# Patient Record
Sex: MALE | Race: WHITE | Employment: OTHER | URBAN - METROPOLITAN AREA
[De-identification: names, ages, dates, MRNs, and addresses within clinical notes are randomized per-mention and may not be internally consistent; named-entity substitution may affect disease eponyms.]

---

## 2017-01-05 ENCOUNTER — OFFICE VISIT (OUTPATIENT)
Dept: INTERNAL MEDICINE | Age: 82
End: 2017-01-05
Attending: INTERNAL MEDICINE

## 2017-01-05 VITALS
HEART RATE: 57 BPM | DIASTOLIC BLOOD PRESSURE: 70 MMHG | SYSTOLIC BLOOD PRESSURE: 127 MMHG | RESPIRATION RATE: 16 BRPM | WEIGHT: 230.6 LBS | OXYGEN SATURATION: 96 % | BODY MASS INDEX: 37.22 KG/M2 | TEMPERATURE: 97.6 F

## 2017-01-05 DIAGNOSIS — I35.0 AORTIC VALVE STENOSIS, UNSPECIFIED ETIOLOGY: Primary | ICD-10-CM

## 2017-01-05 LAB
ALBUMIN SERPL-MCNC: 4 G/DL (ref 3.4–5)
ANION GAP SERPL CALCULATED.3IONS-SCNC: 11 MMOL/L (ref 3–16)
BUN BLDV-MCNC: 27 MG/DL (ref 7–20)
CALCIUM SERPL-MCNC: 9.2 MG/DL (ref 8.3–10.6)
CHLORIDE BLD-SCNC: 105 MMOL/L (ref 99–110)
CO2: 28 MMOL/L (ref 21–32)
CREAT SERPL-MCNC: 1.5 MG/DL (ref 0.8–1.3)
GFR AFRICAN AMERICAN: 54
GFR NON-AFRICAN AMERICAN: 45
GLUCOSE BLD-MCNC: 126 MG/DL (ref 70–99)
HCT VFR BLD CALC: 40.1 % (ref 40.5–52.5)
HEMOGLOBIN: 13.5 G/DL (ref 13.5–17.5)
MCH RBC QN AUTO: 32.5 PG (ref 26–34)
MCHC RBC AUTO-ENTMCNC: 33.8 G/DL (ref 31–36)
MCV RBC AUTO: 96.1 FL (ref 80–100)
PDW BLD-RTO: 13.1 % (ref 12.4–15.4)
PHOSPHORUS: 3.9 MG/DL (ref 2.5–4.9)
PLATELET # BLD: 142 K/UL (ref 135–450)
PMV BLD AUTO: 7.6 FL (ref 5–10.5)
POTASSIUM SERPL-SCNC: 4.6 MMOL/L (ref 3.5–5.1)
RBC # BLD: 4.17 M/UL (ref 4.2–5.9)
SODIUM BLD-SCNC: 144 MMOL/L (ref 136–145)
WBC # BLD: 4.9 K/UL (ref 4–11)

## 2017-01-13 ENCOUNTER — TELEPHONE (OUTPATIENT)
Dept: INTERNAL MEDICINE | Age: 82
End: 2017-01-13

## 2017-01-24 ENCOUNTER — OFFICE VISIT (OUTPATIENT)
Dept: CARDIOLOGY CLINIC | Age: 82
End: 2017-01-24

## 2017-01-24 VITALS
DIASTOLIC BLOOD PRESSURE: 60 MMHG | WEIGHT: 234 LBS | SYSTOLIC BLOOD PRESSURE: 110 MMHG | BODY MASS INDEX: 37.77 KG/M2 | HEART RATE: 60 BPM

## 2017-01-24 DIAGNOSIS — I25.10 CAD IN NATIVE ARTERY: Primary | ICD-10-CM

## 2017-01-24 DIAGNOSIS — Z98.61 POST PTCA: ICD-10-CM

## 2017-01-24 DIAGNOSIS — I35.0 NONRHEUMATIC AORTIC VALVE STENOSIS: ICD-10-CM

## 2017-01-24 DIAGNOSIS — I42.9 CARDIOMYOPATHY (HCC): ICD-10-CM

## 2017-01-24 PROCEDURE — 93000 ELECTROCARDIOGRAM COMPLETE: CPT | Performed by: INTERNAL MEDICINE

## 2017-01-24 PROCEDURE — 99215 OFFICE O/P EST HI 40 MIN: CPT | Performed by: INTERNAL MEDICINE

## 2017-01-24 PROCEDURE — 1123F ACP DISCUSS/DSCN MKR DOCD: CPT | Performed by: INTERNAL MEDICINE

## 2017-01-24 PROCEDURE — 1036F TOBACCO NON-USER: CPT | Performed by: INTERNAL MEDICINE

## 2017-01-24 PROCEDURE — G8419 CALC BMI OUT NRM PARAM NOF/U: HCPCS | Performed by: INTERNAL MEDICINE

## 2017-01-24 PROCEDURE — G8484 FLU IMMUNIZE NO ADMIN: HCPCS | Performed by: INTERNAL MEDICINE

## 2017-01-24 PROCEDURE — 4040F PNEUMOC VAC/ADMIN/RCVD: CPT | Performed by: INTERNAL MEDICINE

## 2017-01-24 PROCEDURE — G8428 CUR MEDS NOT DOCUMENT: HCPCS | Performed by: INTERNAL MEDICINE

## 2017-01-24 PROCEDURE — G8598 ASA/ANTIPLAT THER USED: HCPCS | Performed by: INTERNAL MEDICINE

## 2017-01-24 RX ORDER — LISINOPRIL 40 MG/1
40 TABLET ORAL DAILY
Qty: 30 TABLET | Refills: 5
Start: 2017-01-24 | End: 2017-07-21 | Stop reason: SDUPTHER

## 2017-01-24 ASSESSMENT — ENCOUNTER SYMPTOMS
SHORTNESS OF BREATH: 0
APNEA: 0
CHOKING: 0
ABDOMINAL DISTENTION: 0
CHEST TIGHTNESS: 0
COUGH: 0

## 2017-01-26 ENCOUNTER — OFFICE VISIT (OUTPATIENT)
Dept: INTERNAL MEDICINE | Age: 82
End: 2017-01-26
Attending: INTERNAL MEDICINE

## 2017-01-26 ENCOUNTER — OFFICE VISIT (OUTPATIENT)
Dept: INTERNAL MEDICINE | Age: 82
End: 2017-01-26

## 2017-01-26 VITALS
BODY MASS INDEX: 37.32 KG/M2 | OXYGEN SATURATION: 95 % | HEART RATE: 56 BPM | TEMPERATURE: 97.8 F | DIASTOLIC BLOOD PRESSURE: 60 MMHG | SYSTOLIC BLOOD PRESSURE: 109 MMHG | RESPIRATION RATE: 16 BRPM | WEIGHT: 231.2 LBS

## 2017-01-26 DIAGNOSIS — Z09 FOLLOW UP: Primary | ICD-10-CM

## 2017-01-26 LAB
ALBUMIN SERPL-MCNC: 3.8 G/DL (ref 3.4–5)
ANION GAP SERPL CALCULATED.3IONS-SCNC: 8 MMOL/L (ref 3–16)
BUN BLDV-MCNC: 28 MG/DL (ref 7–20)
CALCIUM SERPL-MCNC: 9 MG/DL (ref 8.3–10.6)
CHLORIDE BLD-SCNC: 103 MMOL/L (ref 99–110)
CO2: 29 MMOL/L (ref 21–32)
CREAT SERPL-MCNC: 1.4 MG/DL (ref 0.8–1.3)
GFR AFRICAN AMERICAN: 58
GFR NON-AFRICAN AMERICAN: 48
GLUCOSE BLD-MCNC: 117 MG/DL (ref 70–99)
PHOSPHORUS: 3 MG/DL (ref 2.5–4.9)
POTASSIUM SERPL-SCNC: 3.6 MMOL/L (ref 3.5–5.1)
SODIUM BLD-SCNC: 140 MMOL/L (ref 136–145)

## 2017-02-09 DIAGNOSIS — N18.9 CKD (CHRONIC KIDNEY DISEASE), UNSPECIFIED STAGE: Primary | ICD-10-CM

## 2017-03-02 ENCOUNTER — TELEPHONE (OUTPATIENT)
Dept: INTERNAL MEDICINE | Age: 82
End: 2017-03-02

## 2017-03-02 DIAGNOSIS — N18.2 CKD (CHRONIC KIDNEY DISEASE), STAGE 2 (MILD): Primary | ICD-10-CM

## 2017-03-29 RX ORDER — TRIAMTERENE AND HYDROCHLOROTHIAZIDE 37.5; 25 MG/1; MG/1
1 TABLET ORAL DAILY
Qty: 30 TABLET | Refills: 3
Start: 2017-03-29 | End: 2017-07-21 | Stop reason: SDUPTHER

## 2017-03-29 RX ORDER — RANITIDINE 150 MG/1
150 TABLET ORAL 2 TIMES DAILY
Qty: 60 TABLET | Refills: 3
Start: 2017-03-29 | End: 2018-01-25 | Stop reason: SDUPTHER

## 2017-03-29 RX ORDER — DOCUSATE SODIUM 100 MG/1
100 CAPSULE, LIQUID FILLED ORAL 2 TIMES DAILY
Qty: 60 CAPSULE | Refills: 3
Start: 2017-03-29 | End: 2017-07-21 | Stop reason: SDUPTHER

## 2017-03-31 ENCOUNTER — TELEPHONE (OUTPATIENT)
Dept: CARDIOLOGY CLINIC | Age: 82
End: 2017-03-31

## 2017-04-07 DIAGNOSIS — M51.36 DDD (DEGENERATIVE DISC DISEASE), LUMBAR: Primary | ICD-10-CM

## 2017-04-14 ENCOUNTER — HOSPITAL ENCOUNTER (OUTPATIENT)
Dept: PHYSICAL THERAPY | Age: 82
Discharge: HOME | End: 2017-04-30
Admitting: PHYSICAL MEDICINE & REHABILITATION

## 2017-04-19 ENCOUNTER — HOSPITAL ENCOUNTER (OUTPATIENT)
Dept: PHYSICAL THERAPY | Age: 82
Discharge: HOME | End: 2017-04-20
Admitting: PHYSICAL MEDICINE & REHABILITATION

## 2017-04-21 ENCOUNTER — HOSPITAL ENCOUNTER (OUTPATIENT)
Dept: PHYSICAL THERAPY | Age: 82
Discharge: HOME | End: 2017-04-22
Admitting: PHYSICAL MEDICINE & REHABILITATION

## 2017-04-26 ENCOUNTER — HOSPITAL ENCOUNTER (OUTPATIENT)
Dept: PHYSICAL THERAPY | Age: 82
Discharge: HOME | End: 2017-04-27
Admitting: PHYSICAL MEDICINE & REHABILITATION

## 2017-04-28 ENCOUNTER — HOSPITAL ENCOUNTER (OUTPATIENT)
Dept: PHYSICAL THERAPY | Age: 82
Discharge: HOME | End: 2017-04-29
Admitting: PHYSICAL MEDICINE & REHABILITATION

## 2017-05-03 ENCOUNTER — HOSPITAL ENCOUNTER (OUTPATIENT)
Dept: PHYSICAL THERAPY | Age: 82
Discharge: HOME | End: 2017-05-04
Admitting: PHYSICAL MEDICINE & REHABILITATION

## 2017-05-05 ENCOUNTER — HOSPITAL ENCOUNTER (OUTPATIENT)
Dept: PHYSICAL THERAPY | Age: 82
Discharge: HOME | End: 2017-05-06
Admitting: PHYSICAL MEDICINE & REHABILITATION

## 2017-05-10 ENCOUNTER — HOSPITAL ENCOUNTER (OUTPATIENT)
Dept: PHYSICAL THERAPY | Age: 82
Discharge: HOME | End: 2017-05-11
Admitting: PHYSICAL MEDICINE & REHABILITATION

## 2017-05-24 RX ORDER — CARVEDILOL 3.12 MG/1
3.12 TABLET ORAL 2 TIMES DAILY WITH MEALS
Qty: 60 TABLET | Refills: 3 | Status: SHIPPED | OUTPATIENT
Start: 2017-05-24 | End: 2017-06-29 | Stop reason: SDUPTHER

## 2017-05-30 RX ORDER — NITROGLYCERIN 0.4 MG/1
0.4 TABLET SUBLINGUAL EVERY 5 MIN PRN
Qty: 25 TABLET | Refills: 0
Start: 2017-05-30 | End: 2018-01-25 | Stop reason: SDUPTHER

## 2017-06-01 ENCOUNTER — OFFICE VISIT (OUTPATIENT)
Dept: INTERNAL MEDICINE | Age: 82
End: 2017-06-01
Attending: INTERNAL MEDICINE

## 2017-06-01 VITALS
BODY MASS INDEX: 36.9 KG/M2 | DIASTOLIC BLOOD PRESSURE: 66 MMHG | OXYGEN SATURATION: 94 % | HEART RATE: 64 BPM | WEIGHT: 228.6 LBS | TEMPERATURE: 97.6 F | RESPIRATION RATE: 16 BRPM | SYSTOLIC BLOOD PRESSURE: 106 MMHG

## 2017-06-01 DIAGNOSIS — I95.2 HYPOTENSION DUE TO DRUGS: Primary | ICD-10-CM

## 2017-06-01 DIAGNOSIS — Z91.09 ENVIRONMENTAL ALLERGIES: ICD-10-CM

## 2017-06-01 RX ORDER — CETIRIZINE HYDROCHLORIDE 10 MG/1
10 TABLET ORAL DAILY
Qty: 30 TABLET | Refills: 3 | Status: SHIPPED | OUTPATIENT
Start: 2017-06-01 | End: 2018-01-25 | Stop reason: SDUPTHER

## 2017-06-02 ENCOUNTER — HOSPITAL ENCOUNTER (OUTPATIENT)
Dept: NON INVASIVE DIAGNOSTICS | Age: 82
Discharge: HOME | End: 2017-06-02
Attending: INTERNAL MEDICINE | Admitting: INTERNAL MEDICINE

## 2017-06-02 DIAGNOSIS — I95.2 HYPOTENSION DUE TO DRUGS: ICD-10-CM

## 2017-06-02 LAB
LV EF: 55 %
LVEF MODALITY: NORMAL

## 2017-06-16 ENCOUNTER — TELEPHONE (OUTPATIENT)
Dept: INTERNAL MEDICINE | Age: 82
End: 2017-06-16

## 2017-06-28 ENCOUNTER — TELEPHONE (OUTPATIENT)
Dept: INTERNAL MEDICINE | Age: 82
End: 2017-06-28

## 2017-06-29 ENCOUNTER — OFFICE VISIT (OUTPATIENT)
Dept: INTERNAL MEDICINE | Age: 82
End: 2017-06-29
Attending: INTERNAL MEDICINE

## 2017-06-29 VITALS
WEIGHT: 226.4 LBS | HEART RATE: 51 BPM | BODY MASS INDEX: 36.54 KG/M2 | TEMPERATURE: 97.4 F | SYSTOLIC BLOOD PRESSURE: 124 MMHG | RESPIRATION RATE: 16 BRPM | OXYGEN SATURATION: 98 % | DIASTOLIC BLOOD PRESSURE: 67 MMHG

## 2017-06-29 DIAGNOSIS — R68.89 BLOOD PRESSURE ALTERATION: Primary | ICD-10-CM

## 2017-06-29 RX ORDER — CARVEDILOL 3.12 MG/1
3.12 TABLET ORAL NIGHTLY
Qty: 30 TABLET | Refills: 3 | Status: SHIPPED | OUTPATIENT
Start: 2017-06-29 | End: 2017-11-02 | Stop reason: SDUPTHER

## 2017-07-21 RX ORDER — DOCUSATE SODIUM 100 MG/1
100 CAPSULE, LIQUID FILLED ORAL 2 TIMES DAILY
Qty: 60 CAPSULE | Refills: 3
Start: 2017-07-21 | End: 2017-11-27 | Stop reason: SDUPTHER

## 2017-07-21 RX ORDER — LISINOPRIL 40 MG/1
40 TABLET ORAL DAILY
Qty: 30 TABLET | Refills: 3
Start: 2017-07-21 | End: 2017-11-27 | Stop reason: SDUPTHER

## 2017-07-21 RX ORDER — TRIAMTERENE AND HYDROCHLOROTHIAZIDE 37.5; 25 MG/1; MG/1
1 TABLET ORAL DAILY
Qty: 30 TABLET | Refills: 3
Start: 2017-07-21 | End: 2017-11-29 | Stop reason: SDUPTHER

## 2017-07-21 RX ORDER — ASPIRIN 81 MG/1
162 TABLET ORAL DAILY
Qty: 30 TABLET | Refills: 3
Start: 2017-07-21 | End: 2017-11-13 | Stop reason: SDUPTHER

## 2017-07-25 ENCOUNTER — OFFICE VISIT (OUTPATIENT)
Dept: CARDIOLOGY CLINIC | Age: 82
End: 2017-07-25

## 2017-07-25 VITALS
DIASTOLIC BLOOD PRESSURE: 58 MMHG | BODY MASS INDEX: 36.83 KG/M2 | HEART RATE: 59 BPM | WEIGHT: 228.2 LBS | SYSTOLIC BLOOD PRESSURE: 130 MMHG

## 2017-07-25 DIAGNOSIS — I25.10 CAD IN NATIVE ARTERY: Primary | ICD-10-CM

## 2017-07-25 DIAGNOSIS — I35.0 NONRHEUMATIC AORTIC VALVE STENOSIS: ICD-10-CM

## 2017-07-25 DIAGNOSIS — I42.9 CARDIOMYOPATHY (HCC): ICD-10-CM

## 2017-07-25 DIAGNOSIS — Z98.61 POST PTCA: ICD-10-CM

## 2017-07-25 PROCEDURE — 4040F PNEUMOC VAC/ADMIN/RCVD: CPT | Performed by: INTERNAL MEDICINE

## 2017-07-25 PROCEDURE — 99214 OFFICE O/P EST MOD 30 MIN: CPT | Performed by: INTERNAL MEDICINE

## 2017-07-25 PROCEDURE — G8598 ASA/ANTIPLAT THER USED: HCPCS | Performed by: INTERNAL MEDICINE

## 2017-07-25 PROCEDURE — G8419 CALC BMI OUT NRM PARAM NOF/U: HCPCS | Performed by: INTERNAL MEDICINE

## 2017-07-25 PROCEDURE — G8427 DOCREV CUR MEDS BY ELIG CLIN: HCPCS | Performed by: INTERNAL MEDICINE

## 2017-07-25 PROCEDURE — 1036F TOBACCO NON-USER: CPT | Performed by: INTERNAL MEDICINE

## 2017-07-25 PROCEDURE — 1123F ACP DISCUSS/DSCN MKR DOCD: CPT | Performed by: INTERNAL MEDICINE

## 2017-07-27 ENCOUNTER — OFFICE VISIT (OUTPATIENT)
Dept: INTERNAL MEDICINE | Age: 82
End: 2017-07-27
Attending: INTERNAL MEDICINE

## 2017-07-27 VITALS
OXYGEN SATURATION: 96 % | WEIGHT: 228.8 LBS | SYSTOLIC BLOOD PRESSURE: 114 MMHG | DIASTOLIC BLOOD PRESSURE: 67 MMHG | RESPIRATION RATE: 16 BRPM | BODY MASS INDEX: 36.93 KG/M2 | HEART RATE: 66 BPM | TEMPERATURE: 97.8 F

## 2017-07-27 DIAGNOSIS — I10 ESSENTIAL HYPERTENSION: Primary | ICD-10-CM

## 2017-08-14 DIAGNOSIS — E78.5 HYPERLIPIDEMIA, UNSPECIFIED HYPERLIPIDEMIA TYPE: ICD-10-CM

## 2017-08-14 RX ORDER — EZETIMIBE 10 MG/1
10 TABLET ORAL NIGHTLY
Qty: 30 TABLET | Refills: 6 | Status: SHIPPED | OUTPATIENT
Start: 2017-08-14 | End: 2018-01-25 | Stop reason: SDUPTHER

## 2017-10-12 ENCOUNTER — OFFICE VISIT (OUTPATIENT)
Dept: ORTHOPEDIC SURGERY | Age: 82
End: 2017-10-12

## 2017-10-12 VITALS
HEIGHT: 66 IN | SYSTOLIC BLOOD PRESSURE: 139 MMHG | HEART RATE: 53 BPM | DIASTOLIC BLOOD PRESSURE: 69 MMHG | BODY MASS INDEX: 36.78 KG/M2 | WEIGHT: 228.84 LBS

## 2017-10-12 DIAGNOSIS — I71.40 ABDOMINAL ANEURYSM: ICD-10-CM

## 2017-10-12 DIAGNOSIS — M51.36 DDD (DEGENERATIVE DISC DISEASE), LUMBAR: Primary | ICD-10-CM

## 2017-10-12 DIAGNOSIS — M47.816 SPONDYLOSIS OF LUMBAR REGION WITHOUT MYELOPATHY OR RADICULOPATHY: ICD-10-CM

## 2017-10-12 PROCEDURE — G8598 ASA/ANTIPLAT THER USED: HCPCS | Performed by: PHYSICAL MEDICINE & REHABILITATION

## 2017-10-12 PROCEDURE — 1123F ACP DISCUSS/DSCN MKR DOCD: CPT | Performed by: PHYSICAL MEDICINE & REHABILITATION

## 2017-10-12 PROCEDURE — 99213 OFFICE O/P EST LOW 20 MIN: CPT | Performed by: PHYSICAL MEDICINE & REHABILITATION

## 2017-10-12 PROCEDURE — G8417 CALC BMI ABV UP PARAM F/U: HCPCS | Performed by: PHYSICAL MEDICINE & REHABILITATION

## 2017-10-12 PROCEDURE — 4040F PNEUMOC VAC/ADMIN/RCVD: CPT | Performed by: PHYSICAL MEDICINE & REHABILITATION

## 2017-10-12 PROCEDURE — G8427 DOCREV CUR MEDS BY ELIG CLIN: HCPCS | Performed by: PHYSICAL MEDICINE & REHABILITATION

## 2017-10-12 PROCEDURE — G8484 FLU IMMUNIZE NO ADMIN: HCPCS | Performed by: PHYSICAL MEDICINE & REHABILITATION

## 2017-10-12 PROCEDURE — 1036F TOBACCO NON-USER: CPT | Performed by: PHYSICAL MEDICINE & REHABILITATION

## 2017-10-12 NOTE — PROGRESS NOTES
30 tablet, Rfl: 3  Allergies:  Review of patient's allergies indicates no known allergies. Social History:    reports that he has never smoked. He has never used smokeless tobacco. He reports that he does not drink alcohol or use drugs. Family History:   History reviewed. No pertinent family history. REVIEW OF SYSTEMS:   CONSTITUTIONAL: Denies unexplained weight loss, fevers, chills or fatigue  NEUROLOGICAL: Denies unsteady gait or progressive weakness  MUSCULOSKELETAL: Denies joint swelling or redness  GI: Denies nausea, vomiting, diarrhea   : Denies bowel or bladder issues       PHYSICAL EXAM:    Vitals: Blood pressure 139/69, pulse 53, height 5' 5.98\" (1.676 m), weight 228 lb 13.4 oz (103.8 kg). GENERAL EXAM:  · General Apparence: Patient is adequately groomed with no evidence of malnutrition. · Psychiatric: Orientation: The patient is oriented to time, place and person. The patient's mood and affect are appropriate   · Vascular: Examination reveals no swelling and palpation reveals no tenderness in upper or lower extremities. Good capillary refill. · The lymphatic examination of the neck, axillae and groin reveals all areas to be without enlargement or induration  · Sensation is intact without deficit in the upper and lower extremities to light touch and pinprick  · Coordination of the upper and lower extremities are normal.      LUMBAR/SACRAL EXAMINATION:  · Inspection: Local inspection shows no step-off or bruising. Lumbar alignment is normal. No instability is noted. · Palpation:   No evidence of tenderness at the midline. No tenderness bilaterally at the paraspinal or trochanters. There is no paraspinal spasm. · Range of Motion: limited by 50% in all planes due to pain  · Strength:   Strength testing is 5/5 in all muscle groups tested. · Special Tests:   Straight leg raise and crossed SLR negative. Jose Alejandro's testing is negative bilaterally. FADIR's testing is negative bilaterally.   · Skin: There are no rashes, ulcerations or lesions. · Reflexes: Reflexes are symmetrically 1+ at the patellar and ankle tendons. Clonus absent bilaterally at the feet. · Gait & station: normal, patient ambulates without assistance  · Additional Examinations:  · RIGHT LOWER EXTREMITY: Inspection/examination of the right lower extremity does not show any tenderness, deformity or injury. Range of motion is normal and pain-free. There is no gross instability. There are no rashes, ulcerations or lesions. Strength and tone are normal. No atrophy or abnormal movements are noted. · LEFT LOWER EXTREMITY:  Inspection/examination of the left lower extremity does not show any tenderness, deformity or injury. Range of motion is normal and pain-free. There is no gross instability. There are no rashes, ulcerations or lesions. Strength and tone are normal. No atrophy or abnormal movements are noted. Diagnostic Testing:    MR Lumbar spine shows 2015  1. Focal 3.7cm abdominal aortic aneurysm at the L3 level. 2. Left lumbar scoliosis with increased lumbar lordosis and grade 1 anterolisthesis of L4 on L5    secondary to severe facet arthropathy resulting in borderline mild central canal stenosis with    right greater than left lateral recess stenosis, mild to moderate foraminal narrowing and    abutment of the descending L5 nerve roots bilaterally right greater than left. 3. Concentric disc displacement L5-S1 along with facet arthropathy mildly narrows the neural    foramen and abuts the exiting right L5 nerve root and the descending S1 nerve roots    bilaterally. 4. Broad-based protrusion L3-4 along with facet arthropathy and ligamentum flavum hypertrophy    results in borderline mild central canal stenosis with lateral recess stenosis and moderate    foraminal narrowing right greater than left with abutment of the exiting right L3 nerve root    and the descending L4 nerve roots bilaterally.    5. Bilobed disc displacement L2-3 results in borderline mild central canal and lateral recess    stenosis with abutment of the descending L3 nerve roots bilaterally. 6. Bilobed disc displacement L1-2 along with facet arthropathy results in abutment of the    descending L2 nerve roots bilaterally. 7. Bilateral renal cysts as described above with hypointense and possibly hemorrhagic 3.3cm    right renal cyst for which renal ultrasound is recommended as the next step in the diagnostic    algorithm. Results for orders placed or performed during the hospital encounter of 04/24/17   CBC Auto Differential   Result Value Ref Range    WBC 3.2 (L) 4.0 - 11.0 K/uL    RBC 4.00 (L) 4.20 - 5.90 M/uL    Hemoglobin 12.7 (L) 13.5 - 17.5 g/dL    Hematocrit 38.4 (L) 40.5 - 52.5 %    MCV 96.0 80.0 - 100.0 fL    MCH 31.8 26.0 - 34.0 pg    MCHC 33.1 31.0 - 36.0 g/dL    RDW 13.7 12.4 - 15.4 %    Platelets 754 (L) 441 - 450 K/uL    MPV 7.0 5.0 - 10.5 fL    Neutrophils % 37.2 %    Lymphocytes % 26.3 %    Monocytes % 27.4 %    Eosinophils % 8.5 %    Basophils % 0.6 %    Neutrophils # 1.2 (L) 1.7 - 7.7 K/uL    Lymphocytes # 0.9 (L) 1.0 - 5.1 K/uL    Monocytes # 0.9 0.0 - 1.3 K/uL    Eosinophils # 0.3 0.0 - 0.6 K/uL    Basophils # 0.0 0.0 - 0.2 K/uL   POCT Venous   Result Value Ref Range    Lactate 0.89 0.40 - 2.00 mmol/L    Sample Type CHAVA     Performed on SEE BELOW    POCT Venous   Result Value Ref Range    POC Sodium 142 136 - 145 mmol/L    POC Potassium 3.9 3.5 - 5.1 mmol/L    POC Chloride 103 99 - 110 mmol/L    CO2 25 21 - 32 mmol/L    POC Anion Gap 14 10 - 20    POC Glucose 108 (H) 70 - 99 mg/dl    POC BUN 22 (H) 7 - 18 mg/dL    POC Creatinine 1.3 0.8 - 1.3 mg/dL    GFR Non- 53 (A) >60    GFR African American >60     Calcium, Ion 1.17 1.12 - 1.32 mmol/L    Sample Type CHAVA     Performed on SEE BELOW      Impression:       1. DDD (degenerative disc disease), lumbar    2. Spondylosis of lumbar region without myelopathy or radiculopathy    3.

## 2017-10-13 ENCOUNTER — HOSPITAL ENCOUNTER (OUTPATIENT)
Dept: PHYSICAL THERAPY | Age: 82
Discharge: HOME | End: 2017-10-31
Attending: PHYSICAL MEDICINE & REHABILITATION | Admitting: PHYSICAL MEDICINE & REHABILITATION

## 2017-10-13 NOTE — PROGRESS NOTES
Physical Therapy  Initial Assessment  Date: 10/13/2017  Patient Name: Maurilio Gee  MRN: [id number]  : 1932     Treatment Diagnosis: Low back pain that interferes with function    Restrictions HTN, HD- stents ~ 22 y/o, OA, chronic LBP       Subjective   General  Chart Reviewed: Yes  Patient assessed for rehabilitation services?: Yes  Additional Pertinent Hx: PMH: HTN, HD- stents ~ 22 y/o, OA, chronic LBP  Family / Caregiver Present: No  Referring Practitioner: Dr. Deborah Reardon  Referral Date : 10/12/17  Diagnosis: Lumbar DDD    M51.36  PT Visit Information  PT Insurance Information: Medicare   $7840 yearly limit-- used ? ? Subjective  Subjective: Pt is 80year old male with over 5 year h/o low back pain that has been eased in the past with KAR and physical therapy. Pt completed a course of therapy 2017 and reports getting relief with exercises and manual therapy but the pain has since increased and limits his ability to walk longer than 10 minutes. Pt states \" I used to be able to walk for over an hour without this pain\". He uses an LS corset for \" extra support\" during the winter months. He denies falls but admits to feeling more confident using a RW in the evening at home but does not use during the day of for outings.   Pain Screening  Patient Currently in Pain: Yes  Vital Signs  Patient Currently in Pain: Yes 0-6/10 Low back L>R with frequent L post lateral thigh pain to knee that is worse at night    Vision/Hearing  Vision  Vision: Within Functional Limits  Hearing  Hearing: Within functional limits    Social/Functional History  Social/Functional History  Lives With: Spouse  Type of Home: Apartment  Home Layout: One level  Home Access: Elevator  Home Equipment: Rolling walker  ADL Assistance: Independent  Homemaking Assistance: Independent  Homemaking Responsibilities: Yes (shares with wife)  Ambulation Assistance: Independent (uses rolling walker in home during the evening and night to bathroom)  Active : Yes  Mode of Transportation: Car  Occupation: Retired  Leisure & Hobbies: Outings to daughters apt- struggles due to steps, swims at [employer] center weekly  Additional Comments: PLOF: Was able to walk with less pain 5 months ago after completing course of therapy  Objective     Observation/Palpation  Palpation: No sig tenderness or spasm to palpation in LS spine or adjacent paraspinals  Observation: Thoracic kyphosis, R shoulder eleveted standing, R lateral trunk shift in standing    AROM RLE (degrees)  RLE General AROM: Hip flexion 110 with stiffness, extension limited 5 deg due to tightenss, knee WFL  AROM LLE (degrees)  LLE General AROM: HIp flexion 105 with stiffnes, knee [place]  Spine  Lumbar: Flex 50% with pain and stiffness, hip hinges;  Extension 75% no pain, L SB 25% stiff, R SB 50% stiff     Strength RLE  Comment: hip abd 4-/5, knee ext 4/5, ankle DF 4+/5  Strength LLE  Comment: hip abd 4-/5, knee ext 4/5, ankle DF 4+/5  Tone RLE  RLE Tone: Normotonic  Tone LLE  LLE Tone: Normotonic  Additional Measures  Other: Gait is antalgic with forward and  L lateral lean no AD, 2.6 ft sec no LOB; TUG= 20 sec no AD with moderate struggle to complete sit to stand  Sensation  Overall Sensation Status: WFL  Bed mobility  Bridging: Independent  Rolling to Left: Independent  Rolling to Right: Independent  Supine to Sit: Independent  Sit to Supine: Independent  Scooting: Independent  Transfers  Sit to Stand: Modified independent  Stand to sit: Modified independent  Comment: moderate struggle with heavy use of B UE     Balance  Comments: TUG= 20 sec no AD ( pt self reports that he feels unsteady walking in the evening at times and uses his rolling walker  =  Assessment   Conditions Requiring Skilled Therapeutic Intervention  Assessment: Pt is 80year old male with exacerbation of chronic low back pain  that interferes with his ability to walk desired distances within his home, to attend community  outings and to walk for ecxercise. Evauation  reveals aberrant posture, decreased lumbar and bilateral hip flexibility, weak LS stabilizers, decreased segmental mobility of LS spine, decreased functional strength and balance per TUG scores score of 20. Pt will benefit from skilled PT to  allow for increased functional mobility withinhis environment with reduced pain and restriction and to prevent further dysfunction. Treatment Diagnosis: Low back pain that interferes with function  Prognosis: Good  Decision Making: Medium Complexity  Patient Education: Role of PT and POC, benefit of completing consistent exercise program in addition to swimming  to reduce symptoms  Barriers to Learning: Mild language barrier  REQUIRES PT FOLLOW UP: Yes  Activity Tolerance  Activity Tolerance: Patient Tolerated treatment well  Activity Tolerance: Pt reported decreased low back pain when walking after session         Plan   Plan  Times per week: 2  Plan weeks: 5  Current Treatment Recommendations: Strengthening, ROM, Balance Training, Functional Mobility Training, Gait Training, Manual Therapy - Soft Tissue Mobilization, Safety Education & Training, Patient/Caregiver Education & Training, Home Exercise Program    G-Code  PT G-Codes  Functional Assessment Tool Used: Modified Oswestry  Score: 34% limited  Functional Limitation: Mobility: Walking and moving around  Mobility: Walking and Moving Around Current Status (): At least 20 percent but less than 40 percent impaired, limited or restricted  Mobility: Walking and Moving Around Goal Status ():  At least 1 percent but less than 20 percent impaired, limited or restricted                 Goals  Long term goals  Time Frame for Long term goals : 5 weeks  Long term goal 1: Pt will be indep with HEP  Long term goal 2: Pt will complete TUG score in less than 12 sec to reflect increased functional strength and improved balance  Long term goal 3: Pt will sleep restfully without LE pain  Long term goal 4: Pt will ambulate for at least 45 minutes to with less than 2/10 back pain and will negotiate steps to visit his daughter without struggle  Patient Goals   Patient goals :  To have less pain when walking       Therapy Time   Individual Concurrent Group Co-treatment   Time In 0940         Time Out [address] [place] [address]

## 2017-10-13 NOTE — FLOWSHEET NOTE
Physical Therapy Daily Treatment Note  Date:  10/13/2017    Patient Name:  Tamiko Muñoz    :  1932  MRN: [id number]    Restrictions/Precaution:  HTN, HD- stents ~ 22 y/o, OA, chronic LBP    Medical/Treatment Diagnosis Information:  · Diagnosis: Lumbar DDD    M51.36  · Treatment Diagnosis: Low back pain that interferes with function  Insurance/Certification information:  PT Insurance Information: Medicare   $1980 yearly limit-- used ? ? Physician Information:  Referring Practitioner: Dr. Russell Blank of care signed (Y/N):  Sig pending  Visit# / total visits:  1/10  Pain level: 0-6/10     G-Code noted on 10/13/2017:   PT G-Codes  Functional Assessment Tool Used: Modified Oswestry  Score: 34% limited  Functional Limitation: Mobility: Walking and moving around  Mobility: Walking and Moving Around Current Status (): At least 20 percent but less than 40 percent impaired, limited or restricted  Mobility: Walking and Moving Around Goal Status (): At least 1 percent but less than 20 percent impaired, limited or restricted    Subjective:  Pt is 80year old male with over 5 year h/o low back pain that has been eased in the past with KAR and physical therapy. Pt completed a course of therapy 2017 and reports getting relief with exercises and manual therapy but the pain has since increased and limits his ability to walk longer than 10 minutes. Pt states \" I used to be able to walk for over an hour without this pain\". He uses an LS corset for \" extra support\" during the winter months. He denies falls but admits to feeling more confident using a RW in the evening at home but does not use during the day of for outings.   Pain Screening  Patient Currently in Pain: Yes  Vital Signs  Patient Currently in Pain: Yes 0-6/10 Low back L>R with frequent L post lateral thigh pain to knee that is worse at night       Objective:   -Observation: See initial evaluation.    -Exercises: see flow sheet below    -Home

## 2017-10-16 ENCOUNTER — TELEPHONE (OUTPATIENT)
Dept: ORTHOPEDIC SURGERY | Age: 82
End: 2017-10-16

## 2017-10-19 ENCOUNTER — TELEPHONE (OUTPATIENT)
Dept: ORTHOPEDIC SURGERY | Age: 82
End: 2017-10-19

## 2017-10-26 ENCOUNTER — OFFICE VISIT (OUTPATIENT)
Dept: INTERNAL MEDICINE | Age: 82
End: 2017-10-26
Attending: INTERNAL MEDICINE

## 2017-10-26 VITALS
SYSTOLIC BLOOD PRESSURE: 167 MMHG | WEIGHT: 230.6 LBS | RESPIRATION RATE: 16 BRPM | OXYGEN SATURATION: 94 % | HEIGHT: 66 IN | TEMPERATURE: 97.7 F | HEART RATE: 48 BPM | BODY MASS INDEX: 37.06 KG/M2 | DIASTOLIC BLOOD PRESSURE: 71 MMHG

## 2017-10-26 DIAGNOSIS — I35.0 AORTIC VALVE STENOSIS, UNSPECIFIED ETIOLOGY: ICD-10-CM

## 2017-10-26 DIAGNOSIS — I71.40 ABDOMINAL AORTIC ANEURYSM (AAA) WITHOUT RUPTURE: ICD-10-CM

## 2017-10-26 DIAGNOSIS — R29.898 WEAKNESS OF BOTH LOWER EXTREMITIES: ICD-10-CM

## 2017-10-26 DIAGNOSIS — I25.10 CORONARY ARTERY DISEASE INVOLVING NATIVE HEART, ANGINA PRESENCE UNSPECIFIED, UNSPECIFIED VESSEL OR LESION TYPE: Primary | ICD-10-CM

## 2017-10-26 DIAGNOSIS — I10 ESSENTIAL HYPERTENSION: ICD-10-CM

## 2017-10-26 DIAGNOSIS — N18.2 STAGE 2 CHRONIC KIDNEY DISEASE: ICD-10-CM

## 2017-10-26 NOTE — PROGRESS NOTES
[facility]       NURSING PROGRESS NOTE      October 26, 2017  Maurilio Gee    Chief Complaint:   Chief Complaint   Patient presents with    Follow-up    Urinary Frequency     every hour at night    Extremity Weakness     going to PT     Pain     2 day onset pain under left arm     Patient states he is taking all of his medications as ordered but does not want to review the printed list.  Constitutional: alert and oriented; denies illness; weight stable. Eyes: negative  Ears, nose, mouth, throat, and face: negative  Respiratory: negative   States he has not had to use his Albuterol INhaler. Cardiovascular: negative  Gastrointestinal: negative  Genitourinary: states he has been urinating every hour at night  Integument/breast: negative  Hematologic/lymphatic: negative  Musculoskeletal: states he has been going to Physical Therapy for leg strengthening due to lower extremity weakness. denies falls. onset 2 days of pain under left arm and he thinks it Is due to the physical therapy.   Neurological: negative  Diabetes: No    Pain Assessment:  Pain Present: yes  Pain Score: 2  Pain Quality/Description: Aching area under left arm    Mobility/Safety/Self-Care:  Steady Gait: yes  History of Falls: No   History of a Fall within the last 30 days No  Assistive Device: None  Poor Hygiene: No    Psychosocial:   Depression: No  If YES,    Does Patient express current thoughts of harming self or others?: No  Anxious: No  Insomnia: No  Inappropriate Behavior: No  Alcohol Abuse: no  Substance Abuse: no  Signs of Physical Abuse: No  Signs of Emotional Abuse: No    Educational:  Identify the learner who is being assessed for education:  patient                    Ability to Learn:  Exhibits ability to grasp concepts and respond to questions: Medium  Ready to Learn: Yes  calm   Preferred Method of Learning:  written  Barriers to Learning: Verbalizes interest  Special Considerations due to cultural, Denominational,

## 2017-10-26 NOTE — PROGRESS NOTES
Department Of Internal Medicine  General Medicine/Primary Care  Established Patient Visit    Patient:  Hany Hernandez   : 1932  Age: 80 y.o. MRN: [id number]  Date : 2015    History Obtained From:  patient    CHIEF COMPLAINT: follow up, chest pain    HISTORY OF PRESENT ILLNESS:   80 y.o. male who presents for follow up, he has PMH of CAD sp PCI (2 stents in ~), hypertension, dyslipidemia, BPH, cellulitis, kidney stones, cellulitis, 3.7 cm AAA, comes for follow up. Has history of AS and I was concerned of progression, repeat echo in  showed stable AS for grade I diastolic dysfunction. BP better controlled today not at goal for AAA however he was having symptomatic hypotension with other medications therefore I don't believe we can increase BP medsat this point. PMR physician gave him a referal for vascular surgery for AAA, he doesn't have indications for repair at this time. Has been going to PT for hip pain and has some non-reproducible pain under the left axilla for 2-3 days, the patient is a poor historian but states the pain starts sometimes at rest, sometimes with exercise and he is not sure about the duration or what makes it better or worse. Denies palpitation, SOB, nausea, vomiting, diarrhea, urinary symptoms and leg swelling. Past Medical History:    Past Medical History          Diagnosis  Date      CAD (coronary artery disease)       Hypertension       Hyperlipidemia       Arthritis       Prostate enlargement       DDD (degenerative disc disease), lumbar       L5, S1      Mild aortic stenosis       Constipation            Past Surgical History:    Past Surgical History        Procedure  Laterality  Date      Turp        Coronary angioplasty with stent placement   98      Skin tag removal   2/2/10         Family History:    Family History    No family history on file. Social History:   TOBACCO:  reports that he has never smoked.  He does not have any smokeless tobacco history on file. ETOH:  reports that he does not drink alcohol. OCCUPATION: retired    Allergies:  Review of patient's allergies indicates no known allergies. Current Medications:   Home Medications   Prior to Admission medications     Medication  Sig  Start Date  End Date  Taking? Authorizing Provider    Multiple Vitamin (MULTI-VITAMIN) TABS  Take 1 tablet by mouth daily  7/28/15   Yes  Sean Murdock MD    Nystatin POWD  Apply 1 each topically 2 times daily  6/10/15   Yes  Chung Suarez MD    docusate sodium (COLACE) 100 MG capsule  Take 1 capsule by mouth 2 times daily  6/2/15   Yes  Mariia Kevin MD    BACITRACIN-POLYMYXIN B, OPHTH, (AK-POLY-BAC) OINT  Sig: Apply a 1/2 inch ribbon to both eyes every 6 hours  4/19/15   Yes  Gerard Corado CNP    Skin Protectants, Misc. (EUCERIN) cream  Apply topically as needed. 4/8/15   Yes  Chung Suarez MD    ranitidine (ZANTAC) 150 MG tablet  Take 1 tablet by mouth nightly. 2/2/15   Yes  Ruslan Cheng MD    lisinopril (PRINIVIL;ZESTRIL) 20 MG tablet  Take 1 tablet by mouth 2 times daily. 1/5/15   Yes  Husam Barlow MD    hydrochlorothiazide (HYDRODIURIL) 25 MG tablet  Take 1 tablet by mouth daily. 10/29/14   Yes  Romy Alvarez MD    Fiber POWD  Take 1 each by mouth 2 times daily. 8/13/13   Yes  Kitty Farrar MD    hydrocortisone (ANUSOL-HC) 2.5 % rectal cream  Place rectally 2 times daily. 5/29/13   Yes  Chung Suarez MD    doxazosin (CARDURA) 8 MG tablet  Take 1 tablet by mouth daily. 4/17/13   Yes  Hemalatha Fonseca MD    aspirin EC 81 MG EC tablet  Take 162 mg by mouth daily. Yes  Historical Provider, MD    ezetimibe (ZETIA) 10 MG tablet  Take 10 mg by mouth nightly. Yes  Historical Provider, MD    diltiazem (CARTIA XT) 300 MG ER capsule  Take 300 mg by mouth daily. Yes  Historical Provider, MD    atorvastatin (LIPITOR) 40 MG tablet  Take 80 mg by mouth daily.     Yes  Historical Provider, MD    betamethasone dipropionate (DIPROLENE) 0.05 % ointment  Apply topically daily. 6/10/15    Diana Sinha MD           REVIEW OF SYSTEMS:  · Negative except for HPI  Physical Exam:       Vitals: BP (!) 167/71 (Site: Left Arm, Position: Sitting, Cuff Size: Large Adult)   Pulse (!) 48   Temp 97.7 °F (36.5 °C) (Oral)   Resp 16   Ht 5' 6\" (1.676 m)   Wt 230 lb 9.6 oz (104.6 kg)   SpO2 94%   BMI 37.22 kg/m²     Body mass index is 36.46 kg/(m^2). Wt Readings from Last 3 Encounters:    08/06/15  225 lb 12.8 oz (102.422 kg)    07/08/15  226 lb 3.2 oz (102.604 kg)    06/03/15  231 lb (104.781 kg)      · Gen - Alert, no signs of distress, appears stated age and cooperative  · HEENT - NC/AT  · Eye - injected conjunctiva  · Neck / Thyroid - Supple, no masses, nodes, nodules or enlargement. No adenopathy, no carotid bruit, no JVD, supple, symmetrical, trachea midline and thyroid not enlarged, symmetric, no tenderness/mass/nodules  · CVS - III/VI systolic murmur located in the right second intercostal space (increased from previous exams)  · Resp - No accessory muscle use. No crackles. No wheezing. No rhonchi  · GI - Non-tender. Non-distended. No masses. No organmegaly. Normal bowel sounds  · MSK - No cyanosis. No joint deformity.  No clubbing  · Neuro -grossly intact    LABS:    CBC:   Lab Results    Component  Value  Date     WBC  4.9  7/8/2015     HGB  12.9*  7/8/2015     HCT  39.7*  7/8/2015     MCV  96.3  7/8/2015     PLT  141  7/8/2015      No results found for this basename: iron, tibc, ferritin, folate, [id number], pth      BMP:   Lab Results    Component  Value  Date     NA  144  7/8/2015     K  4.0  7/8/2015     CL  108  7/8/2015     CO2  29  7/8/2015        LFT's:   Lab Results    Component  Value  Date     ALT  32  11/28/2012     AST  30  11/28/2012     ALKPHOS  97  11/28/2012     BILITOT  1.0  11/28/2012        Lipids:   Lab Results    Component  Value  Date     CHOL  116  3/18/2015     HDL  55  3/18/2015     LDLCALC  44  3/18/2015

## 2017-10-28 ENCOUNTER — HOSPITAL ENCOUNTER (OUTPATIENT)
Dept: NUCLEAR MEDICINE | Age: 82
Discharge: HOME | End: 2017-10-28
Admitting: INTERNAL MEDICINE

## 2017-10-28 ENCOUNTER — HOSPITAL ENCOUNTER (OUTPATIENT)
Dept: NON INVASIVE DIAGNOSTICS | Age: 82
Discharge: HOME | End: 2017-10-26
Attending: INTERNAL MEDICINE | Admitting: INTERNAL MEDICINE

## 2017-10-28 DIAGNOSIS — I25.10 ATHEROSCLEROTIC HEART DISEASE OF NATIVE CORONARY ARTERY WITHOUT ANGINA PECTORIS: ICD-10-CM

## 2017-10-28 DIAGNOSIS — I25.10 CORONARY ARTERY DISEASE INVOLVING NATIVE HEART, ANGINA PRESENCE UNSPECIFIED, UNSPECIFIED VESSEL OR LESION TYPE: ICD-10-CM

## 2017-10-28 LAB
LV EF: 38 %
LVEF MODALITY: NORMAL

## 2017-11-01 ENCOUNTER — HOSPITAL ENCOUNTER (OUTPATIENT)
Dept: OTHER | Age: 82
Discharge: HOME | End: 2017-11-30
Attending: PHYSICAL MEDICINE & REHABILITATION | Admitting: PHYSICAL MEDICINE & REHABILITATION

## 2017-11-01 ENCOUNTER — TELEPHONE (OUTPATIENT)
Dept: INTERNAL MEDICINE | Age: 82
End: 2017-11-01

## 2017-11-01 NOTE — TELEPHONE ENCOUNTER
Results from stress test reviewed, it showed a decrease in EF and a possible MI, patient was complaining of mild atypical chest pain last week and I gave him an order for a stress test.   I called the patient to communicate the results and to ask him to make an appointment soon with cardiology, he said he will call now and he will come to see me in clinic for his scheduled appointment tomorrow    Yasmany Wilhelm MD PGY3  Pager: [phone]

## 2017-11-02 ENCOUNTER — OFFICE VISIT (OUTPATIENT)
Dept: INTERNAL MEDICINE | Age: 82
End: 2017-11-02
Attending: INTERNAL MEDICINE

## 2017-11-02 VITALS
TEMPERATURE: 98 F | SYSTOLIC BLOOD PRESSURE: 113 MMHG | RESPIRATION RATE: 16 BRPM | HEART RATE: 58 BPM | DIASTOLIC BLOOD PRESSURE: 72 MMHG | WEIGHT: 228.6 LBS | OXYGEN SATURATION: 94 % | BODY MASS INDEX: 36.9 KG/M2

## 2017-11-02 DIAGNOSIS — I25.10 CORONARY ARTERY DISEASE INVOLVING NATIVE HEART, ANGINA PRESENCE UNSPECIFIED, UNSPECIFIED VESSEL OR LESION TYPE: Primary | ICD-10-CM

## 2017-11-02 LAB
ALBUMIN SERPL-MCNC: 3.9 G/DL (ref 3.4–5)
ANION GAP SERPL CALCULATED.3IONS-SCNC: 6 MMOL/L (ref 3–16)
BASOPHILS ABSOLUTE: 0 K/UL (ref 0–0.2)
BASOPHILS RELATIVE PERCENT: 0.7 %
BUN BLDV-MCNC: 36 MG/DL (ref 7–20)
CALCIUM SERPL-MCNC: 9.2 MG/DL (ref 8.3–10.6)
CHLORIDE BLD-SCNC: 106 MMOL/L (ref 99–110)
CO2: 32 MMOL/L (ref 21–32)
CREAT SERPL-MCNC: 1.5 MG/DL (ref 0.8–1.3)
EOSINOPHILS ABSOLUTE: 0.3 K/UL (ref 0–0.6)
EOSINOPHILS RELATIVE PERCENT: 5.2 %
GFR AFRICAN AMERICAN: 54
GFR NON-AFRICAN AMERICAN: 44
GLUCOSE BLD-MCNC: 73 MG/DL (ref 70–99)
HCT VFR BLD CALC: 39.7 % (ref 40.5–52.5)
HEMOGLOBIN: 13.3 G/DL (ref 13.5–17.5)
INR BLD: 1.44 (ref 0.85–1.15)
LYMPHOCYTES ABSOLUTE: 1.8 K/UL (ref 1–5.1)
LYMPHOCYTES RELATIVE PERCENT: 31.8 %
MCH RBC QN AUTO: 32.3 PG (ref 26–34)
MCHC RBC AUTO-ENTMCNC: 33.5 G/DL (ref 31–36)
MCV RBC AUTO: 96.4 FL (ref 80–100)
MONOCYTES ABSOLUTE: 1 K/UL (ref 0–1.3)
MONOCYTES RELATIVE PERCENT: 18 %
NEUTROPHILS ABSOLUTE: 2.5 K/UL (ref 1.7–7.7)
NEUTROPHILS RELATIVE PERCENT: 44.3 %
PDW BLD-RTO: 13.4 % (ref 12.4–15.4)
PHOSPHORUS: 3 MG/DL (ref 2.5–4.9)
PLATELET # BLD: 128 K/UL (ref 135–450)
PMV BLD AUTO: 7.8 FL (ref 5–10.5)
POTASSIUM SERPL-SCNC: 4.1 MMOL/L (ref 3.5–5.1)
PROTHROMBIN TIME: 16.3 SEC (ref 9.6–13)
RBC # BLD: 4.12 M/UL (ref 4.2–5.9)
SODIUM BLD-SCNC: 144 MMOL/L (ref 136–145)
WBC # BLD: 5.7 K/UL (ref 4–11)

## 2017-11-02 RX ORDER — CARVEDILOL 3.12 MG/1
3.12 TABLET ORAL 2 TIMES DAILY
Qty: 60 TABLET | Refills: 3 | Status: SHIPPED | OUTPATIENT
Start: 2017-11-02 | End: 2017-12-27 | Stop reason: SDUPTHER

## 2017-11-02 NOTE — PROGRESS NOTES
Department Of Internal Medicine  General Medicine/Primary Care  Established Patient Visit    Patient:  Leatha Luo   : 1932  Age: 80 y.o. MRN: [id number]  Date : 2015    History Obtained From:  patient    CHIEF COMPLAINT: follow up, chest pain    HISTORY OF PRESENT ILLNESS:   80 y.o. male who presents for follow up, he has PMH of CAD sp PCI (2 stents in ~), hypertension, dyslipidemia, BPH, cellulitis, kidney stones, cellulitis, 3.7 cm AAA, comes for follow up. Has history of AS, repeat echo in  showed stable AS for grade I diastolic dysfunction. . PMR physician gave him a referal for vascular surgery for AAA, he doesn't have indications for repair at this time. Last week, I saw him with this vague history of chest pain that sounded atypical, EKG was unchanged, discussed with Dr Nilam Wong and recommended an outpatient stress test, the results showed a small infract in the inferior wall, he was notified yesterday and I recommended to make an appointment with Dr Nilam Wong, he is going tomorrow. Meanwhile, the chest pressure resolved. The patient decided to increase his coreg to BID because of the chest pain, it was decreased a couple of months ago because he was bradycardic and dizzy, now he is tolerating well and wants to take BID (/72 HR 58 today), I told him to continue for today and he can discuss with Dr Nilam Wong tomorrow. I will get renal panel and CBC in case cardiology decide to proceed with an angiogram.     Denies palpitation, SOB, nausea, vomiting, diarrhea, urinary symptoms and leg swelling.      Past Medical History:    Past Medical History          Diagnosis  Date      CAD (coronary artery disease)       Hypertension       Hyperlipidemia       Arthritis       Prostate enlargement       DDD (degenerative disc disease), lumbar       L5, S1      Mild aortic stenosis       Constipation            Past Surgical History:    Past Surgical History        Procedure  Laterality  Date   Turp        Coronary angioplasty with stent placement   1/12/98      Skin tag removal   2/2/10         Family History:    Family History    No family history on file. Social History:   TOBACCO:  reports that he has never smoked. He does not have any smokeless tobacco history on file. ETOH:  reports that he does not drink alcohol. OCCUPATION: retired    Allergies:  Review of patient's allergies indicates no known allergies. Current Medications:   Home Medications   Prior to Admission medications     Medication  Sig  Start Date  End Date  Taking? Authorizing Provider    Multiple Vitamin (MULTI-VITAMIN) TABS  Take 1 tablet by mouth daily  7/28/15   Yes  Arie , MD    Nystatin POWD  Apply 1 each topically 2 times daily  6/10/15   Yes  Prince Brooke MD    docusate sodium (COLACE) 100 MG capsule  Take 1 capsule by mouth 2 times daily  6/2/15   Yes  Vangie Rubio MD    BACITRACIN-POLYMYXIN B, OPHTH, (AK-POLY-BAC) OINT  Sig: Apply a 1/2 inch ribbon to both eyes every 6 hours  4/19/15   Yes  Rolanda Duenas CNP    Skin Protectants, Misc. (EUCERIN) cream  Apply topically as needed. 4/8/15   Yes  Prince Brooke MD    ranitidine (ZANTAC) 150 MG tablet  Take 1 tablet by mouth nightly. 2/2/15   Yes  Adrian Ring MD    lisinopril (PRINIVIL;ZESTRIL) 20 MG tablet  Take 1 tablet by mouth 2 times daily. 1/5/15   Yes  Kisha Marin MD    hydrochlorothiazide (HYDRODIURIL) 25 MG tablet  Take 1 tablet by mouth daily. 10/29/14   Yes  Laila Talbert MD    Fiber POWD  Take 1 each by mouth 2 times daily. 8/13/13   Yes  Joce Feldman MD    hydrocortisone (ANUSOL-HC) 2.5 % rectal cream  Place rectally 2 times daily. 5/29/13   Yes  Prince Brooke MD    doxazosin (CARDURA) 8 MG tablet  Take 1 tablet by mouth daily. 4/17/13   Yes  Jonah Oneil MD    aspirin EC 81 MG EC tablet  Take 162 mg by mouth daily. Yes  Historical Provider, MD    ezetimibe (ZETIA) 10 MG tablet  Take 10 mg by mouth nightly.      Yes Historical Provider, MD    diltiazem (CARTIA XT) 300 MG ER capsule  Take 300 mg by mouth daily. Yes  Historical Provider, MD    atorvastatin (LIPITOR) 40 MG tablet  Take 80 mg by mouth daily. Yes  Historical Provider, MD    betamethasone dipropionate (DIPROLENE) 0.05 % ointment  Apply topically daily. 6/10/15    Yolanda Ferrara MD           REVIEW OF SYSTEMS:  · Negative except for HPI  Physical Exam:       Vitals: /72 (Site: Right Arm, Position: Sitting, Cuff Size: Medium Adult)   Pulse 58   Temp 98 °F (36.7 °C) (Oral)   Resp 16   Wt 228 lb 9.6 oz (103.7 kg)   SpO2 94%   BMI 36.90 kg/m²     Body mass index is 36.46 kg/(m^2). Wt Readings from Last 3 Encounters:    08/06/15  225 lb 12.8 oz (102.422 kg)    07/08/15  226 lb 3.2 oz (102.604 kg)    06/03/15  231 lb (104.781 kg)      · Gen - Alert, no signs of distress, appears stated age and cooperative  · HEENT - NC/AT  · Eye - injected conjunctiva  · Neck / Thyroid - Supple, no masses, nodes, nodules or enlargement. No adenopathy, no carotid bruit, no JVD, supple, symmetrical, trachea midline and thyroid not enlarged, symmetric, no tenderness/mass/nodules  · CVS - III/VI systolic murmur located in the right second intercostal space (increased from previous exams)  · Resp - No accessory muscle use. No crackles. No wheezing. No rhonchi  · GI - Non-tender. Non-distended. No masses. No organmegaly. Normal bowel sounds  · MSK - No cyanosis. No joint deformity.  No clubbing  · Neuro -grossly intact    LABS:    CBC:   Lab Results    Component  Value  Date     WBC  4.9  7/8/2015     HGB  12.9*  7/8/2015     HCT  39.7*  7/8/2015     MCV  96.3  7/8/2015     PLT  141  7/8/2015      No results found for this basename: iron, tibc, ferritin, folate, [id number], pth      BMP:   Lab Results    Component  Value  Date     NA  144  7/8/2015     K  4.0  7/8/2015     CL  108  7/8/2015     CO2  29  7/8/2015        LFT's:   Lab Results    Component  Value  Date     ALT  32 11/28/2012     AST  30  11/28/2012     ALKPHOS  97  11/28/2012     BILITOT  1.0  11/28/2012        Lipids:   Lab Results    Component  Value  Date     CHOL  116  3/18/2015     HDL  55  3/18/2015     LDLCALC  44  3/18/2015     TRIG  87  3/18/2015        INR:   No results found for this basename: INR, PROTIME        U/A:  Lab Results    Component  Value  Date     LABMICR  SEE BELOW  7/8/2015        Lab Results    Component  Value  Date     LABA1C  5.8  7/1/2015      Lab Results    Component  Value  Date     CREATININE  1.2  7/8/2015        Assessment/Plan:    80 y.o. male who presents for follow up:    Chest pain: atypical with a positive nuclear stress test. He has the pressure on the lest side of the chest last week, denies any episodes since the last visit   - Patient is a poor historian, and the pain sounded atypical but given his history cardiology was consulted and a stress test was ordered  - EKG didn't show any acute changes  - Stress test showed a small infract in the inferior wall and a 15% drop on EF, I notified the patient yesterday and he will see his cardiologist tomorrow, I ordered basic labs in case he needs an angiogram   - Continue asa   - Patient was advised to go to the ED if pain worsens    Essential HTN: better controlled and at goal for a small AAA (3.7 cm), coreg decreased a couple of months ago due to symptomatic bradycardia, the patient increased to BID last week when the pain started and he is now tolerating well, he will discuss dose and schedule with Dr Juan Antonio Matthew d/cd 6/17 due to symptomatic hypotension   - Continue lisinopril 40mg qD  - Continue tramtere-HCTZ 37.5-25 mg qD  - Coreg 3.125 BID?  - Statin     AAA  3.7 cm in 2016  - Will obtain a repeat abdominal US for monitoring  - Was referred to vascular surgery by PM&R    CKD creatinine is 1.3 from a baseline of 1.1, most likely secondary to progression of CKD -no volume overload or depletion, no other clear etiology for CARLI, this happened before episodic hypotension - Will monitor closely  - Will repeat today    Coronary artery disease stable s/p PCI, denies CP, SOB, palpitations  - Lexiscan and angiogram 12/15, no obstructive disease  - Started following with Dr Amos Seen in 2017  - BB, aspirin and statin     Hyperlipidemia stable, continue high dose statin and Zetia    Arthritis; stable, denies pain. Swimming 2-4x per week with mild left shoulder pain that lasts 2-3 hours after exercise, hasn't have any pain with exertion     BPH: sp TURP tx with doxazocin    Degenerative disc disease - lumbar: stable   - On PPT    AS, f/u cardiology assessment and plan, no interventions at this time     Pneumococcal vaccine and flu shot offered, patient refused   Today, we had an , so I discussed code status, he wants to be a full code, he gets anxious with this conversation and he never lets me explain me why is it important to have this discussions in the outpatient setting.       Real Hagen MD PGY3  Pager: [phone]

## 2017-11-02 NOTE — PATIENT INSTRUCTIONS
Before any of you medication is completely gone, call your pharmacy AT LEAST 1 WEEK ahead for refills. Review all information regarding your medication before starting. If you become ill when the clinic is closed, please call the [facility]  at   #[phone] and ask the  to page the AOD between 6:00 AM and 6:00 PM or page the AON between 6:00 PM and 6:00 am    Labs are done Tuesday thru Friday from 8:00 to 9:00 AM. For fasting labs do not eat anything for 12 hours prior. You may drink water. The clinic is not able to process MY CHART requests for appointments or messages including requests. Please call the [address] [place] [address] at [phone]  For appointments and messages. Call your pharmacy for medication refills.        Return to clinic 2 months    Approved to take Coreg BID by Dr Kim Dietrich. Patient has been doing this    Continue medication as listed on discharge sheet    Instructions reviewed before discharge and copy given to patient    [address] [phone]

## 2017-11-02 NOTE — PROGRESS NOTES
[facility] [facility] PROGRESS NOTE      November 2, 2017  Anatoliy Gallegos    Chief Complaint:   Chief Complaint   Patient presents with    Results     stress test.       Constitutional: negative  Eyes: negative  Ears, nose, mouth, throat, and face: negative  Respiratory: negative  Cardiovascular: negative, has a positive stress test . To see cardiologist tomorrow.   Gastrointestinal: negative  Genitourinary: negative  Integument/breast: negative  Hematologic/lymphatic: negative  Musculoskeletal: negative  Neurological: negative  Diabetes: No  Yes:  Medication compliance:    Diabetic diet compliance:    Home glucose monitoring:    Last eye exam:     Acute symptoms hyperglycemia:    Acute symptoms hypoglycemia:    POC glucose today:  mg/dL    Pain Assessment:  Pain Present: no  Pain Score: 0  Pain Quality/Description:     Mobility/Safety/Self-Care:  Steady Gait: Yes  History of Falls: No   History of a Fall within the last 30 days No  Assistive Device: None  Poor Hygiene: No    Psychosocial:   Depression: No  If YES,    Does Patient express current thoughts of harming self or others?: No  Anxious: No  Insomnia: No  Inappropriate Behavior: No  Alcohol Abuse: no  Substance Abuse: unknown  Signs of Physical Abuse: No  Signs of Emotional Abuse: No    Educational:  Identify the learner who is being assessed for education:  patient                    Ability to Learn:  Exhibits ability to grasp concepts and respond to questions: Medium  Ready to Learn: Yes  calm   Preferred Method of Learning:  written  Barriers to Learning: Verbalizes interest  Special Considerations due to cultural, Buddhism, spiritual beliefs:  No  Language:  [facility]  :  Yes    Note:         9:29 AM 11/2/2017

## 2017-11-03 ENCOUNTER — OFFICE VISIT (OUTPATIENT)
Dept: CARDIOLOGY CLINIC | Age: 82
End: 2017-11-03

## 2017-11-03 VITALS
HEART RATE: 60 BPM | SYSTOLIC BLOOD PRESSURE: 124 MMHG | BODY MASS INDEX: 36.48 KG/M2 | WEIGHT: 226 LBS | DIASTOLIC BLOOD PRESSURE: 70 MMHG

## 2017-11-03 DIAGNOSIS — I25.10 CAD IN NATIVE ARTERY: ICD-10-CM

## 2017-11-03 DIAGNOSIS — I25.5 ISCHEMIC CARDIOMYOPATHY: ICD-10-CM

## 2017-11-03 DIAGNOSIS — Z98.61 POST PTCA: ICD-10-CM

## 2017-11-03 DIAGNOSIS — R94.39 ABNORMAL MYOCARDIAL PERFUSION STUDY: Primary | ICD-10-CM

## 2017-11-03 DIAGNOSIS — I35.0 NONRHEUMATIC AORTIC VALVE STENOSIS: ICD-10-CM

## 2017-11-03 PROCEDURE — G8427 DOCREV CUR MEDS BY ELIG CLIN: HCPCS | Performed by: INTERNAL MEDICINE

## 2017-11-03 PROCEDURE — G8598 ASA/ANTIPLAT THER USED: HCPCS | Performed by: INTERNAL MEDICINE

## 2017-11-03 PROCEDURE — G8484 FLU IMMUNIZE NO ADMIN: HCPCS | Performed by: INTERNAL MEDICINE

## 2017-11-03 PROCEDURE — 1123F ACP DISCUSS/DSCN MKR DOCD: CPT | Performed by: INTERNAL MEDICINE

## 2017-11-03 PROCEDURE — 4040F PNEUMOC VAC/ADMIN/RCVD: CPT | Performed by: INTERNAL MEDICINE

## 2017-11-03 PROCEDURE — G8417 CALC BMI ABV UP PARAM F/U: HCPCS | Performed by: INTERNAL MEDICINE

## 2017-11-03 PROCEDURE — 99214 OFFICE O/P EST MOD 30 MIN: CPT | Performed by: INTERNAL MEDICINE

## 2017-11-03 PROCEDURE — 1036F TOBACCO NON-USER: CPT | Performed by: INTERNAL MEDICINE

## 2017-11-03 NOTE — PROGRESS NOTES
triamterene-hydrochlorothiazide (MAXZIDE-25) 37.5-25 MG per tablet Take 1 tablet by mouth daily 30 tablet 3    docusate sodium (COLACE) 100 MG capsule Take 1 capsule by mouth 2 times daily 60 capsule 3    Multiple Vitamin (MULTI-VITAMIN) TABS Take 1 tablet by mouth daily 30 tablet 3    cetirizine (ZYRTEC ALLERGY) 10 MG tablet Take 1 tablet by mouth daily 30 tablet 3    nitroGLYCERIN (NITROSTAT) 0.4 MG SL tablet Place 1 tablet under the tongue every 5 minutes as needed for Chest pain 25 tablet 0    albuterol sulfate HFA (PROVENTIL HFA) 108 (90 BASE) MCG/ACT inhaler Inhale 2 puffs into the lungs every 4 hours as needed for Wheezing or Shortness of Breath (Space out to every 6 hours as symptoms improve) Space out to every 6 hours as symptoms improve. 1 Inhaler 0    ranitidine (ZANTAC) 150 MG tablet Take 1 tablet by mouth 2 times daily 60 tablet 3    Fiber POWD Take 1 each by mouth 2 times daily 575 Bottle 3    bisacodyl (BISACODYL) 5 MG EC tablet Take 1 tablet by mouth daily as needed for Constipation 4 tablet 0    atorvastatin (LIPITOR) 80 MG tablet Take 1 tablet by mouth nightly 30 tablet 3    Nystatin POWD Apply 1 each topically 2 times daily 1 each 1    betamethasone dipropionate (DIPROLENE) 0.05 % ointment Apply topically daily. 1 Tube 0    BACITRACIN-POLYMYXIN B, OPHTH, (AK-POLY-BAC) OINT Sig: Apply a 1/2 inch ribbon to both eyes every 6 hours 1 Tube 0    Skin Protectants, Misc. (EUCERIN) cream Apply topically as needed. 1 Package 3    hydrocortisone (ANUSOL-HC) 2.5 % rectal cream Place rectally 2 times daily. 1 g 2    doxazosin (CARDURA) 8 MG tablet Take 1 tablet by mouth daily. 30 tablet 3     No current facility-administered medications for this visit. Vitals  Weight: 226 lb (102.5 kg)  Blood Pressure: BP: (124)/(70)    Pulse: 60         Review of Systems   Constitutional: Negative for activity change and fatigue.    Respiratory: Negative for apnea, cough, choking,  and shortness of including echo 6/17 and myoview 10/17. We discussed options. Including cath and surgical intervention. He is reluctant for Cath/surgery. Would prefer medical therapy unless sx worsen. Again had only isolated episode and was somewhat atypical and having no exertional sx including exercise. Follow up 6-8 wks. Zeke Driscoll

## 2017-11-13 RX ORDER — ASPIRIN 81 MG/1
162 TABLET ORAL DAILY
Qty: 30 TABLET | Refills: 3
Start: 2017-11-13 | End: 2018-01-25 | Stop reason: SDUPTHER

## 2017-11-13 RX ORDER — ASPIRIN 81 MG/1
162 TABLET ORAL DAILY
Qty: 30 TABLET | Refills: 3
Start: 2017-11-13 | End: 2017-11-13 | Stop reason: SDUPTHER

## 2017-11-27 RX ORDER — ATORVASTATIN CALCIUM 80 MG/1
80 TABLET, FILM COATED ORAL NIGHTLY
Qty: 30 TABLET | Refills: 3
Start: 2017-11-27 | End: 2018-01-25 | Stop reason: SDUPTHER

## 2017-11-27 RX ORDER — DOCUSATE SODIUM 100 MG/1
100 CAPSULE, LIQUID FILLED ORAL 2 TIMES DAILY
Qty: 60 CAPSULE | Refills: 1
Start: 2017-11-27 | End: 2017-11-27 | Stop reason: SDUPTHER

## 2017-11-27 RX ORDER — DOCUSATE SODIUM 100 MG/1
100 CAPSULE, LIQUID FILLED ORAL 2 TIMES DAILY
Qty: 60 CAPSULE | Refills: 3
Start: 2017-11-27 | End: 2018-01-25 | Stop reason: SDUPTHER

## 2017-11-27 RX ORDER — LISINOPRIL 40 MG/1
40 TABLET ORAL DAILY
Qty: 30 TABLET | Refills: 1
Start: 2017-11-27 | End: 2017-11-27 | Stop reason: SDUPTHER

## 2017-11-27 RX ORDER — LISINOPRIL 40 MG/1
40 TABLET ORAL DAILY
Qty: 30 TABLET | Refills: 3
Start: 2017-11-27 | End: 2018-01-22 | Stop reason: SDUPTHER

## 2017-11-29 RX ORDER — TRIAMTERENE AND HYDROCHLOROTHIAZIDE 37.5; 25 MG/1; MG/1
1 TABLET ORAL DAILY
Qty: 30 TABLET | Refills: 5
Start: 2017-11-29 | End: 2018-01-25 | Stop reason: SDUPTHER

## 2017-12-01 ENCOUNTER — HOSPITAL ENCOUNTER (OUTPATIENT)
Dept: OTHER | Age: 82
Discharge: HOME | End: 2017-12-31
Attending: PHYSICAL MEDICINE & REHABILITATION | Admitting: PHYSICAL MEDICINE & REHABILITATION

## 2017-12-22 ENCOUNTER — OFFICE VISIT (OUTPATIENT)
Dept: CARDIOLOGY CLINIC | Age: 82
End: 2017-12-22

## 2017-12-22 VITALS
DIASTOLIC BLOOD PRESSURE: 72 MMHG | BODY MASS INDEX: 37.61 KG/M2 | HEART RATE: 60 BPM | SYSTOLIC BLOOD PRESSURE: 118 MMHG | WEIGHT: 233 LBS

## 2017-12-22 DIAGNOSIS — R94.39 ABNORMAL MYOCARDIAL PERFUSION STUDY: ICD-10-CM

## 2017-12-22 DIAGNOSIS — I35.0 NONRHEUMATIC AORTIC VALVE STENOSIS: ICD-10-CM

## 2017-12-22 DIAGNOSIS — I25.5 ISCHEMIC CARDIOMYOPATHY: ICD-10-CM

## 2017-12-22 DIAGNOSIS — Z98.61 POST PTCA: ICD-10-CM

## 2017-12-22 DIAGNOSIS — I25.10 CAD IN NATIVE ARTERY: Primary | ICD-10-CM

## 2017-12-22 PROCEDURE — G8598 ASA/ANTIPLAT THER USED: HCPCS | Performed by: INTERNAL MEDICINE

## 2017-12-22 PROCEDURE — G8417 CALC BMI ABV UP PARAM F/U: HCPCS | Performed by: INTERNAL MEDICINE

## 2017-12-22 PROCEDURE — G8484 FLU IMMUNIZE NO ADMIN: HCPCS | Performed by: INTERNAL MEDICINE

## 2017-12-22 PROCEDURE — 99214 OFFICE O/P EST MOD 30 MIN: CPT | Performed by: INTERNAL MEDICINE

## 2017-12-22 PROCEDURE — G8427 DOCREV CUR MEDS BY ELIG CLIN: HCPCS | Performed by: INTERNAL MEDICINE

## 2017-12-22 PROCEDURE — 1036F TOBACCO NON-USER: CPT | Performed by: INTERNAL MEDICINE

## 2017-12-22 PROCEDURE — 4040F PNEUMOC VAC/ADMIN/RCVD: CPT | Performed by: INTERNAL MEDICINE

## 2017-12-22 PROCEDURE — 1123F ACP DISCUSS/DSCN MKR DOCD: CPT | Performed by: INTERNAL MEDICINE

## 2017-12-26 NOTE — TELEPHONE ENCOUNTER
Requested Prescriptions     Pending Prescriptions Disp Refills    carvedilol (COREG) 3.125 MG tablet [Pharmacy Med Name: CARVEDILOL 3.125 MG TABLET 3.125 TAB] 180 tablet 3     Sig: TAKE 1 TABLET BY MOUTH 2 TIMES DAILY (WITH MEALS)     Last Fill         Last seen        Next appointment       11/28/17 12/22/17 3/26/18       Last labs     Ordering Physician         11/2/17      Stephen Retana

## 2017-12-27 RX ORDER — CARVEDILOL 3.12 MG/1
3.12 TABLET ORAL 2 TIMES DAILY WITH MEALS
Qty: 180 TABLET | Refills: 3 | Status: SHIPPED | OUTPATIENT
Start: 2017-12-27 | End: 2018-01-25 | Stop reason: SDUPTHER

## 2017-12-28 ENCOUNTER — OFFICE VISIT (OUTPATIENT)
Dept: INTERNAL MEDICINE | Age: 82
End: 2017-12-28
Attending: INTERNAL MEDICINE

## 2017-12-28 ENCOUNTER — OFFICE VISIT (OUTPATIENT)
Dept: ORTHOPEDIC SURGERY | Age: 82
End: 2017-12-28

## 2017-12-28 VITALS
WEIGHT: 230 LBS | BODY MASS INDEX: 36.96 KG/M2 | HEIGHT: 66 IN | SYSTOLIC BLOOD PRESSURE: 133 MMHG | DIASTOLIC BLOOD PRESSURE: 71 MMHG

## 2017-12-28 VITALS
OXYGEN SATURATION: 94 % | BODY MASS INDEX: 37.19 KG/M2 | HEART RATE: 59 BPM | WEIGHT: 230.4 LBS | RESPIRATION RATE: 18 BRPM | DIASTOLIC BLOOD PRESSURE: 74 MMHG | SYSTOLIC BLOOD PRESSURE: 150 MMHG

## 2017-12-28 DIAGNOSIS — M54.30 ACUTE SCIATICA: ICD-10-CM

## 2017-12-28 DIAGNOSIS — M54.41 ACUTE RIGHT-SIDED LOW BACK PAIN WITH RIGHT-SIDED SCIATICA: ICD-10-CM

## 2017-12-28 DIAGNOSIS — N40.0 PROSTATE ENLARGEMENT: ICD-10-CM

## 2017-12-28 DIAGNOSIS — M47.816 SPONDYLOSIS OF LUMBAR REGION WITHOUT MYELOPATHY OR RADICULOPATHY: ICD-10-CM

## 2017-12-28 DIAGNOSIS — I10 ESSENTIAL HYPERTENSION: Primary | ICD-10-CM

## 2017-12-28 DIAGNOSIS — I25.10 CORONARY ARTERY DISEASE INVOLVING NATIVE HEART, ANGINA PRESENCE UNSPECIFIED, UNSPECIFIED VESSEL OR LESION TYPE: ICD-10-CM

## 2017-12-28 DIAGNOSIS — M51.36 DDD (DEGENERATIVE DISC DISEASE), LUMBAR: Primary | ICD-10-CM

## 2017-12-28 PROCEDURE — 4040F PNEUMOC VAC/ADMIN/RCVD: CPT | Performed by: PHYSICAL MEDICINE & REHABILITATION

## 2017-12-28 PROCEDURE — G8598 ASA/ANTIPLAT THER USED: HCPCS | Performed by: PHYSICAL MEDICINE & REHABILITATION

## 2017-12-28 PROCEDURE — G8427 DOCREV CUR MEDS BY ELIG CLIN: HCPCS | Performed by: PHYSICAL MEDICINE & REHABILITATION

## 2017-12-28 PROCEDURE — 99213 OFFICE O/P EST LOW 20 MIN: CPT | Performed by: PHYSICAL MEDICINE & REHABILITATION

## 2017-12-28 PROCEDURE — 1123F ACP DISCUSS/DSCN MKR DOCD: CPT | Performed by: PHYSICAL MEDICINE & REHABILITATION

## 2017-12-28 PROCEDURE — G8417 CALC BMI ABV UP PARAM F/U: HCPCS | Performed by: PHYSICAL MEDICINE & REHABILITATION

## 2017-12-28 PROCEDURE — G8484 FLU IMMUNIZE NO ADMIN: HCPCS | Performed by: PHYSICAL MEDICINE & REHABILITATION

## 2017-12-28 PROCEDURE — 1036F TOBACCO NON-USER: CPT | Performed by: PHYSICAL MEDICINE & REHABILITATION

## 2017-12-28 NOTE — PROGRESS NOTES
needed (pain), Disp: 15 tablet, Rfl: 0    Multiple Vitamin (MULTI-VITAMIN) TABS, Take 1 tablet by mouth daily, Disp: 30 tablet, Rfl: 5    triamterene-hydrochlorothiazide (MAXZIDE-25) 37.5-25 MG per tablet, Take 1 tablet by mouth daily, Disp: 30 tablet, Rfl: 5    atorvastatin (LIPITOR) 80 MG tablet, Take 1 tablet by mouth nightly, Disp: 30 tablet, Rfl: 3    lisinopril (PRINIVIL;ZESTRIL) 40 MG tablet, Take 1 tablet by mouth daily, Disp: 30 tablet, Rfl: 3    docusate sodium (COLACE) 100 MG capsule, Take 1 capsule by mouth 2 times daily, Disp: 60 capsule, Rfl: 3    aspirin EC 81 MG EC tablet, Take 2 tablets by mouth daily, Disp: 30 tablet, Rfl: 3    ezetimibe (ZETIA) 10 MG tablet, Take 1 tablet by mouth nightly, Disp: 30 tablet, Rfl: 6    cetirizine (ZYRTEC ALLERGY) 10 MG tablet, Take 1 tablet by mouth daily, Disp: 30 tablet, Rfl: 3    nitroGLYCERIN (NITROSTAT) 0.4 MG SL tablet, Place 1 tablet under the tongue every 5 minutes as needed for Chest pain, Disp: 25 tablet, Rfl: 0    albuterol sulfate HFA (PROVENTIL HFA) 108 (90 BASE) MCG/ACT inhaler, Inhale 2 puffs into the lungs every 4 hours as needed for Wheezing or Shortness of Breath (Space out to every 6 hours as symptoms improve) Space out to every 6 hours as symptoms improve., Disp: 1 Inhaler, Rfl: 0    ranitidine (ZANTAC) 150 MG tablet, Take 1 tablet by mouth 2 times daily, Disp: 60 tablet, Rfl: 3    Fiber POWD, Take 1 each by mouth 2 times daily, Disp: 575 Bottle, Rfl: 3    bisacodyl (BISACODYL) 5 MG EC tablet, Take 1 tablet by mouth daily as needed for Constipation, Disp: 4 tablet, Rfl: 0    Nystatin POWD, Apply 1 each topically 2 times daily, Disp: 1 each, Rfl: 1    betamethasone dipropionate (DIPROLENE) 0.05 % ointment, Apply topically daily. , Disp: 1 Tube, Rfl: 0    BACITRACIN-POLYMYXIN B, OPHTH, (AK-POLY-BAC) OINT, Sig: Apply a 1/2 inch ribbon to both eyes every 6 hours, Disp: 1 Tube, Rfl: 0    Skin Protectants, Misc.  (EUCERIN) cream, Apply

## 2017-12-28 NOTE — PATIENT INSTRUCTIONS
Call your pharmacy for medication refills at least 48 hours ahead at [phone] (Monday -Friday, 08:00 AM to 4:00 PM .If you become ill when the clinic is closed, please call [facility]  at [phone] and ask the  to page the AOD between 6:00 AM and 6:00 PM or page the AON between 6:00 PM & 6:00 AM.    The clinic is not able to process [facility] requests for appointments or messages including refill requests. Please call the [facility] [address] at [phone] for appointments and messages. The [place] [id number]    Keep appointment today with Neuro doctor for back pain. Return in one month for .      Instructions reviewed with patient and copy given to patient upon discharge.      9:54 AM12/28/2017

## 2017-12-28 NOTE — PROGRESS NOTES
[facility], [place], operated by [facility]       NURSING PROGRESS NOTE      December 28, 2017  Gabe Ross    Chief Complaint:   Chief Complaint   Patient presents with    Back Pain       Constitutional: negative  Eyes: negative  Ears, nose, mouth, throat, and face: negative  Respiratory: negative  Cardiovascular: negative  Gastrointestinal: negative  Genitourinary: negative  Integument/breast: negative  Hematologic/lymphatic: negative  Musculoskeletal: positive for back pain  Neurological: negative  Diabetes: No  Yes:  Medication compliance:    Diabetic diet compliance:    Home glucose monitoring:    Last eye exam:     Acute symptoms hyperglycemia:    Acute symptoms hypoglycemia:    POC glucose today:  mg/dL    Pain Assessment:  Pain Present: yes  Pain Score: 6  Pain Quality/Description: Aching    Mobility/Safety/Self-Care:  Steady Gait: Yes  History of Falls: No   History of a Fall within the last 30 days No  Assistive Device: None  Poor Hygiene: No    Psychosocial:   Depression: No  If YES,    Does Patient express current thoughts of harming self or others?: No  Anxious: No  Insomnia: No  Inappropriate Behavior: No  Alcohol Abuse: no  Substance Abuse: no  Signs of Physical Abuse: No  Signs of Emotional Abuse: No    Educational:  Identify the learner who is being assessed for education:  patient                    Ability to Learn:  Exhibits ability to grasp concepts and respond to questions: Medium  Ready to Learn: Yes  calm   Preferred Method of Learning:  written  Barriers to Learning: Verbalizes interest  Special Considerations due to cultural, Zoroastrian, spiritual beliefs:  No  Language:  English  :  No    Note:   Here for ER follow up has back pain. He was given zanaflex and an toradol injection in ER which didn't help/ States it radiates down his right leg. He is too see Dr. Nimesh Rivera. Today at 12:00. She is handling his back pain. Urology started him on Tamsulsin 0.4  Mg one daily.  He saw  Darío one week ago and is to see him again in 3 m [facility].        9:13 AM 12/28/2017

## 2017-12-28 NOTE — PROGRESS NOTES
neurologic deficits, has an appointment with neurosurgery today, complaining of pain 8/10  - Denies urinary, bowel and neurologic sx, he has been having increased urinary frequency attributed to his prostate (BPH), I would like to get more imaging of his back, however he said he will discuss the need with neurosurgery later today     Chest pain: resolved   - 10/17 EKG didn't show any acute changes  - 10/17 Stress test showed a small infract in the inferior wall and a 15% drop on EF, patient seen by Dr Marquise Tilley, Mr Yogi Lennon decided to continue with medical therapy unless symptoms worsen   - Continue asa   - Patient was advised to go to the ED if pain worsens    Essential HTN: ovreall better controlled and at goal for a small AAA (3.7 cm), 150/80 today, 160/80 at the end of the visit, likely due to pain   - Norvasc d/cd 6/17 due to symptomatic hypotension   - Continue lisinopril   - Continue tramtere-HCTZ   - Coreg BID, patient was seen in 9/17 with several episodes of dizziness, was noted to e bradycardic was advised to decrease it to once a day but he refused given that \"it helps my heart\", I explained him the risks several times and he verbalized understanding  - Statin     AAA  3.7 cm in 2016  - Will obtain a repeat abdominal US for monitoring, again   - Was referred to vascular surgery by PM&R    CKD   - Baseline creatinine 1.3 - 1.5    Coronary artery disease stable s/p PCI, denies CP, SOB, palpitations  - Lexiscan and angiogram 12/15, no obstructive disease  - Started following with Dr Marquise Tilley in 2017  - BB, aspirin and statin     Hyperlipidemia stable, continue high dose statin and Zetia    Arthritis; stable, denies pain.  Swimming 2-4x per week with mild left shoulder pain that lasts 2-3 hours after exercise, hasn't have any pain with exertion     BPH: sp TURP tx with doxazocin  - Flomax was started by Dr Jerry Murillo since his urinary frequency is increasing overnight 12/28    Degenerative disc disease - lumbar: stable

## 2018-01-08 ENCOUNTER — HOSPITAL ENCOUNTER (OUTPATIENT)
Dept: PHYSICAL THERAPY | Age: 83
Discharge: HOME | End: 2018-01-31
Admitting: PHYSICAL MEDICINE & REHABILITATION

## 2018-01-08 NOTE — FLOWSHEET NOTE
Minutes: 25   Total Treatment Minutes: 55     [x] EVAL (LOW) 43560 (typically 20 minutes face-to-face)  [] EVAL (MOD) 27814 (typically 30 minutes face-to-face)  [] EVAL (HIGH) 11386 (typically 45 minutes face-to-face)  [] RE-EVAL     [x] ZA(95621) x  1   [] IONTO   [] NMR (57811) x      [] VASO  [x] Manual (97001) x  1    [] Other:  [] TA x       [] Mech Traction (42767)  [] ES(attended) (83177)      [] ES (un) (48544):     Goals:   Short Term Goals: To be achieved in: 2 weeks  1. Independent in HEP and progression per patient tolerance, in order to prevent re-injury. 2. Patient will have a decrease in pain to facilitate improvement in movement, function, and ADLs as indicated by Functional Deficits. Long Term Goals: To be achieved in: 4 weeks  1. Disability index score of 30% or less for the BABAK to assist with reaching prior level of function. 2. Patient will demonstrate increased AROM to WNL, good LS mobility, good hip ROM to allow for proper joint functioning as indicated by patients Functional Deficits. 3. Patient will demonstrate an increase in Strength to good proximal hip and core activation to allow for proper functional mobility as indicated by patients Functional Deficits. 4. Patient will return to walking for 20 minutes functional activities without increased symptoms or restriction. Progression Towards Functional goals:  [] Patient is progressing as expected towards functional goals listed. [] Progression is slowed due to complexities listed. [] Progression has been slowed due to co-morbidities.   [x] Plan just implemented, too soon to assess goals progression  [] Other:     ASSESSMENT:  See eval    Treatment/Activity Tolerance:  [x] Patient tolerated treatment well [] Patient limited by fatique  [] Patient limited by pain  [] Patient limited by other medical complications  [] Other:     Prognosis: [x] Good [] Fair  [] Poor    Patient Requires Follow-up: [x] Yes  [] No    PLAN FOR NEXT

## 2018-01-08 NOTE — PLAN OF CARE
Prone Instability test       Sacral Spring/thrust       Femoral Nerve Tension Test                ROM LEFT RIGHT Comments   Lumbar Flex 50% limited     Lumbar Ext 75% limited     Side Bend 50% limited 50% limited    Rotation 50% limited 50% limited                  ROM LEFT RIGHT Comments   Hip Flexion 90 90    Hip Abd      Hip ER 30 35    Hip IR 15 25    Hip Extension      Knee Ext      Knee Flex      Hamstring Flex -60 -50    Piriformis                    Strength LEFT RIGHT Comments   Multifidus      Transverse Ab 2+ 2+    Hip Flexors 5 5    Hip Abductors 3+ 3+    Hip Extensors 4- 4-                   Myotomes Normal Abnormal Comments   Hip flexion (L1-L2) x     Knee extension (L2-L4) x     Dorsiflexion (L4-L5) x     Great Toe Ext (L5) x     Ankle Eversion (S1-S2) x     Ankle PF(S1-S2) x         Dermatomes Normal Abnormal Comments   inguinal area (L1)  x     anterior mid-thigh (L2) x     distal ant thigh/med knee (L3) x     medial lower leg and foot (L4) x     lateral lower leg and foot (L5) x     posterior calf (S1) x     medial calcaneus (S2) x           Reflexes Normal Abnormal Comments   S1-2 Seated achilles x     S1-2 Prone knee bend      L3-4 Patellar tendon x     C5-6 Biceps      C6 Brachioradialis      C7-8 Triceps      Clonus      Babinski      Castorena's        Joint mobility: Lumbar   []Normal    [x]Hypo   []Hyper    Palpation: denies tenderness with palpation    Functional Mobility/Transfers: slow, labored bed mobility    Posture: forward head, rounded shoulders, increased thoracic kyphosis, decreased lumbar lordosis with posterior pelvic tilt    Gait: (include devices/WB status) shuffling gait with trendelenburg pattern bilaterally    Bandages/Dressings/Incisions: n/a                         [x] Patient history, allergies, meds reviewed. Medical chart reviewed. See intake form.      Review Of Systems (ROS):  [x]Performed Review of systems (Integumentary, CardioPulmonary, Neurological) by intake and observation. Intake form has been scanned into medical record. Patient has been instructed to contact their primary care physician regarding ROS issues if not already being addressed at this time. Co-morbidities/Complexities (which will affect course of rehabilitation):   []None           Arthritic conditions   []Rheumatoid arthritis (M05.9)  [x]Osteoarthritis (M19.91)   Cardiovascular conditions   [x]Hypertension (I10)  []Hyperlipidemia (E78.5)  []Angina pectoris (I20)  []Atherosclerosis (I70)   Musculoskeletal conditions   [x]Disc pathology   []Congenital spine pathologies   []Prior surgical intervention  []Osteoporosis (M81.8)  []Osteopenia (M85.8)   Endocrine conditions   []Hypothyroid (E03.9)  []Hyperthyroid Gastrointestinal conditions   []Constipation (C26.84)   Metabolic conditions   []Morbid obesity (E66.01)  []Diabetes type 1(E10.65) or 2 (E11.65)   []Neuropathy (G60.9)     Pulmonary conditions   []Asthma (J45)  []Coughing   []COPD (J44.9)   Psychological Disorders  []Anxiety (F41.9)  []Depression (F32.9)   []Other:   []Other:          Barriers to/and or personal factors that will affect rehab potential:              [x]Age  []Sex              [x]Motivation/Lack of Motivation                        [x]Co-Morbidities              []Cognitive Function, education/learning barriers              []Environmental, home barriers              []profession/work barriers  [x]past PT/medical experience  []other:  Justification: condition is degenerative which may limit progress. Patient has had prior PT for same condition and has not continued with HEP for maintenance. Falls Risk Assessment (30 days):   [x] Falls Risk assessed and no intervention required.   [] Falls Risk assessed and Patient requires intervention due to being higher risk   TUG score (>12s at risk):     [] Falls education provided, including       G-Codes:  PT G-Codes  Functional Assessment Tool Used: Modified Oswestry  Score: 50%  Functional Limitation: Changing and maintaining body position  Changing and Maintaining Body Position Current Status (): At least 40 percent but less than 60 percent impaired, limited or restricted  Changing and Maintaining Body Position Goal Status (): At least 20 percent but less than 40 percent impaired, limited or restricted    ASSESSMENT:   Functional Impairments:     [x]Noted lumbar/proximal hip hypomobility   []Noted lumbosacral and/or generalized hypermobility   [x]Decreased Lumbosacral/hip/LE functional ROM   [x]Decreased core/proximal hip strength and neuromuscular control    []Decreased LE functional strength    []Abnormal reflexes/sensation/myotomal/dermatomal deficits  []Reduced balance/proprioceptive control    []other:      Functional Activity Limitations (from functional questionnaire and intake)   [x]Reduced ability to tolerate prolonged functional positions   [x]Reduced ability or difficulty with changes of positions or transfers between positions   [x]Reduced ability to maintain good posture and demonstrate good body mechanics with sitting, bending, and lifting   [x]Reduced ability to sleep   [] Reduced ability or tolerance with driving and/or computer work   [x]Reduced ability to perform lifting, reaching, carrying tasks   []Reduced ability to squat   []Reduced ability to forward bend   [x]Reduced ability to ambulate prolonged functional periods/distances/surfaces   [x]Reduced ability to ascend/descend stairs   []other:       Participation Restrictions   [x]Reduced participation in self care activities   [x]Reduced participation in home management activities   []Reduced participation in work activities   [x]Reduced participation in social activities. []Reduced participation in sport/recreation activities. Classification:   [x]Signs/symptoms consistent with Lumbar instability/stabilization subgroup.       []Signs/symptoms consistent with Lumbar mobilization/manipulation subgroup, myotomes and dermatomes intact. Meets manipulation criteria. []Signs/symptoms consistent with Lumbar direction specific/centralization subgroup   []Signs/symptoms consistent with Lumbar traction subgroup     []Signs/symptoms consistent with lumbar facet dysfunction   []Signs/symptoms consistent with lumbar stenosis type dysfunction   []Signs/symptoms consistent with nerve root involvement including myotome & dermatome dysfunction   []Signs/symptoms consistent with post-surgical status including: decreased ROM, strength and function. []signs/symptoms consistent with pathology which may benefit from Dry needling     []other:    Prognosis/Rehab Potential:      []Excellent   []Good    [x]Fair   []Poor    Tolerance of evaluation/treatment:    []Excellent   []Good    [x]Fair   []Poor    Physical Therapy Evaluation Complexity Justification  [x] A history of present problem with:  [] no personal factors and/or comorbidities that impact the plan of care;  []1-2 personal factors and/or comorbidities that impact the plan of care  [x]3 personal factors and/or comorbidities that impact the plan of care  [x] An examination of body systems using standardized tests and measures addressing any of the following: body structures and functions (impairments), activity limitations, and/or participation restrictions;:  [] a total of 1-2 or more elements   [] a total of 3 or more elements   [x] a total of 4 or more elements   [x] A clinical presentation with:  [x] stable and/or uncomplicated characteristics   [] evolving clinical presentation with changing characteristics  [] unstable and unpredictable characteristics;   [x] Clinical decision making of [x] low, [] moderate, [] high complexity using standardized patient assessment instrument and/or measurable assessment of functional outcome.     [x] EVAL (LOW) 51017 (typically 20 minutes face-to-face)  [] EVAL (MOD) 39469 (typically 30 minutes face-to-face)  [] EVAL (HIGH) 90453 (typically 45

## 2018-01-15 ENCOUNTER — HOSPITAL ENCOUNTER (OUTPATIENT)
Dept: PHYSICAL THERAPY | Age: 83
Discharge: HOME | End: 2018-01-16
Admitting: PHYSICAL MEDICINE & REHABILITATION

## 2018-01-15 NOTE — FLOWSHEET NOTE
Bridge 2x10     SLR Flex      SLR Abd 2x10 B     Clamshells - SL 20 x B New     Prone plank      Side plank      Squats      Standing Stretch (insert muscle)                  Therapeutic Exercise and NMR EXR  [x] (07844) Provided verbal/tactile cueing for activities related to strengthening, flexibility, endurance, ROM  for improvements in proximal hip and core control with self care, mobility, lifting and ambulation.  [] (24951) Provided verbal/tactile cueing for activities related to improving balance, coordination, kinesthetic sense, posture, motor skill, proprioception  to assist with core control in self care, mobility, lifting, and ambulation.      Therapeutic Activities:    [] (05099 or 46675) Provided verbal/tactile cueing for activities related to improving balance, coordination, kinesthetic sense, posture, motor skill, proprioception and motor activation to allow for proper function  with self care and ADLs  [] (52324) Provided training and instruction to the patient for proper core and proximal hip recruitment and positioning with ambulation re-education     Home Exercise Program:    [x] (77331) Reviewed/Progressed HEP activities related to strengthening, flexibility, endurance, ROM of core, proximal hip and LE for functional self-care, mobility, lifting and ambulation   [] (46249) Reviewed/Progressed HEP activities related to improving balance, coordination, kinesthetic sense, posture, motor skill, proprioception of core, proximal hip and LE for self care, mobility, lifting, and ambulation      Manual Treatments: Traction / PA's (Gr I, II, III, IV) / Stretch- Hamstring, Piriformis, Hip Flexor, Groin / STM/ Sacral Decompression 10'  [x] Provided manual therapy to mobilize soft tissue/joints for the purpose of modulating pain, promoting relaxation, increasing ROM, reducing/eliminating soft tissue swelling/inflammation/restriction, improving soft tissue extensibility and allowing for proper ROM for normal

## 2018-01-17 ENCOUNTER — HOSPITAL ENCOUNTER (OUTPATIENT)
Dept: PHYSICAL THERAPY | Age: 83
Discharge: HOME | End: 2018-01-18
Admitting: PHYSICAL MEDICINE & REHABILITATION

## 2018-01-17 NOTE — PROGRESS NOTES
I have reviewed and agree to the content of the note created by the Physical Therapist Assistant.     Electronically signed by Daisha Wilson PT

## 2018-01-17 NOTE — FLOWSHEET NOTE
other medical complications  [] Other:     Prognosis: [x] Good [] Fair  [] Poor    Patient Requires Follow-up: [x] Yes  [] No    PLAN FOR NEXT SESSION:     PLAN: See eval  [x] Continue per plan of care [] Alter current plan (see comments)  [] Plan of care initiated [] Hold pending MD visit [] Discharge    Electronically signed by: Mariusz Hauser, [address], [address] [place] [address]

## 2018-01-22 ENCOUNTER — HOSPITAL ENCOUNTER (OUTPATIENT)
Dept: PHYSICAL THERAPY | Age: 83
Discharge: HOME | End: 2018-01-23
Admitting: PHYSICAL MEDICINE & REHABILITATION

## 2018-01-22 RX ORDER — LISINOPRIL 40 MG/1
40 TABLET ORAL DAILY
Qty: 30 TABLET | Refills: 2
Start: 2018-01-22 | End: 2018-01-25 | Stop reason: SDUPTHER

## 2018-01-24 ENCOUNTER — HOSPITAL ENCOUNTER (OUTPATIENT)
Dept: PHYSICAL THERAPY | Age: 83
Discharge: HOME | End: 2018-01-25
Admitting: PHYSICAL MEDICINE & REHABILITATION

## 2018-01-24 NOTE — FLOWSHEET NOTE
[facility] [employer]  Orthopaedics and Sports Rehabilitation, [facility]    Physical Therapy Daily Treatment Note  Date:  2018    Patient Name:  Eddie Weaver    :  1932  MRN: [id number]  Restrictions/Precautions:    Medical/Treatment Diagnosis Information:  · Diagnosis: Lumbar DDD, spondylosis   M51.36, M47.816  · Treatment Diagnosis: PT practice pattern:  4F,  low back pain  Insurance/Certification information:  PT Insurance Information: Medicare  $2010 yearly limit  Physician Information:  Referring Practitioner: Dr Caroline Condon of care signed (Y/N):     Date of Patient follow up with Physician:     G-Code (if applicable):  CK    Date G-Code Applied:  18  PT G-Codes  Functional Assessment Tool Used: Modified Oswestry  Score: 50%  Functional Limitation: Changing and maintaining body position  Changing and Maintaining Body Position Current Status (): At least 40 percent but less than 60 percent impaired, limited or restricted  Changing and Maintaining Body Position Goal Status (): At least 20 percent but less than 40 percent impaired, limited or restricted    Progress Note: []  Yes  []  No  Next due by: Visit #10      Latex Allergy:  [x]NO      []YES  Preferred Language for Healthcare:   [x]English       []other:    Visit # Insurance Allowable   5 Medicare cap     Pain level:  1/10     SUBJECTIVE:  Pt. Reports he has less pain with most ADL's.      OBJECTIVE:   Observation:   Test measurements:      RESTRICTIONS/PRECAUTIONS: HBP, OA    Exercises/Interventions:       Script:  18  Exercise/Equipment Sets/Reps Notes Last Progression   Hand Knee Rock      Prone Press Up      LTR 20 x 5\"      Piriformis Cross Over/Figure 4 piriformis Stretch 3x30'' B     SKC      DKC      Prone Quad Stretch       Hamstring Stretch supine 90/90 + towel 3x30'' B     Quadruped UE      Quadruped LE      Quadruped UE/LE combined (birddog)       TA / Multifidus / Abd Hollow 15 x 5\"  - TC    TA March 10 sets  - medical complications  [] Other:     Prognosis: [x] Good [] Fair  [] Poor    Patient Requires Follow-up: [x] Yes  [] No    PLAN FOR NEXT SESSION:     PLAN: See eval  [x] Continue per plan of care [] Alter current plan (see comments)  [] Plan of care initiated [] Hold pending MD visit [] Discharge    Electronically signed by: Mariusz Huaser, [address], [place] [place] [address]

## 2018-01-25 ENCOUNTER — OFFICE VISIT (OUTPATIENT)
Dept: INTERNAL MEDICINE | Age: 83
End: 2018-01-25
Attending: INTERNAL MEDICINE

## 2018-01-25 VITALS
OXYGEN SATURATION: 94 % | BODY MASS INDEX: 36.8 KG/M2 | TEMPERATURE: 97.5 F | SYSTOLIC BLOOD PRESSURE: 138 MMHG | DIASTOLIC BLOOD PRESSURE: 66 MMHG | HEART RATE: 52 BPM | WEIGHT: 228 LBS | RESPIRATION RATE: 18 BRPM

## 2018-01-25 DIAGNOSIS — N40.0 PROSTATE ENLARGEMENT: ICD-10-CM

## 2018-01-25 DIAGNOSIS — I10 ESSENTIAL HYPERTENSION: ICD-10-CM

## 2018-01-25 DIAGNOSIS — I35.0 MILD AORTIC STENOSIS: ICD-10-CM

## 2018-01-25 DIAGNOSIS — G89.29 CHRONIC LOW BACK PAIN, UNSPECIFIED BACK PAIN LATERALITY, WITH SCIATICA PRESENCE UNSPECIFIED: Primary | ICD-10-CM

## 2018-01-25 DIAGNOSIS — E78.00 PURE HYPERCHOLESTEROLEMIA: ICD-10-CM

## 2018-01-25 DIAGNOSIS — E78.5 HYPERLIPIDEMIA, UNSPECIFIED HYPERLIPIDEMIA TYPE: ICD-10-CM

## 2018-01-25 DIAGNOSIS — M54.5 CHRONIC LOW BACK PAIN, UNSPECIFIED BACK PAIN LATERALITY, WITH SCIATICA PRESENCE UNSPECIFIED: Primary | ICD-10-CM

## 2018-01-25 DIAGNOSIS — I25.10 CORONARY ARTERY DISEASE INVOLVING NATIVE HEART, ANGINA PRESENCE UNSPECIFIED, UNSPECIFIED VESSEL OR LESION TYPE: ICD-10-CM

## 2018-01-25 RX ORDER — RANITIDINE 150 MG/1
150 TABLET ORAL 2 TIMES DAILY
Qty: 180 TABLET | Refills: 2 | Status: SHIPPED | OUTPATIENT
Start: 2018-01-25 | End: 2018-09-11 | Stop reason: SDUPTHER

## 2018-01-25 RX ORDER — LISINOPRIL 40 MG/1
40 TABLET ORAL DAILY
Qty: 90 TABLET | Refills: 3 | Status: SHIPPED | OUTPATIENT
Start: 2018-01-25 | End: 2019-01-17 | Stop reason: SDUPTHER

## 2018-01-25 RX ORDER — DOXAZOSIN 8 MG/1
8 TABLET ORAL DAILY
Qty: 90 TABLET | Refills: 2 | Status: SHIPPED | OUTPATIENT
Start: 2018-01-25 | End: 2018-10-23 | Stop reason: SDUPTHER

## 2018-01-25 RX ORDER — CARVEDILOL 3.12 MG/1
3.12 TABLET ORAL NIGHTLY
Qty: 180 TABLET | Refills: 3 | Status: SHIPPED | OUTPATIENT
Start: 2018-01-25 | End: 2018-02-01 | Stop reason: SDUPTHER

## 2018-01-25 RX ORDER — EZETIMIBE 10 MG/1
10 TABLET ORAL NIGHTLY
Qty: 90 TABLET | Refills: 2 | Status: SHIPPED | OUTPATIENT
Start: 2018-01-25 | End: 2018-10-12 | Stop reason: SDUPTHER

## 2018-01-25 RX ORDER — CETIRIZINE HYDROCHLORIDE 10 MG/1
10 TABLET ORAL DAILY
Qty: 90 TABLET | Refills: 2 | Status: SHIPPED | OUTPATIENT
Start: 2018-01-25 | End: 2019-01-17 | Stop reason: SDUPTHER

## 2018-01-25 RX ORDER — DOCUSATE SODIUM 100 MG/1
100 CAPSULE, LIQUID FILLED ORAL 2 TIMES DAILY
Qty: 180 CAPSULE | Refills: 2 | Status: SHIPPED | OUTPATIENT
Start: 2018-01-25 | End: 2019-01-17 | Stop reason: SDUPTHER

## 2018-01-25 RX ORDER — NITROGLYCERIN 0.4 MG/1
0.4 TABLET SUBLINGUAL EVERY 5 MIN PRN
Qty: 25 TABLET | Refills: 0 | Status: SHIPPED | OUTPATIENT
Start: 2018-01-25 | End: 2019-01-17 | Stop reason: SDUPTHER

## 2018-01-25 RX ORDER — ALBUTEROL SULFATE 90 UG/1
2 AEROSOL, METERED RESPIRATORY (INHALATION) EVERY 4 HOURS PRN
Qty: 1 INHALER | Refills: 2 | Status: ON HOLD | OUTPATIENT
Start: 2018-01-25 | End: 2019-04-29

## 2018-01-25 RX ORDER — ASPIRIN 81 MG/1
162 TABLET ORAL DAILY
Qty: 90 TABLET | Refills: 2 | Status: SHIPPED | OUTPATIENT
Start: 2018-01-25 | End: 2019-01-17 | Stop reason: SDUPTHER

## 2018-01-25 RX ORDER — ATORVASTATIN CALCIUM 80 MG/1
80 TABLET, FILM COATED ORAL NIGHTLY
Qty: 90 TABLET | Refills: 3 | Status: SHIPPED | OUTPATIENT
Start: 2018-01-25 | End: 2019-07-15 | Stop reason: SDUPTHER

## 2018-01-25 RX ORDER — TRIAMTERENE AND HYDROCHLOROTHIAZIDE 37.5; 25 MG/1; MG/1
1 TABLET ORAL DAILY
Qty: 90 TABLET | Refills: 2 | Status: SHIPPED | OUTPATIENT
Start: 2018-01-25 | End: 2018-02-15 | Stop reason: SDUPTHER

## 2018-01-25 NOTE — PROGRESS NOTES
visit. Body mass index is 36.46 kg/(m^2). Wt Readings from Last 3 Encounters:    08/06/15  225 lb 12.8 oz (102.422 kg)    07/08/15  226 lb 3.2 oz (102.604 kg)    06/03/15  231 lb (104.781 kg)      · Gen - Alert, no signs of distress, appears stated age and cooperative  · HEENT - NC/AT  · Eye - injected conjunctiva  · Neck / Thyroid - Supple, no masses, nodes, nodules or enlargement. No adenopathy, no carotid bruit, no JVD, supple, symmetrical, trachea midline and thyroid not enlarged, symmetric, no tenderness/mass/nodules  · CVS - III/VI systolic murmur located in the right second intercostal space (increased from previous exams)  · Resp - No accessory muscle use. No crackles. No wheezing. No rhonchi  · GI - Non-tender. Non-distended. No masses. No organmegaly. Normal bowel sounds  · MSK - No cyanosis. No joint deformity.  No clubbing  · Neuro -grossly intact    LABS:    CBC:   Lab Results    Component  Value  Date     WBC  4.9  7/8/2015     HGB  12.9*  7/8/2015     HCT  39.7*  7/8/2015     MCV  96.3  7/8/2015     PLT  141  7/8/2015      No results found for this basename: iron, tibc, ferritin, folate, [id number], pth      BMP:   Lab Results    Component  Value  Date     NA  144  7/8/2015     K  4.0  7/8/2015     CL  108  7/8/2015     CO2  29  7/8/2015        LFT's:   Lab Results    Component  Value  Date     ALT  32  11/28/2012     AST  30  11/28/2012     ALKPHOS  97  11/28/2012     BILITOT  1.0  11/28/2012        Lipids:   Lab Results    Component  Value  Date     CHOL  116  3/18/2015     HDL  55  3/18/2015     LDLCALC  44  3/18/2015     TRIG  87  3/18/2015        INR:   No results found for this basename: INR, PROTIME        U/A:  Lab Results    Component  Value  Date     LABMICR  SEE BELOW  7/8/2015        Lab Results    Component  Value  Date     LABA1C  5.8  7/1/2015      Lab Results    Component  Value  Date     CREATININE  1.2  7/8/2015        Assessment/Plan:    80 y.o. male who presents for follow up:    Back pain:  - Seen in the ED 12/12 with back pain, no neurologic deficits, seen by neurosurgery  - Denies urinary, bowel and neurologic sx, he has been having increased urinary frequency attributed to his prostate (BPH), I would like to get more imaging of his back, seen by neurosurgery, referred to PT, improving     Chest pain: resolved   - 10/17 EKG didn't show any acute changes  - 10/17 Stress test showed a small infract in the inferior wall and a 15% drop on EF, patient seen by Dr Ana Dsouza, Mr Bettina Coto decided to continue with medical therapy unless symptoms worsen   - Continue asa   - Patient was advised to go to the ED if pain recurrs    Essential HTN: ovreall better controlled and at goal for a small AAA (3.7 cm). Carefull with hypotension given AS  - Norvasc d/cd 6/17 due to symptomatic hypotension   - Continue lisinopril   - Continue tramtere-HCTZ   - Coreg BID, will decrease to nightly given past episodes of hypotension/dizziness and HR today 47. He will discuss with Dr Ana Dsouza later this week   - Statin     AAA  3.7 cm in 2016  - Will obtain a repeat abdominal US for monitoring, again   - Was referred to vascular surgery by PM&R    CKD III  - Baseline creatinine 1.3 - 1.5    Coronary artery disease stable s/p PCI, denies CP, SOB, palpitations  - Lexiscan and angiogram 12/15, no obstructive disease  - Started following with Dr Ana Dsouza in 2017  - BB, aspirin and statin     Hyperlipidemia stable, continue high dose statin and Zetia    Arthritis; stable, denies pain.  Swimming 2-4x per week with mild left shoulder pain that lasts 2-3 hours after exercise, hasn't have any pain with exertion     BPH: sp TURP tx with doxazocin  - Flomax was started by Dr Nadine Loving since his urinary frequency is increasing overnight 12/28    Degenerative disc disease - lumbar: stable   - On PPT    Pneumococcal vaccine and flu shot offered, patient refused   I attempted several times to discussed code status with the patient and his wife with the help of an , he wants to be a full code, he gets anxious with this conversation and he never lets me explain me why is it important to have this discussions in the outpatient setting.       Follow up in 3 months    Champ Hernandez MD PGY3  Pager: [phone]

## 2018-02-01 ENCOUNTER — HOSPITAL ENCOUNTER (OUTPATIENT)
Dept: PHYSICAL THERAPY | Age: 83
Discharge: HOME | End: 2018-02-02
Admitting: PHYSICAL MEDICINE & REHABILITATION

## 2018-02-01 ENCOUNTER — HOSPITAL ENCOUNTER (OUTPATIENT)
Dept: PHYSICAL THERAPY | Age: 83
Discharge: HOME | End: 2018-02-05
Attending: PHYSICAL MEDICINE & REHABILITATION | Admitting: PHYSICAL MEDICINE & REHABILITATION

## 2018-02-01 DIAGNOSIS — I25.10 CORONARY ARTERY DISEASE INVOLVING NATIVE HEART, ANGINA PRESENCE UNSPECIFIED, UNSPECIFIED VESSEL OR LESION TYPE: ICD-10-CM

## 2018-02-01 DIAGNOSIS — I10 ESSENTIAL HYPERTENSION: ICD-10-CM

## 2018-02-01 RX ORDER — CARVEDILOL 3.12 MG/1
3.12 TABLET ORAL NIGHTLY
Qty: 90 TABLET | Refills: 3 | Status: SHIPPED | OUTPATIENT
Start: 2018-02-01 | End: 2018-05-03 | Stop reason: SINTOL

## 2018-02-04 NOTE — PROGRESS NOTES
I have reviewed and agree to the content of the note created by the Physical Therapist Assistant.     Electronically signed by Dary Barbosa PT

## 2018-02-05 ENCOUNTER — TELEPHONE (OUTPATIENT)
Dept: INTERNAL MEDICINE | Age: 83
End: 2018-02-05

## 2018-02-05 ENCOUNTER — HOSPITAL ENCOUNTER (OUTPATIENT)
Dept: PHYSICAL THERAPY | Age: 83
Discharge: HOME | End: 2018-02-06
Admitting: PHYSICAL MEDICINE & REHABILITATION

## 2018-02-05 NOTE — FLOWSHEET NOTE
10 sets  - TC     Bridge 3x10     SLR Flex      SLR Abd 3x10 B     Clamshells - SL 20 x B     Prone plank      Side plank      Squats      CARLOS abd, ext 45# x 15 each B                 Therapeutic Exercise and NMR EXR  [x] (20701) Provided verbal/tactile cueing for activities related to strengthening, flexibility, endurance, ROM  for improvements in proximal hip and core control with self care, mobility, lifting and ambulation.  [] (44169) Provided verbal/tactile cueing for activities related to improving balance, coordination, kinesthetic sense, posture, motor skill, proprioception  to assist with core control in self care, mobility, lifting, and ambulation.      Therapeutic Activities:    [] (92151 or 72049) Provided verbal/tactile cueing for activities related to improving balance, coordination, kinesthetic sense, posture, motor skill, proprioception and motor activation to allow for proper function  with self care and ADLs  [] (72999) Provided training and instruction to the patient for proper core and proximal hip recruitment and positioning with ambulation re-education     Home Exercise Program:    [x] (79267) Reviewed/Progressed HEP activities related to strengthening, flexibility, endurance, ROM of core, proximal hip and LE for functional self-care, mobility, lifting and ambulation   [] (79142) Reviewed/Progressed HEP activities related to improving balance, coordination, kinesthetic sense, posture, motor skill, proprioception of core, proximal hip and LE for self care, mobility, lifting, and ambulation      Manual Treatments: Traction / PA's (Gr I, II, III, IV) / Stretch- Hamstring, Piriformis, Hip Flexor, Groin / STM/ Sacral Decompression 10'  [x] Provided manual therapy to mobilize soft tissue/joints for the purpose of modulating pain, promoting relaxation, increasing ROM, reducing/eliminating soft tissue swelling/inflammation/restriction, improving soft tissue extensibility and allowing for proper ROM for

## 2018-02-15 ENCOUNTER — OFFICE VISIT (OUTPATIENT)
Dept: INTERNAL MEDICINE | Age: 83
End: 2018-02-15
Attending: INTERNAL MEDICINE

## 2018-02-15 VITALS
TEMPERATURE: 97 F | RESPIRATION RATE: 16 BRPM | OXYGEN SATURATION: 98 % | SYSTOLIC BLOOD PRESSURE: 144 MMHG | HEART RATE: 56 BPM | DIASTOLIC BLOOD PRESSURE: 71 MMHG | WEIGHT: 230.4 LBS | BODY MASS INDEX: 37.19 KG/M2

## 2018-02-15 DIAGNOSIS — I25.10 CORONARY ARTERY DISEASE INVOLVING NATIVE HEART, ANGINA PRESENCE UNSPECIFIED, UNSPECIFIED VESSEL OR LESION TYPE: ICD-10-CM

## 2018-02-15 DIAGNOSIS — I10 ESSENTIAL HYPERTENSION: ICD-10-CM

## 2018-02-15 RX ORDER — TRIAMTERENE AND HYDROCHLOROTHIAZIDE 37.5; 25 MG/1; MG/1
2 TABLET ORAL DAILY
Qty: 60 TABLET | Refills: 3 | Status: SHIPPED | OUTPATIENT
Start: 2018-02-15 | End: 2018-06-21 | Stop reason: SDUPTHER

## 2018-02-15 NOTE — PROGRESS NOTES
Brief clinic note    Mr Patricia Mccauley presented today as a walk in for uncontrolled HTN (sBP 170-180), he is taking Coreg 3.125 mg BID (instead of nightly, I changed it several months ago due to pr-syncope and radycardia), lisnopril 40mg, Maxzide 37.5-25mg. BP has been better controlled (144/71) with the double dose of coreg however HR is low (he is asymptomatic). Will increase Maxzide to 75-50mg and will follow up in two weeks for a scheduled appointment. If BP remains elevated will consider Norvasc or other medication that wont decrease HR. He is also been taking more lisinopril than prescribed occassionally, I explained him he is taking the highest dose and that lisinopril may damage the kidneys in higher doses, he verbalized understanding and said he wont take extra medications. Physical Exam   Constitutional: He is oriented to person, place, and time. He appears well-developed and well-nourished. HENT:   Head: Normocephalic and atraumatic. Eyes: EOM are normal. Pupils are equal, round, and reactive to light. Neck: Normal range of motion. Neck supple. Cardiovascular: Regular rhythm. Murmur heard. Pulmonary/Chest: Effort normal and breath sounds normal.   Abdominal: Soft. Bowel sounds are normal.   Musculoskeletal: Normal range of motion. Neurological: He is alert and oriented to person, place, and time.      RTC in 2 weeks  Jade Crockett MD PGY3  Pager: [phone]

## 2018-03-01 ENCOUNTER — OFFICE VISIT (OUTPATIENT)
Dept: INTERNAL MEDICINE | Age: 83
End: 2018-03-01
Attending: INTERNAL MEDICINE

## 2018-03-01 VITALS
TEMPERATURE: 97.8 F | HEART RATE: 61 BPM | WEIGHT: 229 LBS | BODY MASS INDEX: 36.96 KG/M2 | RESPIRATION RATE: 20 BRPM | OXYGEN SATURATION: 95 % | SYSTOLIC BLOOD PRESSURE: 124 MMHG | DIASTOLIC BLOOD PRESSURE: 67 MMHG

## 2018-03-01 DIAGNOSIS — I10 ESSENTIAL HYPERTENSION: ICD-10-CM

## 2018-03-01 DIAGNOSIS — M19.90 ARTHRITIS: ICD-10-CM

## 2018-03-01 DIAGNOSIS — E78.00 PURE HYPERCHOLESTEROLEMIA: ICD-10-CM

## 2018-03-01 DIAGNOSIS — I71.40 ABDOMINAL AORTIC ANEURYSM (AAA) WITHOUT RUPTURE: Primary | ICD-10-CM

## 2018-03-01 LAB
BASOPHILS ABSOLUTE: 0 K/UL (ref 0–0.2)
BASOPHILS RELATIVE PERCENT: 0.7 %
CHOLESTEROL, TOTAL: 108 MG/DL (ref 0–199)
EOSINOPHILS ABSOLUTE: 0.4 K/UL (ref 0–0.6)
EOSINOPHILS RELATIVE PERCENT: 7.4 %
HCT VFR BLD CALC: 39.4 % (ref 40.5–52.5)
HDLC SERPL-MCNC: 53 MG/DL (ref 40–60)
HEMOGLOBIN: 13.2 G/DL (ref 13.5–17.5)
LDL CHOLESTEROL CALCULATED: 41 MG/DL
LYMPHOCYTES ABSOLUTE: 1.6 K/UL (ref 1–5.1)
LYMPHOCYTES RELATIVE PERCENT: 32.2 %
MCH RBC QN AUTO: 33 PG (ref 26–34)
MCHC RBC AUTO-ENTMCNC: 33.5 G/DL (ref 31–36)
MCV RBC AUTO: 98.6 FL (ref 80–100)
MONOCYTES ABSOLUTE: 0.8 K/UL (ref 0–1.3)
MONOCYTES RELATIVE PERCENT: 16.5 %
NEUTROPHILS ABSOLUTE: 2.1 K/UL (ref 1.7–7.7)
NEUTROPHILS RELATIVE PERCENT: 43.2 %
PDW BLD-RTO: 13.5 % (ref 12.4–15.4)
PLATELET # BLD: 118 K/UL (ref 135–450)
PMV BLD AUTO: 7.8 FL (ref 5–10.5)
RBC # BLD: 3.99 M/UL (ref 4.2–5.9)
TRIGL SERPL-MCNC: 72 MG/DL (ref 0–150)
VLDLC SERPL CALC-MCNC: 14 MG/DL
WBC # BLD: 5 K/UL (ref 4–11)

## 2018-03-01 NOTE — PROGRESS NOTES
Department Of Internal Medicine  General Medicine/Primary Care  Established Patient Visit    Patient:  Rohan Jonas   : 1932  Age: 80 y.o. MRN: [id number]  Date : 2015    History Obtained From:  patient    CHIEF COMPLAINT: follow up for BP    HISTORY OF PRESENT ILLNESS:   80 y.o. male who presents for follow up, he has PMH of CAD sp PCI (2 stents in ~), hypertension, dyslipidemia, BPH, cellulitis, kidney stones, cellulitis, 3.7 cm AAA, comes for follow up. Has history of AS, repeat echo in  showed stable AS for grade I diastolic dysfunction. .   PMR physician gave him a referal for vascular surgery for AAA, he doesn't have indications for repair at this time. He came to clinic 2 weeks ago with poorly controlled HTN, continued his home dose of ACE and BB and I increased his Maxzide, his BP today is 124/67 and at home he has been 120-150/50-70. He denies CP, lightheadedness, SOB, palpitations or any other symptoms. Past Medical History:    Past Medical History          Diagnosis  Date      CAD (coronary artery disease)       Hypertension       Hyperlipidemia       Arthritis       Prostate enlargement       DDD (degenerative disc disease), lumbar       L5, S1      Mild aortic stenosis       Constipation            Past Surgical History:    Past Surgical History        Procedure  Laterality  Date      Turp        Coronary angioplasty with stent placement   98      Skin tag removal   2/2/10         Family History:    Family History    No family history on file. Social History:   TOBACCO:  reports that he has never smoked. He does not have any smokeless tobacco history on file. ETOH:  reports that he does not drink alcohol. OCCUPATION: retired    Allergies:  Review of patient's allergies indicates no known allergies. Current Medications:   Home Medications   Prior to Admission medications     Medication  Sig  Start Date  End Date  Taking?   Authorizing Provider 07/08/15  226 lb 3.2 oz (102.604 kg)    06/03/15  231 lb (104.781 kg)      · Gen - Alert, no signs of distress, appears stated age and cooperative  · HEENT - NC/AT  · Eye - injected conjunctiva  · Neck / Thyroid - Supple, no masses, nodes, nodules or enlargement. No adenopathy, no carotid bruit, no JVD, supple, symmetrical, trachea midline and thyroid not enlarged, symmetric, no tenderness/mass/nodules  · CVS - III/VI systolic murmur located in the right second intercostal space (increased from previous exams)  · Resp - No accessory muscle use. No crackles. No wheezing. No rhonchi  · GI - Non-tender. Non-distended. No masses. No organmegaly. Normal bowel sounds  · MSK - No cyanosis. No joint deformity. No clubbing  · Neuro -grossly intact    LABS:    CBC:   Lab Results    Component  Value  Date     WBC  4.9  7/8/2015     HGB  12.9*  7/8/2015     HCT  39.7*  7/8/2015     MCV  96.3  7/8/2015     PLT  141  7/8/2015      No results found for this basename: iron, tibc, ferritin, folate, [id number], pth      BMP:   Lab Results    Component  Value  Date     NA  144  7/8/2015     K  4.0  7/8/2015     CL  108  7/8/2015     CO2  29  7/8/2015        LFT's:   Lab Results    Component  Value  Date     ALT  32  11/28/2012     AST  30  11/28/2012     ALKPHOS  97  11/28/2012     BILITOT  1.0  11/28/2012        Lipids:   Lab Results    Component  Value  Date     CHOL  116  3/18/2015     HDL  55  3/18/2015     LDLCALC  44  3/18/2015     TRIG  87  3/18/2015        INR:   No results found for this basename: INR, PROTIME        U/A:  Lab Results    Component  Value  Date     LABMICR  SEE BELOW  7/8/2015        Lab Results    Component  Value  Date     LABA1C  5.8  7/1/2015      Lab Results    Component  Value  Date     CREATININE  1.2  7/8/2015        Assessment/Plan:    80 y.o. male who presents for follow up:    Essential HTN: ovreall better controlled and at goal for a small AAA (3.7 cm).  Carefull with hypotension given AS  - 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: stable   - Seen in the ED 12/12/17 with back pain, no neurologic deficits, seen by neurosurgery, conservatory management with PT    Coronary artery disease stable s/p PCI and Chest pain: resolved   - 10/17 EKG didn't show any acute changes  - 10/17 Stress test showed a small infract in the inferior wall and a 15% drop on EF, patient seen by Dr Chalino Hearn, Mr Tony Clemente decided to continue with medical therapy unless symptoms worsen   - Continue asa and statin  - Patient was advised to go to the ED if pain recurrs    AAA  3.7 cm in 2016  - Discussed the need for US to monitor, he wont want surgery so we will control risk factors for now however he does want to get and US to see if it is getting bigger   - Was referred to vascular surgery by PM&R    CKD III: stable   - Baseline creatinine 1.3 - 1.5    Hyperlipidemia stable, continue high dose statin and Zetia    Arthritis; stable, denies pain.  Swimming 2-4x per week with mild left shoulder pain that lasts 2-3 hours after exercise, hasn't have any pain with exertion     BPH: sp TURP tx with doxazocin  - Flomax was started by Dr Katarina Harvey since his urinary frequency is increasing overnight 12/28    Degenerative disc disease - lumbar: stable   - On PPT    Pneumococcal vaccine and flu shot offered, patient refused   Follow up in 3 months    Julieth Carl MD PGY3  Pager: [phone]

## 2018-03-07 ENCOUNTER — HOSPITAL ENCOUNTER (OUTPATIENT)
Dept: ULTRASOUND IMAGING | Age: 83
Discharge: HOME | End: 2018-03-07
Attending: INTERNAL MEDICINE | Admitting: INTERNAL MEDICINE

## 2018-03-07 DIAGNOSIS — I71.40 ABDOMINAL AORTIC ANEURYSM WITHOUT RUPTURE: ICD-10-CM

## 2018-03-07 DIAGNOSIS — I71.40 ABDOMINAL AORTIC ANEURYSM (AAA) WITHOUT RUPTURE: ICD-10-CM

## 2018-03-26 ENCOUNTER — OFFICE VISIT (OUTPATIENT)
Dept: CARDIOLOGY CLINIC | Age: 83
End: 2018-03-26

## 2018-03-26 VITALS
SYSTOLIC BLOOD PRESSURE: 112 MMHG | HEART RATE: 56 BPM | BODY MASS INDEX: 37.45 KG/M2 | DIASTOLIC BLOOD PRESSURE: 70 MMHG | WEIGHT: 232 LBS

## 2018-03-26 DIAGNOSIS — Z98.61 HISTORY OF PTCA: ICD-10-CM

## 2018-03-26 DIAGNOSIS — I35.0 NONRHEUMATIC AORTIC VALVE STENOSIS: ICD-10-CM

## 2018-03-26 DIAGNOSIS — I25.5 ISCHEMIC CARDIOMYOPATHY: ICD-10-CM

## 2018-03-26 DIAGNOSIS — R94.39 ABNORMAL MYOCARDIAL PERFUSION STUDY: ICD-10-CM

## 2018-03-26 DIAGNOSIS — I25.10 CAD IN NATIVE ARTERY: Primary | ICD-10-CM

## 2018-03-26 PROCEDURE — 4040F PNEUMOC VAC/ADMIN/RCVD: CPT | Performed by: INTERNAL MEDICINE

## 2018-03-26 PROCEDURE — 99214 OFFICE O/P EST MOD 30 MIN: CPT | Performed by: INTERNAL MEDICINE

## 2018-03-26 PROCEDURE — 1036F TOBACCO NON-USER: CPT | Performed by: INTERNAL MEDICINE

## 2018-03-26 PROCEDURE — 1123F ACP DISCUSS/DSCN MKR DOCD: CPT | Performed by: INTERNAL MEDICINE

## 2018-03-26 PROCEDURE — G8427 DOCREV CUR MEDS BY ELIG CLIN: HCPCS | Performed by: INTERNAL MEDICINE

## 2018-03-26 PROCEDURE — G8484 FLU IMMUNIZE NO ADMIN: HCPCS | Performed by: INTERNAL MEDICINE

## 2018-03-26 PROCEDURE — G8417 CALC BMI ABV UP PARAM F/U: HCPCS | Performed by: INTERNAL MEDICINE

## 2018-03-26 PROCEDURE — G8598 ASA/ANTIPLAT THER USED: HCPCS | Performed by: INTERNAL MEDICINE

## 2018-03-26 NOTE — PROGRESS NOTES
Subjective:      Patient ID: Jr Kaplan is a 80 y.o. male. HPI: Elisabeth Pj ia  being seen today in follow up for CAD/PTCA/AS/cardiomyopathy and now abn myoview. No chest pain. Active. Still exercising. No sx with exercise. Swimming 2x per wk. No sob. No edema. No syncope. Past Medical History:   Diagnosis Date    Arthritis     CAD (coronary artery disease)     Chronic kidney disease     Constipation     DDD (degenerative disc disease), lumbar     L5, S1    Hyperlipidemia     Hypertension     Mild aortic stenosis     Prostate enlargement      Past Surgical History:   Procedure Laterality Date    CORONARY ANGIOPLASTY WITH STENT PLACEMENT  1/12/98    EYE SURGERY      SKIN TAG REMOVAL  2/2/10    TURP           No Known Allergies     Social History     Social History    Marital status:      Spouse name: N/A    Number of children: N/A    Years of education: N/A     Occupational History    Not on file. Social History Main Topics    Smoking status: Never Smoker    Smokeless tobacco: Never Used    Alcohol use No    Drug use: No    Sexual activity: Not on file     Other Topics Concern    Not on file     Social History Narrative    No narrative on file        FH reviewed      Patient  has a past medical history of Arthritis; CAD (coronary artery disease); Chronic kidney disease; Constipation; DDD (degenerative disc disease), lumbar; Hyperlipidemia; Hypertension; Mild aortic stenosis; and Prostate enlargement.      Current Outpatient Prescriptions   Medication Sig Dispense Refill    triamterene-hydrochlorothiazide (MAXZIDE-25) 37.5-25 MG per tablet Take 2 tablets by mouth daily 60 tablet 3    carvedilol (COREG) 3.125 MG tablet Take 1 tablet by mouth nightly 90 tablet 3    lisinopril (PRINIVIL;ZESTRIL) 40 MG tablet Take 1 tablet by mouth daily 90 tablet 3    atorvastatin (LIPITOR) 80 MG tablet Take 1 tablet by mouth nightly 90 tablet 3    docusate sodium (COLACE) 100 MG capsule Take 1 capsule by mouth 2 times daily 180 capsule 2    aspirin EC 81 MG EC tablet Take 2 tablets by mouth daily 90 tablet 2    ezetimibe (ZETIA) 10 MG tablet Take 1 tablet by mouth nightly 90 tablet 2    cetirizine (ZYRTEC ALLERGY) 10 MG tablet Take 1 tablet by mouth daily 90 tablet 2    nitroGLYCERIN (NITROSTAT) 0.4 MG SL tablet Place 1 tablet under the tongue every 5 minutes as needed for Chest pain 25 tablet 0    albuterol sulfate HFA (PROVENTIL HFA) 108 (90 Base) MCG/ACT inhaler Inhale 2 puffs into the lungs every 4 hours as needed for Wheezing or Shortness of Breath (Space out to every 6 hours as symptoms improve) Space out to every 6 hours as symptoms improve. 1 Inhaler 2    ranitidine (ZANTAC) 150 MG tablet Take 1 tablet by mouth 2 times daily 180 tablet 2    bisacodyl (BISACODYL) 5 MG EC tablet Take 1 tablet by mouth daily as needed for Constipation 4 tablet 0    hydrocortisone (ANUSOL-HC) 2.5 % rectal cream Place rectally 2 times daily. 1 g 2    doxazosin (CARDURA) 8 MG tablet Take 1 tablet by mouth daily 90 tablet 2    tiZANidine (ZANAFLEX) 4 MG tablet Take 1 tablet by mouth every 8 hours as needed (pain) 15 tablet 0    Multiple Vitamin (MULTI-VITAMIN) TABS Take 1 tablet by mouth daily 30 tablet 5    Fiber POWD Take 1 each by mouth 2 times daily 575 Bottle 3    Nystatin POWD Apply 1 each topically 2 times daily 1 each 1    betamethasone dipropionate (DIPROLENE) 0.05 % ointment Apply topically daily. 1 Tube 0    BACITRACIN-POLYMYXIN B, OPHTH, (AK-POLY-BAC) OINT Sig: Apply a 1/2 inch ribbon to both eyes every 6 hours 1 Tube 0    Skin Protectants, Misc. (EUCERIN) cream Apply topically as needed. 1 Package 3     No current facility-administered medications for this visit. Vitals:    03/26/18 1426   BP: 112/70   Pulse: 56     Wt 232      Review of Systems   Constitutional: Negative for activity change and fatigue.    Respiratory: Negative for apnea, cough, choking,  and shortness of breath. isolate episode of chest discomfort. Cardiovascular: Negative for chest pain, palpitations and leg swelling. No PND or orthopnea. No tachycardia. Gastrointestinal: Negative for abdominal distention. Musculoskeletal: Negative for myalgias. Neurological: Negative for dizziness, syncope and light-headedness. Psychiatric/Behavioral: Negative for agitation, behavioral problems and confusion. Other systems reviewed negative as done. Objective:   Physical Exam   Constitutional: He is oriented to person, place, and time. He appears well-developed and well-nourished. No distress. HENT:   Head: Normocephalic and atraumatic. Eyes: Conjunctivae and EOM are normal. Right eye exhibits no discharge. Left eye exhibits no discharge. Neck: Normal range of motion. Neck supple. No JVD present. Cardiovascular: Normal rate, regular rhythm, S1 normal and S2 normal.  Exam reveals no gallop. Murmur heard. Systolic murmur is present with a grade of 2/6   Pulses:       Radial pulses are 2+ on the right side, and 2+ on the left side. Pulmonary/Chest: Effort normal and breath sounds normal. No respiratory distress. He has no wheezes. He has no rales. Abdominal: Soft. Bowel sounds are normal. There is no tenderness. Musculoskeletal: Normal range of motion. He exhibits tr edema. Neurological: He is alert and oriented to person, place, and time. Skin: Skin is warm and dry. Psychiatric: He has a normal mood and affect. His behavior is normal. Thought content normal.       Assessment:      1. CAD in native artery     2. History of PTCA     3. Nonrheumatic aortic valve stenosis     4. Ischemic cardiomyopathy     5. Abnormal myocardial perfusion study               Plan:      CV stable. Continues to do well. Remains active. BP is good. No angina. Compensated. No exertional sx. Exercising.    Reviewed previous records and testing including cath 12/15 showing only mild gradient across AV and mild CAD/patent stents. Continue coreg/ACE. Also echo 6/17 and myoview 10/17. Follow up 3 months.

## 2018-05-03 ENCOUNTER — OFFICE VISIT (OUTPATIENT)
Dept: INTERNAL MEDICINE | Age: 83
End: 2018-05-03
Attending: INTERNAL MEDICINE

## 2018-05-03 VITALS
RESPIRATION RATE: 16 BRPM | DIASTOLIC BLOOD PRESSURE: 64 MMHG | OXYGEN SATURATION: 96 % | HEART RATE: 60 BPM | WEIGHT: 227.2 LBS | SYSTOLIC BLOOD PRESSURE: 126 MMHG | BODY MASS INDEX: 36.67 KG/M2 | TEMPERATURE: 97.4 F

## 2018-05-03 DIAGNOSIS — I95.2 HYPOTENSION DUE TO DRUGS: Primary | ICD-10-CM

## 2018-05-31 ENCOUNTER — OFFICE VISIT (OUTPATIENT)
Dept: INTERNAL MEDICINE | Age: 83
End: 2018-05-31
Attending: INTERNAL MEDICINE

## 2018-05-31 VITALS
RESPIRATION RATE: 20 BRPM | WEIGHT: 227 LBS | HEART RATE: 68 BPM | BODY MASS INDEX: 36.64 KG/M2 | OXYGEN SATURATION: 93 % | TEMPERATURE: 98 F | DIASTOLIC BLOOD PRESSURE: 79 MMHG | SYSTOLIC BLOOD PRESSURE: 111 MMHG

## 2018-05-31 DIAGNOSIS — I25.10 CORONARY ARTERY DISEASE INVOLVING NATIVE HEART, ANGINA PRESENCE UNSPECIFIED, UNSPECIFIED VESSEL OR LESION TYPE: Primary | ICD-10-CM

## 2018-05-31 DIAGNOSIS — E78.00 PURE HYPERCHOLESTEROLEMIA: ICD-10-CM

## 2018-05-31 DIAGNOSIS — M19.90 ARTHRITIS: ICD-10-CM

## 2018-05-31 DIAGNOSIS — I10 ESSENTIAL HYPERTENSION: ICD-10-CM

## 2018-06-21 DIAGNOSIS — I25.10 CORONARY ARTERY DISEASE INVOLVING NATIVE HEART, ANGINA PRESENCE UNSPECIFIED, UNSPECIFIED VESSEL OR LESION TYPE: ICD-10-CM

## 2018-06-21 DIAGNOSIS — I10 ESSENTIAL HYPERTENSION: ICD-10-CM

## 2018-06-21 RX ORDER — TRIAMTERENE AND HYDROCHLOROTHIAZIDE 37.5; 25 MG/1; MG/1
2 TABLET ORAL DAILY
Qty: 60 TABLET | Refills: 2 | Status: SHIPPED | OUTPATIENT
Start: 2018-06-21 | End: 2018-09-24 | Stop reason: SDUPTHER

## 2018-07-02 ENCOUNTER — OFFICE VISIT (OUTPATIENT)
Dept: CARDIOLOGY CLINIC | Age: 83
End: 2018-07-02

## 2018-07-02 VITALS
BODY MASS INDEX: 36.32 KG/M2 | DIASTOLIC BLOOD PRESSURE: 76 MMHG | SYSTOLIC BLOOD PRESSURE: 128 MMHG | OXYGEN SATURATION: 95 % | WEIGHT: 225 LBS | HEART RATE: 65 BPM

## 2018-07-02 DIAGNOSIS — Z98.61 HISTORY OF PTCA: ICD-10-CM

## 2018-07-02 DIAGNOSIS — R94.39 ABNORMAL MYOCARDIAL PERFUSION STUDY: ICD-10-CM

## 2018-07-02 DIAGNOSIS — I25.5 ISCHEMIC CARDIOMYOPATHY: ICD-10-CM

## 2018-07-02 DIAGNOSIS — I35.0 NONRHEUMATIC AORTIC VALVE STENOSIS: ICD-10-CM

## 2018-07-02 DIAGNOSIS — I25.10 CAD IN NATIVE ARTERY: Primary | ICD-10-CM

## 2018-07-02 PROCEDURE — 1036F TOBACCO NON-USER: CPT | Performed by: INTERNAL MEDICINE

## 2018-07-02 PROCEDURE — 99214 OFFICE O/P EST MOD 30 MIN: CPT | Performed by: INTERNAL MEDICINE

## 2018-07-02 PROCEDURE — 1123F ACP DISCUSS/DSCN MKR DOCD: CPT | Performed by: INTERNAL MEDICINE

## 2018-07-02 PROCEDURE — G8427 DOCREV CUR MEDS BY ELIG CLIN: HCPCS | Performed by: INTERNAL MEDICINE

## 2018-07-02 PROCEDURE — G8417 CALC BMI ABV UP PARAM F/U: HCPCS | Performed by: INTERNAL MEDICINE

## 2018-07-02 PROCEDURE — G8598 ASA/ANTIPLAT THER USED: HCPCS | Performed by: INTERNAL MEDICINE

## 2018-07-02 PROCEDURE — 4040F PNEUMOC VAC/ADMIN/RCVD: CPT | Performed by: INTERNAL MEDICINE

## 2018-07-02 NOTE — PROGRESS NOTES
cough, choking,  and shortness of breath. isolate episode of chest discomfort. Cardiovascular: Negative for chest pain, palpitations and leg swelling. No PND or orthopnea. No tachycardia. Gastrointestinal: Negative for abdominal distention. Musculoskeletal: Negative for myalgias. Neurological: Negative for dizziness, syncope and light-headedness. Psychiatric/Behavioral: Negative for agitation, behavioral problems and confusion. Other systems reviewed negative as done. Objective:   Physical Exam   Constitutional: He is oriented to person, place, and time. He appears well-developed and well-nourished. No distress. HENT:   Head: Normocephalic and atraumatic. Eyes: Conjunctivae and EOM are normal. Right eye exhibits no discharge. Left eye exhibits no discharge. Neck: Normal range of motion. Neck supple. No JVD present. Cardiovascular: Normal rate, regular rhythm, S1 normal and S2 normal.  Exam reveals no gallop. Murmur heard. Systolic murmur is present with a grade of 2/6   Pulses:       Radial pulses are 2+ on the right side, and 2+ on the left side. Pulmonary/Chest: Effort normal and breath sounds normal. No respiratory distress. He has no wheezes. He has no rales. Abdominal: Soft. Bowel sounds are normal. There is no tenderness. Musculoskeletal: Normal range of motion. He exhibits tr edema. Neurological: He is alert and oriented to person, place, and time. Skin: Skin is warm and dry. Psychiatric: He has a normal mood and affect. His behavior is normal. Thought content normal.       Assessment:       Diagnosis Orders   1. CAD in native artery     2. History of PTCA     3. Nonrheumatic aortic valve stenosis     4. Ischemic cardiomyopathy     5. Abnormal myocardial perfusion study               Plan:      CV stable. Continues to do well. Remains active. BP is good. No angina. Compensated. No exertional sx. Exercising. Wt stable.   Reviewed previous records and

## 2018-08-23 ENCOUNTER — OFFICE VISIT (OUTPATIENT)
Dept: INTERNAL MEDICINE CLINIC | Age: 83
End: 2018-08-23
Payer: MEDICARE

## 2018-08-23 DIAGNOSIS — M19.90 ARTHRITIS: Primary | ICD-10-CM

## 2018-08-23 DIAGNOSIS — E78.5 HYPERLIPIDEMIA, UNSPECIFIED HYPERLIPIDEMIA TYPE: ICD-10-CM

## 2018-08-23 PROCEDURE — 99213 OFFICE O/P EST LOW 20 MIN: CPT | Performed by: STUDENT IN AN ORGANIZED HEALTH CARE EDUCATION/TRAINING PROGRAM

## 2018-08-23 ASSESSMENT — ENCOUNTER SYMPTOMS
BACK PAIN: 1
SHORTNESS OF BREATH: 0
ABDOMINAL PAIN: 0
COUGH: 0
VOMITING: 0
NAUSEA: 0

## 2018-08-23 NOTE — PROGRESS NOTES
tablet by mouth daily 1/25/18  Yes Adria Bennett MD   atorvastatin (LIPITOR) 80 MG tablet Take 1 tablet by mouth nightly 1/25/18  Yes Adria Bennett MD   docusate sodium (COLACE) 100 MG capsule Take 1 capsule by mouth 2 times daily 1/25/18  Yes Adria Bennett MD   aspirin EC 81 MG EC tablet Take 2 tablets by mouth daily  Patient taking differently: Take 81 mg by mouth daily  1/25/18  Yes Adria Bennett MD   ezetimibe (ZETIA) 10 MG tablet Take 1 tablet by mouth nightly 1/25/18  Yes Adria Bennett MD   cetirizine (ZYRTEC ALLERGY) 10 MG tablet Take 1 tablet by mouth daily 1/25/18  Yes Adria Bennett MD   nitroGLYCERIN (NITROSTAT) 0.4 MG SL tablet Place 1 tablet under the tongue every 5 minutes as needed for Chest pain 1/25/18  Yes Adria Bennett MD   ranitidine (ZANTAC) 150 MG tablet Take 1 tablet by mouth 2 times daily 1/25/18  Yes Adria Bennett MD   doxazosin (CARDURA) 8 MG tablet Take 1 tablet by mouth daily 1/25/18  Yes Adria Bennett MD   tiZANidine (ZANAFLEX) 4 MG tablet Take 1 tablet by mouth every 8 hours as needed (pain) 12/20/17  Yes Ayo Drummond MD   Fiber POWD Take 1 each by mouth 2 times daily 8/8/16  Yes Jose Jaime MD   betamethasone dipropionate (DIPROLENE) 0.05 % ointment Apply topically daily. 6/10/15  Yes Troy Saldana MD   BACITRACIN-POLYMYXIN B, OPHTH, (AK-POLY-BAC) OINT Sig: Apply a 1/2 inch ribbon to both eyes every 6 hours 4/19/15  Yes DEBORAH Kruger CNP   albuterol sulfate HFA (PROVENTIL HFA) 108 (90 Base) MCG/ACT inhaler Inhale 2 puffs into the lungs every 4 hours as needed for Wheezing or Shortness of Breath (Space out to every 6 hours as symptoms improve) Space out to every 6 hours as symptoms improve. 1/25/18   Adria Bennett MD   bisacodyl (BISACODYL) 5 MG EC tablet Take 1 tablet by mouth daily as needed for Constipation 1/25/18   Adria Bennett MD   hydrocortisone (ANUSOL-HC) 2.5 % rectal cream Place rectally 2 times daily.  1/25/18   Adria Bennett MD

## 2018-08-23 NOTE — PATIENT INSTRUCTIONS
Get blood work done before next visit. Return in 3 months.    Podiatry referral  Return in 3 [facility]

## 2018-09-11 RX ORDER — RANITIDINE 150 MG/1
150 TABLET ORAL 2 TIMES DAILY
Qty: 180 TABLET | Refills: 2
Start: 2018-09-11 | End: 2019-01-17 | Stop reason: SDUPTHER

## 2018-09-24 DIAGNOSIS — I25.10 CORONARY ARTERY DISEASE INVOLVING NATIVE HEART, ANGINA PRESENCE UNSPECIFIED, UNSPECIFIED VESSEL OR LESION TYPE: ICD-10-CM

## 2018-09-24 DIAGNOSIS — I10 ESSENTIAL HYPERTENSION: ICD-10-CM

## 2018-09-24 RX ORDER — TRIAMTERENE AND HYDROCHLOROTHIAZIDE 37.5; 25 MG/1; MG/1
2 TABLET ORAL DAILY
Qty: 60 TABLET | Refills: 2
Start: 2018-09-24 | End: 2019-01-17 | Stop reason: SDUPTHER

## 2018-10-12 DIAGNOSIS — E78.5 HYPERLIPIDEMIA, UNSPECIFIED HYPERLIPIDEMIA TYPE: ICD-10-CM

## 2018-10-12 DIAGNOSIS — E78.00 PURE HYPERCHOLESTEROLEMIA: ICD-10-CM

## 2018-10-12 RX ORDER — EZETIMIBE 10 MG/1
10 TABLET ORAL NIGHTLY
Qty: 90 TABLET | Refills: 2
Start: 2018-10-12 | End: 2019-01-17 | Stop reason: SDUPTHER

## 2018-10-15 ENCOUNTER — OFFICE VISIT (OUTPATIENT)
Dept: CARDIOLOGY CLINIC | Age: 83
End: 2018-10-15
Payer: MEDICARE

## 2018-10-15 VITALS
SYSTOLIC BLOOD PRESSURE: 120 MMHG | DIASTOLIC BLOOD PRESSURE: 70 MMHG | HEART RATE: 62 BPM | BODY MASS INDEX: 36.16 KG/M2 | HEIGHT: 66 IN | WEIGHT: 225 LBS | RESPIRATION RATE: 16 BRPM

## 2018-10-15 DIAGNOSIS — I25.5 ISCHEMIC CARDIOMYOPATHY: ICD-10-CM

## 2018-10-15 DIAGNOSIS — Z98.61 HISTORY OF PTCA: ICD-10-CM

## 2018-10-15 DIAGNOSIS — R94.39 ABNORMAL MYOCARDIAL PERFUSION STUDY: ICD-10-CM

## 2018-10-15 DIAGNOSIS — I35.0 NONRHEUMATIC AORTIC VALVE STENOSIS: ICD-10-CM

## 2018-10-15 DIAGNOSIS — I25.10 CAD IN NATIVE ARTERY: Primary | ICD-10-CM

## 2018-10-15 PROCEDURE — 1036F TOBACCO NON-USER: CPT | Performed by: INTERNAL MEDICINE

## 2018-10-15 PROCEDURE — G8427 DOCREV CUR MEDS BY ELIG CLIN: HCPCS | Performed by: INTERNAL MEDICINE

## 2018-10-15 PROCEDURE — G8417 CALC BMI ABV UP PARAM F/U: HCPCS | Performed by: INTERNAL MEDICINE

## 2018-10-15 PROCEDURE — 1123F ACP DISCUSS/DSCN MKR DOCD: CPT | Performed by: INTERNAL MEDICINE

## 2018-10-15 PROCEDURE — G8598 ASA/ANTIPLAT THER USED: HCPCS | Performed by: INTERNAL MEDICINE

## 2018-10-15 PROCEDURE — G8484 FLU IMMUNIZE NO ADMIN: HCPCS | Performed by: INTERNAL MEDICINE

## 2018-10-15 PROCEDURE — 99214 OFFICE O/P EST MOD 30 MIN: CPT | Performed by: INTERNAL MEDICINE

## 2018-10-15 PROCEDURE — 1101F PT FALLS ASSESS-DOCD LE1/YR: CPT | Performed by: INTERNAL MEDICINE

## 2018-10-15 PROCEDURE — 4040F PNEUMOC VAC/ADMIN/RCVD: CPT | Performed by: INTERNAL MEDICINE

## 2018-10-15 NOTE — PROGRESS NOTES
abdominal distention. Musculoskeletal: Negative for myalgias. Neurological: Negative for dizziness, syncope and light-headedness. Psychiatric/Behavioral: Negative for agitation, behavioral problems and confusion. Other systems reviewed negative as done. Objective:   Physical Exam   Constitutional: He is oriented to person, place, and time. He appears well-developed and well-nourished. No distress. HENT:   Head: Normocephalic and atraumatic. Eyes: Conjunctivae and EOM are normal. Right eye exhibits no discharge. Left eye exhibits no discharge. Neck: Normal range of motion. Neck supple. No JVD present. Cardiovascular: Normal rate, regular rhythm, S1 normal and S2 normal.  Exam reveals no gallop. Murmur heard. Systolic murmur is present with a grade of 2/6   Pulses:       Radial pulses are 2+ on the right side, and 2+ on the left side. Pulmonary/Chest: Effort normal and breath sounds normal. No respiratory distress. He has no wheezes. He has no rales. Abdominal: Soft. Bowel sounds are normal. There is no tenderness. Musculoskeletal: Normal range of motion. He exhibits tr edema. Neurological: He is alert and oriented to person, place, and time. Skin: Skin is warm and dry. Psychiatric: He has a normal mood and affect. His behavior is normal. Thought content normal.       Assessment:       Diagnosis Orders   1. CAD in native artery     2. History of PTCA     3. Ischemic cardiomyopathy     4. Nonrheumatic aortic valve stenosis     5. Abnormal myocardial perfusion study               Plan:      CV stable. Remains active. BP is good. No angina. Compensated. No exertional sx. Exercising. Wt stable. Reviewed previous records and testing including cath 12/15 showing only mild gradient across AV and mild CAD/patent stents. Continue coreg/ACE. Also echo 6/17 and myoview 10/17. Follow up 3 months.

## 2018-10-17 ENCOUNTER — OFFICE VISIT (OUTPATIENT)
Dept: ORTHOPEDIC SURGERY | Age: 83
End: 2018-10-17
Payer: MEDICARE

## 2018-10-17 VITALS
HEIGHT: 66 IN | BODY MASS INDEX: 36.32 KG/M2 | WEIGHT: 226 LBS | DIASTOLIC BLOOD PRESSURE: 78 MMHG | SYSTOLIC BLOOD PRESSURE: 133 MMHG

## 2018-10-17 DIAGNOSIS — M51.26 LUMBAR DISC HERNIATION: Primary | ICD-10-CM

## 2018-10-17 DIAGNOSIS — M47.816 SPONDYLOSIS OF LUMBAR REGION WITHOUT MYELOPATHY OR RADICULOPATHY: ICD-10-CM

## 2018-10-17 PROCEDURE — 1101F PT FALLS ASSESS-DOCD LE1/YR: CPT | Performed by: PHYSICAL MEDICINE & REHABILITATION

## 2018-10-17 PROCEDURE — G8598 ASA/ANTIPLAT THER USED: HCPCS | Performed by: PHYSICAL MEDICINE & REHABILITATION

## 2018-10-17 PROCEDURE — 1123F ACP DISCUSS/DSCN MKR DOCD: CPT | Performed by: PHYSICAL MEDICINE & REHABILITATION

## 2018-10-17 PROCEDURE — 1036F TOBACCO NON-USER: CPT | Performed by: PHYSICAL MEDICINE & REHABILITATION

## 2018-10-17 PROCEDURE — 4040F PNEUMOC VAC/ADMIN/RCVD: CPT | Performed by: PHYSICAL MEDICINE & REHABILITATION

## 2018-10-17 PROCEDURE — G8484 FLU IMMUNIZE NO ADMIN: HCPCS | Performed by: PHYSICAL MEDICINE & REHABILITATION

## 2018-10-17 PROCEDURE — G8417 CALC BMI ABV UP PARAM F/U: HCPCS | Performed by: PHYSICAL MEDICINE & REHABILITATION

## 2018-10-17 PROCEDURE — 99213 OFFICE O/P EST LOW 20 MIN: CPT | Performed by: PHYSICAL MEDICINE & REHABILITATION

## 2018-10-17 PROCEDURE — G8427 DOCREV CUR MEDS BY ELIG CLIN: HCPCS | Performed by: PHYSICAL MEDICINE & REHABILITATION

## 2018-10-17 NOTE — PROGRESS NOTES
Past Surgical History:   Procedure Laterality Date    CORONARY ANGIOPLASTY WITH STENT PLACEMENT  1/12/98    EYE SURGERY      SKIN TAG REMOVAL  2/2/10    FLACO       Current Medications:     Current Outpatient Prescriptions:     ezetimibe (ZETIA) 10 MG tablet, Take 1 tablet by mouth nightly, Disp: 90 tablet, Rfl: 2    triamterene-hydrochlorothiazide (MAXZIDE-25) 37.5-25 MG per tablet, Take 2 tablets by mouth daily, Disp: 60 tablet, Rfl: 2    ranitidine (ZANTAC) 150 MG tablet, Take 1 tablet by mouth 2 times daily, Disp: 180 tablet, Rfl: 2    Multiple Vitamin (MULTI-VITAMIN) TABS, Take 1 tablet by mouth daily, Disp: 30 tablet, Rfl: 5    lisinopril (PRINIVIL;ZESTRIL) 40 MG tablet, Take 1 tablet by mouth daily, Disp: 90 tablet, Rfl: 3    atorvastatin (LIPITOR) 80 MG tablet, Take 1 tablet by mouth nightly, Disp: 90 tablet, Rfl: 3    docusate sodium (COLACE) 100 MG capsule, Take 1 capsule by mouth 2 times daily, Disp: 180 capsule, Rfl: 2    aspirin EC 81 MG EC tablet, Take 2 tablets by mouth daily (Patient taking differently: Take 81 mg by mouth daily ), Disp: 90 tablet, Rfl: 2    cetirizine (ZYRTEC ALLERGY) 10 MG tablet, Take 1 tablet by mouth daily, Disp: 90 tablet, Rfl: 2    nitroGLYCERIN (NITROSTAT) 0.4 MG SL tablet, Place 1 tablet under the tongue every 5 minutes as needed for Chest pain, Disp: 25 tablet, Rfl: 0    albuterol sulfate HFA (PROVENTIL HFA) 108 (90 Base) MCG/ACT inhaler, Inhale 2 puffs into the lungs every 4 hours as needed for Wheezing or Shortness of Breath (Space out to every 6 hours as symptoms improve) Space out to every 6 hours as symptoms improve., Disp: 1 Inhaler, Rfl: 2    bisacodyl (BISACODYL) 5 MG EC tablet, Take 1 tablet by mouth daily as needed for Constipation, Disp: 4 tablet, Rfl: 0    hydrocortisone (ANUSOL-HC) 2.5 % rectal cream, Place rectally 2 times daily. , Disp: 1 g, Rfl: 2    doxazosin (CARDURA) 8 MG tablet, Take 1 tablet by mouth daily, Disp: 90 tablet, Rfl: 2   1.0 - 5.1 K/uL    Monocytes # 0.8 0.0 - 1.3 K/uL    Eosinophils # 0.4 0.0 - 0.6 K/uL    Basophils # 0.0 0.0 - 0.2 K/uL   Lipid Panel   Result Value Ref Range    Cholesterol, Total 108 0 - 199 mg/dL    Triglycerides 72 0 - 150 mg/dL    HDL 53 40 - 60 mg/dL    LDL Calculated 41 <100 mg/dL    VLDL Cholesterol Calculated 14 Not Established mg/dL     Impression:       1. Lumbar disc herniation    2. Spondylosis of lumbar region without myelopathy or radiculopathy        Plan:  Clinical Course: Above diagnoses are worsening    I discussed the diagnosis and the treatment options with Jennifer Sanders today. In Summary:  The various treatment options were outlined and discussed with Jennifer Snaders including:  Conservative care options: physical therapy, ice, medications, bracing, and activity modification. The indications for therapeutic injections. The indications for additional imaging/laboratory studies. The indications for (possible future) interventions. After considering the various options discussed, Jennifer Sanders elected to pursue a course of treatment that includes the followin. Medications:  Continue anti-inflammatories with appropriate GI Precautions including to stop if develop dark tarry stools or GI upset and to take with food. 2. PT:  I will start the patient on a trial of PT to work on a lumbar stabilization program to focus on core strengthening, core stabilizing, lumbar stretches, hamstring flexibility, modalities as indicated for 6-8 visits over the next 4-6 weeks. 3. Further studies:  No further studies. 4. Interventional:  None at this time    5. Follow up:  2-3 months      Jennifer Sanders was instructed to call the office if his symptoms worsen or if new symptoms appear prior to the next scheduled visit.  He is specifically instructed to contact the office between now & his scheduled appointment if he has concerns related to his condition or if he needs assistance in scheduling

## 2018-10-23 RX ORDER — DOXAZOSIN 8 MG/1
8 TABLET ORAL DAILY
Qty: 90 TABLET | Refills: 2
Start: 2018-10-23 | End: 2019-02-05 | Stop reason: SDUPTHER

## 2018-10-24 ENCOUNTER — HOSPITAL ENCOUNTER (OUTPATIENT)
Dept: PHYSICAL THERAPY | Age: 83
Setting detail: THERAPIES SERIES
Discharge: HOME | End: 2018-10-24
Payer: MEDICARE

## 2018-10-24 PROCEDURE — 97161 PT EVAL LOW COMPLEX 20 MIN: CPT | Performed by: PHYSICAL THERAPIST

## 2018-10-24 PROCEDURE — G8982 BODY POS GOAL STATUS: HCPCS | Performed by: PHYSICAL THERAPIST

## 2018-10-24 PROCEDURE — G8981 BODY POS CURRENT STATUS: HCPCS | Performed by: PHYSICAL THERAPIST

## 2018-10-24 PROCEDURE — 97110 THERAPEUTIC EXERCISES: CPT | Performed by: PHYSICAL THERAPIST

## 2018-10-24 NOTE — PLAN OF CARE
LBP  Functional Disability Index:Modified BABAK  26%    Pain Scale: 6/10    Easing factors: lying down    Provocative factors: standing, walking     Night Pain: none     Type: []Constant   []Intermittent  []Radiating []Localized []other:     Numbness/Tingling: none     Red Flag Symptoms: Denied symptoms associated with more severe pathology    to include loss of bowel and bladder control, fever, chills, nausea, headache, recent weight gain, recent weight loss, night sweats, decreased appetite, fatigue. Functional Limitations/Impairments: []Sitting [x]Standing [x]Walking    []Squatting/bending  []Stairs           []ADL's  []Transfers []Sports/Recreation []Other:    Occupation/School: retired    Sport/recreational activities:      Living Status/Prior Level of Function: This patient was independent in ADL's and IADL's prior to onset of symptoms.        OBJECTIVE:       Standing Exam Normal Abnormal N/A Comments   Toe walk   x      Heel Walk x      Pelvic Height       Fwd Bend- (aberrant juttering or innominate mvmt)- Standing Flexion Test x      Extension x      Sacral Sulcus Test (Side Flexion)       Trendelenburg  x  (+) on L   Combined Movements                                   Seated Exam Normal Abnormal N/A Comments   Pelvic Height       Seated Rotation  x  Limited ROM bilaterally   Seated flexion       B hip IR  x  Limited ROM bilaterally   SLUMP Test x             Supine Exam Normal Abnormal N/A Comments   Hip flexion x      Abduction       ER  x  Limited mobility   IR  x  Limited mobility   NENA/Jose Alejandro x      FADIR x      SLR x      Crossed SLR       Supine to sit       Supine to Sit Test                            Prone Exam Normal Abnormal N/A Comments   Prone knee bend   x    Prone hip IR       B Achilles reflex/[place]       PA/Spring  x  Decreased mobility   Prone Instability test       Sacral Spring/thrust       Femoral Nerve Tension Test                ROM LEFT RIGHT Comments   Lumbar Flex   Limited

## 2018-10-24 NOTE — FLOWSHEET NOTE
Flex      SLR Abd 2x15 B     Clamshells      Prone plank      Side plank      Squats      Standing Stretch (insert muscle)                  Therapeutic Exercise and NMR EXR  [] (35750) Provided verbal/tactile cueing for activities related to strengthening, flexibility, endurance, ROM  for improvements in proximal hip and core control with self care, mobility, lifting and ambulation.  [] (81323) Provided verbal/tactile cueing for activities related to improving balance, coordination, kinesthetic sense, posture, motor skill, proprioception  to assist with core control in self care, mobility, lifting, and ambulation. Therapeutic Activities:    [] (95396 or 70114) Provided verbal/tactile cueing for activities related to improving balance, coordination, kinesthetic sense, posture, motor skill, proprioception and motor activation to allow for proper function  with self care and ADLs  [] (36778) Provided training and instruction to the patient for proper core and proximal hip recruitment and positioning with ambulation re-education     Home Exercise Program:    [x] (26040) Reviewed/Progressed HEP activities related to strengthening, flexibility, endurance, ROM of core, proximal hip and LE for functional self-care, mobility, lifting and ambulation   [] (84391) Reviewed/Progressed HEP activities related to improving balance, coordination, kinesthetic sense, posture, motor skill, proprioception of core, proximal hip and LE for self care, mobility, lifting, and ambulation      Manual Treatments: Traction / PA's (Gr I, II, III, IV) / Stretch- Hamstring, Piriformis, Hip Flexor, Groin / STM/ Sacral Decompression  [x] Provided manual therapy to mobilize soft tissue/joints for the purpose of modulating pain, promoting relaxation, increasing ROM, reducing/eliminating soft tissue swelling/inflammation/restriction, improving soft tissue extensibility and allowing for proper ROM for normal function. (14483).      Modalities:

## 2018-10-29 ENCOUNTER — HOSPITAL ENCOUNTER (OUTPATIENT)
Dept: PHYSICAL THERAPY | Age: 83
Setting detail: THERAPIES SERIES
Discharge: HOME | End: 2018-10-29
Payer: MEDICARE

## 2018-10-29 PROCEDURE — 97140 MANUAL THERAPY 1/> REGIONS: CPT | Performed by: SPECIALIST/TECHNOLOGIST

## 2018-10-29 PROCEDURE — 97110 THERAPEUTIC EXERCISES: CPT | Performed by: SPECIALIST/TECHNOLOGIST

## 2018-10-29 NOTE — FLOWSHEET NOTE
TA / Multifidus / Abd Hollow      TA March      Bridge 2x15     SLR Flex      SLR Abd 2x15 B     Clamshells - supine Green loop x 20 New     Prone plank      Side plank      Squats      Standing Stretch (insert muscle)                  Therapeutic Exercise and NMR EXR  [x] (33217) Provided verbal/tactile cueing for activities related to strengthening, flexibility, endurance, ROM  for improvements in proximal hip and core control with self care, mobility, lifting and ambulation.  [] (63283) Provided verbal/tactile cueing for activities related to improving balance, coordination, kinesthetic sense, posture, motor skill, proprioception  to assist with core control in self care, mobility, lifting, and ambulation.      Therapeutic Activities:    [] (23355 or 56946) Provided verbal/tactile cueing for activities related to improving balance, coordination, kinesthetic sense, posture, motor skill, proprioception and motor activation to allow for proper function  with self care and ADLs  [] (50835) Provided training and instruction to the patient for proper core and proximal hip recruitment and positioning with ambulation re-education     Home Exercise Program:    [x] (41625) Reviewed/Progressed HEP activities related to strengthening, flexibility, endurance, ROM of core, proximal hip and LE for functional self-care, mobility, lifting and ambulation   [] (13641) Reviewed/Progressed HEP activities related to improving balance, coordination, kinesthetic sense, posture, motor skill, proprioception of core, proximal hip and LE for self care, mobility, lifting, and ambulation      Manual Treatments: Traction / PA's (Gr I, II, III, IV) / Stretch- Hamstring, Piriformis, Hip Flexor, Groin / STM/ Sacral Decompression  [x] Provided manual therapy to mobilize soft tissue/joints for the purpose of modulating pain, promoting relaxation, increasing ROM, reducing/eliminating soft tissue swelling/inflammation/restriction, improving soft tissue extensibility and allowing for proper ROM for normal function. (72530). Modalities:   CP10' - sitting in a chair     Charges:  Timed Code Treatment Minutes: 40   Total Treatment Minutes: 50   Pt. Was in a hour time slot  [] EVAL (LOW) 70065 (typically 20 minutes face-to-face)  [] EVAL (MOD) 17789 (typically 30 minutes face-to-face)  [] EVAL (HIGH) 96225 (typically 45 minutes face-to-face)  [] RE-EVAL      [x] QG(03456) x  2   [] IONTO  [] NMR (41378) x      [] VASO  [x] Manual (00529) x  1    [] Other:  [] TA x       [] Mech Traction (09409)  [] ES(attended) (57954)      [] ES (un) (42038):     Goals:   Short Term Goals: To be achieved in: 2 weeks  1. Independent in HEP and progression per patient tolerance, in order to prevent re-injury. 2. Patient will have a decrease in pain to facilitate improvement in movement, function, and ADLs as indicated by Functional Deficits. Long Term Goals: To be achieved in: 4 weeks  1. Disability index score of 15% or less for the BABAK to assist with reaching prior level of function. 2. Patient will demonstrate increased AROM to WNL, good LS mobility, good hip ROM to allow for proper joint functioning as indicated by patients Functional Deficits. 3. Patient will demonstrate an increase in Strength to good proximal hip and core activation to allow for proper functional mobility as indicated by patients Functional Deficits. 4. Patient will return to standing/walking for 30 minutes functional activities without increased symptoms or restriction. Progression Towards Functional goals:  [] Patient is progressing as expected towards functional goals listed. [] Progression is slowed due to complexities listed. [] Progression has been slowed due to co-morbidities. [x] Plan just implemented, too soon to assess goals progression  [] Other:     ASSESSMENT:  Tight hamstrings and limited hip mobility.     Treatment/Activity Tolerance:  [x] Patient tolerated treatment

## 2018-11-05 ENCOUNTER — HOSPITAL ENCOUNTER (OUTPATIENT)
Dept: PHYSICAL THERAPY | Age: 83
Setting detail: THERAPIES SERIES
Discharge: HOME | End: 2018-11-05
Payer: MEDICARE

## 2018-11-05 PROCEDURE — 97140 MANUAL THERAPY 1/> REGIONS: CPT | Performed by: PHYSICAL THERAPIST

## 2018-11-05 PROCEDURE — 97110 THERAPEUTIC EXERCISES: CPT | Performed by: PHYSICAL THERAPIST

## 2018-11-05 NOTE — FLOWSHEET NOTE
UE/LE combined (birddog)       TA / Multifidus / Abd Hollow      TA March 30x B     Bridge 2x15     SLR Flex 2x15 B     SLR Abd 2x15 B     Clamshells  Green loop x 30 Side lying    Hip ext @ end of table 30x B     Prone plank      Side plank      Squats      Standing Stretch (insert muscle)                  Therapeutic Exercise and NMR EXR  [x] (07415) Provided verbal/tactile cueing for activities related to strengthening, flexibility, endurance, ROM  for improvements in proximal hip and core control with self care, mobility, lifting and ambulation.  [] (85388) Provided verbal/tactile cueing for activities related to improving balance, coordination, kinesthetic sense, posture, motor skill, proprioception  to assist with core control in self care, mobility, lifting, and ambulation.      Therapeutic Activities:    [] (86570 or 62377) Provided verbal/tactile cueing for activities related to improving balance, coordination, kinesthetic sense, posture, motor skill, proprioception and motor activation to allow for proper function  with self care and ADLs  [] (05684) Provided training and instruction to the patient for proper core and proximal hip recruitment and positioning with ambulation re-education     Home Exercise Program:    [x] (52217) Reviewed/Progressed HEP activities related to strengthening, flexibility, endurance, ROM of core, proximal hip and LE for functional self-care, mobility, lifting and ambulation   [] (51681) Reviewed/Progressed HEP activities related to improving balance, coordination, kinesthetic sense, posture, motor skill, proprioception of core, proximal hip and LE for self care, mobility, lifting, and ambulation      Manual Treatments: Traction / PA's (Gr I, II, III, IV) / Stretch- Hamstring, Piriformis, Hip Flexor, Groin / STM/ Sacral Decompression  [x] Provided manual therapy to mobilize soft tissue/joints for the purpose of modulating pain, promoting relaxation, increasing ROM,

## 2018-11-07 DIAGNOSIS — E78.5 HYPERLIPIDEMIA, UNSPECIFIED HYPERLIPIDEMIA TYPE: ICD-10-CM

## 2018-11-07 LAB
ALBUMIN SERPL-MCNC: 4.1 G/DL (ref 3.4–5)
ANION GAP SERPL CALCULATED.3IONS-SCNC: 14 MMOL/L (ref 3–16)
BASOPHILS ABSOLUTE: 0 K/UL (ref 0–0.2)
BASOPHILS RELATIVE PERCENT: 0.5 %
BUN BLDV-MCNC: 27 MG/DL (ref 7–20)
CALCIUM SERPL-MCNC: 8.9 MG/DL (ref 8.3–10.6)
CHLORIDE BLD-SCNC: 107 MMOL/L (ref 99–110)
CHOLESTEROL, TOTAL: 103 MG/DL (ref 0–199)
CO2: 25 MMOL/L (ref 21–32)
CREAT SERPL-MCNC: 1.6 MG/DL (ref 0.8–1.3)
EOSINOPHILS ABSOLUTE: 0.5 K/UL (ref 0–0.6)
EOSINOPHILS RELATIVE PERCENT: 8.9 %
GFR AFRICAN AMERICAN: 50
GFR NON-AFRICAN AMERICAN: 41
GLUCOSE BLD-MCNC: 110 MG/DL (ref 70–99)
HCT VFR BLD CALC: 36.8 % (ref 40.5–52.5)
HDLC SERPL-MCNC: 53 MG/DL (ref 40–60)
HEMOGLOBIN: 12.5 G/DL (ref 13.5–17.5)
LDL CHOLESTEROL CALCULATED: 39 MG/DL
LYMPHOCYTES ABSOLUTE: 1.6 K/UL (ref 1–5.1)
LYMPHOCYTES RELATIVE PERCENT: 28.9 %
MCH RBC QN AUTO: 33.3 PG (ref 26–34)
MCHC RBC AUTO-ENTMCNC: 34.1 G/DL (ref 31–36)
MCV RBC AUTO: 97.8 FL (ref 80–100)
MONOCYTES ABSOLUTE: 0.8 K/UL (ref 0–1.3)
MONOCYTES RELATIVE PERCENT: 14.6 %
NEUTROPHILS ABSOLUTE: 2.5 K/UL (ref 1.7–7.7)
NEUTROPHILS RELATIVE PERCENT: 47.1 %
PDW BLD-RTO: 13.2 % (ref 12.4–15.4)
PHOSPHORUS: 3.8 MG/DL (ref 2.5–4.9)
PLATELET # BLD: 130 K/UL (ref 135–450)
PMV BLD AUTO: 8.1 FL (ref 5–10.5)
POTASSIUM SERPL-SCNC: 4.2 MMOL/L (ref 3.5–5.1)
RBC # BLD: 3.76 M/UL (ref 4.2–5.9)
SODIUM BLD-SCNC: 146 MMOL/L (ref 136–145)
TRIGL SERPL-MCNC: 57 MG/DL (ref 0–150)
VLDLC SERPL CALC-MCNC: 11 MG/DL
WBC # BLD: 5.4 K/UL (ref 4–11)

## 2018-11-12 ENCOUNTER — HOSPITAL ENCOUNTER (OUTPATIENT)
Dept: PHYSICAL THERAPY | Age: 83
Setting detail: THERAPIES SERIES
Discharge: HOME | End: 2018-11-12
Payer: MEDICARE

## 2018-11-12 PROCEDURE — 97110 THERAPEUTIC EXERCISES: CPT | Performed by: PHYSICAL THERAPIST

## 2018-11-12 PROCEDURE — 97140 MANUAL THERAPY 1/> REGIONS: CPT | Performed by: PHYSICAL THERAPIST

## 2018-11-12 NOTE — FLOWSHEET NOTE
soft tissue extensibility and allowing for proper ROM for normal function. (87670). Modalities:   CP10' - sitting in a chair     Charges:  Timed Code Treatment Minutes: 40   Total Treatment Minutes: 50     [] EVAL (LOW) 68302 (typically 20 minutes face-to-face)  [] EVAL (MOD) 92976 (typically 30 minutes face-to-face)  [] EVAL (HIGH) 22820 (typically 45 minutes face-to-face)  [] RE-EVAL      [x] CO(47579) x  2   [] IONTO  [] NMR (46999) x      [] VASO  [x] Manual (45248) x  1    [] Other:  [] TA x       [] Mech Traction (11907)  [] ES(attended) (47614)      [] ES (un) (68578):     Goals:   Short Term Goals: To be achieved in: 2 weeks  1. Independent in HEP and progression per patient tolerance, in order to prevent re-injury. 2. Patient will have a decrease in pain to facilitate improvement in movement, function, and ADLs as indicated by Functional Deficits. Long Term Goals: To be achieved in: 4 weeks  1. Disability index score of 15% or less for the BABAK to assist with reaching prior level of function. 2. Patient will demonstrate increased AROM to WNL, good LS mobility, good hip ROM to allow for proper joint functioning as indicated by patients Functional Deficits. 3. Patient will demonstrate an increase in Strength to good proximal hip and core activation to allow for proper functional mobility as indicated by patients Functional Deficits. 4. Patient will return to standing/walking for 30 minutes functional activities without increased symptoms or restriction. Progression Towards Functional goals:  [x] Patient is progressing as expected towards functional goals listed. [] Progression is slowed due to complexities listed. [] Progression has been slowed due to co-morbidities. [] Plan just implemented, too soon to assess goals progression  [] Other:     ASSESSMENT:  Decreased pain levels when consistent with HEP on a daily basis. Tight hamstrings and limited hip mobility.  Repeated cueing for

## 2018-11-19 ENCOUNTER — HOSPITAL ENCOUNTER (OUTPATIENT)
Dept: PHYSICAL THERAPY | Age: 83
Setting detail: THERAPIES SERIES
Discharge: HOME | End: 2018-11-19
Payer: MEDICARE

## 2018-11-19 PROCEDURE — 97140 MANUAL THERAPY 1/> REGIONS: CPT | Performed by: SPECIALIST/TECHNOLOGIST

## 2018-11-19 PROCEDURE — G8983 BODY POS D/C STATUS: HCPCS | Performed by: SPECIALIST/TECHNOLOGIST

## 2018-11-19 PROCEDURE — G8982 BODY POS GOAL STATUS: HCPCS | Performed by: SPECIALIST/TECHNOLOGIST

## 2018-11-19 PROCEDURE — 97110 THERAPEUTIC EXERCISES: CPT | Performed by: SPECIALIST/TECHNOLOGIST

## 2018-11-19 NOTE — DISCHARGE SUMMARY
[facility]       Physical Therapy Discharge  Date: 2018        Patient Name:  Mckay Alicia    :  1932  MRN: [id number]  Referring Physician:Dr. Ml Talley spondylosis                        ICD Code:M47.816  [] Surgical [x] Conservative  Therapy Diagnosis/Practice Pattern:LBP      Total number of visits: 5   Reporting Period:   Beginning Date:10/24/18   End Date:18    OBJECTIVE  Test used Initial score Discharge Score   Pain Summary  6/10 1-2/10   Functional questionnaire Modified BABAK 26% 28%   Functional Testing              R/L R/L   ROM Trunk flex 50% limited WNL    ext 75% limited WNL    SB 25% limited B WNL    ROT 75% limited B WNL   Strength Hip flex 4/5 B 4/5 B    Hip abd 4+/5  4-/5 4+/5   4/5    Hip ext 4/5 B N/T        Functional Limitation G-Code (if applicable):         PT G-Codes  Functional Assessment Tool Used: Modified BABAK  Score: 28%  Functional Limitation: Changing and maintaining body position  Changing and Maintaining Body Position Goal Status (): At least 1 percent but less than 20 percent impaired, limited or restricted  Changing and Maintaining Body Position Discharge Status ():  At least 20 percent but less than 40 percent impaired, limited or restricted   Test/tests used to determine % limitation:Modified BABAK  Actual Score used to drive % PCFTOTIOKR:56%    Co-morbidities/Complexities (which will affect course of rehabilitation):   []None        Arthritic conditions   []Rheumatoid arthritis (M05.9)  [x]Osteoarthritis (M19.91) Cardiovascular conditions   [x]Hypertension (I10)  []Hyperlipidemia (E78.5)  []Angina pectoris (I20)  []Atherosclerosis (I70) Musculoskeletal conditions   []Disc pathology   []Congenital spine pathologies   []Prior surgical intervention  []Osteoporosis (M81.8)  []Osteopenia (M85.8)   Endocrine conditions   []Hypothyroid (E03.9)  []Hyperthyroid Gastrointestinal

## 2018-12-03 ENCOUNTER — HOSPITAL ENCOUNTER (EMERGENCY)
Age: 83
Discharge: HOME | End: 2018-12-04
Attending: EMERGENCY MEDICINE
Payer: MEDICARE

## 2018-12-03 ENCOUNTER — APPOINTMENT (OUTPATIENT)
Dept: CT IMAGING | Age: 83
End: 2018-12-03
Payer: MEDICARE

## 2018-12-03 DIAGNOSIS — R10.9 LEFT SIDED ABDOMINAL PAIN: Primary | ICD-10-CM

## 2018-12-03 LAB
BASOPHILS ABSOLUTE: 0 K/UL (ref 0–0.2)
BASOPHILS RELATIVE PERCENT: 0.5 %
BILIRUBIN URINE: NEGATIVE MG/DL
BLOOD, URINE: NEGATIVE
CALCIUM IONIZED: 1.19 MMOL/L (ref 1.12–1.32)
CLARITY: NORMAL
CO2: 24 MMOL/L (ref 21–32)
COLOR: NORMAL
EOSINOPHILS ABSOLUTE: 0.4 K/UL (ref 0–0.6)
EOSINOPHILS RELATIVE PERCENT: 6 %
GFR AFRICAN AMERICAN: 46
GFR NON-AFRICAN AMERICAN: 38
GLUCOSE BLD-MCNC: 106 MG/DL (ref 70–99)
GLUCOSE URINE: NEGATIVE MG/DL
HCT VFR BLD CALC: 40.1 % (ref 40.5–52.5)
HEMOGLOBIN: 13.3 G/DL (ref 13.5–17.5)
KETONES, URINE: NEGATIVE MG/DL
LEUKOCYTE ESTERASE, URINE: NEGATIVE
LYMPHOCYTES ABSOLUTE: 1.7 K/UL (ref 1–5.1)
LYMPHOCYTES RELATIVE PERCENT: 27 %
MCH RBC QN AUTO: 32.5 PG (ref 26–34)
MCHC RBC AUTO-ENTMCNC: 33.1 G/DL (ref 31–36)
MCV RBC AUTO: 98.1 FL (ref 80–100)
MICROSCOPIC EXAMINATION: NORMAL
MONOCYTES ABSOLUTE: 0.7 K/UL (ref 0–1.3)
MONOCYTES RELATIVE PERCENT: 11.3 %
NEUTROPHILS ABSOLUTE: 3.4 K/UL (ref 1.7–7.7)
NEUTROPHILS RELATIVE PERCENT: 55.2 %
NITRITE, URINE: NEGATIVE
PDW BLD-RTO: 13 % (ref 12.4–15.4)
PERFORMED ON: ABNORMAL
PH UA: 5.5
PLATELET # BLD: 126 K/UL (ref 135–450)
PMV BLD AUTO: 7.5 FL (ref 5–10.5)
POC ANION GAP: 11 (ref 10–20)
POC BUN: 32 MG/DL (ref 7–18)
POC CHLORIDE: 106 MMOL/L (ref 99–110)
POC CREATININE: 1.7 MG/DL (ref 0.8–1.3)
POC POTASSIUM: 4.4 MMOL/L (ref 3.5–5.1)
POC SAMPLE TYPE: ABNORMAL
POC SODIUM: 141 MMOL/L (ref 136–145)
PROTEIN UA: NEGATIVE MG/DL
RBC # BLD: 4.09 M/UL (ref 4.2–5.9)
SPECIFIC GRAVITY UA: 1.02
UROBILINOGEN, URINE: 0.2 E.U./DL
WBC # BLD: 6.2 K/UL (ref 4–11)

## 2018-12-03 PROCEDURE — 80047 BASIC METABLC PNL IONIZED CA: CPT

## 2018-12-03 PROCEDURE — 99284 EMERGENCY DEPT VISIT MOD MDM: CPT

## 2018-12-03 PROCEDURE — 85025 COMPLETE CBC W/AUTO DIFF WBC: CPT

## 2018-12-03 PROCEDURE — 74176 CT ABD & PELVIS W/O CONTRAST: CPT

## 2018-12-03 PROCEDURE — 81003 URINALYSIS AUTO W/O SCOPE: CPT

## 2018-12-03 ASSESSMENT — ENCOUNTER SYMPTOMS
DIARRHEA: 0
ABDOMINAL PAIN: 1
COUGH: 0
CONSTIPATION: 1
VOMITING: 0
NAUSEA: 1
SHORTNESS OF BREATH: 0

## 2018-12-03 ASSESSMENT — PAIN SCALES - GENERAL: PAINLEVEL_OUTOF10: 8

## 2018-12-03 ASSESSMENT — PAIN DESCRIPTION - ORIENTATION: ORIENTATION: LEFT

## 2018-12-03 ASSESSMENT — PAIN DESCRIPTION - DESCRIPTORS: DESCRIPTORS: CONSTANT

## 2018-12-03 ASSESSMENT — PAIN DESCRIPTION - LOCATION: LOCATION: FLANK

## 2018-12-04 VITALS
SYSTOLIC BLOOD PRESSURE: 156 MMHG | HEART RATE: 60 BPM | RESPIRATION RATE: 14 BRPM | DIASTOLIC BLOOD PRESSURE: 80 MMHG | OXYGEN SATURATION: 96 % | TEMPERATURE: 97.7 F

## 2018-12-04 PROCEDURE — 6370000000 HC RX 637 (ALT 250 FOR IP): Performed by: EMERGENCY MEDICINE

## 2018-12-04 PROCEDURE — 96374 THER/PROPH/DIAG INJ IV PUSH: CPT

## 2018-12-04 PROCEDURE — 6360000002 HC RX W HCPCS: Performed by: EMERGENCY MEDICINE

## 2018-12-04 RX ORDER — MORPHINE SULFATE 2 MG/ML
2 INJECTION, SOLUTION INTRAMUSCULAR; INTRAVENOUS ONCE
Status: COMPLETED | OUTPATIENT
Start: 2018-12-04 | End: 2018-12-04

## 2018-12-04 RX ORDER — ACETAMINOPHEN 325 MG/1
650 TABLET ORAL ONCE
Status: COMPLETED | OUTPATIENT
Start: 2018-12-04 | End: 2018-12-04

## 2018-12-04 RX ADMIN — ACETAMINOPHEN 650 MG: 325 TABLET ORAL at 00:46

## 2018-12-04 RX ADMIN — MORPHINE SULFATE 2 MG: 2 INJECTION, SOLUTION INTRAMUSCULAR; INTRAVENOUS at 00:38

## 2018-12-04 ASSESSMENT — PAIN SCALES - GENERAL
PAINLEVEL_OUTOF10: 8
PAINLEVEL_OUTOF10: 8

## 2018-12-04 NOTE — ED NOTES
MD at bedside updating patient and family on test results and plan of care.       Radha Washburn RN  12/04/18 2008

## 2018-12-07 ENCOUNTER — HOSPITAL ENCOUNTER (EMERGENCY)
Age: 83
Discharge: HOME | End: 2018-12-07
Attending: EMERGENCY MEDICINE
Payer: MEDICARE

## 2018-12-07 VITALS
BODY MASS INDEX: 36.16 KG/M2 | HEIGHT: 66 IN | RESPIRATION RATE: 15 BRPM | WEIGHT: 225 LBS | DIASTOLIC BLOOD PRESSURE: 75 MMHG | TEMPERATURE: 97.8 F | SYSTOLIC BLOOD PRESSURE: 151 MMHG | HEART RATE: 65 BPM | OXYGEN SATURATION: 97 %

## 2018-12-07 DIAGNOSIS — R10.32 LEFT LOWER QUADRANT PAIN: Primary | ICD-10-CM

## 2018-12-07 LAB
ANION GAP SERPL CALCULATED.3IONS-SCNC: 13 MMOL/L (ref 3–16)
BILIRUBIN URINE: NEGATIVE MG/DL
BLOOD, URINE: NEGATIVE
BUN BLDV-MCNC: 29 MG/DL (ref 7–20)
CALCIUM SERPL-MCNC: 9.6 MG/DL (ref 8.3–10.6)
CHLORIDE BLD-SCNC: 101 MMOL/L (ref 99–110)
CLARITY: NORMAL
CO2: 24 MMOL/L (ref 21–32)
COLOR: NORMAL
CREAT SERPL-MCNC: 1.5 MG/DL (ref 0.8–1.3)
GFR AFRICAN AMERICAN: 54
GFR NON-AFRICAN AMERICAN: 44
GLUCOSE BLD-MCNC: 108 MG/DL (ref 70–99)
GLUCOSE URINE: NEGATIVE MG/DL
HCT VFR BLD CALC: 41.1 % (ref 40.5–52.5)
HEMOGLOBIN: 14 G/DL (ref 13.5–17.5)
KETONES, URINE: NEGATIVE MG/DL
LEUKOCYTE ESTERASE, URINE: NEGATIVE
LIPASE: 46 U/L (ref 13–60)
MCH RBC QN AUTO: 32.8 PG (ref 26–34)
MCHC RBC AUTO-ENTMCNC: 34 G/DL (ref 31–36)
MCV RBC AUTO: 96.5 FL (ref 80–100)
MICROSCOPIC EXAMINATION: NORMAL
NITRITE, URINE: NEGATIVE
PDW BLD-RTO: 13 % (ref 12.4–15.4)
PH UA: 6
PLATELET # BLD: 136 K/UL (ref 135–450)
PMV BLD AUTO: 7.6 FL (ref 5–10.5)
POTASSIUM REFLEX MAGNESIUM: 4.1 MMOL/L (ref 3.5–5.1)
PROTEIN UA: NEGATIVE MG/DL
RBC # BLD: 4.26 M/UL (ref 4.2–5.9)
SODIUM BLD-SCNC: 138 MMOL/L (ref 136–145)
SPECIFIC GRAVITY UA: 1.01
UROBILINOGEN, URINE: 0.2 E.U./DL
WBC # BLD: 5.4 K/UL (ref 4–11)

## 2018-12-07 PROCEDURE — 81003 URINALYSIS AUTO W/O SCOPE: CPT

## 2018-12-07 PROCEDURE — 96372 THER/PROPH/DIAG INJ SC/IM: CPT

## 2018-12-07 PROCEDURE — 83690 ASSAY OF LIPASE: CPT

## 2018-12-07 PROCEDURE — 85027 COMPLETE CBC AUTOMATED: CPT

## 2018-12-07 PROCEDURE — 99284 EMERGENCY DEPT VISIT MOD MDM: CPT

## 2018-12-07 PROCEDURE — 6360000002 HC RX W HCPCS: Performed by: STUDENT IN AN ORGANIZED HEALTH CARE EDUCATION/TRAINING PROGRAM

## 2018-12-07 PROCEDURE — 80048 BASIC METABOLIC PNL TOTAL CA: CPT

## 2018-12-07 RX ORDER — DICYCLOMINE HYDROCHLORIDE 10 MG/ML
20 INJECTION INTRAMUSCULAR 4 TIMES DAILY
Status: DISCONTINUED | OUTPATIENT
Start: 2018-12-07 | End: 2018-12-07 | Stop reason: HOSPADM

## 2018-12-07 RX ORDER — POLYETHYLENE GLYCOL 3350 17 G/17G
17 POWDER, FOR SOLUTION ORAL DAILY PRN
Qty: 510 G | Refills: 0 | Status: SHIPPED | OUTPATIENT
Start: 2018-12-07 | End: 2019-01-06

## 2018-12-07 RX ORDER — DICYCLOMINE HYDROCHLORIDE 10 MG/1
10 CAPSULE ORAL 4 TIMES DAILY
Qty: 360 CAPSULE | Refills: 1 | Status: ON HOLD | OUTPATIENT
Start: 2018-12-07 | End: 2019-04-29

## 2018-12-07 RX ADMIN — DICYCLOMINE HYDROCHLORIDE 20 MG: 10 INJECTION INTRAMUSCULAR at 17:53

## 2018-12-07 ASSESSMENT — ENCOUNTER SYMPTOMS
CONSTIPATION: 1
ANAL BLEEDING: 0
SHORTNESS OF BREATH: 0
ABDOMINAL PAIN: 1
ABDOMINAL DISTENTION: 0
COUGH: 0
BACK PAIN: 1
BLOOD IN STOOL: 0

## 2018-12-07 ASSESSMENT — PAIN DESCRIPTION - LOCATION: LOCATION: ABDOMEN

## 2018-12-07 ASSESSMENT — PAIN SCALES - GENERAL: PAINLEVEL_OUTOF10: 6

## 2018-12-07 ASSESSMENT — PAIN DESCRIPTION - PAIN TYPE: TYPE: ACUTE PAIN

## 2018-12-07 NOTE — ED PROVIDER NOTES
99 - 110 mmol/L    CO2 24 21 - 32 mmol/L    Anion Gap 13 3 - 16    Glucose 108 (H) 70 - 99 mg/dL    BUN 29 (H) 7 - 20 mg/dL    CREATININE 1.5 (H) 0.8 - 1.3 mg/dL    GFR Non- 44 (A) >60    GFR  54 (A) >60    Calcium 9.6 8.3 - 10.6 mg/dL   CBC   Result Value Ref Range    WBC 5.4 4.0 - 11.0 K/uL    RBC 4.26 4.20 - 5.90 M/uL    Hemoglobin 14.0 13.5 - 17.5 g/dL    Hematocrit 41.1 40.5 - 52.5 %    MCV 96.5 80.0 - 100.0 fL    MCH 32.8 26.0 - 34.0 pg    MCHC 34.0 31.0 - 36.0 g/dL    RDW 13.0 12.4 - 15.4 %    Platelets 427 451 - 424 K/uL    MPV 7.6 5.0 - 10.5 fL   Lipase   Result Value Ref Range    Lipase 46.0 13.0 - 60.0 U/L   POC URINE with Microscopic   Result Value Ref Range    Color, UA Not Entered Straw/Yellow    Clarity, UA Not Entered Clear    Glucose, Ur Negative Negative mg/dL    Bilirubin Urine Negative Negative mg/dL    Ketones, Urine Negative Negative mg/dL    Specific Gravity, UA 1.010 1.005 - 1.030    Blood, Urine Negative Negative    pH, UA 6.0 5.0 - 8.0    Protein, UA Negative Negative mg/dL    Urobilinogen, Urine 0.2 <2.0 E.U./dL    Nitrite, Urine Negative Negative    Leukocyte Esterase, Urine Negative Negative    Microscopic Examination SEE BELOW        ED BEDSIDE ULTRASOUND:  none    RECENT VITALS:  BP: (!) 151/75, Temp: 97.8 °F (36.6 °C), Pulse: 65,Resp: 15, SpO2: 97 %     Procedures     none    ED Course     Nursing Notes, Past Medical Hx, Past Surgical Hx, Social Hx, Allergies, and Family Hx were reviewed.     The patient was given the followingmedications:  Orders Placed This Encounter   Medications    dicyclomine (BENTYL) injection 20 mg    dicyclomine (BENTYL) 10 MG capsule     Sig: Take 1 capsule by mouth 4 times daily     Dispense:  360 capsule     Refill:  1    polyethylene glycol (GLYCOLAX) powder     Sig: Take 17 g by mouth daily as needed (constipation)     Dispense:  510 g     Refill:  0       CONSULTS:  None    MEDICAL DECISION Franko Lindsey / Robert Darden / Kallie Hammond Niki Leos is a 80 y.o. male who presents today complaining of left-sided abdominal pain, onset several days ago. Patient was seen in the emergency room 4 days prior for the same complaint. At that time, a CT of the abdomen was performed and showed diverticulosis, cholelithiasis, and a AAA, which is known and stable. Patient received morphine in the emergency room and then was discharged on Tylenol. He had good relief of his pain for the next 2 days, but noted development of the pain without relief by Tylenol today. It is not changed in any way from prior. He has no significant associated symptoms. The patient does also report a history of constipation and typically has one bowel movement daily, usually in the evenings. He has not had a bowel movement today. On arrival, patient is hemodynamically stable in no acute distress. He does not have significant pain currently. His examination is completely benign. Given that he had a recent CT scan with the above findings, I do not see need to repeat this test or any further imaging. His abdominal pain is unchanged in nature from prior. A rectal exam did not show a significant stool impaction, however I think the patient likely has abdominal pain from constipation. There told her last visit that he has diverticulosis and this may be contributing to his pain is well, however is less likely. I will repeat blood work here today and he will be given Bentyl. On reassessment, patient had improvement of his pain with Bentyl. His blood work did not show any changes from his blood work 4 days ago. At this time, I think patient is okay for discharge. Patient and daughter are in agreement. He'll be discharged on Bentyl and given a prescription for MiraLAX. He takes fiber supplements and a stool softener daily and was encouraged to continue this. He was given instructions for foods with high fiber and appropriate diet for constipation.     This patient

## 2018-12-11 ENCOUNTER — OFFICE VISIT (OUTPATIENT)
Dept: INTERNAL MEDICINE CLINIC | Age: 83
End: 2018-12-11
Payer: MEDICARE

## 2018-12-11 VITALS
TEMPERATURE: 97.5 F | DIASTOLIC BLOOD PRESSURE: 78 MMHG | OXYGEN SATURATION: 95 % | SYSTOLIC BLOOD PRESSURE: 107 MMHG | WEIGHT: 215.3 LBS | RESPIRATION RATE: 20 BRPM | BODY MASS INDEX: 34.75 KG/M2 | HEART RATE: 68 BPM

## 2018-12-11 DIAGNOSIS — Z09 FOLLOW UP: Primary | ICD-10-CM

## 2018-12-11 PROCEDURE — 99213 OFFICE O/P EST LOW 20 MIN: CPT | Performed by: STUDENT IN AN ORGANIZED HEALTH CARE EDUCATION/TRAINING PROGRAM

## 2018-12-11 NOTE — PROGRESS NOTES
triamterene-hydrochlorothiazide (MAXZIDE-25) 37.5-25 MG per tablet Take 2 tablets by mouth daily 9/24/18   Sudhir Mcnamara MD   ranitidine (ZANTAC) 150 MG tablet Take 1 tablet by mouth 2 times daily 9/11/18   Jan Foster MD   Multiple Vitamin (MULTI-VITAMIN) TABS Take 1 tablet by mouth daily 5/21/18   Maximiliano Diaz MD   lisinopril (PRINIVIL;ZESTRIL) 40 MG tablet Take 1 tablet by mouth daily 1/25/18   David Gaston MD   atorvastatin (LIPITOR) 80 MG tablet Take 1 tablet by mouth nightly 1/25/18   David Gaston MD   docusate sodium (COLACE) 100 MG capsule Take 1 capsule by mouth 2 times daily 1/25/18   David Gaston MD   aspirin EC 81 MG EC tablet Take 2 tablets by mouth daily  Patient taking differently: Take 81 mg by mouth daily  1/25/18   David Gaston MD   cetirizine (ZYRTEC ALLERGY) 10 MG tablet Take 1 tablet by mouth daily 1/25/18   David Gaston MD   nitroGLYCERIN (NITROSTAT) 0.4 MG SL tablet Place 1 tablet under the tongue every 5 minutes as needed for Chest pain 1/25/18   David Gaston MD   albuterol sulfate HFA (PROVENTIL HFA) 108 (90 Base) MCG/ACT inhaler Inhale 2 puffs into the lungs every 4 hours as needed for Wheezing or Shortness of Breath (Space out to every 6 hours as symptoms improve) Space out to every 6 hours as symptoms improve. 1/25/18   David Gaston MD   bisacodyl (BISACODYL) 5 MG EC tablet Take 1 tablet by mouth daily as needed for Constipation 1/25/18   David Gaston MD   hydrocortisone (ANUSOL-HC) 2.5 % rectal cream Place rectally 2 times daily. 1/25/18   David Gaston MD   tiZANidine (ZANAFLEX) 4 MG tablet Take 1 tablet by mouth every 8 hours as needed (pain) 12/20/17   Leon Bhandari MD   Fiber POWD Take 1 each by mouth 2 times daily 8/8/16   Boston Kramer MD   Nystatin POWD Apply 1 each topically 2 times daily 6/10/15   Wolfgang Caldwell MD   betamethasone dipropionate (DIPROLENE) 0.05 % ointment Apply topically daily.  6/10/15   MD Helen Mcnulty

## 2018-12-27 ENCOUNTER — OFFICE VISIT (OUTPATIENT)
Dept: INTERNAL MEDICINE CLINIC | Age: 83
End: 2018-12-27
Payer: MEDICARE

## 2018-12-27 VITALS
HEART RATE: 57 BPM | DIASTOLIC BLOOD PRESSURE: 71 MMHG | SYSTOLIC BLOOD PRESSURE: 134 MMHG | RESPIRATION RATE: 20 BRPM | OXYGEN SATURATION: 95 % | TEMPERATURE: 97.2 F | WEIGHT: 215 LBS | BODY MASS INDEX: 34.7 KG/M2

## 2018-12-27 DIAGNOSIS — N18.30 CKD (CHRONIC KIDNEY DISEASE) STAGE 3, GFR 30-59 ML/MIN (HCC): ICD-10-CM

## 2018-12-27 DIAGNOSIS — K59.04 CHRONIC IDIOPATHIC CONSTIPATION: Primary | ICD-10-CM

## 2018-12-27 PROCEDURE — 99213 OFFICE O/P EST LOW 20 MIN: CPT | Performed by: STUDENT IN AN ORGANIZED HEALTH CARE EDUCATION/TRAINING PROGRAM

## 2018-12-27 RX ORDER — TRIAMTERENE AND HYDROCHLOROTHIAZIDE 37.5; 25 MG/1; MG/1
1 TABLET ORAL DAILY
Qty: 30 TABLET | Refills: 3 | Status: SHIPPED | OUTPATIENT
Start: 2018-12-27 | End: 2019-02-05 | Stop reason: SDUPTHER

## 2018-12-27 RX ORDER — DOCUSATE SODIUM 100 MG/1
100 CAPSULE, LIQUID FILLED ORAL 2 TIMES DAILY
Qty: 60 CAPSULE | Refills: 0 | Status: SHIPPED | OUTPATIENT
Start: 2018-12-27 | End: 2019-01-26

## 2018-12-27 RX ORDER — ASPIRIN 81 MG/1
81 TABLET ORAL DAILY
Qty: 90 TABLET | Refills: 3 | Status: SHIPPED | OUTPATIENT
Start: 2018-12-27 | End: 2019-07-26 | Stop reason: SDUPTHER

## 2018-12-27 RX ORDER — POLYETHYLENE GLYCOL 3350 17 G/17G
17 POWDER, FOR SOLUTION ORAL DAILY
Qty: 1530 G | Refills: 1 | Status: SHIPPED | OUTPATIENT
Start: 2018-12-27 | End: 2019-01-26

## 2018-12-27 RX ORDER — ATORVASTATIN CALCIUM 80 MG/1
80 TABLET, FILM COATED ORAL DAILY
Qty: 30 TABLET | Refills: 3 | Status: SHIPPED | OUTPATIENT
Start: 2018-12-27 | End: 2019-01-17 | Stop reason: SDUPTHER

## 2018-12-27 RX ORDER — LISINOPRIL 40 MG/1
40 TABLET ORAL DAILY
Qty: 30 TABLET | Refills: 3 | Status: ON HOLD | OUTPATIENT
Start: 2018-12-27 | End: 2019-03-28 | Stop reason: HOSPADM

## 2018-12-27 RX ORDER — EZETIMIBE 10 MG/1
10 TABLET ORAL NIGHTLY
Qty: 30 TABLET | Refills: 3 | Status: ON HOLD | OUTPATIENT
Start: 2018-12-27 | End: 2019-05-02 | Stop reason: HOSPADM

## 2018-12-27 RX ORDER — CETIRIZINE HYDROCHLORIDE 10 MG/1
10 TABLET ORAL DAILY
Qty: 90 TABLET | Refills: 3 | Status: SHIPPED | OUTPATIENT
Start: 2018-12-27 | End: 2019-07-26 | Stop reason: SDUPTHER

## 2018-12-27 RX ORDER — RANITIDINE 150 MG/1
150 TABLET ORAL 2 TIMES DAILY
Qty: 60 TABLET | Refills: 3 | Status: ON HOLD | OUTPATIENT
Start: 2018-12-27 | End: 2019-04-29

## 2018-12-27 RX ORDER — NITROGLYCERIN 0.4 MG/1
0.4 TABLET SUBLINGUAL EVERY 5 MIN PRN
Qty: 25 TABLET | Refills: 3 | Status: SHIPPED | OUTPATIENT
Start: 2018-12-27 | End: 2020-07-30

## 2018-12-27 ASSESSMENT — ENCOUNTER SYMPTOMS
ABDOMINAL PAIN: 0
SHORTNESS OF BREATH: 0
CHEST TIGHTNESS: 0

## 2019-01-17 ENCOUNTER — OFFICE VISIT (OUTPATIENT)
Dept: CARDIOLOGY CLINIC | Age: 84
End: 2019-01-17
Payer: MEDICARE

## 2019-01-17 VITALS
HEART RATE: 70 BPM | SYSTOLIC BLOOD PRESSURE: 126 MMHG | DIASTOLIC BLOOD PRESSURE: 80 MMHG | WEIGHT: 217 LBS | BODY MASS INDEX: 35.02 KG/M2

## 2019-01-17 DIAGNOSIS — I35.0 NONRHEUMATIC AORTIC VALVE STENOSIS: ICD-10-CM

## 2019-01-17 DIAGNOSIS — I25.5 ISCHEMIC CARDIOMYOPATHY: ICD-10-CM

## 2019-01-17 DIAGNOSIS — I25.10 CAD IN NATIVE ARTERY: Primary | ICD-10-CM

## 2019-01-17 DIAGNOSIS — Z98.61 HISTORY OF PTCA: ICD-10-CM

## 2019-01-17 DIAGNOSIS — R94.39 ABNORMAL MYOCARDIAL PERFUSION STUDY: ICD-10-CM

## 2019-01-17 PROCEDURE — G8417 CALC BMI ABV UP PARAM F/U: HCPCS | Performed by: INTERNAL MEDICINE

## 2019-01-17 PROCEDURE — 99214 OFFICE O/P EST MOD 30 MIN: CPT | Performed by: INTERNAL MEDICINE

## 2019-01-17 PROCEDURE — G8598 ASA/ANTIPLAT THER USED: HCPCS | Performed by: INTERNAL MEDICINE

## 2019-01-17 PROCEDURE — G8427 DOCREV CUR MEDS BY ELIG CLIN: HCPCS | Performed by: INTERNAL MEDICINE

## 2019-01-17 PROCEDURE — 1123F ACP DISCUSS/DSCN MKR DOCD: CPT | Performed by: INTERNAL MEDICINE

## 2019-01-17 PROCEDURE — 1101F PT FALLS ASSESS-DOCD LE1/YR: CPT | Performed by: INTERNAL MEDICINE

## 2019-01-17 PROCEDURE — 1036F TOBACCO NON-USER: CPT | Performed by: INTERNAL MEDICINE

## 2019-01-17 PROCEDURE — G8484 FLU IMMUNIZE NO ADMIN: HCPCS | Performed by: INTERNAL MEDICINE

## 2019-01-17 PROCEDURE — 4040F PNEUMOC VAC/ADMIN/RCVD: CPT | Performed by: INTERNAL MEDICINE

## 2019-02-05 RX ORDER — DOXAZOSIN 8 MG/1
8 TABLET ORAL DAILY
Qty: 90 TABLET | Refills: 0 | Status: ON HOLD | OUTPATIENT
Start: 2019-02-05 | End: 2019-03-28 | Stop reason: HOSPADM

## 2019-02-05 RX ORDER — TRIAMTERENE AND HYDROCHLOROTHIAZIDE 37.5; 25 MG/1; MG/1
1 TABLET ORAL DAILY
Qty: 90 TABLET | Refills: 0 | OUTPATIENT
Start: 2019-02-05 | End: 2019-04-04 | Stop reason: ALTCHOICE

## 2019-02-24 NOTE — PROGRESS NOTES
Addendum to Resident H& P/Progress note:  I have personally seen,examined and evaluated the patient.  I have reviewed the current history, physical findings, labs and assessment and plan and agree with note as documented by resident MD ( Helio Monte)      Quoc Ramos MD, [address]

## 2019-03-20 ENCOUNTER — OFFICE VISIT (OUTPATIENT)
Dept: ENT CLINIC | Age: 84
End: 2019-03-20
Payer: MEDICARE

## 2019-03-20 VITALS
WEIGHT: 225.8 LBS | HEIGHT: 66 IN | HEART RATE: 62 BPM | SYSTOLIC BLOOD PRESSURE: 139 MMHG | BODY MASS INDEX: 36.29 KG/M2 | DIASTOLIC BLOOD PRESSURE: 65 MMHG | TEMPERATURE: 97.2 F

## 2019-03-20 DIAGNOSIS — H61.23 BILATERAL IMPACTED CERUMEN: ICD-10-CM

## 2019-03-20 DIAGNOSIS — H60.61 CHRONIC OTITIS EXTERNA OF RIGHT EAR, UNSPECIFIED TYPE: Primary | ICD-10-CM

## 2019-03-20 PROCEDURE — 1036F TOBACCO NON-USER: CPT | Performed by: OTOLARYNGOLOGY

## 2019-03-20 PROCEDURE — G8484 FLU IMMUNIZE NO ADMIN: HCPCS | Performed by: OTOLARYNGOLOGY

## 2019-03-20 PROCEDURE — G8427 DOCREV CUR MEDS BY ELIG CLIN: HCPCS | Performed by: OTOLARYNGOLOGY

## 2019-03-20 PROCEDURE — G8598 ASA/ANTIPLAT THER USED: HCPCS | Performed by: OTOLARYNGOLOGY

## 2019-03-20 PROCEDURE — 99203 OFFICE O/P NEW LOW 30 MIN: CPT | Performed by: OTOLARYNGOLOGY

## 2019-03-20 PROCEDURE — 69210 REMOVE IMPACTED EAR WAX UNI: CPT | Performed by: OTOLARYNGOLOGY

## 2019-03-20 PROCEDURE — 1101F PT FALLS ASSESS-DOCD LE1/YR: CPT | Performed by: OTOLARYNGOLOGY

## 2019-03-20 PROCEDURE — G8417 CALC BMI ABV UP PARAM F/U: HCPCS | Performed by: OTOLARYNGOLOGY

## 2019-03-20 PROCEDURE — 4040F PNEUMOC VAC/ADMIN/RCVD: CPT | Performed by: OTOLARYNGOLOGY

## 2019-03-20 PROCEDURE — 1123F ACP DISCUSS/DSCN MKR DOCD: CPT | Performed by: OTOLARYNGOLOGY

## 2019-03-26 ENCOUNTER — HOSPITAL ENCOUNTER (INPATIENT)
Age: 84
LOS: 2 days | Discharge: HOME | DRG: 280 | End: 2019-03-28
Attending: INTERNAL MEDICINE | Admitting: INTERNAL MEDICINE
Payer: MEDICARE

## 2019-03-26 ENCOUNTER — APPOINTMENT (OUTPATIENT)
Dept: GENERAL RADIOLOGY | Age: 84
DRG: 280 | End: 2019-03-26
Payer: MEDICARE

## 2019-03-26 DIAGNOSIS — R07.9 CHEST PAIN, UNSPECIFIED TYPE: Primary | ICD-10-CM

## 2019-03-26 DIAGNOSIS — H60.61 CHRONIC OTITIS EXTERNA OF RIGHT EAR, UNSPECIFIED TYPE: ICD-10-CM

## 2019-03-26 LAB
A/G RATIO: 1.2 (ref 1.1–2.2)
ALBUMIN SERPL-MCNC: 3.7 G/DL (ref 3.4–5)
ALP BLD-CCNC: 73 U/L (ref 40–129)
ALT SERPL-CCNC: 17 U/L (ref 10–40)
ANION GAP SERPL CALCULATED.3IONS-SCNC: 10 MMOL/L (ref 3–16)
ANION GAP SERPL CALCULATED.3IONS-SCNC: 7 MMOL/L (ref 3–16)
AST SERPL-CCNC: 22 U/L (ref 15–37)
BASOPHILS ABSOLUTE: 0 K/UL (ref 0–0.2)
BASOPHILS ABSOLUTE: 0 K/UL (ref 0–0.2)
BASOPHILS RELATIVE PERCENT: 0.6 %
BASOPHILS RELATIVE PERCENT: 0.7 %
BILIRUB SERPL-MCNC: 0.9 MG/DL (ref 0–1)
BUN BLDV-MCNC: 33 MG/DL (ref 7–20)
BUN BLDV-MCNC: 35 MG/DL (ref 7–20)
CALCIUM SERPL-MCNC: 8.7 MG/DL (ref 8.3–10.6)
CALCIUM SERPL-MCNC: 8.8 MG/DL (ref 8.3–10.6)
CHLORIDE BLD-SCNC: 104 MMOL/L (ref 99–110)
CHLORIDE BLD-SCNC: 104 MMOL/L (ref 99–110)
CO2: 26 MMOL/L (ref 21–32)
CO2: 29 MMOL/L (ref 21–32)
CREAT SERPL-MCNC: 1.4 MG/DL (ref 0.8–1.3)
CREAT SERPL-MCNC: 1.4 MG/DL (ref 0.8–1.3)
EKG ATRIAL RATE: 65 BPM
EKG DIAGNOSIS: NORMAL
EKG P AXIS: 28 DEGREES
EKG P-R INTERVAL: 220 MS
EKG Q-T INTERVAL: 466 MS
EKG QRS DURATION: 116 MS
EKG QTC CALCULATION (BAZETT): 472 MS
EKG R AXIS: -22 DEGREES
EKG T AXIS: 26 DEGREES
EKG VENTRICULAR RATE: 62 BPM
EOSINOPHILS ABSOLUTE: 0.4 K/UL (ref 0–0.6)
EOSINOPHILS ABSOLUTE: 0.4 K/UL (ref 0–0.6)
EOSINOPHILS RELATIVE PERCENT: 7.3 %
EOSINOPHILS RELATIVE PERCENT: 8.3 %
GFR AFRICAN AMERICAN: 58
GFR AFRICAN AMERICAN: 58
GFR NON-AFRICAN AMERICAN: 48
GFR NON-AFRICAN AMERICAN: 48
GLOBULIN: 3.1 G/DL
GLUCOSE BLD-MCNC: 120 MG/DL (ref 70–99)
GLUCOSE BLD-MCNC: 124 MG/DL (ref 70–99)
HCT VFR BLD CALC: 35.9 % (ref 40.5–52.5)
HCT VFR BLD CALC: 37.1 % (ref 40.5–52.5)
HEMOGLOBIN: 12.1 G/DL (ref 13.5–17.5)
HEMOGLOBIN: 12.4 G/DL (ref 13.5–17.5)
LACTIC ACID: 0.8 MMOL/L (ref 0.4–2)
LV EF: 40 %
LVEF MODALITY: NORMAL
LYMPHOCYTES ABSOLUTE: 1.2 K/UL (ref 1–5.1)
LYMPHOCYTES ABSOLUTE: 1.5 K/UL (ref 1–5.1)
LYMPHOCYTES RELATIVE PERCENT: 26.9 %
LYMPHOCYTES RELATIVE PERCENT: 30.5 %
MAGNESIUM: 2.1 MG/DL (ref 1.8–2.4)
MCH RBC QN AUTO: 32.8 PG (ref 26–34)
MCH RBC QN AUTO: 32.9 PG (ref 26–34)
MCHC RBC AUTO-ENTMCNC: 33.6 G/DL (ref 31–36)
MCHC RBC AUTO-ENTMCNC: 33.6 G/DL (ref 31–36)
MCV RBC AUTO: 97.8 FL (ref 80–100)
MCV RBC AUTO: 98 FL (ref 80–100)
MONOCYTES ABSOLUTE: 0.7 K/UL (ref 0–1.3)
MONOCYTES ABSOLUTE: 0.8 K/UL (ref 0–1.3)
MONOCYTES RELATIVE PERCENT: 16 %
MONOCYTES RELATIVE PERCENT: 16 %
NEUTROPHILS ABSOLUTE: 2.1 K/UL (ref 1.7–7.7)
NEUTROPHILS ABSOLUTE: 2.2 K/UL (ref 1.7–7.7)
NEUTROPHILS RELATIVE PERCENT: 45.6 %
NEUTROPHILS RELATIVE PERCENT: 48.1 %
PDW BLD-RTO: 13.4 % (ref 12.4–15.4)
PDW BLD-RTO: 13.5 % (ref 12.4–15.4)
PLATELET # BLD: 122 K/UL (ref 135–450)
PLATELET # BLD: 123 K/UL (ref 135–450)
PMV BLD AUTO: 7.9 FL (ref 5–10.5)
PMV BLD AUTO: 8.1 FL (ref 5–10.5)
POTASSIUM REFLEX MAGNESIUM: 3.6 MMOL/L (ref 3.5–5.1)
POTASSIUM REFLEX MAGNESIUM: 4.6 MMOL/L (ref 3.5–5.1)
PRO-BNP: 1845 PG/ML (ref 0–449)
RBC # BLD: 3.66 M/UL (ref 4.2–5.9)
RBC # BLD: 3.79 M/UL (ref 4.2–5.9)
SODIUM BLD-SCNC: 140 MMOL/L (ref 136–145)
SODIUM BLD-SCNC: 140 MMOL/L (ref 136–145)
TOTAL PROTEIN: 6.8 G/DL (ref 6.4–8.2)
TROPONIN: 0.01 NG/ML
TROPONIN: 0.01 NG/ML
TROPONIN: 0.02 NG/ML
WBC # BLD: 4.4 K/UL (ref 4–11)
WBC # BLD: 4.9 K/UL (ref 4–11)

## 2019-03-26 PROCEDURE — 6360000002 HC RX W HCPCS: Performed by: STUDENT IN AN ORGANIZED HEALTH CARE EDUCATION/TRAINING PROGRAM

## 2019-03-26 PROCEDURE — 6370000000 HC RX 637 (ALT 250 FOR IP): Performed by: STUDENT IN AN ORGANIZED HEALTH CARE EDUCATION/TRAINING PROGRAM

## 2019-03-26 PROCEDURE — 99223 1ST HOSP IP/OBS HIGH 75: CPT | Performed by: INTERNAL MEDICINE

## 2019-03-26 PROCEDURE — 93005 ELECTROCARDIOGRAM TRACING: CPT | Performed by: INTERNAL MEDICINE

## 2019-03-26 PROCEDURE — 83735 ASSAY OF MAGNESIUM: CPT

## 2019-03-26 PROCEDURE — C9113 INJ PANTOPRAZOLE SODIUM, VIA: HCPCS | Performed by: STUDENT IN AN ORGANIZED HEALTH CARE EDUCATION/TRAINING PROGRAM

## 2019-03-26 PROCEDURE — 80053 COMPREHEN METABOLIC PANEL: CPT

## 2019-03-26 PROCEDURE — 83605 ASSAY OF LACTIC ACID: CPT

## 2019-03-26 PROCEDURE — 84484 ASSAY OF TROPONIN QUANT: CPT

## 2019-03-26 PROCEDURE — 1200000000 HC SEMI PRIVATE

## 2019-03-26 PROCEDURE — 2580000003 HC RX 258: Performed by: STUDENT IN AN ORGANIZED HEALTH CARE EDUCATION/TRAINING PROGRAM

## 2019-03-26 PROCEDURE — 85025 COMPLETE CBC W/AUTO DIFF WBC: CPT

## 2019-03-26 PROCEDURE — 99285 EMERGENCY DEPT VISIT HI MDM: CPT

## 2019-03-26 PROCEDURE — 94150 VITAL CAPACITY TEST: CPT

## 2019-03-26 PROCEDURE — 96374 THER/PROPH/DIAG INJ IV PUSH: CPT

## 2019-03-26 PROCEDURE — 6360000002 HC RX W HCPCS: Performed by: EMERGENCY MEDICINE

## 2019-03-26 PROCEDURE — 93306 TTE W/DOPPLER COMPLETE: CPT

## 2019-03-26 PROCEDURE — 36415 COLL VENOUS BLD VENIPUNCTURE: CPT

## 2019-03-26 PROCEDURE — 83880 ASSAY OF NATRIURETIC PEPTIDE: CPT

## 2019-03-26 PROCEDURE — 94664 DEMO&/EVAL PT USE INHALER: CPT

## 2019-03-26 PROCEDURE — 71045 X-RAY EXAM CHEST 1 VIEW: CPT

## 2019-03-26 PROCEDURE — 94760 N-INVAS EAR/PLS OXIMETRY 1: CPT

## 2019-03-26 RX ORDER — TIZANIDINE 4 MG/1
4 TABLET ORAL EVERY 8 HOURS PRN
Status: DISCONTINUED | OUTPATIENT
Start: 2019-03-26 | End: 2019-03-28 | Stop reason: HOSPADM

## 2019-03-26 RX ORDER — SODIUM CHLORIDE 0.9 % (FLUSH) 0.9 %
10 SYRINGE (ML) INJECTION EVERY 12 HOURS SCHEDULED
Status: DISCONTINUED | OUTPATIENT
Start: 2019-03-26 | End: 2019-03-28 | Stop reason: HOSPADM

## 2019-03-26 RX ORDER — FUROSEMIDE 10 MG/ML
40 INJECTION INTRAMUSCULAR; INTRAVENOUS DAILY
Status: DISCONTINUED | OUTPATIENT
Start: 2019-03-26 | End: 2019-03-27

## 2019-03-26 RX ORDER — PANTOPRAZOLE SODIUM 40 MG/10ML
40 INJECTION, POWDER, LYOPHILIZED, FOR SOLUTION INTRAVENOUS DAILY
Status: DISCONTINUED | OUTPATIENT
Start: 2019-03-26 | End: 2019-03-27

## 2019-03-26 RX ORDER — FUROSEMIDE 10 MG/ML
40 INJECTION INTRAMUSCULAR; INTRAVENOUS ONCE
Status: COMPLETED | OUTPATIENT
Start: 2019-03-26 | End: 2019-03-26

## 2019-03-26 RX ORDER — FUROSEMIDE 10 MG/ML
40 INJECTION INTRAMUSCULAR; INTRAVENOUS 2 TIMES DAILY
Status: DISCONTINUED | OUTPATIENT
Start: 2019-03-26 | End: 2019-03-26

## 2019-03-26 RX ORDER — ATORVASTATIN CALCIUM 40 MG/1
80 TABLET, FILM COATED ORAL NIGHTLY
Status: DISCONTINUED | OUTPATIENT
Start: 2019-03-26 | End: 2019-03-28 | Stop reason: HOSPADM

## 2019-03-26 RX ORDER — DICYCLOMINE HYDROCHLORIDE 10 MG/1
10 CAPSULE ORAL 4 TIMES DAILY
Status: DISCONTINUED | OUTPATIENT
Start: 2019-03-26 | End: 2019-03-28 | Stop reason: HOSPADM

## 2019-03-26 RX ORDER — LISINOPRIL 40 MG/1
40 TABLET ORAL DAILY
Status: DISCONTINUED | OUTPATIENT
Start: 2019-03-26 | End: 2019-03-27

## 2019-03-26 RX ORDER — ALBUTEROL SULFATE 2.5 MG/3ML
2.5 SOLUTION RESPIRATORY (INHALATION) EVERY 6 HOURS PRN
Status: DISCONTINUED | OUTPATIENT
Start: 2019-03-26 | End: 2019-03-28 | Stop reason: HOSPADM

## 2019-03-26 RX ORDER — CETIRIZINE HYDROCHLORIDE 10 MG/1
10 TABLET ORAL DAILY
Status: DISCONTINUED | OUTPATIENT
Start: 2019-03-26 | End: 2019-03-28 | Stop reason: HOSPADM

## 2019-03-26 RX ORDER — ALBUTEROL SULFATE 90 UG/1
2 AEROSOL, METERED RESPIRATORY (INHALATION) EVERY 4 HOURS PRN
Status: DISCONTINUED | OUTPATIENT
Start: 2019-03-26 | End: 2019-03-26 | Stop reason: CLARIF

## 2019-03-26 RX ORDER — TRIAMTERENE AND HYDROCHLOROTHIAZIDE 37.5; 25 MG/1; MG/1
1 TABLET ORAL DAILY
Status: DISCONTINUED | OUTPATIENT
Start: 2019-03-26 | End: 2019-03-28 | Stop reason: HOSPADM

## 2019-03-26 RX ORDER — ALBUTEROL SULFATE 2.5 MG/3ML
2.5 SOLUTION RESPIRATORY (INHALATION) EVERY 6 HOURS PRN
Status: DISCONTINUED | OUTPATIENT
Start: 2019-03-26 | End: 2019-03-26

## 2019-03-26 RX ORDER — HEPARIN SODIUM 5000 [USP'U]/ML
5000 INJECTION, SOLUTION INTRAVENOUS; SUBCUTANEOUS EVERY 8 HOURS SCHEDULED
Status: DISCONTINUED | OUTPATIENT
Start: 2019-03-26 | End: 2019-03-28 | Stop reason: HOSPADM

## 2019-03-26 RX ORDER — DEXTRIN 3 G/3.5 G
1 POWDER (GRAM) ORAL 2 TIMES DAILY
Status: DISCONTINUED | OUTPATIENT
Start: 2019-03-26 | End: 2019-03-26

## 2019-03-26 RX ORDER — DOXAZOSIN MESYLATE 4 MG/1
8 TABLET ORAL DAILY
Status: DISCONTINUED | OUTPATIENT
Start: 2019-03-26 | End: 2019-03-27

## 2019-03-26 RX ORDER — NITROGLYCERIN 0.4 MG/1
0.4 TABLET SUBLINGUAL EVERY 5 MIN PRN
Status: DISCONTINUED | OUTPATIENT
Start: 2019-03-26 | End: 2019-03-26

## 2019-03-26 RX ORDER — CARVEDILOL 6.25 MG/1
3.12 TABLET ORAL 2 TIMES DAILY WITH MEALS
Status: DISCONTINUED | OUTPATIENT
Start: 2019-03-26 | End: 2019-03-26

## 2019-03-26 RX ORDER — M-VIT,TX,IRON,MINS/CALC/FOLIC 27MG-0.4MG
1 TABLET ORAL DAILY
Status: DISCONTINUED | OUTPATIENT
Start: 2019-03-26 | End: 2019-03-28 | Stop reason: HOSPADM

## 2019-03-26 RX ORDER — SODIUM CHLORIDE 0.9 % (FLUSH) 0.9 %
10 SYRINGE (ML) INJECTION PRN
Status: DISCONTINUED | OUTPATIENT
Start: 2019-03-26 | End: 2019-03-28 | Stop reason: HOSPADM

## 2019-03-26 RX ORDER — ASPIRIN 81 MG/1
81 TABLET ORAL DAILY
Status: DISCONTINUED | OUTPATIENT
Start: 2019-03-26 | End: 2019-03-28 | Stop reason: HOSPADM

## 2019-03-26 RX ORDER — NITROGLYCERIN 0.4 MG/1
0.4 TABLET SUBLINGUAL EVERY 5 MIN PRN
Status: DISCONTINUED | OUTPATIENT
Start: 2019-03-26 | End: 2019-03-28 | Stop reason: HOSPADM

## 2019-03-26 RX ORDER — ONDANSETRON 2 MG/ML
4 INJECTION INTRAMUSCULAR; INTRAVENOUS EVERY 6 HOURS PRN
Status: DISCONTINUED | OUTPATIENT
Start: 2019-03-26 | End: 2019-03-28 | Stop reason: HOSPADM

## 2019-03-26 RX ORDER — RANITIDINE 150 MG/1
150 TABLET ORAL 2 TIMES DAILY
Status: DISCONTINUED | OUTPATIENT
Start: 2019-03-26 | End: 2019-03-26

## 2019-03-26 RX ADMIN — Medication 10 ML: at 23:20

## 2019-03-26 RX ADMIN — FUROSEMIDE 40 MG: 10 INJECTION INTRAVENOUS at 02:47

## 2019-03-26 RX ADMIN — Medication 10 ML: at 09:33

## 2019-03-26 RX ADMIN — NITROGLYCERIN 0.5 INCH: 20 OINTMENT TOPICAL at 07:45

## 2019-03-26 RX ADMIN — FUROSEMIDE 40 MG: 10 INJECTION, SOLUTION INTRAMUSCULAR; INTRAVENOUS at 09:33

## 2019-03-26 RX ADMIN — DICYCLOMINE HYDROCHLORIDE 10 MG: 10 CAPSULE ORAL at 23:19

## 2019-03-26 RX ADMIN — TIZANIDINE 4 MG: 4 TABLET ORAL at 09:34

## 2019-03-26 RX ADMIN — HEPARIN SODIUM 5000 UNITS: 5000 INJECTION INTRAVENOUS; SUBCUTANEOUS at 06:59

## 2019-03-26 RX ADMIN — PANTOPRAZOLE SODIUM 40 MG: 40 INJECTION, POWDER, FOR SOLUTION INTRAVENOUS at 09:33

## 2019-03-26 RX ADMIN — DICYCLOMINE HYDROCHLORIDE 10 MG: 10 CAPSULE ORAL at 15:56

## 2019-03-26 RX ADMIN — NITROGLYCERIN 0.5 INCH: 20 OINTMENT TOPICAL at 15:56

## 2019-03-26 RX ADMIN — Medication 1 TABLET: at 10:38

## 2019-03-26 RX ADMIN — ASPIRIN 81 MG: 81 TABLET, COATED ORAL at 09:34

## 2019-03-26 RX ADMIN — NITROGLYCERIN 0.4 MG: 0.4 TABLET, ORALLY DISINTEGRATING SUBLINGUAL at 05:42

## 2019-03-26 RX ADMIN — DICYCLOMINE HYDROCHLORIDE 10 MG: 10 CAPSULE ORAL at 10:38

## 2019-03-26 RX ADMIN — HEPARIN SODIUM 5000 UNITS: 5000 INJECTION INTRAVENOUS; SUBCUTANEOUS at 13:22

## 2019-03-26 RX ADMIN — LISINOPRIL 40 MG: 40 TABLET ORAL at 09:33

## 2019-03-26 RX ADMIN — DOXAZOSIN 8 MG: 4 TABLET ORAL at 10:38

## 2019-03-26 RX ADMIN — HEPARIN SODIUM 5000 UNITS: 5000 INJECTION INTRAVENOUS; SUBCUTANEOUS at 23:23

## 2019-03-26 RX ADMIN — CETIRIZINE HYDROCHLORIDE 10 MG: 10 TABLET, FILM COATED ORAL at 09:34

## 2019-03-26 RX ADMIN — ATORVASTATIN CALCIUM 80 MG: 40 TABLET, FILM COATED ORAL at 23:19

## 2019-03-26 RX ADMIN — TRIAMTERENE AND HYDROCHLOROTHIAZIDE 1 TABLET: 37.5; 25 TABLET ORAL at 10:38

## 2019-03-26 ASSESSMENT — ENCOUNTER SYMPTOMS
DIARRHEA: 0
ABDOMINAL PAIN: 0
ALLERGIC/IMMUNOLOGIC NEGATIVE: 1
EYES NEGATIVE: 1
RESPIRATORY NEGATIVE: 1
GASTROINTESTINAL NEGATIVE: 1

## 2019-03-26 ASSESSMENT — PAIN SCALES - GENERAL
PAINLEVEL_OUTOF10: 0
PAINLEVEL_OUTOF10: 7
PAINLEVEL_OUTOF10: 0
PAINLEVEL_OUTOF10: 0

## 2019-03-26 ASSESSMENT — PAIN DESCRIPTION - PAIN TYPE: TYPE: ACUTE PAIN

## 2019-03-26 ASSESSMENT — PAIN DESCRIPTION - LOCATION: LOCATION: CHEST

## 2019-03-26 NOTE — PROGRESS NOTES
Progress Note    Admit Date: 3/26/2019  Diet: DIET CARDIAC;    CC: Chest pain    S: Patient was seen and examined at bedside. Patient reports that he still feels some chest pain/discomfort in the left upper chest but it is improved compared to last night when it was more severe. Denies SOB, PND, Orthopnea, N/V/D, dizziness, exertional dyspnea, abdominal pain. Reports that he measures BP at home and is usually in the 150's. No other acute complaints at this time. Medications:     Scheduled Meds:   atorvastatin  80 mg Oral Nightly    therapeutic multivitamin-minerals  1 tablet Oral Daily    dicyclomine  10 mg Oral 4x Daily    lisinopril  40 mg Oral Daily    aspirin EC  81 mg Oral Daily    cetirizine  10 mg Oral Daily    doxazosin  8 mg Oral Daily    triamterene-hydrochlorothiazide  1 tablet Oral Daily    neomycin-polymyxin-hydrocortisone  3 drop Right Ear TID    sodium chloride flush  10 mL Intravenous 2 times per day    nitroglycerin  0.5 inch Topical 4 times per day    heparin (porcine)  5,000 Units Subcutaneous 3 times per day    pantoprazole  40 mg Intravenous Daily    furosemide  40 mg Intravenous Daily     Continuous Infusions:  PRN Meds:nitroGLYCERIN, tiZANidine, bisacodyl, sodium chloride flush, magnesium hydroxide, ondansetron, albuterol    Objective:   Vitals:   T-max:  Patient Vitals for the past 8 hrs:   BP Temp Temp src Pulse Resp SpO2   03/26/19 1252 115/67 98.7 °F (37.1 °C) Oral (!) 40 14 95 %   03/26/19 0915 130/83 97.5 °F (36.4 °C) Oral (!) 48 16 93 %   03/26/19 0700 (!) 148/76 -- -- (!) 48 13 --   03/26/19 0645 -- -- -- (!) 48 11 --   03/26/19 0630 (!) 152/76 -- -- (!) 45 18 --   03/26/19 0615 -- -- -- (!) 46 20 93 %       Intake/Output Summary (Last 24 hours) at 3/26/2019 1401  Last data filed at 3/26/2019 1252  Gross per 24 hour   Intake 250 ml   Output 1400 ml   Net -1150 ml       Physical Exam  Constitutional: He appears well-developed and well-nourished.    HENT:   Head: Normocephalic and atraumatic. Eyes: Pupils are equal, round, and reactive to light. Conjunctivae and EOM are normal.   Cardiovascular: Normal rate, regular rhythm and S1 normal. Systolic 2/6 in RUSB and LUSB  Pulses: Radial pulses are 2+ on the left side. EXT: Trace pedal edema. Pulmonary/Chest: Effort normal and breath sounds normal. No respiratory distress. Abdominal: Soft. Normal appearance and bowel sounds are normal. There is no tenderness. Neurological: He is alert. He has normal strength. No cranial nerve deficit or sensory deficit. Skin: Skin is warm and dry. LABS:    CBC:   Recent Labs     03/26/19  0146 03/26/19  0536   WBC 4.4 4.9   HGB 12.1* 12.4*   HCT 35.9* 37.1*   * 122*   MCV 98.0 97.8     Renal:    Recent Labs     03/26/19  0146 03/26/19  0536    140   K 4.6 3.6    104   CO2 29 26   BUN 35* 33*   CREATININE 1.4* 1.4*   GLUCOSE 124* 120*   CALCIUM 8.8 8.7   MG  --  2.10   ANIONGAP 7 10     Hepatic:   Recent Labs     03/26/19  0536   AST 22   ALT 17   BILITOT 0.9   PROT 6.8   LABALBU 3.7   ALKPHOS 73     Troponin:   Recent Labs     03/26/19  0146 03/26/19  0320 03/26/19  1040   TROPONINI 0.01 0.01 0.02*     BNP: No results for input(s): BNP in the last 72 hours. Lipids: No results for input(s): CHOL, HDL in the last 72 hours. Invalid input(s): LDLCALCU, TRIGLYCERIDE  ABGs:  No results for input(s): PHART, TMF6UYA, PO2ART, IEZ9AXU, BEART, THGBART, S5DBHJMV, LAD5PJC in the last 72 hours. INR: No results for input(s): INR in the last 72 hours. Lactate: No results for input(s): LACTATE in the last 72 hours. Cultures:  -----------------------------------------------------------------  RAD:   XR CHEST PORTABLE   Final Result     Reduced lung volumes with accentuation of bronchovascular markings. Assessment/Plan:   Kallie Mendez is a 80 y.o. male with PMH of CAD, CKD, HLD, HTN, and mild aortic stenosis presented to the ED with chest pain. Chest pain, atypical  In the setting of CAD and ischemic cardiomyopathy, r/o MI (mild trop elevation, history of CAD s/p PCI, last cath in 2015 was failed to show occlusive disease, EKG unchanged to prior) vs Costochondritis. Last ECHO showed EF 55%. Mild MR. Mild AS. Mild AR. Mild TX and mild TR.  - f/u ECHO  - trend trops  - Cadiology consulted. Appreciate recs   -ASA and statin   -Heparin is reasonable for now   -No bb/ccb given bradycardia on exam  - Continue home cardiac meds  - ASA, lipitor, lisinopril    Lower extremity edema, resolved  - Lasix 40 mg qd  - Check ECHO    HTN  - Seems to be running higher that usual as per Dr. Amara Zurita note from 12/2018  - Cont home ANURADHA meds; lisinopril 40mg, Maxzide-25, Cardura 8mg     Mild Aortic Stenosis  - Mean gradient 17, although valve area is <1cm (0.94)  - Cardiology eval  - ECHO    FEN:  cardiac diet  PPX:  Ppi, heparin  DISPO: wards      Imelda Nguyen, PGY-1  03/26/19  2:01 PM    This patient will be staffed and discussed with Ray Hinkle MD.   Patient seen and examined, labs and imaging studies reviewed, agree with assessment and plan as outlined above.   Continue with care monitor enzymes discussed with Dr. Fabio Buenrostro MD FACP

## 2019-03-26 NOTE — ED TRIAGE NOTES
Pt arrives with complaints of chest pain since 8pm.  States he's a patient of Dr Velvet Nageotte, but he's having chest pain now so he's not supposed to wait.

## 2019-03-26 NOTE — ED NOTES
Bed: B22-22  Expected date:   Expected time:   Means of arrival:   Comments:  205 Vitaly Gardner RN  03/26/19 9738

## 2019-03-26 NOTE — PROGRESS NOTES
80 y.o. man who came to ER for chest pain. Had sharp chest pain that started at 8 pm yesterday and essentially lasted all night. Gone this morning. No sob. No n/v/syncope. No sig LE edema or syncope. No orthopnea or PND. Has a h/o PCI years ago with Dr. Essence Shay. Past Medical History:   Diagnosis Date    Arthritis     CAD (coronary artery disease)     Chronic kidney disease     Constipation     DDD (degenerative disc disease), lumbar     L5, S1    Dental disease     Hearing loss     Hyperlipidemia     Hypertension     Mild aortic stenosis     Prostate enlargement      Past Surgical History:   Procedure Laterality Date    CORONARY ANGIOPLASTY WITH STENT PLACEMENT  1/12/98    EYE SURGERY      SKIN TAG REMOVAL  2/2/10    TURP       Social History     Tobacco Use    Smoking status: Never Smoker    Smokeless tobacco: Never Used   Substance Use Topics    Alcohol use: No     Alcohol/week: 0.0 oz    Drug use: No     No Known Allergies    Family History   Problem Relation Age of Onset    No Known Problems Mother     No Known Problems Father      Review of Systems   General: No fevers, chills, fatigue, or night sweats. HEENT: No blurry or decreased vision. No changes in hearing, nasal discharge or sore throat. Cardiovascular: See HPI. No cramping in legs or buttocks when walking. Respiratory: No cough, hemoptysis, or wheezing. Gastrointestinal: No abdominal pain, hematochezia, melana    Genito-Urinary: No dysuria or hematuria. Musculoskeletal: No complaints of joint pain, joint swelling or muscular weakness/soreness. Neurological: No dizziness or headaches. No numbness/tingling, speech problems or weakness. No history of a stroke or TIA. Psychological: No anxiety or depression  Hematological and Lymphatic: No abnormal bleeding or bruising, blood clots, jaundice.    Endocrine: No malaise/lethargy, palpitations, polydipsia/polyuria, temperature intolerance or unexpected weight changes. Skin: No rashes or non-healing ulcers. PE:  Blood pressure (!) 148/76, pulse (!) 48, temperature 98 °F (36.7 °C), temperature source Oral, resp. rate 13, height 5' 6\" (1.676 m), weight 225 lb (102.1 kg), SpO2 93 %. General (appearance): Well devel. No distress. Eyes: EOMI. Anicteric  Neck: Supple. No JVD  Ears/Nose/Mouth/Thorat: No cyanosis  CV: Reg azra. + SIMON. Respiratory:  Clear B, normal respiratory effort  GI: Abd soft. No peritoneal signs  Skin: Warm, dry. No rashes  Neuro/Psych: Alert and oriented x 3. Appropriate behavior  Ext:  No c/c. No pittin edema  Pulses:  2+ radial    CBC:   Recent Labs     03/26/19  0146 03/26/19  0536   WBC 4.4 4.9   HGB 12.1* 12.4*   HCT 35.9* 37.1*   MCV 98.0 97.8   * 122*     BMP:   Recent Labs     03/26/19  0146 03/26/19  0536    140   K 4.6 3.6    104   CO2 29 26   BUN 35* 33*   CREATININE 1.4* 1.4*     Mag:   Lab Results   Component Value Date    MG 2.10 03/26/2019     LIVER PROFILE:   Recent Labs     03/26/19  0536   AST 22   ALT 17   BILITOT 0.9   ALKPHOS 73     PT/INR: No results for input(s): PROTIME, INR in the last 72 hours. APTT: No results for input(s): APTT in the last 72 hours. BNP:  No results for input(s): BNP in the last 72 hours.   CARDIAC ENZYMES:   Recent Labs     03/26/19  0146 03/26/19  0320 03/26/19  1040   TROPONINI 0.01 0.01 0.02*     Lab Results   Component Value Date    CHOL 103 11/07/2018    CHOL 108 03/01/2018    CHOL 116 03/18/2015     Lab Results   Component Value Date    TRIG 57 11/07/2018    TRIG 72 03/01/2018    TRIG 87 03/18/2015     Lab Results   Component Value Date    HDL 53 11/07/2018    HDL 53 03/01/2018    HDL 55 03/18/2015     Lab Results   Component Value Date    LDLCALC 39 11/07/2018    LDLCALC 41 03/01/2018    LDLCALC 44 03/18/2015     Lab Results   Component Value Date    LABVLDL 11 11/07/2018    LABVLDL 14 03/01/2018    LABVLDL 17 03/18/2015     No results found for: CHOLDOMENICOO    3/26/19 ECG: NSR with PVC. Nonspecific T wave abnl. IVCD    10/2017 SPECT: Mild septal ischemia, small inferior infarct with ana-infarct ischemia    June 2017 TTE: EF 55%. Mild MR. Mild AS. Mild AR. Mild OR and mild TR. A/P:  80 y.o. admitted with chest pain. Patient of Dr. Leslie Xie. Issues:  -Chest pain, elevated Tn (minimal) in setting of mild ckd  -CAD  -HTN  -Hyperlipidemia  -CKD    Recs:  -ASA and statin  -Heparin is reasonable for now  -No bb/ccb given bradycardia on exam  -Will discuss with Dr. Leslie Xie. Discussed stress test, med mgt, and cath, and the pt and son said they'd follow the recs we make. -Get echo. Gayleen Moritz A. Arlo Rile, MD, [facility]

## 2019-03-26 NOTE — PROGRESS NOTES
Progress Note    Admit Date: 3/26/2019  Day: ***  Diet: DIET CARDIAC;    CC: Chest pain    S: Patient reports that he still feels some chest pain/discomfort in the left upper chest but it is improved compared to last night when it was more severe. Denies SOB, PND, Orthopnea, N/V/D, dizziness, exertional dyspnea, abdominal pain. Reports that he measures BP at home and is usually in the 150's. No other acute complaints at this time.      Medications:     Scheduled Meds:   atorvastatin  80 mg Oral Nightly    therapeutic multivitamin-minerals  1 tablet Oral Daily    dicyclomine  10 mg Oral 4x Daily    lisinopril  40 mg Oral Daily    aspirin EC  81 mg Oral Daily    cetirizine  10 mg Oral Daily    doxazosin  8 mg Oral Daily    triamterene-hydrochlorothiazide  1 tablet Oral Daily    neomycin-polymyxin-hydrocortisone  3 drop Right Ear TID    sodium chloride flush  10 mL Intravenous 2 times per day    nitroglycerin  0.5 inch Topical 4 times per day    heparin (porcine)  5,000 Units Subcutaneous 3 times per day    pantoprazole  40 mg Intravenous Daily    furosemide  40 mg Intravenous Daily     Continuous Infusions:  PRN Meds:nitroGLYCERIN, tiZANidine, bisacodyl, sodium chloride flush, magnesium hydroxide, ondansetron, albuterol    Objective:   Vitals:   T-max:  Patient Vitals for the past 8 hrs:   BP Temp Temp src Pulse Resp SpO2   03/26/19 1252 115/67 98.7 °F (37.1 °C) Oral (!) 40 14 95 %   03/26/19 0915 130/83 97.5 °F (36.4 °C) Oral (!) 48 16 93 %   03/26/19 0700 (!) 148/76 -- -- (!) 48 13 --   03/26/19 0645 -- -- -- (!) 48 11 --   03/26/19 0630 (!) 152/76 -- -- (!) 45 18 --   03/26/19 0615 -- -- -- (!) 46 20 93 %   03/26/19 0600 137/75 98 °F (36.7 °C) Oral 53 20 94 %   03/26/19 0545 -- -- -- 57 19 --       Intake/Output Summary (Last 24 hours) at 3/26/2019 1339  Last data filed at 3/26/2019 1252  Gross per 24 hour   Intake 250 ml   Output 1400 ml   Net -1150 ml       Physical Exam  Constitutional: He appears stenosis presented to the ED with chest pain. Chest pain, atypical  In the setting of CAD and ischemic cardiomyopathy, r/o MI (mild trop elevation, history of CAD s/p PCI, last cath in 2015 was failed to show occlusive disease, EKG unchanged to prior) vs Costochondritis. Last ECHO showed EF 55%. Mild MR. Mild AS. Mild AR. Mild TN and mild TR.  - f/u ECHO  - trend trops  - Cadiology consulted.  Appreciate recs   -ASA and statin   -Heparin is reasonable for now   -No bb/ccb given bradycardia on exam   -Will discuss with Dr. Velvet Nageotte in regards to need for stress test, med mgt, and cath  - Continue home cardiac meds  - ASA, lipitor, lisinopril    Lower extremity edema, resolved  - Lasix 40 mg qd  - Check ECHO    HTN  - Seems to be running higher that usual as per Dr. Alma Roque note from 12/2018  - Cont home ANURADHA meds; lisinopril 40mg, Maxzide-25, Cardura 8mg     Mild Aortic Stenosis  - Mean gradient 17, although valve area is <1cm (0.94)  - Cardiology eval  - ECHO        FEN:  cardiac diet  PPX:  Ppi, heparin  DISPO: wards        Syed Olmedo, PGY-2  03/26/19  1:39 PM    This patient has been staffed and discussed with Carline Campo MD.

## 2019-03-26 NOTE — PLAN OF CARE
4 Eyes Admission Assessment     I agree as the admission nurse that 2 RN's have performed a thorough Head to Toe Skin Assessment on the patient. ALL assessment sites listed below have been assessed on admission. Areas assessed by both nurses:   [x]   Head, Face, and Ears   [x]   Shoulders, Back, and Chest  [x]   Arms, Elbows, and Hands   [x]   Coccyx, Sacrum, and Ischum  [x]   Legs, Feet, and Heels        Does the Patient have Skin Breakdown?   No         Juan Prevention initiated:  No   Wound Care Orders initiated:  No      [facility] nurse consulted for Pressure Injury (Stage 3,4, Unstageable, DTI, NWPT, and Complex wounds):  No      Nurse 1 eSignature: Electronically signed by Carolin Figueredo RN on 3/26/19 at 5:50 PM    **SHARE this note so that the co-signing nurse is able to place an eSignature**    Nurse 2 eSignature: Electronically signed by Neo Martinez RN on 3/26/2019 at 6:00 PM

## 2019-03-26 NOTE — PROGRESS NOTES
RESPIRATORY THERAPY ASSESSMENT    Name:  Bk Hall  Medical Record Number:  [id number]  Age: 80 y.o. Gender: male  : 1932  Today's Date:  3/26/2019  Room:  [place] [address]    Assessment     Is the patient being admitted for a COPD or Asthma exacerbation? No   (If yes the patient will be seen every 4 hours for the first 24 hours and then reassessed)    Patient Admission Diagnosis      Allergies  No Known Allergies    Minimum Predicted Vital Capacity:     952          Actual Vital Capacity:      1500              Pulmonary History:No history  Home Oxygen Therapy:  room air  Home Respiratory Therapy:Albuterol   Current Respiratory Therapy:  Albuterol Q4PRN          Respiratory Severity Index(RSI)   Patients with orders for inhalation medications, oxygen, or any therapeutic treatment modality will be placed on Respiratory Protocol. They will be assessed with the first treatment and at least every 72 hours thereafter. The following severity scale will be used to determine frequency of treatment intervention.     Smoking History: No Smoking History = 0    Social History  Social History     Tobacco Use    Smoking status: Never Smoker    Smokeless tobacco: Never Used   Substance Use Topics    Alcohol use: No     Alcohol/week: 0.0 oz    Drug use: No       Recent Surgical History: None = 0  Past Surgical History  Past Surgical History:   Procedure Laterality Date    CORONARY ANGIOPLASTY WITH STENT PLACEMENT  98    EYE SURGERY      SKIN TAG REMOVAL  2/2/10    TURP         Level of Consciousness: Alert, Oriented, and Cooperative = 0    Level of Activity: Walking with assistance = 1    Respiratory Pattern: Regular Pattern; RR 8-20 = 0    Breath Sounds: Clear = 0    Sputum   ,  ,    Cough: Strong, spontaneous, non-productive = 0    Vital Signs   BP (!) 149/86   Pulse 57   Temp 98.1 °F (36.7 °C) (Oral)   Resp 20   Ht 5' 6\" (1.676 m)   Wt 225 lb (102.1 kg)   SpO2 93%   BMI 36.32 kg/m²   SPO2 (COPD values may differ): Greater than or equal to 92% on room air = 0    Peak Flow (asthma only): not applicable = 0    RSI: 0-4 = See once and convert to home regimen or discontinue        Plan       Goals: medication delivery    Patient/caregiver was educated on the proper method of use for Respiratory Care Devices:  Yes      Level of patient/caregiver understanding able to:   ? Verbalize understanding   ? Demonstrate understanding       ? Teach back        ? Needs reinforcement       ? No available caregiver               ? Other:     Response to education:  Good     Is patient being placed on Home Treatment Regimen? Yes     Does the patient have everything they need prior to discharge? NA     Comments: Pt assessed to home regimen. Plan of Care: Albuterol Q6PRN    Electronically signed by Sorin Olea on 3/26/2019 at 6:33 AM    Respiratory Protocol Guidelines     1. Assessment and treatment by Respiratory Therapy will be initiated for medication and therapeutic interventions upon initiation of aerosolized medication. 2. Physician will be contacted for respiratory rate (RR) greater than 35 breaths per minute. Therapy will be held for heart rate (HR) greater than 140 beats per minute, pending direction from physician. 3. Bronchodilators will be administered via Metered Dose Inhaler (MDI) with spacer when the following criteria are met:  a. Alert and cooperative     b. HR < 140 bpm  c. RR < 30 bpm                d. Can demonstrate a 2-3 second inspiratory hold  4. Bronchodilators will be administered via Hand Held Nebulizer [facility]) to patients when ANY of the following criteria are met  a. Incognizant or uncooperative          b. Patients treated with HHN at Home        c. Unable to demonstrate proper use of MDI with spacer     d. RR > 30 bpm   5. Bronchodilators will be delivered via Metered Dose Inhaler (MDI), HHN, Aerogen to intubated patients on mechanical ventilation.   6. Inhalation medication orders will be delivered and/or substituted as outlined below. Aerosolized Medications Ordering and Administration Guidelines:    1. All Medications will be ordered by a physician, and their frequency and/or modality will be adjusted as defined by the patients Respiratory Severity Index (RSI) score. 2. If the patient does not have documented COPD, consider discontinuing anticholinergics when RSI is less than 9.  3. If the bronchospasm worsens (increased RSI), then the bronchodilator frequency can be increased to a maximum of every 4 hours. If greater than every 4 hours is required, the physician will be contacted. 4. If the bronchospasm improves, the frequency of the bronchodilator can be decreased, based on the patient's RSI, but not less than home treatment regimen frequency. 5. Bronchodilator(s) will be discontinued if patient has a RSI less than 9 and has received no scheduled or as needed treatment for 72  Hrs. Patients Ordered on a Mucolytic Agent:    1. Must always be administered with a bronchodilator. 2. Discontinue if patient experiences worsened bronchospasm, or secretions have lessened to the point that the patient is able to clear them with a cough. Anti-inflammatory and Combination Medications:    1. If the patient lacks prior history of lung disease, is not using inhaled anti-inflammatory medication at home, and lacks wheezing by examination or by history for at least 24 hours, contact physician for possible discontinuation.

## 2019-03-26 NOTE — CARE COORDINATION
3/26/2019  [facility])  Clinical Case Management Department    Spoke with patient bedside, along with wife and son-in-law. Patient is a pleasant 81 y/o male who lives with his wife in a senior apartment building. Patient states that he is completely independent at home. The building has an elevator and patient states that a visiting RN comes once a week to see him - patient was unable to remember to agency name for the RN. CM/SW will follow on [facility] to assist with d/c planning if needed. Patient: Lisa Cabrera  MRN: [id number] / : 1932  ACCT: [de-identified]     Admission Documentation  Attending Provider: Briigtte Gutierrez MD  Admit date/time: 3/26/2019 12:51 AM  Status: Inpatient [101]  Diagnosis: Chest pain     Readmission within last 30 days:  no     Living Situation  Discharge Planning  Living Arrangements: Spouse/Significant Other  Support Systems: Spouse/Significant Other  Potential Assistance Needed: N/A  Type of Home Care Services: None  Patient expects to be discharged to[de-identified] home  Expected Discharge Date: 19    Service Assessment         Advance Directives (For Healthcare)  Pre-existing DNR Comfort Care/DNR Arrest/DNI Order: No  Healthcare Directive: No, patient does not have an advance directive for healthcare treatment       Destination  [place] [address] Health/Skilled Nursing  Services at Discharge: None  Home care at home? No     Therapy Consults  PT evaluation needed?: No  OT Evalulation Needed?: No  SLP evaluation needed?: No    Home Medical Care  Patient took care of his owns needs prior to admit.    Pharmacy: N/A   Potential Assistance Purchasing Medications:  No  Does patient want to participate in local refill/meds to beds program?: No    Goals of Care  Patient expects to be discharged to[de-identified] home  Patient plans for SNF: No         Mode of transport from hospital: Family     Factors facilitating achievement of predicted outcomes: Family support, Cooperative and Pleasant    Barriers to discharge: CRYSTAL Lee, [facility]  Case Management Department  Ph: [phone] Fax: [phone]

## 2019-03-26 NOTE — ED PROVIDER NOTES
[address] [facility] RESIDENT NOTE       Date of evaluation: 3/26/2019    Chief Complaint     Chest Pain      History of Present Illness     Pillo Sellers is a 80 y.o. male who presents with chest pain that started tonight at rest.  States it is located in the left side of his chest and goes to her shoulder. No shortness of breath, no lightheadedness. No fevers cough congestion and abdominal pain nausea or vomiting or back pain. He does have a pertinent past medical history of a previous MI which she has stents in place and follows with Dr. Edwin Wallis. Review of Systems     Review of Systems   All other systems reviewed and are negative. Past Medical, Surgical, Family, and Social History     He has a past medical history of Arthritis, CAD (coronary artery disease), Chronic kidney disease, Constipation, DDD (degenerative disc disease), lumbar, Dental disease, Hearing loss, Hyperlipidemia, Hypertension, Mild aortic stenosis, and Prostate enlargement. He has a past surgical history that includes TURP; Coronary angioplasty with stent (1/12/98); Skin tag removal (2/2/10); and eye surgery. His family history includes No Known Problems in his father and mother. He reports that he has never smoked. He has never used smokeless tobacco. He reports that he does not drink alcohol or use drugs. Medications     Previous Medications    ALBUTEROL SULFATE HFA (PROVENTIL HFA) 108 (90 BASE) MCG/ACT INHALER    Inhale 2 puffs into the lungs every 4 hours as needed for Wheezing or Shortness of Breath (Space out to every 6 hours as symptoms improve) Space out to every 6 hours as symptoms improve.     ASPIRIN EC 81 MG EC TABLET    Take 1 tablet by mouth daily    ATORVASTATIN (LIPITOR) 80 MG TABLET    Take 1 tablet by mouth nightly    BACITRACIN-POLYMYXIN B, OPHTH, (AK-POLY-BAC) OINT    Sig: Apply a 1/2 inch ribbon to both eyes every 6 hours    BETAMETHASONE DIPROPIONATE ([place]) 0.05 % OINTMENT    Apply topically daily. BISACODYL (BISACODYL) 5 MG EC TABLET    Take 1 tablet by mouth daily as needed for Constipation    CETIRIZINE (ZYRTEC) 10 MG TABLET    Take 1 tablet by mouth daily    DICYCLOMINE (BENTYL) 10 MG CAPSULE    Take 1 capsule by mouth 4 times daily    DOXAZOSIN (CARDURA) 8 MG TABLET    Take 1 tablet by mouth daily    EZETIMIBE (ZETIA) 10 MG TABLET    Take 1 tablet by mouth nightly    FIBER POWD    Take 1 each by mouth 2 times daily    HYDROCORTISONE (ANUSOL-HC) 2.5 % RECTAL CREAM    Place rectally 2 times daily. LISINOPRIL (PRINIVIL;ZESTRIL) 40 MG TABLET    Take 1 tablet by mouth daily    MULTIPLE VITAMIN (MULTI-VITAMIN) TABS    Take 1 tablet by mouth daily    NEOMYCIN-POLYMYXIN-HYDROCORTISONE (CORTISPORIN) 3.5-07330-2 OTIC SOLUTION    Place 3 drops into the right ear 3 times daily for 7 days    NITROGLYCERIN (NITROSTAT) 0.4 MG SL TABLET    Place 1 tablet under the tongue every 5 minutes as needed for Chest pain up to max of 3 total doses. If no relief after 1 dose, call 911. NYSTATIN POWD    Apply 1 each topically 2 times daily    RANITIDINE (ZANTAC) 150 MG TABLET    Take 1 tablet by mouth 2 times daily    SKIN PROTECTANTS, MISC. (EUCERIN) CREAM    Apply topically as needed. TIZANIDINE (ZANAFLEX) 4 MG TABLET    Take 1 tablet by mouth every 8 hours as needed (pain)    TRIAMTERENE-HYDROCHLOROTHIAZIDE (MAXZIDE-25) 37.5-25 MG PER TABLET    Take 1 tablet by mouth daily       Allergies     He has No Known Allergies. Physical Exam     INITIAL VITALS: BP: (!) 173/91, Temp: 98.1 °F (36.7 °C), Pulse: 61, Resp: 16, SpO2: 95 %   Physical Exam   Constitutional: He is oriented to person, place, and time. He appears well-developed and well-nourished. No distress. HENT:   Head: Normocephalic and atraumatic. Mouth/Throat: Oropharynx is clear and moist.   Eyes: Pupils are equal, round, and reactive to light. Conjunctivae are normal.   Neck: Normal range of motion. Neck supple. No tracheal deviation present. Cardiovascular: Normal rate, regular rhythm and normal heart sounds. Pulmonary/Chest: Effort normal and breath sounds normal. No respiratory distress. Abdominal: Soft. He exhibits no distension. There is no tenderness. Musculoskeletal: Normal range of motion. He exhibits no tenderness or deformity. Neurological: He is alert and oriented to person, place, and time. He exhibits normal muscle tone. Skin: Skin is warm and dry. He is not diaphoretic. No erythema. Psychiatric: He has a normal mood and affect. Nursing note and vitals reviewed. DiagnosticResults     EKG   Sinus rhythm with a first-degree AV block no ST elevation or depression QTc 472 rate of 62    RADIOLOGY:  XR CHEST PORTABLE   Final Result     Reduced lung volumes with accentuation of bronchovascular markings.               LABS:   Results for orders placed or performed during the hospital encounter of 03/26/19   CBC Auto Differential   Result Value Ref Range    WBC 4.4 4.0 - 11.0 K/uL    RBC 3.66 (L) 4.20 - 5.90 M/uL    Hemoglobin 12.1 (L) 13.5 - 17.5 g/dL    Hematocrit 35.9 (L) 40.5 - 52.5 %    MCV 98.0 80.0 - 100.0 fL    MCH 32.9 26.0 - 34.0 pg    MCHC 33.6 31.0 - 36.0 g/dL    RDW 13.5 12.4 - 15.4 %    Platelets 440 (L) 609 - 450 K/uL    MPV 8.1 5.0 - 10.5 fL    Neutrophils % 48.1 %    Lymphocytes % 26.9 %    Monocytes % 16.0 %    Eosinophils % 8.3 %    Basophils % 0.7 %    Neutrophils # 2.1 1.7 - 7.7 K/uL    Lymphocytes # 1.2 1.0 - 5.1 K/uL    Monocytes # 0.7 0.0 - 1.3 K/uL    Eosinophils # 0.4 0.0 - 0.6 K/uL    Basophils # 0.0 0.0 - 0.2 K/uL   Basic Metabolic Panel w/ Reflex to MG   Result Value Ref Range    Sodium 140 136 - 145 mmol/L    Potassium reflex Magnesium 4.6 3.5 - 5.1 mmol/L    Chloride 104 99 - 110 mmol/L    CO2 29 21 - 32 mmol/L    Anion Gap 7 3 - 16    Glucose 124 (H) 70 - 99 mg/dL    BUN 35 (H) 7 - 20 mg/dL    CREATININE 1.4 (H) 0.8 - 1.3 mg/dL    GFR Non- 48 (A) >60

## 2019-03-26 NOTE — H&P
Social History:   The patient lives at    Alcohol:    Illicit drugs:no use  Tobacco:      Family History:  Family History   Problem Relation Age of Onset    No Known Problems Mother     No Known Problems Father      ============================================================================  HomeMedications:  No current facility-administered medications on file prior to encounter. Current Outpatient Medications on File Prior to Encounter   Medication Sig Dispense Refill    neomycin-polymyxin-hydrocortisone (CORTISPORIN) 3.5-71082-7 otic solution Place 3 drops into the right ear 3 times daily for 7 days 1 Bottle 1    doxazosin (CARDURA) 8 MG tablet Take 1 tablet by mouth daily 90 tablet 0    triamterene-hydrochlorothiazide (MAXZIDE-25) 37.5-25 MG per tablet Take 1 tablet by mouth daily 90 tablet 0    ezetimibe (ZETIA) 10 MG tablet Take 1 tablet by mouth nightly 30 tablet 3    ranitidine (ZANTAC) 150 MG tablet Take 1 tablet by mouth 2 times daily 60 tablet 3    lisinopril (PRINIVIL;ZESTRIL) 40 MG tablet Take 1 tablet by mouth daily 30 tablet 3    aspirin EC 81 MG EC tablet Take 1 tablet by mouth daily 90 tablet 3    cetirizine (ZYRTEC) 10 MG tablet Take 1 tablet by mouth daily 90 tablet 3    nitroGLYCERIN (NITROSTAT) 0.4 MG SL tablet Place 1 tablet under the tongue every 5 minutes as needed for Chest pain up to max of 3 total doses. If no relief after 1 dose, call 911. 25 tablet 3    dicyclomine (BENTYL) 10 MG capsule Take 1 capsule by mouth 4 times daily 360 capsule 1    Multiple Vitamin (MULTI-VITAMIN) TABS Take 1 tablet by mouth daily 30 tablet 5    atorvastatin (LIPITOR) 80 MG tablet Take 1 tablet by mouth nightly 90 tablet 3    albuterol sulfate HFA (PROVENTIL HFA) 108 (90 Base) MCG/ACT inhaler Inhale 2 puffs into the lungs every 4 hours as needed for Wheezing or Shortness of Breath (Space out to every 6 hours as symptoms improve) Space out to every 6 hours as symptoms improve.  1 Inhaler 2    bisacodyl (BISACODYL) 5 MG EC tablet Take 1 tablet by mouth daily as needed for Constipation 4 tablet 0    hydrocortisone (ANUSOL-HC) 2.5 % rectal cream Place rectally 2 times daily. 1 g 2    tiZANidine (ZANAFLEX) 4 MG tablet Take 1 tablet by mouth every 8 hours as needed (pain) 15 tablet 0    Fiber POWD Take 1 each by mouth 2 times daily 575 Bottle 3    Nystatin POWD Apply 1 each topically 2 times daily 1 each 1    betamethasone dipropionate (DIPROLENE) 0.05 % ointment Apply topically daily. 1 Tube 0    BACITRACIN-POLYMYXIN B, OPHTH, (AK-POLY-BAC) OINT Sig: Apply a 1/2 inch ribbon to both eyes every 6 hours 1 Tube 0    Skin Protectants, Misc. (EUCERIN) cream Apply topically as needed. 1 Package 3       Allergies: No Known Allergies    ============================================================================       Current Vitals: BP (!) 171/88   Pulse 52   Temp 98.1 °F (36.7 °C) (Oral)   Resp 19   Ht 5' 6\" (1.676 m)   Wt 225 lb (102.1 kg)   SpO2 96%   BMI 36.32 kg/m²     Intake/Output: No intake/output data recorded. No intake/output data recorded. Physical Examination:     Physical Exam   Constitutional: He appears well-developed and well-nourished. HENT:   Head: Normocephalic and atraumatic. Eyes: Pupils are equal, round, and reactive to light. Conjunctivae and EOM are normal.   Cardiovascular: Normal rate, regular rhythm and S1 normal.   Murmur heard. Systolic murmur is present with a grade of 3/6. Pulses:       Radial pulses are 2+ on the left side. Diminished S2. Mild bilat lower extrem edema   Pulmonary/Chest: Effort normal and breath sounds normal. No respiratory distress. Abdominal: Soft. Normal appearance and bowel sounds are normal. There is no tenderness. Neurological: He is alert. He has normal strength. No cranial nerve deficit or sensory deficit. Skin: Skin is warm and dry. ============================================================================  Consults:   IP CONSULT TO HOSPITALIST  IP CONSULT TO RESIDENT INTERNAL MEDICINE  IP CONSULT TO CARDIOLOGY  ============================================================================  Laboratory Data:    CBC:   Lab Results   Component Value Date    WBC 4.4 2019    HGB 12.1 (L) 2019    HCT 35.9 (L) 2019    MCV 98.0 2019     (L) 2019       BMP:   Lab Results   Component Value Date    CREATININE 1.4 (H) 2019    BUN 35 (H) 2019     2019    K 4.6 2019     2019    CO2 29 2019    PHOS 3.8 2018    MG 2.20 2015       LFT's: No results for input(s): AST, ALT, ALB, BILITOT, ALKPHOS in the last 72 hours. INR: No results for input(s): INR in the last 72 hours. Troponin:   Recent Labs     19  0146 19  0320   TROPONINI 0.01 0.01       BNP:No results for input(s): BNP in the last 72 hours. ABGs: No results for input(s): PHART, CWA0LNX, PO2ART in the last 72 hours. [email or web address]    ============================================================================  Imaging, EKG and other studies:   XR CHEST PORTABLE   Final Result     Reduced lung volumes with accentuation of bronchovascular markings. EKG:    Imagin2015 Coronary angiogram  Angiographic Findings: Right dominant system  Left Main: Normal  Left Anterior Descending: Proximal LAD appears ectatic. There is a \"stepdown\" to the mid LAD and a 50% stenosis at this site. Clayton Pandey is small and has an ostial 95% stenosis. Alistair Cedillo has an ostial 60% stenosis. Circumflex: Very large vessel system. Diffuse luminal irregularities. OM1-OM3 are small to moderate sized. OM4 and 5 are very large.  OM5 has an ostial 40-50% stenosis. Right Coronary: Ectatic vessel. Diffuse luminal irregularities. Left Ventriculogram:  Not performed. Femoral Angiogram: Mild irregularities. Hemodynamics (mm Hg):  Left Ventricular Pressure: 170/6, 22  Central Aortic Pressure: 155/65  Conclusions:  -No obstructive disease. Stents were difficult to visualize (? LAD and RCA) but are patent.  -Peak-to-peak gradient across the aortic valve of roughly 15 mm Hg on pullback across the aortic valve    ECHO:   6/2/2017   Summary   Left ventricular size is mildly increased . There is mild concentric left   ventricular hypertrophy.   Left ventricular function is normal with ejection fraction estimated at 55   %.   No diagnostic regional wall motion abnormalities noted.   Diastolic filling parameters suggests grade I diastolic dysfunction .   Mild mitral regurgitation is present.   The aortic valve is thickened/calcified with a peak gradient of 32 mm Hg,   and mean gradient of 17 mmHg c/w mild aortic stenosis.   Mild aortic regurgitation is present.  Krissy Peaches is mild tricuspid regurgitation with RVSP estimated at 40 mmHg.   Mild pulmonic regurgitation present.     Doppler Measurements   AV Peak Velocity: 282 cm/s     MV Peak E-Wave: 58.5 cm/s   AV Peak Gradient: 31.81 mmHg   MV Peak A-Wave: 73.8 cm/s   AV Mean Gradient: 17 mmHg      MV E/A Ratio: 0.79   LVOT Peak Velocity: 98.8 cm/s   AV Area (Continuity):0.94 cm2  ============================================================================  Assessment and plan:    Geoffrey Brown is a 80 y.o. male, who was admitted with left sided chest pain that started last evening around 8pm. It has since resolved to 2/10. He has a remote history of CAD s/p PCI, last cath in 2015 was failed to show occlusive disease.     Atypical Chest pain with CAD and ischemic cardiomyopathy  - Rule out ACS; trend trops x3  - Possible that mild AS from 2015 may be progressing although unusual that his pain is not exertional  - Check new ECHO  - Cardiology consultation, further recs appreciated  - Continue home cardiac meds;   - ASA, lipitor, lisinopril  - Should be on carvedilol as per Dr. Jorge Patton note but does not appear to be, will hold off on this for now given his high BP and uncertain AS  - Nitro paste (0.5inches) for chest pain    HTN  - Seems to be running higher that usual as per Dr. Varinder Forresteramento note from 12/2018  - Cont home ANURADHA meds; lisinopril 40mg, Maxzide-25, Cardura 8mg  - Add 0.5inches of nitro, monitor for chest pain resolution    Mild Aortic Stenosis  - Mean gradient 17, although valve area is <1cm (0.94)  - Cardiology eval  - ECHO    Lower extremity edema  - Mild  - Add lasix 40mg IV daily  - Check ECHO  ============================================================================  FEN:  cardiac diet  PPX:  Ppi, heparin  DISPO: wards    This [facility]  -----------------------------  Viraj Bose MD PGY-3

## 2019-03-27 LAB
ALBUMIN SERPL-MCNC: 4 G/DL (ref 3.4–5)
ANION GAP SERPL CALCULATED.3IONS-SCNC: 12 MMOL/L (ref 3–16)
BUN BLDV-MCNC: 40 MG/DL (ref 7–20)
CALCIUM SERPL-MCNC: 9.2 MG/DL (ref 8.3–10.6)
CHLORIDE BLD-SCNC: 101 MMOL/L (ref 99–110)
CO2: 27 MMOL/L (ref 21–32)
CREAT SERPL-MCNC: 1.9 MG/DL (ref 0.8–1.3)
EKG ATRIAL RATE: 59 BPM
EKG DIAGNOSIS: NORMAL
EKG P AXIS: 56 DEGREES
EKG P-R INTERVAL: 208 MS
EKG Q-T INTERVAL: 492 MS
EKG QRS DURATION: 142 MS
EKG QTC CALCULATION (BAZETT): 487 MS
EKG R AXIS: -1 DEGREES
EKG T AXIS: 124 DEGREES
EKG VENTRICULAR RATE: 59 BPM
GFR AFRICAN AMERICAN: 41
GFR NON-AFRICAN AMERICAN: 34
GLUCOSE BLD-MCNC: 113 MG/DL (ref 70–99)
LV EF: 36 %
LVEF MODALITY: NORMAL
PHOSPHORUS: 4.1 MG/DL (ref 2.5–4.9)
POTASSIUM SERPL-SCNC: 3.8 MMOL/L (ref 3.5–5.1)
SODIUM BLD-SCNC: 140 MMOL/L (ref 136–145)

## 2019-03-27 PROCEDURE — 2580000003 HC RX 258: Performed by: STUDENT IN AN ORGANIZED HEALTH CARE EDUCATION/TRAINING PROGRAM

## 2019-03-27 PROCEDURE — 1200000000 HC SEMI PRIVATE

## 2019-03-27 PROCEDURE — 6370000000 HC RX 637 (ALT 250 FOR IP): Performed by: STUDENT IN AN ORGANIZED HEALTH CARE EDUCATION/TRAINING PROGRAM

## 2019-03-27 PROCEDURE — 80069 RENAL FUNCTION PANEL: CPT

## 2019-03-27 PROCEDURE — 93017 CV STRESS TEST TRACING ONLY: CPT

## 2019-03-27 PROCEDURE — 6360000002 HC RX W HCPCS: Performed by: STUDENT IN AN ORGANIZED HEALTH CARE EDUCATION/TRAINING PROGRAM

## 2019-03-27 PROCEDURE — C9113 INJ PANTOPRAZOLE SODIUM, VIA: HCPCS | Performed by: STUDENT IN AN ORGANIZED HEALTH CARE EDUCATION/TRAINING PROGRAM

## 2019-03-27 PROCEDURE — 6360000002 HC RX W HCPCS: Performed by: INTERNAL MEDICINE

## 2019-03-27 PROCEDURE — 93005 ELECTROCARDIOGRAM TRACING: CPT | Performed by: STUDENT IN AN ORGANIZED HEALTH CARE EDUCATION/TRAINING PROGRAM

## 2019-03-27 PROCEDURE — 99233 SBSQ HOSP IP/OBS HIGH 50: CPT | Performed by: INTERNAL MEDICINE

## 2019-03-27 PROCEDURE — 78452 HT MUSCLE IMAGE SPECT MULT: CPT

## 2019-03-27 PROCEDURE — 36415 COLL VENOUS BLD VENIPUNCTURE: CPT

## 2019-03-27 PROCEDURE — A9502 TC99M TETROFOSMIN: HCPCS | Performed by: INTERNAL MEDICINE

## 2019-03-27 PROCEDURE — 3430000000 HC RX DIAGNOSTIC RADIOPHARMACEUTICAL: Performed by: INTERNAL MEDICINE

## 2019-03-27 PROCEDURE — 6370000000 HC RX 637 (ALT 250 FOR IP): Performed by: INTERNAL MEDICINE

## 2019-03-27 PROCEDURE — 93010 ELECTROCARDIOGRAM REPORT: CPT | Performed by: INTERNAL MEDICINE

## 2019-03-27 RX ORDER — PANTOPRAZOLE SODIUM 40 MG/1
40 TABLET, DELAYED RELEASE ORAL
Status: DISCONTINUED | OUTPATIENT
Start: 2019-03-28 | End: 2019-03-28 | Stop reason: HOSPADM

## 2019-03-27 RX ORDER — DOXAZOSIN MESYLATE 4 MG/1
4 TABLET ORAL DAILY
Status: DISCONTINUED | OUTPATIENT
Start: 2019-03-28 | End: 2019-03-28 | Stop reason: HOSPADM

## 2019-03-27 RX ORDER — FUROSEMIDE 40 MG/1
40 TABLET ORAL DAILY
Status: DISCONTINUED | OUTPATIENT
Start: 2019-03-28 | End: 2019-03-28 | Stop reason: HOSPADM

## 2019-03-27 RX ORDER — METOPROLOL SUCCINATE 25 MG/1
25 TABLET, EXTENDED RELEASE ORAL NIGHTLY
Status: DISCONTINUED | OUTPATIENT
Start: 2019-03-27 | End: 2019-03-28

## 2019-03-27 RX ORDER — LISINOPRIL 40 MG/1
40 TABLET ORAL NIGHTLY
Status: DISCONTINUED | OUTPATIENT
Start: 2019-03-27 | End: 2019-03-28

## 2019-03-27 RX ADMIN — DICYCLOMINE HYDROCHLORIDE 10 MG: 10 CAPSULE ORAL at 21:23

## 2019-03-27 RX ADMIN — HEPARIN SODIUM 5000 UNITS: 5000 INJECTION INTRAVENOUS; SUBCUTANEOUS at 15:13

## 2019-03-27 RX ADMIN — TRIAMTERENE AND HYDROCHLOROTHIAZIDE 1 TABLET: 37.5; 25 TABLET ORAL at 13:47

## 2019-03-27 RX ADMIN — Medication 10 ML: at 11:42

## 2019-03-27 RX ADMIN — PANTOPRAZOLE SODIUM 40 MG: 40 INJECTION, POWDER, FOR SOLUTION INTRAVENOUS at 13:47

## 2019-03-27 RX ADMIN — Medication 1 TABLET: at 13:47

## 2019-03-27 RX ADMIN — Medication 10 ML: at 13:47

## 2019-03-27 RX ADMIN — TETROFOSMIN 10 MILLICURIE: 1.38 INJECTION, POWDER, LYOPHILIZED, FOR SOLUTION INTRAVENOUS at 11:42

## 2019-03-27 RX ADMIN — DICYCLOMINE HYDROCHLORIDE 10 MG: 10 CAPSULE ORAL at 13:47

## 2019-03-27 RX ADMIN — HEPARIN SODIUM 5000 UNITS: 5000 INJECTION INTRAVENOUS; SUBCUTANEOUS at 06:31

## 2019-03-27 RX ADMIN — Medication 10 ML: at 13:26

## 2019-03-27 RX ADMIN — LISINOPRIL 40 MG: 40 TABLET ORAL at 21:23

## 2019-03-27 RX ADMIN — Medication 10 ML: at 21:24

## 2019-03-27 RX ADMIN — ATORVASTATIN CALCIUM 80 MG: 40 TABLET, FILM COATED ORAL at 21:23

## 2019-03-27 RX ADMIN — DICYCLOMINE HYDROCHLORIDE 10 MG: 10 CAPSULE ORAL at 17:41

## 2019-03-27 RX ADMIN — CETIRIZINE HYDROCHLORIDE 10 MG: 10 TABLET, FILM COATED ORAL at 13:46

## 2019-03-27 RX ADMIN — FUROSEMIDE 40 MG: 10 INJECTION, SOLUTION INTRAMUSCULAR; INTRAVENOUS at 13:47

## 2019-03-27 RX ADMIN — LISINOPRIL 40 MG: 40 TABLET ORAL at 13:47

## 2019-03-27 RX ADMIN — ASPIRIN 81 MG: 81 TABLET, COATED ORAL at 13:47

## 2019-03-27 RX ADMIN — HEPARIN SODIUM 5000 UNITS: 5000 INJECTION INTRAVENOUS; SUBCUTANEOUS at 21:25

## 2019-03-27 RX ADMIN — REGADENOSON 0.4 MG: 0.08 INJECTION, SOLUTION INTRAVENOUS at 13:25

## 2019-03-27 RX ADMIN — DOXAZOSIN 8 MG: 4 TABLET ORAL at 13:46

## 2019-03-27 RX ADMIN — TETROFOSMIN 30 MILLICURIE: 1.38 INJECTION, POWDER, LYOPHILIZED, FOR SOLUTION INTRAVENOUS at 13:25

## 2019-03-27 ASSESSMENT — PAIN SCALES - GENERAL
PAINLEVEL_OUTOF10: 0

## 2019-03-27 NOTE — PROGRESS NOTES
[address] Daily Progress Note      Admit Date:  3/26/2019    Subjective:  Mr. Manual Mortimer was seen and examined. F/U CP/CAD/HTN/Trop. Feels better today. No chest pain.       Objective:   /67   Pulse 54   Temp 97.6 °F (36.4 °C) (Oral)   Resp 16   Ht 5' 6\" (1.676 m)   Wt 225 lb (102.1 kg)   SpO2 92%   BMI 36.32 kg/m²       Intake/Output Summary (Last 24 hours) at 3/27/2019 0944  Last data filed at 3/26/2019 2314  Gross per 24 hour   Intake 600 ml   Output 850 ml   Net -250 ml       TELEMETRY: Sinus     Physical Exam:  General:  Awake, alert, NAD  Skin:  Warm and dry  Neck:  JVP not elevated  Chest:  Clear to auscultation, respiration normal  Cardiovascular:  RRR S1S2  Abdomen:  Soft nontender  Extremities:  TR edema    Medications:    atorvastatin  80 mg Oral Nightly    therapeutic multivitamin-minerals  1 tablet Oral Daily    dicyclomine  10 mg Oral 4x Daily    lisinopril  40 mg Oral Daily    aspirin EC  81 mg Oral Daily    cetirizine  10 mg Oral Daily    doxazosin  8 mg Oral Daily    triamterene-hydrochlorothiazide  1 tablet Oral Daily    neomycin-polymyxin-hydrocortisone  3 drop Right Ear TID    sodium chloride flush  10 mL Intravenous 2 times per day    [Held by provider] nitroglycerin  0.5 inch Topical 4 times per day    heparin (porcine)  5,000 Units Subcutaneous 3 times per day    pantoprazole  40 mg Intravenous Daily    furosemide  40 mg Intravenous Daily       nitroGLYCERIN, tiZANidine, bisacodyl, sodium chloride flush, magnesium hydroxide, ondansetron, albuterol    Lab Data:  CBC:   Recent Labs     03/26/19 0146 03/26/19  0536   WBC 4.4 4.9   HGB 12.1* 12.4*   HCT 35.9* 37.1*   MCV 98.0 97.8   * 122*     BMP:   Recent Labs     03/26/19 0146 03/26/19  0536    140   K 4.6 3.6    104   CO2 29 26   BUN 35* 33*   CREATININE 1.4* 1.4*     LIVER PROFILE:   Recent Labs     03/26/19  0536   AST 22   ALT 17   BILITOT 0.9   ALKPHOS 73     PT/INR: No results for input(s): PROTIME, INR in the last 72 hours. APTT: No results for input(s): APTT in the last 72 hours. BNP:  No results for input(s): BNP in the last 72 hours. IMAGING:     Assessment:  Patient Active Problem List    Diagnosis Date Noted    Weakness of both lower extremities 10/26/2017     Priority: Low    S/P coronary angiogram      Priority: Low    Abnormal stress test      Priority: Low    Coronary artery disease involving native heart      Priority: Low    Essential hypertension      Priority: Low    Moderate aortic stenosis 12/20/2015     Priority: Low    Precordial pain 12/20/2015     Priority: Low    Chest pain      Priority: Low    CAD (coronary artery disease)      Priority: Low    Hypertension      Priority: Low    Hypercholesterolemia      Priority: Low    Arthritis      Priority: Low    Prostate enlargement      Priority: Low    DDD (degenerative disc disease), lumbar      Priority: Low    Mild aortic stenosis      Priority: Low    Constipation      Priority: Low    Elevated troponin     CKD (chronic kidney disease) stage 3, GFR 30-59 ml/min ([facility] [address] 12/27/2018       Plan:  1. No further chest pain. Trop in face of elevated Cr. Will have Fidel myoview to risk stratify. BP better. Recheck cr.        Core Measures:  · Discharge instructions:   · LVEF documented:   · ACEI for LV dysfunction:   · Smoking Cessation:    José Luis Perez MD 3/27/2019 9:44 AM

## 2019-03-27 NOTE — PROGRESS NOTES
Internal Medicine PGY-1 Resident Progress Note        PCP: Mary Burgess MD    Date of Admission: 3/26/2019    Chief Complaint: Chest Pain      Subjective:     No acute events overnight. HR stable in low 50s overnight. Denies any chest pain. BP soft overnight to 90s, so nitropaste was held. Otherwise no issues. Medications:  Reviewed    Infusion Medications   Scheduled Medications    atorvastatin  80 mg Oral Nightly    therapeutic multivitamin-minerals  1 tablet Oral Daily    dicyclomine  10 mg Oral 4x Daily    lisinopril  40 mg Oral Daily    aspirin EC  81 mg Oral Daily    cetirizine  10 mg Oral Daily    doxazosin  8 mg Oral Daily    triamterene-hydrochlorothiazide  1 tablet Oral Daily    neomycin-polymyxin-hydrocortisone  3 drop Right Ear TID    sodium chloride flush  10 mL Intravenous 2 times per day    [Held by provider] nitroglycerin  0.5 inch Topical 4 times per day    heparin (porcine)  5,000 Units Subcutaneous 3 times per day    pantoprazole  40 mg Intravenous Daily    furosemide  40 mg Intravenous Daily     PRN Meds: nitroGLYCERIN, tiZANidine, bisacodyl, sodium chloride flush, magnesium hydroxide, ondansetron, albuterol      Intake/Output Summary (Last 24 hours) at 3/27/2019 0657  Last data filed at 3/26/2019 2314  Gross per 24 hour   Intake 850 ml   Output 1400 ml   Net -550 ml       Physical Exam Performed:    BP (!) 110/54   Pulse 54   Temp 98.3 °F (36.8 °C) (Oral)   Resp 16   Ht 5' 6\" (1.676 m)   Wt 225 lb (102.1 kg)   SpO2 92%   BMI 36.32 kg/m²     General appearance: No apparent distress, appears stated age and cooperative. HEENT: Pupils equal, round, and reactive to light. Conjunctivae/corneas clear. Neck: Supple, with full range of motion. No jugular venous distention. Trachea midline. Respiratory:  Normal respiratory effort. Clear to auscultation, bilaterally without Rales/Wheezes/Rhonchi. Cardiovascular: Regular rate and rhythm.  Systolic murmur appreciated in R 2nd intercostal space. Heart sounds distant. No appreciable gallops or rubs. Abdomen: Soft, non-tender, non-distended with normal bowel sounds. Musculoskeletal: No clubbing, cyanosis or edema bilaterally. Full range of motion without deformity. Strength preserved in upper and lower extremities bilaterally  Skin: Skin color, texture, turgor normal.  No rashes or lesions. Neurologic:  Neurovascularly intact without any focal sensory/motor deficits. Cranial nerves: II-XII intact, grossly non-focal.  Psychiatric: Alert and oriented, thought content appropriate, normal insight   Peripheral Pulses: +2 palpable, equal bilaterally       Labs:   Recent Labs     03/26/19 0146 03/26/19 0536   WBC 4.4 4.9   HGB 12.1* 12.4*   HCT 35.9* 37.1*   * 122*     Recent Labs     03/26/19 0146 03/26/19  0536    140   K 4.6 3.6    104   CO2 29 26   BUN 35* 33*   CREATININE 1.4* 1.4*   CALCIUM 8.8 8.7     Recent Labs     03/26/19  0536   AST 22   ALT 17   BILITOT 0.9   ALKPHOS 73     No results for input(s): INR in the last 72 hours. Recent Labs     03/26/19  0146 03/26/19  0320 03/26/19  1040   TROPONINI 0.01 0.01 0.02*       Urinalysis:      Lab Results   Component Value Date    NITRU Negative 12/07/2018    WBCUA 0-2 05/14/2014    BACTERIA Rare 05/14/2014    RBCUA 0-2 05/14/2014    BLOODU Negative 12/07/2018    SPECGRAV 1.010 12/07/2018    GLUCOSEU Negative 12/07/2018       Radiology:  XR CHEST PORTABLE   Final Result     Reduced lung volumes with accentuation of bronchovascular markings. Assessment/Plan:    Marj Ramos a 80 y. o. male with PMH of CAD, CKD, HLD, HTN, and mild aortic stenosis presented to the ED with chest pain.      Active Hospital Problems    Diagnosis Date Noted    Chest pain [R07.9]     Hypercholesterolemia [E78.00]       Chest pain, atypical  In the setting of CAD and ischemic cardiomyopathy, r/o MI (mild trop elevation, history of CAD s/p PCI, last cath in 2015 was failed to show occlusive disease, EKG unchanged to prior) vs Costochondritis. Last ECHO showed EF 55%. Mild MR. Mild AS. Mild AR. Mild MA and mild TR.  - Echo this admission showed EF 40% with hypokinesis of septum and inferolateral and anterolateral walls; new from prior echo. Grade I DD unchanged. Moderate aortic stenosis w/ mean pressure gradient 30 mmHg and mild-mod aortic regurgitation  - trend trops  - Cadiology following. -ASA and statin              -Heparin is reasonable for now              -No bb/ccb given bradycardia on exam   -Will have lexiscan to risk stratify  - Continue home cardiac meds  - ASA, lipitor, lisinopril     Lower extremity edema, resolved  - Lasix 40 mg qd     HTN  - Seems to be running higher that usual as per Dr. Janae Estes note from 12/2018  - Cont home ANURADHA meds; lisinopril 40mg, Maxzide-25, Cardura 8mg     Mild Aortic Stenosis  - Mean gradient 17, although valve area is <1cm (0.94)  - Cardiology following      DVT Prophylaxis: Heparin  Diet: DIET CARDIAC;  Code Status: Full Code    PT/OT Eval Status: None    Dispo - Patient doing well overall. Nitropaste was stopped due to low BP, but patient continues to deny chest pain. Leanne Renee today to risk stratify.     I will discuss the patient with the senior resident and MD Kaylee Martin MD  Internal Medicine Resident PGY-1  [employer]

## 2019-03-27 NOTE — PLAN OF CARE
Problem: Pain:  Goal: Patient's pain/discomfort is manageable  Description  Patient's pain/discomfort is manageable  Outcome: Ongoing  Note:   Denies chest pain although states he has a little chest pressure. Sbp . HR 55-60. Tele shows sinus azra with PVCs and first degree heart block. Nitropaste on hold due to low bp. Skin warm and dry. Denies SOB. Will continue to monitor and plan of care.

## 2019-03-27 NOTE — PROGRESS NOTES
Bp 96/54 sitting on side of bed. Had pt lie down and rechecked-105/50. Skin warm and dry. Resp easy. HR 55-56. Noted sinus azra on tele with PVCs. Denies chest pain although says he feels a little chest pressure. Denies SOB. Dr. Yara Avila notified of above information. Nitropaste on hold. Will continue to monitor.

## 2019-03-27 NOTE — CARE COORDINATION
[address] Transitions Interview     3/27/2019    Patient: Andrew Whitman Patient : 1932   MRN: [id number]   Reason for Admission: Chest Pain      RARS: Readmission Risk Score: 24         Spoke with: Daughter in Law at bedside      Readmission Risk  Patient Active Problem List   Diagnosis    CAD (coronary artery disease)    Hypertension    Hypercholesterolemia    Arthritis    Prostate enlargement    DDD (degenerative disc disease), lumbar    Mild aortic stenosis    Constipation    Moderate aortic stenosis    Precordial pain    Chest pain    Coronary artery disease involving native heart    Essential hypertension    Abnormal stress test    S/P coronary angiogram    Weakness of both lower extremities    CKD (chronic kidney disease) stage 3, GFR 30-59 ml/min ([facility])    Elevated troponin       Inpatient Assessment  Care Transitions Summary    Care Transitions Inpatient Review  Medication Review  Are you able to afford your medications?:  Yes  How often do you have difficulty taking your medications?:  I always take them as prescribed. Housing Review  Who do you live with?:  Partner/Spouse/SO  Are you an active caregiver in your home?:  Yes  For whom are you the caregiver?:  Spouse  Social Support  Do you have a ?:  No  Do you have a [facility]?:  No  Durable Medical Equipment  Patient DME:  Other, Shower chair  Other Patient DME:  [facility], [address] [facility]  Functional Review  Ability to seek help/take action for Emergent/Urgent situations i.e. fire, crime, inclement weather or health crisis. :  Independent  Ability handle personal hygiene needs (bathing/dressing/grooming): Independent  Ability to manage medications: Independent  Ability to prepare food:  Independent  Ability to maintain home (clean home, laundry):   Independent  Ability to drive and/or has transportation:  Independent  Ability to do shopping:  Independent  Ability to manage finances: Independent  Is patient able to live independently?:  Yes  Hearing and Vision  Visual Impairment:  None  Hearing Impairment:  None  Care Transitions Interventions  No Identified Needs       Summary  CTC spoke with patient's daughter in law at bedside, patient off unit in testing. Daughter in law states patient is very independent in home, cares for spouse in 1 level home, no stairs and elevator access. Patient is able to manage his own medications, shopping and finances, as well as driving himself to and from appointments, per Daughter in law. CTC explained ongoing follow up CTC calls once discharged, daughter reports that would be good, feels someone following up with patient would be very helpful. Daughter stated patient did not have any outside services for himself or spouse, prior to admission. Follow Up  Future Appointments   Date Time Provider Shazia Weldon   3/28/2019  1:15 PM Sin Camargo MD [facility] None   5/2/2019 10:00 AM [facility]   3/20/2020  9:00 AM Ankur Patino MD [facility]       Health Maintenance  There are no preventive care reminders to display for this patient.     Franklin Lewis RN

## 2019-03-28 ENCOUNTER — HOSPITAL ENCOUNTER (EMERGENCY)
Age: 84
Discharge: HOME | End: 2019-03-28
Attending: EMERGENCY MEDICINE
Payer: MEDICARE

## 2019-03-28 ENCOUNTER — APPOINTMENT (OUTPATIENT)
Dept: GENERAL RADIOLOGY | Age: 84
End: 2019-03-28
Payer: MEDICARE

## 2019-03-28 VITALS
BODY MASS INDEX: 36.16 KG/M2 | TEMPERATURE: 97.1 F | DIASTOLIC BLOOD PRESSURE: 66 MMHG | RESPIRATION RATE: 20 BRPM | WEIGHT: 225 LBS | OXYGEN SATURATION: 94 % | HEART RATE: 63 BPM | SYSTOLIC BLOOD PRESSURE: 118 MMHG | HEIGHT: 66 IN

## 2019-03-28 VITALS
HEART RATE: 51 BPM | DIASTOLIC BLOOD PRESSURE: 67 MMHG | OXYGEN SATURATION: 97 % | WEIGHT: 215 LBS | TEMPERATURE: 96 F | BODY MASS INDEX: 34.7 KG/M2 | SYSTOLIC BLOOD PRESSURE: 127 MMHG | RESPIRATION RATE: 16 BRPM

## 2019-03-28 DIAGNOSIS — T50.905A MEDICATION SIDE EFFECT, INITIAL ENCOUNTER: Primary | ICD-10-CM

## 2019-03-28 LAB
ANION GAP SERPL CALCULATED.3IONS-SCNC: 12 MMOL/L (ref 3–16)
BASOPHILS ABSOLUTE: 0 K/UL (ref 0–0.2)
BASOPHILS RELATIVE PERCENT: 0.5 %
BILIRUBIN URINE: NEGATIVE
BLOOD, URINE: NEGATIVE
BUN BLDV-MCNC: 55 MG/DL (ref 7–20)
CALCIUM SERPL-MCNC: 9.2 MG/DL (ref 8.3–10.6)
CHLORIDE BLD-SCNC: 102 MMOL/L (ref 99–110)
CLARITY: CLEAR
CO2: 28 MMOL/L (ref 21–32)
COLOR: YELLOW
CREAT SERPL-MCNC: 2.3 MG/DL (ref 0.8–1.3)
EKG ATRIAL RATE: 49 BPM
EKG DIAGNOSIS: NORMAL
EKG P AXIS: 50 DEGREES
EKG P-R INTERVAL: 224 MS
EKG Q-T INTERVAL: 488 MS
EKG QRS DURATION: 132 MS
EKG QTC CALCULATION (BAZETT): 440 MS
EKG R AXIS: -14 DEGREES
EKG T AXIS: 112 DEGREES
EKG VENTRICULAR RATE: 49 BPM
EOSINOPHILS ABSOLUTE: 0.4 K/UL (ref 0–0.6)
EOSINOPHILS RELATIVE PERCENT: 7.2 %
GFR AFRICAN AMERICAN: 33
GFR NON-AFRICAN AMERICAN: 27
GLUCOSE BLD-MCNC: 97 MG/DL (ref 70–99)
GLUCOSE URINE: NEGATIVE MG/DL
HCT VFR BLD CALC: 37.4 % (ref 40.5–52.5)
HEMOGLOBIN: 12.4 G/DL (ref 13.5–17.5)
KETONES, URINE: NEGATIVE MG/DL
LEUKOCYTE ESTERASE, URINE: NEGATIVE
LYMPHOCYTES ABSOLUTE: 1.4 K/UL (ref 1–5.1)
LYMPHOCYTES RELATIVE PERCENT: 25.5 %
MCH RBC QN AUTO: 32.1 PG (ref 26–34)
MCHC RBC AUTO-ENTMCNC: 33.1 G/DL (ref 31–36)
MCV RBC AUTO: 96.9 FL (ref 80–100)
MICROSCOPIC EXAMINATION: NORMAL
MONOCYTES ABSOLUTE: 0.9 K/UL (ref 0–1.3)
MONOCYTES RELATIVE PERCENT: 17.3 %
NEUTROPHILS ABSOLUTE: 2.6 K/UL (ref 1.7–7.7)
NEUTROPHILS RELATIVE PERCENT: 49.5 %
NITRITE, URINE: NEGATIVE
PDW BLD-RTO: 13.2 % (ref 12.4–15.4)
PH UA: 7 (ref 5–8)
PLATELET # BLD: 128 K/UL (ref 135–450)
PMV BLD AUTO: 7.9 FL (ref 5–10.5)
POTASSIUM REFLEX MAGNESIUM: 3.9 MMOL/L (ref 3.5–5.1)
PRO-BNP: 788 PG/ML (ref 0–449)
PROTEIN UA: NEGATIVE MG/DL
RBC # BLD: 3.86 M/UL (ref 4.2–5.9)
SODIUM BLD-SCNC: 142 MMOL/L (ref 136–145)
SPECIFIC GRAVITY UA: 1.01 (ref 1–1.03)
TROPONIN: 0.01 NG/ML
TROPONIN: 0.02 NG/ML
URINE REFLEX TO CULTURE: NORMAL
URINE TYPE: NORMAL
UROBILINOGEN, URINE: 1 E.U./DL
WBC # BLD: 5.3 K/UL (ref 4–11)

## 2019-03-28 PROCEDURE — 84484 ASSAY OF TROPONIN QUANT: CPT

## 2019-03-28 PROCEDURE — 81003 URINALYSIS AUTO W/O SCOPE: CPT

## 2019-03-28 PROCEDURE — 6370000000 HC RX 637 (ALT 250 FOR IP): Performed by: STUDENT IN AN ORGANIZED HEALTH CARE EDUCATION/TRAINING PROGRAM

## 2019-03-28 PROCEDURE — 96360 HYDRATION IV INFUSION INIT: CPT

## 2019-03-28 PROCEDURE — 99285 EMERGENCY DEPT VISIT HI MDM: CPT

## 2019-03-28 PROCEDURE — 93005 ELECTROCARDIOGRAM TRACING: CPT | Performed by: EMERGENCY MEDICINE

## 2019-03-28 PROCEDURE — 85025 COMPLETE CBC W/AUTO DIFF WBC: CPT

## 2019-03-28 PROCEDURE — 83880 ASSAY OF NATRIURETIC PEPTIDE: CPT

## 2019-03-28 PROCEDURE — 2580000003 HC RX 258: Performed by: STUDENT IN AN ORGANIZED HEALTH CARE EDUCATION/TRAINING PROGRAM

## 2019-03-28 PROCEDURE — 71045 X-RAY EXAM CHEST 1 VIEW: CPT

## 2019-03-28 PROCEDURE — 2580000003 HC RX 258: Performed by: PHYSICIAN ASSISTANT

## 2019-03-28 PROCEDURE — 99232 SBSQ HOSP IP/OBS MODERATE 35: CPT | Performed by: INTERNAL MEDICINE

## 2019-03-28 PROCEDURE — 6360000002 HC RX W HCPCS: Performed by: STUDENT IN AN ORGANIZED HEALTH CARE EDUCATION/TRAINING PROGRAM

## 2019-03-28 PROCEDURE — 80048 BASIC METABOLIC PNL TOTAL CA: CPT

## 2019-03-28 RX ORDER — LISINOPRIL 20 MG/1
20 TABLET ORAL NIGHTLY
Qty: 30 TABLET | Refills: 3 | Status: SHIPPED | OUTPATIENT
Start: 2019-03-28 | End: 2019-04-18

## 2019-03-28 RX ORDER — 0.9 % SODIUM CHLORIDE 0.9 %
500 INTRAVENOUS SOLUTION INTRAVENOUS ONCE
Status: COMPLETED | OUTPATIENT
Start: 2019-03-28 | End: 2019-03-28

## 2019-03-28 RX ORDER — LISINOPRIL 20 MG/1
20 TABLET ORAL NIGHTLY
Status: DISCONTINUED | OUTPATIENT
Start: 2019-03-28 | End: 2019-03-28 | Stop reason: HOSPADM

## 2019-03-28 RX ORDER — DOXAZOSIN MESYLATE 4 MG/1
4 TABLET ORAL DAILY
Qty: 30 TABLET | Refills: 3 | Status: SHIPPED | OUTPATIENT
Start: 2019-03-28 | End: 2019-04-18

## 2019-03-28 RX ORDER — FUROSEMIDE 40 MG/1
40 TABLET ORAL DAILY
Qty: 60 TABLET | Refills: 3 | Status: SHIPPED | OUTPATIENT
Start: 2019-03-28 | End: 2019-04-04

## 2019-03-28 RX ADMIN — ASPIRIN 81 MG: 81 TABLET, COATED ORAL at 10:46

## 2019-03-28 RX ADMIN — DOXAZOSIN 4 MG: 4 TABLET ORAL at 10:45

## 2019-03-28 RX ADMIN — Medication 1 TABLET: at 10:45

## 2019-03-28 RX ADMIN — PANTOPRAZOLE SODIUM 40 MG: 40 TABLET, DELAYED RELEASE ORAL at 06:49

## 2019-03-28 RX ADMIN — Medication 10 ML: at 09:00

## 2019-03-28 RX ADMIN — TIZANIDINE 4 MG: 4 TABLET ORAL at 10:46

## 2019-03-28 RX ADMIN — TRIAMTERENE AND HYDROCHLOROTHIAZIDE 1 TABLET: 37.5; 25 TABLET ORAL at 10:45

## 2019-03-28 RX ADMIN — HEPARIN SODIUM 5000 UNITS: 5000 INJECTION INTRAVENOUS; SUBCUTANEOUS at 06:49

## 2019-03-28 RX ADMIN — CETIRIZINE HYDROCHLORIDE 10 MG: 10 TABLET, FILM COATED ORAL at 10:46

## 2019-03-28 RX ADMIN — SODIUM CHLORIDE 500 ML: 9 INJECTION, SOLUTION INTRAVENOUS at 17:46

## 2019-03-28 RX ADMIN — DICYCLOMINE HYDROCHLORIDE 10 MG: 10 CAPSULE ORAL at 10:46

## 2019-03-28 ASSESSMENT — ENCOUNTER SYMPTOMS
NAUSEA: 0
SORE THROAT: 0
BACK PAIN: 0
VOICE CHANGE: 0
ABDOMINAL DISTENTION: 0
VOMITING: 0
CHEST TIGHTNESS: 0
SHORTNESS OF BREATH: 0
ABDOMINAL PAIN: 0
PHOTOPHOBIA: 0

## 2019-03-28 ASSESSMENT — PAIN SCALES - GENERAL: PAINLEVEL_OUTOF10: 0

## 2019-03-28 NOTE — ED PROVIDER NOTES
TABLET    Take 1 tablet by mouth 2 times daily    TRIAMTERENE-HYDROCHLOROTHIAZIDE (MAXZIDE-25) 37.5-25 MG PER TABLET    Take 1 tablet by mouth daily       Allergies     He has No Known Allergies. Physical Exam     INITIAL VITALS: BP: 130/70, Temp: 96 °F (35.6 °C), Pulse: (!) 47, Resp: 16, SpO2: 94 %  Physical Exam   Constitutional: He is oriented to person, place, and time. He appears well-developed and well-nourished. HENT:   Head: Normocephalic and atraumatic. Eyes: Pupils are equal, round, and reactive to light. EOM are normal.   Neck: Normal range of motion. Neck supple. Cardiovascular: Regular rhythm and normal heart sounds. Bradycardia present. Pulmonary/Chest: Effort normal and breath sounds normal.   Abdominal: Soft. There is no tenderness. Musculoskeletal: Normal range of motion. Neurological: He is alert and oriented to person, place, and time. He has normal strength. No cranial nerve deficit. He displays a negative Romberg sign. Normal finger to nose, no pronator drift. Skin: Skin is warm and dry. Psychiatric: He has a normal mood and affect. Diagnostic Results     EKG   Interpreted in conjunction with emergency department physician Dorothy Skelton MD  Rhythm: sinus bradycardia  Rate: bradycardia  Axis: normal  Ectopy: none  Conduction: nonspecific interventricular conduction block  ST Segments: no acute change  T Waves: no acute change  Q Waves:none  Clinical Impression: no acute changes  Comparison:  3/27/19    RADIOLOGY:  XR CHEST PORTABLE   Final Result      Cardiomegaly. No acute chest process.           LABS:   Results for orders placed or performed during the hospital encounter of 03/28/19   CBC Auto Differential   Result Value Ref Range    WBC 5.3 4.0 - 11.0 K/uL    RBC 3.86 (L) 4.20 - 5.90 M/uL    Hemoglobin 12.4 (L) 13.5 - 17.5 g/dL    Hematocrit 37.4 (L) 40.5 - 52.5 %    MCV 96.9 80.0 - 100.0 fL    MCH 32.1 26.0 - 34.0 pg    MCHC 33.1 31.0 - 36.0 g/dL    RDW 13.2 12.4 - 15.4 %    Platelets 279 (L) 873 - 450 K/uL    MPV 7.9 5.0 - 10.5 fL    Neutrophils % 49.5 %    Lymphocytes % 25.5 %    Monocytes % 17.3 %    Eosinophils % 7.2 %    Basophils % 0.5 %    Neutrophils # 2.6 1.7 - 7.7 K/uL    Lymphocytes # 1.4 1.0 - 5.1 K/uL    Monocytes # 0.9 0.0 - 1.3 K/uL    Eosinophils # 0.4 0.0 - 0.6 K/uL    Basophils # 0.0 0.0 - 0.2 K/uL   Basic Metabolic Panel w/ Reflex to MG   Result Value Ref Range    Sodium 142 136 - 145 mmol/L    Potassium reflex Magnesium 3.9 3.5 - 5.1 mmol/L    Chloride 102 99 - 110 mmol/L    CO2 28 21 - 32 mmol/L    Anion Gap 12 3 - 16    Glucose 97 70 - 99 mg/dL    BUN 55 (H) 7 - 20 mg/dL    CREATININE 2.3 (H) 0.8 - 1.3 mg/dL    GFR Non-African American 27 (A) >60    GFR  33 (A) >60    Calcium 9.2 8.3 - 10.6 mg/dL   Troponin   Result Value Ref Range    Troponin 0.02 (H) <0.01 ng/mL   Brain Natriuretic Peptide   Result Value Ref Range    Pro- (H) 0 - 449 pg/mL     RECENT VITALS:  BP: 127/67, Temp: 96 °F (35.6 °C), Pulse: 51, Resp: 16, SpO2: 97 %     ED Course     Nursing Notes, Past Medical Hx,Past Surgical Hx, Social Hx, Allergies, and Family Hx were reviewed. The patient was given the following medications:  Orders Placed This Encounter   Medications    0.9 % sodium chloride bolus       CONSULTS:  None    MEDICAL DECISION MAKING / ASSESSMENT / Paresh Odette is a 80 y.o. male who presents to the emergency department with weakness and fatigue. Vital signs were stable on presentation and remained stable throughout his stay. Thorough history and physical exam was performed as detailed above. Patient was discharged from the hospital earlier today after being admitted for chest pain. He states that he was feeling good as he was discharged and was able to walk to his car without difficulty. Patient states that shortly after he got home he began to feel very fatigued and weak.  The patient's wife says at one point he fell asleep very quickly on a couch and was difficult to arouse. It is unclear at this time this was a syncopal event. On physical exam, patient is alert and oriented ×3. He has 5/5 strength in upper and lower shortness bilaterally and cranial nerves II through XII are intact. He has normal finger to nose and no pronator drift. I have low suspicion for stroke at this time given normal examination and complaints of generalized fatigue. CBC, BMP, BNP, troponin, EKG, urinalysis, and chest x-ray were all obtained. CBC showed no signs of systemic infection or anemia with a white blood cell count of 5.3 and hemoglobin of 12.4. BMP came back with a slightly increased creatinine to 2.3. His last creatinine on 3/27/19 was 1.9. BNP came back slightly improved at 788, his BNP on 3/26/19 was 1845. Troponin came back slightly elevated at 0.02, however patient did have an elevated troponin 0.02 on 3/26/19. Upon further investigation, the patient was given a total of 80 mg of lisinopril by mistake yesterday while in the hospital. Patient is supposed take 20 mg daily at home. Patient's blood pressures been within normal limits in the emergency department. Orthostatic vital signs did show a drop in systolic blood pressure to 91. The patient was given a 500 mL bolus of fluids for fluid resuscitation. The patient was able to ambulate throughout the entire emergency department without difficulty. He did not experience any dizziness, lightheadedness, or weakness while ambulating. Repeat troponin came back at 0.01. At this time, I believe that over administration of the patient's lisinopril yesterday caused his symptoms. He was able to ambulatory throughout the emergency department without difficulty. He do not experience any symptoms such as lightheadedness, dizziness, or increased weakness. I also have low suspicion for ACS causing the patient's elevated troponin at this time.  Repeat troponin came back at 0.01 and patient did not experience any symptoms such as shortness of breath or chest pain. At this time, with the patient is stable to be discharged with the medication changes noted in his ABS. We told to follow-up with his primary care provider within the next week to discuss his recent hospital admission and medication changes. This patient was also evaluated by the attending physician. All care plans were discussed and agreed upon.        Disposition: Discharge     Laurie Giron PA-C  03/28/19 Lara He 1778, MELVI  03/28/19 0679

## 2019-03-28 NOTE — PROGRESS NOTES
Bp 113/62. Hr 59. Sinus azra with first degree heart block noted on tele. Denies chest pain or SOB. HR dropped into 40s last night. Metoprolol dose due. Dr. Torin Stokes notified of HR and bp. Order received to hold Metoprolol. Will continue to monitor.

## 2019-03-28 NOTE — CARE COORDINATION
Case Management Discharge Assessment    3/28/2019  [facility] Case Management Department    Patient: Ilir Moncada  MRN: [id number] / : 1932  ACCT: [de-identified]          Admission Documentation  Attending Provider: Caden Palomo MD  Admit date/time: 3/26/2019 12:51 AM  Status: Inpatient [101]  Diagnosis: Chest pain     Readmission within last 30 days:  no     Living Situation  Discharge Planning  Living Arrangements: Spouse/Significant Other  Support Systems: Spouse/Significant Other  Potential Assistance Needed: N/A  Type of Home Care Services: None  Patient expects to be discharged to[de-identified] home  Expected Discharge Date: 19    Service Assessment:       Values / Beliefs  Do you have any ethnic, cultural, sacramental, or spiritual Baptism needs you would like us to be aware of while you are in the hospital?: No    Advance Directives (For Healthcare)  Pre-existing DNR Comfort Care/DNR Arrest/DNI Order: No  Healthcare Directive: No, patient does not have an advance directive for healthcare treatment       Pharmacy:    Potential Assistance Purchasing Medications:  No  Does patient want to participate in local refill/meds to beds program?: No    Notification completed in HENS/PAS? No     IMM  No       Discharge disposition: Home     LOC home w/ spouse      D/C home  Via private  Car   Kp Weinberg and his family were provided with choice of provider; he and his family are in agreement with the discharge plan. Care Transitions patient:  Yes    Kyree Govea RN  [facility]  Case Management Department  Ph: [phone]

## 2019-03-28 NOTE — DISCHARGE SUMMARY
Hospital Medicine Discharge Summary    Patient ID: Johnathan Marin   Gender: male  : 1932   Age: 80 y.o. MRN: [id number]  Code Status: Full   Patient's PCP: Donna Russell MD    Admit Date: 3/26/2019     Discharge Date: 3/28/2019      Admitting Physician: Cristiane Gaytan MD     Discharge Physician: Bettina Cotton MD     Discharge Diagnoses:  Acute left ventricular systolic heart failure present on admission  NSTEMI present on admission     Active Hospital Problems    Diagnosis Date Noted    Chest pain [R07.9]     Hypercholesterolemia [E78.00]        The patient was seen and examined on day of discharge and this discharge summary is in conjunction with any daily progress note from day of discharge. Hospital Course:     Mr. Fernando Potts is a 81 y/o male w/ PMH significant for CAD, CKD, HLD, HTN and mild aortic stenosis who was managed inpatient for new-onset systolic heart failure. Initially presented with left-sided chest pain. He had a remote history of PCI w/ 2x stent placement approximately 20 years ago. ACS workup this admission largely negative. His chest pain resolved following the administration of nitroglylcerin. An echo was performed which showed worsening LV function, 40% from 55% with new hypokinesis of septum and inferolateral and anterolateral walls. Moderate aortic stenosis with mild-mod aortic regurgitation also noted. Cardiology followed while inpatient.   His medications were optimized over the course of his stay and he was discharged in stable condition on the following medications:    Started lasix 40 daily  Decreased lisinopril to 20 daily  Doxasozin decreased to 4  Continue Maxzide 37.5-25  Stopped metoprolol in the setting of bradycardia    Disposition:  Home    Physical Exam Performed:     /66   Pulse 63   Temp 97.1 °F (36.2 °C) (Oral)   Resp 20   Ht 5' 6\" (1.676 m)   Wt 225 lb (102.1 kg)   SpO2 94%   BMI 36.32 kg/m²       General appearance: No apparent distress, appears stated age and cooperative. HEENT: Pupils equal, round, and reactive to light. Conjunctivae/corneas clear. Neck: Supple, with full range of motion. No jugular venous distention. Trachea midline. Respiratory:  Normal respiratory effort. Clear to auscultation, bilaterally without Rales/Wheezes/Rhonchi. Cardiovascular: Regular rate and rhythm. Systolic murmur appreciated, heard best in R and left 2nd intercostal spaces. No appreciable gallops or rubs. Abdomen: Soft, non-tender, non-distended with normal bowel sounds. Musculoskeletal: No clubbing, cyanosis or edema bilaterally. Full range of motion without deformity. Strength preserved in upper and lower extremities bilaterally  Skin: Skin color, texture, turgor normal.  No rashes or lesions. Neurologic:  Neurovascularly intact without any focal sensory/motor deficits. Cranial nerves: II-XII intact, grossly non-focal.  Psychiatric: Alert and oriented, thought content appropriate, normal insight   Peripheral Pulses: +2 palpable, equal bilaterally         Labs: For convenience and continuity at follow-up the following most recent labs are provided:      CBC:    Lab Results   Component Value Date    WBC 4.9 03/26/2019    HGB 12.4 03/26/2019    HCT 37.1 03/26/2019     03/26/2019       Renal:    Lab Results   Component Value Date     03/27/2019    K 3.8 03/27/2019    K 3.6 03/26/2019     03/27/2019    CO2 27 03/27/2019    BUN 40 03/27/2019    CREATININE 1.9 03/27/2019    CALCIUM 9.2 03/27/2019    PHOS 4.1 03/27/2019         Significant Diagnostic Studies    Radiology:   NM MYOCARDIAL SPECT REST EXERCISE OR RX   Final Result      XR CHEST PORTABLE   Final Result     Reduced lung volumes with accentuation of bronchovascular markings.                  Consults:     IP CONSULT TO HOSPITALIST  IP CONSULT TO RESIDENT INTERNAL MEDICINE  IP CONSULT TO CARDIOLOGY    Disposition:  Discharged home in stable condition     Condition at Discharge: Stable    Discharge Instructions/Follow-up:      Take medications as directed  Follow up with PCP in 1 week (4/4)    Code Status:  Full    Activity: activity as tolerated    Diet: cardiac diet      Discharge Medications:     Discharge Medication List as of 3/28/2019 10:20 AM           Details   furosemide (LASIX) 40 MG tablet Take 1 tablet by mouth daily, Disp-60 tablet, R-3Normal      diclofenac sodium 1 % GEL Apply 4 g topically 4 times daily as needed for Pain, Topical, 4 TIMES DAILY PRN Starting u 3/28/2019, Disp-4 Tube, R-2, Normal              Details   neomycin-polymyxin-hydrocortisone (CORTISPORIN) 3.5-45529-2 otic solution Place 3 drops into the right ear 3 times daily for 7 days, Disp-1 Bottle, R-1Normal      lisinopril (PRINIVIL;ZESTRIL) 20 MG tablet Take 1 tablet by mouth nightly, Disp-30 tablet, R-3Normal      doxazosin (CARDURA) 4 MG tablet Take 1 tablet by mouth daily, Disp-30 tablet, R-3Normal              Details   triamterene-hydrochlorothiazide (MAXZIDE-25) 37.5-25 MG per tablet Take 1 tablet by mouth daily, Disp-90 tablet, R-0Phone In      ezetimibe (ZETIA) 10 MG tablet Take 1 tablet by mouth nightly, Disp-30 tablet, R-3Print      ranitidine (ZANTAC) 150 MG tablet Take 1 tablet by mouth 2 times daily, Disp-60 tablet, R-3Normal      aspirin EC 81 MG EC tablet Take 1 tablet by mouth daily, Disp-90 tablet, R-3Print      cetirizine (ZYRTEC) 10 MG tablet Take 1 tablet by mouth daily, Disp-90 tablet, R-3Print      nitroGLYCERIN (NITROSTAT) 0.4 MG SL tablet Place 1 tablet under the tongue every 5 minutes as needed for Chest pain up to max of 3 total doses.  If no relief after 1 dose, call 911., Disp-25 tablet, R-3Print      dicyclomine (BENTYL) 10 MG capsule Take 1 capsule by mouth 4 times daily, Disp-360 capsule, R-1Print      Multiple Vitamin (MULTI-VITAMIN) TABS Take 1 tablet by mouth daily, Disp-30 tablet, R-5NO PRINT      atorvastatin (LIPITOR) 80 MG tablet Take 1 tablet by mouth nightly, Disp-90 tablet, R-3Print      albuterol sulfate HFA (PROVENTIL HFA) 108 (90 Base) MCG/ACT inhaler Inhale 2 puffs into the lungs every 4 hours as needed for Wheezing or Shortness of Breath (Space out to every 6 hours as symptoms improve) Space out to every 6 hours as symptoms improve., Disp-1 Inhaler, R-2Print      bisacodyl (BISACODYL) 5 MG EC tablet Take 1 tablet by mouth daily as needed for Constipation, Disp-4 tablet, R-0Print      hydrocortisone (ANUSOL-HC) 2.5 % rectal cream Place rectally 2 times daily. , Disp-1 g, R-2, Print      tiZANidine (ZANAFLEX) 4 MG tablet Take 1 tablet by mouth every 8 hours as needed (pain), Disp-15 tablet, R-0Print      Fiber POWD Take 1 each by mouth 2 times daily, Disp-575 Bottle, R-3      Nystatin POWD Apply 1 each topically 2 times daily, Disp-1 each, R-1      betamethasone dipropionate (DIPROLENE) 0.05 % ointment Apply topically daily. , Disp-1 Tube, R-0, Print      BACITRACIN-POLYMYXIN B, OPHTH, (AK-POLY-BAC) OINT Sig: Apply a 1/2 inch ribbon to both eyes every 6 hours, Disp-1 Tube, R-0      Skin Protectants, Misc. (EUCERIN) cream Apply topically as needed. , Disp-1 Package, R-3, Print             Time Spent on discharge is more than 45 minutes in the examination, evaluation, counseling and review of medications and discharge plan. Signed:    Sanjay Cyr MD   3/28/2019      Thank you Stephanie Mullen MD for the opportunity to be involved in this patient's care. If you have any questions or concerns please feel free to contact me via [address] [place] [address]. Patient seen and examined, labs and imaging studies reviewed, agree with assessment and plan as outlined above. Continue with discharge planning asked to monitor for recurrence of symptoms or new symptoms including but not limited to chest pain shortness of breath nausea vomiting fevers or chills and seek immediate medical attention or call 911. Greater than 30 minutes spent on case on day of discharge.   No beta blocker on discharge because of bradycardia.       MD Ty Bond

## 2019-03-28 NOTE — PROGRESS NOTES
[address] Daily Progress Note      Admit Date:  3/26/2019    Subjective:  Mr. Merlene Marquez was seen and examined. F/U CP/Trop/CAD. No complaints. Feeling good. No chest pain/sob.      Objective:   /62   Pulse 64   Temp 98.6 °F (37 °C) (Oral)   Resp 20   Ht 5' 6\" (1.676 m)   Wt 225 lb (102.1 kg)   SpO2 95%   BMI 36.32 kg/m²       Intake/Output Summary (Last 24 hours) at 3/28/2019 1007  Last data filed at 3/28/2019 3792  Gross per 24 hour   Intake 720 ml   Output 715 ml   Net 5 ml       TELEMETRY: Sinus     Physical Exam:  General:  Awake, alert, NAD  Skin:  Warm and dry  Neck:  JVP not elevated  Chest:  Clear to auscultation, respiration normal  Cardiovascular:  RRR S1S2  Abdomen:  Soft nontender  Extremities:  no edema    Medications:    lisinopril  20 mg Oral Nightly    furosemide  40 mg Oral Daily    doxazosin  4 mg Oral Daily    pantoprazole  40 mg Oral QAM AC    atorvastatin  80 mg Oral Nightly    therapeutic multivitamin-minerals  1 tablet Oral Daily    dicyclomine  10 mg Oral 4x Daily    aspirin EC  81 mg Oral Daily    cetirizine  10 mg Oral Daily    triamterene-hydrochlorothiazide  1 tablet Oral Daily    neomycin-polymyxin-hydrocortisone  3 drop Right Ear TID    sodium chloride flush  10 mL Intravenous 2 times per day    heparin (porcine)  5,000 Units Subcutaneous 3 times per day       nitroGLYCERIN, tiZANidine, bisacodyl, sodium chloride flush, magnesium hydroxide, ondansetron, albuterol    Lab Data:  CBC:   Recent Labs     03/26/19  0146 03/26/19  0536   WBC 4.4 4.9   HGB 12.1* 12.4*   HCT 35.9* 37.1*   MCV 98.0 97.8   * 122*     BMP:   Recent Labs     03/26/19  0146 03/26/19  0536 03/27/19  1102    140 140   K 4.6 3.6 3.8    104 101   CO2 29 26 27   PHOS  --   --  4.1   BUN 35* 33* 40*   CREATININE 1.4* 1.4* 1.9*     LIVER PROFILE:   Recent Labs     03/26/19  0536   AST 22   ALT 17   BILITOT 0.9   ALKPHOS 73     PT/INR: No results for input(s): PROTIME, INR in the last 72 hours. APTT: No results for input(s): APTT in the last 72 hours. BNP:  No results for input(s): BNP in the last 72 hours. IMAGING:     Assessment:  Patient Active Problem List    Diagnosis Date Noted    Weakness of both lower extremities 10/26/2017     Priority: Low    S/P coronary angiogram      Priority: Low    Abnormal stress test      Priority: Low    Coronary artery disease involving native heart      Priority: Low    Essential hypertension      Priority: Low    Moderate aortic stenosis 12/20/2015     Priority: Low    Precordial pain 12/20/2015     Priority: Low    Chest pain      Priority: Low    CAD (coronary artery disease)      Priority: Low    Hypertension      Priority: Low    Hypercholesterolemia      Priority: Low    Arthritis      Priority: Low    Prostate enlargement      Priority: Low    DDD (degenerative disc disease), lumbar      Priority: Low    Mild aortic stenosis      Priority: Low    Constipation      Priority: Low    Elevated troponin     CKD (chronic kidney disease) stage 3, GFR 30-59 ml/min ([facility]) 12/27/2018     Imp: chest pain/trop/CAD      Plan:  1. CV stable. No further chest pain. Myoview negative for ischemia. Likely home today.        Core Measures:  · Discharge instructions:   · LVEF documented:   · ACEI for LV dysfunction:   · Smoking Cessation:    Tamika Pastrana MD 3/28/2019 10:07 AM

## 2019-03-28 NOTE — ED PROVIDER NOTES
ED Attending Attestation Note     Date of evaluation: 3/28/2019    This patient was seen by the advance practice provider. I have seen and examined the patient, agree with the workup, evaluation, management and diagnosis. The care plan has been discussed. I have reviewed the ECG and concur with the ANN's interpretation. My assessment reveals an elderly gentleman who presents with generalized weakness. He was just discharged from the hospital around 11 a.m. He is accompanied by his wife and daughter. Daughter was not here when the patient at discharge. He did reportedly walked to the car without any assistance. When he got home he sat down on the couch and then became unresponsive. It is unclear whether he actually lost consciousness or not. There is a language barrier. When daughter arrived at his house, he seemed slow to answer questions, although his answers were appropriate. She noted his blood pressure below when she checked it, 60s over 30s. On exam, patient is alert and oriented.  Heart rate slow and regular     Rock Blandon MD  03/28/19 2132

## 2019-03-28 NOTE — PROGRESS NOTES
ADVANCED CARE PLANNING    Annmarie Reynolds       :  1932              MRN:  [id number]  Admission Date: 3/26/2019 12:51 AM  Date of Discussion:  3/27/19      Purpose of Encounter: Advanced care planning in light of chf.  Parties in attendance: :Montserrat Gracia MD, Family members:daughter. Decisional Capacity:Yes      Objective/Medical Story: 81 yo with cad admitted with nstemi and chf. Active Diagnoses: Active Hospital Problems    Diagnosis Date Noted    Chest pain [R07.9]     Hypercholesterolemia [E78.00]          Subjective/Patient Story: Patient understands that his/her cardiac function continues to deteriorate. Patient no longer wishes further curative interventions, including hospitalization, if there is no long term benefit :No  Patient wishes to continue further interventions, patient will re-evaluate at a later time: Yes    Goals of Care Determinations: Patient wishes to focus on recovery. Code Status: At this time patient wishes to be Full code      Time Spent:     Total time spent face-to-face in education and discussion: 35 minutes. Electronically signed by Montserrat Avila MD on 3/28/2019 at 2:45 PM late entry on 3/28    Thank you Celine Aguilar MD for the opportunity to be involved in this patient's care. If you have any questions or concerns please feel free to contact me at [phone].

## 2019-03-28 NOTE — PLAN OF CARE
Problem: Pain:  Goal: Patient's pain/discomfort is manageable  Description  Patient's pain/discomfort is manageable  Outcome: Ongoing  Note:   Denied chest pain overnight. Will continue to monitor. Problem: Cardiac Output - Decreased:  Goal: Hemodynamic stability will improve  Description  Hemodynamic stability will improve  Outcome: Ongoing  Note:   Bp dropped to 94/39 when he was sleeping last night. HR 40-50s. Tele shows sinus bradycardia with first degree heart block and IVCD. This AM awake and alert. Bp 121/67. Denied feeling dizzy or lightheaded. Will continue to monitor and plan of care.

## 2019-03-28 NOTE — PROGRESS NOTES
Patient is leaving unit with family member by side. IV and telemetry has been removed. Patient belongings are with patient. Patient refused to use wheel chair to take down to front entrance. Vitals stable upon discharge. Patient left at 1050.

## 2019-03-28 NOTE — DISCHARGE INSTR - DIET

## 2019-03-28 NOTE — ED TRIAGE NOTES
Pt c/o fatigue and weakness since being discharged yesterday. Pt was in the hospital for hypertension, placed on new meds. Per squad and family, pt blood pressure \"was low\". Per squad pt was 120's/60's. Pt denies any pain.

## 2019-03-28 NOTE — DISCHARGE INSTR - COC
disease) stage 3, GFR 30-59 ml/min ([facility]) N18.3    Elevated troponin R74.8       Isolation/Infection:   Isolation          No Isolation            Nurse Assessment:  Last Vital Signs: /62   Pulse 64   Temp 98.6 °F (37 °C) (Oral)   Resp 20   Ht 5' 6\" (1.676 m)   Wt 225 lb (102.1 kg)   SpO2 95%   BMI 36.32 kg/m²     Last documented pain score (0-10 scale): Pain Level: 0  Last Weight:   Wt Readings from Last 1 Encounters:   03/26/19 225 lb (102.1 kg)     Mental Status:  {IP PT MENTAL STATUS:20030}    IV Access:  { SUZY IV ACCESS:317661428}    Nursing Mobility/ADLs:  Walking   {CHP DME JQBA:219808435}  Transfer  {CHP DME RGOI:461002099}  Bathing  {CHP DME JTSL:270775266}  Dressing  {CHP DME BUSX:567939149}  Toileting  {P DME YNFD:034076144}  Feeding  {P DME JWEN:394251446}  Med Admin  {P DME YBEN:840127113}  Med Delivery   { SUZY MED Delivery:645890253}    Wound Care Documentation and Therapy:        Elimination:  Continence:   · Bowel: {YES / XW:81535}  · Bladder: {YES / TX:54159}  Urinary Catheter: {Urinary Catheter:283734742}   Colostomy/Ileostomy/Ileal Conduit: {YES / DD:68262}       Date of Last BM: ***    Intake/Output Summary (Last 24 hours) at 3/28/2019 1019  Last data filed at 3/28/2019 0653  Gross per 24 hour   Intake 720 ml   Output 715 ml   Net 5 ml     I/O last 3 completed shifts:   In: 5 [P.O.:720]  Out: 715 [Urine:715]    Safety Concerns:     [employer] Safety Concerns:303259270}    Impairments/Disabilities:      [employer] Impairments/Disabilities:212962805}    Nutrition Therapy:  Current Nutrition Therapy:   [employer] Diet List:361317012}    Routes of Feeding: {[facility] DME Other Feedings:226499627}  Liquids: {Slp liquid thickness:77354}  Daily Fluid Restriction: {P DME Yes amt example:787075297}  Last Modified Barium Swallow with Video (Video Swallowing Test): {Done Not Done [facility]:892093913}    Treatments at the Time of Hospital Discharge:   Respiratory Treatments: ***  Oxygen Therapy: {Therapy; copd oxygen:10527}  Ventilator:    {MH CC Vent LCIS:115565997}    Rehab Therapies: {THERAPEUTIC INTERVENTION:0846270142}  Weight Bearing Status/Restrictions: 50Mal GUTIERREZ Weight Bearin}  Other Medical Equipment (for information only, NOT a DME order):  {EQUIPMENT:938704403}  Other Treatments: ***    Patient's personal belongings (please select all that are sent with patient):  {CHP DME Belongings:141751284}    RN SIGNATURE:  {Esignature:244826786}    CASE MANAGEMENT/SOCIAL WORK SECTION    Inpatient Status Date: 2019    Readmission Risk Assessment Score:  Readmission Risk              Risk of Unplanned Readmission:        22           Discharging to Facility/ Agency     Dialysis Facility (if applicable)   NA   · Name:  · Address:  · Dialysis Schedule:  · Phone:  · Fax:    / signature: Electronically signed by Elo España RN on 3/28/19 at 10:35 AM    PHYSICIAN SECTION    Prognosis: {Prognosis:2314483911}    Condition at Discharge: 50Mal Rayo Patient Condition:653244415}    Rehab Potential (if transferring to Rehab): {Prognosis:7263720778}    Recommended Labs or Other Treatments After Discharge: ***    Physician Certification: I certify the above information and transfer of José Medeiros  is necessary for the continuing treatment of the diagnosis listed and that he requires {Admit to Appropriate Level of Care:24399} for {GREATER/LESS:971323829} 30 days.      Update Admission H&P: {CHP DME Changes in NCYMO:398747858}    PHYSICIAN SIGNATURE:  {Esignature:990990165}

## 2019-03-29 ENCOUNTER — CARE COORDINATION (OUTPATIENT)
Dept: CASE MANAGEMENT | Age: 84
End: 2019-03-29

## 2019-03-29 DIAGNOSIS — R07.9 CHEST PAIN, UNSPECIFIED TYPE: Primary | ICD-10-CM

## 2019-03-29 PROCEDURE — 1111F DSCHRG MED/CURRENT MED MERGE: CPT | Performed by: STUDENT IN AN ORGANIZED HEALTH CARE EDUCATION/TRAINING PROGRAM

## 2019-04-02 ENCOUNTER — CARE COORDINATION (OUTPATIENT)
Dept: CASE MANAGEMENT | Age: 84
End: 2019-04-02

## 2019-04-04 ENCOUNTER — OFFICE VISIT (OUTPATIENT)
Dept: ENT CLINIC | Age: 84
End: 2019-04-04
Payer: MEDICARE

## 2019-04-04 ENCOUNTER — OFFICE VISIT (OUTPATIENT)
Dept: INTERNAL MEDICINE CLINIC | Age: 84
End: 2019-04-04
Payer: MEDICARE

## 2019-04-04 VITALS
WEIGHT: 218 LBS | TEMPERATURE: 98.2 F | HEART RATE: 57 BPM | SYSTOLIC BLOOD PRESSURE: 121 MMHG | DIASTOLIC BLOOD PRESSURE: 60 MMHG | BODY MASS INDEX: 35.03 KG/M2 | HEIGHT: 66 IN

## 2019-04-04 VITALS
BODY MASS INDEX: 34.91 KG/M2 | OXYGEN SATURATION: 96 % | DIASTOLIC BLOOD PRESSURE: 65 MMHG | SYSTOLIC BLOOD PRESSURE: 157 MMHG | TEMPERATURE: 97.9 F | WEIGHT: 216.3 LBS | RESPIRATION RATE: 20 BRPM | HEART RATE: 59 BPM

## 2019-04-04 DIAGNOSIS — I50.22 CHRONIC SYSTOLIC HEART FAILURE (HCC): ICD-10-CM

## 2019-04-04 DIAGNOSIS — Z09 FOLLOW UP: Primary | ICD-10-CM

## 2019-04-04 DIAGNOSIS — H92.01 OTALGIA, RIGHT EAR: Primary | ICD-10-CM

## 2019-04-04 PROCEDURE — 99213 OFFICE O/P EST LOW 20 MIN: CPT | Performed by: STUDENT IN AN ORGANIZED HEALTH CARE EDUCATION/TRAINING PROGRAM

## 2019-04-04 PROCEDURE — 1123F ACP DISCUSS/DSCN MKR DOCD: CPT | Performed by: OTOLARYNGOLOGY

## 2019-04-04 PROCEDURE — 1111F DSCHRG MED/CURRENT MED MERGE: CPT | Performed by: OTOLARYNGOLOGY

## 2019-04-04 PROCEDURE — 4040F PNEUMOC VAC/ADMIN/RCVD: CPT | Performed by: OTOLARYNGOLOGY

## 2019-04-04 PROCEDURE — G8427 DOCREV CUR MEDS BY ELIG CLIN: HCPCS | Performed by: OTOLARYNGOLOGY

## 2019-04-04 PROCEDURE — 1036F TOBACCO NON-USER: CPT | Performed by: OTOLARYNGOLOGY

## 2019-04-04 PROCEDURE — G8417 CALC BMI ABV UP PARAM F/U: HCPCS | Performed by: OTOLARYNGOLOGY

## 2019-04-04 PROCEDURE — 99212 OFFICE O/P EST SF 10 MIN: CPT | Performed by: OTOLARYNGOLOGY

## 2019-04-04 PROCEDURE — G8598 ASA/ANTIPLAT THER USED: HCPCS | Performed by: OTOLARYNGOLOGY

## 2019-04-04 RX ORDER — FUROSEMIDE 20 MG/1
20 TABLET ORAL DAILY
Qty: 60 TABLET | Refills: 3 | Status: ON HOLD | OUTPATIENT
Start: 2019-04-04 | End: 2019-05-02 | Stop reason: SDUPTHER

## 2019-04-04 RX ORDER — FUROSEMIDE 40 MG/1
20 TABLET ORAL DAILY
Qty: 60 TABLET | Refills: 3 | Status: SHIPPED | OUTPATIENT
Start: 2019-04-04 | End: 2019-04-18

## 2019-04-04 ASSESSMENT — ENCOUNTER SYMPTOMS
SHORTNESS OF BREATH: 0
ABDOMINAL PAIN: 0

## 2019-04-04 NOTE — PATIENT INSTRUCTIONS
Before any of you medication is completely gone, call your pharmacy AT LEAST 1 WEEK ahead for refills. Review all information regarding your medication before starting. If you become ill when the clinic is closed, please call the [facility] [employer]  at   #[phone] and ask the  to page the AOD between 6:00 AM and 6:00 PM or page the AON between 6:00 PM and 6:00 am    The clinic is not able to process MY CHART requests for appointments or messages including requests. Please call the [address] [place] [address] at [phone]  For appointments and messages. Call your pharmacy for medication refills. Return to clinic July.  Call for appointment    Stop Maxzide    If you gain 3 pounds in 1 day take lasix 20 mg daily        Continue medication as listed on discharge sheet    Instructions reviewed before discharge and copy given to patient    Rylan Ghada Avila [phone]

## 2019-04-04 NOTE — PROGRESS NOTES
CC/HPI:  80 y.o. patient of Dr. Bren Hale with CAD, HTN, HLD, AS and CKD who is here for hospital follow up. He denies cp, sob, LH/dizziness, palpitations or syncope. No change to his mild LE edema or weight gain. No orthopnea or PND. No fevers, n/v/d or GI/ bleeding. He takes lasix 20 mg po daily unless he has a 3 lb weight gain then he takes 40 mg       Past Medical History:   Diagnosis Date    Arthritis     CAD (coronary artery disease)     Chronic kidney disease     Constipation     DDD (degenerative disc disease), lumbar     L5, S1    Dental disease     Hearing loss     Hyperlipidemia     Hypertension     Mild aortic stenosis     Prostate enlargement      Past Surgical History:   Procedure Laterality Date    CORONARY ANGIOPLASTY WITH STENT PLACEMENT  1/12/98    EYE SURGERY      SKIN TAG REMOVAL  2/2/10    TURP       Family History   Problem Relation Age of Onset    No Known Problems Mother     No Known Problems Father      Social History     Tobacco Use    Smoking status: Never Smoker    Smokeless tobacco: Never Used   Substance Use Topics    Alcohol use: No     Alcohol/week: 0.0 oz    Drug use: No     Allergies:Patient has no known allergies. Review of Systems  General: No changes in weight, fatigue, or night sweats. HEENT: No blurry or decreased vision. No changes in hearing, nasal discharge or sore throat. Cardiovascular:  See HPI. Respiratory: No cough, hemoptysis, or wheezing. No history of asthma. Gastrointestinal:  No abdominal pain, hematochezia, melana, constipation, diarrhea, or history of GI ulcers. Genito-Urinary: No dysuria or hematuria. No urgency or polyuria. Musculoskeletal:  + arthritis   Neurological:  No dizziness, headaches, numbness/tingling, speech problems or weakness. No history of a stroke or TIA. Psychological:  No anxiety or depression.   Hematological and Lymphatic: No abnormal bleeding or bruising, blood clots, jaundice or swollen lymph [place]    IMAGING:     3/27/2019 Nuc Stress: There is normal isotope uptake at stress and rest. There is no evidence of    myocardial ischemia or scar.    Severely dilated LV with diffuse moderate hypokinesis.    Left ventricular ejection fraction of 36 %.    Normal TID of 1.0    Study findings are abnormal and most consistent with non-ischemic    cardiomyopathy. 3/26/2019 Echo:   Note: patient is bradycardic.   Left ventricular cavity size is normal. There is moderate-severe concentric   left ventricular hypertrophy. The mid anteroseptum is hypokinetic. The   apical septum is hypokinetic. . Mild anterolateral hypokinesis. The apical   inferior and anterior walls are hypokinetic. Overall LVEF is roughly 40%.   The inferolateral wall is severely hypokinetic. Diastolic filling parameters   suggest grade I diastolic dysfunction.   The left atrium is minimally dilated.   Mitral annular calcification is present. Mild mitral regurgitation is present.   At least moderate aortic stenosis with a peak velocity of 3.69 m/s and a   mean pressure gradient of 30mmHg. Given the LV dysfunction, the severity of   the stenosis may be underestimated. Mild to moderate aortic regurgitation.   The aortic root is mildly dilated measuring 3.9 cm.   Mild tricuspid regurgitation.  IVC size is normal (<2.1cm) and collapses > 50% with respiration consistent   with normal RA pressure (3mmHg). Estimated pulmonary artery systolic   pressure is at 37 mmHg assuming a right atrial pressure of 3 mmHg.       Medications:   Current Outpatient Medications   Medication Sig Dispense Refill    neomycin-polymyxin-hydrocortisone (CORTISPORIN) 3.5-51421-7 otic solution Place 3 drops into the right ear 3 times daily for 7 days 1 Bottle 1    furosemide (LASIX) 40 MG tablet Take 1 tablet by mouth daily 60 tablet 3    lisinopril (PRINIVIL;ZESTRIL) 20 MG tablet Take 1 tablet by mouth nightly 30 tablet 3    doxazosin (CARDURA) 4 MG tablet Take 1 tablet by mouth daily 30 tablet 3    diclofenac sodium 1 % GEL Apply 4 g topically 4 times daily as needed for Pain 4 Tube 2    triamterene-hydrochlorothiazide (MAXZIDE-25) 37.5-25 MG per tablet Take 1 tablet by mouth daily 90 tablet 0    ezetimibe (ZETIA) 10 MG tablet Take 1 tablet by mouth nightly 30 tablet 3    ranitidine (ZANTAC) 150 MG tablet Take 1 tablet by mouth 2 times daily 60 tablet 3    aspirin EC 81 MG EC tablet Take 1 tablet by mouth daily 90 tablet 3    cetirizine (ZYRTEC) 10 MG tablet Take 1 tablet by mouth daily 90 tablet 3    nitroGLYCERIN (NITROSTAT) 0.4 MG SL tablet Place 1 tablet under the tongue every 5 minutes as needed for Chest pain up to max of 3 total doses. If no relief after 1 dose, call 911. 25 tablet 3    dicyclomine (BENTYL) 10 MG capsule Take 1 capsule by mouth 4 times daily 360 capsule 1    Multiple Vitamin (MULTI-VITAMIN) TABS Take 1 tablet by mouth daily 30 tablet 5    atorvastatin (LIPITOR) 80 MG tablet Take 1 tablet by mouth nightly 90 tablet 3    albuterol sulfate HFA (PROVENTIL HFA) 108 (90 Base) MCG/ACT inhaler Inhale 2 puffs into the lungs every 4 hours as needed for Wheezing or Shortness of Breath (Space out to every 6 hours as symptoms improve) Space out to every 6 hours as symptoms improve. 1 Inhaler 2     No current facility-administered medications for this visit. Assessment:  1. CAD in native artery    2. Ischemic cardiomyopathy    3. Essential hypertension    4. Hypercholesterolemia    5. Moderate aortic stenosis        Plan:  ASA, statin, zetia  Doxazosin, lisinopril,   Lasix 20 mg daily with 20 mg PRN weight gain  Follow up with Dr. Tr Guardado   Daily weights, salt and fluid limitation  Recheck Echo in 3 months  No BB d/t  bradycardia.

## 2019-04-04 NOTE — PROGRESS NOTES
FOLLOW UP VISIT:      INTERIM HISTORY: History of otitis externa in the past.  His use drops in the right ear earlier this month. Patient was admitted to the hospital for cardiac problems and developed pain in the right ear. Hearing not affected. No otorrhea. Not associated with upper respiratory infection. Right ear pain has completely resolved. PAST MEDICAL HISTORY:   Social History     Tobacco Use   Smoking Status Never Smoker   Smokeless Tobacco Never Used                                                    Social History     Substance and Sexual Activity   Alcohol Use No    Alcohol/week: 0.0 oz                                                    Current Outpatient Medications:     furosemide (LASIX) 40 MG tablet, Take 0.5 tablets by mouth daily, Disp: 60 tablet, Rfl: 3    furosemide (LASIX) 20 MG tablet, Take 1 tablet by mouth daily, Disp: 60 tablet, Rfl: 3    neomycin-polymyxin-hydrocortisone (CORTISPORIN) 3.5-44681-6 otic solution, Place 3 drops into the right ear 3 times daily for 7 days, Disp: 1 Bottle, Rfl: 1    lisinopril (PRINIVIL;ZESTRIL) 20 MG tablet, Take 1 tablet by mouth nightly, Disp: 30 tablet, Rfl: 3    doxazosin (CARDURA) 4 MG tablet, Take 1 tablet by mouth daily, Disp: 30 tablet, Rfl: 3    diclofenac sodium 1 % GEL, Apply 4 g topically 4 times daily as needed for Pain, Disp: 4 Tube, Rfl: 2    ezetimibe (ZETIA) 10 MG tablet, Take 1 tablet by mouth nightly, Disp: 30 tablet, Rfl: 3    ranitidine (ZANTAC) 150 MG tablet, Take 1 tablet by mouth 2 times daily, Disp: 60 tablet, Rfl: 3    aspirin EC 81 MG EC tablet, Take 1 tablet by mouth daily, Disp: 90 tablet, Rfl: 3    cetirizine (ZYRTEC) 10 MG tablet, Take 1 tablet by mouth daily, Disp: 90 tablet, Rfl: 3    nitroGLYCERIN (NITROSTAT) 0.4 MG SL tablet, Place 1 tablet under the tongue every 5 minutes as needed for Chest pain up to max of 3 total doses.  If no relief after 1 dose, call 911., Disp: 25 tablet, Rfl: 3    dicyclomine (BENTYL) 10 MG capsule, Take 1 capsule by mouth 4 times daily, Disp: 360 capsule, Rfl: 1    Multiple Vitamin (MULTI-VITAMIN) TABS, Take 1 tablet by mouth daily, Disp: 30 tablet, Rfl: 5    atorvastatin (LIPITOR) 80 MG tablet, Take 1 tablet by mouth nightly, Disp: 90 tablet, Rfl: 3    albuterol sulfate HFA (PROVENTIL HFA) 108 (90 Base) MCG/ACT inhaler, Inhale 2 puffs into the lungs every 4 hours as needed for Wheezing or Shortness of Breath (Space out to every 6 hours as symptoms improve) Space out to every 6 hours as symptoms improve., Disp: 1 Inhaler, Rfl: 2                                                 Past Medical History:   Diagnosis Date    Arthritis     CAD (coronary artery disease)     Chronic kidney disease     Constipation     DDD (degenerative disc disease), lumbar     L5, S1    Dental disease     Hearing loss     Hyperlipidemia     Hypertension     Mild aortic stenosis     Prostate enlargement                                                     Past Surgical History:   Procedure Laterality Date    CORONARY ANGIOPLASTY WITH STENT PLACEMENT  1/12/98    EYE SURGERY      SKIN TAG REMOVAL  2/2/10    TURP           REVIEW OF SYSTEMS:  All pertinent positive and negative findings included in HPI. Otherwise, all other systems are reviewed and are negative    PHYSICAL EXAMINATION:   GENERAL: wdwn- no acute distress  COMMUNICATION :  Normal voice  MENTAL STATUS:  Normal  HEAD AND FACE:  Normal  EXTERNAL EARS AND NOSE:  Normal  FACIAL MUSCLES:  Normal  FACE PALPATION:  Negative  OTOSCOPY:  Normal tympanic membranes and middle ear spaces  TUNING FORKS:  Rinne ++ Payne midline at 512 Hz  INTRANASAL:  Septum midline, turbinates normal, meati clear. LIPS, TEETH, GINGIVA:  Normal mucosa  PHARYNX:  Normal  NECK:  No masses or adenopathy  SALIVARY GLANDS:  Normal  THYROID:  Normal    IMPRESSION: Normal otologic exam today    PLAN: Patient reassured that his ears look normal.    FOLLOW-UP: As needed.

## 2019-04-04 NOTE — PROGRESS NOTES
Outpatient Note for New Patient Visit    Patient:  Dl Suazo                                               : 1932  Age: 80 y.o. MRN: [id number]  Date : 2019    History Obtained From:  patient    CHIEF COMPLAINT:  Hospital follow up    HISTORY OF PRESENT ILLNESS:   The patient is a 80 y.o. male who presents for a hospital follow-up visit. He presented last week with chest pain. An ischemic w/u was negative, including a perfusion scan. His ECHO did show an EF decrease from 55% to 40%. He was started on lasix. His blood pressures have been 130s/50-60s at home. He has had no chest pain, shortness of breath (even with exertion), abdominal pain. We discussed stopping maxide, continuing with lisinopril 20 and lasix 20 for weight gain. He will take 81 mg ASA daily for his CAD s/p PCI. He will see Dr. Segun Diez tomorrow and continue discussions. Past Medical History:        Diagnosis Date    Arthritis     CAD (coronary artery disease)     Chronic kidney disease     Constipation     DDD (degenerative disc disease), lumbar     L5, S1    Dental disease     Hearing loss     Hyperlipidemia     Hypertension     Mild aortic stenosis     Prostate enlargement        Past Surgical History:        Procedure Laterality Date    CORONARY ANGIOPLASTY WITH STENT PLACEMENT  98    EYE SURGERY      SKIN TAG REMOVAL  2/2/10    TURP         Family History:       Problem Relation Age of Onset    No Known Problems Mother     No Known Problems Father        SocialHistory:   TOBACCO:   reports that he has never smoked. He has never used smokeless tobacco.  ETOH:   reports that he does not drink alcohol. OCCUPATION:      Allergies: Patient has no known allergies. Current Medications:    Prior to Admission medications    Medication Sig Start Date End Date Taking?  Authorizing Provider   furosemide (LASIX) 40 MG tablet Take 0.5 tablets by mouth daily 19  Yes Venkat Randall MD   furosemide (LASIX) 20 MG tablet Take 1 tablet by mouth daily 4/4/19  Yes Ariel Ferrara MD   doxazosin (CARDURA) 4 MG tablet Take 1 tablet by mouth daily 3/28/19  Yes Savana Min MD   ezetimibe (ZETIA) 10 MG tablet Take 1 tablet by mouth nightly 12/27/18  Yes Ariel Ferrara MD   ranitidine (ZANTAC) 150 MG tablet Take 1 tablet by mouth 2 times daily 12/27/18  Yes Ariel Ferrara MD   aspirin EC 81 MG EC tablet Take 1 tablet by mouth daily 12/27/18  Yes Ariel Ferrara MD   cetirizine (ZYRTEC) 10 MG tablet Take 1 tablet by mouth daily 12/27/18  Yes Ariel Ferrara MD   Multiple Vitamin (MULTI-VITAMIN) TABS Take 1 tablet by mouth daily 5/21/18  Yes Don Wallace MD   atorvastatin (LIPITOR) 80 MG tablet Take 1 tablet by mouth nightly 1/25/18  Yes Rao Kessler MD   albuterol sulfate HFA (PROVENTIL HFA) 108 (90 Base) MCG/ACT inhaler Inhale 2 puffs into the lungs every 4 hours as needed for Wheezing or Shortness of Breath (Space out to every 6 hours as symptoms improve) Space out to every 6 hours as symptoms improve. 1/25/18  Yes Rao Kessler MD   neomycin-polymyxin-hydrocortisone (CORTISPORIN) 3.5-88211-1 otic solution Place 3 drops into the right ear 3 times daily for 7 days 3/28/19 4/4/19  Savana Min MD   lisinopril (PRINIVIL;ZESTRIL) 20 MG tablet Take 1 tablet by mouth nightly 3/28/19   Savana Min MD   diclofenac sodium 1 % GEL Apply 4 g topically 4 times daily as needed for Pain 3/28/19   Savana Min MD   nitroGLYCERIN (NITROSTAT) 0.4 MG SL tablet Place 1 tablet under the tongue every 5 minutes as needed for Chest pain up to max of 3 total doses. If no relief after 1 dose, call 911. 12/27/18   Ariel Ferrara MD   dicyclomine (BENTYL) 10 MG capsule Take 1 capsule by mouth 4 times daily 12/7/18   Connie Morales MD       REVIEW OF SYSTEMS:  Review of Systems   Constitutional: Negative for diaphoresis and fatigue. Respiratory: Negative for shortness of breath. Cardiovascular: Negative for chest pain.

## 2019-04-05 ENCOUNTER — OFFICE VISIT (OUTPATIENT)
Dept: CARDIOLOGY CLINIC | Age: 84
End: 2019-04-05
Payer: MEDICARE

## 2019-04-05 ENCOUNTER — CARE COORDINATION (OUTPATIENT)
Dept: CASE MANAGEMENT | Age: 84
End: 2019-04-05

## 2019-04-05 VITALS
DIASTOLIC BLOOD PRESSURE: 54 MMHG | HEART RATE: 60 BPM | SYSTOLIC BLOOD PRESSURE: 98 MMHG | WEIGHT: 216.6 LBS | BODY MASS INDEX: 34.96 KG/M2

## 2019-04-05 DIAGNOSIS — E78.00 HYPERCHOLESTEROLEMIA: ICD-10-CM

## 2019-04-05 DIAGNOSIS — I10 ESSENTIAL HYPERTENSION: ICD-10-CM

## 2019-04-05 DIAGNOSIS — I25.5 ISCHEMIC CARDIOMYOPATHY: ICD-10-CM

## 2019-04-05 DIAGNOSIS — I25.10 CAD IN NATIVE ARTERY: Primary | ICD-10-CM

## 2019-04-05 DIAGNOSIS — I35.0 MODERATE AORTIC STENOSIS: ICD-10-CM

## 2019-04-05 PROCEDURE — 99214 OFFICE O/P EST MOD 30 MIN: CPT | Performed by: NURSE PRACTITIONER

## 2019-04-05 PROCEDURE — 1111F DSCHRG MED/CURRENT MED MERGE: CPT | Performed by: NURSE PRACTITIONER

## 2019-04-05 PROCEDURE — 1123F ACP DISCUSS/DSCN MKR DOCD: CPT | Performed by: NURSE PRACTITIONER

## 2019-04-05 PROCEDURE — G8417 CALC BMI ABV UP PARAM F/U: HCPCS | Performed by: NURSE PRACTITIONER

## 2019-04-05 PROCEDURE — 4040F PNEUMOC VAC/ADMIN/RCVD: CPT | Performed by: NURSE PRACTITIONER

## 2019-04-05 PROCEDURE — 1036F TOBACCO NON-USER: CPT | Performed by: NURSE PRACTITIONER

## 2019-04-05 PROCEDURE — G8428 CUR MEDS NOT DOCUMENT: HCPCS | Performed by: NURSE PRACTITIONER

## 2019-04-05 PROCEDURE — G8598 ASA/ANTIPLAT THER USED: HCPCS | Performed by: NURSE PRACTITIONER

## 2019-04-08 NOTE — CARE COORDINATION
[facility] Trans[facility] Follow Up Call    2019    Patient: Isaac Ureña  Patient : 1932   MRN: [id number]   Reason for Admission: Chest Pain  Discharge Date: 3/28/19 RARS: Readmission Risk Score: 0         Spoke with: [id number] Transitions Subsequent and Final Call    Schedule Follow Up Appointment with PCP:  Declined  Subsequent and Final Calls  Do you have any ongoing symptoms?:  No  Have your medications changed?:  No  Do you have any questions related to your medications?:  No  Do you currently have any active services?:  No  Do you have any needs or concerns that I can assist you with?:  No  Identified Barriers:  None  Care Transitions Interventions  No Identified Needs  Other Interventions:          Summary  CTC spoke with patient this afternoon for follow up CTC call. Patient states he is doing very well, states he saw PCP last week, no new or changed medications, reports MD told him he was doing really well. Patient with no complaints of nausea, vomiting, fevers, chills, SOB or Cough. No heart palpitations or re occurring chest pain. Patient not wanting to receive any more follow up CTC calls, was scheduled to be called again for final follow up call on Wednesday, will sign off at this time, patient with no issues or concerns. CTC advised Pt of use of urgent care or physicians 24 hr access line if assistance is needed after hours or the CTC provided education on s/s that require medical attention and when to seek medical attention. CTC weekend.       Follow Up  Future Appointments   Date Time Provider Shazia Weldon   2019 10:00 AM MD Max Hyde   3/20/2020  9:00 AM MD CHANTAL Malcolm RN

## 2019-04-18 ENCOUNTER — OFFICE VISIT (OUTPATIENT)
Dept: INTERNAL MEDICINE CLINIC | Age: 84
End: 2019-04-18
Payer: MEDICARE

## 2019-04-18 VITALS
SYSTOLIC BLOOD PRESSURE: 167 MMHG | TEMPERATURE: 98.8 F | HEART RATE: 59 BPM | DIASTOLIC BLOOD PRESSURE: 77 MMHG | WEIGHT: 222.5 LBS | RESPIRATION RATE: 18 BRPM | OXYGEN SATURATION: 94 % | BODY MASS INDEX: 35.91 KG/M2

## 2019-04-18 DIAGNOSIS — I50.9 CHRONIC CONGESTIVE HEART FAILURE, UNSPECIFIED HEART FAILURE TYPE (HCC): Primary | ICD-10-CM

## 2019-04-18 DIAGNOSIS — I50.9 CHRONIC CONGESTIVE HEART FAILURE, UNSPECIFIED HEART FAILURE TYPE (HCC): ICD-10-CM

## 2019-04-18 LAB
ALBUMIN SERPL-MCNC: 3.9 G/DL (ref 3.4–5)
ANION GAP SERPL CALCULATED.3IONS-SCNC: 11 MMOL/L (ref 3–16)
BUN BLDV-MCNC: 28 MG/DL (ref 7–20)
CALCIUM SERPL-MCNC: 9 MG/DL (ref 8.3–10.6)
CHLORIDE BLD-SCNC: 103 MMOL/L (ref 99–110)
CO2: 28 MMOL/L (ref 21–32)
CREAT SERPL-MCNC: 1.6 MG/DL (ref 0.8–1.3)
GFR AFRICAN AMERICAN: 50
GFR NON-AFRICAN AMERICAN: 41
GLUCOSE BLD-MCNC: 100 MG/DL (ref 70–99)
PHOSPHORUS: 3.8 MG/DL (ref 2.5–4.9)
POTASSIUM SERPL-SCNC: 3.8 MMOL/L (ref 3.5–5.1)
SODIUM BLD-SCNC: 142 MMOL/L (ref 136–145)

## 2019-04-18 PROCEDURE — 99213 OFFICE O/P EST LOW 20 MIN: CPT | Performed by: STUDENT IN AN ORGANIZED HEALTH CARE EDUCATION/TRAINING PROGRAM

## 2019-04-18 RX ORDER — DOXAZOSIN MESYLATE 4 MG/1
4 TABLET ORAL 2 TIMES DAILY
Qty: 60 TABLET | Refills: 0 | Status: SHIPPED | OUTPATIENT
Start: 2019-04-18 | End: 2019-04-18

## 2019-04-18 RX ORDER — DOXAZOSIN MESYLATE 4 MG/1
4 TABLET ORAL NIGHTLY
Qty: 60 TABLET | Refills: 0 | Status: ON HOLD | OUTPATIENT
Start: 2019-04-18 | End: 2019-05-02 | Stop reason: HOSPADM

## 2019-04-18 RX ORDER — LISINOPRIL 20 MG/1
20 TABLET ORAL 2 TIMES DAILY
Qty: 60 TABLET | Refills: 1 | Status: ON HOLD | OUTPATIENT
Start: 2019-04-18 | End: 2019-05-02 | Stop reason: HOSPADM

## 2019-04-18 ASSESSMENT — ENCOUNTER SYMPTOMS
VOMITING: 0
SHORTNESS OF BREATH: 0
ABDOMINAL PAIN: 0
NAUSEA: 0
DIARRHEA: 0

## 2019-04-18 NOTE — PROGRESS NOTES
[facility]       NURSING PROGRESS NOTE      April 18, 2019  Iza Schafer    Chief Complaint:   Chief Complaint   Patient presents with    Hypertension       Constitutional: negative  Eyes: negative  Ears, nose, mouth, throat, and face: negative  Respiratory: negative  Cardiovascular: negative  Gastrointestinal: negative  Genitourinary: negative  Integument/breast: negative  Hematologic/lymphatic: negative  Musculoskeletal: negative  Neurological: negative  Diabetes: No  Yes:  Medication compliance:    Diabetic diet compliance:    Home glucose monitoring:    Last eye exam:     Acute symptoms hyperglycemia:    Acute symptoms hypoglycemia:    POC glucose today:  mg/dL    Pain Assessment:  Pain Present: no  Pain Score: 0  Pain Quality/Description:     Mobility/Safety/Self-Care:  Steady Gait: Yes  History of Falls: No   History of a Fall within the last 30 days No  Assistive Device: None  Poor Hygiene: No    Psychosocial:   Depression: No  If YES,    Does Patient express current thoughts of harming self or others?: No  Anxious: No  Insomnia: No  Inappropriate Behavior: No  Alcohol Abuse: no  Substance Abuse: no  Signs of Physical Abuse: No  Signs of Emotional Abuse: No    Educational:  Identify the learner who is being assessed for education:  patient                    Ability to Learn:  Exhibits ability to grasp concepts and respond to questions: Medium  Ready to Learn: No  calm   Preferred Method of Learning:  written  Barriers to Learning: Verbalizes interest  Special Considerations due to cultural, Islam, spiritual beliefs:  No  Language:  English  :  No    Note:   States he does not feel bad. No pain. No pedal edema. States he feels his pressure too high 167/77 today. Wanted urgent visit to have pressure checked. Wants prescription for scale to do weights daily at home. Wants to know if going back on Carvedilol would be better for his pressure.  His pulse ifs only 59 today.       1:28 PM 4/18/2019

## 2019-04-18 NOTE — PROGRESS NOTES
Outpatient Note for Established Patient Visit    Patient:  Stephania La                                               : 1932  Age: 80 y.o. MRN: [id number]  Date : 2019    History Obtained From:  patient    CHIEF COMPLAINT:  Concern about BP    HISTORY OF PRESENT ILLNESS:   The patient is a 80 y.o. male who presents with CHF and severe aortic stenosis who presents with concerns about his blood pressure. At home his SBP has been 160s-180s. His Maxide was recently discontinued because his blood pressure was actually borderline hypotensive. He doesn't like taking his lasix because it makes him urinate all day long and it's unpleasant for him. He was counseled on the importance of continuing Lasix. He has no chest pain, shortness of breath, abdominal pain. Past Medical History:        Diagnosis Date    Arthritis     CAD (coronary artery disease)     Chronic kidney disease     Constipation     DDD (degenerative disc disease), lumbar     L5, S1    Dental disease     Hearing loss     Hyperlipidemia     Hypertension     Mild aortic stenosis     Prostate enlargement        Past Surgical History:        Procedure Laterality Date    CORONARY ANGIOPLASTY WITH STENT PLACEMENT  98    EYE SURGERY      SKIN TAG REMOVAL  2/2/10    TURP         Family History:       Problem Relation Age of Onset    No Known Problems Mother     No Known Problems Father        SocialHistory:   TOBACCO:   reports that he has never smoked. He has never used smokeless tobacco.  ETOH:   reports that he does not drink alcohol. OCCUPATION:      Allergies: Patient has no known allergies. Current Medications:    Prior to Admission medications    Medication Sig Start Date End Date Taking? Authorizing Provider   Misc.  Devices (DIGITAL GLASS SCALE) MISC 1 Device by Does not apply route daily 19  Yes Merle Da Silva MD   lisinopril (PRINIVIL;ZESTRIL) 20 MG tablet Take 1 tablet by mouth 2 times daily 19  Yes Resp 18   Wt 222 lb 8 oz (100.9 kg)   SpO2 94%   BMI 35.91 kg/m²     Body mass index is 35.91 kg/m². Wt Readings from Last 3 Encounters:   04/18/19 222 lb 8 oz (100.9 kg)   04/05/19 216 lb 9.6 oz (98.2 kg)   04/04/19 218 lb (98.9 kg)     Physical Exam   Constitutional: He is oriented to person, place, and time. He appears well-developed and well-nourished. No distress. HENT:   Head: Normocephalic and atraumatic. Neck: Normal range of motion. Neck supple. Cardiovascular: Normal rate and regular rhythm. Murmur heard. 4/5 ejection systolic murmur   Pulmonary/Chest: Effort normal and breath sounds normal. No respiratory distress. He has no wheezes. Abdominal: Soft. Bowel sounds are normal. He exhibits no distension. There is no tenderness. Musculoskeletal: He exhibits edema (trace BLE). Neurological: He is alert and oriented to person, place, and time. Skin: Skin is warm and dry. He is not diaphoretic. Assessment/Plan:   Mr. Christopher Diana is an 81 yo gentleman who presents for adjustment of his BP medications. HTN  -continue lasix 20 mg   -increase lisinopril from 20 mg qd to bid  -RFP in 1-2weeks  -return to clinic in 2 weeks for recheck    CHF   -changes: increase lisinopril to 20 bid  -prescription given for scale for daily weights  -counseled on low sodium diet    Merle Da Silva M.D.   4/18/2019, 2:21 PM     Addendum to Resident H& P/Progress note:  I have personally seen,examined and evaluated the patient.  I have reviewed the current history, physical findings, labs and assessment and plan and agree with note as documented by resident MD ( Baljinder Coyne)      Darwin Esquivel MD, [address]

## 2019-04-24 ENCOUNTER — TELEPHONE (OUTPATIENT)
Dept: INTERNAL MEDICINE CLINIC | Age: 84
End: 2019-04-24

## 2019-04-24 RX ORDER — ISOSORBIDE MONONITRATE 30 MG/1
30 TABLET, EXTENDED RELEASE ORAL DAILY
Qty: 30 TABLET | Refills: 3 | Status: ON HOLD | OUTPATIENT
Start: 2019-04-24 | End: 2019-05-02 | Stop reason: HOSPADM

## 2019-04-29 ENCOUNTER — HOSPITAL ENCOUNTER (INPATIENT)
Age: 84
LOS: 3 days | Discharge: HOME | DRG: 291 | End: 2019-05-02
Attending: EMERGENCY MEDICINE | Admitting: FAMILY MEDICINE
Payer: MEDICARE

## 2019-04-29 ENCOUNTER — APPOINTMENT (OUTPATIENT)
Dept: GENERAL RADIOLOGY | Age: 84
DRG: 291 | End: 2019-04-29
Payer: MEDICARE

## 2019-04-29 DIAGNOSIS — J81.0 ACUTE PULMONARY EDEMA (HCC): ICD-10-CM

## 2019-04-29 DIAGNOSIS — R06.03 RESPIRATORY DISTRESS: Primary | ICD-10-CM

## 2019-04-29 PROBLEM — I16.1 HYPERTENSIVE EMERGENCY: Status: ACTIVE | Noted: 2019-04-29

## 2019-04-29 PROBLEM — I50.43 ACUTE ON CHRONIC COMBINED SYSTOLIC AND DIASTOLIC CONGESTIVE HEART FAILURE (HCC): Status: ACTIVE | Noted: 2019-04-29

## 2019-04-29 LAB
ALBUMIN SERPL-MCNC: 3.6 G/DL (ref 3.4–5)
ALBUMIN SERPL-MCNC: 3.9 G/DL (ref 3.4–5)
ALBUMIN SERPL-MCNC: 4.1 G/DL (ref 3.4–5)
ALP BLD-CCNC: 83 U/L (ref 40–129)
ALT SERPL-CCNC: 21 U/L (ref 10–40)
ANION GAP SERPL CALCULATED.3IONS-SCNC: 12 MMOL/L (ref 3–16)
ANION GAP SERPL CALCULATED.3IONS-SCNC: 13 MMOL/L (ref 3–16)
ANION GAP SERPL CALCULATED.3IONS-SCNC: 13 MMOL/L (ref 3–16)
AST SERPL-CCNC: 25 U/L (ref 15–37)
BASE EXCESS ARTERIAL: 3 (ref -3–3)
BASE EXCESS VENOUS: -2 (ref -3–3)
BASE EXCESS VENOUS: 0 (ref -3–3)
BASOPHILS ABSOLUTE: 0 K/UL (ref 0–0.2)
BASOPHILS RELATIVE PERCENT: 0.7 %
BILIRUB SERPL-MCNC: 0.7 MG/DL (ref 0–1)
BILIRUBIN DIRECT: <0.2 MG/DL (ref 0–0.3)
BILIRUBIN, INDIRECT: NORMAL MG/DL (ref 0–1)
BUN BLDV-MCNC: 25 MG/DL (ref 7–20)
BUN BLDV-MCNC: 25 MG/DL (ref 7–20)
BUN BLDV-MCNC: 26 MG/DL (ref 7–20)
CALCIUM IONIZED: 1.17 MMOL/L (ref 1.12–1.32)
CALCIUM SERPL-MCNC: 8.7 MG/DL (ref 8.3–10.6)
CALCIUM SERPL-MCNC: 8.9 MG/DL (ref 8.3–10.6)
CALCIUM SERPL-MCNC: 9.1 MG/DL (ref 8.3–10.6)
CHLORIDE BLD-SCNC: 101 MMOL/L (ref 99–110)
CHLORIDE BLD-SCNC: 103 MMOL/L (ref 99–110)
CHLORIDE BLD-SCNC: 104 MMOL/L (ref 99–110)
CO2: 25 MMOL/L (ref 21–32)
CO2: 27 MMOL/L (ref 21–32)
CO2: 28 MMOL/L (ref 21–32)
CREAT SERPL-MCNC: 1.4 MG/DL (ref 0.8–1.3)
CREAT SERPL-MCNC: 1.6 MG/DL (ref 0.8–1.3)
CREAT SERPL-MCNC: 1.6 MG/DL (ref 0.8–1.3)
EKG ATRIAL RATE: 67 BPM
EKG DIAGNOSIS: NORMAL
EKG P AXIS: 26 DEGREES
EKG P-R INTERVAL: 214 MS
EKG Q-T INTERVAL: 468 MS
EKG QRS DURATION: 138 MS
EKG QTC CALCULATION (BAZETT): 494 MS
EKG R AXIS: -13 DEGREES
EKG T AXIS: 114 DEGREES
EKG VENTRICULAR RATE: 67 BPM
EOSINOPHILS ABSOLUTE: 0.4 K/UL (ref 0–0.6)
EOSINOPHILS RELATIVE PERCENT: 6.8 %
GFR AFRICAN AMERICAN: 50
GFR AFRICAN AMERICAN: 50
GFR AFRICAN AMERICAN: 58
GFR NON-AFRICAN AMERICAN: 41
GFR NON-AFRICAN AMERICAN: 41
GFR NON-AFRICAN AMERICAN: 48
GLUCOSE BLD-MCNC: 118 MG/DL (ref 70–99)
GLUCOSE BLD-MCNC: 138 MG/DL (ref 70–99)
GLUCOSE BLD-MCNC: 172 MG/DL (ref 70–99)
GLUCOSE BLD-MCNC: 87 MG/DL (ref 70–99)
HCO3 ARTERIAL: 27.6 MMOL/L (ref 21–29)
HCO3 VENOUS: 24.8 MMOL/L (ref 23–29)
HCO3 VENOUS: 26.5 MMOL/L (ref 23–29)
HCT VFR BLD CALC: 36.3 % (ref 40.5–52.5)
HEMOGLOBIN: 12 G/DL (ref 13.5–17.5)
LACTATE: 0.53 MMOL/L (ref 0.4–2)
LACTATE: 0.98 MMOL/L (ref 0.4–2)
LACTATE: 1.97 MMOL/L (ref 0.4–2)
LYMPHOCYTES ABSOLUTE: 2.5 K/UL (ref 1–5.1)
LYMPHOCYTES RELATIVE PERCENT: 40.3 %
MAGNESIUM: 1.9 MG/DL (ref 1.8–2.4)
MAGNESIUM: 2.2 MG/DL (ref 1.8–2.4)
MCH RBC QN AUTO: 32.3 PG (ref 26–34)
MCHC RBC AUTO-ENTMCNC: 33.2 G/DL (ref 31–36)
MCV RBC AUTO: 97.4 FL (ref 80–100)
MONOCYTES ABSOLUTE: 0.7 K/UL (ref 0–1.3)
MONOCYTES RELATIVE PERCENT: 11.1 %
NEUTROPHILS ABSOLUTE: 2.5 K/UL (ref 1.7–7.7)
NEUTROPHILS RELATIVE PERCENT: 41.1 %
O2 SAT, ARTERIAL: 98 % (ref 93–100)
O2 SAT, VEN: 41 %
O2 SAT, VEN: 88 %
PCO2 ARTERIAL: 46.5 MM HG (ref 35–45)
PCO2, VEN: 51.8 MM HG (ref 40–50)
PCO2, VEN: 54.3 MM HG (ref 40–50)
PDW BLD-RTO: 13.2 % (ref 12.4–15.4)
PERFORMED ON: ABNORMAL
PH ARTERIAL: 7.38 (ref 7.35–7.45)
PH VENOUS: 7.27 (ref 7.35–7.45)
PH VENOUS: 7.32 (ref 7.35–7.45)
PHOSPHORUS: 2.8 MG/DL (ref 2.5–4.9)
PHOSPHORUS: 3.8 MG/DL (ref 2.5–4.9)
PLATELET # BLD: 133 K/UL (ref 135–450)
PMV BLD AUTO: 8 FL (ref 5–10.5)
PO2 ARTERIAL: 101.5 MM HG (ref 75–108)
PO2, VEN: 26 MM HG
PO2, VEN: 63 MM HG
POC POTASSIUM: 3.4 MMOL/L (ref 3.5–5.1)
POC SAMPLE TYPE: ABNORMAL
POC SODIUM: 142 MMOL/L (ref 136–145)
POTASSIUM REFLEX MAGNESIUM: 3.4 MMOL/L (ref 3.5–5.1)
POTASSIUM SERPL-SCNC: 3.5 MMOL/L (ref 3.5–5.1)
POTASSIUM SERPL-SCNC: 4.1 MMOL/L (ref 3.5–5.1)
PRO-BNP: 2991 PG/ML (ref 0–449)
RBC # BLD: 3.73 M/UL (ref 4.2–5.9)
SODIUM BLD-SCNC: 142 MMOL/L (ref 136–145)
TCO2 ARTERIAL: 29 MMOL/L
TCO2 CALC VENOUS: 26 MMOL/L
TCO2 CALC VENOUS: 28 MMOL/L
TOTAL PROTEIN: 7 G/DL (ref 6.4–8.2)
TROPONIN: <0.01 NG/ML
WBC # BLD: 6.2 K/UL (ref 4–11)

## 2019-04-29 PROCEDURE — 80069 RENAL FUNCTION PANEL: CPT

## 2019-04-29 PROCEDURE — 80076 HEPATIC FUNCTION PANEL: CPT

## 2019-04-29 PROCEDURE — 96374 THER/PROPH/DIAG INJ IV PUSH: CPT

## 2019-04-29 PROCEDURE — 94664 DEMO&/EVAL PT USE INHALER: CPT

## 2019-04-29 PROCEDURE — 6360000002 HC RX W HCPCS: Performed by: STUDENT IN AN ORGANIZED HEALTH CARE EDUCATION/TRAINING PROGRAM

## 2019-04-29 PROCEDURE — 85025 COMPLETE CBC W/AUTO DIFF WBC: CPT

## 2019-04-29 PROCEDURE — 6370000000 HC RX 637 (ALT 250 FOR IP): Performed by: STUDENT IN AN ORGANIZED HEALTH CARE EDUCATION/TRAINING PROGRAM

## 2019-04-29 PROCEDURE — 71045 X-RAY EXAM CHEST 1 VIEW: CPT

## 2019-04-29 PROCEDURE — 94150 VITAL CAPACITY TEST: CPT

## 2019-04-29 PROCEDURE — 82803 BLOOD GASES ANY COMBINATION: CPT

## 2019-04-29 PROCEDURE — 83735 ASSAY OF MAGNESIUM: CPT

## 2019-04-29 PROCEDURE — 2700000000 HC OXYGEN THERAPY PER DAY

## 2019-04-29 PROCEDURE — 2580000003 HC RX 258: Performed by: STUDENT IN AN ORGANIZED HEALTH CARE EDUCATION/TRAINING PROGRAM

## 2019-04-29 PROCEDURE — 94761 N-INVAS EAR/PLS OXIMETRY MLT: CPT

## 2019-04-29 PROCEDURE — 99223 1ST HOSP IP/OBS HIGH 75: CPT | Performed by: INTERNAL MEDICINE

## 2019-04-29 PROCEDURE — 36600 WITHDRAWAL OF ARTERIAL BLOOD: CPT

## 2019-04-29 PROCEDURE — 83605 ASSAY OF LACTIC ACID: CPT

## 2019-04-29 PROCEDURE — 93005 ELECTROCARDIOGRAM TRACING: CPT | Performed by: EMERGENCY MEDICINE

## 2019-04-29 PROCEDURE — 1200000000 HC SEMI PRIVATE

## 2019-04-29 PROCEDURE — 82330 ASSAY OF CALCIUM: CPT

## 2019-04-29 PROCEDURE — 99285 EMERGENCY DEPT VISIT HI MDM: CPT

## 2019-04-29 PROCEDURE — 99221 1ST HOSP IP/OBS SF/LOW 40: CPT | Performed by: INTERNAL MEDICINE

## 2019-04-29 PROCEDURE — 83880 ASSAY OF NATRIURETIC PEPTIDE: CPT

## 2019-04-29 PROCEDURE — 36415 COLL VENOUS BLD VENIPUNCTURE: CPT

## 2019-04-29 PROCEDURE — 6360000002 HC RX W HCPCS

## 2019-04-29 PROCEDURE — 84295 ASSAY OF SERUM SODIUM: CPT

## 2019-04-29 PROCEDURE — 94660 CPAP INITIATION&MGMT: CPT

## 2019-04-29 PROCEDURE — 84484 ASSAY OF TROPONIN QUANT: CPT

## 2019-04-29 PROCEDURE — 82947 ASSAY GLUCOSE BLOOD QUANT: CPT

## 2019-04-29 PROCEDURE — 84132 ASSAY OF SERUM POTASSIUM: CPT

## 2019-04-29 RX ORDER — HEPARIN SODIUM 5000 [USP'U]/ML
5000 INJECTION, SOLUTION INTRAVENOUS; SUBCUTANEOUS EVERY 8 HOURS SCHEDULED
Status: DISCONTINUED | OUTPATIENT
Start: 2019-04-29 | End: 2019-05-02 | Stop reason: HOSPADM

## 2019-04-29 RX ORDER — ACETAMINOPHEN 325 MG/1
650 TABLET ORAL EVERY 4 HOURS PRN
Status: DISCONTINUED | OUTPATIENT
Start: 2019-04-29 | End: 2019-05-02 | Stop reason: HOSPADM

## 2019-04-29 RX ORDER — SUCCINYLCHOLINE CHLORIDE 20 MG/ML
INJECTION INTRAMUSCULAR; INTRAVENOUS
Status: DISCONTINUED
Start: 2019-04-29 | End: 2019-04-29

## 2019-04-29 RX ORDER — FUROSEMIDE 10 MG/ML
INJECTION INTRAMUSCULAR; INTRAVENOUS
Status: COMPLETED
Start: 2019-04-29 | End: 2019-04-29

## 2019-04-29 RX ORDER — DOCUSATE SODIUM 100 MG/1
100 CAPSULE, LIQUID FILLED ORAL 2 TIMES DAILY
Status: ON HOLD | COMMUNITY
End: 2019-05-02 | Stop reason: SDUPTHER

## 2019-04-29 RX ORDER — HEPARIN SODIUM 5000 [USP'U]/ML
5000 INJECTION, SOLUTION INTRAVENOUS; SUBCUTANEOUS EVERY 8 HOURS SCHEDULED
Status: DISCONTINUED | OUTPATIENT
Start: 2019-04-29 | End: 2019-04-29 | Stop reason: ALTCHOICE

## 2019-04-29 RX ORDER — MAGNESIUM SULFATE 1 G/100ML
1 INJECTION INTRAVENOUS ONCE
Status: COMPLETED | OUTPATIENT
Start: 2019-04-29 | End: 2019-04-29

## 2019-04-29 RX ORDER — SODIUM CHLORIDE 0.9 % (FLUSH) 0.9 %
10 SYRINGE (ML) INJECTION EVERY 12 HOURS SCHEDULED
Status: DISCONTINUED | OUTPATIENT
Start: 2019-04-29 | End: 2019-04-29

## 2019-04-29 RX ORDER — SODIUM CHLORIDE 0.9 % (FLUSH) 0.9 %
10 SYRINGE (ML) INJECTION PRN
Status: DISCONTINUED | OUTPATIENT
Start: 2019-04-29 | End: 2019-04-29

## 2019-04-29 RX ORDER — MINERAL OIL AND WHITE PETROLATUM 150; 830 MG/G; MG/G
OINTMENT OPHTHALMIC
Status: DISCONTINUED | OUTPATIENT
Start: 2019-04-29 | End: 2019-05-02 | Stop reason: HOSPADM

## 2019-04-29 RX ORDER — ONDANSETRON 2 MG/ML
4 INJECTION INTRAMUSCULAR; INTRAVENOUS EVERY 6 HOURS PRN
Status: DISCONTINUED | OUTPATIENT
Start: 2019-04-29 | End: 2019-04-29 | Stop reason: SDUPTHER

## 2019-04-29 RX ORDER — SODIUM CHLORIDE 0.9 % (FLUSH) 0.9 %
10 SYRINGE (ML) INJECTION PRN
Status: DISCONTINUED | OUTPATIENT
Start: 2019-04-29 | End: 2019-05-02 | Stop reason: HOSPADM

## 2019-04-29 RX ORDER — DICYCLOMINE HYDROCHLORIDE 10 MG/1
10 CAPSULE ORAL 4 TIMES DAILY
Status: DISCONTINUED | OUTPATIENT
Start: 2019-04-29 | End: 2019-04-29 | Stop reason: ALTCHOICE

## 2019-04-29 RX ORDER — POTASSIUM CHLORIDE 1.5 G/1.77G
40 POWDER, FOR SOLUTION ORAL ONCE
Status: COMPLETED | OUTPATIENT
Start: 2019-04-29 | End: 2019-04-29

## 2019-04-29 RX ORDER — DOXAZOSIN MESYLATE 4 MG/1
4 TABLET ORAL DAILY
Status: DISCONTINUED | OUTPATIENT
Start: 2019-04-29 | End: 2019-05-01

## 2019-04-29 RX ORDER — CETIRIZINE HYDROCHLORIDE 10 MG/1
10 TABLET ORAL DAILY
Status: DISCONTINUED | OUTPATIENT
Start: 2019-04-29 | End: 2019-05-02 | Stop reason: HOSPADM

## 2019-04-29 RX ORDER — FAMOTIDINE 20 MG/1
20 TABLET, FILM COATED ORAL 2 TIMES DAILY
Status: DISCONTINUED | OUTPATIENT
Start: 2019-04-29 | End: 2019-05-02 | Stop reason: HOSPADM

## 2019-04-29 RX ORDER — ALBUTEROL SULFATE 2.5 MG/3ML
2.5 SOLUTION RESPIRATORY (INHALATION)
Status: DISCONTINUED | OUTPATIENT
Start: 2019-04-29 | End: 2019-05-02 | Stop reason: HOSPADM

## 2019-04-29 RX ORDER — LISINOPRIL 20 MG/1
20 TABLET ORAL 2 TIMES DAILY
Status: DISCONTINUED | OUTPATIENT
Start: 2019-04-29 | End: 2019-05-01

## 2019-04-29 RX ORDER — ASPIRIN 81 MG/1
81 TABLET ORAL DAILY
Status: DISCONTINUED | OUTPATIENT
Start: 2019-04-29 | End: 2019-05-02 | Stop reason: HOSPADM

## 2019-04-29 RX ORDER — SODIUM CHLORIDE 9 MG/ML
INJECTION, SOLUTION INTRAVENOUS
Status: DISCONTINUED
Start: 2019-04-29 | End: 2019-04-29

## 2019-04-29 RX ORDER — POTASSIUM CHLORIDE 7.45 MG/ML
10 INJECTION INTRAVENOUS ONCE
Status: COMPLETED | OUTPATIENT
Start: 2019-04-29 | End: 2019-04-29

## 2019-04-29 RX ORDER — MULTIVITAMIN WITH FOLIC ACID 400 MCG
1 TABLET ORAL DAILY
Status: DISCONTINUED | OUTPATIENT
Start: 2019-04-29 | End: 2019-05-02 | Stop reason: HOSPADM

## 2019-04-29 RX ORDER — ATORVASTATIN CALCIUM 80 MG/1
80 TABLET, FILM COATED ORAL NIGHTLY
Status: DISCONTINUED | OUTPATIENT
Start: 2019-04-29 | End: 2019-05-02 | Stop reason: HOSPADM

## 2019-04-29 RX ORDER — NITROGLYCERIN 0.4 MG/1
0.4 TABLET SUBLINGUAL EVERY 5 MIN PRN
Status: DISCONTINUED | OUTPATIENT
Start: 2019-04-29 | End: 2019-05-02 | Stop reason: HOSPADM

## 2019-04-29 RX ORDER — ALBUTEROL SULFATE 2.5 MG/3ML
2.5 SOLUTION RESPIRATORY (INHALATION) EVERY 6 HOURS PRN
Status: DISCONTINUED | OUTPATIENT
Start: 2019-04-29 | End: 2019-04-29

## 2019-04-29 RX ORDER — ONDANSETRON 2 MG/ML
4 INJECTION INTRAMUSCULAR; INTRAVENOUS EVERY 6 HOURS PRN
Status: DISCONTINUED | OUTPATIENT
Start: 2019-04-29 | End: 2019-05-02 | Stop reason: HOSPADM

## 2019-04-29 RX ORDER — FUROSEMIDE 10 MG/ML
40 INJECTION INTRAMUSCULAR; INTRAVENOUS ONCE
Status: COMPLETED | OUTPATIENT
Start: 2019-04-29 | End: 2019-04-29

## 2019-04-29 RX ORDER — ETOMIDATE 2 MG/ML
INJECTION INTRAVENOUS
Status: DISCONTINUED
Start: 2019-04-29 | End: 2019-04-29

## 2019-04-29 RX ADMIN — ACETAMINOPHEN 650 MG: 325 TABLET ORAL at 13:55

## 2019-04-29 RX ADMIN — HEPARIN SODIUM 5000 UNITS: 5000 INJECTION INTRAVENOUS; SUBCUTANEOUS at 13:52

## 2019-04-29 RX ADMIN — ATORVASTATIN CALCIUM 80 MG: 80 TABLET, FILM COATED ORAL at 21:24

## 2019-04-29 RX ADMIN — POTASSIUM CHLORIDE 10 MEQ: 7.46 INJECTION, SOLUTION INTRAVENOUS at 06:49

## 2019-04-29 RX ADMIN — DOXAZOSIN 4 MG: 4 TABLET ORAL at 13:52

## 2019-04-29 RX ADMIN — Medication 10 ML: at 21:25

## 2019-04-29 RX ADMIN — LISINOPRIL 20 MG: 20 TABLET ORAL at 11:49

## 2019-04-29 RX ADMIN — FAMOTIDINE 20 MG: 20 TABLET ORAL at 21:24

## 2019-04-29 RX ADMIN — HEPARIN SODIUM 5000 UNITS: 5000 INJECTION INTRAVENOUS; SUBCUTANEOUS at 21:24

## 2019-04-29 RX ADMIN — FUROSEMIDE 5 MG/HR: 10 INJECTION, SOLUTION INTRAMUSCULAR; INTRAVENOUS at 22:14

## 2019-04-29 RX ADMIN — LISINOPRIL 20 MG: 20 TABLET ORAL at 21:27

## 2019-04-29 RX ADMIN — THERA TABS 1 TABLET: TAB at 09:07

## 2019-04-29 RX ADMIN — CETIRIZINE HYDROCHLORIDE 10 MG: 10 TABLET, FILM COATED ORAL at 09:07

## 2019-04-29 RX ADMIN — FUROSEMIDE 40 MG: 10 INJECTION INTRAMUSCULAR; INTRAVENOUS at 02:56

## 2019-04-29 RX ADMIN — MAGNESIUM SULFATE HEPTAHYDRATE 1 G: 1 INJECTION, SOLUTION INTRAVENOUS at 11:49

## 2019-04-29 RX ADMIN — ASPIRIN 81 MG: 81 TABLET, COATED ORAL at 09:07

## 2019-04-29 RX ADMIN — POTASSIUM CHLORIDE 40 MEQ: 1.5 POWDER, FOR SOLUTION ORAL at 06:19

## 2019-04-29 RX ADMIN — FUROSEMIDE 5 MG/HR: 10 INJECTION, SOLUTION INTRAMUSCULAR; INTRAVENOUS at 06:49

## 2019-04-29 RX ADMIN — FUROSEMIDE 40 MG: 10 INJECTION, SOLUTION INTRAMUSCULAR; INTRAVENOUS at 02:56

## 2019-04-29 ASSESSMENT — PAIN DESCRIPTION - ORIENTATION
ORIENTATION: RIGHT;LEFT
ORIENTATION: RIGHT

## 2019-04-29 ASSESSMENT — PAIN DESCRIPTION - DESCRIPTORS
DESCRIPTORS: OTHER (COMMENT)
DESCRIPTORS: OTHER (COMMENT)

## 2019-04-29 ASSESSMENT — PAIN SCALES - GENERAL
PAINLEVEL_OUTOF10: 0
PAINLEVEL_OUTOF10: 0
PAINLEVEL_OUTOF10: 3
PAINLEVEL_OUTOF10: 0
PAINLEVEL_OUTOF10: 0
PAINLEVEL_OUTOF10: 8

## 2019-04-29 ASSESSMENT — PAIN DESCRIPTION - PAIN TYPE
TYPE: ACUTE PAIN
TYPE: ACUTE PAIN

## 2019-04-29 ASSESSMENT — PAIN DESCRIPTION - ONSET: ONSET: SUDDEN

## 2019-04-29 ASSESSMENT — PAIN DESCRIPTION - LOCATION
LOCATION: LEG
LOCATION: LEG

## 2019-04-29 ASSESSMENT — PAIN DESCRIPTION - FREQUENCY: FREQUENCY: CONTINUOUS

## 2019-04-29 NOTE — CARE COORDINATION
is   present.   At least moderate aortic stenosis with a peak velocity of 3.69 m/s and a   mean pressure gradient of 30mmHg. Given the LV dysfunction, the severity of   the stenosis may be underestimated. Mild to moderate aortic regurgitation.   The aortic root is mildly dilated measuring 3.9 cm.   Mild tricuspid regurgitation.  IVC size is normal (<2.1cm) and collapses > 50% with respiration consistent   with normal RA pressure (3mmHg). Estimated pulmonary artery systolic   pressure is at 37 mmHg assuming a right atrial pressure of 3 mmHg.       Has patient been diagnosed with CRISTINA: No  Is patient being treated: No    Cardiology Consulted: Yes  Heart Failure Order Set Used: Yes  Complete Intake and Output: No  Complete Daily Weights: Yes    BMP:  Lab Results   Component Value Date     04/30/2019     04/29/2019     04/29/2019    K 3.8 04/30/2019    K 3.5 04/29/2019    K 4.1 04/29/2019    K 3.4 04/29/2019    K 3.9 03/28/2019    K 3.6 03/26/2019     04/30/2019     04/29/2019     04/29/2019    CO2 30 04/30/2019    CO2 28 04/29/2019    CO2 27 04/29/2019    PHOS 3.6 04/30/2019    PHOS 2.8 04/29/2019    PHOS 3.8 04/29/2019    BUN 27 04/30/2019    BUN 25 04/29/2019    BUN 25 04/29/2019    CREATININE 1.6 04/30/2019    CREATININE 1.6 04/29/2019    CREATININE 1.4 04/29/2019     BNP:   Lab Results   Component Value Date    PROBNP 2,991 04/29/2019    PROBNP 788 03/28/2019    PROBNP 1,845 03/26/2019           ACTIVITY/FUNCTIONAL ASSESSMENT:     PT/OT:  Assessment   Assessment: Pt presents near his baseline function- he is typically indepenent with self care and IADLs and swims several times a week. Pt sba progressing to supervision for transfer and mobility this date, demo safe ADLS from seated as needed. Pt's O2 sats did decrease to 87% with activity, although pt reports is breathing is improving since admission.  Encouraged pt to continue mobility with RN/PCA assist and anticipate pt will Managing Your Diabetes Plate Method   [] NCM Sodium (Salt) Content of Foods  [] NCM Lower Sodium (Salt) Food List   [] NCM High Protein Food List  [] Heart Healthy Eating Label Reading Tips   [] Fast Food Guide (from [employer])  [] Healthy Eating when Eating Out  [] Controlling Your Fluids   [] Cardiac menu  [] Low sodium menu  [] Renal menu  [] Other     Time spent with patient: 30 minutes     Antonia Garcia RD, LD  Pager: [phone]  Office: [facility]  During Admission  If EF <40% ACE ordered ___, or ARB ___ or contraindication documented ____ Lisinopril  If EF <40% Evidence-Based Beta Blocker ordered ____ or contraindication documented____ Coreg  If EF 35% or less, K <5on admit, Cr<2 (female) <2.5 (male), Aldosterone antagonist ordered None EF >35%  Upon Discharge  If EF <40% ACE ordered ___, or ARB ___ or contraindication documented ____ Lisinopril  If EF <40% Evidence-Based Beta Blocker ordered ____ or contraindication documented____ Coreg  If EF 35% or less, K <5on admit, Cr<2 (female) <2.5 (male), Aldosterone antagonist ordered None EF >35%  Received Influenza Vaccine (Oct-Mar) N/A  DVT Prophylaxis by Day 2: Yes  Prescription drug coverage: Yes  Can afford prescription co-pay: Yes  Time Spent Educating: [address]  Patient Education:       Worsening HF symptoms Yes    fluid restriction  Yes    Tobacco cessation (Smoked in the last 12 months) Yes  daily weights and parameters  Yes  Avoid NSAIDS in HF Yes  written HF education given Yes     Follow up appointment Yes    ICD counseling No and EF >35%  Patient can Teach Back:   Worsening HF symptoms and when to report Yes   Weight gain to report to healthcare provider Yes  Amount of sodium to eat per day Yes  Name of their Rudy Guardian Yes    Time Spent Educatin mins      Total Time Spent on Patient Education:  60minutesYes    CASE MANAGEMENT:  Assessment:      DISCHARGE PLAN:  Services at Discharge: [address] [facility]

## 2019-04-29 NOTE — ED PROVIDER NOTES
normal  Ectopy: premature ventricular contractions (frequent)  Conduction: nonspecific interventricular conduction block  ST Segments: no acute change  T Waves:flattening in  lateral leads  Q Waves: none  Clinical Impression: no acute changes  Comparison:  3/28/19    RADIOLOGY:  XR CHEST PORTABLE   Final Result       Unchanged heart size. Increased opacity in the bilateral mid to lower    lobes, possibly representing aspiration, atelectasis, or edema.           LABS:   Results for orders placed or performed during the hospital encounter of 04/29/19   Troponin   Result Value Ref Range    Troponin <0.01 <0.01 ng/mL   Basic Metabolic Panel w/ Reflex to MG   Result Value Ref Range    Sodium 142 136 - 145 mmol/L    Potassium reflex Magnesium 3.4 (L) 3.5 - 5.1 mmol/L    Chloride 104 99 - 110 mmol/L    CO2 25 21 - 32 mmol/L    Anion Gap 13 3 - 16    Glucose 172 (H) 70 - 99 mg/dL    BUN 26 (H) 7 - 20 mg/dL    CREATININE 1.6 (H) 0.8 - 1.3 mg/dL    GFR Non- 41 (A) >60    GFR  50 (A) >60    Calcium 8.7 8.3 - 10.6 mg/dL   CBC Auto Differential   Result Value Ref Range    WBC 6.2 4.0 - 11.0 K/uL    RBC 3.73 (L) 4.20 - 5.90 M/uL    Hemoglobin 12.0 (L) 13.5 - 17.5 g/dL    Hematocrit 36.3 (L) 40.5 - 52.5 %    MCV 97.4 80.0 - 100.0 fL    MCH 32.3 26.0 - 34.0 pg    MCHC 33.2 31.0 - 36.0 g/dL    RDW 13.2 12.4 - 15.4 %    Platelets 459 (L) 981 - 450 K/uL    MPV 8.0 5.0 - 10.5 fL    Neutrophils % 41.1 %    Lymphocytes % 40.3 %    Monocytes % 11.1 %    Eosinophils % 6.8 %    Basophils % 0.7 %    Neutrophils # 2.5 1.7 - 7.7 K/uL    Lymphocytes # 2.5 1.0 - 5.1 K/uL    Monocytes # 0.7 0.0 - 1.3 K/uL    Eosinophils # 0.4 0.0 - 0.6 K/uL    Basophils # 0.0 0.0 - 0.2 K/uL   Brain Natriuretic Peptide   Result Value Ref Range    Pro-BNP 2,991 (H) 0 - 449 pg/mL   Hepatic Function Panel   Result Value Ref Range    Total Protein 7.0 6.4 - 8.2 g/dL    Alb 3.9 3.4 - 5.0 g/dL    Alkaline Phosphatase 83 40 - 129 U/L

## 2019-04-29 NOTE — H&P
bilaterally      LABS:  Recent Labs     04/29/19 0226   WBC 6.2   HGB 12.0*   HCT 36.3*   *                                                                  Recent Labs     04/29/19 0226      K 3.4*      CO2 25   BUN 26*   CREATININE 1.6*   GLUCOSE 172*     Recent Labs     04/29/19 0226   AST 25   ALT 21   BILITOT 0.7   ALKPHOS 83     Recent Labs     04/29/19 0226   TROPONINI <0.01     No results for input(s): BNP in the last 72 hours. No results found for: PHART, WLD7ETW, PO2ART  No results for input(s): INR in the last 72 hours. No results for input(s): NITRITE, COLORU, PHUR, LABCAST, WBCUA, RBCUA, MUCUS, TRICHOMONAS, YEAST, BACTERIA, CLARITYU, SPECGRAV, LEUKOCYTESUR, UROBILINOGEN, BILIRUBINUR, BLOODU, GLUCOSEU, AMORPHOUS in the last 72 hours. Invalid input(s): Johnson Rinne     IMAGING:  XR CHEST PORTABLE   Final Result       Unchanged heart size. Increased opacity in the bilateral mid to lower    lobes, possibly representing aspiration, atelectasis, or edema. XR CHEST PORTABLE    (Results Pending)       Assessment & Plan:    Patient is a 80 y.o. male with a PMH significant for HFrEF (EF 40%, G1DD), mod aortic stenosis, HTN, CAD s/p PCI who presents with shortness of breath. Dx flash pulmonary edema in the setting of acute CHF exacerbation vs. HTN urgency    Acute Hypoxic Respiratory Failure   2/2 Flash Pulmonary Edema in the setting of  Acute CHF exacerbation vs. HTN Urgency. BP Elevated to 183 systolic on admission with bilateral lower lobe opacities on CXR. Required BiPAP 2/2 O2 sats in 70s. Hx of HFrEF on lasix at home. BNP 3000. Weight significantly elevated from last clinic appointment. Given IV lasix 40 in ED with significant improvement in respiratory status and BP.  - Lasix gtt @ 5  - Holding home antihypertensives as BP dropped significantly after lasix.    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 On lasix 20 daily w/ prn 20 mg for weight gain which patient has not been taking. He has a history of medication non-compliance at clinic appointment; states he has been taking correctly. 20lb weight gain since last clinic appointment  - Lasix gtt as above  - Holding home lisinopril and IMDUR  - BNP every other day  - is not on BB 2/2 bradycardia in past  - Daily weights and strict I/Os  - cardiology and heart failure nurse consulted   - Was supposed to have   - NPO now while on Bi-PAP    HTN Urgency; resolved  BP 836C systolic on admission.   Dropped to 120s after 1x IV lasix 40 in ED.    - Lasix gtt  - Holding home lisinopril, IMDUR, doxazosin  - Cardiology    HLD  Last lipid panel wnl   - Lipid panel ordered  - Continue home lipitor 80  - Had ezetimibe in med rec, holding for now    Hypokalemia  K 3.4   - KCl IV 10 x1 + PO 40   - Keep K > 4.0  - RFP scheduled for 9 to follow    Code Status:  Full  FEN: NPO except sips w/ meds  PPX: Lovenox / pepcid    DISPO: Admitted to   -----------------------------  Merlyn Magallanes, PGY-1  Perfect [employer] Application  5/41/0760  5:43 AM

## 2019-04-29 NOTE — CONSULTS
Critical Care Consult     Admit Date: 4/29/2019  Hospital Day: 1    ICU DAY  Code:Full Code  PCP: Sven Mccallum MD              HPI:  80 y.o. male with mod-severe AS and biventricular heart failure awoke from sleep because he was short of breath. Patient found to be hypoxic to the low 80's on arrival and in respiratory distress with a sbp of around 220. He required bipap overnight to stabilize him and he had a response to IV lasix. SUBJECTIVE:   Interval Hx: Patient is much improved after lasix and BiPAP. He is currently saturating high 90s on 3L O2- will wean. He has no complaints of dyspnea. He has no chest pain, abdominal pain or N/V/D. He has an external catheter and has diuresed 1.5L since admission. He was seen by Dr. Ricardo Dexter this morning. He is complaining of lower abdominal pain, that he says is from [address] [place] [address]. Will bladder scan and place newman catheter. He is not tolerating condom catheter- keeps falling off. He was swimming last week and felt like his legs were sinking and he was weak. He has frequent PVCs on telemetry. Daughter says patient snores, no official diagnosis of CRISTINA.     MEDICATIONS:   Scheduled Meds:   aspirin EC  81 mg Oral Daily    atorvastatin  80 mg Oral Nightly    cetirizine  10 mg Oral Daily    dicyclomine  10 mg Oral 4x Daily    multivitamin  1 tablet Oral Daily    famotidine  20 mg Oral BID    sodium chloride flush  10 mL Intravenous 2 times per day    enoxaparin  40 mg Subcutaneous Daily      Continuous Infusions:   furosemide (LASIX) 1mg/ml infusion 5 mg/hr (04/29/19 0649)     PRN Meds:magnesium hydroxide, ondansetron, albuterol, diclofenac sodium, nitroGLYCERIN, sodium chloride flush, acetaminophen  Allergies: No Known Allergies    PHYSICAL EXAM:       Vitals: BP (!) 144/75   Pulse 61   Temp 97.7 °F (36.5 °C) (Axillary)   Resp 18   Ht 5' 6.14\" (1.68 m)   Wt 221 lb 12.5 oz (100.6 kg)   SpO2 97%   BMI 35.64 kg/m²   I/O:      Intake/Output Summary
daily 4/18/19   Magda Hennessy MD   doxazosin (CARDURA) 4 MG tablet Take 1 tablet by mouth nightly 4/18/19   Magda Hennessy MD   furosemide (LASIX) 20 MG tablet Take 1 tablet by mouth daily 4/4/19   Magda Hennessy MD   diclofenac sodium 1 % GEL Apply 4 g topically 4 times daily as needed for Pain 3/28/19   Wyatt Minor MD   ezetimibe (ZETIA) 10 MG tablet Take 1 tablet by mouth nightly 12/27/18   Magda Hennessy MD   aspirin EC 81 MG EC tablet Take 1 tablet by mouth daily 12/27/18   Magda Hennessy MD   cetirizine (ZYRTEC) 10 MG tablet Take 1 tablet by mouth daily 12/27/18   Magda Hennessy MD   nitroGLYCERIN (NITROSTAT) 0.4 MG SL tablet Place 1 tablet under the tongue every 5 minutes as needed for Chest pain up to max of 3 total doses. If no relief after 1 dose, call 911. 12/27/18   Magda Hennessy MD   Multiple Vitamin (MULTI-VITAMIN) TABS Take 1 tablet by mouth daily 5/21/18   Laya Best MD   atorvastatin (LIPITOR) 80 MG tablet Take 1 tablet by mouth nightly 1/25/18   Claudia Huerta MD        Allergies:  Patient has no known allergies. Review of Systems:       · Constitutional: there has been no unanticipated weight loss. There's been no change in energy level, sleep pattern, or activity level. · Eyes: No visual changes or diplopia. No scleral icterus. · ENT: No Headaches, hearing loss or vertigo. No mouth sores or sore throat. · Cardiovascular: No loss of consciousness. No hemoptysis, pleuritic pain, or phlebitis. · Respiratory: No cough or wheezing, no sputum production. No hematemesis. · Gastrointestinal: No abdominal pain, appetite loss, blood in stools. No change in bowel or bladder habits. · Genitourinary: No dysuria, trouble voiding, or hematuria. · Musculoskeletal:  No gait disturbance, weakness or joint complaints. · Integumentary: No rash or pruritis. · Other systems reviewed negative as done.   Physical Examination:    Vitals:    04/29/19 1006   BP: (!) 165/94   Pulse:

## 2019-04-29 NOTE — ED NOTES
Bed: A04-04  Expected date:   Expected time:   Means of arrival:   Comments:  [facility] [address] [place] [address]  04/29/19 3393

## 2019-04-29 NOTE — PROGRESS NOTES
ABG drawn per RT Kamryn, on BIPAP at 40%. Ref. Range 4/29/2019 06:01   pH, Arterial Latest Ref Range: 7.350 - 7.450  7.381   pCO2, Arterial Latest Ref Range: 35.0 - 45.0 mm Hg 46.5 (H)   pO2, Arterial Latest Ref Range: 75.0 - 108.0 mm Hg 101.5   HCO3, Arterial Latest Ref Range: 21.0 - 29.0 mmol/L 27.6   TCO2 (calc), Art Latest Ref Range: Not Established mmol/L 29   Base Excess, Arterial Latest Ref Range: -3 - 3  3   O2 Sat, Arterial Latest Ref Range: 93 - 100 % 98     Patient taken off BIPAP per MD order. Placed on 4 liters O2 per nasal cannula.

## 2019-04-29 NOTE — PROGRESS NOTES
4 Eyes Admission Assessment     I agree as the admission nurse that 2 RN's have performed a thorough Head to Toe Skin Assessment on the patient. ALL assessment sites listed below have been assessed on admission. Areas assessed by both nurses: Johanna Mendez YES[x]   Head, Face, and Ears   [x]   Shoulders, Back, and Chest  [x]   Arms, Elbows, and Hands   [x]   Coccyx, Sacrum, and Ischum  [x]   Legs, Feet, and Heels        Does the Patient have Skin Breakdown?   NO       Juan Prevention initiated:  YES  Wound Care Orders initiated:  NO      [facility] nurse consulted for Pressure Injury (Stage 3,4, Unstageable, DTI, NWPT, and Complex wounds):  NO      Nurse 1 eSignature: Electronically signed by Jose Dobbs RN on 4/29/19 at 7:27 AM    **SHARE this note so that the co-signing nurse is able to place an eSignature**    Nurse 2 eSignature: Electronically signed by Rayray Lockwood RN on 4/29/19 at 7:30 AM

## 2019-04-29 NOTE — PROGRESS NOTES
INR 1.44 (H) 11/02/2017    INR 1.50 (H) 12/22/2015    INR 1.45 (H) 12/22/2015     U/A:No results for input(s): NITRITE, COLORU, PHUR, LABCAST, WBCUA, RBCUA, MUCUS, TRICHOMONAS, YEAST, BACTERIA, CLARITYU, SPECGRAV, LEUKOCYTESUR, UROBILINOGEN, BILIRUBINUR, BLOODU, GLUCOSEU, AMORPHOUS in the last 72 hours. Invalid input(s): Zuri Griggs       The objective and subjective findings as well as the ICU course of treatment have been reviewed with the ICU team. The treatment plan has been reviewed with the ICU team. The patient is being transferred to [place] [address] in stable condition. Sreedhar Bridges M.D.    Internal Medicine Resident, PGY-2  Reached via [address] [place] [address]  4/29/2019, 2:38 PM

## 2019-04-29 NOTE — PROGRESS NOTES
Hospitalist Progress Note      PCP: James English MD    Date of Admission: 4/29/2019    Chief Complaint: Dyspnea    Hospital Course:   Feels much better  Down to 2 L oxygen  Breathing improved  No chest pain  No abdominal pain nausea vomiting        Medications:  Reviewed      Exam:    BP (!) 165/94   Pulse 59   Temp 97.7 °F (36.5 °C) (Axillary)   Resp 19   Ht 5' 6.14\" (1.68 m)   Wt 221 lb 12.5 oz (100.6 kg)   SpO2 100%   BMI 35.64 kg/m²     General appearance: No apparent distress, appears stated age and cooperative. HEENT: Pupils equal, round, and reactive to light. Conjunctivae/corneas clear. Neck: Supple, with full range of motion. No jugular venous distention. Trachea midline. Respiratory:  Normal respiratory effort. Clear to auscultation, bilaterally without RALES/WHEEZES/Rhonchi. Cardiovascular: Regular rate and rhythm with normal S1/S2 without MURMURS, rubs or gallops. Abdomen: Soft, non-tender, non-distended with normal bowel sounds. Musculoskeletal: 2+ pitting lower extremity edema  Skin: Skin color, texture, turgor normal.  No rashes or lesions. Neurologic:  Neurovascularly intact without any focal sensory/motor deficits. Cranial nerves: II-XII intact, grossly non-focal.  Psychiatric: Alert and oriented, thought content appropriate, normal insight        Labs:   Recent Labs     04/29/19 0226   WBC 6.2   HGB 12.0*   HCT 36.3*   *     Recent Labs     04/29/19 0226 04/29/19  0953    142   K 3.4* 4.1    103   CO2 25 27   BUN 26* 25*   CREATININE 1.6* 1.4*   CALCIUM 8.7 9.1   PHOS  --  3.8     Recent Labs     04/29/19 0226   AST 25   ALT 21   BILIDIR <0.2   BILITOT 0.7   ALKPHOS 83     No results for input(s): INR in the last 72 hours.   Recent Labs     04/29/19 0226   TROPONINI <0.01       Urinalysis:      Lab Results   Component Value Date    NITRU Negative 03/28/2019    WBCUA 0-2 05/14/2014    BACTERIA Rare 05/14/2014    RBCUA 0-2 05/14/2014    BLOODU Negative

## 2019-04-30 LAB
ALBUMIN SERPL-MCNC: 3.7 G/DL (ref 3.4–5)
ANION GAP SERPL CALCULATED.3IONS-SCNC: 11 MMOL/L (ref 3–16)
BASOPHILS ABSOLUTE: 0 K/UL (ref 0–0.2)
BASOPHILS RELATIVE PERCENT: 0.6 %
BUN BLDV-MCNC: 27 MG/DL (ref 7–20)
CALCIUM SERPL-MCNC: 8.9 MG/DL (ref 8.3–10.6)
CHLORIDE BLD-SCNC: 102 MMOL/L (ref 99–110)
CHOLESTEROL, TOTAL: 100 MG/DL (ref 0–199)
CO2: 30 MMOL/L (ref 21–32)
CREAT SERPL-MCNC: 1.6 MG/DL (ref 0.8–1.3)
EOSINOPHILS ABSOLUTE: 0.5 K/UL (ref 0–0.6)
EOSINOPHILS RELATIVE PERCENT: 8.6 %
GFR AFRICAN AMERICAN: 50
GFR NON-AFRICAN AMERICAN: 41
GLUCOSE BLD-MCNC: 116 MG/DL (ref 70–99)
HCT VFR BLD CALC: 37 % (ref 40.5–52.5)
HDLC SERPL-MCNC: 52 MG/DL (ref 40–60)
HEMOGLOBIN: 12.4 G/DL (ref 13.5–17.5)
LDL CHOLESTEROL CALCULATED: 40 MG/DL
LYMPHOCYTES ABSOLUTE: 1.3 K/UL (ref 1–5.1)
LYMPHOCYTES RELATIVE PERCENT: 21.6 %
MAGNESIUM: 2 MG/DL (ref 1.8–2.4)
MCH RBC QN AUTO: 32.7 PG (ref 26–34)
MCHC RBC AUTO-ENTMCNC: 33.6 G/DL (ref 31–36)
MCV RBC AUTO: 97.4 FL (ref 80–100)
MONOCYTES ABSOLUTE: 1 K/UL (ref 0–1.3)
MONOCYTES RELATIVE PERCENT: 17 %
NEUTROPHILS ABSOLUTE: 3.1 K/UL (ref 1.7–7.7)
NEUTROPHILS RELATIVE PERCENT: 52.2 %
PDW BLD-RTO: 12.9 % (ref 12.4–15.4)
PHOSPHORUS: 3.6 MG/DL (ref 2.5–4.9)
PLATELET # BLD: 127 K/UL (ref 135–450)
PMV BLD AUTO: 7.5 FL (ref 5–10.5)
POTASSIUM SERPL-SCNC: 3.8 MMOL/L (ref 3.5–5.1)
RBC # BLD: 3.81 M/UL (ref 4.2–5.9)
SODIUM BLD-SCNC: 143 MMOL/L (ref 136–145)
TRIGL SERPL-MCNC: 39 MG/DL (ref 0–150)
TSH REFLEX: 2.78 UIU/ML (ref 0.27–4.2)
VLDLC SERPL CALC-MCNC: 8 MG/DL
WBC # BLD: 6 K/UL (ref 4–11)

## 2019-04-30 PROCEDURE — 85025 COMPLETE CBC W/AUTO DIFF WBC: CPT

## 2019-04-30 PROCEDURE — 83735 ASSAY OF MAGNESIUM: CPT

## 2019-04-30 PROCEDURE — 6360000002 HC RX W HCPCS: Performed by: STUDENT IN AN ORGANIZED HEALTH CARE EDUCATION/TRAINING PROGRAM

## 2019-04-30 PROCEDURE — 6370000000 HC RX 637 (ALT 250 FOR IP): Performed by: STUDENT IN AN ORGANIZED HEALTH CARE EDUCATION/TRAINING PROGRAM

## 2019-04-30 PROCEDURE — 1200000000 HC SEMI PRIVATE

## 2019-04-30 PROCEDURE — 6370000000 HC RX 637 (ALT 250 FOR IP): Performed by: INTERNAL MEDICINE

## 2019-04-30 PROCEDURE — 80061 LIPID PANEL: CPT

## 2019-04-30 PROCEDURE — 36415 COLL VENOUS BLD VENIPUNCTURE: CPT

## 2019-04-30 PROCEDURE — 94762 N-INVAS EAR/PLS OXIMTRY CONT: CPT

## 2019-04-30 PROCEDURE — 80069 RENAL FUNCTION PANEL: CPT

## 2019-04-30 PROCEDURE — 84443 ASSAY THYROID STIM HORMONE: CPT

## 2019-04-30 PROCEDURE — 99233 SBSQ HOSP IP/OBS HIGH 50: CPT | Performed by: INTERNAL MEDICINE

## 2019-04-30 RX ORDER — FUROSEMIDE 40 MG/1
40 TABLET ORAL 2 TIMES DAILY
Status: DISCONTINUED | OUTPATIENT
Start: 2019-04-30 | End: 2019-05-01

## 2019-04-30 RX ORDER — CARVEDILOL 3.12 MG/1
3.12 TABLET ORAL 2 TIMES DAILY WITH MEALS
Status: DISCONTINUED | OUTPATIENT
Start: 2019-04-30 | End: 2019-05-01

## 2019-04-30 RX ORDER — POTASSIUM CHLORIDE 1.5 G/1.77G
40 POWDER, FOR SOLUTION ORAL 2 TIMES DAILY
Status: COMPLETED | OUTPATIENT
Start: 2019-04-30 | End: 2019-04-30

## 2019-04-30 RX ORDER — TETRAHYDROZOLINE HCL 0.05 %
1 DROPS OPHTHALMIC (EYE) EVERY 4 HOURS PRN
Status: DISCONTINUED | OUTPATIENT
Start: 2019-04-30 | End: 2019-05-02 | Stop reason: HOSPADM

## 2019-04-30 RX ADMIN — DOXAZOSIN 4 MG: 4 TABLET ORAL at 09:21

## 2019-04-30 RX ADMIN — HEPARIN SODIUM 5000 UNITS: 5000 INJECTION INTRAVENOUS; SUBCUTANEOUS at 14:10

## 2019-04-30 RX ADMIN — TETRAHYDROZOLINE HCL 0.05% 1 DROP: 0.05 SOLUTION/ DROPS INTRAOCULAR at 02:57

## 2019-04-30 RX ADMIN — POTASSIUM CHLORIDE 40 MEQ: 1.5 POWDER, FOR SOLUTION ORAL at 02:50

## 2019-04-30 RX ADMIN — THERA TABS 1 TABLET: TAB at 09:46

## 2019-04-30 RX ADMIN — CETIRIZINE HYDROCHLORIDE 10 MG: 10 TABLET, FILM COATED ORAL at 09:21

## 2019-04-30 RX ADMIN — TETRAHYDROZOLINE HCL 0.05% 1 DROP: 0.05 SOLUTION/ DROPS INTRAOCULAR at 16:50

## 2019-04-30 RX ADMIN — LISINOPRIL 20 MG: 20 TABLET ORAL at 09:21

## 2019-04-30 RX ADMIN — FAMOTIDINE 20 MG: 20 TABLET ORAL at 09:21

## 2019-04-30 RX ADMIN — WHITE PETROLATUM MINERAL OIL: 150; 830 OINTMENT OPHTHALMIC at 02:10

## 2019-04-30 RX ADMIN — ATORVASTATIN CALCIUM 80 MG: 80 TABLET, FILM COATED ORAL at 21:37

## 2019-04-30 RX ADMIN — HEPARIN SODIUM 5000 UNITS: 5000 INJECTION INTRAVENOUS; SUBCUTANEOUS at 06:03

## 2019-04-30 RX ADMIN — FAMOTIDINE 20 MG: 20 TABLET ORAL at 21:37

## 2019-04-30 RX ADMIN — CARVEDILOL 3.12 MG: 3.12 TABLET, FILM COATED ORAL at 12:49

## 2019-04-30 RX ADMIN — ASPIRIN 81 MG: 81 TABLET, COATED ORAL at 09:21

## 2019-04-30 RX ADMIN — TETRAHYDROZOLINE HCL 0.05% 1 DROP: 0.05 SOLUTION/ DROPS INTRAOCULAR at 09:49

## 2019-04-30 RX ADMIN — LISINOPRIL 20 MG: 20 TABLET ORAL at 21:37

## 2019-04-30 RX ADMIN — FUROSEMIDE 40 MG: 40 TABLET ORAL at 18:12

## 2019-04-30 RX ADMIN — WHITE PETROLATUM MINERAL OIL: 150; 830 OINTMENT OPHTHALMIC at 12:52

## 2019-04-30 RX ADMIN — HEPARIN SODIUM 5000 UNITS: 5000 INJECTION INTRAVENOUS; SUBCUTANEOUS at 21:45

## 2019-04-30 RX ADMIN — CARVEDILOL 3.12 MG: 3.12 TABLET, FILM COATED ORAL at 18:12

## 2019-04-30 RX ADMIN — POTASSIUM CHLORIDE 40 MEQ: 1.5 POWDER, FOR SOLUTION ORAL at 09:21

## 2019-04-30 ASSESSMENT — PAIN SCALES - GENERAL
PAINLEVEL_OUTOF10: 0

## 2019-04-30 ASSESSMENT — ENCOUNTER SYMPTOMS
SHORTNESS OF BREATH: 0
CONSTIPATION: 0
COUGH: 0
SORE THROAT: 0

## 2019-04-30 NOTE — PROGRESS NOTES
[address] Daily Progress Note      Admit Date:  4/29/2019    Subjective:  Mr. Em Jackson was seen and examined. F/U CHF/HTN/HTN urgency/RF. Breathing much better. Breathing back to normal. No chest pain. Objective:   BP (!) 144/89   Pulse 59   Temp 98.4 °F (36.9 °C) (Oral)   Resp 14   Ht 5' 6.14\" (1.68 m)   Wt 212 lb 1.3 oz (96.2 kg)   SpO2 95%   BMI 34.08 kg/m²       Intake/Output Summary (Last 24 hours) at 4/30/2019 8689  Last data filed at 4/30/2019 8649  Gross per 24 hour   Intake 1728 ml   Output 5150 ml   Net -3422 ml       TELEMETRY: Sinus     Physical Exam:  General:  Awake, alert, NAD  Skin:  Warm and dry  Neck:  JVP difficult  Chest: decreased BS, fine crackles very base, respiration normal  Cardiovascular:  RRR S1S2  Abdomen:  Soft nontender  Extremities:  tr edema    Medications:    aspirin EC  81 mg Oral Daily    atorvastatin  80 mg Oral Nightly    cetirizine  10 mg Oral Daily    multivitamin  1 tablet Oral Daily    famotidine  20 mg Oral BID    heparin (porcine)  5,000 Units Subcutaneous 3 times per day    lisinopril  20 mg Oral BID    doxazosin  4 mg Oral Daily      furosemide (LASIX) 1mg/ml infusion 5 mg/hr (04/29/19 2214)     tetrahydrozoline, magnesium hydroxide, ondansetron, diclofenac sodium, nitroGLYCERIN, sodium chloride flush, acetaminophen, albuterol, lubrifresh P.M. Lab Data:  CBC:   Recent Labs     04/29/19  0226 04/30/19 0439   WBC 6.2 6.0   HGB 12.0* 12.4*   HCT 36.3* 37.0*   MCV 97.4 97.4   * 127*     BMP:   Recent Labs     04/29/19  0953 04/29/19  1545 04/30/19 0439    142 143   K 4.1 3.5 3.8    101 102   CO2 27 28 30   PHOS 3.8 2.8 3.6   BUN 25* 25* 27*   CREATININE 1.4* 1.6* 1.6*     LIVER PROFILE:   Recent Labs     04/29/19 0226   AST 25   ALT 21   BILIDIR <0.2   BILITOT 0.7   ALKPHOS 83     PT/INR: No results for input(s): PROTIME, INR in the last 72 hours. APTT: No results for input(s): APTT in the last 72 hours.   BNP:  No

## 2019-05-01 LAB
ALBUMIN SERPL-MCNC: 3.4 G/DL (ref 3.4–5)
ANION GAP SERPL CALCULATED.3IONS-SCNC: 11 MMOL/L (ref 3–16)
BASOPHILS ABSOLUTE: 0 K/UL (ref 0–0.2)
BASOPHILS RELATIVE PERCENT: 0.6 %
BUN BLDV-MCNC: 35 MG/DL (ref 7–20)
CALCIUM SERPL-MCNC: 9 MG/DL (ref 8.3–10.6)
CHLORIDE BLD-SCNC: 100 MMOL/L (ref 99–110)
CO2: 31 MMOL/L (ref 21–32)
CREAT SERPL-MCNC: 1.8 MG/DL (ref 0.8–1.3)
EOSINOPHILS ABSOLUTE: 0.6 K/UL (ref 0–0.6)
EOSINOPHILS RELATIVE PERCENT: 9.4 %
GFR AFRICAN AMERICAN: 43
GFR NON-AFRICAN AMERICAN: 36
GLUCOSE BLD-MCNC: 108 MG/DL (ref 70–99)
HCT VFR BLD CALC: 39.5 % (ref 40.5–52.5)
HEMOGLOBIN: 13.2 G/DL (ref 13.5–17.5)
LYMPHOCYTES ABSOLUTE: 1.8 K/UL (ref 1–5.1)
LYMPHOCYTES RELATIVE PERCENT: 28.5 %
MAGNESIUM: 2.1 MG/DL (ref 1.8–2.4)
MCH RBC QN AUTO: 32.5 PG (ref 26–34)
MCHC RBC AUTO-ENTMCNC: 33.5 G/DL (ref 31–36)
MCV RBC AUTO: 96.9 FL (ref 80–100)
MONOCYTES ABSOLUTE: 1 K/UL (ref 0–1.3)
MONOCYTES RELATIVE PERCENT: 15.2 %
NEUTROPHILS ABSOLUTE: 3 K/UL (ref 1.7–7.7)
NEUTROPHILS RELATIVE PERCENT: 46.3 %
PDW BLD-RTO: 13 % (ref 12.4–15.4)
PHOSPHORUS: 3.9 MG/DL (ref 2.5–4.9)
PLATELET # BLD: 133 K/UL (ref 135–450)
PMV BLD AUTO: 8 FL (ref 5–10.5)
POTASSIUM SERPL-SCNC: 3.5 MMOL/L (ref 3.5–5.1)
PRO-BNP: 4898 PG/ML (ref 0–449)
RBC # BLD: 4.08 M/UL (ref 4.2–5.9)
SODIUM BLD-SCNC: 142 MMOL/L (ref 136–145)
WBC # BLD: 6.4 K/UL (ref 4–11)

## 2019-05-01 PROCEDURE — 83735 ASSAY OF MAGNESIUM: CPT

## 2019-05-01 PROCEDURE — 97161 PT EVAL LOW COMPLEX 20 MIN: CPT

## 2019-05-01 PROCEDURE — 2580000003 HC RX 258: Performed by: STUDENT IN AN ORGANIZED HEALTH CARE EDUCATION/TRAINING PROGRAM

## 2019-05-01 PROCEDURE — 97535 SELF CARE MNGMENT TRAINING: CPT

## 2019-05-01 PROCEDURE — 97530 THERAPEUTIC ACTIVITIES: CPT

## 2019-05-01 PROCEDURE — 99233 SBSQ HOSP IP/OBS HIGH 50: CPT | Performed by: INTERNAL MEDICINE

## 2019-05-01 PROCEDURE — 85025 COMPLETE CBC W/AUTO DIFF WBC: CPT

## 2019-05-01 PROCEDURE — 6370000000 HC RX 637 (ALT 250 FOR IP): Performed by: STUDENT IN AN ORGANIZED HEALTH CARE EDUCATION/TRAINING PROGRAM

## 2019-05-01 PROCEDURE — 36415 COLL VENOUS BLD VENIPUNCTURE: CPT

## 2019-05-01 PROCEDURE — 80069 RENAL FUNCTION PANEL: CPT

## 2019-05-01 PROCEDURE — 94760 N-INVAS EAR/PLS OXIMETRY 1: CPT

## 2019-05-01 PROCEDURE — 1200000000 HC SEMI PRIVATE

## 2019-05-01 PROCEDURE — 6360000002 HC RX W HCPCS: Performed by: STUDENT IN AN ORGANIZED HEALTH CARE EDUCATION/TRAINING PROGRAM

## 2019-05-01 PROCEDURE — 97165 OT EVAL LOW COMPLEX 30 MIN: CPT

## 2019-05-01 PROCEDURE — 83880 ASSAY OF NATRIURETIC PEPTIDE: CPT

## 2019-05-01 RX ORDER — POTASSIUM CHLORIDE 1.5 G/1.77G
40 POWDER, FOR SOLUTION ORAL ONCE
Status: COMPLETED | OUTPATIENT
Start: 2019-05-01 | End: 2019-05-01

## 2019-05-01 RX ORDER — CARVEDILOL 6.25 MG/1
6.25 TABLET ORAL 2 TIMES DAILY WITH MEALS
Status: DISCONTINUED | OUTPATIENT
Start: 2019-05-01 | End: 2019-05-02 | Stop reason: HOSPADM

## 2019-05-01 RX ORDER — 0.9 % SODIUM CHLORIDE 0.9 %
250 INTRAVENOUS SOLUTION INTRAVENOUS ONCE
Status: COMPLETED | OUTPATIENT
Start: 2019-05-01 | End: 2019-05-01

## 2019-05-01 RX ADMIN — FAMOTIDINE 20 MG: 20 TABLET ORAL at 09:52

## 2019-05-01 RX ADMIN — CARVEDILOL 6.25 MG: 6.25 TABLET, FILM COATED ORAL at 18:52

## 2019-05-01 RX ADMIN — ATORVASTATIN CALCIUM 80 MG: 80 TABLET, FILM COATED ORAL at 20:10

## 2019-05-01 RX ADMIN — THERA TABS 1 TABLET: TAB at 09:52

## 2019-05-01 RX ADMIN — HEPARIN SODIUM 5000 UNITS: 5000 INJECTION INTRAVENOUS; SUBCUTANEOUS at 15:37

## 2019-05-01 RX ADMIN — ASPIRIN 81 MG: 81 TABLET, COATED ORAL at 09:52

## 2019-05-01 RX ADMIN — CETIRIZINE HYDROCHLORIDE 10 MG: 10 TABLET, FILM COATED ORAL at 09:52

## 2019-05-01 RX ADMIN — SODIUM CHLORIDE 250 ML: 9 INJECTION, SOLUTION INTRAVENOUS at 15:37

## 2019-05-01 RX ADMIN — LISINOPRIL 20 MG: 20 TABLET ORAL at 09:55

## 2019-05-01 RX ADMIN — POTASSIUM CHLORIDE 40 MEQ: 1.5 POWDER, FOR SOLUTION ORAL at 15:37

## 2019-05-01 RX ADMIN — FUROSEMIDE 40 MG: 40 TABLET ORAL at 09:51

## 2019-05-01 RX ADMIN — HEPARIN SODIUM 5000 UNITS: 5000 INJECTION INTRAVENOUS; SUBCUTANEOUS at 20:11

## 2019-05-01 RX ADMIN — TETRAHYDROZOLINE HCL 0.05% 1 DROP: 0.05 SOLUTION/ DROPS INTRAOCULAR at 04:52

## 2019-05-01 RX ADMIN — FAMOTIDINE 20 MG: 20 TABLET ORAL at 20:10

## 2019-05-01 RX ADMIN — DOXAZOSIN 4 MG: 4 TABLET ORAL at 09:52

## 2019-05-01 RX ADMIN — HEPARIN SODIUM 5000 UNITS: 5000 INJECTION INTRAVENOUS; SUBCUTANEOUS at 06:44

## 2019-05-01 ASSESSMENT — PAIN SCALES - GENERAL
PAINLEVEL_OUTOF10: 0
PAINLEVEL_OUTOF10: 2

## 2019-05-01 ASSESSMENT — ENCOUNTER SYMPTOMS
SORE THROAT: 0
SHORTNESS OF BREATH: 0
CONSTIPATION: 0
COUGH: 0

## 2019-05-01 NOTE — PROGRESS NOTES
Physical Therapy    Facility/Department: [facility] ICU  Initial Assessment/Treatment Note/Discharge Note    NAME: Luis Amaya  : 1932  MRN: [id number]    Date of Service: 2019    Discharge Recommendations:    Luis Amaya scored a 21/24 on the AM-PAC short mobility form. At this time, no further PT is recommended upon discharge due to supervision/SBA for all mobility. Recommend patient returns to prior setting with prior services. PT Equipment Recommendations  Equipment Needed: No    Assessment   Assessment: Pt tolerated treatment well this date. He is at or near baseline with SpO2 >90% for majority of treatment with a brief decrease to 87%. However, pt able to recover quickly and on his own. Pt is stable on his feet and required SBA/Supervision for all mobility and balance activities. Pt desires to return home and his only concerns with treatment include control with medications. Pt is typically independent with all activities and active, however advised to use cane when his breathing is altered. Consult social work/case management regarding nursing assist at home upon DC. DC with family and from acute IP PT at this time. Decision Making: Low Complexity  Patient Education: role of PT, importance of OOB, assist with mobility, dc recs. Pt verb understanding. No Skilled PT: At baseline function  REQUIRES PT FOLLOW UP: No       Patient Diagnosis(es): The primary encounter diagnosis was Respiratory distress. A diagnosis of Acute pulmonary edema (HCC) was also pertinent to this visit. has a past medical history of Arthritis, CAD (coronary artery disease), Chronic kidney disease, Constipation, DDD (degenerative disc disease), lumbar, Dental disease, Hearing loss, Hyperlipidemia, Hypertension, Mild aortic stenosis, and Prostate enlargement. has a past surgical history that includes TURP; Coronary angioplasty with stent (98);  Skin tag removal (2/2/10); and eye surgery. Restrictions  Position Activity Restriction  Other position/activity restrictions: up with assist  Vision/Hearing  Vision: Impaired  Vision Exceptions: Wears glasses for reading  Hearing: Within functional limits(per PMH, pt is [place])     Subjective  General  Chart Reviewed: Yes  Patient assessed for rehabilitation services?: Yes  Additional Pertinent Hx: Pt is a 80 y.o. male admitted to [facility] following an episode of respiratory distress. CXR - CHF with increased basilar opacities, effusions, atelectasis. Pert PMH: CAD, CKD, DDD, Hyperlipidemia, HTN, Mild aortic stenosis, Coronary Angioplasty with stent placcement (1998), TURP  Family / Caregiver Present: Yes(Son in Law)  Referring Practitioner: Willow Peck MD  Referral Date : 04/30/19  Diagnosis: Hypertensive Emergency  Subjective  Subjective: Pt found in supine upon arrival. Pleasant and agreeable to PT eval.  Pain Screening  Patient Currently in Pain: Denies         Orientation  Orientation  Overall Orientation Status: Within Functional Limits  Social/Functional History  Social/Functional History  Lives With: Spouse (wife uses RW to get around)  Type of Home: Apartment  Home Layout: One level  Home Access: Elevator  Bathroom Shower/Tub: Walk-in shower  Bathroom Toilet: Standard  Bathroom Equipment: Grab bars in shower, Grab bars around toilet, Shower chair  Home Equipment: Rolling walker, Cane(Does not use AD often)  ADL Assistance: Independent  Homemaking Assistance: Needs assistance(Aide comes into clean during the week. Pt able to do laundry. Wife cooks)  Ambulation Assistance: Independent(No AD typically. )  Transfer Assistance: Independent  Active : Yes  Occupation: Retired  Additional Comments: pt swims regularly at the [employer]. Pt denies falls. Cognition   Cognition  Overall Cognitive Status: [facility]  Cognition Comment: Very pleasant and talkative. Cues to redirect.     Objective    AROM RLE (degrees)  RLE AROM: WFL  AROM LLE

## 2019-05-01 NOTE — DISCHARGE INSTR - COC
restirctions  Other Medical Equipment (for information only, NOT a DME order):  {EQUIPMENT:042710910}  Other Treatments: ***    Patient's personal belongings (please select all that are sent with patient):  {CHP DME Belongings:817926372}    RN SIGNATURE:  Jennifer Hamilton RN    CASE MANAGEMENT/SOCIAL WORK SECTION    Inpatient Status Date: ***    Readmission Risk Assessment Score:  Readmission Risk              Risk of Unplanned Readmission:        24           Discharging to Facility/ Agency   · Name:  [facility]   · Address:  · Phone: [phone]  · Fax: [phone]        / signature: {Esignature:401777746}    PHYSICIAN SECTION    Prognosis: {Prognosis:1206316876}    Condition at Discharge: [address] [place] [address] Patient Condition:423449920}    Rehab Potential (if transferring to Rehab): {Prognosis:4954787430}    Recommended Labs or Other Treatments After Discharge: ***    Physician Certification: I certify the above information and transfer of Ivan Nogueira  is necessary for the continuing treatment of the diagnosis listed and that he requires {Admit to Appropriate Level of Care:99361} for {GREATER/LESS:519257030} 30 days.      Update Admission H&P: {CHP DME Changes in VQONV:251028777}    PHYSICIAN SIGNATURE:  {Esignature:276069701}

## 2019-05-01 NOTE — FLOWSHEET NOTE
05/01/19 1504   Encounter Summary   Services provided to: Patient and family together   Referral/Consult From: [address] [facility]   (Seen 5/1/19, LAURO. )   Complexity of Encounter Moderate   Length of Encounter 15 minutes   Routine   Type Initial   Assessment Approachable   Intervention Nurtured hope   Outcome Expressed gratitude

## 2019-05-01 NOTE — PROGRESS NOTES
place, and time. He appears well-developed and well-nourished. HENT:   Head: Normocephalic and atraumatic. Eyes: Pupils are equal, round, and reactive to light. Neck: Normal range of motion. Cardiovascular: Normal rate and regular rhythm. Murmur heard. Pulmonary/Chest: Effort normal and breath sounds normal. He has no wheezes. Abdominal: Soft. Bowel sounds are normal. He exhibits no distension. Musculoskeletal: Normal range of motion. He exhibits edema. Neurological: He is alert and oriented to person, place, and time. No cranial nerve deficit. DATA:       Labs:  CBC:   Recent Labs     04/29/19 0226 04/30/19 0439 05/01/19  0424   WBC 6.2 6.0 6.4   HGB 12.0* 12.4* 13.2*   HCT 36.3* 37.0* 39.5*   * 127* 133*       BMP:   Recent Labs     04/29/19  1545 04/30/19 0439 05/01/19  0424    143 142   K 3.5 3.8 3.5    102 100   CO2 28 30 31   BUN 25* 27* 35*   CREATININE 1.6* 1.6* 1.8*   GLUCOSE 87 116* 108*     LFT's:   Recent Labs     04/29/19 0226   AST 25   ALT 21   BILITOT 0.7   ALKPHOS 83     Troponin:   Recent Labs     04/29/19 0226   TROPONINI <0.01     BNP: No results for input(s): BNP in the last 72 hours. ABGs:   Recent Labs     04/29/19  0601   PHART 7.381   EYQ3FEM 46.5*   PO2ART 101.5     INR: No results for input(s): INR in the last 72 hours. U/A:No results for input(s): NITRITE, COLORU, PHUR, LABCAST, WBCUA, RBCUA, MUCUS, TRICHOMONAS, YEAST, BACTERIA, CLARITYU, SPECGRAV, LEUKOCYTESUR, UROBILINOGEN, BILIRUBINUR, BLOODU, GLUCOSEU, AMORPHOUS in the last 72 hours. Invalid input(s): KETONESU    XR CHEST PORTABLE   Final Result   1. Congestive heart failure with increasing basilar opacity, effusions and atelectasis. 2. Pneumonia not excluded with chest radiography alone. XR CHEST PORTABLE   Final Result       Unchanged heart size. Increased opacity in the bilateral mid to lower    lobes, possibly representing aspiration, atelectasis, or edema.

## 2019-05-02 VITALS
WEIGHT: 208.34 LBS | SYSTOLIC BLOOD PRESSURE: 117 MMHG | OXYGEN SATURATION: 95 % | HEIGHT: 66 IN | BODY MASS INDEX: 33.48 KG/M2 | RESPIRATION RATE: 17 BRPM | HEART RATE: 52 BPM | TEMPERATURE: 98.3 F | DIASTOLIC BLOOD PRESSURE: 70 MMHG

## 2019-05-02 LAB
ALBUMIN SERPL-MCNC: 3.3 G/DL (ref 3.4–5)
ANION GAP SERPL CALCULATED.3IONS-SCNC: 11 MMOL/L (ref 3–16)
BASOPHILS ABSOLUTE: 0 K/UL (ref 0–0.2)
BASOPHILS RELATIVE PERCENT: 0.5 %
BUN BLDV-MCNC: 36 MG/DL (ref 7–20)
CALCIUM SERPL-MCNC: 9 MG/DL (ref 8.3–10.6)
CHLORIDE BLD-SCNC: 104 MMOL/L (ref 99–110)
CO2: 28 MMOL/L (ref 21–32)
CREAT SERPL-MCNC: 1.8 MG/DL (ref 0.8–1.3)
EOSINOPHILS ABSOLUTE: 0.6 K/UL (ref 0–0.6)
EOSINOPHILS RELATIVE PERCENT: 9.9 %
GFR AFRICAN AMERICAN: 43
GFR NON-AFRICAN AMERICAN: 36
GLUCOSE BLD-MCNC: 107 MG/DL (ref 70–99)
HCT VFR BLD CALC: 37.9 % (ref 40.5–52.5)
HEMOGLOBIN: 12.9 G/DL (ref 13.5–17.5)
LYMPHOCYTES ABSOLUTE: 1.6 K/UL (ref 1–5.1)
LYMPHOCYTES RELATIVE PERCENT: 26.4 %
MAGNESIUM: 2.1 MG/DL (ref 1.8–2.4)
MCH RBC QN AUTO: 32.6 PG (ref 26–34)
MCHC RBC AUTO-ENTMCNC: 34 G/DL (ref 31–36)
MCV RBC AUTO: 96 FL (ref 80–100)
MONOCYTES ABSOLUTE: 0.9 K/UL (ref 0–1.3)
MONOCYTES RELATIVE PERCENT: 15.6 %
NEUTROPHILS ABSOLUTE: 2.9 K/UL (ref 1.7–7.7)
NEUTROPHILS RELATIVE PERCENT: 47.6 %
PDW BLD-RTO: 13 % (ref 12.4–15.4)
PHOSPHORUS: 4.2 MG/DL (ref 2.5–4.9)
PLATELET # BLD: 141 K/UL (ref 135–450)
PMV BLD AUTO: 7.6 FL (ref 5–10.5)
POTASSIUM SERPL-SCNC: 4 MMOL/L (ref 3.5–5.1)
RBC # BLD: 3.95 M/UL (ref 4.2–5.9)
SODIUM BLD-SCNC: 143 MMOL/L (ref 136–145)
WBC # BLD: 6 K/UL (ref 4–11)

## 2019-05-02 PROCEDURE — 83735 ASSAY OF MAGNESIUM: CPT

## 2019-05-02 PROCEDURE — 6360000002 HC RX W HCPCS: Performed by: STUDENT IN AN ORGANIZED HEALTH CARE EDUCATION/TRAINING PROGRAM

## 2019-05-02 PROCEDURE — 99233 SBSQ HOSP IP/OBS HIGH 50: CPT | Performed by: INTERNAL MEDICINE

## 2019-05-02 PROCEDURE — 85025 COMPLETE CBC W/AUTO DIFF WBC: CPT

## 2019-05-02 PROCEDURE — 6370000000 HC RX 637 (ALT 250 FOR IP): Performed by: STUDENT IN AN ORGANIZED HEALTH CARE EDUCATION/TRAINING PROGRAM

## 2019-05-02 PROCEDURE — 80069 RENAL FUNCTION PANEL: CPT

## 2019-05-02 RX ORDER — FUROSEMIDE 20 MG/1
60 TABLET ORAL DAILY
Qty: 60 TABLET | Refills: 0 | Status: SHIPPED | OUTPATIENT
Start: 2019-05-02 | End: 2019-05-02 | Stop reason: SDUPTHER

## 2019-05-02 RX ORDER — CARVEDILOL 6.25 MG/1
6.25 TABLET ORAL 2 TIMES DAILY WITH MEALS
Qty: 60 TABLET | Refills: 0 | Status: SHIPPED | OUTPATIENT
Start: 2019-05-02 | End: 2019-05-10

## 2019-05-02 RX ORDER — FUROSEMIDE 20 MG/1
40 TABLET ORAL DAILY
Qty: 60 TABLET | Refills: 0 | Status: SHIPPED | OUTPATIENT
Start: 2019-05-02 | End: 2019-05-07

## 2019-05-02 RX ORDER — DOCUSATE SODIUM 100 MG/1
100 CAPSULE, LIQUID FILLED ORAL 2 TIMES DAILY PRN
Qty: 10 CAPSULE | Refills: 0 | Status: SHIPPED | OUTPATIENT
Start: 2019-05-02 | End: 2019-07-05 | Stop reason: SDUPTHER

## 2019-05-02 RX ADMIN — ASPIRIN 81 MG: 81 TABLET, COATED ORAL at 08:43

## 2019-05-02 RX ADMIN — CETIRIZINE HYDROCHLORIDE 10 MG: 10 TABLET, FILM COATED ORAL at 08:43

## 2019-05-02 RX ADMIN — THERA TABS 1 TABLET: TAB at 08:43

## 2019-05-02 RX ADMIN — CARVEDILOL 6.25 MG: 6.25 TABLET, FILM COATED ORAL at 08:43

## 2019-05-02 RX ADMIN — HEPARIN SODIUM 5000 UNITS: 5000 INJECTION INTRAVENOUS; SUBCUTANEOUS at 06:23

## 2019-05-02 RX ADMIN — FAMOTIDINE 20 MG: 20 TABLET ORAL at 08:43

## 2019-05-02 ASSESSMENT — PAIN SCALES - GENERAL
PAINLEVEL_OUTOF10: 0
PAINLEVEL_OUTOF10: 0

## 2019-05-02 NOTE — PROGRESS NOTES
Pt discharged home. IV d/keith, catheter intact. Belongings collected. D/c instructions explained, including medication regimen. Pt verbalized understanding. Pt transported to main entrance and safely assisted into vehicle.

## 2019-05-03 ENCOUNTER — CARE COORDINATION (OUTPATIENT)
Dept: CASE MANAGEMENT | Age: 84
End: 2019-05-03

## 2019-05-03 DIAGNOSIS — I50.9 CONGESTIVE HEART FAILURE, UNSPECIFIED HF CHRONICITY, UNSPECIFIED HEART FAILURE TYPE (HCC): Primary | ICD-10-CM

## 2019-05-03 PROCEDURE — 1111F DSCHRG MED/CURRENT MED MERGE: CPT | Performed by: STUDENT IN AN ORGANIZED HEALTH CARE EDUCATION/TRAINING PROGRAM

## 2019-05-03 NOTE — CARE COORDINATION
[facility] Transitions Initial Follow Up Call    Call within 2 business days of discharge: Yes    Patient: Ilir Moncada Patient : 1932   MRN: [id number]   Reason for Admission: HTN Emergency/ CHF  Discharge Date: 19 RARS: Readmission Risk Score: 23      Last Discharge [facility]       Complaint Diagnosis Description Type Department Provider    19 Respiratory Distress Respiratory distress . .. ED to Hosp-Admission (Discharged) (ADMITTED) [facility] Lali Meyer MD; Maritza Beth. .. Spoke with: Ilir Moncada and Daughter     Facility: [facility], [facility].     Non-face-to-face services provided:  Obtained and reviewed discharge summary and/or continuity of care documents  Communication with home health agencies or other community services the patient is currently using-[facility]   Education of patient/family/caregiver/guardian to support self-management-.   Assessment and support for treatment adherence and medication management-.    Care Transitions 24 Hour Call    Schedule Follow Up Appointment with PCP:  Declined  Do you have any ongoing symptoms?:  No  Do you have a copy of your discharge instructions?:  Yes  Do you have all of your prescriptions and are they filled?:  Yes  Have you been contacted by a [id number] Pharmacist?:  No  Have you scheduled your follow up appointment?:  Yes  How are you going to get to your appointment?:  Car - family or friend to transport  Were you discharged with any Home Care or Post Acute Services:  Yes  Post Acute Services:  Home Health (Comment: [facility] )  Patient DME:  Other, Shower chair  Other Patient DME:  [facility], Rabia W Bibiana Noonan you have support at home?:  Partner/Spouse/SO  Do you feel like you have everything you need to keep you well at home?:  Yes  Are you an active caregiver in your home?:  Yes  Care Transitions Interventions  No Identified Needs       Summary  CTC spoke with patient and patient's daughter this afternoon for initial 24 hour discharge follow up call. Patient states he is doing well, feels much better. BP this am was 96-53, then 122-56 later this am.  Patient states he is not having any complaints of nausea, vomiting, fevers, chills, SOB or Cough. No LE Edema, chest pain or heart palpitations. Patient states his weight this am was 202 lbs. CTC encouraged patient and daughter to make sure he get's weight first thing in the am, after first void. CTC also instructed patient to monitor sodium intake, reading all food labels and staying away from foods with 400 mg of sodium. CTC also encouraged patient to make sure he is following his fluid restriction of 64 oz per day. CTC, patient's daughter,  and Pt reviewed meds and CTC completed the 1111f, all medications filled and in home. Patient has follow up appointments scheduled already with Cardiologist.  CTC advised Pt of use of urgent care or physicians 24 hr access line if assistance is needed after hours or CTC weekend. CTC provided education on s/s that require medical attention and when to seek medical attention. Pt denies any needs or concerns and is agreeable with additional calls. CTC contacted [facility], as daughter states no one has contacted them yet. CTC spoke with intake person at [facility] agency, stated SN will be in home today or tomorrow.      Follow Up  Future Appointments   Date Time Provider Shazia Weldon   5/7/2019  1:00 PM DEBORAH Meneses - CNP [facility]   3/20/2020  9:00 AM Paula Schwarz MD [facility]       Freddie Carrillo RN

## 2019-05-06 NOTE — PROGRESS NOTES
[facility]   Congestive Heart Failure    Primary Care Doctor:  Donna Russell MD  Primary Cardiologist: Soraida Ford    ReAd Pt: Yes    Chief Complaint:  chf    History of Present Illness:  Johnathan Marin is a 80 y.o. male with PMH AS, HTN, CAD, PCI, ICM,  HFrEF (40%) who presents today for hospital f/u. Johnathan Marin was admitted 4/29/19 and discharged 5/2/19. Hospital course: presents with shortness of breath that woke him from sleep. In ED, his BP was elevated to 802 systolic w/ significant shortness of breath and altered mental status. He was hypoxic to SpO2 70's. CXR showed increased bilateral opacities in lower lobes, pro-BNP was significantly elevated.  VBG showed pH 7.268, pCO2 54.3.  He was placed on BiPAP and given 1x dose IV lasix 40 with significant improvement in BP and breathing. He was quickly titrated off the BiPAP. He was placed on a lasix gtt and diuresed 7L. He was converted to po lasix. He was placed on coreg 6.25mg by cardiology. Medication changes include: Discontinued lisinopril, Cardura. Added Coreg and increased dose of lasix  Swam yesterday and felt good    Since hospitalization he denies chest pain, dyspnea, palpitations, orthopnea, PND, exertional chest pressure/discomfort, fatigue, early saiety, edema, syncope. Approximate hosp diuresis was 7L, hospital weight on d/c was 208lb and discharge home weight was 201lb. Daily wts since that gone up initially bec dose wrong at 20, then increased to 60mg Today's home wt: 202    -150/70-90  Baseline Weight: 201lb    Admit BNP: 2991   Discharge BNP: 4898    EF: 40%  Cardiac Imaging: Echo 3/26/19   Left ventricular cavity size is normal. There is moderate-severe concentric   left ventricular hypertrophy. The mid anteroseptum is hypokinetic. The   apical septum is hypokinetic. . Mild anterolateral hypokinesis. The apical   inferior and anterior walls are hypokinetic.  Overall LVEF is roughly 40%.   The inferolateral wall is severely hypokinetic. Diastolic filling parameters   suggest grade I diastolic dysfunction.   The left atrium is minimally dilated.   Mitral annular calcification is present. Mild mitral regurgitation is   present.   At least moderate aortic stenosis with a peak velocity of 3.69 m/s and a   mean pressure gradient of 30mmHg. Given the LV dysfunction, the severity of   the stenosis may be underestimated. Mild to moderate aortic regurgitation.   The aortic root is mildly dilated measuring 3.9 cm.   Mild tricuspid regurgitation.  IVC size is normal (<2.1cm) and collapses > 50% with respiration consistent   with normal RA pressure (3mmHg). Estimated pulmonary artery systolic   pressure is at 37 mmHg assuming a right atrial pressure of 3 mmHg. Device: No       Activity: at baseline, back to swimming  Can you walk 1-2 blocks or do a moderate amount of house/yard work? Yes  Cardiac Rehab Referral: No     NYHA Class: I   Class I   Patients with no limitation of activities; they suffer no symptoms from ordinary activities. Class II   Patients with slight, mild limitation of activity; they are comfortable with rest or with mild exertion.    Class III   Patients with marked limitation of activity; they are comfortable only at rest.   Class IV   Patients who should be at complete rest, confined to bed or chair; any physical activity brings on discomfort and symptoms occur at rest.    Sodium Restrictions: 2g  Fluid Restrictions: 48-64 oz/day  Sodium and fluid restriction compliance: good    Pt Education: The patient has received education on the following topics: dietary sodium restriction, heart failure medications, the importance of physical activity, symptom management and weight monitoring         Past Medical History:   has a past medical history of Arthritis, CAD (coronary artery disease), Chronic kidney disease, Constipation, DDD (degenerative disc disease), lumbar, Dental disease, Hearing loss, Hyperlipidemia, Hypertension, Mild aortic stenosis, and Prostate enlargement. Surgical History:   has a past surgical history that includes TURP; Coronary angioplasty with stent (1/12/98); Skin tag removal (2/2/10); and eye surgery. Social History:   reports that he has never smoked. He has never used smokeless tobacco. He reports that he does not drink alcohol or use drugs. Family History:   Family History   Problem Relation Age of Onset    No Known Problems Mother     No Known Problems Father        Home Medications:  Prior to Admission medications    Medication Sig Start Date End Date Taking? Authorizing Provider   docusate sodium (COLACE) 100 MG capsule Take 1 capsule by mouth 2 times daily as needed for Constipation 5/2/19   Primo Bahena MD   carvedilol (COREG) 6.25 MG tablet Take 1 tablet by mouth 2 times daily (with meals) 5/2/19   Primo Bahena MD   furosemide (LASIX) 20 MG tablet Take 2 tablets by mouth daily 5/2/19   Primo Bahena MD   [facility]. Devices (DIGITAL GLASS SCALE) MISC 1 Device by Does not apply route daily 4/18/19   Aleksandar Moore MD   diclofenac sodium 1 % GEL Apply 4 g topically 4 times daily as needed for Pain 3/28/19   Christiano Can MD   aspirin EC 81 MG EC tablet Take 1 tablet by mouth daily 12/27/18   Aleksandar Moore MD   cetirizine (ZYRTEC) 10 MG tablet Take 1 tablet by mouth daily 12/27/18   Aleksandar Moore MD   nitroGLYCERIN (NITROSTAT) 0.4 MG SL tablet Place 1 tablet under the tongue every 5 minutes as needed for Chest pain up to max of 3 total doses. If no relief after 1 dose, call 911. 12/27/18   Aleksandar Moore MD   Multiple Vitamin (MULTI-VITAMIN) TABS Take 1 tablet by mouth daily 5/21/18   Vesta Sims MD   atorvastatin (LIPITOR) 80 MG tablet Take 1 tablet by mouth nightly 1/25/18   Bre Hart MD        Allergies:  Patient has no known allergies. ROS:   Review of Systems   Constitutional: Negative. Respiratory: Negative. Cardiovascular: Negative.     Gastrointestinal: Negative. Genitourinary: Negative. Musculoskeletal: Negative. Skin: Negative. Neurological: Negative. Hematological: Negative. Psychiatric/Behavioral: Negative. Physical Examination:    Vitals:    05/07/19 1313   BP: (!) 138/90   Pulse: 63   Weight: 212 lb 12.8 oz (96.5 kg)           Physical Exam   Constitutional: He is oriented to person, place, and time. He appears well-developed and well-nourished. HENT:   Head: Normocephalic and atraumatic. Eyes: Pupils are equal, round, and reactive to light. EOM are normal.   Neck: Normal range of motion. Neck supple. Cardiovascular: Normal rate, regular rhythm and intact distal pulses. Murmur heard. Pulmonary/Chest: Effort normal and breath sounds normal.   Abdominal: Soft. Musculoskeletal: Normal range of motion. Neurological: He is alert and oriented to person, place, and time. Skin: Skin is warm and dry. Psychiatric: He has a normal mood and affect.  His behavior is normal. Judgment and thought content normal.       Lab Data:    CBC:   Lab Results   Component Value Date    WBC 6.0 05/02/2019    WBC 6.4 05/01/2019    WBC 6.0 04/30/2019    RBC 3.95 05/02/2019    RBC 4.08 05/01/2019    RBC 3.81 04/30/2019    HGB 12.9 05/02/2019    HGB 13.2 05/01/2019    HGB 12.4 04/30/2019    HCT 37.9 05/02/2019    HCT 39.5 05/01/2019    HCT 37.0 04/30/2019    MCV 96.0 05/02/2019    MCV 96.9 05/01/2019    MCV 97.4 04/30/2019    RDW 13.0 05/02/2019    RDW 13.0 05/01/2019    RDW 12.9 04/30/2019     05/02/2019     05/01/2019     04/30/2019     BMP:  Lab Results   Component Value Date     05/02/2019     05/01/2019     04/30/2019    K 4.0 05/02/2019    K 3.5 05/01/2019    K 3.8 04/30/2019    K 3.4 04/29/2019    K 3.9 03/28/2019    K 3.6 03/26/2019     05/02/2019     05/01/2019     04/30/2019    CO2 28 05/02/2019    CO2 31 05/01/2019    CO2 30 04/30/2019    PHOS 4.2 05/02/2019    PHOS 3.9 05/01/2019 PHOS 3.6 04/30/2019    BUN 36 05/02/2019    BUN 35 05/01/2019    BUN 27 04/30/2019    CREATININE 1.8 05/02/2019    CREATININE 1.8 05/01/2019    CREATININE 1.6 04/30/2019     BNP:   Lab Results   Component Value Date    PROBNP 4,898 05/01/2019    PROBNP 2,991 04/29/2019    PROBNP 788 03/28/2019     Iron Studies:  No results found for: FERRITIN  Iron Deficiency Anemia:  No IV Iron Therapy:  No  2017 ACC/AHA HF Guidelines:   intravenous iron replacement in patients with New York Heart Association (NYHA) class II and III HF and iron deficiency(ferritin <100 ng/ml or 100-300 ng/ml if transferrin saturation <20%), to improve functional status and QoL. Assessment/Plan:    1. Acute systolic congestive heart failure ([address] - compensated   2. Coronary artery disease involving native heart, angina presence unspecified, unspecified vessel or lesion type - stable, on GDT   3. Essential hypertension - controlled, cont to monitor         Instructions:   1. Medications: Continue current medications  2. Labs: per home health  3. Lifestyle Recommendations: Weigh yourself every day in the morning after urination, call [facility] if wt increases 2-3lb in one day or 5lb in one week, Limit sodium to 2000mg/day and fluids to 2L or 64oz/day. Add a fish oil supplement. 4. Follow up: Dr. Kathryn Simpson in 3 months, [facility] in one month      Heart Failure Hotline: 47 490 310 - [place]     Heart Failure Websites:   www.heart. org  Www.cardiosmart. org    Websites with Low Sodium Recipes:   www.mayoclinic.org/healthy-lifestyle/recipes/low-sodium-recipes  www.V-Key.com/recipes    Smartphone mikayla's that can help you keep track of symptoms, weights, and medications:  HeartPartner  MyHeartA  HeartSSpikes [employer]  WOWME    Nutrition Mikayla to track calories, carbohydrates and sodium content of food:   CalorieKing  MyFitnessPal      I appreciate the opportunity of cooperating in the care of this individual.    Sony Olivo CNP, 5/6/2019, 10:44 AM    QUALITY MEASURES  1. Tobacco Cessation Counseling: NA  2. Retake of BP if >140/90:   NA  3. Documentation to PCP/referring for new patient:  Sent to PCP at close of office visit  4. CAD patient on anti-platelet: Yes  5. CAD patient on STATIN therapy:  Yes  6. Patient with CHF and aFib on anticoagulation:  NA   7. Patient Education:  Yes   8. BB for LVSD :  NA   9. ACE/ARB for LVSD:  NA   10.  Left Ventricular Ejection Fraction (LVEF) Assessment:  Yes

## 2019-05-07 ENCOUNTER — OFFICE VISIT (OUTPATIENT)
Dept: CARDIOLOGY CLINIC | Age: 84
End: 2019-05-07
Payer: MEDICARE

## 2019-05-07 ENCOUNTER — CARE COORDINATION (OUTPATIENT)
Dept: CASE MANAGEMENT | Age: 84
End: 2019-05-07

## 2019-05-07 VITALS
WEIGHT: 212.8 LBS | HEART RATE: 63 BPM | BODY MASS INDEX: 34.2 KG/M2 | DIASTOLIC BLOOD PRESSURE: 90 MMHG | SYSTOLIC BLOOD PRESSURE: 138 MMHG

## 2019-05-07 DIAGNOSIS — I50.21 ACUTE SYSTOLIC CONGESTIVE HEART FAILURE (HCC): Primary | ICD-10-CM

## 2019-05-07 DIAGNOSIS — I25.10 CORONARY ARTERY DISEASE INVOLVING NATIVE HEART, ANGINA PRESENCE UNSPECIFIED, UNSPECIFIED VESSEL OR LESION TYPE: ICD-10-CM

## 2019-05-07 DIAGNOSIS — I10 ESSENTIAL HYPERTENSION: ICD-10-CM

## 2019-05-07 PROCEDURE — G8417 CALC BMI ABV UP PARAM F/U: HCPCS | Performed by: NURSE PRACTITIONER

## 2019-05-07 PROCEDURE — 4040F PNEUMOC VAC/ADMIN/RCVD: CPT | Performed by: NURSE PRACTITIONER

## 2019-05-07 PROCEDURE — G8427 DOCREV CUR MEDS BY ELIG CLIN: HCPCS | Performed by: NURSE PRACTITIONER

## 2019-05-07 PROCEDURE — 1111F DSCHRG MED/CURRENT MED MERGE: CPT | Performed by: NURSE PRACTITIONER

## 2019-05-07 PROCEDURE — 1036F TOBACCO NON-USER: CPT | Performed by: NURSE PRACTITIONER

## 2019-05-07 PROCEDURE — 1123F ACP DISCUSS/DSCN MKR DOCD: CPT | Performed by: NURSE PRACTITIONER

## 2019-05-07 PROCEDURE — G8598 ASA/ANTIPLAT THER USED: HCPCS | Performed by: NURSE PRACTITIONER

## 2019-05-07 PROCEDURE — 99214 OFFICE O/P EST MOD 30 MIN: CPT | Performed by: NURSE PRACTITIONER

## 2019-05-07 RX ORDER — FUROSEMIDE 20 MG/1
60 TABLET ORAL DAILY
Qty: 60 TABLET | Refills: 0 | Status: SHIPPED | OUTPATIENT
Start: 2019-05-07 | End: 2019-05-10

## 2019-05-07 ASSESSMENT — ENCOUNTER SYMPTOMS
GASTROINTESTINAL NEGATIVE: 1
RESPIRATORY NEGATIVE: 1

## 2019-05-07 NOTE — CARE COORDINATION
[facility] Transitions Follow Up Call    2019    Patient: Andrew Whitman  Patient : 1932   MRN: [id number]   Reason for Admission: CHF  Discharge Date: 19 RARS: Readmission Risk Score: 23         Spoke with: [id number] Transitions Subsequent and Final Call    Schedule Follow Up Appointment with PCP:  Declined  Subsequent and Final Calls  Do you have any ongoing symptoms?:  No  Have your medications changed?:  No  Do you have any questions related to your medications?:  No  Do you currently have any active services?:  Yes  Are you currently active with any services?:  Home Health  Do you have any needs or concerns that I can assist you with?:  No  Identified Barriers:  None  Care Transitions Interventions  No Identified Needs  Other Interventions:          Summary  CTC spoke with patient this afternoon for follow up CTC call. Patient states he is doing well, denies any complaints of nausea, vomiting, fevers, chills, SOB or Cough. No LE Edema, states SN with [facility] was in home today. Weight continues to be 202 lbs, instructed to continue to weigh himself daily, in am after first void. Patient denies any new or changed medications, states BP was good, unable to recall actual numbers. Patient has follow up appointment scheduled with Cardiologist for 2019. CTC advised Pt of use of urgent care or physicians 24 hr access line if assistance is needed after hours or CTC weekend. CTC provided education on s/s that require medical attention and when to seek medical attention. Pt denies any needs or concerns and is agreeable with additional calls.     Follow Up  Future Appointments   Date Time Provider Shazia Weldon   2019  2:00 PM DEBORAH Galicia - CNP [place] [facility]   2019  2:30 PM MD Max Tillman [facility]   3/20/2020  9:00 AM MD VICENTE CasePHYSKJULEITTE [facility]       Boris Ayers RN

## 2019-05-07 NOTE — PATIENT INSTRUCTIONS
Instructions:   1. Medications: Continue current medications  2. Labs: per home health  3. Lifestyle Recommendations: Weigh yourself every day in the morning after urination, call Kaylan Magana if wt increases 2-3lb in one day or 5lb in one week, Limit sodium to 2000mg/day and fluids to 2L or 64oz/day. Add a fish oil supplement.    4. Follow up: Dr. Rafael Zarate in 3 months, Kaylan Magana in one month      Heart Failure Hotline: [address] [place] [address] Half

## 2019-05-10 ENCOUNTER — OFFICE VISIT (OUTPATIENT)
Dept: CARDIOLOGY CLINIC | Age: 84
End: 2019-05-10
Payer: MEDICARE

## 2019-05-10 ENCOUNTER — CARE COORDINATION (OUTPATIENT)
Dept: CASE MANAGEMENT | Age: 84
End: 2019-05-10

## 2019-05-10 VITALS
HEART RATE: 75 BPM | SYSTOLIC BLOOD PRESSURE: 120 MMHG | WEIGHT: 212.8 LBS | BODY MASS INDEX: 34.2 KG/M2 | OXYGEN SATURATION: 94 % | HEIGHT: 66 IN | DIASTOLIC BLOOD PRESSURE: 70 MMHG

## 2019-05-10 DIAGNOSIS — R06.09 DYSPNEA ON EXERTION: ICD-10-CM

## 2019-05-10 DIAGNOSIS — E87.70 HYPERVOLEMIA, UNSPECIFIED HYPERVOLEMIA TYPE: ICD-10-CM

## 2019-05-10 DIAGNOSIS — R00.1 BRADYCARDIA: ICD-10-CM

## 2019-05-10 DIAGNOSIS — I50.21 ACUTE SYSTOLIC CONGESTIVE HEART FAILURE (HCC): ICD-10-CM

## 2019-05-10 DIAGNOSIS — I50.43 ACUTE ON CHRONIC COMBINED SYSTOLIC AND DIASTOLIC CONGESTIVE HEART FAILURE (HCC): Primary | ICD-10-CM

## 2019-05-10 LAB
ANION GAP SERPL CALCULATED.3IONS-SCNC: 15 MMOL/L (ref 3–16)
BUN BLDV-MCNC: 26 MG/DL (ref 7–20)
CALCIUM SERPL-MCNC: 9.3 MG/DL (ref 8.3–10.6)
CHLORIDE BLD-SCNC: 98 MMOL/L (ref 99–110)
CO2: 27 MMOL/L (ref 21–32)
CREAT SERPL-MCNC: 1.8 MG/DL (ref 0.8–1.3)
GFR AFRICAN AMERICAN: 43
GFR NON-AFRICAN AMERICAN: 36
GLUCOSE BLD-MCNC: 99 MG/DL (ref 70–99)
POTASSIUM SERPL-SCNC: 4.3 MMOL/L (ref 3.5–5.1)
PRO-BNP: 7763 PG/ML (ref 0–449)
SODIUM BLD-SCNC: 140 MMOL/L (ref 136–145)

## 2019-05-10 PROCEDURE — 1123F ACP DISCUSS/DSCN MKR DOCD: CPT | Performed by: NURSE PRACTITIONER

## 2019-05-10 PROCEDURE — 1111F DSCHRG MED/CURRENT MED MERGE: CPT | Performed by: NURSE PRACTITIONER

## 2019-05-10 PROCEDURE — G8427 DOCREV CUR MEDS BY ELIG CLIN: HCPCS | Performed by: NURSE PRACTITIONER

## 2019-05-10 PROCEDURE — G8417 CALC BMI ABV UP PARAM F/U: HCPCS | Performed by: NURSE PRACTITIONER

## 2019-05-10 PROCEDURE — 4040F PNEUMOC VAC/ADMIN/RCVD: CPT | Performed by: NURSE PRACTITIONER

## 2019-05-10 PROCEDURE — G8598 ASA/ANTIPLAT THER USED: HCPCS | Performed by: NURSE PRACTITIONER

## 2019-05-10 PROCEDURE — 1036F TOBACCO NON-USER: CPT | Performed by: NURSE PRACTITIONER

## 2019-05-10 PROCEDURE — 99214 OFFICE O/P EST MOD 30 MIN: CPT | Performed by: NURSE PRACTITIONER

## 2019-05-10 RX ORDER — CARVEDILOL 6.25 MG/1
3.12 TABLET ORAL 2 TIMES DAILY WITH MEALS
Qty: 60 TABLET | Refills: 0 | Status: SHIPPED | OUTPATIENT
Start: 2019-05-10 | End: 2019-07-26 | Stop reason: SDUPTHER

## 2019-05-10 RX ORDER — FUROSEMIDE 20 MG/1
60 TABLET ORAL 2 TIMES DAILY
Qty: 60 TABLET | Refills: 0 | Status: SHIPPED | OUTPATIENT
Start: 2019-05-10 | End: 2019-05-20

## 2019-05-10 RX ORDER — SPIRONOLACTONE 25 MG/1
12.5 TABLET ORAL DAILY
Qty: 30 TABLET | Refills: 3 | Status: SHIPPED | OUTPATIENT
Start: 2019-05-10 | End: 2019-07-26 | Stop reason: SDUPTHER

## 2019-05-10 ASSESSMENT — ENCOUNTER SYMPTOMS
COUGH: 0
SHORTNESS OF BREATH: 1
GASTROINTESTINAL NEGATIVE: 1
WHEEZING: 0

## 2019-05-10 NOTE — PROGRESS NOTES
[address]   Congestive Heart Failure    Primary Care Doctor:  Jayesh Dickey MD  Primary Cardiologist: Joshua Amaya    ReAd Pt: Yes    Chief Complaint:  SOB    History of Present Illness:  Dario Donovan is a 80 y.o. male with PMH AS, HTN, CAD, PCI, ICM,  HFrEF (40%) who presents today with c/o episode PND early this morning, increased AYALA, edema, dizziness, fatigue, and wt gain since this past Tuesday. Daughter reports HRs in the low 50s. he denies chest pain,  palpitations, orthopnea,  exertional chest pressure/discomfort, or syncope. Today's home wt 205lb    Baseline Weight: 201lb home scale    Admit BNP: 2991   Discharge BNP: 4898    EF: 40%  Cardiac Imaging: Echo 3/26/19   Left ventricular cavity size is normal. There is moderate-severe concentric   left ventricular hypertrophy. The mid anteroseptum is hypokinetic. The   apical septum is hypokinetic. . Mild anterolateral hypokinesis. The apical   inferior and anterior walls are hypokinetic. Overall LVEF is roughly 40%.   The inferolateral wall is severely hypokinetic. Diastolic filling parameters   suggest grade I diastolic dysfunction.   The left atrium is minimally dilated.   Mitral annular calcification is present. Mild mitral regurgitation is   present.   At least moderate aortic stenosis with a peak velocity of 3.69 m/s and a   mean pressure gradient of 30mmHg. Given the LV dysfunction, the severity of   the stenosis may be underestimated. Mild to moderate aortic regurgitation.   The aortic root is mildly dilated measuring 3.9 cm.   Mild tricuspid regurgitation.  IVC size is normal (<2.1cm) and collapses > 50% with respiration consistent   with normal RA pressure (3mmHg). Estimated pulmonary artery systolic   pressure is at 37 mmHg assuming a right atrial pressure of 3 mmHg. Device: No     Activity: below baseline  Can you walk 1-2 blocks or do a moderate amount of house/yard work? No  Cardiac Rehab Referral: No     NYHA Class: I II      Sodium Restrictions: 2g  Fluid Restrictions: 48-64 oz/day  Sodium and fluid restriction compliance: good    Pt Education: The patient has received education on the following topics: dietary sodium restriction, heart failure medications, the importance of physical activity, symptom management and weight monitoring         Past Medical History:   has a past medical history of Arthritis, CAD (coronary artery disease), Chronic kidney disease, Constipation, DDD (degenerative disc disease), lumbar, Dental disease, Hearing loss, Hyperlipidemia, Hypertension, Mild aortic stenosis, and Prostate enlargement. Surgical History:   has a past surgical history that includes TURP; Coronary angioplasty with stent (1/12/98); Skin tag removal (2/2/10); and eye surgery. Social History:   reports that he has never smoked. He has never used smokeless tobacco. He reports that he does not drink alcohol or use drugs. Family History:   Family History   Problem Relation Age of Onset    No Known Problems Mother     No Known Problems Father        Home Medications:  Prior to Admission medications    Medication Sig Start Date End Date Taking? Authorizing Provider   furosemide (LASIX) 20 MG tablet Take 3 tablets by mouth daily 5/7/19   DEBORAH Rose CNP   docusate sodium (COLACE) 100 MG capsule Take 1 capsule by mouth 2 times daily as needed for Constipation 5/2/19   Elsy Scott MD   carvedilol (COREG) 6.25 MG tablet Take 1 tablet by mouth 2 times daily (with meals) 5/2/19   Elsy Scott MD   [facility]  Devices (DIGITAL GLASS SCALE) MISC 1 Device by Does not apply route daily 4/18/19   Barrington Herring MD   diclofenac sodium 1 % GEL Apply 4 g topically 4 times daily as needed for Pain 3/28/19   Deanna Mckoy MD   aspirin EC 81 MG EC tablet Take 1 tablet by mouth daily 12/27/18   Barrington Herring MD   cetirizine (ZYRTEC) 10 MG tablet Take 1 tablet by mouth daily 12/27/18   Barrington Herring MD   nitroGLYCERIN (NITROSTAT) 0.4 MG SL tablet Place 1 tablet under the tongue every 5 minutes as needed for Chest pain up to max of 3 total doses. If no relief after 1 dose, call 911. 12/27/18   Floresita Amaro MD   Multiple Vitamin (MULTI-VITAMIN) TABS Take 1 tablet by mouth daily 5/21/18   Trish Yao MD   atorvastatin (LIPITOR) 80 MG tablet Take 1 tablet by mouth nightly 1/25/18   Cristo Nunez MD        Allergies:  Patient has no known allergies. ROS:   Review of Systems   Constitutional: Positive for fatigue. Respiratory: Positive for shortness of breath. Negative for cough and wheezing. Cardiovascular: Positive for leg swelling. Negative for chest pain and palpitations. Gastrointestinal: Negative. Genitourinary: Negative. Musculoskeletal: Negative. Skin: Negative. Neurological: Positive for dizziness. Hematological: Negative. Psychiatric/Behavioral: Negative. Physical Examination:    Vitals:    05/10/19 1356   BP: 120/70   Site: Left Upper Arm   Position: Sitting   Cuff Size: Medium Adult   Pulse: 75   SpO2: 94%   Weight: 212 lb 12.8 oz (96.5 kg)   Height: 5' 6\" (1.676 m)           Physical Exam   Constitutional: He is oriented to person, place, and time. He appears well-developed and well-nourished. HENT:   Head: Normocephalic and atraumatic. Eyes: Pupils are equal, round, and reactive to light. EOM are normal.   Neck: Normal range of motion. Neck supple. Cardiovascular: Normal rate, regular rhythm and intact distal pulses. Murmur heard. Pulmonary/Chest: Effort normal. He has rales. Abdominal: Soft. Musculoskeletal: Normal range of motion. He exhibits edema. Neurological: He is alert and oriented to person, place, and time. Skin: Skin is warm and dry. Psychiatric: He has a normal mood and affect.  His behavior is normal. Judgment and thought content normal.       Lab Data:    CBC:   Lab Results   Component Value Date    WBC 6.0 05/02/2019    WBC 6.4 05/01/2019    WBC 6.0 04/30/2019 once a day, decrease carvedilol to 3.125mg twice a day  2. Labs: today  3. Lifestyle Recommendations: Weigh yourself every day in the morning after urination, call Luis Barahona if wt increases 2-3lb in one day or 5lb in one week, Limit sodium to 2000mg/day and fluids to 2L or 64oz/day. Add a fish oil supplement. 4. Follow up: call ashok Monday with weight      Heart Failure Hotline: 767.923.4258 - Sheree Anguiano    Heart Failure Websites:   www.heart. org  Www.cardiosmart. org    Websites with Low Sodium Recipes:   www.[employer].org/healthy-lifestyle/recipes/low-sodium-recipes  www."[employer]/recipes    Smartphone mikayla's that can help you keep track of symptoms, weights, and medications:  HeartPartRocky Mountain OasisearEzakus  WOWME    Nutrition Mikayla to track calories, carbohydrates and sodium content of food:   CalorieKing  MyFitSt. [facility] HospitalPal      I appreciate the opportunity of cooperating in the care of this individual.    Julieth Donald CNP, 5/10/2019, 9:46 AM    QUALITY MEASURES  1. Tobacco Cessation Counseling: NA  2. Retake of BP if >140/90:   NA  3. Documentation to PCP/referring for new patient:  Sent to PCP at close of office visit  4. CAD patient on anti-platelet: Yes  5. CAD patient on STATIN therapy:  Yes  6. Patient with CHF and aFib on anticoagulation:  NA   7. Patient Education:  Yes   8. BB for LVSD :  NA   9. ACE/ARB for LVSD:  NA   10.  Left Ventricular Ejection Fraction (LVEF) Assessment:  Yes

## 2019-05-13 ENCOUNTER — TELEPHONE (OUTPATIENT)
Dept: CARDIOLOGY CLINIC | Age: 84
End: 2019-05-13

## 2019-05-13 ENCOUNTER — CARE COORDINATION (OUTPATIENT)
Dept: CASE MANAGEMENT | Age: 84
End: 2019-05-13

## 2019-05-13 NOTE — CARE COORDINATION
[facility] Transitions Follow Up Call    2019    Patient: Kallie Mendez  Patient : 1932   MRN: [id number]   Reason for Admission: CHF/ Hypertensive Emergency   Discharge Date: 19 RARS: Readmission Risk Score: 23         Spoke with: [address] Transitions Subsequent and Final Call    Schedule Follow Up Appointment with PCP:  Declined  Subsequent and Final Calls  Do you have any ongoing symptoms?:  No  Have your medications changed?:  No  Do you have any questions related to your medications?:  No  Do you currently have any active services?:  Yes  Are you currently active with any services?:  Home Health  Do you have any needs or concerns that I can assist you with?:  No  Identified Barriers:  None  Care Transitions Interventions  No Identified Needs  Other Interventions:          Summary  CTC spoke with patient this afternoon for follow up CTC call. Patient states he is doing much better, LE Edema is gone, denies any complaints of nausea, vomiting, fevers, chills, SOB or Cough. CTC encouraged patient to continue to weigh himself daily, follow fluid restriction and low sodium diet. Patient taking all medications as prescribed, no new or changed medications since last CTC call. Patient states [facility] in home on 2019. Patient has another follow up appointment with Cardiologist on 2019, and PCP on 2019. CTC advised Pt of use of urgent care or physicians 24 hr access line if assistance is needed after hours or CTC weekend. CTC provided education on s/s that require medical attention and when to seek medical attention. Pt denies any needs or concerns and is agreeable with additional calls.     Follow Up  Future Appointments   Date Time Provider Shazia Weldon   2019  1:15 PM Bethanie Celestin MD [address] None   2019  2:30 PM Robin Geiger APRN - CNP [place] [facility]   2019  2:30 PM Karissa Orozco MD [facility] 3/20/2020  9:00 AM MD VICENTE DaveyPHYPASTOR Chandra RN

## 2019-05-15 ENCOUNTER — OFFICE VISIT (OUTPATIENT)
Dept: INTERNAL MEDICINE CLINIC | Age: 84
End: 2019-05-15
Payer: MEDICARE

## 2019-05-15 ENCOUNTER — CARE COORDINATION (OUTPATIENT)
Dept: CASE MANAGEMENT | Age: 84
End: 2019-05-15

## 2019-05-15 ENCOUNTER — TELEPHONE (OUTPATIENT)
Dept: CARDIOLOGY CLINIC | Age: 84
End: 2019-05-15

## 2019-05-15 VITALS
DIASTOLIC BLOOD PRESSURE: 69 MMHG | WEIGHT: 207.5 LBS | HEART RATE: 64 BPM | BODY MASS INDEX: 33.49 KG/M2 | SYSTOLIC BLOOD PRESSURE: 141 MMHG | OXYGEN SATURATION: 96 % | RESPIRATION RATE: 20 BRPM | TEMPERATURE: 97.2 F

## 2019-05-15 DIAGNOSIS — M71.342 OTHER BURSAL CYST, LEFT HAND: Primary | ICD-10-CM

## 2019-05-15 PROCEDURE — 99213 OFFICE O/P EST LOW 20 MIN: CPT | Performed by: INTERNAL MEDICINE

## 2019-05-15 NOTE — PROGRESS NOTES
Department Of Internal Medicine     General Medicine/Primary Care      Established Patient Visit    Patient:  Kamilah Kaba                                               : 1932  Age: 80 y.o. MRN: [id number]  Date : 5/15/2019    History Obtained From:  patient    CHIEF COMPLAINT:  Bump on hand     HISTORY OF PRESENT ILLNESS:   The patient is a 80 y.o. male Kamilah Kaba is a 80 y.o. male with PMH AS, HTN, CAD, PCI, ICM,  HFrEF (40%) who presents today with c/o a bump on his left hand. States it appeared out of no where. It is painful when pushed on but there is no pain with movement. He has never had this before. Past Medical History:        Diagnosis Date    Arthritis     CAD (coronary artery disease)     Chronic kidney disease     Constipation     DDD (degenerative disc disease), lumbar     L5, S1    Dental disease     Hearing loss     Hyperlipidemia     Hypertension     Mild aortic stenosis     Prostate enlargement        Past Surgical History:        Procedure Laterality Date    CORONARY ANGIOPLASTY WITH STENT PLACEMENT  98    EYE SURGERY      SKIN TAG REMOVAL  2/2/10    TURP         Family History:       Problem Relation Age of Onset    Hypertension Mother     Cancer Mother     Stroke Father        Social History:   TOBACCO:   reports that he has never smoked. He has never used smokeless tobacco.  ETOH:   reports that he does not drink alcohol. OCCUPATION:      Allergies:  Patient has no known allergies. Current Medications:    Prior to Admission medications    Medication Sig Start Date End Date Taking?  Authorizing Provider   furosemide (LASIX) 20 MG tablet Take 3 tablets by mouth 2 times daily 5/10/19  Yes DEBORAH Goldsmith CNP   spironolactone (ALDACTONE) 25 MG tablet Take 0.5 tablets by mouth daily 5/10/19  Yes DEBORAH Goldsmith CNP   carvedilol (COREG) 6.25 MG tablet Take 0.5 tablets by mouth 2 times daily (with meals) 5/10/19  Yes DEBORAH Goldsmith CNP docusate sodium (COLACE) 100 MG capsule Take 1 capsule by mouth 2 times daily as needed for Constipation 5/2/19  Yes Maurilio An MD   diclofenac sodium 1 % GEL Apply 4 g topically 4 times daily as needed for Pain 3/28/19  Yes Odalys Garcia MD   aspirin EC 81 MG EC tablet Take 1 tablet by mouth daily 12/27/18  Yes Tylor Gonzalez MD   cetirizine (ZYRTEC) 10 MG tablet Take 1 tablet by mouth daily 12/27/18  Yes Tylor Gonzalez MD   Multiple Vitamin (MULTI-VITAMIN) TABS Take 1 tablet by mouth daily 5/21/18  Yes Lottie Purvis MD   atorvastatin (LIPITOR) 80 MG tablet Take 1 tablet by mouth nightly 1/25/18  Yes Elissa White MD   [facility]. Devices (DIGITAL GLASS SCALE) MISC 1 Device by Does not apply route daily 4/18/19   Tylor Gonzalez MD   nitroGLYCERIN (NITROSTAT) 0.4 MG SL tablet Place 1 tablet under the tongue every 5 minutes as needed for Chest pain up to max of 3 total doses. If no relief after 1 dose, call 911. 12/27/18   Tylor Gonzalez MD       REVIEW OF SYSTEMS:  · CONSTITUTIONAL: NO Recent weight changes,fatigue,fever,chills or night sweats   · EYES: NO blurriness,tearing,itching or acute change in vision   · EARS: NO hearing loss,tinnitus,vertigo,discharge or earache. · NOSE: NO rhinorrhea,sneezing,itching,allergy or epistaxis   · MOUTH/THROAT: NO bleeding gums,hoarseness or sore throat. · RESP: NO SOB,wheeze,cough,sputum,hemoptysis or bronchitis   · CVS: NO chest pain,palpitations,dyspnea on exertion,orthopnea,paroxysmal nocturnal dyspnea or edema   · GI: NO appetite changes, nausea,vomiting,or diarrhea,indigestion,dysphagia,change in bowel movements, or abdominal pain. · : NO Urinary frequency, hesitancy, urgency, polyuria, dysuria, hematuria, nocturia, or incontinence. · HEM/LYMPH: NO Anemia,bleeding tendency   · MSK: NO New myalgias,bone pain,joint pain,swelling or stiffness and has had no change in gait.    · NEURO: NO LOC, memory loss, forgetfulness, periods of confusion, difficulty concentrating, seizures, decline in intellect, nervousness, insomina, aphasia, or dysarthria. · PSYCH: Neg depression,personality changes,anxiety. · ENDO: Neg polydipsia,polyuria,abnormal weight changes,heat /cold intolerance. · ALL/IMM : Neg reactions to drugs other than listed,food,insects,skin rash,trouble breathing,local or general lymph node enlargement or tenderness. Physical Exam:      Vitals: BP (!) 141/69 (Site: Right Upper Arm, Position: Sitting, Cuff Size: Large Adult)   Pulse 64   Temp 97.2 °F (36.2 °C)   Resp 20   Wt 207 lb 8 oz (94.1 kg)   SpO2 96%   BMI 33.49 kg/m²     Body mass index is 33.49 kg/m². Wt Readings from Last 3 Encounters:   05/15/19 207 lb 8 oz (94.1 kg)   05/10/19 212 lb 12.8 oz (96.5 kg)   05/07/19 212 lb 12.8 oz (96.5 kg)       · Gen - Alert, no signs of distress, appears stated age and cooperative  · HEENT - NC/ atraumatic   · CVS - murmur apprenticed   · Resp - No accessory muscle use. No crackles. No wheezing. No rhonchi  · GI - Non-tender. Non-distended  · Skin - Warm and dry. No nodule on exposed extremities. No rash on exposed extremities  · Lymph - No cervical LAD. No supraclavicular LAD. · MSK - subdermal well circumscribed non movable cyst, no changes to skin overlying cyst   · Neuro - Awake. Follows commands. CN II-XII Grossly Intact. Sensation intact. Strength 5/5 UE and LE. Reflexes 1+ UE and LE francisco. Downgoing plantar francisco. Normal Gait. Finger-to-nose and rapidly alternating movements intact. Normal pain response  · Psych - Oriented to person, place, time. No anxiety or agitation.      LABS:    CBC:   Lab Results   Component Value Date    WBC 6.0 05/02/2019    HGB 12.9 (L) 05/02/2019    HCT 37.9 (L) 05/02/2019    MCV 96.0 05/02/2019     05/02/2019         No results found for: IRON, TIBC, FERRITIN, FOLATE, [id number], PTH                                                          BMP:    Lab Results   Component Value Date     05/10/2019    K 4.3 PGY-1    5/15/2019, 2:29 PM     Addendum to Resident H& P/Progress note:  I have personally seen,examined and evaluated the patient.  I have reviewed the current history, physical findings, labs and assessment and plan and agree with note as documented by resident MD ( Monica Coleman)      Jerry Sorensen MD, [address]

## 2019-05-15 NOTE — TELEPHONE ENCOUNTER
Talked with [place] about her father today. Wt down to 197lb, sx improved. Will decrease lasix to 40 bid with 20mg prn for wt >200lb.  Aidan Moon

## 2019-05-15 NOTE — PROGRESS NOTES
[facility]       NURSING PROGRESS NOTE      May 15, 2019  Shannan Vargas    Chief Complaint:   Chief Complaint   Patient presents with    Mass     Left hand       Constitutional: negative  Eyes: negative  Ears, nose, mouth, throat, and face: negative  Respiratory: negative  Cardiovascular: negative, HTN  Gastrointestinal: negative  Genitourinary: negative  Integument/breast: negative  Hematologic/lymphatic: negative  Musculoskeletal: negative, Mass left hand for 5 days- painful  Neurological: negative  Diabetes: No    Pain Assessment:  Pain Present: yes when touching it.  Can't give number      Mobility/Safety/Self-Care:  Steady Gait: Yes  History of Falls: No   History of a Fall within the last 30 days No  Assistive Device: None  Poor Hygiene: No    Psychosocial:   Depression: No  If YES,    Does Patient express current thoughts of harming self or others?: No  Anxious: No  Insomnia: No  Inappropriate Behavior: No  Alcohol Abuse: no  Substance Abuse: no  Signs of Physical Abuse: No  Signs of Emotional Abuse: No    Educational:  Identify the learner who is being assessed for education:  patient                    Ability to Learn:  Exhibits ability to grasp concepts and respond to questions: Medium  Ready to Learn: Yes  calm   Preferred Method of Learning:  written  Barriers to Learning: Verbalizes interest  Special Considerations due to cultural, Yarsanism, spiritual beliefs:  No  Language:  English  :  No    Note:   Thinks this was caused by medication      2:00 PM 5/15/2019

## 2019-05-15 NOTE — CARE COORDINATION
[facility] Transitions Initial Follow Up Call    Call within 2 business days of discharge: Yes    Patient:  Maciej Peralta   Patient :  1932  MRN:  [id number]    Reason for Admission:  CHF, Hypertensive Emergency   Discharge Date:  19   RARS:  Davy      1ST CTC attempt to reach Pt regarding recent hospital discharge. CTC left voice recording with call back number requesting a call back. CTC will close out case, as today was scheduled for final follow up CTC call.     Follow up appointments:    Future Appointments   Date Time Provider Shazia Weldon   2019  1:15 PM Sin Camargo MD [facility] None   2019  2:30 PM DEBORAH Mathias - CNP [place] [facility]   2019  2:30 PM Jacinto Cooks, MD [place] [facility]   3/20/2020  9:00 AM Ankur Patino MD [id number]       Thank You,    Franklin Lewis RN  Care Transition Coordinator  Contact [phone]

## 2019-05-20 ENCOUNTER — TELEPHONE (OUTPATIENT)
Dept: CARDIOLOGY CLINIC | Age: 84
End: 2019-05-20

## 2019-05-20 RX ORDER — FUROSEMIDE 20 MG/1
40 TABLET ORAL 2 TIMES DAILY
Qty: 60 TABLET | Refills: 3 | Status: SHIPPED | OUTPATIENT
Start: 2019-05-20 | End: 2019-07-26 | Stop reason: SDUPTHER

## 2019-05-22 ENCOUNTER — TELEPHONE (OUTPATIENT)
Dept: CARDIOLOGY CLINIC | Age: 84
End: 2019-05-22

## 2019-05-23 ENCOUNTER — OFFICE VISIT (OUTPATIENT)
Dept: ORTHOPEDIC SURGERY | Age: 84
End: 2019-05-23
Payer: MEDICARE

## 2019-05-23 VITALS — WEIGHT: 207 LBS | HEIGHT: 66 IN | BODY MASS INDEX: 33.27 KG/M2

## 2019-05-23 DIAGNOSIS — M79.642 LEFT HAND PAIN: Primary | ICD-10-CM

## 2019-05-23 DIAGNOSIS — R22.32 MASS OF LEFT HAND: ICD-10-CM

## 2019-05-23 PROCEDURE — 4040F PNEUMOC VAC/ADMIN/RCVD: CPT | Performed by: ORTHOPAEDIC SURGERY

## 2019-05-23 PROCEDURE — 1123F ACP DISCUSS/DSCN MKR DOCD: CPT | Performed by: ORTHOPAEDIC SURGERY

## 2019-05-23 PROCEDURE — G8598 ASA/ANTIPLAT THER USED: HCPCS | Performed by: ORTHOPAEDIC SURGERY

## 2019-05-23 PROCEDURE — G8427 DOCREV CUR MEDS BY ELIG CLIN: HCPCS | Performed by: ORTHOPAEDIC SURGERY

## 2019-05-23 PROCEDURE — 1111F DSCHRG MED/CURRENT MED MERGE: CPT | Performed by: ORTHOPAEDIC SURGERY

## 2019-05-23 PROCEDURE — 1036F TOBACCO NON-USER: CPT | Performed by: ORTHOPAEDIC SURGERY

## 2019-05-23 PROCEDURE — 99203 OFFICE O/P NEW LOW 30 MIN: CPT | Performed by: ORTHOPAEDIC SURGERY

## 2019-05-23 PROCEDURE — G8417 CALC BMI ABV UP PARAM F/U: HCPCS | Performed by: ORTHOPAEDIC SURGERY

## 2019-05-23 NOTE — LETTER
to following Quita Raisaak along with you.     Sincerely,      Renetta Perdomo MD    Hand & Upper Extremity Surgery  [address]  A partner of Ye Montiel MD

## 2019-05-24 ENCOUNTER — TELEPHONE (OUTPATIENT)
Dept: INTERNAL MEDICINE CLINIC | Age: 84
End: 2019-05-24

## 2019-05-24 NOTE — TELEPHONE ENCOUNTER
Note to Arline Quiles.  in office with wife's appointment. Asked to have B/P checked. Heart rate high 30s to 44. No chest pain. No lightheadedness. took all medications this am. States he will call you for advice.

## 2019-05-25 NOTE — PROGRESS NOTES
surrounding skin changes including erythema    Palpation:  Minimal tenderness to palpation, mass appears to be mobile and firm, no tenderness throughout remainder of thumb with normal thumb tenodesis and cascade    Range of Motion: Near symmetrical thumb range of motion in all planes including IP joint motion compared with contralateral thumb  No mechanical symptoms including catching or locking throughout thumb range of motion    Strength:  5 out of 5 strength EPL FPL and thumb abduction    Special Tests:  Gross sensation intact to thumb tip radial and ulnar aspect  Negative Tinel's overlying nonpulsatile mass  Negative transillumination of mass  Skin: There are no additional worrisome rashes, ulcerations or lesions. Gait: normal    Circulation: well perfused    Additional Comments:     Additional Examinations:  Right Upper Extremity:  Examination of the right upper extremity does not show any tenderness, deformity or injury. Range of motion is unremarkable. There is no gross instability. There are no rashes, ulcerations or lesions. Strength and tone are normal.       Radiology:     X-rays obtained and reviewed in office:  Views 3  Location left hand  Impression no acute fracture or additional acute osseous abnormality  Degenerative changes noted throughout the hand including thumb CMC and IP joints  Calcification noted adjacent to the volar aspect of the thumb MP joint and near [facility] joint with no additional soft tissue abnormality      Assessment:  80year-old male presenting with new onset left thumb pain as well as volar soft tissue mass ×2 weeks  1. Left thumb volar soft tissue mass, likely solid mass  Impression:   Encounter Diagnoses   Name Primary?     Left hand pain Yes    Mass of left hand        Office Procedures:  Orders Placed This Encounter   Procedures    XR HAND LEFT (MIN 3 VIEWS)     Standing Status:   Future     Number of Occurrences:   1     Standing Expiration Date:   5/23/2020     Order Specific Question:   Reason for exam:     Answer:   pain       Treatment Plan: After evaluating the patient he does appear to have a new soft tissue mass that has not grown in size and today is minimally uncomfortable. It does appear solid based on transillumination but possibility of solid versus cystic mass in this area could include retinacular cyst or nodule. We did discuss the possibility of additional masses including benign versus malignant. For now, the patient has minimal symptoms and we agreed to observe over the next 3-4 weeks carefully. If he is having increased growth or new pain then we will consider either obtaining an MRI for further evaluation or excisional biopsy of soft tissue mass. The patient is in agreement and we will plan for follow-up in 1 month.  No other treatment unless symptoms change acutely over the next several weeks

## 2019-05-30 ENCOUNTER — OFFICE VISIT (OUTPATIENT)
Dept: INTERNAL MEDICINE CLINIC | Age: 84
End: 2019-05-30
Payer: MEDICARE

## 2019-05-30 VITALS
SYSTOLIC BLOOD PRESSURE: 126 MMHG | HEART RATE: 51 BPM | WEIGHT: 200 LBS | TEMPERATURE: 97.6 F | DIASTOLIC BLOOD PRESSURE: 64 MMHG | BODY MASS INDEX: 32.28 KG/M2 | RESPIRATION RATE: 20 BRPM | OXYGEN SATURATION: 97 %

## 2019-05-30 DIAGNOSIS — N18.30 CHRONIC KIDNEY DISEASE, STAGE III (MODERATE) (HCC): ICD-10-CM

## 2019-05-30 DIAGNOSIS — I50.42 CHRONIC COMBINED SYSTOLIC AND DIASTOLIC CONGESTIVE HEART FAILURE (HCC): Primary | ICD-10-CM

## 2019-05-30 PROCEDURE — 99213 OFFICE O/P EST LOW 20 MIN: CPT | Performed by: STUDENT IN AN ORGANIZED HEALTH CARE EDUCATION/TRAINING PROGRAM

## 2019-05-30 ASSESSMENT — ENCOUNTER SYMPTOMS
SHORTNESS OF BREATH: 0
ABDOMINAL PAIN: 0

## 2019-05-30 NOTE — PROGRESS NOTES
[address] [facility]       NURSING PROGRESS NOTE      May 30, 2019  Huber Grady    Chief Complaint:   Chief Complaint   Patient presents with    Coronary Artery Disease       Constitutional: negative  Eyes: negative  Ears, nose, mouth, throat, and face: negative  Respiratory: negative  Cardiovascular: negative, low heart rate occasionally  Gastrointestinal: negative  Genitourinary: negative  Integument/breast: negative  Hematologic/lymphatic: negative  Musculoskeletal: negative, cyst on hand  Neurological: negative  Diabetes: Not Applicable    Pain Assessment:  Pain Present: yes  Pain Score: 3  Pain Quality/Description: Aching hand    Mobility/Safety/Self-Care:  Steady Gait: Yes  History of Falls: No   History of a Fall within the last 30 days No  Assistive Device: None  Poor Hygiene: No    Psychosocial:   Depression: No  If YES,    Does Patient express current thoughts of harming self or others?: No  Anxious: No  Insomnia: No  Inappropriate Behavior: No  Alcohol Abuse: no  Substance Abuse: no  Signs of Physical Abuse: No  Signs of Emotional Abuse: No    Educational:  Identify the learner who is being assessed for education:  patient                    Ability to Learn:  Exhibits ability to grasp concepts and respond to questions: Medium  Ready to Learn: Yes  calm   Preferred Method of Learning:  written  Barriers to Learning: Verbalizes interest  Special Considerations due to cultural, [employer]ian, spiritual beliefs:  No  Language:  English  :  No    Note:   Saw Dr Abraahm Morales      1:38 PM 5/30/2019
Constitutional: He is oriented to person, place, and time. He appears well-developed and well-nourished. No distress. Neck: Normal range of motion. Neck supple. Cardiovascular: Intact distal pulses. Murmur heard. Bradycardia, 5/6 holosystolic murmur   Pulmonary/Chest: Effort normal and breath sounds normal. No respiratory distress. He has no wheezes. He has no rales. Abdominal: Soft. Bowel sounds are normal. He exhibits no distension. Musculoskeletal: Normal range of motion. He exhibits no edema. Neurological: He is alert and oriented to person, place, and time. Skin: Skin is warm and dry. He is not diaphoretic. Psychiatric: He has a normal mood and affect. His behavior is normal. Judgment and thought content normal.        Assessment/Plan:     Mr. Henny Rodriguez is an 79 yo gentleman who presents for routine f/u visit.     HTN  -continue lasix 40 mg, increase to 60 mg bid for weight gain   -aldactone 12.5 daily  -coreg 3.125 bid  -electrolytes stable on last RFP, Cr stable    Moderate AS  -followed by Dr. Dyana Fuentes  -asymptomatic     CHF   -daily weights  -low sodium diet  -f/u with CHF clinic   -Britni Helm NP on 6/6 and Dr. Dyana Fuentes in August    CKD III  -stable    Asymptomatic Bradycardic  -HR 40-50 with no sx  -will speak with cardiology, who is already aware    CAD  -ASA and atorvastatin    HLD  -atorvastatin    L hand cyst  -followed by Dr. Milka De La Paz  -voltaren gel    *Patient qualifies for home health aide. He needs assistance with medication compliance. He needs monitoring of vitals and weight for his CHF. He qualifies for skilled nursing. Joceline Munoz M.D.   5/30/2019, 1:56 PM     Addendum to Resident H& P/Progress note:  I have personally seen,examined and evaluated the patient.  I have reviewed the current history, physical findings, labs and assessment and plan and agree with note as documented by resident MD ( John Sparks)      Jason Best MD, [address]

## 2019-05-31 NOTE — TELEPHONE ENCOUNTER
Spoke with pt's daughter Duran Shell pt has had no sx's and pulse at home was 49. Kaveh Collazo NP is ok with the pt coming in on 6/6/19 for OV to see her since the pt is not experiencing any current sx's or problems. Advised Amy to contact the office with any questions or concerns.

## 2019-06-05 ASSESSMENT — ENCOUNTER SYMPTOMS
GASTROINTESTINAL NEGATIVE: 1
WHEEZING: 0
COUGH: 0

## 2019-06-05 NOTE — PROGRESS NOTES
[address]   Congestive Heart Failure    Primary Care Doctor:  Izabella Bay MD  Primary Cardiologist: Franko Vance Pt: Yes    Chief Complaint:  SOB    History of Present Illness:  Huber Grady is a 80 y.o. male with PMH AS, HTN, CAD, PCI, ICM,  HFrEF (40%) who presents today for CHF f/u. At last OV one month ago, lasix increase and li started, coreg decreased for azra. Wt came down to 197 and lasix decreased to 40 bid with 20mg prn wt >200lb, still with azra. Today, he reports that he is feeling much better, wt back down, sx resolved and he is swimming regularly again. he denies chest pain,  palpitations, SOB, edema, orthopnea,  exertional chest pressure/discomfort, or syncope. Home HR 40-50, /60 in am 130-140 in pm    Today's home wt: 198lb    Baseline Weight: 198-200home scale    Admit BNP: 2991   Discharge BNP: 4898    EF: 40%  Cardiac Imaging: Echo 3/26/19   Left ventricular cavity size is normal. There is moderate-severe concentric   left ventricular hypertrophy. The mid anteroseptum is hypokinetic. The   apical septum is hypokinetic. . Mild anterolateral hypokinesis. The apical   inferior and anterior walls are hypokinetic. Overall LVEF is roughly 40%.   The inferolateral wall is severely hypokinetic. Diastolic filling parameters   suggest grade I diastolic dysfunction.   The left atrium is minimally dilated.   Mitral annular calcification is present. Mild mitral regurgitation is   present.   At least moderate aortic stenosis with a peak velocity of 3.69 m/s and a   mean pressure gradient of 30mmHg. Given the LV dysfunction, the severity of   the stenosis may be underestimated. Mild to moderate aortic regurgitation.   The aortic root is mildly dilated measuring 3.9 cm.   Mild tricuspid regurgitation.  IVC size is normal (<2.1cm) and collapses > 50% with respiration consistent   with normal RA pressure (3mmHg).  Estimated pulmonary artery systolic   pressure is at 37 mmHg assuming a right atrial pressure of 3 mmHg. Device: No     Activity: back to baseline  Can you walk 1-2 blocks or do a moderate amount of house/yard work?yes     NYHA Class: I I    Sodium Restrictions: 2g  Fluid Restrictions: 48-64 oz/day  Sodium and fluid restriction compliance: good    Pt Education: The patient has received education on the following topics: dietary sodium restriction, heart failure medications, the importance of physical activity, symptom management and weight monitoring         Past Medical History:   has a past medical history of Arthritis, CAD (coronary artery disease), Chronic kidney disease, Constipation, DDD (degenerative disc disease), lumbar, Dental disease, Hearing loss, Hyperlipidemia, Hypertension, Mild aortic stenosis, and Prostate enlargement. Surgical History:   has a past surgical history that includes TURP; Coronary angioplasty with stent (1/12/98); Skin tag removal (2/2/10); and eye surgery. Social History:   reports that he has never smoked. He has never used smokeless tobacco. He reports that he does not drink alcohol or use drugs. Family History:   Family History   Problem Relation Age of Onset    Hypertension Mother     Cancer Mother     Stroke Father        Home Medications:  Prior to Admission medications    Medication Sig Start Date End Date Taking? Authorizing Provider   furosemide (LASIX) 20 MG tablet Take 2 tablets by mouth 2 times daily 5/20/19   DEBORAH Jolly - CNP   spironolactone (ALDACTONE) 25 MG tablet Take 0.5 tablets by mouth daily 5/10/19   DEBORAH Jolly CNP   carvedilol (COREG) 6.25 MG tablet Take 0.5 tablets by mouth 2 times daily (with meals) 5/10/19   DEBORAH Jolly CNP   docusate sodium (COLACE) 100 MG capsule Take 1 capsule by mouth 2 times daily as needed for Constipation 5/2/19   Brinda Velasquez MD   [facility].  Devices (DIGITAL GLASS SCALE) MISC 1 Device by Does not apply route daily 4/18/19   Oscar Singh MD   diclofenac sodium 1 % GEL Apply 4 g topically 4 times daily as needed for Pain 3/28/19   Starr Malagon MD   aspirin EC 81 MG EC tablet Take 1 tablet by mouth daily 12/27/18   Kenneth Hodgson MD   cetirizine (ZYRTEC) 10 MG tablet Take 1 tablet by mouth daily 12/27/18   Kenneth Hodgson MD   nitroGLYCERIN (NITROSTAT) 0.4 MG SL tablet Place 1 tablet under the tongue every 5 minutes as needed for Chest pain up to max of 3 total doses. If no relief after 1 dose, call 911. 12/27/18   Kenneth Hodgson MD   Multiple Vitamin (MULTI-VITAMIN) TABS Take 1 tablet by mouth daily 5/21/18   Casey Beatty MD   atorvastatin (LIPITOR) 80 MG tablet Take 1 tablet by mouth nightly 1/25/18   Kurtis Lilly MD        Allergies:  Patient has no known allergies. ROS:   Review of Systems   Constitutional: Negative for fatigue. Respiratory: Negative for cough, shortness of breath and wheezing. Cardiovascular: Negative for chest pain, palpitations and leg swelling. Gastrointestinal: Negative. Genitourinary: Negative. Musculoskeletal: Negative. Skin: Negative. Neurological: Negative for dizziness. Hematological: Negative. Psychiatric/Behavioral: Negative. Physical Examination:    Vitals:    06/06/19 1401 06/06/19 1402   BP: (!) 140/70 (!) 140/70   Pulse: 56    Weight: 207 lb 6.4 oz (94.1 kg)            Physical Exam   Constitutional: He is oriented to person, place, and time. He appears well-developed and well-nourished. HENT:   Head: Normocephalic and atraumatic. Eyes: Pupils are equal, round, and reactive to light. EOM are normal.   Neck: Normal range of motion. Neck supple. Cardiovascular: Regular rhythm and intact distal pulses. Bradycardia present. Murmur heard. Pulmonary/Chest: Effort normal.   Abdominal: Soft. Musculoskeletal: Normal range of motion. Neurological: He is alert and oriented to person, place, and time. Skin: Skin is warm and dry. Psychiatric: He has a normal mood and affect.  His behavior is normal. Judgment and thought content normal.       Lab Data:    CBC:   Lab Results   Component Value Date    WBC 6.0 05/02/2019    WBC 6.4 05/01/2019    WBC 6.0 04/30/2019    RBC 3.95 05/02/2019    RBC 4.08 05/01/2019    RBC 3.81 04/30/2019    HGB 12.9 05/02/2019    HGB 13.2 05/01/2019    HGB 12.4 04/30/2019    HCT 37.9 05/02/2019    HCT 39.5 05/01/2019    HCT 37.0 04/30/2019    MCV 96.0 05/02/2019    MCV 96.9 05/01/2019    MCV 97.4 04/30/2019    RDW 13.0 05/02/2019    RDW 13.0 05/01/2019    RDW 12.9 04/30/2019     05/02/2019     05/01/2019     04/30/2019     BMP:  Lab Results   Component Value Date     05/10/2019     05/02/2019     05/01/2019    K 4.3 05/10/2019    K 4.0 05/02/2019    K 3.5 05/01/2019    K 3.4 04/29/2019    K 3.9 03/28/2019    K 3.6 03/26/2019    CL 98 05/10/2019     05/02/2019     05/01/2019    CO2 27 05/10/2019    CO2 28 05/02/2019    CO2 31 05/01/2019    PHOS 4.2 05/02/2019    PHOS 3.9 05/01/2019    PHOS 3.6 04/30/2019    BUN 26 05/10/2019    BUN 36 05/02/2019    BUN 35 05/01/2019    CREATININE 1.8 05/10/2019    CREATININE 1.8 05/02/2019    CREATININE 1.8 05/01/2019     BNP:   Lab Results   Component Value Date    PROBNP 7,763 05/10/2019    PROBNP 4,898 05/01/2019    PROBNP 2,991 04/29/2019     Iron Studies:  No results found for: FERRITIN  Iron Deficiency Anemia:  No    IV Iron Therapy:  No  2017 ACC/AHA HF Guidelines:   intravenous iron replacement in patients with New York Heart Association (NYHA) class II and III HF and iron deficiency(ferritin <100 ng/ml or 100-300 ng/ml if transferrin saturation <20%), to improve functional status and QoL.        Assessment/Plan:    Encounter Diagnoses   Name     Acute on chronic combined systolic and diastolic congestive heart failure (HCC) Compensated, cont current diuretics, labs today    Dyspnea on exertion resolved    Hypervolemia, unspecified hypervolemia type resolved    Bradycardia Continued,

## 2019-06-06 ENCOUNTER — NURSE ONLY (OUTPATIENT)
Dept: CARDIOLOGY CLINIC | Age: 84
End: 2019-06-06

## 2019-06-06 ENCOUNTER — OFFICE VISIT (OUTPATIENT)
Dept: CARDIOLOGY CLINIC | Age: 84
End: 2019-06-06
Payer: MEDICARE

## 2019-06-06 VITALS
BODY MASS INDEX: 33.48 KG/M2 | DIASTOLIC BLOOD PRESSURE: 70 MMHG | SYSTOLIC BLOOD PRESSURE: 140 MMHG | HEART RATE: 56 BPM | WEIGHT: 207.4 LBS

## 2019-06-06 DIAGNOSIS — R00.1 BRADYCARDIA: ICD-10-CM

## 2019-06-06 DIAGNOSIS — R06.09 DYSPNEA ON EXERTION: ICD-10-CM

## 2019-06-06 DIAGNOSIS — I50.43 ACUTE ON CHRONIC COMBINED SYSTOLIC AND DIASTOLIC CONGESTIVE HEART FAILURE (HCC): Primary | ICD-10-CM

## 2019-06-06 PROCEDURE — 4040F PNEUMOC VAC/ADMIN/RCVD: CPT | Performed by: NURSE PRACTITIONER

## 2019-06-06 PROCEDURE — 1036F TOBACCO NON-USER: CPT | Performed by: NURSE PRACTITIONER

## 2019-06-06 PROCEDURE — 0296T PR EXT ECG > 48HR TO 21 DAY RCRD W/CONECT INTL RCRD: CPT | Performed by: NURSE PRACTITIONER

## 2019-06-06 PROCEDURE — 1123F ACP DISCUSS/DSCN MKR DOCD: CPT | Performed by: NURSE PRACTITIONER

## 2019-06-06 PROCEDURE — G8598 ASA/ANTIPLAT THER USED: HCPCS | Performed by: NURSE PRACTITIONER

## 2019-06-06 PROCEDURE — G8427 DOCREV CUR MEDS BY ELIG CLIN: HCPCS | Performed by: NURSE PRACTITIONER

## 2019-06-06 PROCEDURE — 99214 OFFICE O/P EST MOD 30 MIN: CPT | Performed by: NURSE PRACTITIONER

## 2019-06-06 PROCEDURE — G8417 CALC BMI ABV UP PARAM F/U: HCPCS | Performed by: NURSE PRACTITIONER

## 2019-06-06 ASSESSMENT — ENCOUNTER SYMPTOMS: SHORTNESS OF BREATH: 0

## 2019-06-20 ENCOUNTER — OFFICE VISIT (OUTPATIENT)
Dept: ORTHOPEDIC SURGERY | Age: 84
End: 2019-06-20
Payer: MEDICARE

## 2019-06-20 DIAGNOSIS — R22.32 MASS OF LEFT HAND: Primary | ICD-10-CM

## 2019-06-20 PROCEDURE — G8417 CALC BMI ABV UP PARAM F/U: HCPCS | Performed by: ORTHOPAEDIC SURGERY

## 2019-06-20 PROCEDURE — 4040F PNEUMOC VAC/ADMIN/RCVD: CPT | Performed by: ORTHOPAEDIC SURGERY

## 2019-06-20 PROCEDURE — 99212 OFFICE O/P EST SF 10 MIN: CPT | Performed by: ORTHOPAEDIC SURGERY

## 2019-06-20 PROCEDURE — G8598 ASA/ANTIPLAT THER USED: HCPCS | Performed by: ORTHOPAEDIC SURGERY

## 2019-06-20 PROCEDURE — 1036F TOBACCO NON-USER: CPT | Performed by: ORTHOPAEDIC SURGERY

## 2019-06-20 PROCEDURE — 1123F ACP DISCUSS/DSCN MKR DOCD: CPT | Performed by: ORTHOPAEDIC SURGERY

## 2019-06-20 PROCEDURE — G8427 DOCREV CUR MEDS BY ELIG CLIN: HCPCS | Performed by: ORTHOPAEDIC SURGERY

## 2019-06-21 ENCOUNTER — TELEPHONE (OUTPATIENT)
Dept: INTERNAL MEDICINE CLINIC | Age: 84
End: 2019-06-21

## 2019-06-21 NOTE — TELEPHONE ENCOUNTER
Patient called in requesting a prescription for his multivitamin be called in to [facility]. Please call patient for any additional questions.

## 2019-06-24 PROCEDURE — 0298T PR EXT ECG > 48HR TO 21 DAY REVIEW AND INTERPRETATN: CPT | Performed by: INTERNAL MEDICINE

## 2019-06-24 NOTE — PROGRESS NOTES
Assessment: 59-year-old male presenting with new onset left thumb pain as well as volar soft tissue mass ×6 weeks  1. Left thumb volar soft tissue mass, likely solid mass        Treatment Plan: The patient currently does not have any pain or additional symptoms related to the soft tissue mass. No change in size over the last 1 month. We discussed operative intervention for surgical excision and biopsy versus continued observation. The patient would prefer observation at this point. I have offered him follow-up in 3 months for reevaluation and he is encouraged to call sooner if he begins to experience any new symptoms, increase in size or other questions or concerns    No follow-ups on file. History of Present Illness  Kamilah Kaba is a 80 y.o. male here today for a follow-up for a left thumb volar soft tissue mass  The patient was seen nearly 1 month ago with soft tissue mass near the volar aspect of the thumb MP joint  Since his last visit the patient has not noticed any change in size and currently has no symptoms of pain. He denies any additional visible changes or skin changes related to the mass. No new numbness or tingling and he has been performing activities as tolerated    Review of Systems  Pertinent items are noted in HPI  Review of systems reviewed from Patient History Form dated on 5/23/19 and available in the patient's chart under the Media tab. Vital Signs  There were no vitals filed for this visit. Physical Exam  Constitutional:  Patient is well-nourished and demonstrates normal hygiene. Mental Status:  Patient is alert and oriented to person, place and time. Skin:  Intact, no rashes or lesions.      Left Hand/thumb Examination  Inspection:  No swelling or skin changes throughout the thumb or hand  There is a prominence near the volar MP crease of the thumb approximately 1 cm in diameter with no surrounding skin changes edema or ecchymosis     Palpation:  Minimal tenderness to palpation, mass appears to be mobile and firm, no tenderness throughout remainder of thumb with normal thumb tenodesis and cascade     Range of Motion: Near symmetrical thumb range of motion in all planes including IP joint motion compared with contralateral thumb  No mechanical symptoms including catching or locking throughout thumb range of motion     Strength:  5 out of 5 strength EPL FPL and thumb abduction     Special Tests:  Gross sensation intact to thumb tip radial and ulnar aspect  Negative Tinel's overlying nonpulsatile mass  Negative transillumination of mass  Skin: There are no additional worrisome rashes, ulcerations or lesions. Capillary refill brisk all fingers, symmetric  Gross sensation intact to light touch median/ulnar/radial nerves  Sensation intact to radial/ulnar aspect of fingertip        Radiology:    X-rays obtained and reviewed in office:  No new images obtained today    Additional Diagnostic Test Findings:    Office Procedures:  No orders of the defined types were placed in this encounter. Harish Pacheco MD  Orthopaedic Surgeon, [address]    Contact Information:  Serafin Gray: [phone] [id number] Clinical )    This dictation was performed with a verbal recognition program [facility]) and it was checked for errors. It is possible that there are still dictated errors within this office note. If so, please bring any errors to my attention for an addendum. All efforts were made to ensure that this office note is accurate.

## 2019-06-26 DIAGNOSIS — R00.1 BRADYCARDIA: ICD-10-CM

## 2019-07-05 RX ORDER — DOCUSATE SODIUM 100 MG/1
100 CAPSULE, LIQUID FILLED ORAL 2 TIMES DAILY PRN
Qty: 60 CAPSULE | Refills: 1 | Status: SHIPPED | OUTPATIENT
Start: 2019-07-05 | End: 2019-07-26 | Stop reason: SDUPTHER

## 2019-07-15 DIAGNOSIS — I25.10 CORONARY ARTERY DISEASE INVOLVING NATIVE HEART, ANGINA PRESENCE UNSPECIFIED, UNSPECIFIED VESSEL OR LESION TYPE: ICD-10-CM

## 2019-07-15 RX ORDER — ATORVASTATIN CALCIUM 80 MG/1
80 TABLET, FILM COATED ORAL NIGHTLY
Qty: 90 TABLET | Refills: 0 | Status: SHIPPED | OUTPATIENT
Start: 2019-07-15 | End: 2019-07-26 | Stop reason: SDUPTHER

## 2019-07-25 NOTE — PROGRESS NOTES
[address]   Cardiac Electrophysiology Consultation   Date: 7/30/2019  Reason for Consultation:  Bradycardia  Consult Requesting Physician: No att. providers found     Chief Complaint:   Chief Complaint   Patient presents with    Congestive Heart Failure      HPI: Sulma Singh is a 80 y.o. male referred by Lori Graham NP, for bradycardia. PMH significant for AS, HTN, CAD s/p CABG 1998, PCI, ICMP, and HFrEF (EF 40%). He reported his HRs at home to be 40-50 bpm.  7 day Holter (6/2019) showed SR with average HR 49 (34-75 bpm), brief PAT and brief NSVT. Interval History: Today, he presents in SB, rate 47 bpm.  Denies complaints of lightheadedness, palpitations, dizziness, CP, SOB, orthopnea, presyncope, or syncope. Currently on Coreg 3.125 mg BID. He is active swimming 2x/week with good exercise tolerance. Past Medical History:   Diagnosis Date    Arthritis     CAD (coronary artery disease)     Chronic kidney disease     Constipation     DDD (degenerative disc disease), lumbar     L5, S1    Dental disease     Hearing loss     Hyperlipidemia     Hypertension     Mild aortic stenosis     Prostate enlargement         Past Surgical History:   Procedure Laterality Date    CORONARY ANGIOPLASTY WITH STENT PLACEMENT  1/12/98    EYE SURGERY      SKIN TAG REMOVAL  2/2/10    TURP         Allergies:  No Known Allergies    Medication:   Prior to Admission medications    Medication Sig Start Date End Date Taking?  Authorizing Provider   aspirin EC 81 MG EC tablet Take 1 tablet by mouth daily 7/26/19  Yes Gerardo Mata MD   atorvastatin (LIPITOR) 80 MG tablet Take 1 tablet by mouth nightly 7/26/19  Yes Gerardo Mata MD   carvedilol (COREG) 6.25 MG tablet Take 0.5 tablets by mouth 2 times daily (with meals) 7/26/19  Yes Gerardo Mata MD   cetirizine (ZYRTEC) 10 MG tablet Take 1 tablet by mouth daily 7/26/19  Yes Gerardo Mata MD   diclofenac sodium 1 % GEL Apply 4 g topically 4 times daily as needed for Pain 7/26/19  Yes Blanquita Olivares MD   docusate sodium (COLACE) 100 MG capsule Take 1 capsule by mouth 2 times daily as needed for Constipation 7/26/19  Yes Blanquita Olivares MD   furosemide (LASIX) 20 MG tablet Take 2 tablets by mouth 2 times daily 7/26/19  Yes Blanquita Olivares MD   Multiple Vitamin (MULTI-VITAMIN) TABS Take 1 tablet by mouth daily 7/26/19  Yes Blanquita Olivares MD   spironolactone (ALDACTONE) 25 MG tablet Take 0.5 tablets by mouth daily 7/26/19  Yes Blanquita Olivares MD   Misc. Devices (DIGITAL GLASS SCALE) MISC 1 Device by Does not apply route daily 4/18/19  Yes Blayne Plascencia MD   nitroGLYCERIN (NITROSTAT) 0.4 MG SL tablet Place 1 tablet under the tongue every 5 minutes as needed for Chest pain up to max of 3 total doses. If no relief after 1 dose, call 911. 12/27/18  Yes Blayne Plascencia MD       Social History:   reports that he has never smoked. He has never used smokeless tobacco. He reports that he does not drink alcohol or use drugs. Family History:  family history includes Cancer in his mother; Hypertension in his mother; Stroke in his father. Reviewed. Denies family history of sudden cardiac death, arrhythmia, premature CAD    Review of System:    · General ROS: negative for - chills, fever   · Psychological ROS: negative for - anxiety or depression  · Ophthalmic ROS: negative for - eye pain or loss of vision  · ENT ROS: negative for - epistaxis, headaches, nasal discharge, sore throat   · Allergy and Immunology ROS: negative for - hives, nasal congestion   · Hematological and Lymphatic ROS: negative for - bleeding problems, blood clots, bruising or jaundice  · Endocrine ROS: negative for - skin changes, temperature intolerance or unexpected weight changes  · Respiratory ROS: negative for - cough, hemoptysis, pleuritic pain, SOB, sputum changes or wheezing  · Cardiovascular ROS: Per HPI.    · Gastrointestinal ROS: negative for - abdominal pain, blood in stools, diarrhea, hematemesis, melena, nausea/vomiting or swallowing difficulty/pain  · Genito-Urinary ROS: negative for - dysuria or incontinence  · Musculoskeletal ROS: negative for - joint swelling or muscle pain  · Neurological ROS: negative for - confusion, dizziness, gait disturbance, headaches, numbness/tingling, seizures, speech problems, tremors, visual changes or weakness  · Dermatological ROS: negative for - rash    Physical Examination:  Vitals:    07/30/19 1010   BP: 120/70   Pulse: (!) 47       · Constitutional: Oriented. No distress. · Head: Normocephalic and atraumatic. · Mouth/Throat: Oropharynx is clear and moist.   · Eyes: Conjunctivae normal. EOM are normal.   · Neck: Normal range of motion. Neck supple. No rigidity. No JVD present. · Cardiovascular: Normal rate, regular rhythm, S1&S2 and intact distal pulses. · Pulmonary/Chest: Bilateral respiratory sounds. No wheezes. No rhonchi. · Abdominal: Soft. Bowel sounds present. No distension, No tenderness. · Musculoskeletal: No tenderness. No edema    · Lymphadenopathy: Has no cervical adenopathy. · Neurological: Alert and oriented. Cranial nerve appears intact, No Gross deficit   · Skin: Skin is warm and dry. No rash noted. · Psychiatric: Has a normal mood, affect and behavior     Labs:  Reviewed. ECG: reviewed, Sinus Bradycardia with first degree AVB and IVCD, rate 47,  ms, with QRS duration 132 ms. No pathologic Q waves, ventricular pre-excitation, or QT prolongation. Studies:   1.  7 day Holter (6/2019): showed SR with average HR 49 (34-75 bpm). 3 episodes of brief NSAT and one 4 beat run of NSVT    2. Echo 3/26/19:   Summary   Note: patient is bradycardic.   Left ventricular cavity size is normal. There is moderate-severe concentric   left ventricular hypertrophy. The mid anteroseptum is hypokinetic. The   apical septum is hypokinetic. . Mild anterolateral hypokinesis.  The apical   inferior and anterior walls are hypokinetic. Overall LVEF is roughly 40%.   The inferolateral wall is severely hypokinetic. Diastolic filling parameters   suggest grade I diastolic dysfunction.   The left atrium is minimally dilated.   Mitral annular calcification is present. Mild mitral regurgitation is   present.   At least moderate aortic stenosis with a peak velocity of 3.69 m/s and a   mean pressure gradient of 30mmHg. Given the LV dysfunction, the severity of   the stenosis may be underestimated. Mild to moderate aortic regurgitation.   The aortic root is mildly dilated measuring 3.9 cm.   Mild tricuspid regurgitation.  IVC size is normal (<2.1cm) and collapses > 50% with respiration consistent   with normal RA pressure (3mmHg). Estimated pulmonary artery systolic   pressure is at 37 mmHg assuming a right atrial pressure of 3 mmHg. 3. Stress Test 3/27/19:    Summary    There is normal isotope uptake at stress and rest. There is no evidence of    myocardial ischemia or scar.    Severely dilated LV with diffuse moderate hypokinesis.    Left ventricular ejection fraction of 36 %.    Normal TID of 1.0    Study findings are abnormal and most consistent with non-ischemic    cardiomyopathy. 4. Cath:  none    The MCOT, echocardiogram, stress test, and coronary angiography/PCI were reviewed by myself and used for my plan of care. Procedures:  1. none    Assessment/Plan:  1. Bradycardia  2. ICMP  3. CAD s/p CABG 1998   4. AS, moderate    Asymptomatic sinus bradycardia  With a short runs of nonsustained ventricular tachycardia nonsustained atrial tachycardia.  Denies dizziness, lightheadedness, presyncope, or syncope  Active pt with good exercise tolerance  Currently on Coreg 3.125 mg BID    Permanent pacemaker is not indicated at this time as he is asymptomatic and able to tolerate a small dose of beta-blockers    Follow up with EP as needed  Follow up with Angie Mcgee, [facility], and Dr. Nicholes Habermann    Thank you for allowing me to participate in the care of Claire Abrams. All questions and concerns were addressed to the patient/family. Alternatives to my treatment were discussed. Miguel Albright RN, am scribing for and in the presence of Dr. Rohini Garner. 07/30/19 10:15 AM  ANDI Bourgeois MD, personally performed the services prescribed in this documentation as scribed by Ms. Shakila Whiting RN in my presence and it is both accurate and complete.        Marika Barber MD  Cardiac Electrophysiology  [facility]

## 2019-07-26 ENCOUNTER — OFFICE VISIT (OUTPATIENT)
Dept: INTERNAL MEDICINE CLINIC | Age: 84
End: 2019-07-26
Payer: MEDICARE

## 2019-07-26 ENCOUNTER — TELEPHONE (OUTPATIENT)
Dept: INTERNAL MEDICINE CLINIC | Age: 84
End: 2019-07-26

## 2019-07-26 VITALS
BODY MASS INDEX: 33.65 KG/M2 | OXYGEN SATURATION: 96 % | DIASTOLIC BLOOD PRESSURE: 68 MMHG | RESPIRATION RATE: 18 BRPM | TEMPERATURE: 97 F | HEART RATE: 44 BPM | SYSTOLIC BLOOD PRESSURE: 136 MMHG | WEIGHT: 208.5 LBS

## 2019-07-26 DIAGNOSIS — I10 ESSENTIAL HYPERTENSION: ICD-10-CM

## 2019-07-26 DIAGNOSIS — K59.00 CONSTIPATION, UNSPECIFIED CONSTIPATION TYPE: ICD-10-CM

## 2019-07-26 DIAGNOSIS — E78.5 HYPERLIPIDEMIA, UNSPECIFIED HYPERLIPIDEMIA TYPE: ICD-10-CM

## 2019-07-26 DIAGNOSIS — Z00.00 WELL ADULT EXAM: ICD-10-CM

## 2019-07-26 DIAGNOSIS — I25.10 CORONARY ARTERY DISEASE INVOLVING NATIVE HEART, ANGINA PRESENCE UNSPECIFIED, UNSPECIFIED VESSEL OR LESION TYPE: ICD-10-CM

## 2019-07-26 DIAGNOSIS — I50.22 CHRONIC SYSTOLIC HEART FAILURE (HCC): Primary | ICD-10-CM

## 2019-07-26 DIAGNOSIS — M19.90 ARTHRITIS: ICD-10-CM

## 2019-07-26 PROCEDURE — 99213 OFFICE O/P EST LOW 20 MIN: CPT

## 2019-07-26 RX ORDER — ATORVASTATIN CALCIUM 80 MG/1
80 TABLET, FILM COATED ORAL NIGHTLY
Qty: 90 TABLET | Refills: 1 | Status: SHIPPED | OUTPATIENT
Start: 2019-07-26 | End: 2020-02-05

## 2019-07-26 RX ORDER — ASPIRIN 81 MG/1
81 TABLET ORAL DAILY
Qty: 90 TABLET | Refills: 3 | Status: ON HOLD | OUTPATIENT
Start: 2019-07-26 | End: 2019-10-17 | Stop reason: HOSPADM

## 2019-07-26 RX ORDER — SPIRONOLACTONE 25 MG/1
12.5 TABLET ORAL DAILY
Qty: 60 TABLET | Refills: 1 | Status: SHIPPED | OUTPATIENT
Start: 2019-07-26 | End: 2020-01-21

## 2019-07-26 RX ORDER — FUROSEMIDE 20 MG/1
40 TABLET ORAL 2 TIMES DAILY
Qty: 360 TABLET | Refills: 1 | Status: SHIPPED | OUTPATIENT
Start: 2019-07-26 | End: 2020-02-17 | Stop reason: SDUPTHER

## 2019-07-26 RX ORDER — CARVEDILOL 6.25 MG/1
3.12 TABLET ORAL 2 TIMES DAILY WITH MEALS
Qty: 90 TABLET | Refills: 1 | Status: SHIPPED | OUTPATIENT
Start: 2019-07-26 | End: 2019-10-16 | Stop reason: CLARIF

## 2019-07-26 RX ORDER — CETIRIZINE HYDROCHLORIDE 10 MG/1
10 TABLET ORAL DAILY
Qty: 90 TABLET | Refills: 1 | Status: SHIPPED | OUTPATIENT
Start: 2019-07-26 | End: 2019-10-16 | Stop reason: CLARIF

## 2019-07-26 RX ORDER — DOCUSATE SODIUM 100 MG/1
100 CAPSULE, LIQUID FILLED ORAL 2 TIMES DAILY PRN
Qty: 60 CAPSULE | Refills: 3 | Status: SHIPPED | OUTPATIENT
Start: 2019-07-26 | End: 2019-12-31

## 2019-07-26 ASSESSMENT — ENCOUNTER SYMPTOMS
ABDOMINAL PAIN: 0
STRIDOR: 0
CHEST TIGHTNESS: 0

## 2019-07-26 NOTE — PROGRESS NOTES
Here for follow up . Heart rate 44 . No dizziness to also follow up with Cardiology next Thursday . He had monitor on . No complaints.
connections:     Talks on phone: Not on file     Gets together: Not on file     Attends Shinto service: Not on file     Active member of club or organization: Not on file     Attends meetings of clubs or organizations: Not on file     Relationship status: Not on file    Intimate partner violence:     Fear of current or ex partner: Not on file     Emotionally abused: Not on file     Physically abused: Not on file     Forced sexual activity: Not on file   Other Topics Concern    Not on file   Social History Narrative    Not on file        Family History   Problem Relation Age of Onset    Hypertension Mother     Cancer Mother     Stroke Father        Vitals:    07/26/19 0821   BP: 136/68   Pulse: (!) 44   Resp: 18   Temp: 97 °F (36.1 °C)   TempSrc: Oral   SpO2: 96%   Weight: 208 lb 8 oz (94.6 kg)     Estimated body mass index is 33.65 kg/m² as calculated from the following:    Height as of 5/23/19: 5' 6\" (1.676 m). Weight as of this encounter: 208 lb 8 oz (94.6 kg). Physical Exam   Constitutional: He is oriented to person, place, and time. He appears well-developed and well-nourished. No distress. HENT:   Head: Normocephalic and atraumatic. Eyes: Pupils are equal, round, and reactive to light. EOM are normal.   Neck: Normal range of motion. Neck supple. Cardiovascular: Regular rhythm and intact distal pulses. Exam reveals no gallop and no friction rub. Murmur (aortic stenosis) heard. bradycardic     Pulmonary/Chest: Effort normal and breath sounds normal. No respiratory distress. He has no wheezes. Abdominal: Soft. Bowel sounds are normal.   Musculoskeletal: Normal range of motion. He exhibits no edema. Neurological: He is alert and oriented to person, place, and time. Skin: Skin is warm and dry. He is not diaphoretic. Psychiatric: He has a normal mood and affect.  His behavior is normal. Judgment and thought content normal.       ASSESSMENT/PLAN:      CHFrEF  Echo (03/26/2019) showed an

## 2019-07-30 ENCOUNTER — OFFICE VISIT (OUTPATIENT)
Dept: CARDIOLOGY CLINIC | Age: 84
End: 2019-07-30
Payer: MEDICARE

## 2019-07-30 VITALS
WEIGHT: 210 LBS | HEART RATE: 47 BPM | SYSTOLIC BLOOD PRESSURE: 120 MMHG | BODY MASS INDEX: 33.89 KG/M2 | DIASTOLIC BLOOD PRESSURE: 70 MMHG

## 2019-07-30 DIAGNOSIS — I25.10 CORONARY ARTERY DISEASE INVOLVING NATIVE HEART, ANGINA PRESENCE UNSPECIFIED, UNSPECIFIED VESSEL OR LESION TYPE: ICD-10-CM

## 2019-07-30 DIAGNOSIS — R00.1 BRADYCARDIA: Primary | ICD-10-CM

## 2019-07-30 DIAGNOSIS — I10 ESSENTIAL HYPERTENSION: ICD-10-CM

## 2019-07-30 DIAGNOSIS — I25.5 ISCHEMIC CARDIOMYOPATHY: ICD-10-CM

## 2019-07-30 PROCEDURE — 93000 ELECTROCARDIOGRAM COMPLETE: CPT | Performed by: INTERNAL MEDICINE

## 2019-07-30 PROCEDURE — 99215 OFFICE O/P EST HI 40 MIN: CPT | Performed by: INTERNAL MEDICINE

## 2019-08-07 ENCOUNTER — OFFICE VISIT (OUTPATIENT)
Dept: CARDIOLOGY CLINIC | Age: 84
End: 2019-08-07
Payer: MEDICARE

## 2019-08-07 VITALS
SYSTOLIC BLOOD PRESSURE: 138 MMHG | DIASTOLIC BLOOD PRESSURE: 80 MMHG | WEIGHT: 210 LBS | BODY MASS INDEX: 33.89 KG/M2 | HEART RATE: 56 BPM

## 2019-08-07 DIAGNOSIS — I25.10 CAD IN NATIVE ARTERY: Primary | ICD-10-CM

## 2019-08-07 DIAGNOSIS — Z98.61 HISTORY OF PTCA: ICD-10-CM

## 2019-08-07 DIAGNOSIS — I35.0 NONRHEUMATIC AORTIC VALVE STENOSIS: ICD-10-CM

## 2019-08-07 DIAGNOSIS — R00.1 BRADYCARDIA: ICD-10-CM

## 2019-08-07 DIAGNOSIS — I25.5 ISCHEMIC CARDIOMYOPATHY: ICD-10-CM

## 2019-08-07 PROCEDURE — G8427 DOCREV CUR MEDS BY ELIG CLIN: HCPCS | Performed by: INTERNAL MEDICINE

## 2019-08-07 PROCEDURE — 4040F PNEUMOC VAC/ADMIN/RCVD: CPT | Performed by: INTERNAL MEDICINE

## 2019-08-07 PROCEDURE — G8598 ASA/ANTIPLAT THER USED: HCPCS | Performed by: INTERNAL MEDICINE

## 2019-08-07 PROCEDURE — 1036F TOBACCO NON-USER: CPT | Performed by: INTERNAL MEDICINE

## 2019-08-07 PROCEDURE — 99214 OFFICE O/P EST MOD 30 MIN: CPT | Performed by: INTERNAL MEDICINE

## 2019-08-07 PROCEDURE — 1123F ACP DISCUSS/DSCN MKR DOCD: CPT | Performed by: INTERNAL MEDICINE

## 2019-08-07 PROCEDURE — G8417 CALC BMI ABV UP PARAM F/U: HCPCS | Performed by: INTERNAL MEDICINE

## 2019-08-18 ENCOUNTER — HOSPITAL ENCOUNTER (EMERGENCY)
Age: 84
Discharge: HOME | End: 2019-08-18
Attending: EMERGENCY MEDICINE
Payer: MEDICARE

## 2019-08-18 VITALS
HEART RATE: 60 BPM | BODY MASS INDEX: 32.95 KG/M2 | RESPIRATION RATE: 16 BRPM | OXYGEN SATURATION: 95 % | DIASTOLIC BLOOD PRESSURE: 70 MMHG | WEIGHT: 205 LBS | HEIGHT: 66 IN | SYSTOLIC BLOOD PRESSURE: 166 MMHG | TEMPERATURE: 98.4 F

## 2019-08-18 DIAGNOSIS — S61.011A LACERATION OF RIGHT THUMB WITHOUT FOREIGN BODY WITHOUT DAMAGE TO NAIL, INITIAL ENCOUNTER: Primary | ICD-10-CM

## 2019-08-18 PROCEDURE — 99283 EMERGENCY DEPT VISIT LOW MDM: CPT

## 2019-08-18 ASSESSMENT — PAIN DESCRIPTION - FREQUENCY: FREQUENCY: CONTINUOUS

## 2019-08-18 ASSESSMENT — PAIN DESCRIPTION - LOCATION: LOCATION: FINGER (COMMENT WHICH ONE)

## 2019-08-18 ASSESSMENT — PAIN DESCRIPTION - ORIENTATION: ORIENTATION: RIGHT

## 2019-08-18 ASSESSMENT — PAIN DESCRIPTION - PAIN TYPE: TYPE: ACUTE PAIN

## 2019-08-18 ASSESSMENT — PAIN SCALES - GENERAL: PAINLEVEL_OUTOF10: 5

## 2019-08-18 ASSESSMENT — PAIN DESCRIPTION - DESCRIPTORS: DESCRIPTORS: ACHING

## 2019-08-18 NOTE — ED PROVIDER NOTES
ED Attending Attestation Note     Date of evaluation: 8/18/2019    This patient was seen by the resident. I have seen and examined the patient, agree with the workup, evaluation, management and diagnosis. The care plan has been discussed. My assessment reveals appearing male with small superficial abrasion of the tip of the right thumb. No signs of felon, soft finger pad full flexion extension at the interphalangeal and MCP joint without pain. No concern for flexor tenosynovitis. No signs of cellulitis.       Elle Antonio MD  08/18/19 6940
Hx, Past Surgical Hx, Social Hx, Allergies, and Family Hx were reviewed. The patient was given the followingmedications:  Orders Placed This Encounter   Medications    mupirocin (BACTROBAN) 2 % ointment     Sig: Apply topically 3 times daily for 1 week. Dispense:  1 Tube     Refill:  0       CONSULTS:  None    MEDICAL DECISION MAKING / ASSESSMENT / PLAN     Qasim Esparza is a 80 y.o. male complaining of pain at the distal aspect of the left thumb after sustaining a laceration 10 days ago. There are no signs of infection and the patient denies fever or chills. He is afebrile in the emergency department. He has full range of motion of the joint, and there is no evidence of flexor tenosynovitis, felon, or neurovascular compromise. Tetanus status updated in the emergency department. The patient was discharged with mupirocin ointment. He was instructed to follow-up with his primary care provider as needed. He was provided discharge instructions in his native language. Patient was given discharge instructions and strict return precautions. All questions were answered appropriately. Patient verbalized understanding and agreement with the above treatment plan. This patient was also evaluated by the attending physician. All care plans werediscussed and agreed upon. Clinical Impression     1. Laceration of right thumb without foreign body without damage to nail, initial encounter        Disposition     PATIENT REFERRED TO:  Lara Velasquez MD  [facility]  [phone]    Schedule an appointment as soon as possible for a visit in 3 days  As needed      DISCHARGE MEDICATIONS:  New Prescriptions    MUPIROCIN (BACTROBAN) 2 % OINTMENT    Apply topically 3 times daily for 1 week.        DISPOSITION Decision To Discharge 08/18/2019 02:53:52 PM       Adan Diaz MD  Resident  08/18/19 9549

## 2019-10-03 ASSESSMENT — ENCOUNTER SYMPTOMS: GASTROINTESTINAL NEGATIVE: 1

## 2019-10-04 ENCOUNTER — OFFICE VISIT (OUTPATIENT)
Dept: CARDIOLOGY CLINIC | Age: 84
End: 2019-10-04
Payer: MEDICARE

## 2019-10-04 VITALS
BODY MASS INDEX: 34.04 KG/M2 | HEIGHT: 66 IN | WEIGHT: 211.8 LBS | DIASTOLIC BLOOD PRESSURE: 80 MMHG | OXYGEN SATURATION: 95 % | HEART RATE: 52 BPM | SYSTOLIC BLOOD PRESSURE: 152 MMHG

## 2019-10-04 DIAGNOSIS — I10 ESSENTIAL HYPERTENSION: Primary | ICD-10-CM

## 2019-10-04 DIAGNOSIS — I25.10 CORONARY ARTERY DISEASE INVOLVING NATIVE HEART, ANGINA PRESENCE UNSPECIFIED, UNSPECIFIED VESSEL OR LESION TYPE: ICD-10-CM

## 2019-10-04 DIAGNOSIS — I25.5 ISCHEMIC CARDIOMYOPATHY: ICD-10-CM

## 2019-10-04 DIAGNOSIS — I50.43 ACUTE ON CHRONIC COMBINED SYSTOLIC AND DIASTOLIC CONGESTIVE HEART FAILURE (HCC): ICD-10-CM

## 2019-10-04 LAB
ANION GAP SERPL CALCULATED.3IONS-SCNC: 12 MMOL/L (ref 3–16)
BUN BLDV-MCNC: 29 MG/DL (ref 7–20)
CALCIUM SERPL-MCNC: 8.9 MG/DL (ref 8.3–10.6)
CHLORIDE BLD-SCNC: 105 MMOL/L (ref 99–110)
CO2: 27 MMOL/L (ref 21–32)
CREAT SERPL-MCNC: 1.6 MG/DL (ref 0.8–1.3)
GFR AFRICAN AMERICAN: 50
GFR NON-AFRICAN AMERICAN: 41
GLUCOSE BLD-MCNC: 82 MG/DL (ref 70–99)
POTASSIUM SERPL-SCNC: 4 MMOL/L (ref 3.5–5.1)
PRO-BNP: 3757 PG/ML (ref 0–449)
SODIUM BLD-SCNC: 144 MMOL/L (ref 136–145)

## 2019-10-04 PROCEDURE — G8417 CALC BMI ABV UP PARAM F/U: HCPCS | Performed by: NURSE PRACTITIONER

## 2019-10-04 PROCEDURE — G8484 FLU IMMUNIZE NO ADMIN: HCPCS | Performed by: NURSE PRACTITIONER

## 2019-10-04 PROCEDURE — G8598 ASA/ANTIPLAT THER USED: HCPCS | Performed by: NURSE PRACTITIONER

## 2019-10-04 PROCEDURE — G8427 DOCREV CUR MEDS BY ELIG CLIN: HCPCS | Performed by: NURSE PRACTITIONER

## 2019-10-04 PROCEDURE — 1036F TOBACCO NON-USER: CPT | Performed by: NURSE PRACTITIONER

## 2019-10-04 PROCEDURE — 1123F ACP DISCUSS/DSCN MKR DOCD: CPT | Performed by: NURSE PRACTITIONER

## 2019-10-04 PROCEDURE — 4040F PNEUMOC VAC/ADMIN/RCVD: CPT | Performed by: NURSE PRACTITIONER

## 2019-10-04 PROCEDURE — 99214 OFFICE O/P EST MOD 30 MIN: CPT | Performed by: NURSE PRACTITIONER

## 2019-10-04 RX ORDER — LISINOPRIL 10 MG/1
10 TABLET ORAL DAILY
Qty: 90 TABLET | Refills: 1 | Status: SHIPPED | OUTPATIENT
Start: 2019-10-04 | End: 2019-12-11 | Stop reason: SDUPTHER

## 2019-10-04 ASSESSMENT — ENCOUNTER SYMPTOMS: RESPIRATORY NEGATIVE: 1

## 2019-10-08 ENCOUNTER — OFFICE VISIT (OUTPATIENT)
Dept: ORTHOPEDIC SURGERY | Age: 84
End: 2019-10-08
Payer: MEDICARE

## 2019-10-08 VITALS
HEART RATE: 49 BPM | SYSTOLIC BLOOD PRESSURE: 155 MMHG | WEIGHT: 211.86 LBS | HEIGHT: 66 IN | DIASTOLIC BLOOD PRESSURE: 92 MMHG | BODY MASS INDEX: 34.05 KG/M2

## 2019-10-08 DIAGNOSIS — I71.40 ABDOMINAL AORTIC ANEURYSM (AAA) WITHOUT RUPTURE: ICD-10-CM

## 2019-10-08 DIAGNOSIS — M48.062 SPINAL STENOSIS OF LUMBAR REGION WITH NEUROGENIC CLAUDICATION: ICD-10-CM

## 2019-10-08 DIAGNOSIS — M51.36 DDD (DEGENERATIVE DISC DISEASE), LUMBAR: Primary | ICD-10-CM

## 2019-10-08 DIAGNOSIS — M47.816 SPONDYLOSIS WITHOUT MYELOPATHY OR RADICULOPATHY, LUMBAR REGION: ICD-10-CM

## 2019-10-08 PROCEDURE — G8484 FLU IMMUNIZE NO ADMIN: HCPCS | Performed by: PHYSICAL MEDICINE & REHABILITATION

## 2019-10-08 PROCEDURE — G8427 DOCREV CUR MEDS BY ELIG CLIN: HCPCS | Performed by: PHYSICAL MEDICINE & REHABILITATION

## 2019-10-08 PROCEDURE — G8598 ASA/ANTIPLAT THER USED: HCPCS | Performed by: PHYSICAL MEDICINE & REHABILITATION

## 2019-10-08 PROCEDURE — 99214 OFFICE O/P EST MOD 30 MIN: CPT | Performed by: PHYSICAL MEDICINE & REHABILITATION

## 2019-10-08 PROCEDURE — 1123F ACP DISCUSS/DSCN MKR DOCD: CPT | Performed by: PHYSICAL MEDICINE & REHABILITATION

## 2019-10-08 PROCEDURE — 1036F TOBACCO NON-USER: CPT | Performed by: PHYSICAL MEDICINE & REHABILITATION

## 2019-10-08 PROCEDURE — G8417 CALC BMI ABV UP PARAM F/U: HCPCS | Performed by: PHYSICAL MEDICINE & REHABILITATION

## 2019-10-08 PROCEDURE — 4040F PNEUMOC VAC/ADMIN/RCVD: CPT | Performed by: PHYSICAL MEDICINE & REHABILITATION

## 2019-10-10 ENCOUNTER — OFFICE VISIT (OUTPATIENT)
Dept: INTERNAL MEDICINE CLINIC | Age: 84
End: 2019-10-10
Payer: MEDICARE

## 2019-10-10 VITALS
TEMPERATURE: 98.5 F | WEIGHT: 211 LBS | SYSTOLIC BLOOD PRESSURE: 157 MMHG | HEIGHT: 66 IN | OXYGEN SATURATION: 98 % | DIASTOLIC BLOOD PRESSURE: 75 MMHG | HEART RATE: 47 BPM | BODY MASS INDEX: 33.91 KG/M2

## 2019-10-10 DIAGNOSIS — I10 ESSENTIAL HYPERTENSION: Primary | ICD-10-CM

## 2019-10-10 DIAGNOSIS — M54.42 ACUTE BILATERAL LOW BACK PAIN WITH LEFT-SIDED SCIATICA: ICD-10-CM

## 2019-10-10 PROCEDURE — 99213 OFFICE O/P EST LOW 20 MIN: CPT | Performed by: STUDENT IN AN ORGANIZED HEALTH CARE EDUCATION/TRAINING PROGRAM

## 2019-10-10 ASSESSMENT — ENCOUNTER SYMPTOMS
SHORTNESS OF BREATH: 0
EYE PAIN: 0
DIARRHEA: 0
VOMITING: 0
NAUSEA: 0
EYE REDNESS: 0
ABDOMINAL PAIN: 0
BACK PAIN: 1
WHEEZING: 0
COLOR CHANGE: 0
RHINORRHEA: 0
SINUS PAIN: 0
CHEST TIGHTNESS: 0
CHOKING: 0

## 2019-10-10 ASSESSMENT — PATIENT HEALTH QUESTIONNAIRE - PHQ9
2. FEELING DOWN, DEPRESSED OR HOPELESS: 0
SUM OF ALL RESPONSES TO PHQ9 QUESTIONS 1 & 2: 0
SUM OF ALL RESPONSES TO PHQ QUESTIONS 1-9: 0
SUM OF ALL RESPONSES TO PHQ QUESTIONS 1-9: 0
1. LITTLE INTEREST OR PLEASURE IN DOING THINGS: 0

## 2019-10-11 ENCOUNTER — INITIAL CONSULT (OUTPATIENT)
Dept: SURGERY | Age: 84
End: 2019-10-11
Payer: MEDICARE

## 2019-10-11 VITALS
HEART RATE: 46 BPM | BODY MASS INDEX: 34.06 KG/M2 | SYSTOLIC BLOOD PRESSURE: 128 MMHG | DIASTOLIC BLOOD PRESSURE: 69 MMHG | WEIGHT: 211 LBS

## 2019-10-11 DIAGNOSIS — I71.40 ABDOMINAL AORTIC ANEURYSM (AAA) WITHOUT RUPTURE: ICD-10-CM

## 2019-10-11 PROCEDURE — G8484 FLU IMMUNIZE NO ADMIN: HCPCS | Performed by: SURGERY

## 2019-10-11 PROCEDURE — G8427 DOCREV CUR MEDS BY ELIG CLIN: HCPCS | Performed by: SURGERY

## 2019-10-11 PROCEDURE — G8598 ASA/ANTIPLAT THER USED: HCPCS | Performed by: SURGERY

## 2019-10-11 PROCEDURE — 1123F ACP DISCUSS/DSCN MKR DOCD: CPT | Performed by: SURGERY

## 2019-10-11 PROCEDURE — 4040F PNEUMOC VAC/ADMIN/RCVD: CPT | Performed by: SURGERY

## 2019-10-11 PROCEDURE — 1036F TOBACCO NON-USER: CPT | Performed by: SURGERY

## 2019-10-11 PROCEDURE — G8417 CALC BMI ABV UP PARAM F/U: HCPCS | Performed by: SURGERY

## 2019-10-11 PROCEDURE — 99203 OFFICE O/P NEW LOW 30 MIN: CPT | Performed by: SURGERY

## 2019-10-11 ASSESSMENT — ENCOUNTER SYMPTOMS
RESPIRATORY NEGATIVE: 1
GASTROINTESTINAL NEGATIVE: 1
EYES NEGATIVE: 1
ALLERGIC/IMMUNOLOGIC NEGATIVE: 1
BACK PAIN: 1

## 2019-10-16 ENCOUNTER — APPOINTMENT (OUTPATIENT)
Dept: CT IMAGING | Age: 84
DRG: 069 | End: 2019-10-16
Payer: MEDICARE

## 2019-10-16 ENCOUNTER — APPOINTMENT (OUTPATIENT)
Dept: MRI IMAGING | Age: 84
DRG: 069 | End: 2019-10-16
Payer: MEDICARE

## 2019-10-16 ENCOUNTER — TELEPHONE (OUTPATIENT)
Dept: CARDIOLOGY CLINIC | Age: 84
End: 2019-10-16

## 2019-10-16 ENCOUNTER — HOSPITAL ENCOUNTER (INPATIENT)
Age: 84
LOS: 1 days | Discharge: HOME | DRG: 069 | End: 2019-10-17
Attending: EMERGENCY MEDICINE | Admitting: INTERNAL MEDICINE
Payer: MEDICARE

## 2019-10-16 DIAGNOSIS — G45.9 TIA (TRANSIENT ISCHEMIC ATTACK): Primary | ICD-10-CM

## 2019-10-16 LAB
ANION GAP SERPL CALCULATED.3IONS-SCNC: 9 MMOL/L (ref 3–16)
APTT: 30.4 SEC (ref 26–36)
BASOPHILS ABSOLUTE: 0 K/UL (ref 0–0.2)
BASOPHILS RELATIVE PERCENT: 0.5 %
BUN BLDV-MCNC: 32 MG/DL (ref 7–20)
CALCIUM SERPL-MCNC: 9.2 MG/DL (ref 8.3–10.6)
CHLORIDE BLD-SCNC: 105 MMOL/L (ref 99–110)
CO2: 25 MMOL/L (ref 21–32)
CREAT SERPL-MCNC: 1.6 MG/DL (ref 0.8–1.3)
EKG ATRIAL RATE: 45 BPM
EKG DIAGNOSIS: NORMAL
EKG P AXIS: 39 DEGREES
EKG P-R INTERVAL: 244 MS
EKG Q-T INTERVAL: 490 MS
EKG QRS DURATION: 130 MS
EKG QTC CALCULATION (BAZETT): 423 MS
EKG R AXIS: -17 DEGREES
EKG T AXIS: 109 DEGREES
EKG VENTRICULAR RATE: 45 BPM
EOSINOPHILS ABSOLUTE: 0.2 K/UL (ref 0–0.6)
EOSINOPHILS RELATIVE PERCENT: 4.5 %
GFR AFRICAN AMERICAN: 50
GFR NON-AFRICAN AMERICAN: 41
GLUCOSE BLD-MCNC: 102 MG/DL (ref 70–99)
GLUCOSE BLD-MCNC: 117 MG/DL (ref 70–99)
HCT VFR BLD CALC: 37.9 % (ref 40.5–52.5)
HEMOGLOBIN: 12.9 G/DL (ref 13.5–17.5)
INR BLD: 1.6 (ref 0.86–1.14)
LYMPHOCYTES ABSOLUTE: 1.5 K/UL (ref 1–5.1)
LYMPHOCYTES RELATIVE PERCENT: 28.3 %
MCH RBC QN AUTO: 33.2 PG (ref 26–34)
MCHC RBC AUTO-ENTMCNC: 34.1 G/DL (ref 31–36)
MCV RBC AUTO: 97.3 FL (ref 80–100)
MONOCYTES ABSOLUTE: 0.7 K/UL (ref 0–1.3)
MONOCYTES RELATIVE PERCENT: 13.2 %
NEUTROPHILS ABSOLUTE: 2.8 K/UL (ref 1.7–7.7)
NEUTROPHILS RELATIVE PERCENT: 53.5 %
PDW BLD-RTO: 13.5 % (ref 12.4–15.4)
PERFORMED ON: ABNORMAL
PLATELET # BLD: 127 K/UL (ref 135–450)
PMV BLD AUTO: 7.4 FL (ref 5–10.5)
POTASSIUM REFLEX MAGNESIUM: 4.2 MMOL/L (ref 3.5–5.1)
PROTHROMBIN TIME: 18.2 SEC (ref 9.8–13)
RBC # BLD: 3.9 M/UL (ref 4.2–5.9)
SODIUM BLD-SCNC: 139 MMOL/L (ref 136–145)
WBC # BLD: 5.2 K/UL (ref 4–11)

## 2019-10-16 PROCEDURE — 2580000003 HC RX 258: Performed by: STUDENT IN AN ORGANIZED HEALTH CARE EDUCATION/TRAINING PROGRAM

## 2019-10-16 PROCEDURE — 6360000002 HC RX W HCPCS: Performed by: STUDENT IN AN ORGANIZED HEALTH CARE EDUCATION/TRAINING PROGRAM

## 2019-10-16 PROCEDURE — 70496 CT ANGIOGRAPHY HEAD: CPT

## 2019-10-16 PROCEDURE — G0378 HOSPITAL OBSERVATION PER HR: HCPCS

## 2019-10-16 PROCEDURE — 70498 CT ANGIOGRAPHY NECK: CPT

## 2019-10-16 PROCEDURE — 70551 MRI BRAIN STEM W/O DYE: CPT

## 2019-10-16 PROCEDURE — 96372 THER/PROPH/DIAG INJ SC/IM: CPT

## 2019-10-16 PROCEDURE — 99285 EMERGENCY DEPT VISIT HI MDM: CPT

## 2019-10-16 PROCEDURE — 85730 THROMBOPLASTIN TIME PARTIAL: CPT

## 2019-10-16 PROCEDURE — 70450 CT HEAD/BRAIN W/O DYE: CPT

## 2019-10-16 PROCEDURE — 6360000004 HC RX CONTRAST MEDICATION: Performed by: EMERGENCY MEDICINE

## 2019-10-16 PROCEDURE — 93005 ELECTROCARDIOGRAM TRACING: CPT | Performed by: EMERGENCY MEDICINE

## 2019-10-16 PROCEDURE — 6370000000 HC RX 637 (ALT 250 FOR IP): Performed by: STUDENT IN AN ORGANIZED HEALTH CARE EDUCATION/TRAINING PROGRAM

## 2019-10-16 PROCEDURE — 80048 BASIC METABOLIC PNL TOTAL CA: CPT

## 2019-10-16 PROCEDURE — 85610 PROTHROMBIN TIME: CPT

## 2019-10-16 PROCEDURE — 85025 COMPLETE CBC W/AUTO DIFF WBC: CPT

## 2019-10-16 RX ORDER — DOCUSATE SODIUM 100 MG/1
100 CAPSULE, LIQUID FILLED ORAL 2 TIMES DAILY PRN
Status: DISCONTINUED | OUTPATIENT
Start: 2019-10-16 | End: 2019-10-17 | Stop reason: HOSPADM

## 2019-10-16 RX ORDER — LABETALOL 20 MG/4 ML (5 MG/ML) INTRAVENOUS SYRINGE
10 EVERY 10 MIN PRN
Status: DISCONTINUED | OUTPATIENT
Start: 2019-10-16 | End: 2019-10-17 | Stop reason: HOSPADM

## 2019-10-16 RX ORDER — MULTIVITAMIN WITH FOLIC ACID 400 MCG
1 TABLET ORAL DAILY
Status: DISCONTINUED | OUTPATIENT
Start: 2019-10-16 | End: 2019-10-17 | Stop reason: HOSPADM

## 2019-10-16 RX ORDER — CARVEDILOL 6.25 MG/1
6.25 TABLET ORAL 2 TIMES DAILY WITH MEALS
Status: ON HOLD | COMMUNITY
End: 2019-10-17 | Stop reason: SDUPTHER

## 2019-10-16 RX ORDER — ONDANSETRON 2 MG/ML
4 INJECTION INTRAMUSCULAR; INTRAVENOUS EVERY 6 HOURS PRN
Status: DISCONTINUED | OUTPATIENT
Start: 2019-10-16 | End: 2019-10-17 | Stop reason: HOSPADM

## 2019-10-16 RX ORDER — ATORVASTATIN CALCIUM 80 MG/1
80 TABLET, FILM COATED ORAL NIGHTLY
Status: DISCONTINUED | OUTPATIENT
Start: 2019-10-16 | End: 2019-10-17 | Stop reason: HOSPADM

## 2019-10-16 RX ORDER — ASPIRIN 81 MG/1
81 TABLET ORAL DAILY
Status: DISCONTINUED | OUTPATIENT
Start: 2019-10-16 | End: 2019-10-17

## 2019-10-16 RX ORDER — SODIUM CHLORIDE 9 MG/ML
INJECTION, SOLUTION INTRAVENOUS CONTINUOUS
Status: DISCONTINUED | OUTPATIENT
Start: 2019-10-16 | End: 2019-10-17

## 2019-10-16 RX ORDER — SODIUM CHLORIDE 0.9 % (FLUSH) 0.9 %
10 SYRINGE (ML) INJECTION PRN
Status: DISCONTINUED | OUTPATIENT
Start: 2019-10-16 | End: 2019-10-17 | Stop reason: HOSPADM

## 2019-10-16 RX ORDER — SODIUM CHLORIDE 0.9 % (FLUSH) 0.9 %
10 SYRINGE (ML) INJECTION EVERY 12 HOURS SCHEDULED
Status: DISCONTINUED | OUTPATIENT
Start: 2019-10-16 | End: 2019-10-17 | Stop reason: HOSPADM

## 2019-10-16 RX ORDER — ASPIRIN 300 MG/1
300 SUPPOSITORY RECTAL DAILY
Status: DISCONTINUED | OUTPATIENT
Start: 2019-10-16 | End: 2019-10-17

## 2019-10-16 RX ORDER — CARVEDILOL 6.25 MG/1
6.25 TABLET ORAL 2 TIMES DAILY WITH MEALS
Status: DISCONTINUED | OUTPATIENT
Start: 2019-10-16 | End: 2019-10-16

## 2019-10-16 RX ORDER — HEPARIN SODIUM 5000 [USP'U]/ML
5000 INJECTION, SOLUTION INTRAVENOUS; SUBCUTANEOUS EVERY 8 HOURS SCHEDULED
Status: DISCONTINUED | OUTPATIENT
Start: 2019-10-16 | End: 2019-10-17 | Stop reason: HOSPADM

## 2019-10-16 RX ADMIN — SODIUM CHLORIDE: 0.9 INJECTION, SOLUTION INTRAVENOUS at 20:59

## 2019-10-16 RX ADMIN — HEPARIN SODIUM 5000 UNITS: 5000 INJECTION INTRAVENOUS; SUBCUTANEOUS at 20:56

## 2019-10-16 RX ADMIN — Medication 10 ML: at 22:23

## 2019-10-16 RX ADMIN — ATORVASTATIN CALCIUM 80 MG: 80 TABLET, FILM COATED ORAL at 20:56

## 2019-10-16 RX ADMIN — ASPIRIN 81 MG: 81 TABLET, COATED ORAL at 20:56

## 2019-10-16 RX ADMIN — IOPAMIDOL 80 ML: 755 INJECTION, SOLUTION INTRAVENOUS at 14:54

## 2019-10-16 RX ADMIN — THERA TABS 1 TABLET: TAB at 20:56

## 2019-10-16 ASSESSMENT — PAIN SCALES - GENERAL: PAINLEVEL_OUTOF10: 0

## 2019-10-16 ASSESSMENT — ENCOUNTER SYMPTOMS: ABDOMINAL PAIN: 0

## 2019-10-17 VITALS
HEIGHT: 66 IN | OXYGEN SATURATION: 95 % | BODY MASS INDEX: 32.78 KG/M2 | RESPIRATION RATE: 16 BRPM | DIASTOLIC BLOOD PRESSURE: 67 MMHG | SYSTOLIC BLOOD PRESSURE: 168 MMHG | HEART RATE: 41 BPM | WEIGHT: 204 LBS | TEMPERATURE: 98.1 F

## 2019-10-17 LAB
CHOLESTEROL, TOTAL: 114 MG/DL (ref 0–199)
HCT VFR BLD CALC: 35.8 % (ref 40.5–52.5)
HDLC SERPL-MCNC: 39 MG/DL (ref 40–60)
HEMOGLOBIN: 12.4 G/DL (ref 13.5–17.5)
LDL CHOLESTEROL CALCULATED: 62 MG/DL
LV EF: 23 %
LVEF MODALITY: NORMAL
MCH RBC QN AUTO: 33.5 PG (ref 26–34)
MCHC RBC AUTO-ENTMCNC: 34.6 G/DL (ref 31–36)
MCV RBC AUTO: 96.9 FL (ref 80–100)
PDW BLD-RTO: 13.2 % (ref 12.4–15.4)
PLATELET # BLD: 119 K/UL (ref 135–450)
PMV BLD AUTO: 7.7 FL (ref 5–10.5)
RBC # BLD: 3.69 M/UL (ref 4.2–5.9)
TRIGL SERPL-MCNC: 66 MG/DL (ref 0–150)
VLDLC SERPL CALC-MCNC: 13 MG/DL
WBC # BLD: 4.9 K/UL (ref 4–11)

## 2019-10-17 PROCEDURE — 36415 COLL VENOUS BLD VENIPUNCTURE: CPT

## 2019-10-17 PROCEDURE — C8929 TTE W OR WO FOL WCON,DOPPLER: HCPCS

## 2019-10-17 PROCEDURE — 92526 ORAL FUNCTION THERAPY: CPT

## 2019-10-17 PROCEDURE — 96372 THER/PROPH/DIAG INJ SC/IM: CPT

## 2019-10-17 PROCEDURE — 85027 COMPLETE CBC AUTOMATED: CPT

## 2019-10-17 PROCEDURE — 97530 THERAPEUTIC ACTIVITIES: CPT

## 2019-10-17 PROCEDURE — 97116 GAIT TRAINING THERAPY: CPT

## 2019-10-17 PROCEDURE — 2580000003 HC RX 258: Performed by: STUDENT IN AN ORGANIZED HEALTH CARE EDUCATION/TRAINING PROGRAM

## 2019-10-17 PROCEDURE — 80061 LIPID PANEL: CPT

## 2019-10-17 PROCEDURE — 1200000000 HC SEMI PRIVATE

## 2019-10-17 PROCEDURE — 6370000000 HC RX 637 (ALT 250 FOR IP): Performed by: STUDENT IN AN ORGANIZED HEALTH CARE EDUCATION/TRAINING PROGRAM

## 2019-10-17 PROCEDURE — G0378 HOSPITAL OBSERVATION PER HR: HCPCS

## 2019-10-17 PROCEDURE — 97162 PT EVAL MOD COMPLEX 30 MIN: CPT

## 2019-10-17 PROCEDURE — 6370000000 HC RX 637 (ALT 250 FOR IP): Performed by: INTERNAL MEDICINE

## 2019-10-17 PROCEDURE — 92610 EVALUATE SWALLOWING FUNCTION: CPT

## 2019-10-17 PROCEDURE — 6360000002 HC RX W HCPCS: Performed by: STUDENT IN AN ORGANIZED HEALTH CARE EDUCATION/TRAINING PROGRAM

## 2019-10-17 PROCEDURE — 97535 SELF CARE MNGMENT TRAINING: CPT

## 2019-10-17 PROCEDURE — 97165 OT EVAL LOW COMPLEX 30 MIN: CPT

## 2019-10-17 PROCEDURE — 83036 HEMOGLOBIN GLYCOSYLATED A1C: CPT

## 2019-10-17 RX ORDER — CARVEDILOL 3.12 MG/1
3.12 TABLET ORAL 2 TIMES DAILY WITH MEALS
Status: DISCONTINUED | OUTPATIENT
Start: 2019-10-17 | End: 2019-10-17 | Stop reason: HOSPADM

## 2019-10-17 RX ORDER — ACETAMINOPHEN 325 MG/1
650 TABLET ORAL EVERY 4 HOURS PRN
Status: DISCONTINUED | OUTPATIENT
Start: 2019-10-17 | End: 2019-10-17 | Stop reason: HOSPADM

## 2019-10-17 RX ORDER — CARVEDILOL 6.25 MG/1
3.12 TABLET ORAL 2 TIMES DAILY WITH MEALS
Qty: 60 TABLET | Refills: 3 | Status: SHIPPED | OUTPATIENT
Start: 2019-10-17 | End: 2019-10-18

## 2019-10-17 RX ADMIN — HEPARIN SODIUM 5000 UNITS: 5000 INJECTION INTRAVENOUS; SUBCUTANEOUS at 15:34

## 2019-10-17 RX ADMIN — Medication 10 ML: at 10:21

## 2019-10-17 RX ADMIN — ACETAMINOPHEN 650 MG: 325 TABLET ORAL at 10:20

## 2019-10-17 RX ADMIN — THERA TABS 1 TABLET: TAB at 10:20

## 2019-10-17 RX ADMIN — ASPIRIN 81 MG: 81 TABLET, COATED ORAL at 10:20

## 2019-10-17 RX ADMIN — HEPARIN SODIUM 5000 UNITS: 5000 INJECTION INTRAVENOUS; SUBCUTANEOUS at 05:53

## 2019-10-17 ASSESSMENT — PAIN SCALES - GENERAL
PAINLEVEL_OUTOF10: 3
PAINLEVEL_OUTOF10: 1
PAINLEVEL_OUTOF10: 4
PAINLEVEL_OUTOF10: 0

## 2019-10-17 ASSESSMENT — PAIN DESCRIPTION - LOCATION: LOCATION: LEG

## 2019-10-17 ASSESSMENT — PAIN DESCRIPTION - PROGRESSION: CLINICAL_PROGRESSION: NOT CHANGED

## 2019-10-17 ASSESSMENT — PAIN DESCRIPTION - DESCRIPTORS: DESCRIPTORS: ACHING

## 2019-10-17 ASSESSMENT — PAIN DESCRIPTION - ONSET: ONSET: GRADUAL

## 2019-10-17 ASSESSMENT — PAIN DESCRIPTION - FREQUENCY: FREQUENCY: INTERMITTENT

## 2019-10-17 ASSESSMENT — PAIN DESCRIPTION - PAIN TYPE: TYPE: ACUTE PAIN

## 2019-10-17 ASSESSMENT — PAIN DESCRIPTION - ORIENTATION: ORIENTATION: RIGHT;LEFT

## 2019-10-18 ENCOUNTER — TELEPHONE (OUTPATIENT)
Dept: CARDIOLOGY CLINIC | Age: 84
End: 2019-10-18

## 2019-10-18 ENCOUNTER — CARE COORDINATION (OUTPATIENT)
Dept: CASE MANAGEMENT | Age: 84
End: 2019-10-18

## 2019-10-18 LAB
ESTIMATED AVERAGE GLUCOSE: 116.9 MG/DL
HBA1C MFR BLD: 5.7 %

## 2019-10-18 RX ORDER — CARVEDILOL 6.25 MG/1
12.5 TABLET ORAL 2 TIMES DAILY WITH MEALS
Qty: 120 TABLET | Refills: 3 | Status: SHIPPED | OUTPATIENT
Start: 2019-10-18 | End: 2019-10-21 | Stop reason: SDUPTHER

## 2019-10-18 RX ORDER — CARVEDILOL 6.25 MG/1
6.25 TABLET ORAL 2 TIMES DAILY WITH MEALS
Qty: 60 TABLET | Refills: 3 | Status: SHIPPED | OUTPATIENT
Start: 2019-10-18 | End: 2019-10-18

## 2019-10-21 ENCOUNTER — OFFICE VISIT (OUTPATIENT)
Dept: CARDIOLOGY CLINIC | Age: 84
End: 2019-10-21
Payer: MEDICARE

## 2019-10-21 VITALS
DIASTOLIC BLOOD PRESSURE: 80 MMHG | WEIGHT: 206 LBS | HEART RATE: 60 BPM | SYSTOLIC BLOOD PRESSURE: 124 MMHG | BODY MASS INDEX: 33.25 KG/M2

## 2019-10-21 DIAGNOSIS — Z98.61 HISTORY OF PTCA: ICD-10-CM

## 2019-10-21 DIAGNOSIS — I71.40 ABDOMINAL AORTIC ANEURYSM (AAA) WITHOUT RUPTURE: Primary | ICD-10-CM

## 2019-10-21 DIAGNOSIS — I35.0 NONRHEUMATIC AORTIC VALVE STENOSIS: ICD-10-CM

## 2019-10-21 DIAGNOSIS — R00.1 BRADYCARDIA: ICD-10-CM

## 2019-10-21 DIAGNOSIS — I25.10 CAD IN NATIVE ARTERY: Primary | ICD-10-CM

## 2019-10-21 DIAGNOSIS — I25.5 ISCHEMIC CARDIOMYOPATHY: ICD-10-CM

## 2019-10-21 PROCEDURE — G8427 DOCREV CUR MEDS BY ELIG CLIN: HCPCS | Performed by: INTERNAL MEDICINE

## 2019-10-21 PROCEDURE — 4040F PNEUMOC VAC/ADMIN/RCVD: CPT | Performed by: INTERNAL MEDICINE

## 2019-10-21 PROCEDURE — 1111F DSCHRG MED/CURRENT MED MERGE: CPT | Performed by: INTERNAL MEDICINE

## 2019-10-21 PROCEDURE — G8484 FLU IMMUNIZE NO ADMIN: HCPCS | Performed by: INTERNAL MEDICINE

## 2019-10-21 PROCEDURE — G8598 ASA/ANTIPLAT THER USED: HCPCS | Performed by: INTERNAL MEDICINE

## 2019-10-21 PROCEDURE — 99214 OFFICE O/P EST MOD 30 MIN: CPT | Performed by: INTERNAL MEDICINE

## 2019-10-21 PROCEDURE — 1123F ACP DISCUSS/DSCN MKR DOCD: CPT | Performed by: INTERNAL MEDICINE

## 2019-10-21 PROCEDURE — G8417 CALC BMI ABV UP PARAM F/U: HCPCS | Performed by: INTERNAL MEDICINE

## 2019-10-21 PROCEDURE — 1036F TOBACCO NON-USER: CPT | Performed by: INTERNAL MEDICINE

## 2019-10-21 RX ORDER — CARVEDILOL 6.25 MG/1
6.25 TABLET ORAL 2 TIMES DAILY WITH MEALS
Qty: 120 TABLET | Refills: 3 | Status: SHIPPED | OUTPATIENT
Start: 2019-10-21 | End: 2020-01-23

## 2019-10-23 ENCOUNTER — CARE COORDINATION (OUTPATIENT)
Dept: CASE MANAGEMENT | Age: 84
End: 2019-10-23

## 2019-10-25 ENCOUNTER — CARE COORDINATION (OUTPATIENT)
Dept: CASE MANAGEMENT | Age: 84
End: 2019-10-25

## 2019-10-28 ENCOUNTER — CARE COORDINATION (OUTPATIENT)
Dept: CASE MANAGEMENT | Age: 84
End: 2019-10-28

## 2019-10-28 DIAGNOSIS — R53.81 PHYSICAL DECONDITIONING: Primary | ICD-10-CM

## 2019-11-01 RX ORDER — POLYETHYLENE GLYCOL 3350 17 G/17G
POWDER, FOR SOLUTION ORAL
Qty: 510 G | Refills: 5 | Status: SHIPPED | OUTPATIENT
Start: 2019-11-01 | End: 2019-11-13

## 2019-11-04 ENCOUNTER — HOSPITAL ENCOUNTER (OUTPATIENT)
Dept: CT IMAGING | Age: 84
Discharge: HOME | End: 2019-11-04
Payer: MEDICARE

## 2019-11-04 DIAGNOSIS — I71.40 ABDOMINAL AORTIC ANEURYSM (AAA) WITHOUT RUPTURE: ICD-10-CM

## 2019-11-04 LAB
GFR AFRICAN AMERICAN: 54
GFR NON-AFRICAN AMERICAN: 44
PERFORMED ON: ABNORMAL
POC CREATININE: 1.5 MG/DL (ref 0.8–1.3)
POC SAMPLE TYPE: ABNORMAL

## 2019-11-04 PROCEDURE — 6360000004 HC RX CONTRAST MEDICATION: Performed by: INTERNAL MEDICINE

## 2019-11-04 PROCEDURE — 71275 CT ANGIOGRAPHY CHEST: CPT

## 2019-11-04 PROCEDURE — 82565 ASSAY OF CREATININE: CPT

## 2019-11-04 RX ADMIN — IOPAMIDOL 80 ML: 755 INJECTION, SOLUTION INTRAVENOUS at 14:19

## 2019-11-11 ENCOUNTER — OFFICE VISIT (OUTPATIENT)
Dept: INTERNAL MEDICINE CLINIC | Age: 84
End: 2019-11-11
Payer: MEDICARE

## 2019-11-11 VITALS
DIASTOLIC BLOOD PRESSURE: 61 MMHG | WEIGHT: 207 LBS | OXYGEN SATURATION: 96 % | RESPIRATION RATE: 16 BRPM | HEART RATE: 49 BPM | BODY MASS INDEX: 33.41 KG/M2 | SYSTOLIC BLOOD PRESSURE: 133 MMHG | TEMPERATURE: 97 F

## 2019-11-11 DIAGNOSIS — E78.00 HYPERCHOLESTEROLEMIA: ICD-10-CM

## 2019-11-11 DIAGNOSIS — I71.40 ABDOMINAL AORTIC ANEURYSM (AAA) WITHOUT RUPTURE: ICD-10-CM

## 2019-11-11 DIAGNOSIS — I71.21 ASCENDING AORTIC ANEURYSM: Primary | ICD-10-CM

## 2019-11-11 DIAGNOSIS — I10 ESSENTIAL HYPERTENSION: ICD-10-CM

## 2019-11-11 DIAGNOSIS — I50.22 CHRONIC SYSTOLIC HEART FAILURE (HCC): ICD-10-CM

## 2019-11-11 DIAGNOSIS — G45.9 TIA (TRANSIENT ISCHEMIC ATTACK): ICD-10-CM

## 2019-11-11 PROCEDURE — 99213 OFFICE O/P EST LOW 20 MIN: CPT

## 2019-11-11 ASSESSMENT — ENCOUNTER SYMPTOMS
CHOKING: 0
SHORTNESS OF BREATH: 0
COUGH: 0
ABDOMINAL PAIN: 0

## 2019-11-13 ENCOUNTER — OFFICE VISIT (OUTPATIENT)
Dept: CARDIOTHORACIC SURGERY | Age: 84
End: 2019-11-13
Payer: MEDICARE

## 2019-11-13 VITALS
WEIGHT: 209.8 LBS | BODY MASS INDEX: 33.72 KG/M2 | DIASTOLIC BLOOD PRESSURE: 70 MMHG | SYSTOLIC BLOOD PRESSURE: 140 MMHG | HEIGHT: 66 IN | TEMPERATURE: 97.7 F | HEART RATE: 52 BPM | OXYGEN SATURATION: 98 %

## 2019-11-13 DIAGNOSIS — I71.21 ASCENDING AORTIC ANEURYSM: Primary | ICD-10-CM

## 2019-11-13 PROCEDURE — 1036F TOBACCO NON-USER: CPT | Performed by: THORACIC SURGERY (CARDIOTHORACIC VASCULAR SURGERY)

## 2019-11-13 PROCEDURE — 1111F DSCHRG MED/CURRENT MED MERGE: CPT | Performed by: THORACIC SURGERY (CARDIOTHORACIC VASCULAR SURGERY)

## 2019-11-13 PROCEDURE — 1123F ACP DISCUSS/DSCN MKR DOCD: CPT | Performed by: THORACIC SURGERY (CARDIOTHORACIC VASCULAR SURGERY)

## 2019-11-13 PROCEDURE — 4040F PNEUMOC VAC/ADMIN/RCVD: CPT | Performed by: THORACIC SURGERY (CARDIOTHORACIC VASCULAR SURGERY)

## 2019-11-13 PROCEDURE — G8598 ASA/ANTIPLAT THER USED: HCPCS | Performed by: THORACIC SURGERY (CARDIOTHORACIC VASCULAR SURGERY)

## 2019-11-13 PROCEDURE — G8427 DOCREV CUR MEDS BY ELIG CLIN: HCPCS | Performed by: THORACIC SURGERY (CARDIOTHORACIC VASCULAR SURGERY)

## 2019-11-13 PROCEDURE — 99204 OFFICE O/P NEW MOD 45 MIN: CPT | Performed by: THORACIC SURGERY (CARDIOTHORACIC VASCULAR SURGERY)

## 2019-11-13 PROCEDURE — G8417 CALC BMI ABV UP PARAM F/U: HCPCS | Performed by: THORACIC SURGERY (CARDIOTHORACIC VASCULAR SURGERY)

## 2019-11-13 PROCEDURE — G8484 FLU IMMUNIZE NO ADMIN: HCPCS | Performed by: THORACIC SURGERY (CARDIOTHORACIC VASCULAR SURGERY)

## 2019-11-13 SDOH — HEALTH STABILITY: MENTAL HEALTH: HOW MANY STANDARD DRINKS CONTAINING ALCOHOL DO YOU HAVE ON A TYPICAL DAY?: 1 OR 2

## 2019-11-13 SDOH — HEALTH STABILITY: MENTAL HEALTH: HOW OFTEN DO YOU HAVE A DRINK CONTAINING ALCOHOL?: 2-4 TIMES A MONTH

## 2019-11-13 ASSESSMENT — ENCOUNTER SYMPTOMS
BACK PAIN: 1
EYE PAIN: 0
ABDOMINAL PAIN: 0
EYE REDNESS: 1
NAUSEA: 0
TROUBLE SWALLOWING: 0
BLOOD IN STOOL: 0
CHEST TIGHTNESS: 0
SHORTNESS OF BREATH: 0

## 2019-11-21 ENCOUNTER — TELEPHONE (OUTPATIENT)
Dept: INTERNAL MEDICINE CLINIC | Age: 84
End: 2019-11-21

## 2019-12-11 ENCOUNTER — OFFICE VISIT (OUTPATIENT)
Dept: CARDIOLOGY CLINIC | Age: 84
End: 2019-12-11
Payer: MEDICARE

## 2019-12-11 VITALS
HEART RATE: 60 BPM | DIASTOLIC BLOOD PRESSURE: 80 MMHG | SYSTOLIC BLOOD PRESSURE: 130 MMHG | WEIGHT: 205 LBS | BODY MASS INDEX: 33.09 KG/M2

## 2019-12-11 DIAGNOSIS — Z98.61 HISTORY OF PTCA: ICD-10-CM

## 2019-12-11 DIAGNOSIS — I25.5 ISCHEMIC CARDIOMYOPATHY: ICD-10-CM

## 2019-12-11 DIAGNOSIS — I25.10 CAD IN NATIVE ARTERY: Primary | ICD-10-CM

## 2019-12-11 DIAGNOSIS — R00.1 BRADYCARDIA: ICD-10-CM

## 2019-12-11 DIAGNOSIS — I35.0 NONRHEUMATIC AORTIC VALVE STENOSIS: ICD-10-CM

## 2019-12-11 PROCEDURE — 4040F PNEUMOC VAC/ADMIN/RCVD: CPT | Performed by: INTERNAL MEDICINE

## 2019-12-11 PROCEDURE — 1036F TOBACCO NON-USER: CPT | Performed by: INTERNAL MEDICINE

## 2019-12-11 PROCEDURE — G8484 FLU IMMUNIZE NO ADMIN: HCPCS | Performed by: INTERNAL MEDICINE

## 2019-12-11 PROCEDURE — G8598 ASA/ANTIPLAT THER USED: HCPCS | Performed by: INTERNAL MEDICINE

## 2019-12-11 PROCEDURE — G8428 CUR MEDS NOT DOCUMENT: HCPCS | Performed by: INTERNAL MEDICINE

## 2019-12-11 PROCEDURE — 99214 OFFICE O/P EST MOD 30 MIN: CPT | Performed by: INTERNAL MEDICINE

## 2019-12-11 PROCEDURE — G8417 CALC BMI ABV UP PARAM F/U: HCPCS | Performed by: INTERNAL MEDICINE

## 2019-12-11 PROCEDURE — 1123F ACP DISCUSS/DSCN MKR DOCD: CPT | Performed by: INTERNAL MEDICINE

## 2019-12-12 RX ORDER — LISINOPRIL 10 MG/1
10 TABLET ORAL 2 TIMES DAILY
Qty: 90 TABLET | Refills: 3 | Status: ON HOLD
Start: 2019-12-12 | End: 2020-06-22 | Stop reason: HOSPADM

## 2019-12-28 DIAGNOSIS — K59.00 CONSTIPATION, UNSPECIFIED CONSTIPATION TYPE: ICD-10-CM

## 2019-12-31 RX ORDER — DOCUSATE SODIUM 100 MG/1
CAPSULE, LIQUID FILLED ORAL
Qty: 60 CAPSULE | Refills: 3 | Status: SHIPPED | OUTPATIENT
Start: 2019-12-31 | End: 2020-04-30

## 2020-01-02 ENCOUNTER — TELEPHONE (OUTPATIENT)
Dept: INTERNAL MEDICINE CLINIC | Age: 85
End: 2020-01-02

## 2020-01-13 NOTE — TELEPHONE ENCOUNTER
refill Ranitidine  last OV 11-11-19  next appt. 1-21-20 Frances Santos  Patient was in [facility] and given Ranitidine 150mg take one tab 2 times daily with no refills at discharge  Requesting refill

## 2020-01-14 RX ORDER — RANITIDINE 150 MG/1
150 TABLET ORAL 2 TIMES DAILY PRN
Qty: 60 TABLET | Refills: 1 | Status: SHIPPED | OUTPATIENT
Start: 2020-01-14

## 2020-01-21 ENCOUNTER — OFFICE VISIT (OUTPATIENT)
Dept: INTERNAL MEDICINE CLINIC | Age: 85
End: 2020-01-21
Payer: MEDICARE

## 2020-01-21 VITALS
TEMPERATURE: 97.8 F | OXYGEN SATURATION: 97 % | WEIGHT: 208 LBS | HEART RATE: 48 BPM | RESPIRATION RATE: 18 BRPM | SYSTOLIC BLOOD PRESSURE: 149 MMHG | BODY MASS INDEX: 33.57 KG/M2 | DIASTOLIC BLOOD PRESSURE: 72 MMHG

## 2020-01-21 PROCEDURE — 99213 OFFICE O/P EST LOW 20 MIN: CPT | Performed by: STUDENT IN AN ORGANIZED HEALTH CARE EDUCATION/TRAINING PROGRAM

## 2020-01-21 RX ORDER — SPIRONOLACTONE 25 MG/1
25 TABLET ORAL DAILY
Qty: 60 TABLET | Refills: 1
Start: 2020-01-21 | End: 2020-02-06 | Stop reason: SDUPTHER

## 2020-01-21 ASSESSMENT — ENCOUNTER SYMPTOMS
BACK PAIN: 0
STRIDOR: 0
SHORTNESS OF BREATH: 0
RHINORRHEA: 0
ABDOMINAL DISTENTION: 0
TROUBLE SWALLOWING: 0
PHOTOPHOBIA: 0
DIARRHEA: 0
COUGH: 0
VOICE CHANGE: 0
VOMITING: 0
WHEEZING: 0
ABDOMINAL PAIN: 0
APNEA: 0
NAUSEA: 0
CONSTIPATION: 0
CHEST TIGHTNESS: 0
CHOKING: 0

## 2020-01-21 ASSESSMENT — PATIENT HEALTH QUESTIONNAIRE - PHQ9
2. FEELING DOWN, DEPRESSED OR HOPELESS: 0
SUM OF ALL RESPONSES TO PHQ QUESTIONS 1-9: 0
SUM OF ALL RESPONSES TO PHQ QUESTIONS 1-9: 0
1. LITTLE INTEREST OR PLEASURE IN DOING THINGS: 0
SUM OF ALL RESPONSES TO PHQ9 QUESTIONS 1 & 2: 0

## 2020-01-21 NOTE — LETTER
Griffin Mancuso,      Mr. Scarlett Sesay visited our clinic today for his follow up. As per Dr. Gabriel Caraballo in regards of the management of the Ascending aortic aneurysm to keep SBP goal ~ 100s. His BP currently 140-150. Since he won't see you until March, we think patient will be benefit from changing his Lisinopril to Entresto while increasing Aldoctone to 25mg daily giving his hx of CHF. Please let us know your recommendations.      Tex Sage

## 2020-01-21 NOTE — PROGRESS NOTES
I sent correct one Socioeconomic History    Marital status:      Spouse name: Not on file    Number of children: Not on file    Years of education: Not on file    Highest education level: Not on file   Occupational History    Not on file   Social Needs    Financial resource strain: Not on file    Food insecurity:     Worry: Not on file     Inability: Not on file    Transportation needs:     Medical: Not on file     Non-medical: Not on file   Tobacco Use    Smoking status: Never Smoker    Smokeless tobacco: Never Used   Substance and Sexual Activity    Alcohol use: Yes     Alcohol/week: 0.0 standard drinks     Frequency: 2-4 times a month     Drinks per session: 1 or 2     Binge frequency: Never    Drug use: No    Sexual activity: Not on file   Lifestyle    Physical activity:     Days per week: Not on file     Minutes per session: Not on file    Stress: Not on file   Relationships    Social connections:     Talks on phone: Not on file     Gets together: Not on file     Attends Gnosticist service: Not on file     Active member of club or organization: Not on file     Attends meetings of clubs or organizations: Not on file     Relationship status: Not on file    Intimate partner violence:     Fear of current or ex partner: Not on file     Emotionally abused: Not on file     Physically abused: Not on file     Forced sexual activity: Not on file   Other Topics Concern    Not on file   Social History Narrative    Not on file        Family History   Problem Relation Age of Onset    Hypertension Mother     Cancer Mother     Stroke Father        Vitals:    01/21/20 0847   BP: (!) 149/72   Pulse: (!) 48   Resp: 18   Temp: 97.8 °F (36.6 °C)   TempSrc: Oral   SpO2: 97%   Weight: 208 lb (94.3 kg)     Estimated body mass index is 33.57 kg/m² as calculated from the following:    Height as of 11/13/19: 5' 6\" (1.676 m). Weight as of this encounter: 208 lb (94.3 kg).     Physical Exam  Vitals signs and nursing note reviewed. Constitutional:       General: He is not in acute distress. Appearance: Normal appearance. He is well-developed and normal weight. He is not ill-appearing, toxic-appearing or diaphoretic. HENT:      Head: Normocephalic and atraumatic. Eyes:      General: No scleral icterus. Conjunctiva/sclera: Conjunctivae normal.      Pupils: Pupils are equal, round, and reactive to light. Neck:      Musculoskeletal: Normal range of motion and neck supple. Cardiovascular:      Rate and Rhythm: Normal rate and regular rhythm. Pulses: Normal pulses. Heart sounds: Murmur (systolic murmur ) present. No friction rub. No gallop. Pulmonary:      Effort: Pulmonary effort is normal. No respiratory distress. Breath sounds: Normal breath sounds. No stridor. No wheezing or rales. Chest:      Chest wall: No tenderness. Abdominal:      General: Bowel sounds are normal. There is no distension. Palpations: Abdomen is soft. Tenderness: There is no tenderness. There is no guarding. Musculoskeletal: Normal range of motion. General: No swelling, tenderness, deformity or signs of injury. Right lower leg: No edema. Left lower leg: No edema. Lymphadenopathy:      Cervical: No cervical adenopathy. Skin:     General: Skin is warm and dry. Findings: No bruising, lesion or rash. Neurological:      General: No focal deficit present. Mental Status: He is alert and oriented to person, place, and time. Mental status is at baseline. Cranial Nerves: No cranial nerve deficit. Sensory: No sensory deficit. Psychiatric:         Mood and Affect: Mood normal.         Behavior: Behavior normal.         ASSESSMENT/PLAN:  1. Chronic systolic heart failure (HCC)  Last Echo (10/16/19) EF of 20-25% and Grade II diastolic dysfunction. Sees Dr. Jermaine Lunsford every 3 months, next appointment in March 2020. Patient still able to exercise and states he swims multiple times per week.

## 2020-01-23 ENCOUNTER — TELEPHONE (OUTPATIENT)
Dept: CARDIOLOGY CLINIC | Age: 85
End: 2020-01-23

## 2020-01-23 RX ORDER — CARVEDILOL 12.5 MG/1
12.5 TABLET ORAL 2 TIMES DAILY WITH MEALS
Qty: 60 TABLET | Refills: 3 | Status: SHIPPED | OUTPATIENT
Start: 2020-01-23 | End: 2020-05-18

## 2020-01-23 NOTE — TELEPHONE ENCOUNTER
Patient called stating he needs new prescription of his Carvedilol 6.25 mg. He said he has been taking four tablets of this daily but pharmacy will not fill this without new prescription. He can be reached at [phone].

## 2020-02-05 RX ORDER — ATORVASTATIN CALCIUM 80 MG/1
80 TABLET, FILM COATED ORAL NIGHTLY
Qty: 90 TABLET | Refills: 1 | Status: SHIPPED | OUTPATIENT
Start: 2020-02-05 | End: 2020-02-17 | Stop reason: SDUPTHER

## 2020-02-06 ENCOUNTER — OFFICE VISIT (OUTPATIENT)
Dept: INTERNAL MEDICINE CLINIC | Age: 85
End: 2020-02-06
Payer: MEDICARE

## 2020-02-06 VITALS
DIASTOLIC BLOOD PRESSURE: 73 MMHG | SYSTOLIC BLOOD PRESSURE: 145 MMHG | RESPIRATION RATE: 16 BRPM | HEART RATE: 45 BPM | TEMPERATURE: 98 F | WEIGHT: 207 LBS | OXYGEN SATURATION: 100 % | BODY MASS INDEX: 33.41 KG/M2

## 2020-02-06 PROCEDURE — 99213 OFFICE O/P EST LOW 20 MIN: CPT | Performed by: STUDENT IN AN ORGANIZED HEALTH CARE EDUCATION/TRAINING PROGRAM

## 2020-02-06 RX ORDER — SPIRONOLACTONE 25 MG/1
50 TABLET ORAL DAILY
Qty: 60 TABLET | Refills: 1
Start: 2020-02-06 | End: 2020-02-17 | Stop reason: SDUPTHER

## 2020-02-06 ASSESSMENT — ENCOUNTER SYMPTOMS
GASTROINTESTINAL NEGATIVE: 1
RESPIRATORY NEGATIVE: 1

## 2020-02-06 NOTE — PROGRESS NOTES
Corners of mouth red burning slit like opening noted. No refills needed. No other complaints. Patient speaks English well. We had  here for his wife, so he also spoke with    Tonia part of the time . English the remainder.
Neurological:      General: No focal deficit present. Mental Status: He is alert and oriented to person, place, and time. Mental status is at baseline. Cranial Nerves: No cranial nerve deficit. Psychiatric:         Mood and Affect: Mood normal.         Behavior: Behavior normal.         Thought Content: Thought content normal.         Judgment: Judgment normal.         ASSESSMENT/PLAN:  1. Angular cheilosis  -Apply with vaseline in between. - hydrocortisone 2.5 % cream; Apply topically 2 times daily. Dispense: 1 Tube; Refill: 0    2. Essential hypertension  -/73. Increase to 50mg aldactone. - spironolactone (ALDACTONE) 25 MG tablet; Take 2 tablets by mouth daily  Dispense: 60 tablet; Refill: 1    3. Chronic systolic heart failure ([facility] [address]  -Please consider Entresto. - spironolactone (ALDACTONE) 25 MG tablet; Take 2 tablets by mouth daily  Dispense: 60 tablet; Refill: 1      Return in about 1 week (around 2/13/2020) for Follow up. An electronic signature was used to authenticate this note.     --Didier Connolly MD on 2/6/2020 at 10:27 AM

## 2020-02-06 NOTE — PATIENT INSTRUCTIONS
Return on 2-17-20. Increase Spirolactone to 25 mg take TWO tablets daily. Hydrocortisone cream to corner of mouth. NOT in your mouth  All other medication the same.

## 2020-02-17 ENCOUNTER — OFFICE VISIT (OUTPATIENT)
Dept: INTERNAL MEDICINE CLINIC | Age: 85
End: 2020-02-17
Payer: MEDICARE

## 2020-02-17 VITALS
RESPIRATION RATE: 18 BRPM | HEART RATE: 48 BPM | WEIGHT: 204.1 LBS | BODY MASS INDEX: 32.94 KG/M2 | TEMPERATURE: 97.7 F | OXYGEN SATURATION: 98 % | DIASTOLIC BLOOD PRESSURE: 57 MMHG | SYSTOLIC BLOOD PRESSURE: 122 MMHG

## 2020-02-17 PROCEDURE — 99213 OFFICE O/P EST LOW 20 MIN: CPT | Performed by: STUDENT IN AN ORGANIZED HEALTH CARE EDUCATION/TRAINING PROGRAM

## 2020-02-17 RX ORDER — SPIRONOLACTONE 50 MG/1
50 TABLET, FILM COATED ORAL DAILY
Qty: 30 TABLET | Refills: 1 | Status: SHIPPED | OUTPATIENT
Start: 2020-02-17 | End: 2020-07-22

## 2020-02-17 RX ORDER — FUROSEMIDE 40 MG/1
40 TABLET ORAL 2 TIMES DAILY
Qty: 30 TABLET | Refills: 1 | Status: SHIPPED | OUTPATIENT
Start: 2020-02-17 | End: 2020-04-20 | Stop reason: SDUPTHER

## 2020-02-17 RX ORDER — ATORVASTATIN CALCIUM 80 MG/1
80 TABLET, FILM COATED ORAL NIGHTLY
Qty: 90 TABLET | Refills: 1 | Status: SHIPPED | OUTPATIENT
Start: 2020-02-17

## 2020-02-17 ASSESSMENT — PATIENT HEALTH QUESTIONNAIRE - PHQ9
2. FEELING DOWN, DEPRESSED OR HOPELESS: 0
SUM OF ALL RESPONSES TO PHQ QUESTIONS 1-9: 0
SUM OF ALL RESPONSES TO PHQ QUESTIONS 1-9: 0

## 2020-02-17 NOTE — PROGRESS NOTES
Follow Up Visit Established Patient Visit    Patient:  Florin Rodrigues                                               : 1932  Age: 80 y.o. MRN: [id number]  Date : 2020    Florin Rodrigues is a 80 y.o. male who presents for :  Follow up HTN    Patient presents after his dose of aldactone was increased to 50 mg daily. He reports this am his Bp was running in 005 systolic. He denied any symptoms of hypotension throughout the last week. He will call next week with BP reads in am and pm.     He also has a small skin laceration along the finger very minor injury due to dry skin. Wants a refill of mupirocin. Chief Complaint   Patient presents with    Medication Refill       Past Medical History:   Diagnosis Date    Arthritis     Ascending aortic aneurysm ([address]     CAD (coronary artery disease)     Chronic kidney disease     Constipation     DDD (degenerative disc disease), lumbar     L5, S1    Dental disease     Hearing loss     Hyperlipidemia     Hypertension     Mild aortic stenosis     Prostate enlargement     TIA (transient ischemic attack)        Past Surgical History:   Procedure Laterality Date    CORONARY ANGIOPLASTY WITH STENT PLACEMENT  98    EYE SURGERY      SKIN TAG REMOVAL  2/2/10    TURP         Current Outpatient Medications   Medication Sig Dispense Refill    spironolactone (ALDACTONE) 50 MG tablet Take 1 tablet by mouth daily 30 tablet 1    atorvastatin (LIPITOR) 80 MG tablet Take 1 tablet by mouth nightly 90 tablet 1    aspirin 325 MG EC tablet Take 1 tablet by mouth daily 30 tablet 3    furosemide (LASIX) 40 MG tablet Take 1 tablet by mouth 2 times daily 30 tablet 1    mupirocin (BACTROBAN) 2 % ointment Apply 3 times daily.  1 Tube 0    carvedilol (COREG) 12.5 MG tablet Take 1 tablet by mouth 2 times daily (with meals) 60 tablet 3    ranitidine (ZANTAC) 150 MG tablet Take 1 tablet by mouth 2 times daily as needed for Heartburn 60 tablet 1     MG place, and time. Mental status is at baseline. Cranial Nerves: No cranial nerve deficit. Psychiatric:         Mood and Affect: Mood normal.         Behavior: Behavior normal.         Thought Content: Thought content normal.         Judgment: Judgment normal.         Assessment/ plan:     Stephy Abdalla was seen today for medication refill. Diagnoses and all orders for this visit:    Essential hypertension  -     COMPREHENSIVE METABOLIC PANEL; Future  -     CBC WITH AUTO DIFFERENTIAL; Future  -     spironolactone (ALDACTONE) 50 MG tablet; Take 1 tablet by mouth daily    Chronic systolic heart failure (HCC)  -     TSH with Reflex; Future  -     spironolactone (ALDACTONE) 50 MG tablet; Take 1 tablet by mouth daily  -     aspirin 325 MG EC tablet; Take 1 tablet by mouth daily  -     furosemide (LASIX) 40 MG tablet; Take 1 tablet by mouth 2 times daily    Hyperlipidemia, unspecified hyperlipidemia type  -     Lipid, Fasting; Future  -     atorvastatin (LIPITOR) 80 MG tablet; Take 1 tablet by mouth nightly    CKD (chronic kidney disease) stage 3, GFR 30-59 ml/min ([facility])    Coronary artery disease involving native heart, angina presence unspecified, unspecified vessel or lesion type  -     atorvastatin (LIPITOR) 80 MG tablet; Take 1 tablet by mouth nightly    Laceration of skin of finger, initial encounter  -     mupirocin (BACTROBAN) 2 % ointment; Apply 3 times daily. Follow up in 3 months    Addendum to Resident H& P/Progress note:  I have personally seen,examined and evaluated the patient.  I have reviewed the current history, physical findings, labs and assessment and plan and agree with note as documented by resident MD ( Rosario Zendejas)      Bernabe Salomon MD, [address]

## 2020-02-17 NOTE — PATIENT INSTRUCTIONS
Take blood pressure twice a day . Call results to [address] [place] [address] Friday. If TOP number of pressure less than 110 Do not take the spironlactone that  Morning. The spironolactone is only ONCE  a day. Furosemide is now a 40 mg tablet , so only take ONE tablet twice a day. DO NOT hold the Lisinopril   Return in 3 months. Get fastin labs done.   [facility] [id number]

## 2020-02-17 NOTE — PROGRESS NOTES
Feels pressure running too low. He is not dizzy, but pressure 260 systolic this morning. HE held his spironlacton and lisinopril.   Wants cut on finger checked,   Jessie [id number]

## 2020-03-11 ENCOUNTER — OFFICE VISIT (OUTPATIENT)
Dept: CARDIOLOGY CLINIC | Age: 85
End: 2020-03-11
Payer: MEDICARE

## 2020-03-11 VITALS
SYSTOLIC BLOOD PRESSURE: 150 MMHG | WEIGHT: 204 LBS | DIASTOLIC BLOOD PRESSURE: 80 MMHG | HEART RATE: 60 BPM | BODY MASS INDEX: 32.93 KG/M2

## 2020-03-11 PROCEDURE — G8427 DOCREV CUR MEDS BY ELIG CLIN: HCPCS | Performed by: INTERNAL MEDICINE

## 2020-03-11 PROCEDURE — 4040F PNEUMOC VAC/ADMIN/RCVD: CPT | Performed by: INTERNAL MEDICINE

## 2020-03-11 PROCEDURE — 1036F TOBACCO NON-USER: CPT | Performed by: INTERNAL MEDICINE

## 2020-03-11 PROCEDURE — 1123F ACP DISCUSS/DSCN MKR DOCD: CPT | Performed by: INTERNAL MEDICINE

## 2020-03-11 PROCEDURE — G8484 FLU IMMUNIZE NO ADMIN: HCPCS | Performed by: INTERNAL MEDICINE

## 2020-03-11 PROCEDURE — G8417 CALC BMI ABV UP PARAM F/U: HCPCS | Performed by: INTERNAL MEDICINE

## 2020-03-11 PROCEDURE — 99214 OFFICE O/P EST MOD 30 MIN: CPT | Performed by: INTERNAL MEDICINE

## 2020-03-11 NOTE — PROGRESS NOTES
Attends Holiness service: Not on file     Active member of club or organization: Not on file     Attends meetings of clubs or organizations: Not on file     Relationship status: Not on file    Intimate partner violence     Fear of current or ex partner: Not on file     Emotionally abused: Not on file     Physically abused: Not on file     Forced sexual activity: Not on file   Other Topics Concern    Not on file   Social History Narrative    Not on file        FH reviewed , denies FH cardiac issues. .           Current Outpatient Medications   Medication Sig Dispense Refill    spironolactone (ALDACTONE) 50 MG tablet Take 1 tablet by mouth daily 30 tablet 1    atorvastatin (LIPITOR) 80 MG tablet Take 1 tablet by mouth nightly 90 tablet 1    aspirin 325 MG EC tablet Take 1 tablet by mouth daily 30 tablet 3    furosemide (LASIX) 40 MG tablet Take 1 tablet by mouth 2 times daily 30 tablet 1    carvedilol (COREG) 12.5 MG tablet Take 1 tablet by mouth 2 times daily (with meals) 60 tablet 3    ranitidine (ZANTAC) 150 MG tablet Take 1 tablet by mouth 2 times daily as needed for Heartburn 60 tablet 1     MG capsule TAKE 1 CAPSULE BY MOUTH 2 TIMES DAILY AS NEEDED FOR CONSTIPATION 60 capsule 3    lisinopril (PRINIVIL;ZESTRIL) 10 MG tablet Take 1 tablet by mouth 2 times daily 90 tablet 3    Handicap Placard MISC by Does not apply route effective November 25,2019 through November 24,2020 1 each 0    diclofenac sodium 1 % GEL Apply 4 g topically 4 times daily as needed for Pain 4 Tube 3    Multiple Vitamin (MULTI-VITAMIN) TABS Take 1 tablet by mouth daily 90 tablet 2    nitroGLYCERIN (NITROSTAT) 0.4 MG SL tablet Place 1 tablet under the tongue every 5 minutes as needed for Chest pain up to max of 3 total doses. If no relief after 1 dose, call 911. 25 tablet 3     No current facility-administered medications for this visit.        Vitals:    03/11/20 1447   BP: (!) 150/80   Pulse: 60       Wt 204    Review of Systems   Constitutional: Negative for activity change and fatigue. Respiratory: Negative for apnea, cough, choking,  and shortness of breath. isolate episode of chest discomfort. Cardiovascular: Negative for chest pain, palpitations and leg swelling. No PND or orthopnea. No tachycardia. Gastrointestinal: Negative for abdominal distention. Musculoskeletal: Negative for myalgias. Neurological: Negative for dizziness, syncope and light-headedness. Psychiatric/Behavioral: Negative for agitation, behavioral problems and confusion. All other systems reviewed negative as done. Objective:   Physical Exam   Constitutional: He is oriented to person, place, and time. He appears well-developed and well-nourished. No distress. HENT:   Head: Normocephalic and atraumatic. Eyes: Conjunctivae and EOM are normal. Right eye exhibits no discharge. Left eye exhibits no discharge. Neck: Normal range of motion. Neck supple. No JVD present. Cardiovascular: Normal rate, regular rhythm, S1 normal and S2 normal.  Exam reveals no gallop. Murmur heard. Systolic murmur is present with a grade of 2/6   Pulses:       Radial pulses are 2+ on the right side, and 2+ on the left side. Pulmonary/Chest: Effort normal and breath sounds normal. No respiratory distress. He has no wheezes. He has no rales. Abdominal: Soft. Bowel sounds are normal. There is no tenderness. Musculoskeletal: Normal range of motion. He exhibits tr edema. Neurological: He is alert and oriented to person, place, and time. Skin: Skin is warm and dry. Psychiatric: He has a normal mood and affect. His behavior is normal. Thought content normal.       Assessment:       Diagnosis Orders   1. CAD in native artery     2. History of PTCA     3. Ischemic cardiomyopathy     4. Bradycardia     5. Nonrheumatic aortic valve stenosis             Plan:      CV stable. NoTIA sx. Remains active. BP is good at home. [facility] HR good.  On coreg of 12.5 mg bid. Lisinopril daily. No angina. Compensated. Lasix controlling edema. No exertional sx. Wt stable. Dr Bobbi Chaudhary following aorta. Reviewed previous records and testing including cath 12/15 ,echo 10/19, myoview 10/17 and  hosp stay for bradycardia and possible TIA. Discussed decline in LV function and probable medical approach to issue. Follow up 3 months.

## 2020-04-20 RX ORDER — FUROSEMIDE 40 MG/1
40 TABLET ORAL 2 TIMES DAILY
Qty: 60 TABLET | Refills: 2 | Status: ON HOLD | OUTPATIENT
Start: 2020-04-20 | End: 2020-06-22 | Stop reason: SDUPTHER

## 2020-04-30 RX ORDER — DOCUSATE SODIUM 100 MG/1
CAPSULE, LIQUID FILLED ORAL
Qty: 60 CAPSULE | Refills: 3 | Status: SHIPPED | OUTPATIENT
Start: 2020-04-30

## 2020-05-18 RX ORDER — CARVEDILOL 12.5 MG/1
12.5 TABLET ORAL 2 TIMES DAILY WITH MEALS
Qty: 60 TABLET | Refills: 3 | Status: ON HOLD
Start: 2020-05-18 | End: 2020-06-22 | Stop reason: HOSPADM

## 2020-05-18 NOTE — TELEPHONE ENCOUNTER
Last OV: 10/04/19  Last Labs: 10/16/19  Next appt: None at this time. Pt has been seeing Dr. Jyothi Fonseca [facility].

## 2020-06-11 DIAGNOSIS — I10 ESSENTIAL HYPERTENSION: ICD-10-CM

## 2020-06-11 DIAGNOSIS — I50.22 CHRONIC SYSTOLIC HEART FAILURE (HCC): ICD-10-CM

## 2020-06-11 DIAGNOSIS — E78.5 HYPERLIPIDEMIA, UNSPECIFIED HYPERLIPIDEMIA TYPE: ICD-10-CM

## 2020-06-11 LAB
A/G RATIO: 1.4 (ref 1.1–2.2)
ALBUMIN SERPL-MCNC: 3.8 G/DL (ref 3.4–5)
ALP BLD-CCNC: 79 U/L (ref 40–129)
ALT SERPL-CCNC: 11 U/L (ref 10–40)
ANION GAP SERPL CALCULATED.3IONS-SCNC: 12 MMOL/L (ref 3–16)
AST SERPL-CCNC: 17 U/L (ref 15–37)
BASOPHILS ABSOLUTE: 0 K/UL (ref 0–0.2)
BASOPHILS RELATIVE PERCENT: 0.7 %
BILIRUB SERPL-MCNC: 0.9 MG/DL (ref 0–1)
BUN BLDV-MCNC: 27 MG/DL (ref 7–20)
CALCIUM SERPL-MCNC: 8.5 MG/DL (ref 8.3–10.6)
CHLORIDE BLD-SCNC: 105 MMOL/L (ref 99–110)
CHOLESTEROL, FASTING: 133 MG/DL (ref 0–199)
CO2: 26 MMOL/L (ref 21–32)
CREAT SERPL-MCNC: 1.5 MG/DL (ref 0.8–1.3)
EOSINOPHILS ABSOLUTE: 0.3 K/UL (ref 0–0.6)
EOSINOPHILS RELATIVE PERCENT: 5.5 %
GFR AFRICAN AMERICAN: 53
GFR NON-AFRICAN AMERICAN: 44
GLOBULIN: 2.7 G/DL
GLUCOSE BLD-MCNC: 120 MG/DL (ref 70–99)
HCT VFR BLD CALC: 36.3 % (ref 40.5–52.5)
HDLC SERPL-MCNC: 48 MG/DL (ref 40–60)
HEMOGLOBIN: 12.3 G/DL (ref 13.5–17.5)
LDL CHOLESTEROL CALCULATED: 70 MG/DL
LYMPHOCYTES ABSOLUTE: 1.2 K/UL (ref 1–5.1)
LYMPHOCYTES RELATIVE PERCENT: 23.5 %
MCH RBC QN AUTO: 33.5 PG (ref 26–34)
MCHC RBC AUTO-ENTMCNC: 33.8 G/DL (ref 31–36)
MCV RBC AUTO: 99.2 FL (ref 80–100)
MONOCYTES ABSOLUTE: 0.7 K/UL (ref 0–1.3)
MONOCYTES RELATIVE PERCENT: 13.4 %
NEUTROPHILS ABSOLUTE: 2.9 K/UL (ref 1.7–7.7)
NEUTROPHILS RELATIVE PERCENT: 56.9 %
PDW BLD-RTO: 14.2 % (ref 12.4–15.4)
PLATELET # BLD: 126 K/UL (ref 135–450)
PMV BLD AUTO: 8.3 FL (ref 5–10.5)
POTASSIUM SERPL-SCNC: 3.9 MMOL/L (ref 3.5–5.1)
RBC # BLD: 3.66 M/UL (ref 4.2–5.9)
SODIUM BLD-SCNC: 143 MMOL/L (ref 136–145)
T4 FREE: 1.1 NG/DL (ref 0.9–1.8)
TOTAL PROTEIN: 6.5 G/DL (ref 6.4–8.2)
TRIGLYCERIDE, FASTING: 74 MG/DL (ref 0–150)
TSH REFLEX: 5.08 UIU/ML (ref 0.27–4.2)
VLDLC SERPL CALC-MCNC: 15 MG/DL
WBC # BLD: 5.1 K/UL (ref 4–11)

## 2020-06-16 ENCOUNTER — OFFICE VISIT (OUTPATIENT)
Dept: INTERNAL MEDICINE CLINIC | Age: 85
DRG: 291 | End: 2020-06-16
Payer: MEDICARE

## 2020-06-16 VITALS
TEMPERATURE: 98 F | DIASTOLIC BLOOD PRESSURE: 84 MMHG | WEIGHT: 209 LBS | BODY MASS INDEX: 33.59 KG/M2 | HEART RATE: 59 BPM | RESPIRATION RATE: 20 BRPM | SYSTOLIC BLOOD PRESSURE: 147 MMHG | OXYGEN SATURATION: 97 % | HEIGHT: 66 IN

## 2020-06-16 PROCEDURE — 99213 OFFICE O/P EST LOW 20 MIN: CPT | Performed by: STUDENT IN AN ORGANIZED HEALTH CARE EDUCATION/TRAINING PROGRAM

## 2020-06-16 RX ORDER — TAMSULOSIN HYDROCHLORIDE 0.4 MG/1
0.4 CAPSULE ORAL DAILY
COMMUNITY
End: 2020-12-10

## 2020-06-16 ASSESSMENT — ENCOUNTER SYMPTOMS
ABDOMINAL PAIN: 0
ABDOMINAL DISTENTION: 0
WHEEZING: 0
VOMITING: 0
COUGH: 0
SHORTNESS OF BREATH: 1
DIARRHEA: 0
NAUSEA: 0

## 2020-06-16 NOTE — PROGRESS NOTES
tablet by mouth 2 times daily Yes Mayra De La Rosa MD   diclofenac sodium 1 % GEL Apply 4 g topically 4 times daily as needed for Pain Yes Steven Hayes MD   Multiple Vitamin (MULTI-VITAMIN) TABS Take 1 tablet by mouth daily Yes Steven Hayes MD   Handicap Placard MISC by Does not apply route effective November 25,2019 through November 24,2020  Steven Hayes MD   nitroGLYCERIN (NITROSTAT) 0.4 MG SL tablet Place 1 tablet under the tongue every 5 minutes as needed for Chest pain up to max of 3 total doses. If no relief after 1 dose, call 911. Patient not taking: Reported on 6/16/2020  Keny Ramírez MD        Social History     Tobacco Use    Smoking status: Never Smoker    Smokeless tobacco: Never Used   Substance Use Topics    Alcohol use: Yes     Alcohol/week: 0.0 standard drinks     Frequency: 2-4 times a month     Drinks per session: 1 or 2     Binge frequency: Never        Vitals:    06/16/20 1437   BP: (!) 147/84   Site: Left Upper Arm   Position: Sitting   Cuff Size: Large Adult   Pulse: 59   Resp: 20   Temp: 98 °F (36.7 °C)   TempSrc: Oral   SpO2: 97%   Weight: 209 lb (94.8 kg)   Height: 5' 6\" (1.676 m)     Estimated body mass index is 33.73 kg/m² as calculated from the following:    Height as of this encounter: 5' 6\" (1.676 m). Weight as of this encounter: 209 lb (94.8 kg). Physical Exam  Constitutional:       General: He is not in acute distress. Appearance: He is well-developed. He is not diaphoretic. HENT:      Head: Normocephalic and atraumatic. Cardiovascular:      Rate and Rhythm: Normal rate and regular rhythm. Heart sounds: Murmur (systolic murmur) present. Pulmonary:      Effort: Pulmonary effort is normal. No respiratory distress. Breath sounds: Normal breath sounds. No wheezing. Abdominal:      General: Bowel sounds are normal. There is no distension. Palpations: Abdomen is soft. Tenderness: There is no abdominal tenderness.

## 2020-06-18 ENCOUNTER — OFFICE VISIT (OUTPATIENT)
Dept: CARDIOLOGY CLINIC | Age: 85
End: 2020-06-18
Payer: MEDICARE

## 2020-06-18 VITALS
TEMPERATURE: 97.8 F | HEART RATE: 60 BPM | WEIGHT: 207 LBS | BODY MASS INDEX: 33.41 KG/M2 | DIASTOLIC BLOOD PRESSURE: 80 MMHG | SYSTOLIC BLOOD PRESSURE: 138 MMHG

## 2020-06-18 PROCEDURE — 1123F ACP DISCUSS/DSCN MKR DOCD: CPT | Performed by: INTERNAL MEDICINE

## 2020-06-18 PROCEDURE — G8417 CALC BMI ABV UP PARAM F/U: HCPCS | Performed by: INTERNAL MEDICINE

## 2020-06-18 PROCEDURE — 99214 OFFICE O/P EST MOD 30 MIN: CPT | Performed by: INTERNAL MEDICINE

## 2020-06-18 PROCEDURE — G8427 DOCREV CUR MEDS BY ELIG CLIN: HCPCS | Performed by: INTERNAL MEDICINE

## 2020-06-18 PROCEDURE — 1036F TOBACCO NON-USER: CPT | Performed by: INTERNAL MEDICINE

## 2020-06-18 PROCEDURE — 4040F PNEUMOC VAC/ADMIN/RCVD: CPT | Performed by: INTERNAL MEDICINE

## 2020-06-18 NOTE — PROGRESS NOTES
Subjective:      Patient ID: Jeison Obregon is a 80 y.o. male. HPI: Nishi juarez  being seen today in follow up for CAD/PTCA/AS/cardiomyopathy and abn myoview. some sob. No chest pain. NoTIA sx. HR good. Nancy Fallow No chest pain. Remains active. No exertional chest pain. No edema. No syncope. No tachycardia. No pnd. Past Medical History:   Diagnosis Date    Arthritis     Ascending aortic aneurysm ([address]     CAD (coronary artery disease)     Chronic kidney disease     Constipation     DDD (degenerative disc disease), lumbar     L5, S1    Dental disease     Hearing loss     Hyperlipidemia     Hypertension     Mild aortic stenosis     Prostate enlargement     TIA (transient ischemic attack)      Past Surgical History:   Procedure Laterality Date    CORONARY ANGIOPLASTY WITH STENT PLACEMENT  1/12/98    EYE SURGERY      SKIN TAG REMOVAL  2/2/10    TURP           No Known Allergies     Social History     Socioeconomic History    Marital status:      Spouse name: Not on file    Number of children: Not on file    Years of education: Not on file    Highest education level: Not on file   Occupational History    Not on file   Social Needs    Financial resource strain: Not on file    Food insecurity     Worry: Not on file     Inability: Not on file    Transportation needs     Medical: Not on file     Non-medical: Not on file   Tobacco Use    Smoking status: Never Smoker    Smokeless tobacco: Never Used   Substance and Sexual Activity    Alcohol use:  Yes     Alcohol/week: 0.0 standard drinks     Frequency: 2-4 times a month     Drinks per session: 1 or 2     Binge frequency: Never    Drug use: No    Sexual activity: Not on file   Lifestyle    Physical activity     Days per week: Not on file     Minutes per session: Not on file    Stress: Not on file   Relationships    Social connections     Talks on phone: Not on file     Gets together: Not on file     Attends Taoism service: Not on aortic valve stenosis             Plan:      Some sob and edema. Remains active. BP is good. HR good. On coreg of 12.5  mg bid. Lisinopril daily. No angina. No exertional sx. Wt up some. Increase to lasix 80 mg daily. EPl in l wk. Dr Antunez Even following aorta. Reviewed previous records and testing including cath 12/15 ,echo 10/19, myoview 10/17 and  hosp stay for bradycardia and possible TIA. Discussed decline in LV function and probable medical approach to issue. Follow up 4-6 wks.

## 2020-06-19 ENCOUNTER — APPOINTMENT (OUTPATIENT)
Dept: GENERAL RADIOLOGY | Age: 85
DRG: 291 | End: 2020-06-19
Payer: MEDICARE

## 2020-06-19 ENCOUNTER — HOSPITAL ENCOUNTER (INPATIENT)
Age: 85
LOS: 3 days | Discharge: HOME | DRG: 291 | End: 2020-06-22
Attending: EMERGENCY MEDICINE
Payer: MEDICARE

## 2020-06-19 PROBLEM — I50.43 CHF (CONGESTIVE HEART FAILURE), NYHA CLASS I, ACUTE ON CHRONIC, COMBINED (HCC): Status: ACTIVE | Noted: 2020-06-19

## 2020-06-19 PROBLEM — I50.23 ACUTE ON CHRONIC SYSTOLIC CONGESTIVE HEART FAILURE (HCC): Status: ACTIVE | Noted: 2019-04-04

## 2020-06-19 LAB
ANION GAP SERPL CALCULATED.3IONS-SCNC: 11 MMOL/L (ref 3–16)
BASOPHILS ABSOLUTE: 0 K/UL (ref 0–0.2)
BASOPHILS RELATIVE PERCENT: 0.4 %
BUN BLDV-MCNC: 30 MG/DL (ref 7–20)
CALCIUM SERPL-MCNC: 9.3 MG/DL (ref 8.3–10.6)
CHLORIDE BLD-SCNC: 102 MMOL/L (ref 99–110)
CO2: 25 MMOL/L (ref 21–32)
CREAT SERPL-MCNC: 1.3 MG/DL (ref 0.8–1.3)
EKG ATRIAL RATE: 60 BPM
EKG DIAGNOSIS: NORMAL
EKG P AXIS: 46 DEGREES
EKG P-R INTERVAL: 266 MS
EKG Q-T INTERVAL: 470 MS
EKG QRS DURATION: 124 MS
EKG QTC CALCULATION (BAZETT): 470 MS
EKG R AXIS: 26 DEGREES
EKG T AXIS: 24 DEGREES
EKG VENTRICULAR RATE: 60 BPM
EOSINOPHILS ABSOLUTE: 0.2 K/UL (ref 0–0.6)
EOSINOPHILS RELATIVE PERCENT: 3.4 %
GFR AFRICAN AMERICAN: >60
GFR NON-AFRICAN AMERICAN: 52
GLUCOSE BLD-MCNC: 122 MG/DL (ref 70–99)
HCT VFR BLD CALC: 34.8 % (ref 40.5–52.5)
HEMOGLOBIN: 11.9 G/DL (ref 13.5–17.5)
LYMPHOCYTES ABSOLUTE: 1.3 K/UL (ref 1–5.1)
LYMPHOCYTES RELATIVE PERCENT: 24.4 %
MAGNESIUM: 2.2 MG/DL (ref 1.8–2.4)
MCH RBC QN AUTO: 33.8 PG (ref 26–34)
MCHC RBC AUTO-ENTMCNC: 34.3 G/DL (ref 31–36)
MCV RBC AUTO: 98.7 FL (ref 80–100)
MONOCYTES ABSOLUTE: 0.8 K/UL (ref 0–1.3)
MONOCYTES RELATIVE PERCENT: 14.5 %
NEUTROPHILS ABSOLUTE: 3 K/UL (ref 1.7–7.7)
NEUTROPHILS RELATIVE PERCENT: 57.3 %
PDW BLD-RTO: 13.7 % (ref 12.4–15.4)
PHOSPHORUS: 3.4 MG/DL (ref 2.5–4.9)
PLATELET # BLD: 136 K/UL (ref 135–450)
PMV BLD AUTO: 8.1 FL (ref 5–10.5)
POTASSIUM SERPL-SCNC: 3.6 MMOL/L (ref 3.5–5.1)
PRO-BNP: 6159 PG/ML (ref 0–449)
RBC # BLD: 3.53 M/UL (ref 4.2–5.9)
SODIUM BLD-SCNC: 138 MMOL/L (ref 136–145)
TROPONIN: 0.02 NG/ML
WBC # BLD: 5.3 K/UL (ref 4–11)

## 2020-06-19 PROCEDURE — 80048 BASIC METABOLIC PNL TOTAL CA: CPT

## 2020-06-19 PROCEDURE — 6360000002 HC RX W HCPCS: Performed by: INTERNAL MEDICINE

## 2020-06-19 PROCEDURE — 93005 ELECTROCARDIOGRAM TRACING: CPT | Performed by: EMERGENCY MEDICINE

## 2020-06-19 PROCEDURE — 83880 ASSAY OF NATRIURETIC PEPTIDE: CPT

## 2020-06-19 PROCEDURE — 99223 1ST HOSP IP/OBS HIGH 75: CPT | Performed by: INTERNAL MEDICINE

## 2020-06-19 PROCEDURE — 71046 X-RAY EXAM CHEST 2 VIEWS: CPT

## 2020-06-19 PROCEDURE — 6360000002 HC RX W HCPCS: Performed by: EMERGENCY MEDICINE

## 2020-06-19 PROCEDURE — 84100 ASSAY OF PHOSPHORUS: CPT

## 2020-06-19 PROCEDURE — 6370000000 HC RX 637 (ALT 250 FOR IP): Performed by: INTERNAL MEDICINE

## 2020-06-19 PROCEDURE — 96374 THER/PROPH/DIAG INJ IV PUSH: CPT

## 2020-06-19 PROCEDURE — 1200000000 HC SEMI PRIVATE

## 2020-06-19 PROCEDURE — 99285 EMERGENCY DEPT VISIT HI MDM: CPT

## 2020-06-19 PROCEDURE — 83735 ASSAY OF MAGNESIUM: CPT

## 2020-06-19 PROCEDURE — 85025 COMPLETE CBC W/AUTO DIFF WBC: CPT

## 2020-06-19 PROCEDURE — 2580000003 HC RX 258: Performed by: INTERNAL MEDICINE

## 2020-06-19 PROCEDURE — 84484 ASSAY OF TROPONIN QUANT: CPT

## 2020-06-19 RX ORDER — DOCUSATE SODIUM 100 MG/1
100 CAPSULE, LIQUID FILLED ORAL 2 TIMES DAILY
Status: DISCONTINUED | OUTPATIENT
Start: 2020-06-19 | End: 2020-06-22 | Stop reason: HOSPADM

## 2020-06-19 RX ORDER — ALPRAZOLAM 0.5 MG/1
0.5 TABLET ORAL ONCE
Status: COMPLETED | OUTPATIENT
Start: 2020-06-19 | End: 2020-06-19

## 2020-06-19 RX ORDER — NITROGLYCERIN 0.4 MG/1
0.4 TABLET SUBLINGUAL EVERY 5 MIN PRN
Status: DISCONTINUED | OUTPATIENT
Start: 2020-06-19 | End: 2020-06-22 | Stop reason: HOSPADM

## 2020-06-19 RX ORDER — SODIUM CHLORIDE 0.9 % (FLUSH) 0.9 %
10 SYRINGE (ML) INJECTION EVERY 12 HOURS SCHEDULED
Status: DISCONTINUED | OUTPATIENT
Start: 2020-06-19 | End: 2020-06-22 | Stop reason: HOSPADM

## 2020-06-19 RX ORDER — FAMOTIDINE 20 MG/1
20 TABLET, FILM COATED ORAL 2 TIMES DAILY
Status: DISCONTINUED | OUTPATIENT
Start: 2020-06-19 | End: 2020-06-19 | Stop reason: DRUGHIGH

## 2020-06-19 RX ORDER — ATORVASTATIN CALCIUM 80 MG/1
80 TABLET, FILM COATED ORAL DAILY
Status: DISCONTINUED | OUTPATIENT
Start: 2020-06-20 | End: 2020-06-22 | Stop reason: HOSPADM

## 2020-06-19 RX ORDER — FUROSEMIDE 10 MG/ML
40 INJECTION INTRAMUSCULAR; INTRAVENOUS 2 TIMES DAILY
Status: CANCELLED | OUTPATIENT
Start: 2020-06-19

## 2020-06-19 RX ORDER — ACETAMINOPHEN 650 MG/1
650 SUPPOSITORY RECTAL EVERY 6 HOURS PRN
Status: DISCONTINUED | OUTPATIENT
Start: 2020-06-19 | End: 2020-06-22 | Stop reason: HOSPADM

## 2020-06-19 RX ORDER — AZELASTINE HYDROCHLORIDE 0.5 MG/ML
1 SOLUTION/ DROPS OPHTHALMIC 3 TIMES DAILY
COMMUNITY

## 2020-06-19 RX ORDER — POLYETHYLENE GLYCOL 3350 17 G/17G
17 POWDER, FOR SOLUTION ORAL DAILY
COMMUNITY

## 2020-06-19 RX ORDER — CARVEDILOL 12.5 MG/1
12.5 TABLET ORAL 2 TIMES DAILY WITH MEALS
Status: DISCONTINUED | OUTPATIENT
Start: 2020-06-19 | End: 2020-06-21

## 2020-06-19 RX ORDER — FUROSEMIDE 10 MG/ML
60 INJECTION INTRAMUSCULAR; INTRAVENOUS 2 TIMES DAILY
Status: DISCONTINUED | OUTPATIENT
Start: 2020-06-19 | End: 2020-06-22 | Stop reason: HOSPADM

## 2020-06-19 RX ORDER — POLYVINYL ALCOHOL 14 MG/ML
1 SOLUTION/ DROPS OPHTHALMIC
COMMUNITY

## 2020-06-19 RX ORDER — PROMETHAZINE HYDROCHLORIDE 12.5 MG/1
12.5 TABLET ORAL EVERY 6 HOURS PRN
Status: DISCONTINUED | OUTPATIENT
Start: 2020-06-19 | End: 2020-06-22 | Stop reason: HOSPADM

## 2020-06-19 RX ORDER — TAMSULOSIN HYDROCHLORIDE 0.4 MG/1
0.4 CAPSULE ORAL DAILY
Status: DISCONTINUED | OUTPATIENT
Start: 2020-06-19 | End: 2020-06-22 | Stop reason: HOSPADM

## 2020-06-19 RX ORDER — FAMOTIDINE 20 MG/1
20 TABLET, FILM COATED ORAL DAILY
Status: DISCONTINUED | OUTPATIENT
Start: 2020-06-19 | End: 2020-06-22 | Stop reason: HOSPADM

## 2020-06-19 RX ORDER — POLYETHYLENE GLYCOL 3350 17 G/17G
17 POWDER, FOR SOLUTION ORAL DAILY PRN
Status: DISCONTINUED | OUTPATIENT
Start: 2020-06-19 | End: 2020-06-22 | Stop reason: HOSPADM

## 2020-06-19 RX ORDER — ONDANSETRON 2 MG/ML
4 INJECTION INTRAMUSCULAR; INTRAVENOUS EVERY 6 HOURS PRN
Status: DISCONTINUED | OUTPATIENT
Start: 2020-06-19 | End: 2020-06-22 | Stop reason: HOSPADM

## 2020-06-19 RX ORDER — ACETAMINOPHEN 325 MG/1
650 TABLET ORAL EVERY 6 HOURS PRN
Status: DISCONTINUED | OUTPATIENT
Start: 2020-06-19 | End: 2020-06-22 | Stop reason: HOSPADM

## 2020-06-19 RX ORDER — FUROSEMIDE 10 MG/ML
80 INJECTION INTRAMUSCULAR; INTRAVENOUS ONCE
Status: COMPLETED | OUTPATIENT
Start: 2020-06-19 | End: 2020-06-19

## 2020-06-19 RX ORDER — LISINOPRIL 10 MG/1
10 TABLET ORAL 2 TIMES DAILY
Status: DISCONTINUED | OUTPATIENT
Start: 2020-06-19 | End: 2020-06-20

## 2020-06-19 RX ORDER — SODIUM CHLORIDE 0.9 % (FLUSH) 0.9 %
10 SYRINGE (ML) INJECTION PRN
Status: DISCONTINUED | OUTPATIENT
Start: 2020-06-19 | End: 2020-06-22 | Stop reason: HOSPADM

## 2020-06-19 RX ADMIN — Medication 10 ML: at 10:43

## 2020-06-19 RX ADMIN — DOCUSATE SODIUM 100 MG: 100 CAPSULE, LIQUID FILLED ORAL at 19:54

## 2020-06-19 RX ADMIN — ENOXAPARIN SODIUM 40 MG: 40 INJECTION SUBCUTANEOUS at 10:47

## 2020-06-19 RX ADMIN — FUROSEMIDE 80 MG: 10 INJECTION, SOLUTION INTRAMUSCULAR; INTRAVENOUS at 07:19

## 2020-06-19 RX ADMIN — TAMSULOSIN HYDROCHLORIDE 0.4 MG: 0.4 CAPSULE ORAL at 10:43

## 2020-06-19 RX ADMIN — FUROSEMIDE 60 MG: 10 INJECTION, SOLUTION INTRAMUSCULAR; INTRAVENOUS at 18:04

## 2020-06-19 RX ADMIN — Medication 10 ML: at 19:49

## 2020-06-19 RX ADMIN — ALPRAZOLAM 0.5 MG: 0.5 TABLET ORAL at 18:03

## 2020-06-19 RX ADMIN — LISINOPRIL 10 MG: 10 TABLET ORAL at 19:54

## 2020-06-19 RX ADMIN — FAMOTIDINE 20 MG: 20 TABLET ORAL at 10:43

## 2020-06-19 ASSESSMENT — ENCOUNTER SYMPTOMS
SHORTNESS OF BREATH: 1
EYES NEGATIVE: 1
COUGH: 0
GASTROINTESTINAL NEGATIVE: 1

## 2020-06-19 ASSESSMENT — PAIN SCALES - GENERAL
PAINLEVEL_OUTOF10: 0

## 2020-06-19 NOTE — PROGRESS NOTES
4 Eyes Admission Assessment     I agree as the admission nurse that 2 RN's have performed a thorough Head to Toe Skin Assessment on the patient. ALL assessment sites listed below have been assessed on admission. Areas assessed by both nurses:   [x]   Head, Face, and Ears   [x]   Shoulders, Back, and Chest  [x]   Arms, Elbows, and Hands   [x]   Coccyx, Sacrum, and Ischum  [x]   Legs, Feet, and Heels        Does the Patient have Skin Breakdown? No, his ble are red, and his buttocks has bilateral blanchable redness.        Juan Prevention initiated:  na   Wound Care Orders initiated:  na      [facility] nurse consulted for Pressure Injury (Stage 3,4, Unstageable, DTI, NWPT, and Complex wounds):  na      Nurse 1 eSignature: Electronically signed by Angeles Chappell RN on 6/19/20 at 10:19 AM EDT    **SHARE this note so that the co-signing nurse is able to place an eSignature**    Nurse 2 eSignature: Electronically signed by Sid Power RN on 6/20/20 at 7:39 AM EDT

## 2020-06-19 NOTE — ED PROVIDER NOTES
[id number] [facility]          ATTENDING PHYSICIAN NOTE       Date of evaluation: 6/19/2020    Chief Complaint     Other (stressed and cannot sleep)      History of Present Illness     Huey Carrizales is a 80 y.o. male who presents to the emergency department complaining of difficulty sleeping and shortness of breath. Patient states that he has been having difficulty sleeping over the last several days. He states that he will fall asleep but only sleep for 2 to 3 hours before he wakes up and cannot get back to sleep. He states he does have some shortness of breath associated with this. He denies any fevers or chills. He denies any chest pain or pressure. He does note swelling to his lower extremities but saw his cardiologist yesterday who recommended he increase his Lasix. Patient denies any headache. He denies any numbness, tingling, or weakness to the extremities. He does note that approximately 1 week ago his wife was admitted to the hospital that he has been staying at home alone during this time. The patient does have an appointment today with his primary care provider but did not feel he could wait that long. Review of Systems     Review of Systems   Constitutional: Negative. HENT: Negative. Eyes: Negative. Respiratory: Positive for shortness of breath. Negative for cough. Cardiovascular: Positive for leg swelling. Negative for chest pain. Gastrointestinal: Negative. Genitourinary: Negative. Musculoskeletal: Negative. Neurological: Negative. Psychiatric/Behavioral: Positive for sleep disturbance. The patient is nervous/anxious. All other systems reviewed and are negative.       Past Medical, Surgical, Family, and Social History     He has a past medical history of Arthritis, Ascending aortic aneurysm ([address] CAD (coronary artery disease), Chronic kidney disease, Constipation, DDD (degenerative disc disease), lumbar, Dental disease, Hearing loss, Hyperlipidemia, Hypertension, Mild aortic stenosis, Prostate enlargement, and TIA (transient ischemic attack). He has a past surgical history that includes TURP; Coronary angioplasty with stent (1/12/98); Skin tag removal (2/2/10); and eye surgery. His family history includes Cancer in his mother; Hypertension in his mother; Stroke in his father. He reports that he has never smoked. He has never used smokeless tobacco. He reports current alcohol use. He reports that he does not use drugs. Medications     Previous Medications    ASPIRIN 325 MG EC TABLET    Take 1 tablet by mouth daily    ATORVASTATIN (LIPITOR) 80 MG TABLET    Take 1 tablet by mouth nightly    CARVEDILOL (COREG) 12.5 MG TABLET    TAKE 1 TABLET BY MOUTH 2 TIMES DAILY (WITH MEALS)    DICLOFENAC SODIUM 1 % GEL    Apply 4 g topically 4 times daily as needed for Pain     MG CAPSULE    TAKE 1 CAPSULE BY MOUTH 2 TIMES DAILY AS NEEDED FOR CONSTIPATION    FUROSEMIDE (LASIX) 40 MG TABLET    Take 1 tablet by mouth 2 times daily    HANDICAP PLACARD MISC    by Does not apply route effective November 25,2019 through November 24,2020    HYDROCORTISONE 2.5 % CREAM    Apply topically 2 times daily. LISINOPRIL (PRINIVIL;ZESTRIL) 10 MG TABLET    Take 1 tablet by mouth 2 times daily    MULTIPLE VITAMIN (MULTI-VITAMIN) TABS    Take 1 tablet by mouth daily    NITROGLYCERIN (NITROSTAT) 0.4 MG SL TABLET    Place 1 tablet under the tongue every 5 minutes as needed for Chest pain up to max of 3 total doses. If no relief after 1 dose, call 911. RANITIDINE (ZANTAC) 150 MG TABLET    Take 1 tablet by mouth 2 times daily as needed for Heartburn    SPIRONOLACTONE (ALDACTONE) 50 MG TABLET    Take 1 tablet by mouth daily    TAMSULOSIN (FLOMAX) 0.4 MG CAPSULE    Take 0.4 mg by mouth daily       Allergies     He has No Known Allergies.     Physical Exam     INITIAL VITALS: BP: (!) 160/121, Temp: 98.1 °F (36.7 °C), Pulse: 65, Resp: 18, SpO2: 95 %   Physical Exam  Vitals signs and nursing note reviewed. Constitutional:       General: He is not in acute distress. Comments: Appears anxious. HENT:      Head: Normocephalic and atraumatic. Mouth/Throat:      Mouth: Mucous membranes are moist.      Pharynx: No oropharyngeal exudate. Eyes:      General: No scleral icterus. Extraocular Movements: Extraocular movements intact. Conjunctiva/sclera: Conjunctivae normal.      Pupils: Pupils are equal, round, and reactive to light. Neck:      Musculoskeletal: Normal range of motion and neck supple. Cardiovascular:      Rate and Rhythm: Normal rate and regular rhythm. Heart sounds: Murmur present. Pulmonary:      Effort: Pulmonary effort is normal.      Breath sounds: Normal breath sounds. No wheezing, rhonchi or rales. Abdominal:      General: Bowel sounds are normal.      Palpations: Abdomen is soft. Tenderness: There is no abdominal tenderness. There is no guarding or rebound. Musculoskeletal: Normal range of motion. General: Swelling present. Skin:     General: Skin is warm and dry. Neurological:      General: No focal deficit present. Mental Status: He is alert and oriented to person, place, and time. Cranial Nerves: No cranial nerve deficit. Motor: No weakness. Coordination: Coordination normal.         Diagnostic Results     EKG   EKG as interpreted by me shows patient to be in a normal sinus rhythm with a normal axis, first-degree AV block with NH interval of 266, normal QT interval, idioventricular block with QRS of 124, no ST segment abnormalities, no T wave abnormalities, PVC present. This appears unchanged from prior EKGs. RADIOLOGY:  XR CHEST STANDARD (2 VW)   Final Result       Cardiomegaly. Mild vascular congestion. Trace pleural effusions. Bibasilar atelectasis.           LABS:   Results for orders placed or performed during the hospital encounter of 06/19/20   CBC auto differential Result Value Ref Range    WBC 5.3 4.0 - 11.0 K/uL    RBC 3.53 (L) 4.20 - 5.90 M/uL    Hemoglobin 11.9 (L) 13.5 - 17.5 g/dL    Hematocrit 34.8 (L) 40.5 - 52.5 %    MCV 98.7 80.0 - 100.0 fL    MCH 33.8 26.0 - 34.0 pg    MCHC 34.3 31.0 - 36.0 g/dL    RDW 13.7 12.4 - 15.4 %    Platelets 200 943 - 573 K/uL    MPV 8.1 5.0 - 10.5 fL    Neutrophils % 57.3 %    Lymphocytes % 24.4 %    Monocytes % 14.5 %    Eosinophils % 3.4 %    Basophils % 0.4 %    Neutrophils Absolute 3.0 1.7 - 7.7 K/uL    Lymphocytes Absolute 1.3 1.0 - 5.1 K/uL    Monocytes Absolute 0.8 0.0 - 1.3 K/uL    Eosinophils Absolute 0.2 0.0 - 0.6 K/uL    Basophils Absolute 0.0 0.0 - 0.2 K/uL   Basic Metabolic Panel (EP - 1)   Result Value Ref Range    Sodium 138 136 - 145 mmol/L    Potassium 3.6 3.5 - 5.1 mmol/L    Chloride 102 99 - 110 mmol/L    CO2 25 21 - 32 mmol/L    Anion Gap 11 3 - 16    Glucose 122 (H) 70 - 99 mg/dL    BUN 30 (H) 7 - 20 mg/dL    CREATININE 1.3 0.8 - 1.3 mg/dL    GFR Non-African American 52 (A) >60    GFR African American >60 >60    Calcium 9.3 8.3 - 10.6 mg/dL   Magnesium   Result Value Ref Range    Magnesium 2.20 1.80 - 2.40 mg/dL   Phosphorus   Result Value Ref Range    Phosphorus 3.4 2.5 - 4.9 mg/dL   Troponin   Result Value Ref Range    Troponin 0.02 (H) <0.01 ng/mL   Brain Natriuretic Peptide   Result Value Ref Range    Pro-BNP 6,159 (H) 0 - 449 pg/mL   EKG 12 Lead   Result Value Ref Range    Ventricular Rate 60 BPM    Atrial Rate 60 BPM    P-R Interval 266 ms    QRS Duration 124 ms    Q-T Interval 470 ms    QTc Calculation (Bazett) 470 ms    P Axis 46 degrees    R Axis 26 degrees    T Axis 24 degrees    Diagnosis       EKG performed in ER and to be interpreted by ER physician. Confirmed by MD, ER ([id number]),  Lauren Clarke ([id number]) on 6/19/2020 6:18:25 AM     RECENT VITALS:  BP: (!) 168/79,Temp: 98.1 °F (36.7 °C), Pulse: 57, Resp: 21, SpO2: 95 %     Procedures     N/A    ED Course     Nursing Notes, Past Medical Hx, Past Surgical Hx, Social Hx,Allergies, and Family Hx were reviewed. patient was given the following medications:  Orders Placed This Encounter   Medications    furosemide (LASIX) injection 80 mg       CONSULTS:  IP CONSULT TO HOSPITALIST    MEDICAL DECISIONMAKING / ASSESSMENT / Lambert Glenn is a 80 y.o. male with history of cardiomyopathy with ejection fraction of 25% and history of stable thoracic aortic aneurysm presents complaining of shortness of breath and difficulty sleeping. Patient states he is been having difficulty sleeping ever since his wife was admitted to the hospital.  Patient describes difficulty with sleep maintenance, stating he is able to fall asleep but then wakes up and cannot get back to sleep. He also notes shortness of breath. He was seen yesterday by his cardiologist who recommended that he increase his Lasix due to peripheral edema. Patient has clear breath sounds and normal heart sounds. He has normal oxygen saturation. EKG shows no acute ischemic abnormalities. Chest x-ray does show mild pulmonary edema and small bilateral pleural effusions. NT proBNP is elevated at 6100. Troponin is slightly elevated at 0.02 but with no new ischemic changes on EKG I will hold on heparin at this time. .  Patient was written for a dose of Lasix. Patient will be admitted to the clinic service for further evaluation and management. Clinical Impression     1. Acute on chronic systolic congestive heart failure (HCC)        Disposition     PATIENT REFERRED TO:  No follow-up provider specified.     DISCHARGE MEDICATIONS:  New Prescriptions    No medications on file       DISPOSITION          Alex Estrada MD  06/19/20 [id number]

## 2020-06-19 NOTE — CONSULTS
History of Present Illness:  Anne-Marie Sanchez is a 80 y.o. patient whom we were asked to see for sob. Hx CHF/cardiomyop. Progressive sob and edema over past 3 days. Seen in office and increased lasix. However sob at rest and with PND. Compliant with meds. No fever/cough. Past Medical History:   has a past medical history of Arthritis, Ascending aortic aneurysm ([address] CAD (coronary artery disease), Chronic kidney disease, Constipation, DDD (degenerative disc disease), lumbar, Dental disease, Hearing loss, Hyperlipidemia, Hypertension, Mild aortic stenosis, Prostate enlargement, and TIA (transient ischemic attack). Surgical History:   has a past surgical history that includes TURP; Coronary angioplasty with stent (1/12/98); Skin tag removal (2/2/10); and eye surgery. Social History:   reports that he has never smoked. He has never used smokeless tobacco. He reports current alcohol use. He reports that he does not use drugs. Family History:  No evidence for sudden cardiac death or premature CAD    Home Medications:  Were reviewed and are listed in nursing record. and/or listed below  Prior to Admission medications    Medication Sig Start Date End Date Taking?  Authorizing Provider   azelastine (OPTIVAR) 0.05 % ophthalmic solution Place 1 drop into both eyes 3 times daily    Yes Historical Provider, MD   polyethylene glycol (GLYCOLAX) 17 GM/SCOOP powder Take 17 g by mouth daily   Yes Historical Provider, MD   polyvinyl alcohol (LIQUIFILM TEARS) 1.4 % ophthalmic solution Place 1 drop into both eyes every 3 hours as needed   Yes Historical Provider, MD   tamsulosin (FLOMAX) 0.4 MG capsule Take 0.4 mg by mouth daily   Yes Historical Provider, MD   aspirin 325 MG EC tablet Take 1 tablet by mouth daily 6/16/20  Yes Teresita New MD   carvedilol (COREG) 12.5 MG tablet TAKE 1 TABLET BY MOUTH 2 TIMES DAILY (WITH MEALS) 5/18/20  Yes DEBORAH Giang CNP    MG capsule TAKE 1 CAPSULE BY MOUTH 2 TIMES DAILY AS NEEDED FOR CONSTIPATION 4/30/20  Yes Byron Yang MD   furosemide (LASIX) 40 MG tablet Take 1 tablet by mouth 2 times daily  Patient taking differently: Take 80 mg by mouth 2 times daily  4/20/20  Yes Yovanny Mendez MD   spironolactone (ALDACTONE) 50 MG tablet Take 1 tablet by mouth daily 2/17/20  Yes Yelitza Hogan MD   atorvastatin (LIPITOR) 80 MG tablet Take 1 tablet by mouth nightly  Patient taking differently: Take 80 mg by mouth daily  2/17/20  Yes Yelitza Hogan MD   ranitidine (ZANTAC) 150 MG tablet Take 1 tablet by mouth 2 times daily as needed for Heartburn 1/14/20  Yes Isreal Cornell MD   lisinopril (PRINIVIL;ZESTRIL) 10 MG tablet Take 1 tablet by mouth 2 times daily 12/12/19  Yes Alexx Purvis MD   diclofenac sodium 1 % GEL Apply 4 g topically 4 times daily as needed for Pain 7/26/19  Yes Zuhair Wagner MD   Multiple Vitamin (MULTI-VITAMIN) TABS Take 1 tablet by mouth daily 7/26/19  Yes Zuhair Wagner MD   nitroGLYCERIN (NITROSTAT) 0.4 MG SL tablet Place 1 tablet under the tongue every 5 minutes as needed for Chest pain up to max of 3 total doses. If no relief after 1 dose, call 911. 12/27/18  Yes Keith Moss MD   hydrocortisone 2.5 % cream Apply topically 2 times daily. 6/16/20   Nelsy Contreras MD   Handicap Placard MISC by Does not apply route effective November 25,2019 through November 24,2020 11/25/19   Zuhair Wagner MD        Allergies:  Patient has no known allergies. Review of Systems:       · Constitutional: there has been no unanticipated weight loss. There's been no change in energy level, sleep pattern, or activity level. · Eyes: No visual changes or diplopia. No scleral icterus. · ENT: No Headaches, hearing loss or vertigo. No mouth sores or sore throat. · Cardiovascular: No loss of consciousness. No hemoptysis, pleuritic pain, or phlebitis. · Respiratory: No cough or wheezing, no sputum production. No hematemesis.     · Gastrointestinal: No abdominal pain, appetite loss, blood in stools. No change in bowel or bladder habits. · Genitourinary: No dysuria, trouble voiding, or hematuria. · Musculoskeletal:  No gait disturbance, weakness or joint complaints. · Integumentary: No rash or pruritis. · All other systems reviewed negative as done. Physical Examination:    Vitals:    06/19/20 0953   BP: (!) 170/83   Pulse: 58   Resp: 18   Temp: 98.3 °F (36.8 °C)   SpO2: 94%    Weight: 200 lb 7 oz (90.9 kg)         General Appearance:  Alert, cooperative, no distress, appears stated age   Head:  Normocephalic, without obvious abnormality, atraumatic   Eyes:  PERRL, conjunctiva/corneas clear       Nose: Nares normal, no drainage or sinus tenderness   Throat: Lips, mucosa, and tongue normal   Neck: Supple, symmetrical, trachea midline       Lungs:   Decreased BS, respirations unlabored   Chest Wall:  No tenderness or deformity   Heart:  Regular rate and rhythm, S1, S2 normal, no rub or gallop. 2/6 syst M   Abdomen:   Soft, non-tender, bowel sounds active all four quadrants,  no masses, no organomegaly           Extremities: Extremities normal, atraumatic, no cyanosis.  1-2+edema       Skin: Skin color, texture, turgor normal, no rashes or lesions   Pysch: Normal mood and affect   Neurologic: Normal gross motor and sensory exam.         Labs  CBC:   Lab Results   Component Value Date    WBC 5.3 06/19/2020    RBC 3.53 06/19/2020    HGB 11.9 06/19/2020    HCT 34.8 06/19/2020    MCV 98.7 06/19/2020    RDW 13.7 06/19/2020     06/19/2020     CMP:    Lab Results   Component Value Date     06/19/2020    K 3.6 06/19/2020    K 4.2 10/16/2019     06/19/2020    CO2 25 06/19/2020    BUN 30 06/19/2020    CREATININE 1.3 06/19/2020    GFRAA >60 06/19/2020    GFRAA 76 11/28/2012    AGRATIO 1.4 06/11/2020    LABGLOM 52 06/19/2020    GLUCOSE 122 06/19/2020    PROT 6.5 06/11/2020    PROT 7.3 11/28/2012    CALCIUM 9.3 06/19/2020    BILITOT 0.9 06/11/2020 ALKPHOS 79 06/11/2020    AST 17 06/11/2020    ALT 11 06/11/2020     PT/INR:  No results found for: PTINR  Lab Results   Component Value Date    TROPONINI 0.02 (H) 06/19/2020       EKG:  I have reviewed EKG with the following interpretation:  Impression:  NSR, first degree AVB, PVC, IVCD, NSSTTW changes    Assessment  Patient Active Problem List   Diagnosis    CAD (coronary artery disease)    Hypertension    Hypercholesterolemia    Arthritis    Prostate enlargement    DDD (degenerative disc disease), lumbar    Constipation    Moderate aortic stenosis    Precordial pain    Chest pain    Coronary artery disease involving native heart    Abnormal stress test    S/P coronary angiogram    Weakness of both lower extremities    CKD (chronic kidney disease) stage 3, GFR 30-59 ml/min ([facility])    Acute on chronic systolic congestive heart failure (HCC)    Hypertensive emergency    Acute on chronic combined systolic and diastolic congestive heart failure (HCC)    Respiratory distress    Dyspnea on exertion    Fluid overload    Bradycardia    Mass of left hand    Ischemic cardiomyopathy    Acute bilateral low back pain with left-sided sciatica    Abdominal aortic aneurysm (AAA) without rupture (HCC)    TIA (transient ischemic attack)    CHF (congestive heart failure), NYHA class I, acute on chronic, combined ([address]         Plan:    Acute on chronic systolic CHF. Agree with diuresis with IV lasix. He has already responded symptom wise. Edema still present. Continue IV lasix. Watch cr.

## 2020-06-19 NOTE — PROGRESS NOTES
Pt arrives to floor from ED alert, oriented and pleasant. He is [place] speaking, but can carry on a conversation very well in [place]. He can answer all admission questions and questions about his medications. Assessment as documented. VSS, though he is hypertensive. Pt changed into a gown and weighed on standing scale. Admission complete. Pt resting in bed with eyes closed. Fall precautions in place. Will continue to round on pt for safety/needs.

## 2020-06-19 NOTE — ED NOTES
Pt taking home medications now:  Lisinopril, ASA, Multi-vit, and DOK. Pt's lasix and Coreg held.  Lasix given IVP and Coreg held for HR 49-55 bpm.      Ivett Whitney RN  06/19/20 [address], RN  06/19/20 [id number]

## 2020-06-19 NOTE — ED NOTES
Prior to giving Lasix IVP, peripheral IV in right forearm infiltrated. IV removed, tip intact, dressing applied. New IV established in left AC, 20g.  Lasix given in new IV via slow push over several minutes     Christopher Dash RN  06/19/20 ANDI Cueva  06/19/20 [id number]

## 2020-06-19 NOTE — ED NOTES
Bed being cleaned. Pt's cousin is at bedside. Call light in reach.      Jessenia Upton RN  06/19/20 [id number]

## 2020-06-19 NOTE — H&P
Vitamin (MULTI-VITAMIN) TABS Take 1 tablet by mouth daily 7/26/19  Yes Skylar Wolfe MD   nitroGLYCERIN (NITROSTAT) 0.4 MG SL tablet Place 1 tablet under the tongue every 5 minutes as needed for Chest pain up to max of 3 total doses. If no relief after 1 dose, call 911. 12/27/18  Yes Mary Grace Moyer MD   tamsulosin (FLOMAX) 0.4 MG capsule Take 0.4 mg by mouth daily    Historical Provider, MD   hydrocortisone 2.5 % cream Apply topically 2 times daily. 6/16/20   Deacon Cheng MD   carvedilol (COREG) 12.5 MG tablet TAKE 1 TABLET BY MOUTH 2 TIMES DAILY (WITH MEALS) 5/18/20   DEBORAH Conway CNP   furosemide (LASIX) 40 MG tablet Take 1 tablet by mouth 2 times daily  Patient taking differently: Take 80 mg by mouth 2 times daily  4/20/20   Jassi Dunaway MD   spironolactone (ALDACTONE) 50 MG tablet Take 1 tablet by mouth daily 2/17/20   Laura Granados MD   atorvastatin (LIPITOR) 80 MG tablet Take 1 tablet by mouth nightly 2/17/20   Laura Granados MD   ranitidine (ZANTAC) 150 MG tablet Take 1 tablet by mouth 2 times daily as needed for Heartburn 1/14/20   Vance Leonardo MD   Handicap Placard MISC by Does not apply route effective November 25,2019 through November 24,2020 11/25/19   Skylar Wolfe MD   diclofenac sodium 1 % GEL Apply 4 g topically 4 times daily as needed for Pain 7/26/19   Skylar Wolfe MD       Allergies:  Patient has no known allergies. Social History:      The patient currently lives  At home     TOBACCO:   reports that he has never smoked. He has never used smokeless tobacco.  ETOH:   reports current alcohol use. Family History:      Reviewed in detail and negative for DM, CAD, Cancer, CVA. Positive as follows:        Problem Relation Age of Onset    Hypertension Mother     Cancer Mother     Stroke Father        REVIEW OF SYSTEMS:   Pertinent positives as noted in the HPI. All other systems reviewed and negative.     PHYSICAL EXAM:    BP (!) 153/114   Pulse 50   Temp 98.1 °F (36.7 °C) (Oral)   Resp 23   SpO2 99%     General appearance:  No apparent distress, appears stated age and cooperative. HEENT:  Normal cephalic, atraumatic without obvious deformity. Pupils equal, round, and reactive to light. Extra ocular muscles intact. Conjunctivae/corneas clear. Neck: Supple, with full range of motion. No jugular venous distention. Trachea midline. Respiratory: Few bibasal crepitations  Cardiovascular:  Regular rate and rhythm Alexei systolic murmur grade 3/6  Abdomen: Soft, non-tender, non-distended with normal bowel sounds. Musculoskeletal: 2+ pitting edema both lower extremities extending up to the knees  Skin: Skin color, texture, turgor normal.  No rashes or lesions. Neurologic:  Neurovascularly intact without any focal sensory/motor deficits. Cranial nerves: II-XII intact, grossly non-focal.  Psychiatric:  Alert and oriented, thought content appropriate, normal insight  Capillary Refill: Brisk,< 3 seconds   Peripheral Pulses: +2 palpable, equal bilaterally       Labs:     Recent Labs     06/19/20  0552   WBC 5.3   HGB 11.9*   HCT 34.8*        Recent Labs     06/19/20  0552      K 3.6      CO2 25   BUN 30*   CREATININE 1.3   CALCIUM 9.3   PHOS 3.4     No results for input(s): AST, ALT, BILIDIR, BILITOT, ALKPHOS in the last 72 hours. No results for input(s): INR in the last 72 hours.   Recent Labs     06/19/20  0552   TROPONINI 0.02*       Urinalysis:      Lab Results   Component Value Date    NITRU Negative 03/28/2019    WBCUA 0-2 05/14/2014    BACTERIA Rare 05/14/2014    RBCUA 0-2 05/14/2014    BLOODU Negative 03/28/2019    SPECGRAV 1.010 03/28/2019    GLUCOSEU Negative 03/28/2019       Radiology:     CXR: I have reviewed the CXR with the following interpretation: cardiomegaly  pulmonary edema  EKG:  I have reviewed the EKG with the following interpretation: Sinus rhythm with PVC no acute changes    XR CHEST STANDARD (2 VW)   Final Result Cardiomegaly. Mild vascular congestion. Trace pleural effusions. Bibasilar atelectasis. ASSESSMENT/PLAN:    Active Hospital Problems    Diagnosis Date Noted    CHF (congestive heart failure), NYHA class I, acute on chronic, combined ([facility] [address] [I50.43] 06/19/2020       Acute Medical Issues Being Addressed:    57-year-old gentleman admitted with dyspnea    Acute on chronic severe systolic heart failure  At this point will increase his Lasix to 60 IV twice daily  Strict intake and output  Cardiology consult  I am not sure a repeat echocardiogram would help  Trend out troponins  Currently no chest pain  Telemetry    Chronic kidney disease stage III  Creatinine at baseline    Coronary artery disease status post PTCA  Currently on medical therapy will continue home medication    Moderate aortic stenosis        DVT Prophylaxis: sc lovenox renal dose  Diet: No diet orders on file  Code Status: Prior    PT/OT Eval Status: will have eval if appropriate given patient's condition    Dispo - once acute medical process is resolved       Ozzy Segura MD    Thank you Mercedes Andersen MD for the opportunity to be involved in this patient's care. If you have any questions or concerns please feel free to contact me.

## 2020-06-19 NOTE — CARE COORDINATION
Case Management Assessment           Initial Evaluation                Date / Time of Evaluation: 6/19/2020 4:18 PM                 Assessment Completed by: Miriam Jones    Patient Name: Ronald Aldana     YOB: 1932  Diagnosis: CHF (congestive heart failure), NYHA class I, acute on chronic, combined ([facility] [address] [I50.43]  CHF (congestive heart failure), NYHA class I, acute on chronic, combined ([facility] [address] [I50.43]     Date / Time: 6/19/2020  5:21 AM    Patient Admission Status: Inpatient    If patient is discharged prior to next notation, then this note serves as note for discharge by case management. Current PCP: Naif Leigh MD  Clinic Patient: No    Chart Reviewed: Yes  Patient/ Family Interviewed: Yes    Initial assessment completed at bedside with: pt     Hospitalization in the last 30 days: No    Emergency Contacts:  Extended Emergency Contact Information  Primary Emergency Contact: [facility]  Address: [address] [place] [address] Phone: [phone]  Relation: Spouse  Secondary Emergency Contact: Mando Rincon [facility]. Phone: [phone]  Work Phone: [phone]  Relation: Child    Advance Directives:   Code Status: Full [address]: No  Agent: NA  Contact Number: NA    Copy present: No     In paper Chart: No    Scanned into EMR No    Financial  Payor: MEDICARE / Plan: MEDICARE PART A AND B / Product Type: *No Product type* /     Pre-cert required for SNF: No    Pharmacy    [address] [place] [id number]  Phone: [phone] Fax: [phone]      Potential assistance Purchasing Medications: Potential Assistance Purchasing Medications: No  Does Patient want to participate in local refill/ meds to beds program?: No    Meds To Beds General Rules:  1.  Can ONLY be done Monday- Friday between 8: 30am-5pm  2. Prescription(s) must be in pharmacy by 3pm to be filled same day  3. Copy of patient's insurance/ prescription drug card and patient face sheet must be sent along with the prescription(s)  4. Cost of Rx cannot be added to hospital bill. If financial assistance is needed, please contact unit  or ;  or  CANNOT provide pharmacy voucher for patients co-pays  5.  Patients can then  the prescription on their way out of the hospital at discharge, or pharmacy can deliver to the bedside if staff is available. (payment due at time of pick-up or delivery - cash, check, or card accepted)     Able to afford home medications/ co-pay costs: Yes    ADLS  Support Systems: Children, Family Members, Spouse/Significant Other, Home Care Staff    PT AM-PAC:   /24  OT AM-PAC:   /24    [place]: from home w/ spouse   Steps: NA  Lives With: Spouse  Type of Home: Apartment  Home Layout: One level  Home Access: Elevator, Level entry  Bathroom Shower/Tub: Tub/Shower unit  Bathroom Toilet: Standard(sink nearby for leverage)  Bathroom Equipment: Shower chair, Grab bars in shower, Hand-held shower  Home Equipment: Cane(pt's wife has RW and 4WW and always uses one or the other)  ADL Assistance: Independent  Homemaking Assistance: Needs assistance(aide 2 hours biweekly for cleaning, pt and wife share cooking/laundry)  Ambulation Assistance: Independent  Transfer Assistance: Independent  Active : Yes    Plans to RETURN to current housing: Yes  Barriers to RETURNING to current housing: [address]  Currently ACTIVE with [address]: No  Home Care Agency: [address]  Phone: [phone]  Fax: [phone]  Was  Active  For his wife      Currently ACTIVE with [place] on Aging: No  Passport/ Waiver: No  Passport/ Waiver Services: Not Applicable    [facility] Provider: MAXINE  Equipment: walker    Home Oxygen and Respiratory Equipment  Has HOME OXYGEN prior to admission: No  Oxygen Company: Not Applicable  Other Respiratory Equipment: NA    Dialysis  Active with HD/PD prior to admission: No  Nephrologist: NA    HD Center:  Not Applicable    DISCHARGE PLAN:  Disposition: [address]: [facility] previosly active  with his wife      Transportation PLAN for discharge: family     Factors facilitating achievement of predicted outcomes: Family support, Motivated, Cooperative and Pleasant    Barriers to discharge: medical clearance    Additional Case Management Notes: Cm following for  Discharge  Planning :  Pt From home w/ spouse  But currently his wife was on [facility] , and she was  Just  D/c today from our ARU to [facility]. CM  will cont to follow pt  For d/c needs ,  HF  RN  Consulted. The Plan for Transition of Care is related to the following treatment goals of CHF (congestive heart failure), NYHA class I, acute on chronic, combined (HCC) [I50.43]  CHF (congestive heart failure), NYHA class I, acute on chronic, combined ([address] [I50.43]    The Patient and/or patient representative Armand Hameed and his family were provided with a choice of provider and agrees with the discharge plan Yes    Freedom of choice list was provided with basic dialogue that supports the patient's individualized plan of care/goals and shares the quality data associated with the providers. Yes    Care Transition patient:  Yes    José Mcfadden RN  [facility]  Case Management Department  Ph: [phone]

## 2020-06-20 LAB
ALBUMIN SERPL-MCNC: 4.1 G/DL (ref 3.4–5)
ALP BLD-CCNC: 103 U/L (ref 40–129)
ALT SERPL-CCNC: 29 U/L (ref 10–40)
ANION GAP SERPL CALCULATED.3IONS-SCNC: 11 MMOL/L (ref 3–16)
AST SERPL-CCNC: 23 U/L (ref 15–37)
BASOPHILS ABSOLUTE: 0 K/UL (ref 0–0.2)
BASOPHILS RELATIVE PERCENT: 0.8 %
BILIRUB SERPL-MCNC: 1 MG/DL (ref 0–1)
BILIRUBIN DIRECT: 0.3 MG/DL (ref 0–0.3)
BILIRUBIN, INDIRECT: 0.7 MG/DL (ref 0–1)
BUN BLDV-MCNC: 33 MG/DL (ref 7–20)
CALCIUM SERPL-MCNC: 9.2 MG/DL (ref 8.3–10.6)
CHLORIDE BLD-SCNC: 99 MMOL/L (ref 99–110)
CO2: 29 MMOL/L (ref 21–32)
CREAT SERPL-MCNC: 1.5 MG/DL (ref 0.8–1.3)
EOSINOPHILS ABSOLUTE: 0.3 K/UL (ref 0–0.6)
EOSINOPHILS RELATIVE PERCENT: 6.2 %
GFR AFRICAN AMERICAN: 53
GFR NON-AFRICAN AMERICAN: 44
GLUCOSE BLD-MCNC: 129 MG/DL (ref 70–99)
HCT VFR BLD CALC: 38.7 % (ref 40.5–52.5)
HEMOGLOBIN: 13.1 G/DL (ref 13.5–17.5)
INR BLD: 1.5 (ref 0.86–1.14)
LYMPHOCYTES ABSOLUTE: 1.5 K/UL (ref 1–5.1)
LYMPHOCYTES RELATIVE PERCENT: 30.3 %
MCH RBC QN AUTO: 33.3 PG (ref 26–34)
MCHC RBC AUTO-ENTMCNC: 33.9 G/DL (ref 31–36)
MCV RBC AUTO: 98.5 FL (ref 80–100)
MONOCYTES ABSOLUTE: 0.8 K/UL (ref 0–1.3)
MONOCYTES RELATIVE PERCENT: 15.3 %
NEUTROPHILS ABSOLUTE: 2.4 K/UL (ref 1.7–7.7)
NEUTROPHILS RELATIVE PERCENT: 47.4 %
PDW BLD-RTO: 13.9 % (ref 12.4–15.4)
PLATELET # BLD: 139 K/UL (ref 135–450)
PMV BLD AUTO: 7.4 FL (ref 5–10.5)
POTASSIUM SERPL-SCNC: 3.3 MMOL/L (ref 3.5–5.1)
PROTHROMBIN TIME: 17.5 SEC (ref 10–13.2)
RBC # BLD: 3.93 M/UL (ref 4.2–5.9)
SODIUM BLD-SCNC: 139 MMOL/L (ref 136–145)
TOTAL PROTEIN: 7.4 G/DL (ref 6.4–8.2)
WBC # BLD: 5.1 K/UL (ref 4–11)

## 2020-06-20 PROCEDURE — 85610 PROTHROMBIN TIME: CPT

## 2020-06-20 PROCEDURE — 80048 BASIC METABOLIC PNL TOTAL CA: CPT

## 2020-06-20 PROCEDURE — 36415 COLL VENOUS BLD VENIPUNCTURE: CPT

## 2020-06-20 PROCEDURE — 80076 HEPATIC FUNCTION PANEL: CPT

## 2020-06-20 PROCEDURE — 2580000003 HC RX 258: Performed by: INTERNAL MEDICINE

## 2020-06-20 PROCEDURE — 85025 COMPLETE CBC W/AUTO DIFF WBC: CPT

## 2020-06-20 PROCEDURE — 1200000000 HC SEMI PRIVATE

## 2020-06-20 PROCEDURE — 6370000000 HC RX 637 (ALT 250 FOR IP): Performed by: INTERNAL MEDICINE

## 2020-06-20 PROCEDURE — 99233 SBSQ HOSP IP/OBS HIGH 50: CPT | Performed by: NURSE PRACTITIONER

## 2020-06-20 PROCEDURE — 6370000000 HC RX 637 (ALT 250 FOR IP): Performed by: NURSE PRACTITIONER

## 2020-06-20 PROCEDURE — 6360000002 HC RX W HCPCS: Performed by: INTERNAL MEDICINE

## 2020-06-20 RX ORDER — LISINOPRIL 20 MG/1
20 TABLET ORAL 2 TIMES DAILY
Status: DISCONTINUED | OUTPATIENT
Start: 2020-06-20 | End: 2020-06-22 | Stop reason: HOSPADM

## 2020-06-20 RX ADMIN — ASPIRIN 325 MG: 325 TABLET, COATED ORAL at 08:33

## 2020-06-20 RX ADMIN — FUROSEMIDE 60 MG: 10 INJECTION, SOLUTION INTRAMUSCULAR; INTRAVENOUS at 08:33

## 2020-06-20 RX ADMIN — DOCUSATE SODIUM 100 MG: 100 CAPSULE, LIQUID FILLED ORAL at 08:33

## 2020-06-20 RX ADMIN — FUROSEMIDE 60 MG: 10 INJECTION, SOLUTION INTRAMUSCULAR; INTRAVENOUS at 18:05

## 2020-06-20 RX ADMIN — Medication 10 ML: at 21:30

## 2020-06-20 RX ADMIN — FAMOTIDINE 20 MG: 20 TABLET ORAL at 08:33

## 2020-06-20 RX ADMIN — LISINOPRIL 10 MG: 10 TABLET ORAL at 08:33

## 2020-06-20 RX ADMIN — TAMSULOSIN HYDROCHLORIDE 0.4 MG: 0.4 CAPSULE ORAL at 08:33

## 2020-06-20 RX ADMIN — ENOXAPARIN SODIUM 40 MG: 40 INJECTION SUBCUTANEOUS at 13:55

## 2020-06-20 RX ADMIN — Medication 10 ML: at 08:33

## 2020-06-20 RX ADMIN — DOCUSATE SODIUM 100 MG: 100 CAPSULE, LIQUID FILLED ORAL at 20:09

## 2020-06-20 RX ADMIN — ATORVASTATIN CALCIUM 80 MG: 80 TABLET, FILM COATED ORAL at 08:33

## 2020-06-20 RX ADMIN — LISINOPRIL 20 MG: 20 TABLET ORAL at 20:09

## 2020-06-20 ASSESSMENT — PAIN SCALES - GENERAL
PAINLEVEL_OUTOF10: 0

## 2020-06-20 NOTE — PLAN OF CARE
Problem: Falls - Risk of:  Goal: Will remain free from falls  Description: Will remain free from falls  Outcome: Ongoing  Remains free from falls.

## 2020-06-20 NOTE — PROGRESS NOTES
Pt on RA resp easy. Denies any chest pain or SOB. S-arrhythmia, HR 30's to 60's. BP stable, afebrile. Pt ambulating in room and hallway, gait steady. Voices no complaints. Will continue to monitor.

## 2020-06-20 NOTE — PROGRESS NOTES
Hospitalist Progress Note      PCP: Laura Crow MD    Date of Admission: 6/19/2020    Chief Complaint: CHF    Hospital Course:   Patient feels well  No chest pain shortness of breath  No abdominal pain nausea vomiting  It of 2.3 L in the last 24 hours  No acute events on telemetry      Medications:  Reviewed      Exam:    BP (!) 176/84   Pulse 58   Temp 98 °F (36.7 °C) (Oral)   Resp 18   Ht 5' 6\" (1.676 m)   Wt 197 lb 3.2 oz (89.4 kg)   SpO2 95%   BMI 31.83 kg/m²     General appearance: No apparent distress, appears stated age and cooperative. HEENT: Pupils equal, round, and reactive to light. Conjunctivae/corneas clear. Neck: Supple, with full range of motion. No jugular venous distention. Trachea midline. Respiratory:  Normal respiratory effort. Clear to auscultation, bilaterally without RALES/WHEEZES/Rhonchi. Cardiovascular: Ejection systolic murmur grade 3/6  Abdomen: Soft, non-tender, non-distended with normal bowel sounds. Musculoskeletal: No clubbing, cyanosis or EDEMA bilaterally. Full range of motion without deformity. Skin: Skin color, texture, turgor normal.  No rashes or lesions. Neurologic:  Neurovascularly intact without any focal sensory/motor deficits.  Cranial nerves: II-XII intact, grossly non-focal.        Labs:   Recent Labs     06/19/20  0552 06/20/20  0607   WBC 5.3 5.1   HGB 11.9* 13.1*   HCT 34.8* 38.7*    139     Recent Labs     06/19/20  0552      K 3.6      CO2 25   BUN 30*   CREATININE 1.3   CALCIUM 9.3   PHOS 3.4     Recent Labs     06/20/20  0607   AST 23   ALT 29   BILIDIR 0.3   BILITOT 1.0   ALKPHOS 103     Recent Labs     06/20/20  0607   INR 1.50*     Recent Labs     06/19/20  0552   TROPONINI 0.02*       Urinalysis:      Lab Results   Component Value Date    NITRU Negative 03/28/2019    WBCUA 0-2 05/14/2014    BACTERIA Rare 05/14/2014    RBCUA 0-2 05/14/2014    BLOODU Negative 03/28/2019    SPECGRAV 1.010 03/28/2019    GLUCOSEU Negative 03/28/2019       Radiology:  XR CHEST STANDARD (2 VW)   Final Result       Cardiomegaly. Mild vascular congestion. Trace pleural effusions. Bibasilar atelectasis.           Assessment/Plan:    Active Hospital Problems    Diagnosis Date Noted    Mixed hyperlipidemia [E78.2]     CHF (congestive heart failure), NYHA class I, acute on chronic, combined ([facility] [address] [I50.43] 06/19/2020    Ischemic cardiomyopathy [I25.5] 10/04/2019    Acute on chronic systolic [facility] [I50.23] 04/04/2019       Acute Medical Issues Being Addressed:    59-year-old gentleman admitted to the hospital with dyspnea    Acute on chronic severe systolic heart failure with moderate aortic stenosis  Continue IV diuretics  Await basic metabolic profile  Strict intake and output  Seen by cardiology    Chronic kidney disease stage III  We will continue to monitor creatinine    Coronary artery disease status post PTCA  Continue aspirin statin  Hypertension uncontrolled  Lisinopril increased to 20 twice daily        DVT Prophylaxis: sc lovenox   Diet: DIET CARDIAC;  Code Status: Full Code      Dispo - once acute medical processes have resolved    Chepe Belcher MD

## 2020-06-20 NOTE — PROGRESS NOTES
CC: CHF  HPI: 80 y.o. with SOB and edema     S: Ambulating through halls. No chest pain or sob. States edema is improving. Tele: SR PVC    O:  Physical Exam:  BP (!) 176/84   Pulse 58   Temp 98 °F (36.7 °C) (Oral)   Resp 18   Ht 5' 6\" (1.676 m)   Wt 197 lb 3.2 oz (89.4 kg)   SpO2 95%   BMI 31.83 kg/m²    General (appearance):  No acute distress  Eyes: anicteric   Neck: soft, No JVD  Ears/Nose/Mouth/Thorat: No cyanosis  CV: RRR 2-3/6 SIMON   Respiratory:  Clear, normal effort  GI: soft, non-tender, non-distended  Skin: Warm, dry. No rashes  Neuro/Psych: Alert and oriented x 3. Appropriate behavior  Ext:  No c/c. 1+ BLE edema  Pulses:  2+ radial     I.O's= -2.3 L     Weight  Admission: Weight: 200 lb 7 oz (90.9 kg)   Today: Weight: 197 lb 3.2 oz (89.4 kg)    CBC:   Recent Labs     20  0552 20  0607   WBC 5.3 5.1   HGB 11.9* 13.1*   HCT 34.8* 38.7*   MCV 98.7 98.5    139     BMP:   Recent Labs     20  0552      K 3.6      CO2 25   PHOS 3.4   BUN 30*   CREATININE 1.3     Mag:   Lab Results   Component Value Date    MG 2.20 2020     LIVER PROFILE:   Recent Labs     20  0607   AST 23   ALT 29   BILIDIR 0.3   BILITOT 1.0   ALKPHOS 103     PT/INR:   Recent Labs     20  0607   PROTIME 17.5*   INR 1.50*     Pro-BNP:   Lab Results   Component Value Date    PROBNP 6,159 2020    PROBNP 3,757 10/04/2019    PROBNP 7,763 05/10/2019     CARDIAC ENZYMES:   Recent Labs     20  0552   TROPONINI 0.02*     Imagin2020 CXR:     Cardiomegaly.  Mild vascular congestion.  Trace pleural effusions.     Bibasilar atelectasis. 10/2019 Echo:   Left ventricular cavity size is dilated. There is mild concentric left   ventricular hypertrophy. Overall left ventricular systolic function appears   severely reduced with an ejection fraction of 20-25%. There is severe   diffuse hypokinesis. Diastolic filling parameters suggest grade II diastolic   dysfunction. Mitral annular calcification is present. Mild mitral   regurgitation is present. The aortic valve is thickened/calcified. Moderate   aortic stenosis with a peak velocity of 3.17m/s and a mean pressure gradient   of 22mmHg. Mild to moderate aortic regurgitation. Mild to moderate tricuspid   regurgitation. Estimated pulmonary artery systolic pressure is at 63 mmHg   assuming a right atrial pressure of 8 mmHg. Mild pulmonic regurgitation   present. The left atrium is severely dilated. A bubble study was performed   and fails to show evidence of right to left shunting. Assessment:  80 y.o. with SOB and edema  Issues:  -Acute/chronic combined systolic and diastolic CHF  -Mild MR  -Moderate AS  -Mild/mod AI  -Mild/Mod TR  -HTN  -HLD  -CAD  -CKD    Plan:  -Keep K>4, Mg>2.   -ASA  -Statin  -Coreg, will not increase b/c HR 50-60  -Increase Lisinopril to 20 mg po bid  -Cont IV diuretics

## 2020-06-21 LAB
ANION GAP SERPL CALCULATED.3IONS-SCNC: 12 MMOL/L (ref 3–16)
BASOPHILS ABSOLUTE: 0 K/UL (ref 0–0.2)
BASOPHILS RELATIVE PERCENT: 0.6 %
BUN BLDV-MCNC: 39 MG/DL (ref 7–20)
CALCIUM SERPL-MCNC: 9.3 MG/DL (ref 8.3–10.6)
CHLORIDE BLD-SCNC: 102 MMOL/L (ref 99–110)
CO2: 28 MMOL/L (ref 21–32)
CREAT SERPL-MCNC: 1.4 MG/DL (ref 0.8–1.3)
EOSINOPHILS ABSOLUTE: 0.3 K/UL (ref 0–0.6)
EOSINOPHILS RELATIVE PERCENT: 6.7 %
GFR AFRICAN AMERICAN: 58
GFR NON-AFRICAN AMERICAN: 48
GLUCOSE BLD-MCNC: 125 MG/DL (ref 70–99)
HCT VFR BLD CALC: 37.9 % (ref 40.5–52.5)
HEMOGLOBIN: 13 G/DL (ref 13.5–17.5)
LYMPHOCYTES ABSOLUTE: 1.4 K/UL (ref 1–5.1)
LYMPHOCYTES RELATIVE PERCENT: 27.5 %
MAGNESIUM: 2.3 MG/DL (ref 1.8–2.4)
MCH RBC QN AUTO: 33.7 PG (ref 26–34)
MCHC RBC AUTO-ENTMCNC: 34.3 G/DL (ref 31–36)
MCV RBC AUTO: 98.5 FL (ref 80–100)
MONOCYTES ABSOLUTE: 0.8 K/UL (ref 0–1.3)
MONOCYTES RELATIVE PERCENT: 16.1 %
NEUTROPHILS ABSOLUTE: 2.5 K/UL (ref 1.7–7.7)
NEUTROPHILS RELATIVE PERCENT: 49.1 %
PDW BLD-RTO: 13.5 % (ref 12.4–15.4)
PLATELET # BLD: 134 K/UL (ref 135–450)
PMV BLD AUTO: 7.8 FL (ref 5–10.5)
POTASSIUM SERPL-SCNC: 3.4 MMOL/L (ref 3.5–5.1)
RBC # BLD: 3.84 M/UL (ref 4.2–5.9)
SODIUM BLD-SCNC: 142 MMOL/L (ref 136–145)
WBC # BLD: 5.1 K/UL (ref 4–11)

## 2020-06-21 PROCEDURE — 6370000000 HC RX 637 (ALT 250 FOR IP): Performed by: NURSE PRACTITIONER

## 2020-06-21 PROCEDURE — 6370000000 HC RX 637 (ALT 250 FOR IP): Performed by: INTERNAL MEDICINE

## 2020-06-21 PROCEDURE — 99232 SBSQ HOSP IP/OBS MODERATE 35: CPT | Performed by: NURSE PRACTITIONER

## 2020-06-21 PROCEDURE — 85025 COMPLETE CBC W/AUTO DIFF WBC: CPT

## 2020-06-21 PROCEDURE — 2580000003 HC RX 258: Performed by: INTERNAL MEDICINE

## 2020-06-21 PROCEDURE — 36415 COLL VENOUS BLD VENIPUNCTURE: CPT

## 2020-06-21 PROCEDURE — 6360000002 HC RX W HCPCS: Performed by: INTERNAL MEDICINE

## 2020-06-21 PROCEDURE — 1200000000 HC SEMI PRIVATE

## 2020-06-21 PROCEDURE — 80048 BASIC METABOLIC PNL TOTAL CA: CPT

## 2020-06-21 PROCEDURE — 83735 ASSAY OF MAGNESIUM: CPT

## 2020-06-21 RX ORDER — UREA 10 %
3 LOTION (ML) TOPICAL NIGHTLY PRN
Status: DISCONTINUED | OUTPATIENT
Start: 2020-06-21 | End: 2020-06-22 | Stop reason: HOSPADM

## 2020-06-21 RX ORDER — CARVEDILOL 3.12 MG/1
3.12 TABLET ORAL 2 TIMES DAILY WITH MEALS
Status: DISCONTINUED | OUTPATIENT
Start: 2020-06-21 | End: 2020-06-22 | Stop reason: HOSPADM

## 2020-06-21 RX ORDER — POTASSIUM CHLORIDE 20 MEQ/1
40 TABLET, EXTENDED RELEASE ORAL ONCE
Status: COMPLETED | OUTPATIENT
Start: 2020-06-21 | End: 2020-06-21

## 2020-06-21 RX ADMIN — Medication 10 ML: at 20:16

## 2020-06-21 RX ADMIN — POTASSIUM CHLORIDE 40 MEQ: 20 TABLET, EXTENDED RELEASE ORAL at 15:24

## 2020-06-21 RX ADMIN — FAMOTIDINE 20 MG: 20 TABLET ORAL at 08:18

## 2020-06-21 RX ADMIN — DOCUSATE SODIUM 100 MG: 100 CAPSULE, LIQUID FILLED ORAL at 20:16

## 2020-06-21 RX ADMIN — FUROSEMIDE 60 MG: 10 INJECTION, SOLUTION INTRAMUSCULAR; INTRAVENOUS at 18:13

## 2020-06-21 RX ADMIN — ATORVASTATIN CALCIUM 80 MG: 80 TABLET, FILM COATED ORAL at 08:18

## 2020-06-21 RX ADMIN — LISINOPRIL 20 MG: 20 TABLET ORAL at 22:33

## 2020-06-21 RX ADMIN — CARVEDILOL 3.12 MG: 3.12 TABLET, FILM COATED ORAL at 18:13

## 2020-06-21 RX ADMIN — LISINOPRIL 20 MG: 20 TABLET ORAL at 08:18

## 2020-06-21 RX ADMIN — DOCUSATE SODIUM 100 MG: 100 CAPSULE, LIQUID FILLED ORAL at 08:18

## 2020-06-21 RX ADMIN — FUROSEMIDE 60 MG: 10 INJECTION, SOLUTION INTRAMUSCULAR; INTRAVENOUS at 08:18

## 2020-06-21 RX ADMIN — Medication 3 MG: at 22:32

## 2020-06-21 RX ADMIN — TAMSULOSIN HYDROCHLORIDE 0.4 MG: 0.4 CAPSULE ORAL at 08:18

## 2020-06-21 RX ADMIN — ENOXAPARIN SODIUM 40 MG: 40 INJECTION SUBCUTANEOUS at 15:25

## 2020-06-21 RX ADMIN — Medication 10 ML: at 08:19

## 2020-06-21 RX ADMIN — ASPIRIN 325 MG: 325 TABLET, COATED ORAL at 08:18

## 2020-06-21 ASSESSMENT — PAIN SCALES - GENERAL
PAINLEVEL_OUTOF10: 0

## 2020-06-21 NOTE — PROGRESS NOTES
Hospitalist Progress Note      PCP: Flex Stapleton MD    Date of Admission: 6/19/2020    Chief Complaint: CHF    Hospital Course:   Patient denies dyspnea or chest pain. His legs are still swollen. He worries about his wife being at the nursing home. Medications:  Reviewed      Exam:    BP (!) 112/58   Pulse 52   Temp 97.8 °F (36.6 °C) (Oral)   Resp 18   Ht 5' 6\" (1.676 m)   Wt 197 lb 3.2 oz (89.4 kg)   SpO2 96%   BMI 31.83 kg/m²     General appearance: No apparent distress, appears stated age and cooperative. Respiratory:  Normal respiratory effort. Clear to auscultation, bilaterally without RALES/WHEEZES/Rhonchi. Cardiovascular: Ejection systolic murmur grade 3/6  Abdomen: Soft, non-tender, non-distended with normal bowel sounds. Musculoskeletal: No clubbing, cyanosis 1+ pitting leg EDEMA bilaterally. Skin: Skin color, texture, turgor normal.    Neurologic:  Neurovascularly intact without any focal sensory/motor deficits. Cranial nerves: II-XII intact, grossly non-focal.        Labs:   Recent Labs     06/19/20  0552 06/20/20  0607 06/21/20  0555   WBC 5.3 5.1 5.1   HGB 11.9* 13.1* 13.0*   HCT 34.8* 38.7* 37.9*    139 134*     Recent Labs     06/19/20  0552 06/20/20  1338 06/21/20  0555    139 142   K 3.6 3.3* 3.4*    99 102   CO2 25 29 28   BUN 30* 33* 39*   CREATININE 1.3 1.5* 1.4*   CALCIUM 9.3 9.2 9.3   PHOS 3.4  --   --      Recent Labs     06/20/20  0607   AST 23   ALT 29   BILIDIR 0.3   BILITOT 1.0   ALKPHOS 103     Recent Labs     06/20/20  0607   INR 1.50*     Recent Labs     06/19/20  0552   TROPONINI 0.02*       Urinalysis:      Lab Results   Component Value Date    NITRU Negative 03/28/2019    WBCUA 0-2 05/14/2014    BACTERIA Rare 05/14/2014    RBCUA 0-2 05/14/2014    BLOODU Negative 03/28/2019    SPECGRAV 1.010 03/28/2019    GLUCOSEU Negative 03/28/2019       Radiology:  XR CHEST STANDARD (2 VW)   Final Result       Cardiomegaly.   Mild vascular congestion. Trace pleural effusions. Bibasilar atelectasis.           Assessment/Plan:    Active Hospital Problems    Diagnosis Date Noted    Mixed hyperlipidemia [E78.2]     CHF (congestive heart failure), NYHA class I, acute on chronic, combined ([facility] [address] [I50.43] 06/19/2020    Ischemic cardiomyopathy [I25.5] 10/04/2019    Acute on chronic systolic congestive heart failure ([facility] [address] [I50.23] 04/04/2019       Acute Medical Issues Being Addressed:    26-year-old gentleman admitted to the hospital with dyspnea    Acute on chronic severe systolic heart failure with moderate aortic stenosis  Continue IV diuretics  Creatinine is stable  Likely needs one more day of IV diuresis  Strict intake and output  Seen by cardiology    Chronic kidney disease stage III  We will continue to monitor creatinine    Coronary artery disease status post PTCA  Continue aspirin statin  Hypertension uncontrolled  Lisinopril increased to 20 twice daily        DVT Prophylaxis: sc lovenox   Diet: DIET CARDIAC;  Code Status: Full Code      Dispo - once acute medical processes have resolved    Jamil Mcgregor MD

## 2020-06-21 NOTE — PROGRESS NOTES
CC: CHF  HPI: 80 y.o. with SOB and edema     S: Edema continues to improve. No chest pain, sob, or orthopnea. Tele: SR PVC    O:  Physical Exam:  BP (!) 151/81   Pulse 59   Temp 97.4 °F (36.3 °C) (Oral)   Resp 18   Ht 5' 6\" (1.676 m)   Wt 197 lb 3.2 oz (89.4 kg)   SpO2 96%   BMI 31.83 kg/m²    General (appearance):  No acute distress  Eyes: anicteric   Neck: soft, No JVD  Ears/Nose/Mouth/Thorat: No cyanosis  CV: RRR 2-3/6 SIMON   Respiratory:  Clear, normal effort  GI: soft, non-tender, non-distended  Skin: Warm, dry. No rashes  Neuro/Psych: Alert and oriented x 3. Appropriate behavior  Ext:  No c/c. 1+ BLE edema  Pulses:  2+ radial     I.O's= -3.1 L     Weight  Admission: Weight: 200 lb 7 oz (90.9 kg)   Today: Weight: 197 lb 3.2 oz (89.4 kg)    CBC:   Recent Labs     20  0552 20  0607 20  0555   WBC 5.3 5.1 5.1   HGB 11.9* 13.1* 13.0*   HCT 34.8* 38.7* 37.9*   MCV 98.7 98.5 98.5    139 134*     BMP:   Recent Labs     20  0552 20  1338 20  0555    139 142   K 3.6 3.3* 3.4*    99 102   CO2 25 29 28   PHOS 3.4  --   --    BUN 30* 33* 39*   CREATININE 1.3 1.5* 1.4*     Mag:   Lab Results   Component Value Date    MG 2.30 2020     LIVER PROFILE:   Recent Labs     20  0607   AST 23   ALT 29   BILIDIR 0.3   BILITOT 1.0   ALKPHOS 103     PT/INR:   Recent Labs     20  0607   PROTIME 17.5*   INR 1.50*     Pro-BNP:   Lab Results   Component Value Date    PROBNP 6,159 2020    PROBNP 3,757 10/04/2019    PROBNP 7,763 05/10/2019     CARDIAC ENZYMES:   Recent Labs     20  0552   TROPONINI 0.02*     Imagin2020 CXR:     Cardiomegaly.  Mild vascular congestion.  Trace pleural effusions.     Bibasilar atelectasis. 10/2019 Echo:   Left ventricular cavity size is dilated. There is mild concentric left   ventricular hypertrophy.  Overall left ventricular systolic function appears   severely reduced with an ejection fraction of

## 2020-06-21 NOTE — PROGRESS NOTES
Pt ad jenifer on unit. Denies any chest pain or SOB. Vital signs stable, afebrile. S-azra on telemetry, HR in 50's and 60's. Will continue to montor.

## 2020-06-22 ENCOUNTER — APPOINTMENT (OUTPATIENT)
Dept: VASCULAR LAB | Age: 85
DRG: 291 | End: 2020-06-22
Payer: MEDICARE

## 2020-06-22 VITALS
HEART RATE: 59 BPM | BODY MASS INDEX: 31.5 KG/M2 | WEIGHT: 196 LBS | TEMPERATURE: 97.6 F | RESPIRATION RATE: 18 BRPM | OXYGEN SATURATION: 97 % | HEIGHT: 66 IN | DIASTOLIC BLOOD PRESSURE: 70 MMHG | SYSTOLIC BLOOD PRESSURE: 127 MMHG

## 2020-06-22 LAB
ALBUMIN SERPL-MCNC: 4 G/DL (ref 3.4–5)
ANION GAP SERPL CALCULATED.3IONS-SCNC: 11 MMOL/L (ref 3–16)
BUN BLDV-MCNC: 42 MG/DL (ref 7–20)
CALCIUM SERPL-MCNC: 9 MG/DL (ref 8.3–10.6)
CHLORIDE BLD-SCNC: 103 MMOL/L (ref 99–110)
CO2: 27 MMOL/L (ref 21–32)
CREAT SERPL-MCNC: 1.6 MG/DL (ref 0.8–1.3)
GFR AFRICAN AMERICAN: 50
GFR NON-AFRICAN AMERICAN: 41
GLUCOSE BLD-MCNC: 120 MG/DL (ref 70–99)
PHOSPHORUS: 3.9 MG/DL (ref 2.5–4.9)
POTASSIUM SERPL-SCNC: 3.5 MMOL/L (ref 3.5–5.1)
SODIUM BLD-SCNC: 141 MMOL/L (ref 136–145)

## 2020-06-22 PROCEDURE — 99232 SBSQ HOSP IP/OBS MODERATE 35: CPT | Performed by: NURSE PRACTITIONER

## 2020-06-22 PROCEDURE — 6360000002 HC RX W HCPCS: Performed by: INTERNAL MEDICINE

## 2020-06-22 PROCEDURE — 97161 PT EVAL LOW COMPLEX 20 MIN: CPT

## 2020-06-22 PROCEDURE — 6370000000 HC RX 637 (ALT 250 FOR IP): Performed by: NURSE PRACTITIONER

## 2020-06-22 PROCEDURE — 6370000000 HC RX 637 (ALT 250 FOR IP): Performed by: INTERNAL MEDICINE

## 2020-06-22 PROCEDURE — 36415 COLL VENOUS BLD VENIPUNCTURE: CPT

## 2020-06-22 PROCEDURE — 93970 EXTREMITY STUDY: CPT

## 2020-06-22 PROCEDURE — 80069 RENAL FUNCTION PANEL: CPT

## 2020-06-22 PROCEDURE — 2580000003 HC RX 258: Performed by: INTERNAL MEDICINE

## 2020-06-22 PROCEDURE — 97166 OT EVAL MOD COMPLEX 45 MIN: CPT

## 2020-06-22 RX ORDER — POTASSIUM CHLORIDE 20 MEQ/1
20 TABLET, EXTENDED RELEASE ORAL ONCE
Status: COMPLETED | OUTPATIENT
Start: 2020-06-22 | End: 2020-06-22

## 2020-06-22 RX ORDER — LANOLIN ALCOHOL/MO/W.PET/CERES
3 CREAM (GRAM) TOPICAL NIGHTLY PRN
Qty: 30 TABLET | Refills: 0 | Status: SHIPPED | OUTPATIENT
Start: 2020-06-22

## 2020-06-22 RX ORDER — FUROSEMIDE 40 MG/1
TABLET ORAL
Qty: 60 TABLET | Refills: 2 | Status: ON HOLD | OUTPATIENT
Start: 2020-06-22 | End: 2020-11-09 | Stop reason: HOSPADM

## 2020-06-22 RX ORDER — LISINOPRIL 20 MG/1
20 TABLET ORAL 2 TIMES DAILY
Qty: 60 TABLET | Refills: 2 | Status: SHIPPED | OUTPATIENT
Start: 2020-06-22 | End: 2020-07-17

## 2020-06-22 RX ADMIN — Medication 10 ML: at 09:30

## 2020-06-22 RX ADMIN — TAMSULOSIN HYDROCHLORIDE 0.4 MG: 0.4 CAPSULE ORAL at 09:30

## 2020-06-22 RX ADMIN — DOCUSATE SODIUM 100 MG: 100 CAPSULE, LIQUID FILLED ORAL at 09:30

## 2020-06-22 RX ADMIN — LISINOPRIL 20 MG: 20 TABLET ORAL at 09:30

## 2020-06-22 RX ADMIN — POTASSIUM CHLORIDE 20 MEQ: 20 TABLET, EXTENDED RELEASE ORAL at 09:30

## 2020-06-22 RX ADMIN — ENOXAPARIN SODIUM 40 MG: 40 INJECTION SUBCUTANEOUS at 12:32

## 2020-06-22 RX ADMIN — FUROSEMIDE 60 MG: 10 INJECTION, SOLUTION INTRAMUSCULAR; INTRAVENOUS at 09:30

## 2020-06-22 RX ADMIN — ASPIRIN 325 MG: 325 TABLET, COATED ORAL at 09:30

## 2020-06-22 RX ADMIN — FAMOTIDINE 20 MG: 20 TABLET ORAL at 09:30

## 2020-06-22 ASSESSMENT — PAIN SCALES - GENERAL
PAINLEVEL_OUTOF10: 0

## 2020-06-22 NOTE — PROGRESS NOTES
Pt denies any chest pain or SOB. Vital signs stable, afebrile. Medicated with Melatonin 3 mg po as ordered for sleep.

## 2020-06-22 NOTE — DISCHARGE INSTR - COC
Continuity of Care Form    Patient Name: Madisyn Cowan   :  1932  MRN:  [id number]    Admit date:  2020  Discharge date:  ***    Code Status Order: Full Code   Advance Directives:   Advance Care Flowsheet Documentation     Date/Time Healthcare Directive Type of Healthcare Directive Copy in [address] Agent's Name Healthcare Agent's Phone Number    [phone]  No, patient does not have an advance directive for healthcare treatment -- -- -- -- --          Admitting Physician:  No admitting provider for patient encounter. PCP: Chika Maldonado MD    Discharging Nurse: [facility] Unit/Room#: 1879/4211-73  Discharging Unit Phone Number: ***    Emergency Contact:   Extended Emergency Contact Information  Primary Emergency Contact: [facility]  Address: [address] [place] [address] Phone: [phone]  Relation: Spouse  Secondary Emergency Contact: Mando Rincon [facility]. Phone: [phone]  Work Phone: [phone]  Relation: Child    Past Surgical History:  Past Surgical History:   Procedure Laterality Date    CORONARY ANGIOPLASTY WITH STENT PLACEMENT  98    EYE SURGERY      SKIN TAG REMOVAL  2/2/10    TURP         Immunization History: There is no immunization history on file for this patient.     Active Problems:  Patient Active Problem List   Diagnosis Code    CAD (coronary artery disease) I25.10    Hypertension I10    Hypercholesterolemia E78.00    Arthritis M19.90    Prostate enlargement N40.0    DDD (degenerative disc disease), lumbar M51.36    Constipation K59.00    Moderate aortic stenosis I35.0    Precordial pain R07.2    Chest pain R07.9    Coronary artery disease involving native heart I25.10    Abnormal stress test R94.39    S/P coronary angiogram Z98.890    Weakness of both lower extremities R29.898    CKD (chronic kidney disease) stage 3, GFR 30-59 ml/min (HCC) N18.3    Acute on chronic systolic congestive heart failure ([facility]) I50.23    Hypertensive emergency I16.1    Acute on chronic combined systolic and diastolic congestive heart failure ([facility]) I50.43    Respiratory distress R06.03    Dyspnea on exertion R06.09    Fluid overload E87.70    Bradycardia R00.1    Mass of left hand R22.32    Ischemic cardiomyopathy I25.5    Acute bilateral low back pain with left-sided sciatica M54.42    Abdominal aortic aneurysm (AAA) without rupture ([facility]) I71.4    TIA (transient ischemic attack) G45.9    CHF (congestive heart failure), NYHA class I, acute on chronic, combined ([facility]) I50.43    Mixed hyperlipidemia E78.2       Isolation/Infection:   Isolation          No Isolation        Patient Infection Status     None to display          Nurse Assessment:  Last Vital Signs: /70   Pulse 59   Temp 97.6 °F (36.4 °C) (Oral)   Resp 18   Ht 5' 6\" (1.676 m)   Wt 196 lb (88.9 kg)   SpO2 97%   BMI 31.64 kg/m²     Last documented pain score (0-10 scale): Pain Level: 0  Last Weight:   Wt Readings from Last 1 Encounters:   06/22/20 196 lb (88.9 kg)     Mental Status:  {IP PT MENTAL STATUS:41697}    IV Access:  { SUZY IV ACCESS:880146395}    Nursing Mobility/ADLs:  Walking   {CHP DME HHST:695349597}  Transfer  {P DME XSRN:203194456}  Bathing  {P DME TQTE:067828351}  Dressing  {P DME EAXA:987567173}  Toileting  {P DME KDWU:539766724}  Feeding  {P DME NFCZ:223255609}  Med Admin  {P DME PSWW:268586141}  Med Delivery   { SUZY MED Delivery:394159822}    Wound Care Documentation and Therapy:        Elimination:  Continence:   · Bowel: {YES / LS:22553}  · Bladder: {YES / SR:33019}  Urinary Catheter: {Urinary Catheter:475612303}   Colostomy/Ileostomy/Ileal Conduit: {YES / SO:55593}       Date of Last BM: ***    Intake/Output Summary (Last 24 hours) at 6/22/2020 1554  Last data filed at 6/21/2020 2232  Gross per 24 hour   Intake 120 ml   Output 300 ml Net -180 ml     I/O last 3 completed shifts:   In: 120 [P.O.:120]  Out: 300 [Urine:300]    Safety Concerns:     [address] [facility] Safety Concerns:691151188}    Impairments/Disabilities:      [facility] Impairments/Disabilities:668492761}    Nutrition Therapy:  Current Nutrition Therapy:   [facility] Diet List:200890209}    Routes of Feeding: {CHP DME Other Feedings:235054702}  Liquids: {Slp liquid thickness:10306}  Daily Fluid Restriction: {CHP DME Yes amt example:343836345}  Last Modified Barium Swallow with Video (Video Swallowing Test): {Done Not Done VWJH:019404612}    Treatments at the Time of Hospital Discharge:   Respiratory Treatments: ***  Oxygen Therapy:  {Therapy; copd oxygen:12190}  Ventilator:    { CC Vent RRNO:504247186}    Rehab Therapies: {THERAPEUTIC INTERVENTION:9835016702}  Weight Bearing Status/Restrictions: [address] [place] [address] Weight Bearin}  Other Medical Equipment (for information only, NOT a DME order):  {EQUIPMENT:931034682}  Other Treatments: ***    Patient's personal belongings (please select all that are sent with patient):  {[facility] DME Belongings:821298467}    RN SIGNATURE:  {Esignature:181751572}    CASE MANAGEMENT/SOCIAL WORK SECTION    Inpatient Status Date: ***    Readmission Risk Assessment Score:  Readmission Risk              Risk of Unplanned Readmission:        25           Discharging to Facility/ Agency   · Name:   · Address:  · Phone:  · Fax:    Dialysis Facility (if applicable)   · Name:  · Address:  · Dialysis Schedule:  · Phone:  · Fax:    / signature: {Esignature:582112474}    PHYSICIAN SECTION    Prognosis: {Prognosis:7399874235}    Condition at Discharge: [address] Patient Condition:946530474}    Rehab Potential (if transferring to Rehab): {Prognosis:3408103438}    Recommended Labs or Other Treatments After Discharge: ***    Physician Certification: I certify the above information and transfer of Ma Clonts  is necessary for the continuing treatment of the diagnosis listed and that he requires {Admit to Appropriate Level of Care:13228} for {GREATER/LESS:487773804} 30 days.      Update Admission H&P: {CHP DME Changes in IMMYU:750965268}    PHYSICIAN SIGNATURE:  {Esignature:387835109}

## 2020-06-22 NOTE — PROGRESS NOTES
CC: CHF  HPI: 80 y.o. with SOB and edema     S: Edema continues to improve. Denies cp, sob, or orthopnea. Tele: SB PVC    O:  Physical Exam:  BP (!) 150/75   Pulse 59   Temp 97.2 °F (36.2 °C) (Oral)   Resp 18   Ht 5' 6\" (1.676 m)   Wt 196 lb (88.9 kg)   SpO2 99%   BMI 31.64 kg/m²    General (appearance):  No acute distress  Eyes: anicteric   Neck: soft, No JVD  Ears/Nose/Mouth/Thorat: No cyanosis  CV: RRR 2-3/6 SIMON   Respiratory:  Clear, normal effort  GI: soft, non-tender, non-distended  Skin: Warm, dry. No rashes  Neuro/Psych: Alert and oriented x 3. Appropriate behavior  Ext:  No c/c. 1+ BLE edema  Pulses:  2+ radial     I.O's= -3.3 L     Weight  Admission: Weight: 200 lb 7 oz (90.9 kg)   Today: Weight: 196 lb (88.9 kg)    CBC:   Recent Labs     20  0607 20  0555   WBC 5.1 5.1   HGB 13.1* 13.0*   HCT 38.7* 37.9*   MCV 98.5 98.5    134*     BMP:   Recent Labs     20  1338 20  0555 20  0611    142 141   K 3.3* 3.4* 3.5   CL 99 102 103   CO2 29 28 27   PHOS  --   --  3.9   BUN 33* 39* 42*   CREATININE 1.5* 1.4* 1.6*     Mag:   Lab Results   Component Value Date    MG 2.30 2020     LIVER PROFILE:   Recent Labs     20  0607   AST 23   ALT 29   BILIDIR 0.3   BILITOT 1.0   ALKPHOS 103     PT/INR:   Recent Labs     20  0607   PROTIME 17.5*   INR 1.50*     Pro-BNP:   Lab Results   Component Value Date    PROBNP 6,159 2020    PROBNP 3,757 10/04/2019    PROBNP 7,763 05/10/2019     CARDIAC ENZYMES:   No results for input(s): CKTOTAL, CKMB, TROPONINI in the last 72 hours. Imagin2020 CXR:     Cardiomegaly.  Mild vascular congestion.  Trace pleural effusions.     Bibasilar atelectasis. 10/2019 Echo:   Left ventricular cavity size is dilated. There is mild concentric left   ventricular hypertrophy. Overall left ventricular systolic function appears   severely reduced with an ejection fraction of 20-25%.  There is severe   diffuse hypokinesis. Diastolic filling parameters suggest grade II diastolic   dysfunction. Mitral annular calcification is present. Mild mitral   regurgitation is present. The aortic valve is thickened/calcified. Moderate   aortic stenosis with a peak velocity of 3.17m/s and a mean pressure gradient   of 22mmHg. Mild to moderate aortic regurgitation. Mild to moderate tricuspid   regurgitation. Estimated pulmonary artery systolic pressure is at 63 mmHg   assuming a right atrial pressure of 8 mmHg. Mild pulmonic regurgitation   present. The left atrium is severely dilated. A bubble study was performed   and fails to show evidence of right to left shunting. Assessment:  80 y.o. with SOB and edema  Issues:  -Acute/chronic combined systolic and diastolic CHF  -Mild MR  -Moderate AS  -Mild/mod AI  -Mild/Mod TR  -HTN  -HLD  -CAD  -CKD    Plan:  -Keep K>4, Mg>2. -ASA  -Statin  -No BB d/t bradycardia   -Lisinopril to 20 mg po bid  -Change IV lasix to PO  -Cr back to baseline.  Will hopefully d/c today     Discussed plan of care with Dr Elva Pacheco  Follow up with me in 1 week

## 2020-06-22 NOTE — CARE COORDINATION
Patient has been evaluated by PT/OT. Patient has no needs and will return home with daughter transporting at d/c when medically stable.     CRYSTAL Steele, LSW    [phone]  Electronically signed by CRYSTAL Machaod, LSW on 6/22/2020 at 2:02 PM

## 2020-06-22 NOTE — PROGRESS NOTES
Occupational Therapy   Occupational Therapy Initial Assessment/ Treatment/ Discharge  Date: 2020   Patient Name: Fabienne Langford  MRN: [id number]     : 1932    Date of Service: 2020    Discharge Recommendations:    Fabienne Langford scored a 23/24 on the -[facility] ADL Inpatient form. At this time, no further OT is recommended upon discharge due to pt back to his baseline. Recommend patient returns to prior setting with prior services. Assessment   Performance deficits / Impairments: Decreased endurance;Decreased high-level IADLs  Assessment: Prior to admission pt was living at home with his wife, was independent with ADLs and IADLs. Pt now presents s/p CHF exacerbation, now however back to his baseline. Pt demonstrating independent for transfers and mobility. Pt demonstrates no acute OT services at this time, will sign off on acute OT. Treatment Diagnosis: decreased ADLs and transfers   Prognosis: Fair  Decision Making: Low Complexity  OT Education: OT Role;Plan of Care  Patient Education: verb understanding  REQUIRES OT FOLLOW UP: No  Activity Tolerance  Activity Tolerance: Patient Tolerated treatment well  Activity Tolerance: Pt reported no fatigue after mobility in room/ hallway  Safety Devices  Safety Devices in place: Yes  Type of devices: Call light within reach; Left in chair;Nurse notified(RN requested no alarm)           Patient Diagnosis(es): The encounter diagnosis was Acute on chronic systolic congestive heart failure ([address] has a past medical history of Arthritis, Ascending aortic aneurysm ([address] CAD (coronary artery disease), Chronic kidney disease, Constipation, DDD (degenerative disc disease), lumbar, Dental disease, Hearing loss, Hyperlipidemia, Hypertension, Mild aortic stenosis, Prostate enlargement, and TIA (transient ischemic attack). has a past surgical history that includes TURP; Coronary angioplasty with stent (98);  Skin tag removal (2/2/10); and eye surgery. Treatment Diagnosis: decreased ADLs and transfers       Restrictions    up as tolerated    Subjective   General  Chart Reviewed: Yes  Patient assessed for rehabilitation services?: Yes  Additional Pertinent Hx: Pt admitted to ED with c/o SOB. PMHx includes:  has a past medical history of Arthritis, Ascending aortic aneurysm ([address] CAD (coronary artery disease), Chronic kidney disease, Constipation, DDD (degenerative disc disease), lumbar, Dental disease, Hearing loss, Hyperlipidemia, Hypertension, Mild aortic stenosis, Prostate enlargement, and TIA (transient ischemic attack). Family / Caregiver Present: No  Diagnosis: CHF  Subjective  Subjective: Pt walking in hallway upon OT arrival, agreeable to OT eval and treat. Social/Functional History  Social/Functional History  Lives With: Spouse  Type of Home: Apartment(senior building)  Home Layout: One level  Home Access: Elevator, Level entry  Receives Help From: Family(daughter comes in sometimes to help with cooking as needed)  ADL Assistance: Independent  Homemaking Assistance: (staff for cleaning)  Ambulation Assistance: Independent  Transfer Assistance: Independent       Objective   Vision: Within Functional Limits  Hearing: Within functional limits    Orientation  Overall Orientation Status: (full assessment limited 2/2 language barrier)     Balance  Sitting Balance: Independent  Standing Balance: Independent  Standing Balance  Time: 5 mins  Activity: mobility in room, in hallway  Comment: without AE  Functional Mobility  Functional - Mobility Device: No device  Activity: Other(in hallway)  Assist Level: Independent  ADL  Feeding: Independent; Beverage management  LE Dressing: Supervision;Verbal cueing(seated EOB, verbal cueing for pt to attempt to don shoes vs asking OT to tie/ don)  Additional Comments: pt declined all other ADLs           Transfers  Sit to stand: Independent  Stand to sit:  Independent     Cognition  Overall Cognitive Status: WFL                 LUE AROM (degrees)  LUE AROM : WFL  Left Hand AROM (degrees)  Left Hand AROM: WFL  RUE AROM (degrees)  RUE AROM : WFL  Right Hand AROM (degrees)  Right Hand AROM: WFL                      Plan   Plan  Times per week: dc acute OT       Goals  Patient Goals   Patient goals : dc acute OT       Therapy Time   Individual Concurrent Group Co-treatment   Time In 0910         Time Out 0929         Minutes 19         Timed Code Treatment Minutes: 4 Minutes(+15 min eval)     Liz BUSTILLO  [address] [place] [address], OTR/L [id number]

## 2020-06-22 NOTE — PLAN OF CARE
Problem: Falls - Risk of:  Goal: Will remain free from falls  Description: Will remain free from falls  6/22/2020 1534 by Steven Flores RN  Outcome: Ongoing  Patient's gait is steady. Patient has been ambulating in the [address]. Therapy saw patient today. Patient asked to call out for any assistance needed and not to get up if he feels dizzy and weak.

## 2020-06-22 NOTE — PROGRESS NOTES
Physical Therapy    Facility/Department: [address]  Initial Assessment and treatment/discharge    NAME: Kayden Lopez  : 1932  MRN: [id number]    Date of Service: 2020    Discharge Recommendations:    Kayden Lopez scored a 23/24 on the AM-PAC short mobility form. At this time, no further PT is recommended upon discharge due to patient independent with mobility. Recommend patient returns to prior setting with prior services. PT Equipment Recommendations  Equipment Needed: No    Assessment   Assessment: Patient tolerated session well, transferring and ambulating in room and hallways as above with steady gait without an assistive device. Patient with mild gait deficits noted above which are likely baseline. Patient hopeful for d/c home today; no therapy needs upon discharge. Will discharge from acute care PT at this time as patient is independent with mobility. Treatment Diagnosis: impaired mobility associated with CHF  Prognosis: Good  Decision Making: Low Complexity  Patient Education: Educated on role of PT, safety with mobility, d/c recommendations; patient verbalized understanding. No Skilled PT: Independent with functional mobility   REQUIRES PT FOLLOW UP: No  Activity Tolerance  Activity Tolerance: Patient Tolerated treatment well       Patient Diagnosis(es): The encounter diagnosis was Acute on chronic systolic congestive heart failure ([address] has a past medical history of Arthritis, Ascending aortic aneurysm ([address] CAD (coronary artery disease), Chronic kidney disease, Constipation, DDD (degenerative disc disease), lumbar, Dental disease, Hearing loss, Hyperlipidemia, Hypertension, Mild aortic stenosis, Prostate enlargement, and TIA (transient ischemic attack). has a past surgical history that includes TURP; Coronary angioplasty with stent (98); Skin tag removal (2/2/10); and eye surgery.     Restrictions  Position Activity Restriction  Other position/activity restrictions: up as tolerated  Vision/Hearing  Vision: Within Functional Limits  Hearing: Within functional limits     Subjective  General  Chart Reviewed: Yes  Patient assessed for rehabilitation services?: Yes  Additional Pertinent Hx: Pt admitted to ED with c/o SOB. PMHx includes:  has a past medical history of Arthritis, Ascending aortic aneurysm ([address] CAD (coronary artery disease), Chronic kidney disease, Constipation, DDD (degenerative disc disease), lumbar, Dental disease, Hearing loss, Hyperlipidemia, Hypertension, Mild aortic stenosis, Prostate enlargement, and TIA (transient ischemic attack). Response To Previous Treatment: Not applicable  Family / Caregiver Present: No  Referring Practitioner: Rick Valenzuela MD  Referral Date : 06/20/20  Diagnosis: CHF  Follows Commands: Within Functional Limits  General Comment  Comments: Patient ambulating in hallways upon PT arrival.  Subjective  Subjective: Patient agreeable to PT evaluation.    Pain Screening  Patient Currently in Pain: Denies  Vital Signs  Patient Currently in Pain: Denies       Orientation  Orientation  Overall Orientation Status: Within Functional Limits  Social/Functional History  Social/Functional History  Lives With: Spouse  Type of Home: Apartment(senior building)  Home Layout: One level  Home Access: Elevator, Level entry  Receives Help From: Family(daughter comes in sometimes to help with cooking as needed)  ADL Assistance: Independent  Homemaking Assistance: (staff for cleaning)  Ambulation Assistance: Independent  Transfer Assistance: Independent  Cognition   Cognition  Overall Cognitive Status: WFL    Objective          AROM RLE (degrees)  RLE AROM: WFL  AROM LLE (degrees)  LLE AROM : WFL  Strength RLE  Strength RLE: WFL  Strength LLE  Strength LLE: WFL        Bed mobility  Comment: patient ambulating in hallways upon arrival, sitting up in bedside chair at end of session  Transfers  Sit to Stand: Modified independent(from EOB and from bedside chair)  Stand to sit: Modified independent(to bedside chair x 2 trials)  Ambulation  Ambulation?: Yes  Ambulation 1  Surface: level tile  Device: No Device  Assistance: Independent  Quality of Gait: forward flexed posture, decreased tejinder, decreased step height bilaterally, steady gait with no loss of balance noted  Distance: 150' in hallways returning to EOB, 150' in hallways returning to bedside chair  Stairs/Curb  Stairs?: No     Balance  Sitting - Static: Good  Sitting - Dynamic: Good  Standing - Static: Good  Standing - Dynamic: Good        Plan   Plan  Times per week: d/c from acute care PT; patient is independent with mobility.   Safety Devices  Type of devices: Left in chair, Call light within reach, Nurse notified  Restraints  Initially in place: No      OutComes Score                                                  AM-PAC Score  AM-PAC Inpatient Mobility Raw Score : 23 (06/22/20 1010)  AM-PAC Inpatient T-Scale Score : 56.93 (06/22/20 1010)  Mobility Inpatient CMS 0-100% Score: 11.2 (06/22/20 1010)  Mobility Inpatient CMS G-Code Modifier : CI (06/22/20 1010)          Goals  Short term goals  Time Frame for Short term goals: none; patient is independent with mobility and will be discharged from acute care PT   Patient Goals   Patient goals : to go home       Therapy Time   Individual Concurrent Group Co-treatment   Time In 0910         Time Out 0929         Minutes 19         Timed Code Treatment Minutes: 4 Minutes    Timed Code Treatment Minutes:  4 Minutes    Total Treatment Minutes:    4 minutes treatment + 15 minutes evaluation = 19 total treatment minutes    [place], [place] [id number]

## 2020-06-22 NOTE — DISCHARGE SUMMARY
Hospital Medicine Discharge Summary      Patient ID: Moriah Cruz      Patient's PCP: Cornel Brian MD    Admit Date: 6/19/2020     Discharge Date:   6/22/2020    Admitting Physician: Ghada Knowles MD    Discharge Physician: Kristal Warner MD     Discharge Diagnoses: Active Hospital Problems    Diagnosis Date Noted    Mixed hyperlipidemia [E78.2]     CHF (congestive heart failure), NYHA class I, acute on chronic, combined ([facility] [address] [I50.43] 06/19/2020    Ischemic cardiomyopathy [I25.5] 10/04/2019    Acute on chronic systolic congestive heart failure ([facility] [address] [I50.23] 04/04/2019   CHF due to valvular disease and Hypertensive heart disease       The patient was seen and examined on day of discharge and this discharge summary is in conjunction with any daily progress note from day of discharge. Hospital Course:     Patient was admitted with worsening leg edema and dyspnea on exertion. He was found to have acute on chronic systolic CHF. Started on IV lasix. Diuresed well. Venous doppler was negative for DVT. Creatinine remained stable. Patient was noted to have borderline bradycardia on telemetry and did not meet criteria for coreg. It was stopped and lisinopril increased. Creatinine tolerated it well. Patient was discharged in stable condition with close cardiology follow and and repeat renal panel within one week. Consults:     IP CONSULT TO HOSPITALIST  IP CONSULT TO PHARMACY  IP CONSULT TO HEART FAILURE NURSE/COORDINATOR  IP CONSULT TO DIETITIAN  IP CONSULT TO CARDIOLOGY    Disposition: Home     Discharged Condition: Stable    Code Status: Full Code    Activity: activity as tolerated    Diet: cardiac diet    Follow Up: Primary Care Physician in one week    Exam:     General appearance: No apparent distress, appears stated age and cooperative. Lungs: Clear to ascultation, bilaterally without Rales/Wheezes/Rhonchi with good respiratory effort.   Heart: Regular rate and rhythm with Normal S1/S2 with syst  Murmur  Abdomen: Soft, non-tender or non-distended without rigidity or guarding and positive bowel sounds all four quadrants. Extremities: No clubbing, cyanosis, trace non pitting leg edema   Skin: Skin color, texture, turgor normal.   Neurologic: Alert and oriented X 3,   grossly non-focal.  Mental status: Alert, oriented, thought content appropriate      Labs:  For convenience and continuity at follow-up the following most recent labs are provided:    CBC:   Lab Results   Component Value Date    WBC 5.1 06/21/2020    HGB 13.0 06/21/2020    HCT 37.9 06/21/2020     06/21/2020       RENAL:   Lab Results   Component Value Date     06/22/2020    K 3.5 06/22/2020    K 4.2 10/16/2019     06/22/2020    CO2 27 06/22/2020    BUN 42 06/22/2020    CREATININE 1.6 06/22/2020           Discharge Medications:   Current Discharge Medication List           Details   melatonin (RA MELATONIN) 3 MG TABS tablet Take 1 tablet by mouth nightly as needed (sleep)  Qty: 30 tablet, Refills: 0              Details   lisinopril (PRINIVIL;ZESTRIL) 20 MG tablet Take 1 tablet by mouth 2 times daily  Qty: 60 tablet, Refills: 2      furosemide (LASIX) 40 MG tablet Take 80 mg in the morning and 40 mg in the afternoon  Qty: 60 tablet, Refills: 2    Associated Diagnoses: Chronic systolic heart failure (HCC)              Details   azelastine (OPTIVAR) 0.05 % ophthalmic solution Place 1 drop into both eyes 3 times daily       polyethylene glycol (GLYCOLAX) 17 GM/SCOOP powder Take 17 g by mouth daily      polyvinyl alcohol (LIQUIFILM TEARS) 1.4 % ophthalmic solution Place 1 drop into both eyes every 3 hours as needed      tamsulosin (FLOMAX) 0.4 MG capsule Take 0.4 mg by mouth daily      aspirin 325 MG EC tablet Take 1 tablet by mouth daily  Qty: 30 tablet, Refills: 3    Associated Diagnoses: Chronic systolic heart failure (HCC)       MG capsule TAKE 1 CAPSULE BY MOUTH 2 TIMES DAILY AS NEEDED FOR CONSTIPATION  Qty: 60 capsule, Refills: 3    Associated Diagnoses: Constipation, unspecified constipation type      spironolactone (ALDACTONE) 50 MG tablet Take 1 tablet by mouth daily  Qty: 30 tablet, Refills: 1    Associated Diagnoses: Chronic systolic heart failure ([address] Essential hypertension      atorvastatin (LIPITOR) 80 MG tablet Take 1 tablet by mouth nightly  Qty: 90 tablet, Refills: 1    Associated Diagnoses: Coronary artery disease involving native heart, angina presence unspecified, unspecified vessel or lesion type; Hyperlipidemia, unspecified hyperlipidemia type      ranitidine (ZANTAC) 150 MG tablet Take 1 tablet by mouth 2 times daily as needed for Heartburn  Qty: 60 tablet, Refills: 1      diclofenac sodium 1 % GEL Apply 4 g topically 4 times daily as needed for Pain  Qty: 4 Tube, Refills: 3    Associated Diagnoses: Arthritis      Multiple Vitamin (MULTI-VITAMIN) TABS Take 1 tablet by mouth daily  Qty: 90 tablet, Refills: 2    Associated Diagnoses: Well adult exam      nitroGLYCERIN (NITROSTAT) 0.4 MG SL tablet Place 1 tablet under the tongue every 5 minutes as needed for Chest pain up to max of 3 total doses. If no relief after 1 dose, call 911. Qty: 25 tablet, Refills: 3      hydrocortisone 2.5 % cream Apply topically 2 times daily. Qty: 3.5 g, Refills: 3      Handicap Placard MISC by Does not apply route effective November 25,2019 through November 24,2020  Qty: 1 each, Refills: 0                Time Spent on discharge is more than 30 minutes in the examination, evaluation, counseling and review of medications and discharge plan. Signed:  Rivas Jiménez MD   6/22/2020      Thank you Kandice Ford MD for the opportunity to be involved in this patient's care. If you have any questions or concerns please feel free to contact me at [phone].

## 2020-06-23 ENCOUNTER — CARE COORDINATION (OUTPATIENT)
Dept: CASE MANAGEMENT | Age: 85
End: 2020-06-23

## 2020-06-24 ENCOUNTER — CARE COORDINATION (OUTPATIENT)
Dept: CASE MANAGEMENT | Age: 85
End: 2020-06-24

## 2020-06-24 ENCOUNTER — TELEPHONE (OUTPATIENT)
Dept: PHARMACY | Facility: CLINIC | Age: 85
End: 2020-06-24

## 2020-06-24 PROCEDURE — 1111F DSCHRG MED/CURRENT MED MERGE: CPT | Performed by: PHARMACIST

## 2020-06-24 NOTE — TELEPHONE ENCOUNTER
CLINICAL PHARMACY NOTE  Post-Discharge Transitions of Care (TERA)    Subjective/Objective:  Darwin Curran is a 80 y.o. male. Patient was discharged from [facility] on 6/22/20 with a diagnosis of HF. Patient outreach to review discharge medications and provide medication review and management. Spoke with patient    No Known Allergies    Discharge Medications (as per current medication list/AVS):  There are NEW medications for you.  START taking them after you leave the hospital:  Current Outpatient Medications   Medication Sig Dispense Refill    melatonin (RA MELATONIN) 3 MG TABS tablet Take 1 tablet by mouth nightly as needed (sleep) 30 tablet 0     You told us you were taking these medications at home, but the amount or how often you take this medication has CHANGED:  Current Outpatient Medications   Medication Sig Dispense Refill    lisinopril (PRINIVIL;ZESTRIL) 20 MG tablet    *confirms increase to 20mg BID and has new rx/tabs; monitoring daily BP   Take 1 tablet by mouth 2 times daily 60 tablet 2    furosemide (LASIX) 40 MG tablet    *confirms increase to 2 tabs qAM and 1 tab in afternoon; monitoring daily weight   Take 80 mg in the morning and 40 mg in the afternoon 60 tablet 2    atorvastatin (LIPITOR) 80 MG tablet    *not a change; removed \"taking differently\" instructions Take 1 tablet by mouth nightly (Patient taking differently: Take 80 mg by mouth daily ) 90 tablet 1     These are medications you told us you were taking at home, CONTINUE taking them after you leave the hospital:  Current Outpatient Medications   Medication Sig Dispense Refill    azelastine (OPTIVAR) 0.05 % ophthalmic solution Place 1 drop into both eyes 3 times daily       polyethylene glycol (GLYCOLAX) 17 GM/SCOOP powder Take 17 g by mouth daily      polyvinyl alcohol (LIQUIFILM TEARS) 1.4 % ophthalmic solution Place 1 drop into both eyes every 3 hours as needed      tamsulosin (FLOMAX) 0.4 MG capsule Take 0.4 mg by mouth daily No   - Level 2 #: 0   - Level 3 #: 0  Outreach Status: Review Complete  Care Coordinator Outreach to Patient?: Yes  Provider Contacted?: No  Time Spent (min): 15

## 2020-06-24 NOTE — CARE COORDINATION
[facility] Transitions Initial Follow Up Call    Call within 2 business days of discharge: Yes    Patient: Syed Magana Patient : 1932   MRN: [id number]   Reason for Admission: CHF  Discharge Date: 20 RARS: Readmission Risk Score: 25      Last Discharge [facility]       Complaint Diagnosis Description Type Department Provider    20 Other Acute on chronic systolic congestive heart failure ([facility] [address] . .. ED to Hosp-Admission (Discharged) (ADMITTED) [facility] 6 Jsoe Talavera MD; Dary Espinal. .. Spoke with: [address]: [facility]     Non-face-to-face services provided:  Obtained and reviewed discharge summary and/or continuity of care documents  Education of patient/family/caregiver/guardian to support self-management-. Assessment and support for treatment adherence and medication management-.    Care Transitions 24 Hour Call    Schedule Follow Up Appointment with PCP:  Declined  Do you have any ongoing symptoms?:  No  Do you have a copy of your discharge instructions?:  Yes  Do you have all of your prescriptions and are they filled?:  Yes  Have you been contacted by a [facility] Pharmacist?:  No  Have you scheduled your follow up appointment?:  Yes  How are you going to get to your appointment?:  Car - family or friend to transport  Were you discharged with any Home Care or Post Acute Services:  No  Post Acute Services:  [address] [place] [address] Reinier Bui (Comment: [address] )  Patient DME:  Other, Shower chair  Other Patient DME:  [facility], [address] you have support at home?:  Partner/Spouse/SO  Do you feel like you have everything you need to keep you well at home?:  Yes  Are you an active caregiver in your home?:  Yes  Care Transitions Interventions  No Identified Needs       Summary  CTN spoke with patient this am for initial 24 hour discharge follow up CTN call. Patient states he is doing well, reports no complaints of any nausea, vomiting, fevers, chills, SOB or Cough.   No

## 2020-06-26 ENCOUNTER — CARE COORDINATION (OUTPATIENT)
Dept: CASE MANAGEMENT | Age: 85
End: 2020-06-26

## 2020-06-26 NOTE — CARE COORDINATION
[facility] Transitions Initial Follow Up Call    Call within 2 business days of discharge: Yes    Patient:  Gwendolyn Salinas   Patient :  1932  MRN:  [id number]    Reason for Admission: CHF, Stroke  Discharge Date:  20   RARS:  Davy      Non-face-to-face services provided:      1ST CTC attempt to reach Pt regarding recent hospital discharge. CTC left voice recording with call back number requesting a call back.     Follow up appointments:    Future Appointments   Date Time Provider Shazia Weldon   2020 10:30 AM DEBORAH Martinez - CNP [facility]   2020  2:45 PM MD Max Borrego       Thank You,    Miles Mcclendon RN  Care Transition Coordinator  [facility][phone]

## 2020-06-29 ENCOUNTER — OFFICE VISIT (OUTPATIENT)
Dept: CARDIOLOGY CLINIC | Age: 85
End: 2020-06-29
Payer: MEDICARE

## 2020-06-29 VITALS
DIASTOLIC BLOOD PRESSURE: 60 MMHG | BODY MASS INDEX: 31.96 KG/M2 | SYSTOLIC BLOOD PRESSURE: 100 MMHG | HEART RATE: 45 BPM | TEMPERATURE: 98 F | WEIGHT: 198 LBS

## 2020-06-29 PROCEDURE — 1123F ACP DISCUSS/DSCN MKR DOCD: CPT | Performed by: NURSE PRACTITIONER

## 2020-06-29 PROCEDURE — G8427 DOCREV CUR MEDS BY ELIG CLIN: HCPCS | Performed by: NURSE PRACTITIONER

## 2020-06-29 PROCEDURE — G8417 CALC BMI ABV UP PARAM F/U: HCPCS | Performed by: NURSE PRACTITIONER

## 2020-06-29 PROCEDURE — 4040F PNEUMOC VAC/ADMIN/RCVD: CPT | Performed by: NURSE PRACTITIONER

## 2020-06-29 PROCEDURE — 99214 OFFICE O/P EST MOD 30 MIN: CPT | Performed by: NURSE PRACTITIONER

## 2020-06-29 PROCEDURE — 1036F TOBACCO NON-USER: CPT | Performed by: NURSE PRACTITIONER

## 2020-06-29 PROCEDURE — 1111F DSCHRG MED/CURRENT MED MERGE: CPT | Performed by: NURSE PRACTITIONER

## 2020-06-29 NOTE — PROGRESS NOTES
[address]  Office Visit           Leonel Lau MD,  [address] FNP CVNP       Cardiology             Rao Gone  11/26/1932 June 29, 2020    Primary Cardiologist:  Rebel Silveira     CC: CAD /  PTCA/stent / CM   Interval Hx: Mr. Claudell Hertz is here with CAD/ stable  NICM/appears compensated wt stable / denies PND or CRISTINA / lasix 80mg am 40mg afternoon   Bradycardia/ off BB HR 50s   TIA? no residual   CRI 1.5>1.4>1.6 /  stable   HLD on lipitor LDL wnl     Today  no c/o cp or SOB wt stable HR 50s off of beta blocker now     I have examined pt and reviewed notes / any lab work EKGs stress test, angiograms, & images reviewed  I  have spent >20 minutes of face to face time with the patient with more than 50% spent counseling and coordinating care this patient. 3/19 lexiscan   There is normal isotope uptake at stress and rest. There is no evidence of    myocardial ischemia or scar.    Severely dilated LV with diffuse moderate hypokinesis.    Left ventricular ejection fraction of 36 %.    Normal TID of 1.0    Study findings are abnormal and most consistent with non-ischemic    cardiomyopathy. Echo 10/2019 Left ventricular cavity size is dilated. There is mild concentric left   ventricular hypertrophy. Overall left ventricular systolic function appears   severely reduced with an ejection fraction of 20-25%. There is severe   diffuse hypokinesis. Diastolic filling parameters suggest grade II diastolic   dysfunction. Mitral annular calcification is present. Mild mitral   regurgitation is present. The aortic valve is thickened/calcified. Moderate   aortic stenosis with a peak velocity of 3.17m/s and a mean pressure gradient   of 22mmHg. Mild to moderate aortic regurgitation. Mild to moderate tricuspid   regurgitation. Estimated pulmonary artery systolic pressure is at 63 mmHg   assuming a right atrial pressure of 8 mmHg. Mild pulmonic regurgitation   present.  The left atrium is severely dilated. A bubble study was performed   and fails to show evidence of right to left shunting. 11/2019 CT chest   Impression   1. 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 Continued surveillance with CT in one year is recommended. 2. Fusiform aneurysmal dilatation of ascending aortic measuring 44 mm. 3. Hamartoma right lower lobe. 4. Hiatal hernia. 5. Small pericardial effusion measuring 20 mm along the right heart border, increased from 2004   6. No evidence of acute or chronic pulmonary embolus.        Us lower ext: 6/22/20    Normal venous duplex study of the bilateral lower extremities. There is no    evidence of deep or superficial venous thrombosis. Review of Systems:  Constitutional: Denies  fatigue, weakness, night sweats or fever. HEENT: Denies new visual changes, ringing in ears, nosebleeds,nasal congestion  Respiratory: Denies new or change in SOB, PND, orthopnea or cough. Cardiovascular: see HPI  GI: Denies N/V, diarrhea, constipation, abdominal pain, change in bowel habits, melena or hematochezia  : Denies urinary frequency, urgency, incontinence, hematuria or dysuria. Skin: Denies rash, hives, or cyanosis  Musculoskeletal: Denies joint or muscle aches/pain  Neurological: Denies syncope or TIA-like symptoms.   Psychiatric: Denies anxiety, insomnia or depression     Past Medical History:   Diagnosis Date    Arthritis     Ascending aortic aneurysm ([facility] [address]     CAD (coronary artery disease)     Chronic kidney disease     Constipation     DDD (degenerative disc disease), lumbar     L5, S1    Dental disease     Hearing loss     Hyperlipidemia     Hypertension     Mild aortic stenosis     Prostate enlargement     TIA (transient ischemic attack)      Past Surgical History:   Procedure Laterality Date    CORONARY

## 2020-07-01 ENCOUNTER — HOSPITAL ENCOUNTER (OUTPATIENT)
Dept: NON INVASIVE DIAGNOSTICS | Age: 85
Discharge: HOME | End: 2020-07-01
Payer: MEDICARE

## 2020-07-01 LAB
LV EF: 23 %
LVEF MODALITY: NORMAL

## 2020-07-01 PROCEDURE — 93306 TTE W/DOPPLER COMPLETE: CPT

## 2020-07-08 ENCOUNTER — CARE COORDINATION (OUTPATIENT)
Dept: CASE MANAGEMENT | Age: 85
End: 2020-07-08

## 2020-07-08 ENCOUNTER — OFFICE VISIT (OUTPATIENT)
Dept: INTERNAL MEDICINE CLINIC | Age: 85
End: 2020-07-08
Payer: MEDICARE

## 2020-07-08 VITALS
DIASTOLIC BLOOD PRESSURE: 77 MMHG | HEIGHT: 66 IN | BODY MASS INDEX: 31.4 KG/M2 | SYSTOLIC BLOOD PRESSURE: 131 MMHG | HEART RATE: 55 BPM | TEMPERATURE: 98.4 F | OXYGEN SATURATION: 96 % | WEIGHT: 195.4 LBS | RESPIRATION RATE: 24 BRPM

## 2020-07-08 PROCEDURE — 99213 OFFICE O/P EST LOW 20 MIN: CPT | Performed by: STUDENT IN AN ORGANIZED HEALTH CARE EDUCATION/TRAINING PROGRAM

## 2020-07-08 RX ORDER — PAROXETINE 10 MG/1
10 TABLET, FILM COATED ORAL NIGHTLY
Qty: 30 TABLET | Refills: 3 | Status: SHIPPED | OUTPATIENT
Start: 2020-07-08 | End: 2020-07-13 | Stop reason: SINTOL

## 2020-07-08 NOTE — CARE COORDINATION
[facility] Transitions Initial Follow Up Call    Call within 2 business days of discharge: Yes    Patient:  Nikki Oliveros   Patient :  1932  MRN:  [id number]    Reason for Admission: CHF, Stroke    Discharge Date:  20   RARS:  Davy      Non-face-to-face services provided:    2ND CTC attempt to reach Pt regarding recent hospital discharge. CTC left voice recording with call back number requesting a call back.     Follow up appointments:    Future Appointments   Date Time Provider Shazia Weldon   2020  2:45 PM Patrizia Dunne MD [facility]   2020 10:15 AM Gus Gary APRN - CNP [facility]       Thank Сергей Avilez RN  Care Transition Coordinator  Contact [facility][phone]

## 2020-07-08 NOTE — PROGRESS NOTES
for Heartburn Yes Robyn Cleary MD   Handicap Placard MISC by Does not apply route effective November 25,2019 through November 24,2020 Yes Lyssa Hanley MD   diclofenac sodium 1 % GEL Apply 4 g topically 4 times daily as needed for Pain Yes Lyssa Hanley MD   Multiple Vitamin (MULTI-VITAMIN) TABS Take 1 tablet by mouth daily Yes Lyssa Hanley MD   nitroGLYCERIN (NITROSTAT) 0.4 MG SL tablet Place 1 tablet under the tongue every 5 minutes as needed for Chest pain up to max of 3 total doses. If no relief after 1 dose, call 911. Patient not taking: Reported on 7/8/2020  Lety Alcantara MD        Social History     Tobacco Use    Smoking status: Never Smoker    Smokeless tobacco: Never Used   Substance Use Topics    Alcohol use: Yes     Alcohol/week: 0.0 standard drinks     Frequency: 2-4 times a month     Drinks per session: 1 or 2     Binge frequency: Never        Vitals:    07/08/20 1426   BP: 131/77   Site: Left Upper Arm   Position: Sitting   Cuff Size: Medium Adult   Pulse: 55   Resp: 24   Temp: 98.4 °F (36.9 °C)   TempSrc: Oral   SpO2: 96%   Weight: 195 lb 6.4 oz (88.6 kg)   Height: 5' 6\" (1.676 m)     Estimated body mass index is 31.54 kg/m² as calculated from the following:    Height as of this encounter: 5' 6\" (1.676 m). Weight as of this encounter: 195 lb 6.4 oz (88.6 kg). Physical Exam  Constitutional:       Appearance: Normal appearance. HENT:      Head: Normocephalic. Eyes:      Pupils: Pupils are equal, round, and reactive to light. Cardiovascular:      Rate and Rhythm: Normal rate. Heart sounds: Murmur present. Pulmonary:      Effort: Pulmonary effort is normal.      Breath sounds: Normal breath sounds. Abdominal:      Palpations: Abdomen is soft. Musculoskeletal: Normal range of motion. Right lower leg: Edema present. Left lower leg: Edema present. Skin:     General: Skin is warm. Neurological:      Mental Status: He is alert. ASSESSMENT/PLAN:  1. Chronic systolic heart failure ([facility] [address]): Pt is stable. Admitted to the hospital a month ago for CHF exacerbation.   - Lasix 40 mg BID  - Aldactone 50 mg    2. Depression, unspecified depression type: Pt feels sad and has hard time to sleep at night. He takes melatonin but it does not help him with sleep. - PARoxetine (PAXIL) 10 MG tablet; Take 1 tablet by mouth nightly  Dispense: 30 tablet    3. Essential hypertension: Normotensive. Coreg was stopped due to low HR.   - Continue Lisinopril 20 mg BID  - Continue Lasix 40 mg BID  - Continue Aldactone 50 mg        Return in about 4 weeks (around 8/5/2020). An electronic signature was used to authenticate this note. --Angela Powell MD on 7/8/2020 at 3:20 PM     Addendum to Resident H& P/Progress note:  I have personally seen,examined and evaluated the patient.  I have reviewed the current history, physical findings, labs and assessment and plan and agree with note as documented by resident MD ( Roberth Anguiano)      Nancy Barrett MD, Jono Diaz

## 2020-07-10 ENCOUNTER — CARE COORDINATION (OUTPATIENT)
Dept: CASE MANAGEMENT | Age: 85
End: 2020-07-10

## 2020-07-10 NOTE — CARE COORDINATION
[facility] Transitions Follow Up Call    7/10/2020    Patient: Anne-Marie Sanchez  Patient : 1932   MRN: [id number]   Reason for Admission: CHF, Stroke   Discharge Date: 20 RARS: Readmission Risk Score: 25         Spoke with: [address] Transitions Subsequent and Final Call    Schedule Follow Up Appointment with PCP:  Declined  Subsequent and Final Calls  Do you have any ongoing symptoms?:  No  Have your medications changed?:  No  Do you have any questions related to your medications?:  No  Do you currently have any active services?:  No  Are you currently active with any services?:  Home Health  Do you have any needs or concerns that I can assist you with?:  No  Identified Barriers:  None  Care Transitions Interventions  No Identified Needs  Other Interventions:          Summary  CTN spoke with patient this afternoon for follow up CTN call. Patient states he is doing well, reports no complaints of any nausea, vomiting, fevers, chills, SOB or Cough. No difficulty with urination, BM's or appetite, no LE Edema, no weight gain reported. CTN encouraged patient to continue to monitor himself for any of the above s/s, reporting any that may present to MD immediately, no new or changed medications, no other issues or concerns at this time. CTN provided education on s/s that require medical attention and when to seek medical attention. CTN advised Pt of use of urgent care or physicians 24 hr access line if assistance is needed after hours or weekend. Pt denies any needs or concerns and is agreeable with additional calls.     Follow Up  Future Appointments   Date Time Provider Shazia Weldon   2020  2:45 PM Indira Lloyd MD [facility]   2020 10:15 AM Brian Sheridan, APRN - [facility] [place] [facility]       Hillary Marin RN

## 2020-07-12 ENCOUNTER — HOSPITAL ENCOUNTER (EMERGENCY)
Age: 85
Discharge: HOME | End: 2020-07-13
Attending: EMERGENCY MEDICINE
Payer: MEDICARE

## 2020-07-12 ENCOUNTER — APPOINTMENT (OUTPATIENT)
Dept: GENERAL RADIOLOGY | Age: 85
End: 2020-07-12
Payer: MEDICARE

## 2020-07-12 LAB
ANION GAP SERPL CALCULATED.3IONS-SCNC: 12 MMOL/L (ref 3–16)
BASOPHILS ABSOLUTE: 0 K/UL (ref 0–0.2)
BASOPHILS RELATIVE PERCENT: 0.5 %
BILIRUBIN URINE: NEGATIVE
BLOOD, URINE: NEGATIVE
BUN BLDV-MCNC: 33 MG/DL (ref 7–20)
CALCIUM SERPL-MCNC: 8.9 MG/DL (ref 8.3–10.6)
CHLORIDE BLD-SCNC: 101 MMOL/L (ref 99–110)
CLARITY: CLEAR
CO2: 24 MMOL/L (ref 21–32)
COLOR: YELLOW
CREAT SERPL-MCNC: 1.5 MG/DL (ref 0.8–1.3)
EOSINOPHILS ABSOLUTE: 0.2 K/UL (ref 0–0.6)
EOSINOPHILS RELATIVE PERCENT: 3.1 %
GFR AFRICAN AMERICAN: 53
GFR NON-AFRICAN AMERICAN: 44
GLUCOSE BLD-MCNC: 106 MG/DL (ref 70–99)
GLUCOSE URINE: NEGATIVE MG/DL
HCT VFR BLD CALC: 37.8 % (ref 40.5–52.5)
HEMOGLOBIN: 12.8 G/DL (ref 13.5–17.5)
KETONES, URINE: NEGATIVE MG/DL
LEUKOCYTE ESTERASE, URINE: NEGATIVE
LYMPHOCYTES ABSOLUTE: 1.3 K/UL (ref 1–5.1)
LYMPHOCYTES RELATIVE PERCENT: 26.3 %
MCH RBC QN AUTO: 33.3 PG (ref 26–34)
MCHC RBC AUTO-ENTMCNC: 33.9 G/DL (ref 31–36)
MCV RBC AUTO: 97.9 FL (ref 80–100)
MICROSCOPIC EXAMINATION: NORMAL
MONOCYTES ABSOLUTE: 0.7 K/UL (ref 0–1.3)
MONOCYTES RELATIVE PERCENT: 14.1 %
NEUTROPHILS ABSOLUTE: 2.8 K/UL (ref 1.7–7.7)
NEUTROPHILS RELATIVE PERCENT: 56 %
NITRITE, URINE: NEGATIVE
PDW BLD-RTO: 13.1 % (ref 12.4–15.4)
PH UA: 6 (ref 5–8)
PLATELET # BLD: 128 K/UL (ref 135–450)
PMV BLD AUTO: 7.9 FL (ref 5–10.5)
POTASSIUM REFLEX MAGNESIUM: 4.3 MMOL/L (ref 3.5–5.1)
PRO-BNP: 6062 PG/ML (ref 0–449)
PROTEIN UA: NEGATIVE MG/DL
RBC # BLD: 3.86 M/UL (ref 4.2–5.9)
SODIUM BLD-SCNC: 137 MMOL/L (ref 136–145)
SPECIFIC GRAVITY UA: 1.01 (ref 1–1.03)
URINE TYPE: NORMAL
UROBILINOGEN, URINE: 0.2 E.U./DL
WBC # BLD: 5.1 K/UL (ref 4–11)

## 2020-07-12 PROCEDURE — 85025 COMPLETE CBC W/AUTO DIFF WBC: CPT

## 2020-07-12 PROCEDURE — 80048 BASIC METABOLIC PNL TOTAL CA: CPT

## 2020-07-12 PROCEDURE — 93005 ELECTROCARDIOGRAM TRACING: CPT | Performed by: EMERGENCY MEDICINE

## 2020-07-12 PROCEDURE — 99285 EMERGENCY DEPT VISIT HI MDM: CPT

## 2020-07-12 PROCEDURE — 6370000000 HC RX 637 (ALT 250 FOR IP): Performed by: EMERGENCY MEDICINE

## 2020-07-12 PROCEDURE — 84484 ASSAY OF TROPONIN QUANT: CPT

## 2020-07-12 PROCEDURE — 83880 ASSAY OF NATRIURETIC PEPTIDE: CPT

## 2020-07-12 PROCEDURE — 36415 COLL VENOUS BLD VENIPUNCTURE: CPT

## 2020-07-12 PROCEDURE — 81003 URINALYSIS AUTO W/O SCOPE: CPT

## 2020-07-12 PROCEDURE — 71046 X-RAY EXAM CHEST 2 VIEWS: CPT

## 2020-07-12 RX ORDER — ONDANSETRON 2 MG/ML
INJECTION INTRAMUSCULAR; INTRAVENOUS
Status: DISCONTINUED
Start: 2020-07-12 | End: 2020-07-12 | Stop reason: WASHOUT

## 2020-07-12 RX ORDER — ONDANSETRON 4 MG/1
4 TABLET, ORALLY DISINTEGRATING ORAL ONCE
Status: COMPLETED | OUTPATIENT
Start: 2020-07-12 | End: 2020-07-12

## 2020-07-12 RX ADMIN — ONDANSETRON 4 MG: 4 TABLET, ORALLY DISINTEGRATING ORAL at 23:35

## 2020-07-12 ASSESSMENT — ENCOUNTER SYMPTOMS
COUGH: 0
CONSTIPATION: 0
NAUSEA: 1
DIARRHEA: 0
EYES NEGATIVE: 1
SHORTNESS OF BREATH: 0
ABDOMINAL PAIN: 0
VOMITING: 0
RESPIRATORY NEGATIVE: 1

## 2020-07-13 ENCOUNTER — OFFICE VISIT (OUTPATIENT)
Dept: INTERNAL MEDICINE CLINIC | Age: 85
End: 2020-07-13
Payer: MEDICARE

## 2020-07-13 ENCOUNTER — CARE COORDINATION (OUTPATIENT)
Dept: CASE MANAGEMENT | Age: 85
End: 2020-07-13

## 2020-07-13 ENCOUNTER — TELEPHONE (OUTPATIENT)
Dept: OTHER | Facility: CLINIC | Age: 85
End: 2020-07-13

## 2020-07-13 VITALS
SYSTOLIC BLOOD PRESSURE: 146 MMHG | OXYGEN SATURATION: 97 % | HEART RATE: 60 BPM | DIASTOLIC BLOOD PRESSURE: 98 MMHG | RESPIRATION RATE: 15 BRPM | WEIGHT: 195 LBS | TEMPERATURE: 97.7 F | BODY MASS INDEX: 31.47 KG/M2

## 2020-07-13 VITALS
HEART RATE: 52 BPM | TEMPERATURE: 97 F | SYSTOLIC BLOOD PRESSURE: 111 MMHG | WEIGHT: 192 LBS | BODY MASS INDEX: 30.99 KG/M2 | DIASTOLIC BLOOD PRESSURE: 53 MMHG | OXYGEN SATURATION: 97 % | RESPIRATION RATE: 18 BRPM

## 2020-07-13 LAB
EKG ATRIAL RATE: 57 BPM
EKG DIAGNOSIS: NORMAL
EKG P AXIS: 38 DEGREES
EKG P-R INTERVAL: 266 MS
EKG Q-T INTERVAL: 464 MS
EKG QRS DURATION: 138 MS
EKG QTC CALCULATION (BAZETT): 451 MS
EKG R AXIS: -3 DEGREES
EKG T AXIS: 95 DEGREES
EKG VENTRICULAR RATE: 57 BPM
TROPONIN: 0.02 NG/ML

## 2020-07-13 PROCEDURE — 99213 OFFICE O/P EST LOW 20 MIN: CPT | Performed by: STUDENT IN AN ORGANIZED HEALTH CARE EDUCATION/TRAINING PROGRAM

## 2020-07-13 PROCEDURE — 6370000000 HC RX 637 (ALT 250 FOR IP): Performed by: EMERGENCY MEDICINE

## 2020-07-13 RX ORDER — ONDANSETRON 4 MG/1
4 TABLET, ORALLY DISINTEGRATING ORAL ONCE
Status: COMPLETED | OUTPATIENT
Start: 2020-07-13 | End: 2020-07-13

## 2020-07-13 RX ORDER — MIRTAZAPINE 7.5 MG/1
7.5 TABLET, FILM COATED ORAL NIGHTLY
Qty: 30 TABLET | Refills: 5 | Status: SHIPPED | OUTPATIENT
Start: 2020-07-13

## 2020-07-13 RX ORDER — ONDANSETRON 4 MG/1
4 TABLET, ORALLY DISINTEGRATING ORAL EVERY 8 HOURS PRN
Qty: 20 TABLET | Refills: 0 | Status: SHIPPED | OUTPATIENT
Start: 2020-07-13

## 2020-07-13 RX ADMIN — ONDANSETRON 4 MG: 4 TABLET, ORALLY DISINTEGRATING ORAL at 01:28

## 2020-07-13 ASSESSMENT — ENCOUNTER SYMPTOMS
STRIDOR: 0
CHOKING: 0
ABDOMINAL DISTENTION: 0
WHEEZING: 0
SHORTNESS OF BREATH: 0
COUGH: 0
APNEA: 0
CHEST TIGHTNESS: 0

## 2020-07-13 NOTE — TELEPHONE ENCOUNTER
Patient Discharged. RN Access will attempt to assist patient with follow up care as directed by ED Provider.

## 2020-07-13 NOTE — ED NOTES
Pt returned from [facility] rg. Blood and urine sent to lab. Pt resting comfortably. Family at bedside.       Jaspal Owusu RN  07/12/20 2577

## 2020-07-13 NOTE — ED PROVIDER NOTES
[address] [place]          ATTENDING PHYSICIAN NOTE       Date of evaluation: 7/12/2020    Chief Complaint     Mental Health Problem and Nausea      History of Present Illness     Tish Murillo is a 80 y.o. male who presents due to gradually worsening difficulty with depression, difficulty sleeping, lack of interest in activities, nausea without vomiting, decreased appetite, a generalized sense of malaise, with an occasional sense that \"something is wrong. \"  The patient's daughter, who is present at the bedside, relates that his wife was placed in rehab a number of weeks ago, and that his symptoms have been gradually worsening since that time. He was seen by his primary care provider 4 days ago for the same symptoms, and was started on Paxil which he was to take at bedtime. He feels that his symptoms have worsened since he was started on the Paxil, and specifically after taking his most recent dose at 8 PM last night, he feels more nauseous than he had previously. He denies any chest pain or shortness of breath. He feels that his lower extremity edema is improved from baseline, particularly since his most recent admission for CHF exacerbation about 3 weeks ago. He denies any URI symptoms or cough, fevers or chills. He denies any abdominal pain, changes in bowel habits, or urinary symptoms, although he does complain of the frequent urination associated with his Lasix sometimes keeping him up at night. His daughter is concerned that, since his wife was placed in rehab, Darrell Priest has not been himself. \"  She wonders if something can be done to resolve his depression and help him sleep at night. Review of Systems     Review of Systems   Constitutional: Positive for activity change, appetite change and fatigue. Negative for chills and fever. HENT: Negative. Eyes: Negative. Respiratory: Negative. Negative for cough and shortness of breath. Cardiovascular: Negative. Negative for chest pain, palpitations and leg swelling. Gastrointestinal: Positive for nausea. Negative for abdominal pain, constipation, diarrhea and vomiting. Genitourinary: Positive for frequency. Negative for difficulty urinating and dysuria. Musculoskeletal: Negative. Skin: Negative. Neurological: Negative for dizziness, tremors, seizures, syncope, facial asymmetry, speech difficulty, weakness, light-headedness, numbness and headaches. Hematological: Negative. Psychiatric/Behavioral: Positive for dysphoric mood and sleep disturbance. Negative for agitation, self-injury and suicidal ideas. Past Medical, Surgical, Family, and Social History     He has a past medical history of Arthritis, Ascending aortic aneurysm ([facility] [address] CAD (coronary artery disease), Chronic kidney disease, Constipation, DDD (degenerative disc disease), lumbar, Dental disease, Hearing loss, Hyperlipidemia, Hypertension, Mild aortic stenosis, Prostate enlargement, and TIA (transient ischemic attack). He has a past surgical history that includes TURP; Coronary angioplasty with stent (1/12/98); Skin tag removal (2/2/10); and eye surgery. His family history includes Cancer in his mother; Hypertension in his mother; Stroke in his father. He reports that he has never smoked. He has never used smokeless tobacco. He reports current alcohol use. He reports that he does not use drugs.     Medications     Previous Medications    ASPIRIN 325 MG EC TABLET    Take 1 tablet by mouth daily    ATORVASTATIN (LIPITOR) 80 MG TABLET    Take 1 tablet by mouth nightly    AZELASTINE (OPTIVAR) 0.05 % OPHTHALMIC SOLUTION    Place 1 drop into both eyes 3 times daily     DICLOFENAC SODIUM 1 % GEL    Apply 4 g topically 4 times daily as needed for Pain     MG CAPSULE    TAKE 1 CAPSULE BY MOUTH 2 TIMES DAILY AS NEEDED FOR CONSTIPATION    FUROSEMIDE (LASIX) 40 MG TABLET    Take 80 mg in the morning and 40 mg in the afternoon    HANDICAP PLACARD tenderness. No masses or hepatosplenomegaly. Skin: Warm and dry, without rashes or ecchymoses, lacerations or abrasions. Neuro: Alert and oriented x3. No focal neurologic deficits are noted. Extremities: Warm and well-perfused, without clubbing, cyanosis, or edema. The patient moves all extremities equally. Psych: The patient's affect is flat, and he describes a dysphoric mood, although he is calmly conversational with intact appearing judgment. He denies suicidal or homicidal ideation.       Diagnostic Results       RADIOLOGY:  XR CHEST STANDARD (2 VW)   Final Result      No acute radiographic abnormality of the chest.          LABS:   Results for orders placed or performed during the hospital encounter of 07/12/20   CBC auto differential   Result Value Ref Range    WBC 5.1 4.0 - 11.0 K/uL    RBC 3.86 (L) 4.20 - 5.90 M/uL    Hemoglobin 12.8 (L) 13.5 - 17.5 g/dL    Hematocrit 37.8 (L) 40.5 - 52.5 %    MCV 97.9 80.0 - 100.0 fL    MCH 33.3 26.0 - 34.0 pg    MCHC 33.9 31.0 - 36.0 g/dL    RDW 13.1 12.4 - 15.4 %    Platelets 606 (L) 533 - 450 K/uL    MPV 7.9 5.0 - 10.5 fL    Neutrophils % 56.0 %    Lymphocytes % 26.3 %    Monocytes % 14.1 %    Eosinophils % 3.1 %    Basophils % 0.5 %    Neutrophils Absolute 2.8 1.7 - 7.7 K/uL    Lymphocytes Absolute 1.3 1.0 - 5.1 K/uL    Monocytes Absolute 0.7 0.0 - 1.3 K/uL    Eosinophils Absolute 0.2 0.0 - 0.6 K/uL    Basophils Absolute 0.0 0.0 - 0.2 K/uL   Brain Natriuretic Peptide   Result Value Ref Range    Pro-BNP 6,062 (H) 0 - 449 pg/mL   Basic Metabolic Panel w/ Reflex to MG   Result Value Ref Range    Sodium 137 136 - 145 mmol/L    Potassium reflex Magnesium 4.3 3.5 - 5.1 mmol/L    Chloride 101 99 - 110 mmol/L    CO2 24 21 - 32 mmol/L    Anion Gap 12 3 - 16    Glucose 106 (H) 70 - 99 mg/dL    BUN 33 (H) 7 - 20 mg/dL    CREATININE 1.5 (H) 0.8 - 1.3 mg/dL    GFR Non- 44 (A) >60    GFR  53 (A) >60    Calcium 8.9 8.3 - 10.6 mg/dL Troponin (lab)   Result Value Ref Range    Troponin 0.02 (H) <0.01 ng/mL   Urinalysis, reflex to microscopic (Lab)   Result Value Ref Range    Color, UA Yellow Straw/Yellow    Clarity, UA Clear Clear    Glucose, Ur Negative Negative mg/dL    Bilirubin Urine Negative Negative    Ketones, Urine Negative Negative mg/dL    Specific Gravity, UA 1.015 1.005 - 1.030    Blood, Urine Negative Negative    pH, UA 6.0 5.0 - 8.0    Protein, UA Negative Negative mg/dL    Urobilinogen, Urine 0.2 <2.0 E.U./dL    Nitrite, Urine Negative Negative    Leukocyte Esterase, Urine Negative Negative    Microscopic Examination Not Indicated     Urine Type Voided        RECENT VITALS:  BP: (!) 146/98, Temp: 97.7 °F (36.5 °C), Pulse: 60, Resp: 15, SpO2: 97 %     Procedures       ED Course     Nursing Notes, Past Medical Hx, Past Surgical Hx, Social Hx, Allergies, and Family Hx were reviewed. The patient was given the following medications:  Orders Placed This Encounter   Medications    ondansetron (ZOFRAN-ODT) disintegrating tablet 4 mg    DISCONTD: ondansetron (ZOFRAN) 4 MG/2ML injection     BETTINA PARKER: cabinet override    ondansetron (ZOFRAN ODT) 4 MG disintegrating tablet     Sig: Take 1 tablet by mouth every 8 hours as needed for Nausea     Dispense:  20 tablet     Refill:  0    ondansetron (ZOFRAN-ODT) disintegrating tablet 4 mg       CONSULTS:  None    MEDICAL DECISION MAKING / ASSESSMENT / Ulices Imelda is a 80 y.o. male with a history significant for heart failure and other medical comorbidities, who has been struggling with depression and difficulty sleeping for the last several weeks, since his wife was moved into a skilled nursing facility, and he has been unable to visit her due to the COVID-19 pandemic. The patient describes vague symptoms of anhedonia, decreased activity, decreased appetite with mild nausea but no other GI symptoms, and fatigue although with difficulty sleeping at night.   He has no complaints of chest pain or shortness of breath. A medical work-up was initiated, primarily due to the significance of his past medical history. His laboratory studies reveal a mild elevation of proBNP and a scant elevation of troponin, similar to those seen on his recent admission for a CHF exacerbation. However, he has no evidence of pulmonary edema on chest x-ray, no dyspnea, and is maintaining normal O2 saturations and normal respiratory effort on room air. I discussed with the patient that if he feels that he is having difficulty getting around at home due to his fatigue, which may be related to a mild CHF exacerbation, then he could be admitted to the hospital for diuresis. Otherwise, it would be very reasonable to increase his Lasix dose for a couple of days and have him follow-up with his cardiologist as an outpatient. He prefers to follow-up as an outpatient. The patient and his daughter were more concerned about his mental and emotional distress. I discussed with them that, as he is only been taking paroxetine for a few days, it would not be dangerous to stop it if he felt that it were making his symptoms worse. However, I did discuss with him that is not unexpected that an SSRI might worsen depression briefly before it improves. I also discussed with him that I would prefer to leave medication adjustment for his situational depression to his primary care provider. He was given a dose of ODT Zofran in the emergency department, which he felt resolved his nausea. He requested another dose just before discharge. He was given a prescription for some ODT Zofran to help with the nausea and decreased appetite that he has been experiencing at home. He had no other GI symptoms and no abdominal pain or tenderness to palpation. Finally, the patient had been complaining of significant insomnia. He had been taking melatonin, but was prescribed the Paxil by his primary care provider instead.   He wishes to stop the Paxil and return to taking the melatonin. He asked if he could take 2 tabs, and given that he is taking a 3 mg tablet of melatonin, he was advised that he may. He was also advised that he may try taking 25 mg of over-the-counter Benadryl together with the melatonin, as this combination is often used as a treatment for insomnia. The patient is thereafter considered stable for discharge, with a couple of days of increased Lasix dose, to follow-up with his cardiologist as an outpatient for his mild CHF exacerbation, and with his primary care provider as an outpatient for further management of his depression and insomnia. He was given return precautions for any worsening symptoms or other concerns. Clinical Impression     1. Chronic systolic heart failure ([address]    2. Reactive depression    3. Psychophysiological insomnia    4. Nausea        Disposition     PATIENT REFERRED TO:  Gasper Cuadra MD  [address] [place] [address] No. [facility]  [phone]    Call in 1 day  to arrange a follow-up appointment to discuss management of depression and insomnia    Abdullahi Luna MD  [address]  [phone]    Call in 1 day  To discuss your ER visit, and arrange a follow-up appointment      DISCHARGE MEDICATIONS:  New Prescriptions    ONDANSETRON (ZOFRAN ODT) 4 MG DISINTEGRATING TABLET    Take 1 tablet by mouth every 8 hours as needed for Nausea       DISPOSITION Decision To Discharge    (Please note that portions of this note were completed with voice recognition software.   Efforts were made to edit the dictations but occasionally words are mis-transcribed.)     Alona Velez MD  07/13/20 6813

## 2020-07-13 NOTE — PROGRESS NOTES
2020     [facility]  Sai Alvarez (:  1932) is a 80 y.o. male, with PMHx  HFrEF, CKD 3, CAD and HTN who presents today for a hospital follow up. He was seen in the hospital for ongoing depression since his wife's placement into a SNF. He was seen in our clinic last week and was started on Paxil however he reported nausea and not feeling well while taking so he stopped. Today he reports feeling down, difficulty with sleeping and appetite. Patient denies any fevers, chills, chest pain, SOB, abdominal pain or lower extremity edema. Review of Systems   Constitutional: Negative for activity change, appetite change, chills, diaphoresis, fatigue and fever. HENT: Negative for congestion. Eyes: Negative for visual disturbance. Respiratory: Negative for apnea, cough, choking, chest tightness, shortness of breath, wheezing and stridor. Cardiovascular: Negative for chest pain, palpitations and leg swelling. Gastrointestinal: Negative for abdominal distention. Genitourinary: Negative for difficulty urinating. Neurological: Negative for dizziness, seizures, light-headedness, numbness and headaches. Psychiatric/Behavioral: Positive for dysphoric mood and sleep disturbance. Negative for agitation and behavioral problems. Prior to Visit Medications    Medication Sig Taking?  Authorizing Provider   ondansetron (ZOFRAN ODT) 4 MG disintegrating tablet Take 1 tablet by mouth every 8 hours as needed for Nausea Yes Micki Murphy MD   mirtazapine (REMERON) 7.5 MG tablet Take 1 tablet by mouth nightly Yes Pedro Tinajero MD   lisinopril (PRINIVIL;ZESTRIL) 20 MG tablet Take 1 tablet by mouth 2 times daily Yes Prema Avery MD   furosemide (LASIX) 40 MG tablet Take 80 mg in the morning and 40 mg in the afternoon Yes Prema Avery MD   melatonin (RA MELATONIN) 3 MG TABS tablet Take 1 tablet by mouth nightly as needed (sleep) Yes Prema Avery MD   azelastine (OPTIVAR) 0.05 % ophthalmic solution Place 1 drop into both eyes 3 times daily  Yes Historical Provider, MD   polyethylene glycol (GLYCOLAX) 17 GM/SCOOP powder Take 17 g by mouth daily Yes Historical Provider, MD   polyvinyl alcohol (LIQUIFILM TEARS) 1.4 % ophthalmic solution Place 1 drop into both eyes every 3 hours as needed Yes Historical Provider, MD   tamsulosin (FLOMAX) 0.4 MG capsule Take 0.4 mg by mouth daily Yes Historical Provider, MD   aspirin 325 MG EC tablet Take 1 tablet by mouth daily Yes Quentin Gunn MD   hydrocortisone 2.5 % cream Apply topically 2 times daily. Yes Quentin Gunn MD    MG capsule TAKE 1 CAPSULE BY MOUTH 2 TIMES DAILY AS NEEDED FOR CONSTIPATION Yes Rossy Dailey MD   spironolactone (ALDACTONE) 50 MG tablet Take 1 tablet by mouth daily Yes Traci Schafer MD   atorvastatin (LIPITOR) 80 MG tablet Take 1 tablet by mouth nightly Yes Traci Schafer MD   ranitidine (ZANTAC) 150 MG tablet Take 1 tablet by mouth 2 times daily as needed for Heartburn Yes Danica Cid MD   Handicap Placard MISC by Does not apply route effective November 25,2019 through November 24,2020 Yes Feliciano Sebastian MD   diclofenac sodium 1 % GEL Apply 4 g topically 4 times daily as needed for Pain Yes Feliciano Sebastian MD   Multiple Vitamin (MULTI-VITAMIN) TABS Take 1 tablet by mouth daily Yes Feliciano Sebastian MD   nitroGLYCERIN (NITROSTAT) 0.4 MG SL tablet Place 1 tablet under the tongue every 5 minutes as needed for Chest pain up to max of 3 total doses. If no relief after 1 dose, call 911. Yes Katia Montalvo MD        Social History     Tobacco Use    Smoking status: Never Smoker    Smokeless tobacco: Never Used   Substance Use Topics    Alcohol use:  Yes     Alcohol/week: 0.0 standard drinks     Frequency: 2-4 times a month     Drinks per session: 1 or 2     Binge frequency: Never        Vitals:    07/13/20 1502   BP: (!) 111/53   Site: Right Upper Arm   Position: Sitting   Cuff Size: Medium Adult   Pulse: 52   Resp: 18 3:15 PM     Addendum to Resident H& P/Progress note:  I have personally seen,examined and evaluated the patient.  I have reviewed the current history, physical findings, labs and assessment and plan and agree with note as documented by resident MD ( Shun Griffiths)      Lesly Basurto MD, [address]

## 2020-07-13 NOTE — CARE COORDINATION
[facility] Transitions Initial Follow Up Call    Call within 2 business days of discharge: Yes    Patient:  Saman Hill  Patient :  1932  MRN:  [id number]   Reason for Admission:    Discharge Date:  20  RARS:  [facility]      Non-face-to-face services provided:    1ST CTC attempt to reach Pt regarding recent hospital discharge. CTC left voice recording with call back number requesting a call back.     Follow up appointments:    Future Appointments   Date Time Provider Shazia Weldon   2020  2:45 PM MD Max Mccall [facility]   8/10/2020  2:15 PM Dominique Cyr MD [facility]   2020 10:15 AM Shun Hassan APRN - CNP [place] [facility]       Thank Idalia Sanford RN  Care Transition Coordinator  Contact VGXYBE:[phone]

## 2020-07-13 NOTE — PATIENT INSTRUCTIONS
Continue current medications for all chronic conditions  Stop taking Paxil and start taking remeron 7.5 mg daily for depression and appetite   Return in one month

## 2020-07-15 ENCOUNTER — CARE COORDINATION (OUTPATIENT)
Dept: CASE MANAGEMENT | Age: 85
End: 2020-07-15

## 2020-07-15 NOTE — CARE COORDINATION
[facility] Transitions Follow Up Call    7/15/2020    Patient: Theresa Arreola  Patient : 1932   MRN: [id number]   Reason for Admission: CHF, Stroke   Discharge Date: 20 RARS: Readmission Risk Score: 25         Spoke with: [address] Transitions Subsequent and Final Call    Schedule Follow Up Appointment with PCP:  Declined  Subsequent and Final Calls  Do you have any ongoing symptoms?:  No  Have your medications changed?:  No  Do you have any questions related to your medications?:  No  Do you currently have any active services?:  No  Are you currently active with any services?:  Home Health  Do you have any needs or concerns that I can assist you with?:  No  Identified Barriers:  None  Care Transitions Interventions  No Identified Needs  Other Interventions:          Summary  CTN spoke with patient this afternoon for follow up CTN call. Patient states he is doing well, reports no complaints of any nausea, vomiting, fevers, chills, SOB or Cough. No difficulty with urination, BM's or appetite. CTN encouraged patient to continue to monitor himself for any of the above s/s, reporting any that may present to MD immediately, rest as needed, patient encouraged to weigh himself daily, reporting any 3 lb weight gain overnight, or 5 lb weight gain in a week. No new or changed medications, no other issues or concerns at this time. CTN provided education on s/s that require medical attention and when to seek medical attention. CTN advised Pt of use of urgent care or physicians 24 hr access line if assistance is needed after hours or weekend. Pt denies any needs or concerns and is agreeable with additional calls.     Follow Up  Future Appointments   Date Time Provider Shazia Weldon   2020  2:45 PM Patrizia Dunne MD [facility]   8/10/2020  2:15 PM Isabela Chi MD [facility]   2020 10:15 AM DEBORAH Carnes - CNP [facility]       Oral Lomas RN

## 2020-07-17 ENCOUNTER — TELEPHONE (OUTPATIENT)
Dept: CARDIOLOGY CLINIC | Age: 85
End: 2020-07-17

## 2020-07-17 ENCOUNTER — CARE COORDINATION (OUTPATIENT)
Dept: CASE MANAGEMENT | Age: 85
End: 2020-07-17

## 2020-07-17 ENCOUNTER — TELEPHONE (OUTPATIENT)
Dept: INTERNAL MEDICINE CLINIC | Age: 85
End: 2020-07-17

## 2020-07-17 RX ORDER — LISINOPRIL 20 MG/1
20 TABLET ORAL DAILY
Qty: 60 TABLET | Refills: 2 | Status: SHIPPED | OUTPATIENT
Start: 2020-07-17 | End: 2020-07-24

## 2020-07-17 NOTE — TELEPHONE ENCOUNTER
Pt's daughter called re: hypotension this morning 70s not up to 92 systolic. Pt recently in ED and Lasix increased for 2 days. Instructed him to decrease lisinopril to daily and hold for SBP<100.  Will move up pt appointment per request. Evelia Rehman

## 2020-07-17 NOTE — CARE COORDINATION
[facility] Trans[facility] Follow Up Call    2020    Patient: Anne-Marie Sanchez  Patient : 1932   MRN: [id number]   Reason for Admission: CHF, Stroke   Discharge Date: 20 RARS: Readmission Risk Score: 25         Spoke with: [address] Transitions Subsequent and Final Call    Schedule Follow Up Appointment with PCP:  Declined  Subsequent and Final Calls  Do you have any ongoing symptoms?:  Yes  Onset of Patient-reported symptoms:  Today  Patient-reported symptoms:  Other  Interventions for patient-reported symptoms:  Notified PCP/Physician  Have your medications changed?:  No  Do you have any questions related to your medications?:  No  Do you currently have any active services?:  No  Are you currently active with any services?:  Home Health  Do you have any needs or concerns that I can assist you with?:  No  Identified Barriers:  None  Care Transitions Interventions  No Identified Needs  Other Interventions:          Summary  CTN spoke with patient this afternoon for follow up CTN call. Patient states his BP's are high, then low after taking medications. Reports no dizziness, lightheadedness, nausea, vomiting, fevers, chills, SOB or Cough. No difficulty with urination, BM's or appetite. Patient instructed to take BP before taking any Blood Pressure medications, and contact MD to notify, as patient states he is feeling sluggish, states he will contact MD today. No other issues or concerns, no new or changed medications at this time. CTN provided education on s/s that require medical attention and when to seek medical attention. CTN advised Pt of use of urgent care or physicians 24 hr access line if assistance is needed after hours or weekend. Pt denies any needs or concerns and is agreeable with additional calls.     Follow Up  Future Appointments   Date Time Provider Shazia Weldon   2020  2:45 PM MD Max Esquivel   8/10/2020  2:15 PM Jean Vega MD [address] [facility]   8/13/2020 10:15 AM DEBORAH Hardwick - CNP [facility]       Leighton Senior RN

## 2020-07-20 ENCOUNTER — CARE COORDINATION (OUTPATIENT)
Dept: CASE MANAGEMENT | Age: 85
End: 2020-07-20

## 2020-07-22 ENCOUNTER — CARE COORDINATION (OUTPATIENT)
Dept: CASE MANAGEMENT | Age: 85
End: 2020-07-22

## 2020-07-22 RX ORDER — SPIRONOLACTONE 50 MG/1
50 TABLET, FILM COATED ORAL DAILY
Qty: 30 TABLET | Refills: 1 | Status: ON HOLD | OUTPATIENT
Start: 2020-07-22 | End: 2020-11-09 | Stop reason: HOSPADM

## 2020-07-22 NOTE — CARE COORDINATION
[facility] Trans[facility] Follow Up Call    2020    Patient: Michelle Sparkspshire  Patient : 1932   MRN: [id number]   Reason for Admission: CHF, Stroke   Discharge Date: 20 RARS: Readmission Risk Score: 25         Spoke with: [address] Transitions Subsequent and Final Call    Schedule Follow Up Appointment with PCP:  Declined  Subsequent and Final Calls  Do you have any ongoing symptoms?:  No  Have your medications changed?:  No  Do you have any questions related to your medications?:  No  Do you currently have any active services?:  No  Are you currently active with any services?:  Home Health  Do you have any needs or concerns that I can assist you with?:  No  Identified Barriers:  None  Care Transitions Interventions  No Identified Needs  Other Interventions:          Summary  CTN spoke with patient this afternoon for final follow up CTN call. Patient states he is doing well, reports no difficulty with BP's, no chest pain, dizziness or lightheadedness. Patient eating well, states he is not having any trouble with urination, BM's or appetite. CTN encouraged patient to continue to monitor himself for any of the above s/s, reporting any that may present to MD immediately, take all medications as prescribed, continue to monitor BP daily. No new or changed medications, no other issues or concerns at this time. CTN provided education on s/s that require medical attention and when to seek medical attention. CTN advised Pt of use of urgent care or physicians 24 hr access line if assistance is needed after hours or weekend.       Follow Up  Future Appointments   Date Time Provider Shazia Weldon   2020 10:15 AM DEBORAH Trevino CNP [facility]   2020  2:45 PM MD Max Valdez [facility]   8/10/2020  2:15 PM Julien Phillips MD [facility]   2020 10:15 AM DEBORAH Haro CNPwood [facility] Mojica Caro, RN

## 2020-07-23 ASSESSMENT — ENCOUNTER SYMPTOMS
GASTROINTESTINAL NEGATIVE: 1
RESPIRATORY NEGATIVE: 1

## 2020-07-23 NOTE — PROGRESS NOTES
consistent   with normal RA pressure (3mmHg). Estimated pulmonary artery systolic   pressure is at 37 mmHg assuming a right atrial pressure of 3 mmHg. Device: No     Activity: below baseline  Can you walk 1-2 blocks or do a moderate amount of house/yard work?yes     NYHA Class: I I    Sodium Restrictions: 2g  Fluid Restrictions: 48-64 oz/day  Sodium and fluid restriction compliance: good    Pt Education: The patient has received education on the following topics: dietary sodium restriction, heart failure medications, the importance of physical activity, symptom management and weight monitoring         Past Medical History:   has a past medical history of Arthritis, Ascending aortic aneurysm ([address] CAD (coronary artery disease), Chronic kidney disease, Constipation, DDD (degenerative disc disease), lumbar, Dental disease, Hearing loss, Hyperlipidemia, Hypertension, Mild aortic stenosis, Prostate enlargement, and TIA (transient ischemic attack). Surgical History:   has a past surgical history that includes TURP; Coronary angioplasty with stent (1/12/98); Skin tag removal (2/2/10); and eye surgery. Social History:   reports that he has never smoked. He has never used smokeless tobacco. He reports current alcohol use. He reports that he does not use drugs. Family History:   Family History   Problem Relation Age of Onset    Hypertension Mother     Cancer Mother     Stroke Father        Home Medications:  Prior to Admission medications    Medication Sig Start Date End Date Taking?  Authorizing Provider   spironolactone (ALDACTONE) 50 MG tablet TAKE 1 TABLET BY MOUTH DAILY 7/22/20   Edgardo Villa MD   lisinopril (PRINIVIL;ZESTRIL) 20 MG tablet Take 1 tablet by mouth daily 7/17/20   DEBORAH Ring CNP   ondansetron (ZOFRAN ODT) 4 MG disintegrating tablet Take 1 tablet by mouth every 8 hours as needed for Nausea 7/13/20   Emilie Howell MD   mirtazapine (REMERON) 7.5 MG tablet Take 1 tablet by mouth nightly 7/13/20   Isabela Chi MD   furosemide (LASIX) 40 MG tablet Take 80 mg in the morning and 40 mg in the afternoon 6/22/20   Cynthia Teran MD   melatonin (RA MELATONIN) 3 MG TABS tablet Take 1 tablet by mouth nightly as needed (sleep) 6/22/20   Cynthia Teran MD   azelastine (OPTIVAR) 0.05 % ophthalmic solution Place 1 drop into both eyes 3 times daily     Historical Provider, MD   polyethylene glycol (GLYCOLAX) 17 GM/SCOOP powder Take 17 g by mouth daily    Historical Provider, MD   polyvinyl alcohol (LIQUIFILM TEARS) 1.4 % ophthalmic solution Place 1 drop into both eyes every 3 hours as needed    Historical Provider, MD   tamsulosin (FLOMAX) 0.4 MG capsule Take 0.4 mg by mouth daily    Historical Provider, MD   aspirin 325 MG EC tablet Take 1 tablet by mouth daily 6/16/20   Laurie Cheng MD   hydrocortisone 2.5 % cream Apply topically 2 times daily. 6/16/20   Laurie Cheng MD    MG capsule TAKE 1 CAPSULE BY MOUTH 2 TIMES DAILY AS NEEDED FOR CONSTIPATION 4/30/20   Dez Hernández MD   atorvastatin (LIPITOR) 80 MG tablet Take 1 tablet by mouth nightly 2/17/20   Helen Hamilton MD   ranitidine (ZANTAC) 150 MG tablet Take 1 tablet by mouth 2 times daily as needed for Heartburn 1/14/20   Verkade Coburn MD   Handicap Placard MISC by Does not apply route effective November 25,2019 through November 24,2020 11/25/19   Hyacinth Jiang MD   diclofenac sodium 1 % GEL Apply 4 g topically 4 times daily as needed for Pain 7/26/19   Hyacinth Jiang MD   Multiple Vitamin (MULTI-VITAMIN) TABS Take 1 tablet by mouth daily 7/26/19   Hyacinth Jiang MD   nitroGLYCERIN (NITROSTAT) 0.4 MG SL tablet Place 1 tablet under the tongue every 5 minutes as needed for Chest pain up to max of 3 total doses. If no relief after 1 dose, call 911. 12/27/18   Haider Vásquez MD        Allergies:  Patient has no known allergies. ROS:   Review of Systems   Constitutional: Positive for fatigue. Respiratory: Negative. Cardiovascular: Negative. Gastrointestinal: Negative. Genitourinary: Negative. Musculoskeletal: Negative. Skin: Negative. Neurological: Negative. Hematological: Negative. Psychiatric/Behavioral: Negative. Physical Examination:    Vitals:    07/24/20 1019   BP: (!) 98/44   Pulse: 56   Temp: 98.2 °F (36.8 °C)   Weight: 194 lb (88 kg)           Physical Exam  Constitutional:       Appearance: He is well-developed. HENT:      Head: Normocephalic and atraumatic. Eyes:      Pupils: Pupils are equal, round, and reactive to light. Neck:      Musculoskeletal: Normal range of motion and neck supple. Cardiovascular:      Rate and Rhythm: Regular rhythm. Bradycardia present. Heart sounds: Murmur present. Pulmonary:      Effort: Pulmonary effort is normal.   Abdominal:      Palpations: Abdomen is soft. Musculoskeletal: Normal range of motion. Comments: Trace bilaterally   Skin:     General: Skin is warm and dry. Neurological:      Mental Status: He is alert and oriented to person, place, and time. Psychiatric:         Behavior: Behavior normal.         Thought Content:  Thought content normal.         Judgment: Judgment normal.         Lab Data:    CBC:   Lab Results   Component Value Date    WBC 5.1 07/12/2020    WBC 4.7 06/30/2020    WBC 5.1 06/21/2020    RBC 3.86 07/12/2020    RBC 3.83 06/30/2020    RBC 3.84 06/21/2020    HGB 12.8 07/12/2020    HGB 12.6 06/30/2020    HGB 13.0 06/21/2020    HCT 37.8 07/12/2020    HCT 38.3 06/30/2020    HCT 37.9 06/21/2020    MCV 97.9 07/12/2020    .1 06/30/2020    MCV 98.5 06/21/2020    RDW 13.1 07/12/2020    RDW 13.5 06/30/2020    RDW 13.5 06/21/2020     07/12/2020     06/30/2020     06/21/2020     BMP:  Lab Results   Component Value Date     07/12/2020     06/30/2020     06/22/2020    K 4.3 07/12/2020    K 4.5 06/30/2020    K 3.5 06/22/2020    K 3.4 06/21/2020    K 4.2 10/16/2019    K 3.4 04/29/2019     07/12/2020     06/30/2020     06/22/2020    CO2 24 07/12/2020    CO2 23 06/30/2020    CO2 27 06/22/2020    PHOS 3.9 06/22/2020    PHOS 3.4 06/19/2020    PHOS 4.2 05/02/2019    BUN 33 07/12/2020    BUN 44 06/30/2020    BUN 42 06/22/2020    CREATININE 1.5 07/12/2020    CREATININE 1.8 06/30/2020    CREATININE 1.6 06/22/2020     BNP:   Lab Results   Component Value Date    PROBNP 6,062 07/12/2020    PROBNP 6,159 06/19/2020    PROBNP 3,757 10/04/2019     Iron Studies:  No results found for: FERRITIN  Iron Deficiency Anemia:  No    IV Iron Therapy:  No  2017 ACC/AHA HF Guidelines:   intravenous iron replacement in patients with New York Heart Association (NYHA) class II and III HF and iron deficiency(ferritin <100 ng/ml or 100-300 ng/ml if transferrin saturation <20%), to improve functional status and QoL. Assessment/Plan:    Encounter Diagnoses        CHF (congestive heart failure), NYHA class I, acute on chronic, combined (HCC) Compensated, repeat labs in 2 weeks    Essential hypertension Hypotension, decrease ACE    Ischemic cardiomyopathy On ACE, no BB due to azra, continue li         Instructions:   1. Medications: decrease lisinopril to 10mg once a day, if your blood pressure is running over 140 top number then increase to twice a day  2. Labs: 2 weeks  3. Lifestyle Recommendations: Weigh yourself every day in the morning after urination, call Edouard Condon if wt increases 2-3lb in one day or 5lb in one week, Limit sodium to 2000mg/day and fluids to 2L or 64oz/day. 4. Follow up: Dr. Carlos Contreras in August      Heart Failure Hotline: [phone]  [place]      I appreciate the opportunity of cooperating in the care of this individual.    Nai Shay CNP, 7/23/2020, 5:28 PM    QUALITY MEASURES  1. Tobacco Cessation Counseling: NA  2. Retake of BP if >140/90:   NA  3. Documentation to PCP/referring for new patient:  Sent to PCP at close of office visit  4.  CAD patient on anti-platelet: Yes  5. CAD patient on STATIN therapy:  Yes  6. Patient with CHF and aFib on anticoagulation:  NA   7. Patient Education:  Yes   8. BB for LVSD :  no, azra  9. ACE/ARB for LVSD:  yes  10.  Left Ventricular Ejection Fraction (LVEF) Assessment:  Yes

## 2020-07-24 ENCOUNTER — OFFICE VISIT (OUTPATIENT)
Dept: CARDIOLOGY CLINIC | Age: 85
End: 2020-07-24
Payer: MEDICARE

## 2020-07-24 VITALS
BODY MASS INDEX: 31.31 KG/M2 | DIASTOLIC BLOOD PRESSURE: 44 MMHG | HEART RATE: 56 BPM | TEMPERATURE: 98.2 F | SYSTOLIC BLOOD PRESSURE: 98 MMHG | WEIGHT: 194 LBS

## 2020-07-24 PROCEDURE — 1123F ACP DISCUSS/DSCN MKR DOCD: CPT | Performed by: NURSE PRACTITIONER

## 2020-07-24 PROCEDURE — 1036F TOBACCO NON-USER: CPT | Performed by: NURSE PRACTITIONER

## 2020-07-24 PROCEDURE — 99214 OFFICE O/P EST MOD 30 MIN: CPT | Performed by: NURSE PRACTITIONER

## 2020-07-24 PROCEDURE — G8427 DOCREV CUR MEDS BY ELIG CLIN: HCPCS | Performed by: NURSE PRACTITIONER

## 2020-07-24 PROCEDURE — 4040F PNEUMOC VAC/ADMIN/RCVD: CPT | Performed by: NURSE PRACTITIONER

## 2020-07-24 PROCEDURE — G8417 CALC BMI ABV UP PARAM F/U: HCPCS | Performed by: NURSE PRACTITIONER

## 2020-07-24 RX ORDER — LISINOPRIL 10 MG/1
10 TABLET ORAL DAILY
Qty: 60 TABLET | Refills: 3 | Status: SHIPPED | OUTPATIENT
Start: 2020-07-24 | End: 2021-02-26 | Stop reason: ALTCHOICE

## 2020-07-24 NOTE — PATIENT INSTRUCTIONS
Instructions:   1. Medications: decrease lisinopril to 10mg once a day, if your blood pressure is running over 140 top number then increase to twice a day  2. Labs: 2 weeks  3. Lifestyle Recommendations: Weigh yourself every day in the morning after urination, call [facility] if wt increases 2-3lb in one day or 5lb in one week, Limit sodium to 2000mg/day and fluids to 2L or 64oz/day.    4. Follow up: Dr. Tabatha Hines in August      Heart Failure Hotline: [address]

## 2020-07-30 ENCOUNTER — OFFICE VISIT (OUTPATIENT)
Dept: INTERNAL MEDICINE CLINIC | Age: 85
End: 2020-07-30
Payer: MEDICARE

## 2020-07-30 VITALS
SYSTOLIC BLOOD PRESSURE: 97 MMHG | OXYGEN SATURATION: 96 % | BODY MASS INDEX: 30.59 KG/M2 | HEART RATE: 66 BPM | TEMPERATURE: 97.1 F | DIASTOLIC BLOOD PRESSURE: 61 MMHG | WEIGHT: 189.5 LBS

## 2020-07-30 DIAGNOSIS — I50.43 CHF (CONGESTIVE HEART FAILURE), NYHA CLASS I, ACUTE ON CHRONIC, COMBINED (HCC): ICD-10-CM

## 2020-07-30 LAB
ANION GAP SERPL CALCULATED.3IONS-SCNC: 13 MMOL/L (ref 3–16)
BUN BLDV-MCNC: 45 MG/DL (ref 7–20)
CALCIUM SERPL-MCNC: 9 MG/DL (ref 8.3–10.6)
CHLORIDE BLD-SCNC: 104 MMOL/L (ref 99–110)
CO2: 24 MMOL/L (ref 21–32)
CREAT SERPL-MCNC: 1.8 MG/DL (ref 0.8–1.3)
GFR AFRICAN AMERICAN: 43
GFR NON-AFRICAN AMERICAN: 36
GLUCOSE BLD-MCNC: 106 MG/DL (ref 70–99)
POTASSIUM SERPL-SCNC: 4.6 MMOL/L (ref 3.5–5.1)
PRO-BNP: 3258 PG/ML (ref 0–449)
SODIUM BLD-SCNC: 141 MMOL/L (ref 136–145)

## 2020-07-30 PROCEDURE — 99213 OFFICE O/P EST LOW 20 MIN: CPT | Performed by: STUDENT IN AN ORGANIZED HEALTH CARE EDUCATION/TRAINING PROGRAM

## 2020-07-30 RX ORDER — BUPROPION HYDROCHLORIDE 150 MG/1
150 TABLET ORAL 2 TIMES DAILY
Qty: 180 TABLET | Refills: 1 | Status: SHIPPED | OUTPATIENT
Start: 2020-07-30

## 2020-07-30 RX ORDER — NITROGLYCERIN 0.4 MG/1
TABLET SUBLINGUAL
Qty: 25 TABLET | Refills: 3 | Status: SHIPPED | OUTPATIENT
Start: 2020-07-30 | End: 2020-07-30 | Stop reason: SDUPTHER

## 2020-07-30 RX ORDER — NITROGLYCERIN 0.4 MG/1
TABLET SUBLINGUAL
Qty: 25 TABLET | Refills: 3 | Status: SHIPPED | OUTPATIENT
Start: 2020-07-30

## 2020-07-30 ASSESSMENT — ENCOUNTER SYMPTOMS
SHORTNESS OF BREATH: 0
DIARRHEA: 0
ABDOMINAL DISTENTION: 0
PHOTOPHOBIA: 0
SORE THROAT: 0
ABDOMINAL PAIN: 0
CONSTIPATION: 0
COLOR CHANGE: 0
COUGH: 0
NAUSEA: 0
VOMITING: 0

## 2020-07-30 NOTE — PATIENT INSTRUCTIONS
Please continue mirtazepine  Start taking wellbutrin daily    Change how you take furosemide.  Take 80mg around 8am. Take 40mg around 2pm.

## 2020-08-04 ENCOUNTER — OFFICE VISIT (OUTPATIENT)
Dept: CARDIOLOGY CLINIC | Age: 85
End: 2020-08-04
Payer: MEDICARE

## 2020-08-04 VITALS
SYSTOLIC BLOOD PRESSURE: 114 MMHG | HEART RATE: 72 BPM | WEIGHT: 191 LBS | TEMPERATURE: 98.2 F | DIASTOLIC BLOOD PRESSURE: 80 MMHG | BODY MASS INDEX: 30.83 KG/M2

## 2020-08-04 PROCEDURE — G8427 DOCREV CUR MEDS BY ELIG CLIN: HCPCS | Performed by: INTERNAL MEDICINE

## 2020-08-04 PROCEDURE — 1036F TOBACCO NON-USER: CPT | Performed by: INTERNAL MEDICINE

## 2020-08-04 PROCEDURE — 1123F ACP DISCUSS/DSCN MKR DOCD: CPT | Performed by: INTERNAL MEDICINE

## 2020-08-04 PROCEDURE — G8417 CALC BMI ABV UP PARAM F/U: HCPCS | Performed by: INTERNAL MEDICINE

## 2020-08-04 PROCEDURE — 4040F PNEUMOC VAC/ADMIN/RCVD: CPT | Performed by: INTERNAL MEDICINE

## 2020-08-04 PROCEDURE — 99214 OFFICE O/P EST MOD 30 MIN: CPT | Performed by: INTERNAL MEDICINE

## 2020-08-04 NOTE — PROGRESS NOTES
Subjective:      Patient ID: Guadalupe Courser is a 80 y.o. male. HPI: Karen juarez  being seen today in follow up for CAD/PTCA/AS/cardiomyopathy and abn myoview. heart doing ok. Breathing ok. No chest pain. HR good. Brock Santiago No chest pain. Remains active. No exertional chest pain. Edema stable. . No syncope. No tachycardia. No pnd. Past Medical History:   Diagnosis Date    Arthritis     Ascending aortic aneurysm ([address]     CAD (coronary artery disease)     Chronic kidney disease     Constipation     DDD (degenerative disc disease), lumbar     L5, S1    Dental disease     Hearing loss     Hyperlipidemia     Hypertension     Mild aortic stenosis     Prostate enlargement     TIA (transient ischemic attack)      Past Surgical History:   Procedure Laterality Date    CORONARY ANGIOPLASTY WITH STENT PLACEMENT  1/12/98    EYE SURGERY      SKIN TAG REMOVAL  2/2/10    TURP           No Known Allergies     Social History     Socioeconomic History    Marital status:      Spouse name: Not on file    Number of children: Not on file    Years of education: Not on file    Highest education level: Not on file   Occupational History    Not on file   Social Needs    Financial resource strain: Not on file    Food insecurity     Worry: Not on file     Inability: Not on file    Transportation needs     Medical: Not on file     Non-medical: Not on file   Tobacco Use    Smoking status: Never Smoker    Smokeless tobacco: Never Used   Substance and Sexual Activity    Alcohol use:  Yes     Alcohol/week: 0.0 standard drinks     Frequency: 2-4 times a month     Drinks per session: 1 or 2     Binge frequency: Never    Drug use: No    Sexual activity: Not on file   Lifestyle    Physical activity     Days per week: Not on file     Minutes per session: Not on file    Stress: Not on file   Relationships    Social connections     Talks on phone: Not on file     Gets together: Not on file     Attends Jewish service: Not on file     Active member of club or organization: Not on file     Attends meetings of clubs or organizations: Not on file     Relationship status: Not on file    Intimate partner violence     Fear of current or ex partner: Not on file     Emotionally abused: Not on file     Physically abused: Not on file     Forced sexual activity: Not on file   Other Topics Concern    Not on file   Social History Narrative    Not on file        FH reviewed , denies FH cardiac issues. .           Current Outpatient Medications   Medication Sig Dispense Refill    buPROPion (WELLBUTRIN XL) 150 MG extended release tablet Take 1 tablet by mouth 2 times daily 180 tablet 1    nitroGLYCERIN (NITROSTAT) 0.4 MG SL tablet PLACE 1 TABLET UNDER THE TONGUE EVERY 5 MINUTES IF NEEDED FOR CHEST PAIN UP TO MAXIMUM OF 3 TOTAL DOSES.  IF NO RELIEF AFTER 1 DOSE CALL 911 25 tablet 3    lisinopril (PRINIVIL;ZESTRIL) 10 MG tablet Take 1 tablet by mouth daily 60 tablet 3    spironolactone (ALDACTONE) 50 MG tablet TAKE 1 TABLET BY MOUTH DAILY 30 tablet 1    ondansetron (ZOFRAN ODT) 4 MG disintegrating tablet Take 1 tablet by mouth every 8 hours as needed for Nausea 20 tablet 0    mirtazapine (REMERON) 7.5 MG tablet Take 1 tablet by mouth nightly 30 tablet 5    furosemide (LASIX) 40 MG tablet Take 80 mg in the morning and 40 mg in the afternoon 60 tablet 2    melatonin (RA MELATONIN) 3 MG TABS tablet Take 1 tablet by mouth nightly as needed (sleep) 30 tablet 0    azelastine (OPTIVAR) 0.05 % ophthalmic solution Place 1 drop into both eyes 3 times daily       polyethylene glycol (GLYCOLAX) 17 GM/SCOOP powder Take 17 g by mouth daily      polyvinyl alcohol (LIQUIFILM TEARS) 1.4 % ophthalmic solution Place 1 drop into both eyes every 3 hours as needed      tamsulosin (FLOMAX) 0.4 MG capsule Take 0.4 mg by mouth daily      aspirin 325 MG EC tablet Take 1 tablet by mouth daily 30 tablet 3    hydrocortisone 2.5 % cream Apply topically left side. Pulmonary/Chest: Effort normal and breath sounds normal. No respiratory distress. He has no wheezes. He has no rales. Abdominal: Soft. Bowel sounds are normal. There is no tenderness. Musculoskeletal: Normal range of motion. He exhibits + edema. Neurological: He is alert and oriented to person, place, and time. Skin: Skin is warm and dry. Psychiatric: He has a normal mood and affect. His behavior is normal. Thought content normal.       Assessment:       Diagnosis Orders   1. Chronic systolic congestive heart failure ([address]     2. CAD in native artery     3. Hx of CABG     4. Ischemic cardiomyopathy     5. Bradycardia     6. Nonrheumatic aortic valve stenosis             Plan:      Some sob and edema. Remains active. BP is good. HR good. On coreg of 12.5  mg bid. Lisinopril daily. No angina. No exertional sx. Wt up some. Continue  lasix 80 mg q am and 40 q pm.   Dr Shanel Sanders following aorta. Reviewed previous records and testing including cath 12/15 ,echo 7/20, myoview 10/17 and  hosp stay for CHF. Discussed decline in LV function and medical approach to issue. Follow up 6-8  wks.

## 2020-08-06 ENCOUNTER — OFFICE VISIT (OUTPATIENT)
Dept: PRIMARY CARE CLINIC | Age: 85
End: 2020-08-06
Payer: MEDICARE

## 2020-08-06 PROCEDURE — G8428 CUR MEDS NOT DOCUMENT: HCPCS | Performed by: NURSE PRACTITIONER

## 2020-08-06 PROCEDURE — 99211 OFF/OP EST MAY X REQ PHY/QHP: CPT | Performed by: NURSE PRACTITIONER

## 2020-08-06 PROCEDURE — G8417 CALC BMI ABV UP PARAM F/U: HCPCS | Performed by: NURSE PRACTITIONER

## 2020-08-06 NOTE — PROGRESS NOTES
Theresa Arreola received a viral test for COVID-19. They were educated on isolation and quarantine as appropriate. For any symptoms, they were directed to seek care from their PCP, given contact information to establish with a doctor, directed to an urgent care or the emergency room.

## 2020-08-13 LAB — SARS-COV-2, NAA: NOT DETECTED

## 2020-11-03 PROBLEM — I25.10 CAD (CORONARY ARTERY DISEASE): Status: RESOLVED | Noted: 2020-11-03 | Resolved: 2020-11-03

## 2020-11-04 ENCOUNTER — HOSPITAL ENCOUNTER (INPATIENT)
Age: 85
LOS: 4 days | Discharge: NURSING, REHAB OR LONG-TERM CARE | DRG: 683 | End: 2020-11-09
Attending: EMERGENCY MEDICINE | Admitting: INTERNAL MEDICINE
Payer: MEDICARE

## 2020-11-04 ENCOUNTER — APPOINTMENT (OUTPATIENT)
Dept: CT IMAGING | Age: 85
DRG: 683 | End: 2020-11-04
Payer: MEDICARE

## 2020-11-04 LAB
ANION GAP SERPL CALCULATED.3IONS-SCNC: 12 MMOL/L (ref 3–16)
BASE EXCESS VENOUS: 0.3 MMOL/L (ref -2–3)
BASOPHILS ABSOLUTE: 0 K/UL (ref 0–0.2)
BASOPHILS RELATIVE PERCENT: 0 %
BILIRUBIN URINE: NEGATIVE
BLOOD, URINE: NEGATIVE
BUN BLDV-MCNC: 57 MG/DL (ref 7–20)
CALCIUM SERPL-MCNC: 8.6 MG/DL (ref 8.3–10.6)
CARBOXYHEMOGLOBIN: 1.6 % (ref 0–1.5)
CHLORIDE BLD-SCNC: 106 MMOL/L (ref 99–110)
CLARITY: CLEAR
CO2: 22 MMOL/L (ref 21–32)
COLOR: YELLOW
CREAT SERPL-MCNC: 2.9 MG/DL (ref 0.8–1.3)
EOSINOPHILS ABSOLUTE: 0.1 K/UL (ref 0–0.6)
EOSINOPHILS RELATIVE PERCENT: 2 %
GFR AFRICAN AMERICAN: 25
GFR NON-AFRICAN AMERICAN: 21
GLUCOSE BLD-MCNC: 107 MG/DL (ref 70–99)
GLUCOSE BLD-MCNC: 190 MG/DL (ref 70–99)
GLUCOSE BLD-MCNC: 84 MG/DL (ref 70–99)
GLUCOSE BLD-MCNC: 91 MG/DL (ref 70–99)
GLUCOSE URINE: NEGATIVE MG/DL
HCO3 VENOUS: 26.3 MMOL/L (ref 24–28)
HCT VFR BLD CALC: 32.2 % (ref 40.5–52.5)
HEMOGLOBIN, VEN, REDUCED: 48.6 %
HEMOGLOBIN: 11 G/DL (ref 13.5–17.5)
KETONES, URINE: NEGATIVE MG/DL
LEUKOCYTE ESTERASE, URINE: NEGATIVE
LYMPHOCYTES ABSOLUTE: 0.8 K/UL (ref 1–5.1)
LYMPHOCYTES RELATIVE PERCENT: 15 %
MCH RBC QN AUTO: 34.2 PG (ref 26–34)
MCHC RBC AUTO-ENTMCNC: 34.3 G/DL (ref 31–36)
MCV RBC AUTO: 99.8 FL (ref 80–100)
METHEMOGLOBIN VENOUS: 0.8 % (ref 0–1.5)
MICROSCOPIC EXAMINATION: NORMAL
MONOCYTES ABSOLUTE: 0.5 K/UL (ref 0–1.3)
MONOCYTES RELATIVE PERCENT: 10 %
NEUTROPHILS ABSOLUTE: 3.7 K/UL (ref 1.7–7.7)
NEUTROPHILS RELATIVE PERCENT: 73 %
NITRITE, URINE: NEGATIVE
O2 SAT, VEN: 50 %
OVALOCYTES: ABNORMAL
PCO2, VEN: 52.3 MMHG (ref 41–51)
PDW BLD-RTO: 14.2 % (ref 12.4–15.4)
PERFORMED ON: ABNORMAL
PERFORMED ON: NORMAL
PERFORMED ON: NORMAL
PH UA: 6 (ref 5–8)
PH VENOUS: 7.32 (ref 7.35–7.45)
PLATELET # BLD: 132 K/UL (ref 135–450)
PMV BLD AUTO: 7.7 FL (ref 5–10.5)
PO2, VEN: 30.8 MMHG (ref 25–40)
POTASSIUM REFLEX MAGNESIUM: 5.3 MMOL/L (ref 3.5–5.1)
PRO-BNP: 3503 PG/ML (ref 0–449)
PROTEIN UA: NEGATIVE MG/DL
RBC # BLD: 3.23 M/UL (ref 4.2–5.9)
SODIUM BLD-SCNC: 140 MMOL/L (ref 136–145)
SPECIFIC GRAVITY UA: 1.02 (ref 1–1.03)
TCO2 CALC VENOUS: 28 MMOL/L
TROPONIN: 0.02 NG/ML
TROPONIN: 0.02 NG/ML
URINE REFLEX TO CULTURE: NORMAL
URINE TYPE: NORMAL
UROBILINOGEN, URINE: 0.2 E.U./DL
WBC # BLD: 5 K/UL (ref 4–11)

## 2020-11-04 PROCEDURE — 84484 ASSAY OF TROPONIN QUANT: CPT

## 2020-11-04 PROCEDURE — 96365 THER/PROPH/DIAG IV INF INIT: CPT

## 2020-11-04 PROCEDURE — 85025 COMPLETE CBC W/AUTO DIFF WBC: CPT

## 2020-11-04 PROCEDURE — 99284 EMERGENCY DEPT VISIT MOD MDM: CPT

## 2020-11-04 PROCEDURE — 96361 HYDRATE IV INFUSION ADD-ON: CPT

## 2020-11-04 PROCEDURE — 80048 BASIC METABOLIC PNL TOTAL CA: CPT

## 2020-11-04 PROCEDURE — 82803 BLOOD GASES ANY COMBINATION: CPT

## 2020-11-04 PROCEDURE — 6370000000 HC RX 637 (ALT 250 FOR IP): Performed by: STUDENT IN AN ORGANIZED HEALTH CARE EDUCATION/TRAINING PROGRAM

## 2020-11-04 PROCEDURE — 81003 URINALYSIS AUTO W/O SCOPE: CPT

## 2020-11-04 PROCEDURE — 6360000002 HC RX W HCPCS: Performed by: STUDENT IN AN ORGANIZED HEALTH CARE EDUCATION/TRAINING PROGRAM

## 2020-11-04 PROCEDURE — 2580000003 HC RX 258: Performed by: STUDENT IN AN ORGANIZED HEALTH CARE EDUCATION/TRAINING PROGRAM

## 2020-11-04 PROCEDURE — 96375 TX/PRO/DX INJ NEW DRUG ADDON: CPT

## 2020-11-04 PROCEDURE — 93005 ELECTROCARDIOGRAM TRACING: CPT | Performed by: EMERGENCY MEDICINE

## 2020-11-04 PROCEDURE — 74176 CT ABD & PELVIS W/O CONTRAST: CPT

## 2020-11-04 PROCEDURE — 83880 ASSAY OF NATRIURETIC PEPTIDE: CPT

## 2020-11-04 RX ORDER — DEXTROSE MONOHYDRATE 25 G/50ML
50 INJECTION, SOLUTION INTRAVENOUS ONCE
Status: COMPLETED | OUTPATIENT
Start: 2020-11-04 | End: 2020-11-04

## 2020-11-04 RX ORDER — NICOTINE POLACRILEX 4 MG
15 LOZENGE BUCCAL PRN
Status: DISCONTINUED | OUTPATIENT
Start: 2020-11-04 | End: 2020-11-09 | Stop reason: HOSPADM

## 2020-11-04 RX ORDER — CALCIUM GLUCONATE 94 MG/ML
1 INJECTION, SOLUTION INTRAVENOUS ONCE
Status: DISCONTINUED | OUTPATIENT
Start: 2020-11-04 | End: 2020-11-04 | Stop reason: SDUPTHER

## 2020-11-04 RX ORDER — DEXTROSE MONOHYDRATE 50 MG/ML
100 INJECTION, SOLUTION INTRAVENOUS PRN
Status: DISCONTINUED | OUTPATIENT
Start: 2020-11-04 | End: 2020-11-09 | Stop reason: HOSPADM

## 2020-11-04 RX ORDER — DEXTROSE MONOHYDRATE 25 G/50ML
12.5 INJECTION, SOLUTION INTRAVENOUS PRN
Status: DISCONTINUED | OUTPATIENT
Start: 2020-11-04 | End: 2020-11-09 | Stop reason: HOSPADM

## 2020-11-04 RX ORDER — 0.9 % SODIUM CHLORIDE 0.9 %
250 INTRAVENOUS SOLUTION INTRAVENOUS ONCE
Status: COMPLETED | OUTPATIENT
Start: 2020-11-04 | End: 2020-11-05

## 2020-11-04 RX ADMIN — INSULIN HUMAN 10 UNITS: 100 INJECTION, SOLUTION PARENTERAL at 19:15

## 2020-11-04 RX ADMIN — SODIUM CHLORIDE 250 ML: 9 INJECTION, SOLUTION INTRAVENOUS at 22:50

## 2020-11-04 RX ADMIN — DEXTROSE MONOHYDRATE 50 G: 25 INJECTION, SOLUTION INTRAVENOUS at 19:15

## 2020-11-04 RX ADMIN — CALCIUM GLUCONATE 1 G: 98 INJECTION, SOLUTION INTRAVENOUS at 21:32

## 2020-11-04 ASSESSMENT — PAIN DESCRIPTION - FREQUENCY: FREQUENCY: CONTINUOUS

## 2020-11-04 ASSESSMENT — PAIN SCALES - GENERAL: PAINLEVEL_OUTOF10: 7

## 2020-11-04 ASSESSMENT — PAIN DESCRIPTION - DESCRIPTORS: DESCRIPTORS: PRESSURE

## 2020-11-04 ASSESSMENT — PAIN DESCRIPTION - ORIENTATION: ORIENTATION: LEFT

## 2020-11-04 ASSESSMENT — PAIN DESCRIPTION - LOCATION: LOCATION: ABDOMEN;BACK

## 2020-11-04 ASSESSMENT — PAIN DESCRIPTION - PAIN TYPE: TYPE: ACUTE PAIN

## 2020-11-04 NOTE — ED PROVIDER NOTES
1 [facility]  EMERGENCY DEPARTMENT ENCOUNTER          [facility] RESIDENT NOTE       Date of evaluation: 11/4/2020    Chief Complaint     Flank Pain and Abdominal Pain      History of Present Illness     Lolly Hernandez is a 80 y.o. male with past medical history CAD, hypertension, combined CHF, who presents with back and abdominal pain. Patient states yesterday he started to have a left dull pressure in the middle of his back that went away but this morning he started to feel the pain on the left side of his side and left side of his lower abdomen. He says he has had some nausea today. He denies any vomiting, fever, chills, constipation, diarrhea, blood in stools, dysuria, trouble urinating, he says he does have increased urinary frequency but this is been from his Lasix. Review of Systems     Review of Systems   All other systems reviewed and are negative. Past Medical, Surgical, Family, and Social History     He has a past medical history of Arthritis, Ascending aortic aneurysm ([address] CAD (coronary artery disease), Chronic kidney disease, Constipation, DDD (degenerative disc disease), lumbar, Dental disease, Hearing loss, Hyperlipidemia, Hypertension, Mild aortic stenosis, Prostate enlargement, and TIA (transient ischemic attack). He has a past surgical history that includes TURP; Coronary angioplasty with stent (1/12/98); Skin tag removal (2/2/10); and eye surgery. His family history includes Cancer in his mother; Hypertension in his mother; Stroke in his father. He reports that he has never smoked. He has never used smokeless tobacco. He reports current alcohol use. He reports that he does not use drugs.     Medications     Previous Medications    ASPIRIN 325 MG EC TABLET    Take 1 tablet by mouth daily    ATORVASTATIN (LIPITOR) 80 MG TABLET    Take 1 tablet by mouth nightly    AZELASTINE (OPTIVAR) 0.05 % OPHTHALMIC SOLUTION    Place 1 drop into both eyes 3 times daily     BUPROPION [facility] WBC 5.0 4.0 - 11.0 K/uL    RBC 3.23 (L) 4.20 - 5.90 M/uL    Hemoglobin 11.0 (L) 13.5 - 17.5 g/dL    Hematocrit 32.2 (L) 40.5 - 52.5 %    MCV 99.8 80.0 - 100.0 fL    MCH 34.2 (H) 26.0 - 34.0 pg    MCHC 34.3 31.0 - 36.0 g/dL    RDW 14.2 12.4 - 15.4 %    Platelets 235 (L) 308 - 450 K/uL    MPV 7.7 5.0 - 10.5 fL    Neutrophils % 73.0 %    Lymphocytes % 15.0 %    Monocytes % 10.0 %    Eosinophils % 2.0 %    Basophils % 0.0 %    Neutrophils Absolute 3.7 1.7 - 7.7 K/uL    Lymphocytes Absolute 0.8 (L) 1.0 - 5.1 K/uL    Monocytes Absolute 0.5 0.0 - 1.3 K/uL    Eosinophils Absolute 0.1 0.0 - 0.6 K/uL    Basophils Absolute 0.0 0.0 - 0.2 K/uL    Ovalocytes Occasional (A)    Basic Metabolic Panel w/ Reflex to MG   Result Value Ref Range    Sodium 140 136 - 145 mmol/L    Potassium reflex Magnesium 5.3 (H) 3.5 - 5.1 mmol/L    Chloride 106 99 - 110 mmol/L    CO2 22 21 - 32 mmol/L    Anion Gap 12 3 - 16    Glucose 107 (H) 70 - 99 mg/dL    BUN 57 (H) 7 - 20 mg/dL    CREATININE 2.9 (H) 0.8 - 1.3 mg/dL    GFR Non-African American 21 (A) >60    GFR  25 (A) >60    Calcium 8.6 8.3 - 10.6 mg/dL   Troponin   Result Value Ref Range    Troponin 0.02 (H) <0.01 ng/mL   Brain Natriuretic Peptide   Result Value Ref Range    Pro-BNP 3,503 (H) 0 - 449 pg/mL   Urinalysis Reflex to Culture    Specimen: Urine, clean catch   Result Value Ref Range    Color, UA Yellow Straw/Yellow    Clarity, UA Clear Clear    Glucose, Ur Negative Negative mg/dL    Bilirubin Urine Negative Negative    Ketones, Urine Negative Negative mg/dL    Specific Gravity, UA 1.020 1.005 - 1.030    Blood, Urine Negative Negative    pH, UA 6.0 5.0 - 8.0    Protein, UA Negative Negative mg/dL    Urobilinogen, Urine 0.2 <2.0 E.U./dL    Nitrite, Urine Negative Negative    Leukocyte Esterase, Urine Negative Negative    Microscopic Examination Not Indicated     Urine Type NotGiven     Urine Reflex to Culture Not Indicated    Troponin   Result Value Ref Range    Troponin 0.02 (H) <0.01 ng/mL   Blood Gas, Venous   Result Value Ref Range    pH, Terry 7.323 (L) 7.350 - 7.450    pCO2, Terry 52.3 (H) 41.0 - 51.0 mmHg    pO2, Terry 30.8 25.0 - 40.0 mmHg    HCO3, Venous 26.3 24.0 - 28.0 mmol/L    Base Excess, Terry 0.3 -2.0 - 3.0 mmol/L    O2 Sat, Terry 50 Not established %    Carboxyhemoglobin 1.6 (H) 0.0 - 1.5 %    MetHgb, Terry 0.8 0.0 - 1.5 %    TC02 (Calc), Terry 28 mmol/L    Hemoglobin, Terry, Reduced 48.60 %   POCT Glucose   Result Value Ref Range    POC Glucose 91 70 - 99 mg/dl    Performed on ACCU-CHEK    POCT Glucose   Result Value Ref Range    POC Glucose 190 (H) 70 - 99 mg/dl    Performed on ACCU-CHEK        ED BEDSIDE ULTRASOUND:      RECENT VITALS:  BP: 117/65, Temp: 98.1 °F (36.7 °C),Pulse: 64, Resp: 23, SpO2: 99 %     Procedures         ED Course     Nursing Notes, Past Medical Hx, Past Surgical Hx, Social Hx, Allergies, and FamilyHx were reviewed. The patient was giventhe following medications:  Orders Placed This Encounter   Medications    AND Linked Order Group     insulin regular (HUMULIN R;NOVOLIN R) injection 10 Units     dextrose 50 % IV solution    glucose (GLUTOSE) 40 % oral gel 15 g    dextrose 50 % IV solution    glucagon (rDNA) injection 1 mg    dextrose 5 % solution    DISCONTD: calcium gluconate 10 % injection 1 g    calcium gluconate 1 g in dextrose 5 % 100 mL IVPB    0.9 % sodium chloride bolus       CONSULTS:  IP CONSULT TO HOSPITALIST    MEDICAL DECISION MAKING / ASSESSMENT / Emiliano Enedina is a 80 y.o. male is with back and flank pain. Nature of pain starting him back and migrating to left flank concerning for kidney stone but she says he has a history of in the past.  Patient hemodynamically stable but tachycardic with ECG showing multiple PACs with some normal sinus complexes along with multiple 1 second pauses.   Patient was found to be hyperkalemic to 5.3 with concern this could be an abnormal rhythm and was given 2 Amps of dextrose, 10 g insulin, and calcium gluconate. He was also found to have an CARLI with creatinine 2.9 with baseline of 1.5-1.8. Elevated BNP to 3503 with most recent in July being 3258, he had no signs of fluid overload on exam.  Troponin was also elevated . 02 with repeat 0.02. CTAP performed demonstrated no evidence of nephrolithiasis but did demonstrate AAA measuring 5.4 x 4.6 cm along with multiple cysts and other lesions in the kidney. Due to new CARLI on CKD the patient was admitted and discussed with the hospitalist.     This patient was also evaluated by the attending physician. All care plans were discussed and agreed upon. Clinical Impression     1. Acute kidney injury superimposed on CKD ([facility] [address]        Disposition     PATIENT REFERRED TO:  No follow-up provider specified.     DISCHARGE MEDICATIONS:  New Prescriptions    No medications on file       Ayo Harrison MD  Resident  11/04/20 1980

## 2020-11-04 NOTE — ED TRIAGE NOTES
Pt began to have pressure in the L side of his back yesterday then it began wrapping around to his abdomen. Today pt doesn't experience back pressure but it is still in his LLQ of the abdomen.

## 2020-11-04 NOTE — ED PROVIDER NOTES
ED Attending Attestation Note     Date of evaluation: 11/4/2020    This patient was seen by the resident. I have seen and examined the patient, agree with the workup, evaluation, management and diagnosis. The care plan has been discussed. I have reviewed the ECG and concur with the resident's interpretation. My assessment reveals an 80 yom who presents with CC of flank pain, abdominal pain. Patient with 4 days of low back pain. Pain constant, today worsened and then moved to his left lower quadrant. Pain is currently a 7 out of 10. Mild left lower quadrant tenderness to palpation on exam.  No flank tenderness to palpation.      Jimbo Colby MD  11/04/20 1981

## 2020-11-05 PROBLEM — N17.9 AKI (ACUTE KIDNEY INJURY) (HCC): Status: ACTIVE | Noted: 2020-11-05

## 2020-11-05 LAB
ALBUMIN SERPL-MCNC: 4 G/DL (ref 3.4–5)
ANION GAP SERPL CALCULATED.3IONS-SCNC: 15 MMOL/L (ref 3–16)
APTT: 28.2 SEC (ref 24.2–36.2)
BASOPHILS ABSOLUTE: 0 K/UL (ref 0–0.2)
BASOPHILS RELATIVE PERCENT: 0.4 %
BUN BLDV-MCNC: 56 MG/DL (ref 7–20)
C-REACTIVE PROTEIN: 0.5 MG/L (ref 0–5.1)
CALCIUM SERPL-MCNC: 9.1 MG/DL (ref 8.3–10.6)
CHLORIDE BLD-SCNC: 106 MMOL/L (ref 99–110)
CO2: 21 MMOL/L (ref 21–32)
CREAT SERPL-MCNC: 2.6 MG/DL (ref 0.8–1.3)
CREATININE URINE: 66.3 MG/DL (ref 39–259)
EKG ATRIAL RATE: 71 BPM
EKG ATRIAL RATE: 95 BPM
EKG DIAGNOSIS: NORMAL
EKG DIAGNOSIS: NORMAL
EKG P AXIS: 6 DEGREES
EKG P-R INTERVAL: 154 MS
EKG P-R INTERVAL: 248 MS
EKG Q-T INTERVAL: 394 MS
EKG Q-T INTERVAL: 394 MS
EKG QRS DURATION: 132 MS
EKG QRS DURATION: 96 MS
EKG QTC CALCULATION (BAZETT): 428 MS
EKG QTC CALCULATION (BAZETT): 495 MS
EKG R AXIS: -14 DEGREES
EKG R AXIS: 8 DEGREES
EKG T AXIS: 8 DEGREES
EKG T AXIS: 96 DEGREES
EKG VENTRICULAR RATE: 71 BPM
EKG VENTRICULAR RATE: 95 BPM
EOSINOPHILS ABSOLUTE: 0.2 K/UL (ref 0–0.6)
EOSINOPHILS RELATIVE PERCENT: 2.8 %
GFR AFRICAN AMERICAN: 28
GFR NON-AFRICAN AMERICAN: 23
GLUCOSE BLD-MCNC: 107 MG/DL (ref 70–99)
GLUCOSE BLD-MCNC: 110 MG/DL (ref 70–99)
GLUCOSE BLD-MCNC: 121 MG/DL (ref 70–99)
GLUCOSE BLD-MCNC: 121 MG/DL (ref 70–99)
GLUCOSE BLD-MCNC: 123 MG/DL (ref 70–99)
HCT VFR BLD CALC: 34.7 % (ref 40.5–52.5)
HEMOGLOBIN: 11.7 G/DL (ref 13.5–17.5)
INR BLD: 1.34 (ref 0.86–1.14)
LACTIC ACID: 0.9 MMOL/L (ref 0.4–2)
LYMPHOCYTES ABSOLUTE: 1 K/UL (ref 1–5.1)
LYMPHOCYTES RELATIVE PERCENT: 16.2 %
MCH RBC QN AUTO: 33.7 PG (ref 26–34)
MCHC RBC AUTO-ENTMCNC: 33.7 G/DL (ref 31–36)
MCV RBC AUTO: 100.1 FL (ref 80–100)
MONOCYTES ABSOLUTE: 0.6 K/UL (ref 0–1.3)
MONOCYTES RELATIVE PERCENT: 10 %
NEUTROPHILS ABSOLUTE: 4.4 K/UL (ref 1.7–7.7)
NEUTROPHILS RELATIVE PERCENT: 70.6 %
PDW BLD-RTO: 13.9 % (ref 12.4–15.4)
PERFORMED ON: ABNORMAL
PHOSPHORUS: 4.3 MG/DL (ref 2.5–4.9)
PLATELET # BLD: 139 K/UL (ref 135–450)
PMV BLD AUTO: 7.9 FL (ref 5–10.5)
POTASSIUM SERPL-SCNC: 5 MMOL/L (ref 3.5–5.1)
PROTHROMBIN TIME: 15.6 SEC (ref 10–13.2)
RBC # BLD: 3.46 M/UL (ref 4.2–5.9)
SEDIMENTATION RATE, ERYTHROCYTE: 21 MM/HR (ref 0–20)
SODIUM BLD-SCNC: 142 MMOL/L (ref 136–145)
SODIUM URINE: 63 MMOL/L
TROPONIN: 0.02 NG/ML
UREA NITROGEN, UR: 614.4 MG/DL (ref 800–1666)
WBC # BLD: 6.2 K/UL (ref 4–11)

## 2020-11-05 PROCEDURE — 6370000000 HC RX 637 (ALT 250 FOR IP): Performed by: INTERNAL MEDICINE

## 2020-11-05 PROCEDURE — 97530 THERAPEUTIC ACTIVITIES: CPT

## 2020-11-05 PROCEDURE — 80069 RENAL FUNCTION PANEL: CPT

## 2020-11-05 PROCEDURE — 84300 ASSAY OF URINE SODIUM: CPT

## 2020-11-05 PROCEDURE — 85025 COMPLETE CBC W/AUTO DIFF WBC: CPT

## 2020-11-05 PROCEDURE — 2580000003 HC RX 258: Performed by: INTERNAL MEDICINE

## 2020-11-05 PROCEDURE — 84540 ASSAY OF URINE/UREA-N: CPT

## 2020-11-05 PROCEDURE — 36415 COLL VENOUS BLD VENIPUNCTURE: CPT

## 2020-11-05 PROCEDURE — 51798 US URINE CAPACITY MEASURE: CPT

## 2020-11-05 PROCEDURE — 2060000000 HC ICU INTERMEDIATE R&B

## 2020-11-05 PROCEDURE — 97116 GAIT TRAINING THERAPY: CPT

## 2020-11-05 PROCEDURE — 99223 1ST HOSP IP/OBS HIGH 75: CPT | Performed by: SURGERY

## 2020-11-05 PROCEDURE — 85610 PROTHROMBIN TIME: CPT

## 2020-11-05 PROCEDURE — 97165 OT EVAL LOW COMPLEX 30 MIN: CPT

## 2020-11-05 PROCEDURE — 86140 C-REACTIVE PROTEIN: CPT

## 2020-11-05 PROCEDURE — 85730 THROMBOPLASTIN TIME PARTIAL: CPT

## 2020-11-05 PROCEDURE — 82570 ASSAY OF URINE CREATININE: CPT

## 2020-11-05 PROCEDURE — 85652 RBC SED RATE AUTOMATED: CPT

## 2020-11-05 PROCEDURE — 83605 ASSAY OF LACTIC ACID: CPT

## 2020-11-05 PROCEDURE — 97161 PT EVAL LOW COMPLEX 20 MIN: CPT

## 2020-11-05 PROCEDURE — 83883 ASSAY NEPHELOMETRY NOT SPEC: CPT

## 2020-11-05 PROCEDURE — 6360000002 HC RX W HCPCS: Performed by: INTERNAL MEDICINE

## 2020-11-05 PROCEDURE — 93010 ELECTROCARDIOGRAM REPORT: CPT | Performed by: INTERNAL MEDICINE

## 2020-11-05 PROCEDURE — 84484 ASSAY OF TROPONIN QUANT: CPT

## 2020-11-05 RX ORDER — POTASSIUM CHLORIDE 7.45 MG/ML
10 INJECTION INTRAVENOUS PRN
Status: DISCONTINUED | OUTPATIENT
Start: 2020-11-05 | End: 2020-11-09 | Stop reason: HOSPADM

## 2020-11-05 RX ORDER — LANOLIN ALCOHOL/MO/W.PET/CERES
3 CREAM (GRAM) TOPICAL NIGHTLY PRN
Status: DISCONTINUED | OUTPATIENT
Start: 2020-11-05 | End: 2020-11-09 | Stop reason: HOSPADM

## 2020-11-05 RX ORDER — ACETAMINOPHEN 650 MG/1
650 SUPPOSITORY RECTAL EVERY 6 HOURS PRN
Status: DISCONTINUED | OUTPATIENT
Start: 2020-11-05 | End: 2020-11-09 | Stop reason: HOSPADM

## 2020-11-05 RX ORDER — MULTIVITAMIN WITH IRON
1 TABLET ORAL DAILY
Status: DISCONTINUED | OUTPATIENT
Start: 2020-11-05 | End: 2020-11-09 | Stop reason: HOSPADM

## 2020-11-05 RX ORDER — ATORVASTATIN CALCIUM 80 MG/1
80 TABLET, FILM COATED ORAL NIGHTLY
Status: DISCONTINUED | OUTPATIENT
Start: 2020-11-05 | End: 2020-11-09 | Stop reason: HOSPADM

## 2020-11-05 RX ORDER — LORAZEPAM 0.5 MG/1
0.5 TABLET ORAL EVERY 8 HOURS PRN
Status: DISCONTINUED | OUTPATIENT
Start: 2020-11-05 | End: 2020-11-09 | Stop reason: HOSPADM

## 2020-11-05 RX ORDER — SODIUM CHLORIDE 9 MG/ML
1000 INJECTION, SOLUTION INTRAVENOUS ONCE
Status: COMPLETED | OUTPATIENT
Start: 2020-11-05 | End: 2020-11-05

## 2020-11-05 RX ORDER — MIRTAZAPINE 15 MG/1
7.5 TABLET, FILM COATED ORAL NIGHTLY
Status: DISCONTINUED | OUTPATIENT
Start: 2020-11-05 | End: 2020-11-09 | Stop reason: HOSPADM

## 2020-11-05 RX ORDER — SENNA AND DOCUSATE SODIUM 50; 8.6 MG/1; MG/1
2 TABLET, FILM COATED ORAL DAILY PRN
Status: DISCONTINUED | OUTPATIENT
Start: 2020-11-05 | End: 2020-11-09 | Stop reason: HOSPADM

## 2020-11-05 RX ORDER — ACETAMINOPHEN 325 MG/1
650 TABLET ORAL EVERY 6 HOURS PRN
Status: DISCONTINUED | OUTPATIENT
Start: 2020-11-05 | End: 2020-11-09 | Stop reason: HOSPADM

## 2020-11-05 RX ORDER — SODIUM CHLORIDE 9 MG/ML
INJECTION, SOLUTION INTRAVENOUS CONTINUOUS
Status: DISCONTINUED | OUTPATIENT
Start: 2020-11-05 | End: 2020-11-09

## 2020-11-05 RX ORDER — FAMOTIDINE 20 MG/1
10 TABLET, FILM COATED ORAL DAILY
Status: DISCONTINUED | OUTPATIENT
Start: 2020-11-05 | End: 2020-11-09 | Stop reason: HOSPADM

## 2020-11-05 RX ORDER — ONDANSETRON 2 MG/ML
4 INJECTION INTRAMUSCULAR; INTRAVENOUS EVERY 6 HOURS PRN
Status: DISCONTINUED | OUTPATIENT
Start: 2020-11-05 | End: 2020-11-09 | Stop reason: HOSPADM

## 2020-11-05 RX ORDER — SODIUM CHLORIDE 0.9 % (FLUSH) 0.9 %
10 SYRINGE (ML) INJECTION EVERY 12 HOURS SCHEDULED
Status: DISCONTINUED | OUTPATIENT
Start: 2020-11-05 | End: 2020-11-09 | Stop reason: HOSPADM

## 2020-11-05 RX ORDER — FAMOTIDINE 20 MG/1
20 TABLET, FILM COATED ORAL DAILY PRN
Status: DISCONTINUED | OUTPATIENT
Start: 2020-11-05 | End: 2020-11-09 | Stop reason: HOSPADM

## 2020-11-05 RX ORDER — LORAZEPAM 0.5 MG/1
0.5 TABLET ORAL EVERY 8 HOURS PRN
Status: ON HOLD | COMMUNITY
End: 2020-11-09 | Stop reason: SDUPTHER

## 2020-11-05 RX ORDER — SODIUM CHLORIDE 0.9 % (FLUSH) 0.9 %
10 SYRINGE (ML) INJECTION PRN
Status: DISCONTINUED | OUTPATIENT
Start: 2020-11-05 | End: 2020-11-09 | Stop reason: HOSPADM

## 2020-11-05 RX ORDER — BUPROPION HYDROCHLORIDE 150 MG/1
150 TABLET ORAL 2 TIMES DAILY
Status: DISCONTINUED | OUTPATIENT
Start: 2020-11-05 | End: 2020-11-09 | Stop reason: HOSPADM

## 2020-11-05 RX ORDER — MAGNESIUM SULFATE IN WATER 40 MG/ML
2 INJECTION, SOLUTION INTRAVENOUS PRN
Status: DISCONTINUED | OUTPATIENT
Start: 2020-11-05 | End: 2020-11-09 | Stop reason: HOSPADM

## 2020-11-05 RX ORDER — TAMSULOSIN HYDROCHLORIDE 0.4 MG/1
0.4 CAPSULE ORAL DAILY
Status: DISCONTINUED | OUTPATIENT
Start: 2020-11-05 | End: 2020-11-09 | Stop reason: HOSPADM

## 2020-11-05 RX ORDER — POLYETHYLENE GLYCOL 3350 17 G/17G
17 POWDER, FOR SOLUTION ORAL 2 TIMES DAILY
Status: DISCONTINUED | OUTPATIENT
Start: 2020-11-05 | End: 2020-11-09 | Stop reason: HOSPADM

## 2020-11-05 RX ORDER — PROMETHAZINE HYDROCHLORIDE 12.5 MG/1
12.5 TABLET ORAL EVERY 6 HOURS PRN
Status: DISCONTINUED | OUTPATIENT
Start: 2020-11-05 | End: 2020-11-09 | Stop reason: HOSPADM

## 2020-11-05 RX ADMIN — THERA TABS 1 TABLET: TAB at 16:42

## 2020-11-05 RX ADMIN — SODIUM CHLORIDE: 9 INJECTION, SOLUTION INTRAVENOUS at 15:01

## 2020-11-05 RX ADMIN — DOCUSATE SODIUM 50 MG AND SENNOSIDES 8.6 MG 2 TABLET: 8.6; 5 TABLET, FILM COATED ORAL at 16:41

## 2020-11-05 RX ADMIN — Medication 3 MG: at 20:49

## 2020-11-05 RX ADMIN — TAMSULOSIN HYDROCHLORIDE 0.4 MG: 0.4 CAPSULE ORAL at 16:42

## 2020-11-05 RX ADMIN — BUPROPION HYDROCHLORIDE 150 MG: 150 TABLET, FILM COATED, EXTENDED RELEASE ORAL at 20:49

## 2020-11-05 RX ADMIN — LORAZEPAM 0.5 MG: 0.5 TABLET ORAL at 16:42

## 2020-11-05 RX ADMIN — FAMOTIDINE 10 MG: 20 TABLET, FILM COATED ORAL at 16:40

## 2020-11-05 RX ADMIN — POLYETHYLENE GLYCOL 3350 17 G: 17 POWDER, FOR SOLUTION ORAL at 20:49

## 2020-11-05 RX ADMIN — SODIUM CHLORIDE 1000 ML: 0.9 INJECTION, SOLUTION INTRAVENOUS at 03:45

## 2020-11-05 RX ADMIN — ACETAMINOPHEN 650 MG: 325 TABLET ORAL at 20:48

## 2020-11-05 RX ADMIN — MIRTAZAPINE 7.5 MG: 15 TABLET, FILM COATED ORAL at 20:49

## 2020-11-05 RX ADMIN — ACETAMINOPHEN 650 MG: 325 TABLET ORAL at 03:45

## 2020-11-05 RX ADMIN — ACETAMINOPHEN 650 MG: 325 TABLET ORAL at 13:43

## 2020-11-05 RX ADMIN — ATORVASTATIN CALCIUM 80 MG: 80 TABLET, FILM COATED ORAL at 20:49

## 2020-11-05 RX ADMIN — Medication 10 ML: at 20:49

## 2020-11-05 RX ADMIN — ENOXAPARIN SODIUM 30 MG: 30 INJECTION SUBCUTANEOUS at 13:44

## 2020-11-05 RX ADMIN — Medication 10 ML: at 13:44

## 2020-11-05 RX ADMIN — SODIUM CHLORIDE: 9 INJECTION, SOLUTION INTRAVENOUS at 13:44

## 2020-11-05 ASSESSMENT — PAIN DESCRIPTION - FREQUENCY: FREQUENCY: CONTINUOUS

## 2020-11-05 ASSESSMENT — PAIN DESCRIPTION - PAIN TYPE: TYPE: ACUTE PAIN

## 2020-11-05 ASSESSMENT — PAIN DESCRIPTION - ORIENTATION: ORIENTATION: LEFT

## 2020-11-05 ASSESSMENT — PAIN SCALES - GENERAL
PAINLEVEL_OUTOF10: 4
PAINLEVEL_OUTOF10: 3
PAINLEVEL_OUTOF10: 8
PAINLEVEL_OUTOF10: 4

## 2020-11-05 ASSESSMENT — PAIN DESCRIPTION - ONSET: ONSET: ON-GOING

## 2020-11-05 ASSESSMENT — PAIN DESCRIPTION - LOCATION: LOCATION: ABDOMEN;BACK

## 2020-11-05 ASSESSMENT — PAIN DESCRIPTION - PROGRESSION: CLINICAL_PROGRESSION: NOT CHANGED

## 2020-11-05 ASSESSMENT — PAIN DESCRIPTION - DESCRIPTORS: DESCRIPTORS: PRESSURE

## 2020-11-05 NOTE — CONSULTS
Vascular Surgery   Resident Consult Note      Chief Complaint: AAA    History obtained from: Patient and the patient's EMR    History of Present Illness :   Sheila Garcia is a 80 y.o. male who has a history of HTN, CAD, CKD (stage III), and combined CHF (EF 20-25%) presented to the [facility] ED with abdominal pain and back pain on 11/4/2020. The patient stated that the pain started on the left side of his back and then moved to his left lower abdomen. He states that at the time he was having nausea but no vomiting. The vascular surgery team was consulted because his CT scan completed 11/4/20 shows a 5.4 x 4.6 cm abdominal aortic aneurysm. Currently he denies nausea or vomiting. He admits to still having some remnant left sided abdominal pain. The pain is described as dull, he notes that overnight the pain got worse but resolved with pain medication. He has been having constipation and has not had a BM in over 2 days. Which he notes is not uncommon for him. He admits to a history of kidney stones in the past. He denies fever or chills, no hematuria or dysuria, and denies any weakness or dizziness. He is able to ambulate on his own without difficulty. Last Echo done 7/1/20 showed an EF of 20-25%, with severe diffuse hypokinesis. Of note, the patient has a history of an ascending thoracic aneurysm for which he saw Dr. Mery Ramírez at which time the agreed to no surgery given his age and comorbidities. Past Surgical History: Coronary angioplasty with stent placement in 1998 and eye surgery. Social History: He lives in an assisted living facility. Denies alcohol or tobacco use.        Past Medical History:        Diagnosis Date    Arthritis     Ascending aortic aneurysm ([address]     CAD (coronary artery disease)     Chronic kidney disease     Constipation     DDD (degenerative disc disease), lumbar     L5, S1    Dental disease     Hearing loss     Hyperlipidemia     Hypertension     Mild aortic stenosis     Prostate enlargement     TIA (transient ischemic attack)        Past Surgical History:        Procedure Laterality Date    CORONARY ANGIOPLASTY WITH STENT PLACEMENT  1/12/98    EYE SURGERY      SKIN TAG REMOVAL  2/2/10    TURP         Allergies:   Patient has no known allergies. Medications:   Home Meds  No current facility-administered medications on file prior to encounter. Current Outpatient Medications on File Prior to Encounter   Medication Sig Dispense Refill    melatonin (RA MELATONIN) 3 MG TABS tablet Take 1 tablet by mouth nightly as needed (sleep) 30 tablet 0    buPROPion (WELLBUTRIN XL) 150 MG extended release tablet Take 1 tablet by mouth 2 times daily 180 tablet 1    nitroGLYCERIN (NITROSTAT) 0.4 MG SL tablet PLACE 1 TABLET UNDER THE TONGUE EVERY 5 MINUTES IF NEEDED FOR CHEST PAIN UP TO MAXIMUM OF 3 TOTAL DOSES. IF NO RELIEF AFTER 1 DOSE CALL 911 25 tablet 3    lisinopril (PRINIVIL;ZESTRIL) 10 MG tablet Take 1 tablet by mouth daily 60 tablet 3    spironolactone (ALDACTONE) 50 MG tablet TAKE 1 TABLET BY MOUTH DAILY 30 tablet 1    ondansetron (ZOFRAN ODT) 4 MG disintegrating tablet Take 1 tablet by mouth every 8 hours as needed for Nausea 20 tablet 0    mirtazapine (REMERON) 7.5 MG tablet Take 1 tablet by mouth nightly 30 tablet 5    furosemide (LASIX) 40 MG tablet Take 80 mg in the morning and 40 mg in the afternoon 60 tablet 2    azelastine (OPTIVAR) 0.05 % ophthalmic solution Place 1 drop into both eyes 3 times daily       polyethylene glycol (GLYCOLAX) 17 GM/SCOOP powder Take 17 g by mouth daily      polyvinyl alcohol (LIQUIFILM TEARS) 1.4 % ophthalmic solution Place 1 drop into both eyes every 3 hours as needed      tamsulosin (FLOMAX) 0.4 MG capsule Take 0.4 mg by mouth daily      aspirin 325 MG EC tablet Take 1 tablet by mouth daily 30 tablet 3    hydrocortisone 2.5 % cream Apply topically 2 times daily.  (Patient not taking: Reported on 8/4/2020) 3.5 g 3     MG capsule TAKE 1 CAPSULE BY MOUTH 2 TIMES DAILY AS NEEDED FOR CONSTIPATION 60 capsule 3    atorvastatin (LIPITOR) 80 MG tablet Take 1 tablet by mouth nightly 90 tablet 1    ranitidine (ZANTAC) 150 MG tablet Take 1 tablet by mouth 2 times daily as needed for Heartburn 60 tablet 1    Handicap Placard MISC by Does not apply route effective November 25,2019 through November 24,2020 (Patient not taking: Reported on 8/4/2020) 1 each 0    diclofenac sodium 1 % GEL Apply 4 g topically 4 times daily as needed for Pain (Patient not taking: Reported on 8/4/2020) 4 Tube 3    Multiple Vitamin (MULTI-VITAMIN) TABS Take 1 tablet by mouth daily 90 tablet 2       Current Meds  sodium chloride flush 0.9 % injection 10 mL, 2 times per day  sodium chloride flush 0.9 % injection 10 mL, PRN  potassium chloride 10 mEq/100 mL IVPB (Peripheral Line), PRN  magnesium sulfate 2 g in 50 mL IVPB premix, PRN  acetaminophen (TYLENOL) tablet 650 mg, Q6H PRN    Or  acetaminophen (TYLENOL) suppository 650 mg, Q6H PRN  promethazine (PHENERGAN) tablet 12.5 mg, Q6H PRN    Or  ondansetron (ZOFRAN) injection 4 mg, Q6H PRN  famotidine (PEPCID) tablet 20 mg, Daily PRN  enoxaparin (LOVENOX) injection 30 mg, Daily  0.9 % sodium chloride infusion, Continuous  glucose (GLUTOSE) 40 % oral gel 15 g, PRN  dextrose 50 % IV solution, PRN  glucagon (rDNA) injection 1 mg, PRN  dextrose 5 % solution, PRN        Family History:   Family History   Problem Relation Age of Onset    Hypertension Mother     Cancer Mother     Stroke Father        Social History:   TOBACCO:   reports that he has never smoked. He has never used smokeless tobacco.  ETOH:   reports current alcohol use. DRUGS:   reports no history of drug use. Review of Systems:      Constitutional: Negative. HENT: Negative. Eyes: Negative. Respiratory: Negative.   Cardiovascular: +A. Fib, +HTN, +CHF  Gastrointestinal: +abdominal pain on the left, +constipation  Genitourinary: Negative. Musculoskeletal: +low back pain. Skin: Negative. Endocrine: Negative. Allergic/Immunologic: Negative. Neurological: Negative. Hematological: Negative. Psychiatric/Behavioral: Negative. Physical exam:   Vitals:    11/05/20 0000 11/05/20 0141 11/05/20 0619 11/05/20 0651   BP: (!) 147/56 (!) 143/65 108/64    Pulse: 60 51 59    Resp:  21 19    Temp:  97.5 °F (36.4 °C) 97.4 °F (36.3 °C)    TempSrc:  Oral Oral    SpO2: 99% 97% 96%    Weight:    191 lb 5.8 oz (86.8 kg)   Height:    5' 6\" (1.676 m)       General appearance: alert, no acute distress, grooming appropriate  Eyes: No scleral icterus, EOM grossly intact  Neck: trachea midline, no JVD, no lymphadenopathy  Chest/Lungs: CTAB, no crackles/rales, wheezes/rhonchi, normal effort on RA  Cardiovascular: Irregularly irregular rhythm, regular rate, well perfused. Abdomen: soft, non-tender, non-distended, no guarding/rigidity  Skin: warm and dry, no rashes  Extremities: no edema, no cyanosis, pulses as noted below. Neuro: A&Ox3, no focal deficits, sensation intact    Pulses    Rad Fem Pop PT DP   Right + + + -/+ -/+   Left + + + -/+ -/+   +, -/+, -/-    Labs:    CBC:   Recent Labs     11/04/20  1802 11/05/20  0634   WBC 5.0 6.2   HGB 11.0* 11.7*   HCT 32.2* 34.7*   MCV 99.8 100.1*   * 139     BMP:   Recent Labs     11/04/20  1802 11/05/20  0634    142   K 5.3* 5.0    106   CO2 22 21   PHOS  --  4.3   BUN 57* 56*   CREATININE 2.9* 2.6*     PT/INR: No results for input(s): PROTIME, INR in the last 72 hours. APTT: No results for input(s): APTT in the last 72 hours.   Liver Profile:   Lab Results   Component Value Date    AST 14 06/30/2020    ALT 11 06/30/2020    BILIDIR <0.2 06/30/2020    BILITOT 0.7 06/30/2020    ALKPHOS 80 06/30/2020     Lab Results   Component Value Date    CHOL 133 06/30/2020    HDL 47 06/30/2020    TRIG 84 06/30/2020     UA:   Lab Results   Component Value Date    COLORU Yellow 11/04/2020    PHUR 6.0 11/04/2020 WBCUA 0-2 05/14/2014    RBCUA 0-2 05/14/2014    BACTERIA Rare 05/14/2014    CLARITYU Clear 11/04/2020    SPECGRAV 1.020 11/04/2020    LEUKOCYTESUR Negative 11/04/2020    UROBILINOGEN 0.2 11/04/2020    BILIRUBINUR Negative 11/04/2020    BLOODU Negative 11/04/2020    GLUCOSEU Negative 11/04/2020       Imaging  CT scan from 2018 shows a 4.3cm infrarenal abdominal aortic aneurysm. During this admission CT scan done 11/4/20 revealed a 5.4 x 4.6 cm abdominal aortic aneurysm. Assessment/Plan:  Napoleon Ramos is a 80 y.o. male who has a history of HTN, CAD, CKD stage III, and combined CHF (last EF of 20-25%) presented to the [facility] ED with abdominal pain and back pain on 11/4/2020. CT scan from 2018 shows a 4.3 cm infrarenal abdominal aortic aneurysm. During this admission CT scan done 11/4/20 revealed a 5.4 x 4.6 cm abdominal aortic aneurysm. - Given the change in size of his abdominal aortic aneurysm over the last 2 years it would benefit the patient to follow up with Dr. Sylvia Kothari as an outpatient for possible elective repair of his AAA  - No emergent indication for surgery at this time. - Patient will need to be treated for his acute on chronic kidney disease prior to undergoing intervention  - Patient discussed with Dr. Sylvia Kothari.      Donzella Closs, DO  11/05/20  10:14 AM

## 2020-11-05 NOTE — H&P
Hypertension     Mild aortic stenosis     Prostate enlargement     TIA (transient ischemic attack)        Past Surgical History:          Procedure Laterality Date    CORONARY ANGIOPLASTY WITH STENT PLACEMENT  1/12/98    EYE SURGERY      SKIN TAG REMOVAL  2/2/10    TURP         Medications Prior to Admission:      Prior to Admission medications    Medication Sig Start Date End Date Taking? Authorizing Provider   melatonin (RA MELATONIN) 3 MG TABS tablet Take 1 tablet by mouth nightly as needed (sleep) 6/22/20  Yes Robina Palomino MD   buPROPion (WELLBUTRIN XL) 150 MG extended release tablet Take 1 tablet by mouth 2 times daily 7/30/20   Jo Ann Suárez MD   nitroGLYCERIN (NITROSTAT) 0.4 MG SL tablet PLACE 1 TABLET UNDER THE TONGUE EVERY 5 MINUTES IF NEEDED FOR CHEST PAIN UP TO MAXIMUM OF 3 TOTAL DOSES.  IF NO RELIEF AFTER 1 DOSE CALL 911 7/30/20   Jo Ann Suárez MD   lisinopril (PRINIVIL;ZESTRIL) 10 MG tablet Take 1 tablet by mouth daily 7/24/20   DEBORAH Munson CNP   spironolactone (ALDACTONE) 50 MG tablet TAKE 1 TABLET BY MOUTH DAILY 7/22/20   Stephanie Rahman MD   ondansetron (ZOFRAN ODT) 4 MG disintegrating tablet Take 1 tablet by mouth every 8 hours as needed for Nausea 7/13/20   Laura Gomez MD   mirtazapine (REMERON) 7.5 MG tablet Take 1 tablet by mouth nightly 7/13/20   Stephanie Rahman MD   furosemide (LASIX) 40 MG tablet Take 80 mg in the morning and 40 mg in the afternoon 6/22/20   Robina Palomino MD   azelastine (OPTIVAR) 0.05 % ophthalmic solution Place 1 drop into both eyes 3 times daily     Historical Provider, MD   polyethylene glycol (GLYCOLAX) 17 GM/SCOOP powder Take 17 g by mouth daily    Historical Provider, MD   polyvinyl alcohol (LIQUIFILM TEARS) 1.4 % ophthalmic solution Place 1 drop into both eyes every 3 hours as needed    Historical Provider, MD   tamsulosin (FLOMAX) 0.4 MG capsule Take 0.4 mg by mouth daily    Historical Provider, MD   aspirin 325 MG EC tablet Take 1 tablet by mouth daily 6/16/20   Olya Velásquez MD   hydrocortisone 2.5 % cream Apply topically 2 times daily. Patient not taking: Reported on 8/4/2020 6/16/20   Olya Velásquez MD    MG capsule TAKE 1 CAPSULE BY MOUTH 2 TIMES DAILY AS NEEDED FOR CONSTIPATION 4/30/20   Rajiv Hilton MD   atorvastatin (LIPITOR) 80 MG tablet Take 1 tablet by mouth nightly 2/17/20   Maryjo Melendez MD   ranitidine (ZANTAC) 150 MG tablet Take 1 tablet by mouth 2 times daily as needed for Heartburn 1/14/20   Mindy Hester MD   Handicap Placard MISC by Does not apply route effective November 25,2019 through November 24,2020  Patient not taking: Reported on 8/4/2020 11/25/19   Corrie Monroe MD   diclofenac sodium 1 % GEL Apply 4 g topically 4 times daily as needed for Pain  Patient not taking: Reported on 8/4/2020 7/26/19   Corrie Monroe MD   Multiple Vitamin (MULTI-VITAMIN) TABS Take 1 tablet by mouth daily 7/26/19   Corrie Monroe MD       Allergies:  Patient has no known allergies. Social History:      The patient currently lives in assisted living    TOBACCO:   reports that he has never smoked. He has never used smokeless tobacco.  ETOH:   reports current alcohol use. Family History:      Reviewed. Positive as follows:        Problem Relation Age of Onset    Hypertension Mother     Cancer Mother     Stroke Father        REVIEW OF SYSTEMS:   Pertinent positives as noted in the HPI. All other systems reviewed and negative. PHYSICAL EXAM PERFORMED:    /64   Pulse 59   Temp 97.4 °F (36.3 °C) (Oral)   Resp 19   Ht 5' 6\" (1.676 m)   Wt 191 lb 5.8 oz (86.8 kg)   SpO2 96%   BMI 30.89 kg/m²     General appearance:  No apparent distress, appears stated age and cooperative. HEENT:  Normal cephalic, atraumatic without obvious deformity. Pupils equal, round, and reactive to light. Extra ocular muscles intact. Conjunctivae/corneas clear. Neck: Supple, with full range of motion. No jugular venous distention. Trachea midline. Respiratory:  Normal respiratory effort. Clear to auscultation, bilaterally without Rales/Wheezes/Rhonchi. Cardiovascular:  Regular rate and rhythm with normal S1/S2 without murmurs, rubs or gallops. Abdomen: Soft, non-tender, non-distended with normal bowel sounds. Mild tenderness to palpation the left lower quadrant no guarding  Musculoskeletal:  No clubbing, cyanosis or edema bilaterally. Full range of motion without deformity. Skin: Skin color, texture, turgor normal.  No rashes or lesions. Neurologic:  Neurovascularly intact without any focal sensory/motor deficits. Cranial nerves: II-XII intact, grossly non-focal.  Psychiatric:  Alert and oriented, thought content appropriate, normal insight  Capillary Refill: Brisk,< 3 seconds   Peripheral Pulses: +2 palpable, equal bilaterally       Labs:     Recent Labs     11/04/20  1802   WBC 5.0   HGB 11.0*   HCT 32.2*   *     Recent Labs     11/04/20  1802      K 5.3*      CO2 22   BUN 57*   CREATININE 2.9*   CALCIUM 8.6     No results for input(s): AST, ALT, BILIDIR, BILITOT, ALKPHOS in the last 72 hours. No results for input(s): INR in the last 72 hours. Recent Labs     11/04/20  1802 11/04/20  2126   TROPONINI 0.02* 0.02*       Urinalysis:      Lab Results   Component Value Date    NITRU Negative 11/04/2020    WBCUA 0-2 05/14/2014    BACTERIA Rare 05/14/2014    RBCUA 0-2 05/14/2014    BLOODU Negative 11/04/2020    SPECGRAV 1.020 11/04/2020    GLUCOSEU Negative 11/04/2020       Radiology:     CXR: I have reviewed the CXR with the following interpretation: N/AA  EKG:  I have reviewed the EKG with the following interpretation:  See HPI    CT ABDOMEN PELVIS WO CONTRAST Additional Contrast? None   Final Result      1. No acute abnormality in the abdomen or pelvis. 2. Abdominal aortic aneurysm measuring 5.4 x 4.6 cm.   3. Cholelithiasis.    4. Multiple cysts and other indeterminate lesions in the kidneys, incompletely characterized without contrast.   5. Small pericardial effusion and extensive coronary artery and aortic calcifications. 6. Moderate prostatomegaly. ASSESSMENT/PLAN:    Active Hospital Problems    Diagnosis Date Noted    CARLI (acute kidney injury) ([facility] [address] [N17.9] 11/05/2020     CARLI on CKD stage III; I suspect this is due to underlying volume depletion in the setting of diuretic use and decreased p.o. intake. On admission creatinine 2.9 with BUN 57. His baseline around 1.5. Hold lisinopril Aldactone Lasix  Given a bolus of 5 fluids 1 L  Continue maintenance fluids  CT with multiple cyst bilateral kidneys. No stone was seen. 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 Alecia Gonzalez as an outpatient for possible elective repair of his AAA    Chronic medical prob: insomnia, depression - continue home meds    DVT Prophylaxis: Lovenox 30 mg once daily  Diet: Diet NPO Effective Now Exceptions are: Ice Chips, Sips with Meds, Popsicles  Code Status: Full Code    PT/OT Eval Status: ordered    Dispo - Admit as inpatient.  I anticipate hospitalization spanning more than two midnights for investigation and treatment of the above medically necessary diagnoses. Jose Miguel Chaudhary MD  Hospitalist    Thank you Trang Linares MD for the opportunity to be involved in this patient's care. If you have any questions or concerns please feel free to contact me at [phone].

## 2020-11-05 NOTE — PROGRESS NOTES
4 Eyes Admission Assessment     I agree as the admission nurse that 2 RN's have performed a thorough Head to Toe Skin Assessment on the patient. ALL assessment sites listed below have been assessed on admission. Areas assessed by both nurses: Monda Pro   [x]   Head, Face, and Ears   [x]   Shoulders, Back, and Chest  [x]   Arms, Elbows, and Hands   [x]   Coccyx, Sacrum, and Ischium  [x]   Legs, Feet, and Heels        Does the Patient have Skin Breakdown?   No         Juan Prevention initiated:  No   Wound Care Orders initiated:  No      [facility] nurse consulted for Pressure Injury (Stage 3,4, Unstageable, DTI, NWPT, and Complex wounds) or Juan score 18 or lower:  No      Nurse 1 eSignature: Electronically signed by Carie Hawley RN on 11/5/20 at 2:21 AM EST    **SHARE this note so that the co-signing nurse is able to place an eSignature**    Nurse 2 eSignature: Electronically signed by Milton Pearce RN on 11/5/20 at 4:41 AM EST

## 2020-11-05 NOTE — PROGRESS NOTES
Patient admitted to [facility] via stretcher with all belongings accounted for. Set of vitals taken. VSS. Patient complains of left upper quadrant pain. He is requesting something for pain. He also complains of nausea. 4 eye completed with secondary RN. Awaiting MD orders. Will continue to assess and monitor.

## 2020-11-05 NOTE — PLAN OF CARE
Problem: Pain:  Goal: Pain level will decrease  Description: Pain level will decrease  Outcome: Ongoing  Note: Patient uses 0-10 pain scale. Noting moderate to severe pain in his LLQ. Requests PRN Tylenol with relief. Will continue to assess and monitor. Problem: Falls - Risk of:  Goal: Will remain free from falls  Description: Will remain free from falls  Outcome: Ongoing  Note: Fall precautions in place. Bed is in lowest position, wheels locked and alarm on. Non-skid socks on. Call light and bedside table within reach. Pt calls out appropriately. Will continue to assess and monitor.

## 2020-11-05 NOTE — PROGRESS NOTES
Patient notes that he brought a list of his medications with him for the med reconciliation. He told this RN that he gave the nurse in the ED the list to give to the doctor. This RN called down to the ED multiple times with no response. Will pass on to days to call family and have them go over the meds.

## 2020-11-05 NOTE — PROGRESS NOTES
Occupational Therapy   Occupational Therapy Initial Assessment, Treatment, and Discharge  Date: 2020   Patient Name: iNck Tran  MRN: [id number]     : 1932    Date of Service: 2020    Discharge Recommendations: Nick Tran scored a 18/24 on the AM-PAC ADL Inpatient form. Current research shows that an AM-PAC score of 18 or greater is typically associated with a discharge to the patient's home setting. Based on the patient's AM-PAC score, and their current ADL deficits, it is recommended that the patient have 2-3 sessions per week of Occupational Therapy at d/c to increase the patient's independence. At this time, this patient demonstrates the endurance and safety to discharge home with A prn. Please see assessment section for further patient specific details. If patient discharges prior to next session this note will serve as a discharge summary. Please see below for the latest assessment towards goals. OT Equipment Recommendations  Equipment Needed: No    Assessment   Performance deficits / Impairments: Decreased functional mobility ; Decreased ADL status; Decreased strength;Decreased endurance  Assessment: Assessment: 80 y.o. male with past medical history CAD, hypertension, combined CHF, who presents with back and abdominal pain. Pt currently near his functional baseline performing all mobility safely with SBA/CGA 2/2 pain. Pt planning to d/c home with A prn from wife. Pt with no further acute OT needs at this time - will sign off from OT services  Treatment Diagnosis: decreased functional mobility, ADL, strength, endurance  Prognosis: Good  Decision Making: Low Complexity  Assistance / Modification: CGA  OT Education: OT Role;Plan of Care  Patient Education: verbalized understanding  REQUIRES OT FOLLOW UP: No  Safety Devices  Safety Devices in place: Yes  Type of devices: All fall risk precautions in place;Call light within reach; Chair alarm in place;Gait belt;Left in chair;Nurse notified           Patient Diagnosis(es): The encounter diagnosis was Acute kidney injury superimposed on CKD ([facility] [address] has a past medical history of Arthritis, Ascending aortic aneurysm ([facility] [address] CAD (coronary artery disease), Chronic kidney disease, Constipation, DDD (degenerative disc disease), lumbar, Dental disease, Hearing loss, Hyperlipidemia, Hypertension, Mild aortic stenosis, Prostate enlargement, and TIA (transient ischemic attack). has a past surgical history that includes TURP; Coronary angioplasty with stent (1/12/98); Skin tag removal (2/2/10); and eye surgery. Treatment Diagnosis: decreased functional mobility, ADL, strength, endurance      Restrictions  Position Activity Restriction  Other position/activity restrictions: up as tolerated    Subjective   General  Chart Reviewed: Yes  Patient assessed for rehabilitation services?: Yes  Additional Pertinent Hx: past medical history CAD, hypertension, combined CHF, who presents with back and abdominal pain. Diagnosis: CARLI acute kidney injury  Subjective  Subjective: pt in bed upon arrival, agreeable to therapy     Social/Functional History  Social/Functional History  Lives With: Spouse  Type of Home: Facility([facility])  Home Access: Elevator  Bathroom Shower/Tub: Walk-in shower  Bathroom Toilet: Standard  Home Equipment: (owns no DME)  ADL Assistance: Independent  Ambulation Assistance: Independent(without assist)  Transfer Assistance: Independent  Active : No  Occupation: Retired  Additional Comments: Pt reports no recent falls. Objective        Orientation  Overall Orientation Status: Within Functional Limits     Balance  Sitting Balance: Supervision  Standing Balance: Contact guard assistance  Standing Balance  Time: ~10 minutes  Activity: funcitonal mobility to and from bathroom, hallway, functional trasfers.  Completed hand washing and cleaning dentures at sink with CGA  Functional Mobility  Functional Mobility Comments: pt 1x seated rest break, pt lost footing with sit to stand leaving after rest break, pt stated L leg pain which caused mis-step  ADL  Grooming: Independent  Toileting: Contact guard assistance        Bed mobility  Supine to Sit: Independent  Scooting: Supervision  Transfers  Sit to stand: Contact guard assistance  Stand to sit: Contact guard assistance     Cognition  Overall Cognitive Status: WNL                 LUE AROM (degrees)  LUE AROM : WFL  Left Hand AROM (degrees)  Left Hand AROM: WFL  RUE AROM (degrees)  RUE AROM : [facility]  Right Hand AROM (degrees)  Right Hand AROM: [facility]          AM-[facility] Score        AM-PAC Inpatient Daily Activity Raw Score: 18 (11/05/20 1026)  AM-PAC Inpatient ADL T-Scale Score : 38.66 (11/05/20 1026)  ADL Inpatient CMS 0-100% Score: 46.65 (11/05/20 1026)  ADL Inpatient CMS G-Code Modifier : CK (11/05/20 1026)      Therapy Time   Individual Concurrent Group Co-treatment   Time In 0754         Time Out 0820         Minutes 26              Timed Code Treatment Minutes:  15 Minutes    Total Treatment Minutes: Terrie Bellamy 0341, OTR/L

## 2020-11-05 NOTE — PROGRESS NOTES
Lovenox 40 mg daily ordered for patient. This medication is renally eliminated. Will change to 30 mg daily per renal dose adjustment policy. CrCl cannot be calculated (Unknown ideal weight.). Cr = 2.9    Pharmacy will continue to monitor renal function and adjust dose as necessary. Please call with any questions.     Thanks  Opal Darling RPh 11/5/2020, 5:36 AM

## 2020-11-05 NOTE — PROGRESS NOTES
Physical Therapy    Facility/Department: [facility] [address]  Initial Assessment/Treatment/Discharge Summary    NAME: Pam Olivo  : 1932  MRN: [id number]    Date of Service: 2020    Discharge Recommendations:    Pam Olivo scored a 20/24 on the AM-PAC short mobility form. Current research shows that an AM-PAC score of 18 or greater is typically associated with a discharge to the patient's home setting. Based on the patient's AM-PAC score and their current functional mobility deficits, it is recommended that the patient have 2-3 sessions per week of Physical Therapy at d/c to increase the patient's independence. At this time, this patient demonstrates the endurance and safety to discharge home with A prn. PT Equipment Recommendations  Equipment Needed: No    Assessment   Assessment: 80 y.o. male with past medical history CAD, hypertension, combined CHF, who presents with back and abdominal pain. Pt currently near his functional baseline performing all mobility safely with SBA/CGA 2/2 pain. Pt planning to d/c home with A prn from wife. Pt with no further acute PT needs at this time - will sign off from PT services. Prognosis: Good  Decision Making: Low Complexity  Patient Education: role of PT, use of call light, d/c planning; pt verb understanding  Barriers to Learning: none  REQUIRES PT FOLLOW UP: No  Activity Tolerance  Activity Tolerance: Patient Tolerated treatment well       Patient Diagnosis(es): The encounter diagnosis was Acute kidney injury superimposed on CKD ([facility] [address] has a past medical history of Arthritis, Ascending aortic aneurysm ([address] CAD (coronary artery disease), Chronic kidney disease, Constipation, DDD (degenerative disc disease), lumbar, Dental disease, Hearing loss, Hyperlipidemia, Hypertension, Mild aortic stenosis, Prostate enlargement, and TIA (transient ischemic attack).    has a past surgical history that includes TURP; Coronary angioplasty with stent (98); Skin tag removal (2/2/10); and eye surgery. Restrictions  Position Activity Restriction  Other position/activity restrictions: up as tolerated  Vision/Hearing  Vision: Within Functional Limits  Hearing: Within functional limits     Subjective  General  Chart Reviewed: Yes  Additional Pertinent Hx: 80 y.o. male with past medical history CAD, hypertension, combined CHF, who presents with back and abdominal pain. Family / Caregiver Present: No  Referring Practitioner: Radu Haq DO  Diagnosis: CARLI  Follows Commands: Within Functional Limits  Subjective  Subjective: Pt found supine in bed upon arrival, reporting 4/10 pain and agreeable to therapy. Pain Screening  Patient Currently in Pain: Yes          Orientation  Orientation  Overall Orientation Status: Within Functional Limits  Social/Functional History  Social/Functional History  Lives With: Spouse  Type of Home: Facility([facility])  Home Access: Elevator  Bathroom Shower/Tub: Walk-in shower  Bathroom Toilet: Standard  Home Equipment: (owns no DME)  ADL Assistance: Independent  Ambulation Assistance: Independent(without assist)  Transfer Assistance: Independent  Active : No  Occupation: Retired  Additional Comments: Pt reports no recent falls. Objective  AROM RLE (degrees)  RLE AROM: WFL  AROM LLE (degrees)  LLE AROM : WFL  Strength RLE  Strength RLE: WFL  Strength LLE  Strength LLE: WFL        Bed mobility  Supine to Sit: Independent  Scooting: Supervision  Transfers  Sit to Stand: Contact guard assistance;Stand by assistance(from EOB, toilet and arm chair)  Stand to sit: Contact guard assistance;Stand by assistance(to toilet, arm chair, recliner)  Ambulation  Ambulation?: Yes  Ambulation 1  Surface: level tile  Device: No Device  Assistance: Contact guard assistance  Quality of Gait: moderate tejinder, stride length and Eliana.   Distance: 10'+100'+125'     Balance  Posture: Good  Sitting - Static: Good  Sitting - Dynamic: Good  Standing - Static: Good  Standing - Dynamic: Good(with CGA)        Plan   Plan  Times per week: d/c acute PT  Safety Devices  Type of devices: Call light within reach, Chair alarm in place, Nurse notified, Gait belt, Left in chair    AM-PAC Score  AM-PAC Inpatient Mobility Raw Score : 20 (11/05/20 1004)  AM-PAC Inpatient T-Scale Score : 47.67 (11/05/20 1004)  Mobility Inpatient CMS 0-100% Score: 35.83 (11/05/20 1004)  Mobility Inpatient CMS G-Code Modifier : CJ (11/05/20 1004)          Therapy Time   Individual Concurrent Group Co-treatment   Time In 0750         Time Out 0820         Minutes 30           Timed Code Treatment Minutes:  15    Total Treatment Minutes:  30    Furman Schlatter, PT, DPT

## 2020-11-05 NOTE — CONSULTS
[facility]    [facility]              [phone]                        Interval History and plan:      He has underlying cardiorenal syndrome and his creatinine fluctuates with the use of diuretics. I will hold lisinopril, Aldactone, Lasix. Normal saline x1 L. Renal panel in a.m. Serum for free light chain. Urine for protein creatinine. Assessment :     Acute kidney injury: He presented with a creatinine of 2.9 with a BUN of 57. Baseline creatinine is 1.5. At home he was taking lisinopril, spironolactone, Lasix and Flomax. Stage III chronic kidney disease: Baseline GFR is 45 mL/min. He has chronic kidney disease since 2015. Urinalysis is bland with no proteinuria or hematuria. Ultrasound the kidney showed bilateral renal cyst in 2015. CT scan showed normal adrenal.  Multiple cysts within bilateral kidneys. There was no kidney stone. Prostate is enlarged. He has underlying chronic kidney disease from vascular disease. Creatinine fluctuates with the use of diuretics and cardiorenal syndrome. Hypertension: Well controlled. Echocardiogram with 2025% EF with grade 1 diastolic dysfunction. [facility] would like to thank Edward Browning MD   for opportunity to serve this patient      Please call with questions at-   24 Hrs Answering service [phone] or  7 am- 5 pm via Perfect serve or cell phone  Chacho Gallegos          CC/reason for consult :     Acute kidney injury     HPI :     Sheryl Mendes is a 80 y.o. male presented to   the hospital on 11/4/2020 with abdominal pain. He has past medical history of AAA, stage III chronic kidney disease, DJD, hyperlipidemia, hypertension, BPH status post TURP presented with left flank pain and abdominal pain. Patient's pain started at which was dull ache which started becoming sharp. The pain radiated to his left side of his lower abdomen.   He had some nausea as well.  He denies diarrhea constipation or vomiting. He had no fever or chills. He does take Lasix. When he arrived in the ER EKG showed multiple PACs. He was also hyperkalemic with a potassium of 5.3. He was given calcium gluconate, insulin and dextrose. Creatinine was elevated 2.9 from baseline of 1.5. CT showed AAA but no kidney stone. He is now admitted for further management. ROS:     Seen with-resident    positives in bold   Constitutional:  fever, chills, weakness, weight change, fatigue  Skin:  rash, pruritus, hair loss, bruising, dry skin, petechiae  Head, Face, Neck   headaches, swelling,  cervical adenopathy  Respiratory: shortness of breath, cough, or wheezing  Cardiovascular: chest pain, palpitations, dizzy, edema  Gastrointestinal: nausea, vomiting, diarrhea, constipation,belly pain    Yellow skin, blood in stool  Musculoskeletal:  back pain, muscle weakness, gait problems,       joint pain or swelling. Genitourinary:  dysuria, poor urine flow, flank pain, blood in urine  Neurologic:  vertigo, TIA'S, syncope, seizures, focal weakness  Psychosocial:  insomnia, anxiety, or depression. All other remaining systems are negative or unable to obtain        PMH/PSH/SH/Family History:     Past Medical History:   Diagnosis Date    Arthritis     Ascending aortic aneurysm ([facility] [address]     CAD (coronary artery disease)     Chronic kidney disease     Constipation     DDD (degenerative disc disease), lumbar     L5, S1    Dental disease     Hearing loss     Hyperlipidemia     Hypertension     Mild aortic stenosis     Prostate enlargement     TIA (transient ischemic attack)        Past Surgical History:   Procedure Laterality Date    CORONARY ANGIOPLASTY WITH STENT PLACEMENT  1/12/98    EYE SURGERY      SKIN TAG REMOVAL  2/2/10    TURP          reports that he has never smoked. He has never used smokeless tobacco. He reports current alcohol use.  He reports that he does strength- grossly normal , tone - grossly normal  Skin: rashes- no , ulcers- no, induration- no, tightening - no  Psychiatric:  Judgement and insight- normal           AAO X 3       LABS:     Recent Labs     11/04/20  1802   WBC 5.0   HGB 11.0*   HCT 32.2*   *     Recent Labs     11/04/20  1802      K 5.3*      CO2 22   BUN 57*   CREATININE 2.9*   GLUCOSE 107*        Nephrology  [address], [address]  Office: [phone]  Cell: [phone]  Fax: [phone]

## 2020-11-05 NOTE — CARE COORDINATION
Case Management Assessment           Initial Evaluation                Date / Time of Evaluation: 11/5/2020 2:26 PM                 Assessment Completed by: Alin Krause    Patient Name: Mendel Sevin     YOB: 1932  Diagnosis: CARLI (acute kidney injury) ([facility] [address] [N17.9]  CARLI (acute kidney injury) ([facility] [address] [N17.9]  CARLI (acute kidney injury) ([facility] [address] [N17.9]     Date / Time: 11/4/2020  4:40 PM    Patient Admission Status: Inpatient    If patient is discharged prior to next notation, then this note serves as note for discharge by case management. Current PCP: Gage Ramirez MD  Clinic Patient: No    Chart Reviewed: Yes  Patient/ Family Interviewed: Yes    Initial assessment completed at bedside with: f2f at bedside with patient    Hospitalization in the last 30 days: No    Emergency Contacts:  Extended Emergency Contact Information  Primary Emergency Contact: [facility]  Address: [address] [place] [address] Phone: [phone]  Relation: Spouse  Secondary Emergency Contact: Tylerluzmaeileen Alfaropaolo [facility]. Phone: [phone]  Work Phone: [phone]  Relation: Child    Advance Directives:   Code Status: [address]: No  Agent: NA  Contact Number: NA    Copy present: No     In paper Chart: No    Scanned into EMR No    Financial  Payor: MEDICARE / Plan: MEDICARE PART A AND B / Product Type: *No Product type* /     Pre-cert required for SNF: No    Pharmacy    [address] [place] [id number]  Phone: [phone] Fax: [phone]      Potential assistance Purchasing Medications:    Does Patient want to participate in local refill/ meds to beds program?:      Meds To Beds General Rules:  1. Can ONLY be done Monday- Friday between 8:30am-5pm  2. Prescription(s) must be in pharmacy by 3pm to be filled same day  3. Copy of patient's insurance/ prescription drug card and patient face sheet must be sent along with the prescription(s)  4. Cost of Rx cannot be added to hospital bill. If financial assistance is needed, please contact unit  or ;  or  CANNOT provide pharmacy voucher for patients co-pays  5. Patients can then  the prescription on their way out of the hospital at discharge, or pharmacy can deliver to the bedside if staff is available. (payment due at time of pick-up or delivery - cash, check, or card accepted)     Able to afford home medications/ co-pay costs: Yes    [address]:      PT AM-PAC: 20 /24  OT AM-PAC: 18 /24    [place]: [facility], [facility]  Steps: NA    Plans to RETURN to current housing: Yes  Barriers to RETURNING to current housing: safety, pain    [address]  Currently ACTIVE with [address]: No  Home Care Agency: Not Applicable    Currently ACTIVE with [place] on Aging: No  Passport/ Waiver: No  Passport/ Waiver Services: Not Applicable    : MAXINE Phone: [address]  [facility] Provider: NA  Equipment: NA    Home Oxygen and [address] [place] prior to admission: No  [address]: Not Applicable  Other Respiratory Equipment: NA    Informed of need to bring portable home O2 tank on day of DISCHARGE for nursing to connect prior to leaving: No  Verbalized agreement/Understanding: No  Person to bring portable tank at discharge: NA    Dialysis  Active with HD/PD prior to admission: No  Nephrologist: [address]:  Not Applicable    DISCHARGE PLAN:  Disposition: [facility] (SNF): [facility] Phone: [phone] Fax: [phone]    Transportation PLAN for discharge: TBD     Factors facilitating achievement of predicted outcomes: Caregiver support    Barriers to discharge: Pain    Additional Case Management Notes:   Patient here from [facility]. Plans to return to facility at discharge. Unsure if he will need transportation. Patient stated he will speak with daughter. CT completed. Result bilat renal cyst.  Neph following. Consult with vascular. Vascular recommended outpatient f/u w/ Dr Damon Call for elective repair of abdominal aortic aneurysm. Labs in am.  CM will continue to follow for discharge planning. The Plan for Transition of Care is related to the following treatment goals of CARLI (acute kidney injury) ([facility] [address] [N17.9]  CARLI (acute kidney injury) ([address] [N17.9]  CARLI (acute kidney injury) ([address] [N17.9]    The Patient and/or patient representative Allyssa Mast and his family were provided with a choice of provider and agrees with the discharge plan Yes    Freedom of choice list was provided with basic dialogue that supports the patient's individualized plan of care/goals and shares the quality data associated with the providers.  Yes    Care Transition patient: Yes    June Saldana RN  [facility] [employer]  Case Management Department  Ph: [phone]   Fax: [phone]

## 2020-11-06 LAB
ALBUMIN SERPL-MCNC: 3.5 G/DL (ref 3.4–5)
ANION GAP SERPL CALCULATED.3IONS-SCNC: 7 MMOL/L (ref 3–16)
BUN BLDV-MCNC: 46 MG/DL (ref 7–20)
CALCIUM SERPL-MCNC: 8.3 MG/DL (ref 8.3–10.6)
CHLORIDE BLD-SCNC: 113 MMOL/L (ref 99–110)
CO2: 22 MMOL/L (ref 21–32)
CREAT SERPL-MCNC: 2.3 MG/DL (ref 0.8–1.3)
GFR AFRICAN AMERICAN: 33
GFR NON-AFRICAN AMERICAN: 27
GLUCOSE BLD-MCNC: 104 MG/DL (ref 70–99)
GLUCOSE BLD-MCNC: 110 MG/DL (ref 70–99)
GLUCOSE BLD-MCNC: 178 MG/DL (ref 70–99)
GLUCOSE BLD-MCNC: 94 MG/DL (ref 70–99)
GLUCOSE BLD-MCNC: 98 MG/DL (ref 70–99)
KAPPA, FREE LIGHT CHAINS, SERUM: 59.53 MG/L (ref 3.3–19.4)
KAPPA/LAMBDA RATIO: 2.1 (ref 0.26–1.65)
KAPPA/LAMBDA TEST COMMENT: ABNORMAL
LAMBDA, FREE LIGHT CHAINS, SERUM: 28.36 MG/L (ref 5.71–26.3)
PERFORMED ON: ABNORMAL
PERFORMED ON: NORMAL
PHOSPHORUS: 3.6 MG/DL (ref 2.5–4.9)
POTASSIUM SERPL-SCNC: 4.9 MMOL/L (ref 3.5–5.1)
SARS-COV-2, NAAT: NOT DETECTED
SODIUM BLD-SCNC: 142 MMOL/L (ref 136–145)

## 2020-11-06 PROCEDURE — 2060000000 HC ICU INTERMEDIATE R&B

## 2020-11-06 PROCEDURE — 2580000003 HC RX 258: Performed by: INTERNAL MEDICINE

## 2020-11-06 PROCEDURE — U0002 COVID-19 LAB TEST NON-CDC: HCPCS

## 2020-11-06 PROCEDURE — 36415 COLL VENOUS BLD VENIPUNCTURE: CPT

## 2020-11-06 PROCEDURE — 80069 RENAL FUNCTION PANEL: CPT

## 2020-11-06 PROCEDURE — 6370000000 HC RX 637 (ALT 250 FOR IP): Performed by: INTERNAL MEDICINE

## 2020-11-06 PROCEDURE — 6360000002 HC RX W HCPCS: Performed by: INTERNAL MEDICINE

## 2020-11-06 RX ORDER — TRAMADOL HYDROCHLORIDE 50 MG/1
50 TABLET ORAL EVERY 6 HOURS PRN
Status: DISCONTINUED | OUTPATIENT
Start: 2020-11-06 | End: 2020-11-08

## 2020-11-06 RX ADMIN — ACETAMINOPHEN 650 MG: 325 TABLET ORAL at 09:28

## 2020-11-06 RX ADMIN — Medication 10 ML: at 21:09

## 2020-11-06 RX ADMIN — TAMSULOSIN HYDROCHLORIDE 0.4 MG: 0.4 CAPSULE ORAL at 09:28

## 2020-11-06 RX ADMIN — BUPROPION HYDROCHLORIDE 150 MG: 150 TABLET, FILM COATED, EXTENDED RELEASE ORAL at 09:28

## 2020-11-06 RX ADMIN — FAMOTIDINE 10 MG: 20 TABLET, FILM COATED ORAL at 09:28

## 2020-11-06 RX ADMIN — ENOXAPARIN SODIUM 30 MG: 30 INJECTION SUBCUTANEOUS at 09:28

## 2020-11-06 RX ADMIN — ACETAMINOPHEN 650 MG: 325 TABLET ORAL at 22:59

## 2020-11-06 RX ADMIN — Medication 10 ML: at 09:29

## 2020-11-06 RX ADMIN — THERA TABS 1 TABLET: TAB at 09:28

## 2020-11-06 RX ADMIN — SODIUM CHLORIDE: 9 INJECTION, SOLUTION INTRAVENOUS at 11:31

## 2020-11-06 RX ADMIN — LORAZEPAM 0.5 MG: 0.5 TABLET ORAL at 09:28

## 2020-11-06 RX ADMIN — POLYETHYLENE GLYCOL 3350 17 G: 17 POWDER, FOR SOLUTION ORAL at 09:28

## 2020-11-06 RX ADMIN — TRAMADOL HYDROCHLORIDE 50 MG: 50 TABLET, COATED ORAL at 15:46

## 2020-11-06 RX ADMIN — SODIUM CHLORIDE: 9 INJECTION, SOLUTION INTRAVENOUS at 22:32

## 2020-11-06 RX ADMIN — Medication 3 MG: at 21:09

## 2020-11-06 RX ADMIN — LORAZEPAM 0.5 MG: 0.5 TABLET ORAL at 21:09

## 2020-11-06 RX ADMIN — POLYETHYLENE GLYCOL 3350 17 G: 17 POWDER, FOR SOLUTION ORAL at 21:09

## 2020-11-06 RX ADMIN — MIRTAZAPINE 7.5 MG: 15 TABLET, FILM COATED ORAL at 21:09

## 2020-11-06 RX ADMIN — ATORVASTATIN CALCIUM 80 MG: 80 TABLET, FILM COATED ORAL at 21:09

## 2020-11-06 RX ADMIN — ACETAMINOPHEN 650 MG: 325 TABLET ORAL at 15:47

## 2020-11-06 ASSESSMENT — PAIN SCALES - GENERAL
PAINLEVEL_OUTOF10: 0
PAINLEVEL_OUTOF10: 2
PAINLEVEL_OUTOF10: 0
PAINLEVEL_OUTOF10: 7
PAINLEVEL_OUTOF10: 0
PAINLEVEL_OUTOF10: 3
PAINLEVEL_OUTOF10: 6
PAINLEVEL_OUTOF10: 3

## 2020-11-06 ASSESSMENT — PAIN DESCRIPTION - PROGRESSION
CLINICAL_PROGRESSION: NOT CHANGED
CLINICAL_PROGRESSION: GRADUALLY WORSENING
CLINICAL_PROGRESSION: NOT CHANGED

## 2020-11-06 ASSESSMENT — PAIN DESCRIPTION - DESCRIPTORS
DESCRIPTORS: PRESSURE
DESCRIPTORS: PRESSURE
DESCRIPTORS: ACHING;DISCOMFORT

## 2020-11-06 ASSESSMENT — PAIN DESCRIPTION - LOCATION
LOCATION: ABDOMEN;BACK
LOCATION: BACK
LOCATION: ABDOMEN;BACK

## 2020-11-06 ASSESSMENT — PAIN DESCRIPTION - PAIN TYPE
TYPE: ACUTE PAIN

## 2020-11-06 ASSESSMENT — PAIN DESCRIPTION - ORIENTATION
ORIENTATION: LOWER;LEFT
ORIENTATION: LEFT
ORIENTATION: LEFT

## 2020-11-06 ASSESSMENT — PAIN - FUNCTIONAL ASSESSMENT: PAIN_FUNCTIONAL_ASSESSMENT: ACTIVITIES ARE NOT PREVENTED

## 2020-11-06 ASSESSMENT — PAIN DESCRIPTION - FREQUENCY
FREQUENCY: CONTINUOUS

## 2020-11-06 ASSESSMENT — PAIN DESCRIPTION - ONSET
ONSET: ON-GOING
ONSET: AWAKENED FROM SLEEP
ONSET: ON-GOING

## 2020-11-06 NOTE — PROGRESS NOTES
Vascular Surgery   Daily Progress Note  Patient: Mendel Sevin      CC: AAA    SUBJECTIVE:   Patient rested well overnight, denies any acute complaints. Tolerating diet with no nausea or vomiting. He states his abdominal pain is mild and still present on the left flank area. ROS:   A 14 point review of systems was conducted, significant findings as noted above. All other systems negative. OBJECTIVE:    PHYSICAL EXAM:    Vitals:    11/05/20 2322 11/06/20 0201 11/06/20 0600 11/06/20 0609   BP: 127/67 117/65  (!) 149/72   Pulse: 55 52  58   Resp: 16 16  16   Temp: 98.4 °F (36.9 °C) 98.5 °F (36.9 °C)  98.1 °F (36.7 °C)   TempSrc: Oral Oral  Oral   SpO2: 92% 94%  96%   Weight:   189 lb 13.1 oz (86.1 kg)    Height:           General appearance: alert, no acute distress, grooming appropriate  Eyes: No scleral icterus, EOM grossly intact  Neck: trachea midline, no JVD, no lymphadenopathy, neck supple  Chest/Lungs: CTAB, no crackles/rales, wheezes/rhonchi, normal effort with no accessory muscle use on RA  Cardiovascular: RRR, no murmurs/gallops/rubs, S1 and S2 noted, well perfused  Abdomen: Obese, non-tender, no rebound, guarding, or rigidity. Skin: warm and dry, no rashes  Extremities: no edema, no cyanosis  Genitourinary: Grossly normal  Neuro: A&Ox3, no focal deficits, sensation intact    Pulses     Rad Fem Pop PT DP   Right + + + -/+ -/+   Left + + + -/+ -/+   +, -/+, -/-    LABS:   Recent Labs     11/04/20 1802 11/05/20  0634   WBC 5.0 6.2   HGB 11.0* 11.7*   HCT 32.2* 34.7*   MCV 99.8 100.1*   * 139        Recent Labs     11/04/20 1802 11/05/20  0634    142   K 5.3* 5.0    106   CO2 22 21   PHOS  --  4.3   BUN 57* 56*   CREATININE 2.9* 2.6*      No results for input(s): AST, ALT, ALB, BILIDIR, BILITOT, ALKPHOS in the last 72 hours. No results for input(s): LIPASE, AMYLASE in the last 72 hours.      Recent Labs     11/05/20  1206   INR 1.34*   APTT 28.2        Recent Labs 11/04/20 2126 11/05/20  0634   TROPONINI 0.02* 0.02*         ASSESSMENT & PLAN:   Delphia Spurling is a 80 y.o. 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 CT scan from 2018 shows a 4.3 cm infrarenal abdominal aortic aneurysm. During this admission CT scan done 11/4/20 revealed a 5.4 x 4.6 cm abdominal aortic aneurysm. - Given the change in size of his abdominal aortic aneurysm over the last 2 years it would benefit the patient to follow up with Dr. Amaris Mariscal as an outpatient for possible elective repair of his AAA  - Continue ASA and statin  - No emergent indication for surgery at this time.   - Medical management per primary.  - Patient will need to be treated for his acute on chronic kidney disease prior to undergoing intervention    Zuhair Serna DO, MS  PGY1, General Surgery  11/06/20  6:45 AM  [phone]

## 2020-11-06 NOTE — PLAN OF CARE
Problem: Pain:  Goal: Pain level will decrease  Description: Pain level will decrease  Outcome: Ongoing  Pt c/o 7/10 pain in abdomen and back, PRN's given as available. Repositioned and up in chair, will continue to monitor. Problem: Falls - Risk of:  Goal: Will remain free from falls  Description: Will remain free from falls  Outcome: Ongoing  Fall precautions in place. Bed is in lowest position, wheels locked, bed alarm on, non skid socks on. Call light and bedside table within reach. Pt calls out appropriately. Pt is up x1 SBA. Will continue to assess and monitor.

## 2020-11-06 NOTE — PROGRESS NOTES
[facility]    [facility]              [phone]                        Interval History and plan:      He has underlying cardiorenal syndrome and his creatinine fluctuates with the use of diuretics. BP better  Urine is 675 ml     I will hold lisinopril, Aldactone, Lasix. Continue saline IVF  Creatinine 2.6 >> 2.3  Improving   Serum for free light chain. Urine for protein creatinine. Assessment :     Acute kidney injury: He presented with a creatinine of 2.9 with a BUN of 57. Baseline creatinine is 1.5. At home he was taking lisinopril, spironolactone, Lasix and Flomax. Stage III chronic kidney disease: Baseline GFR is 45 mL/min. He has chronic kidney disease since 2015. Urinalysis is bland with no proteinuria or hematuria. Ultrasound the kidney showed bilateral renal cyst in 2015. CT scan showed normal adrenal.  Multiple cysts within bilateral kidneys. There was no kidney stone. Prostate is enlarged. He has underlying chronic kidney disease from vascular disease. Creatinine fluctuates with the use of diuretics and cardiorenal syndrome. Hypertension: Well controlled. Echocardiogram with 2025% EF with grade 1 diastolic dysfunction. [facility] would like to thank Muna Whiting MD   for opportunity to serve this patient      Please call with questions at-   24 Hrs Answering service [phone] or  7 am- 5 pm via Perfect serve or cell phone  Raysa Santiago          CC/reason for consult :     Acute kidney injury     HPI :     Panfilo Goff is a 80 y.o. male presented to   the hospital on 11/4/2020 with abdominal pain. He has past medical history of AAA, stage III chronic kidney disease, DJD, hyperlipidemia, hypertension, BPH status post TURP presented with left flank pain and abdominal pain. Patient's pain started at which was dull ache which started becoming sharp.   The pain radiated to his left side of his lower abdomen. He had some nausea as well. He denies diarrhea constipation or vomiting. He had no fever or chills. He does take Lasix. When he arrived in the ER EKG showed multiple PACs. He was also hyperkalemic with a potassium of 5.3. He was given calcium gluconate, insulin and dextrose. Creatinine was elevated 2.9 from baseline of 1.5. CT showed AAA but no kidney stone. He is now admitted for further management. ROS:     Seen with-resident    positives in bold   Constitutional:  fever, chills, weakness, weight change, fatigue  Skin:  rash, pruritus, hair loss, bruising, dry skin, petechiae  Head, Face, Neck   headaches, swelling,  cervical adenopathy  Respiratory: shortness of breath, cough, or wheezing  Cardiovascular: chest pain, palpitations, dizzy, edema  Gastrointestinal: nausea, vomiting, diarrhea, constipation,belly pain    Yellow skin, blood in stool  Musculoskeletal:  back pain, muscle weakness, gait problems,       joint pain or swelling. Genitourinary:  dysuria, poor urine flow, flank pain, blood in urine  Neurologic:  vertigo, TIA'S, syncope, seizures, focal weakness  Psychosocial:  insomnia, anxiety, or depression. All other remaining systems are negative or unable to obtain        PMH/PSH/SH/Family History:     Past Medical History:   Diagnosis Date    Arthritis     Ascending aortic aneurysm ([address]     CAD (coronary artery disease)     Chronic kidney disease     Constipation     DDD (degenerative disc disease), lumbar     L5, S1    Dental disease     Hearing loss     Hyperlipidemia     Hypertension     Mild aortic stenosis     Prostate enlargement     TIA (transient ischemic attack)        Past Surgical History:   Procedure Laterality Date    CORONARY ANGIOPLASTY WITH STENT PLACEMENT  1/12/98    EYE SURGERY      SKIN TAG REMOVAL  2/2/10    TURP          reports that he has never smoked.  He has never used smokeless tobacco. He reports current alcohol use. He reports that he does not use drugs. family history includes Cancer in his mother; Hypertension in his mother; Stroke in his father.          Medication:     Current Facility-Administered Medications: sodium chloride flush 0.9 % injection 10 mL, 10 mL, Intravenous, 2 times per day  sodium chloride flush 0.9 % injection 10 mL, 10 mL, Intravenous, PRN  potassium chloride 10 mEq/100 mL IVPB (Peripheral Line), 10 mEq, Intravenous, PRN  magnesium sulfate 2 g in 50 mL IVPB premix, 2 g, Intravenous, PRN  acetaminophen (TYLENOL) tablet 650 mg, 650 mg, Oral, Q6H PRN **OR** acetaminophen (TYLENOL) suppository 650 mg, 650 mg, Rectal, Q6H PRN  promethazine (PHENERGAN) tablet 12.5 mg, 12.5 mg, Oral, Q6H PRN **OR** ondansetron (ZOFRAN) injection 4 mg, 4 mg, Intravenous, Q6H PRN  famotidine (PEPCID) tablet 20 mg, 20 mg, Oral, Daily PRN  enoxaparin (LOVENOX) injection 30 mg, 30 mg, Subcutaneous, Daily  0.9 % sodium chloride infusion, , Intravenous, Continuous  polyethylene glycol (GLYCOLAX) packet 17 g, 17 g, Oral, BID  sennosides-docusate sodium (SENOKOT-S) 8.6-50 MG tablet 2 tablet, 2 tablet, Oral, Daily PRN  aspirin EC tablet 325 mg, 325 mg, Oral, Daily  atorvastatin (LIPITOR) tablet 80 mg, 80 mg, Oral, Nightly  buPROPion (WELLBUTRIN XL) extended release tablet 150 mg, 150 mg, Oral, BID  LORazepam (ATIVAN) tablet 0.5 mg, 0.5 mg, Oral, Q8H PRN  melatonin tablet 3 mg, 3 mg, Oral, Nightly PRN  multivitamin 1 tablet, 1 tablet, Oral, Daily  mirtazapine (REMERON) tablet 7.5 mg, 7.5 mg, Oral, Nightly  famotidine (PEPCID) tablet 10 mg, 10 mg, Oral, Daily  tamsulosin (FLOMAX) capsule 0.4 mg, 0.4 mg, Oral, Daily  glucose (GLUTOSE) 40 % oral gel 15 g, 15 g, Oral, PRN  dextrose 50 % IV solution, 12.5 g, Intravenous, PRN  glucagon (rDNA) injection 1 mg, 1 mg, Intramuscular, PRN  dextrose 5 % solution, 100 mL/hr, Intravenous, PRN       Vitals :     Vitals:    11/06/20 1007   BP: 126/63   Pulse: 61   Resp: 18   Temp: 97.7 °F (36.5 °C)   SpO2: 95%       I & O :       Intake/Output Summary (Last 24 hours) at 11/6/2020 1101  Last data filed at 11/6/2020 0936  Gross per 24 hour   Intake 1200 ml   Output 1075 ml   Net 125 ml        Physical Examination :     General appearance: Anxious- no, distressed- no, in good spirits- yes    HEENT: Lips- normal, teeth- ok , oral mucosa- moist  Neck : Mass- no, appears symmetrical, JVD- not visible  Respiratory: Respiratory effort-okay, wheeze- no, crackles -none  Cardiovascular:  Ausculation- No M/R/G, Edema none  Abdomen: visible mass- no, distention- no, scar- no, tenderness- no                            hepatosplenomegaly-  no  Musculoskeletal:  clubbing no,cyanosis- no , digital ischemia- no                           muscle strength- grossly normal , tone - grossly normal  Skin: rashes- no , ulcers- no, induration- no, tightening - no  Psychiatric:  Judgement and insight- normal           AAO X 3       LABS:     Recent Labs     11/04/20  1802 11/05/20  0634   WBC 5.0 6.2   HGB 11.0* 11.7*   HCT 32.2* 34.7*   * 139     Recent Labs     11/04/20  1802 11/05/20  0634 11/06/20  0612    142 142   K 5.3* 5.0 4.9    106 113*   CO2 22 21 22   BUN 57* 56* 46*   CREATININE 2.9* 2.6* 2.3*   GLUCOSE 107* 107* 94   PHOS  --  4.3 3.6        Nephrology  [address], [address]  Office: [phone]  Cell: [phone]  Fax: [phone]

## 2020-11-06 NOTE — PROGRESS NOTES
Hospitalist Progress Note      PCP: Redd Jaquez MD    Date of Admission: 11/4/2020    Chief Complaint: Abdominal pain    Hospital Course:   80 male with history of severe systolic congestive heart failure last echo with EF 20 to 25%, CAD s/p PTCA moderate aortic stenosis CKD stage III, history of GERD presented with back pain in the left lower quadrant pain. Found to have CARLI work-up, as well as abdominal urinalysis of 5.4 x 4.6 cm, cholelithiasis and moderate prostamegaly. Subjective:   Date the pain has shifted from left lower quadrant to left flank pain  Denies any other complaints  Says he is anxious about his aneurysm size  Wants me to discuss his care with his daughter      Medications:  Reviewed    Infusion Medications    sodium chloride 100 mL/hr at 11/05/20 1501    dextrose       Scheduled Medications    sodium chloride flush  10 mL Intravenous 2 times per day    enoxaparin  30 mg Subcutaneous Daily    polyethylene glycol  17 g Oral BID    aspirin  325 mg Oral Daily    atorvastatin  80 mg Oral Nightly    buPROPion  150 mg Oral BID    multivitamin  1 tablet Oral Daily    mirtazapine  7.5 mg Oral Nightly    famotidine  10 mg Oral Daily    tamsulosin  0.4 mg Oral Daily     PRN Meds: sodium chloride flush, potassium chloride, magnesium sulfate, acetaminophen **OR** acetaminophen, promethazine **OR** ondansetron, famotidine, sennosides-docusate sodium, LORazepam, melatonin, glucose, dextrose, glucagon (rDNA), dextrose      Intake/Output Summary (Last 24 hours) at 11/6/2020 0953  Last data filed at 11/6/2020 0936  Gross per 24 hour   Intake 1200 ml   Output 1075 ml   Net 125 ml       Physical Exam Performed:    BP (!) 149/72   Pulse 58   Temp 98.1 °F (36.7 °C) (Oral)   Resp 16   Ht 5' 6\" (1.676 m)   Wt 189 lb 13.1 oz (86.1 kg)   SpO2 96%   BMI 30.64 kg/m²     General appearance:   Anxious and worried, appears stated age and cooperative.   HEENT:  Normal cephalic, atraumatic without obvious deformity. Pupils equal, round, and reactive to light. Extra ocular muscles intact. Conjunctivae/corneas clear. Neck: Supple, with full range of motion. No jugular venous distention. Trachea midline. Respiratory:  Normal respiratory effort. Clear to auscultation, bilaterally without Rales/Wheezes/Rhonchi. Cardiovascular:  Regular rate and rhythm with normal S1/S2 without murmurs, rubs or gallops. Abdomen: Soft, non-tender, non-distended with normal bowel sounds. No tenderness in the left lower quadrant today, no lumbar spinal tenderness appreciated on exam  Musculoskeletal:  No clubbing, cyanosis or edema bilaterally. Full range of motion without deformity. Skin: Skin color, texture, turgor normal.  No rashes or lesions. Neurologic:  Neurovascularly intact without any focal sensory/motor deficits. Cranial nerves: II-XII intact, grossly non-focal.  Psychiatric:  Alert and oriented, thought content appropriate, normal insight  Capillary Refill: Brisk,< 3 seconds   Peripheral Pulses: +2 palpable, equal bilaterally     Labs:   Recent Labs     11/04/20  1802 11/05/20  0634   WBC 5.0 6.2   HGB 11.0* 11.7*   HCT 32.2* 34.7*   * 139     Recent Labs     11/04/20  1802 11/05/20  0634 11/06/20  0612    142 142   K 5.3* 5.0 4.9    106 113*   CO2 22 21 22   BUN 57* 56* 46*   CREATININE 2.9* 2.6* 2.3*   CALCIUM 8.6 9.1 8.3   PHOS  --  4.3 3.6     No results for input(s): AST, ALT, BILIDIR, BILITOT, ALKPHOS in the last 72 hours.   Recent Labs     11/05/20  1206   INR 1.34*     Recent Labs     11/04/20  1802 11/04/20  2126 11/05/20  0634   TROPONINI 0.02* 0.02* 0.02*       Urinalysis:      Lab Results   Component Value Date    NITRU Negative 11/04/2020    WBCUA 0-2 05/14/2014    BACTERIA Rare 05/14/2014    RBCUA 0-2 05/14/2014    BLOODU Negative 11/04/2020    SPECGRAV 1.020 11/04/2020    GLUCOSEU Negative 11/04/2020       Radiology:  CT ABDOMEN PELVIS WO CONTRAST Additional Contrast? None home meds    DVT Prophylaxis: Lovenox 30 mg OD  Diet: DIET RENAL;  Code Status: Full Code    PT/OT Eval Status: ordered    Dispo - Continue inpatient care  Discussed with patient's daughter Torrie Leblanc MD  Hospitalist

## 2020-11-06 NOTE — CARE COORDINATION
Case Management Assessment           Daily Note                 Date/ Time of Note: 11/6/2020 3:54 PM         Note completed by: Darby Marrufo    Patient Name: Jessica Lees  YOB: 1932    Diagnosis:CARLI (acute kidney injury) ([facility] [N17.9]  CARLI (acute kidney injury) ([facility] [N17.9]  CARLI (acute kidney injury) ([facility] [N17.9]  Patient Admission Status: Inpatient    Date of Admission:11/4/2020  4:40 PM Length of Stay: 1 GLOS:      Current Plan of Care: Per vascular, no surgical intervention at this time. ________________________________________________________________________________________  PT AM-PAC: 20 / 24 per last evaluation on: 11/5    OT AM-PAC: 18 / 24 per last evaluation on: 11/5    DME Needs for discharge: NA  ________________________________________________________________________________________  Discharge Plan: SNF: [facility]    Tentative discharge date: TBD    Current barriers to discharge: safety, pain management    Referrals completed: Not Applicable    Resources/ information provided: Not indicated at this time  ________________________________________________________________________________________  Case Management Notes:  Patient here from [facility]. Will return to facility at discharge. CM spoke to Gibran Escobar at [facility]. Patient will need a new Covid test. Can have rapid. Test completed today. Patient stated daughter will transport at d/c.  CM called daughter Harpreet Bynum. Message was left on vm. CM will continue to follow for discharge planning. Dina Geiger and his family were provided with choice of provider; he and his family are in agreement with the discharge plan.     Care Transition Patient: Yes    Darby Marrufo RN  [facility]  Case Management Department  Ph: [phone]  Fax: [phone]

## 2020-11-06 NOTE — DISCHARGE INSTR - COC
Continuity of Care Form    Patient Name: Reanna Yin   :  1932  MRN:  [id number]    Admit date:  2020  Discharge date:  ***    Code Status Order: Full Code   Advance Directives:      Admitting Physician:  Antoinette Prater MD  PCP: Bart Crowell MD    Discharging Nurse: [facility] Unit/Room#: 1975/5488-93  Discharging Unit Phone Number: ***    Emergency Contact:   Extended Emergency Contact Information  Primary Emergency Contact: [facility]  Address: [address] [place] [address], [address] Phone: [phone]  Relation: Spouse  Secondary Emergency Contact: [address] Phone: [phone]  Work Phone: [phone]  Relation: Child    Past Surgical History:  Past Surgical History:   Procedure Laterality Date    CORONARY ANGIOPLASTY WITH STENT PLACEMENT  98    EYE SURGERY      SKIN TAG REMOVAL  2/2/10    TURP         Immunization History: There is no immunization history on file for this patient.     Active Problems:  Patient Active Problem List   Diagnosis Code    Hypertension I10    Hypercholesterolemia E78.00    Arthritis M19.90    Prostate enlargement N40.0    DDD (degenerative disc disease), lumbar M51.36    Constipation K59.00    Moderate aortic stenosis I35.0    Precordial pain R07.2    Chest pain R07.9    Coronary artery disease involving native heart I25.10    Abnormal stress test R94.39    S/P coronary angiogram Z98.890    Weakness of both lower extremities R29.898    CKD (chronic kidney disease) stage 3, GFR 30-59 ml/min N18.30    Acute on chronic systolic congestive heart failure (HCC) I50.23    Hypertensive emergency I16.1    Acute on chronic combined systolic and diastolic congestive heart failure (HCC) I50.43    Respiratory distress R06.03    Dyspnea on exertion R06.00    Fluid overload E87.70    Bradycardia R00.1    Mass of left hand R22.32    Ischemic cardiomyopathy I25.5  Acute bilateral low back pain with left-sided sciatica M54.42    Abdominal aortic aneurysm (AAA) without rupture ([facility]) I71.4    TIA (transient ischemic attack) G45.9    CHF (congestive heart failure), NYHA class I, acute on chronic, combined (HCC) I50.43    Mixed hyperlipidemia E78.2    CARLI (acute kidney injury) ([facility] N17.9       Isolation/Infection:   Isolation            No Isolation          Patient Infection Status       None to display            Nurse Assessment:  Last Vital Signs: BP (!) 149/72   Pulse 58   Temp 98.1 °F (36.7 °C) (Oral)   Resp 16   Ht 5' 6\" (1.676 m)   Wt 189 lb 13.1 oz (86.1 kg)   SpO2 96%   BMI 30.64 kg/m²     Last documented pain score (0-10 scale): Pain Level: 3  Last Weight:   Wt Readings from Last 1 Encounters:   11/06/20 189 lb 13.1 oz (86.1 kg)     Mental Status:  oriented, alert and coherent    IV Access:  - None    Nursing Mobility/ADLs:  Walking   Assisted  Transfer  Assisted  Bathing  Independent  Dressing  Assisted  Toileting  Independent  Feeding  Independent  Med Admin  Independent  Med Delivery   whole    Wound Care Documentation and Therapy:        Elimination:  Continence:   · Bowel: Yes  · Bladder: Yes  Urinary Catheter: None   Colostomy/Ileostomy/Ileal Conduit: No       Date of Last BM: ***    Intake/Output Summary (Last 24 hours) at 11/6/2020 0650  Last data filed at 11/6/2020 0201  Gross per 24 hour   Intake 720 ml   Output 675 ml   Net 45 ml     I/O last 3 completed shifts: In: 1033.5 [P.O.:720; I.V.:313.5]  Out: 375 [Urine:375]    Safety Concerns: At Risk for Falls    Impairments/Disabilities:      Speech (Speaks [place] and [place])    Nutrition Therapy:  Current Nutrition Therapy:   Oral: Renal diet    Routes of Feeding: Oral  Liquids:  Thin Liquids  Daily Fluid Restriction: no  Last Modified Barium Swallow with Video (Video Swallowing Test): not done    Treatments at the Time of Hospital Discharge:   Respiratory Treatments: ***  Oxygen Therapy: is not on home oxygen therapy. Ventilator:    - No ventilator support    Rehab Therapies: {THERAPEUTIC INTERVENTION:[id number]}  Weight Bearing Status/Restrictions: No weight bearing restirctions  Other Medical Equipment (for information only, NOT a DME order):  walker  Other Treatments: ***    Patient's personal belongings (please select all that are sent with patient):  Dentures upper and lower    RN SIGNATURE:  Electronically signed by Ragini Garcia RN on 11/9/20 at 1:26 PM EST    CASE MANAGEMENT/SOCIAL WORK SECTION    Inpatient Status Date: 11-    Readmission Risk Assessment Score:  Readmission Risk              Risk of Unplanned Readmission:        31           Discharging to Facility/ Agency   · Name:  [facility]  · Address: [address] [place] [address]  · Phone: [phone]  · Fax:     [phone]      / signature: Electronically signed by Cecilio Bishop RN on 11/9/20 at 11:33 AM EST    PHYSICIAN SECTION    Prognosis: Fair    Condition at Discharge: Stable    Rehab Potential (if transferring to Rehab): Fair    Recommended Labs or Other Treatments After Discharge:     Renal panel in 3 days, follow with Dr Maciej Kendrick in 1 week     Vascular surgery: Follow up with Dr. Alona Santos within one week to discuss management of aortic aneurysm. Call [phone] to make an appointment. Her office is located at [facility] [place] [address]. [address]  (directly across from the main entrance of [facility])    Physician Certification: I certify the above information and transfer of Oz Dior  is necessary for the continuing treatment of the diagnosis listed and that he requires [facility] Admission H&P: No change in H&P    PHYSICIAN SIGNATURE:  Electronically signed by Princess Mendoza MD on 11/9/20 at 9:14 AM EST

## 2020-11-06 NOTE — PROGRESS NOTES
Patient is alert and oriented. Vital signs are stable. Patient's pain is controlled with medication per MAR. Patient ambulates x1 with a walker. Patient tolerates ambulation well. Patient voiding urine without complication. Patient had a bowel movement. Bed is in the lowest position. Bed alarm is activated. Call light is within reach. Will continue to monitor and reassess.

## 2020-11-07 ENCOUNTER — APPOINTMENT (OUTPATIENT)
Dept: CT IMAGING | Age: 85
DRG: 683 | End: 2020-11-07
Payer: MEDICARE

## 2020-11-07 LAB
ALBUMIN SERPL-MCNC: 3.5 G/DL (ref 3.4–5)
ANION GAP SERPL CALCULATED.3IONS-SCNC: 8 MMOL/L (ref 3–16)
BASOPHILS ABSOLUTE: 0 K/UL (ref 0–0.2)
BASOPHILS RELATIVE PERCENT: 0.9 %
BUN BLDV-MCNC: 37 MG/DL (ref 7–20)
CALCIUM SERPL-MCNC: 8.4 MG/DL (ref 8.3–10.6)
CHLORIDE BLD-SCNC: 115 MMOL/L (ref 99–110)
CO2: 21 MMOL/L (ref 21–32)
CREAT SERPL-MCNC: 1.8 MG/DL (ref 0.8–1.3)
CREATININE URINE: 95.4 MG/DL (ref 39–259)
EOSINOPHILS ABSOLUTE: 0.3 K/UL (ref 0–0.6)
EOSINOPHILS RELATIVE PERCENT: 6.4 %
GFR AFRICAN AMERICAN: 43
GFR NON-AFRICAN AMERICAN: 36
GLUCOSE BLD-MCNC: 100 MG/DL (ref 70–99)
GLUCOSE BLD-MCNC: 110 MG/DL (ref 70–99)
GLUCOSE BLD-MCNC: 112 MG/DL (ref 70–99)
GLUCOSE BLD-MCNC: 79 MG/DL (ref 70–99)
GLUCOSE BLD-MCNC: 95 MG/DL (ref 70–99)
HCT VFR BLD CALC: 30.1 % (ref 40.5–52.5)
HEMOGLOBIN: 10.1 G/DL (ref 13.5–17.5)
LYMPHOCYTES ABSOLUTE: 1.1 K/UL (ref 1–5.1)
LYMPHOCYTES RELATIVE PERCENT: 26.5 %
MAGNESIUM: 2.4 MG/DL (ref 1.8–2.4)
MCH RBC QN AUTO: 34.1 PG (ref 26–34)
MCHC RBC AUTO-ENTMCNC: 33.7 G/DL (ref 31–36)
MCV RBC AUTO: 101.3 FL (ref 80–100)
MICROALBUMIN UR-MCNC: <1.2 MG/DL
MICROALBUMIN/CREAT UR-RTO: NORMAL MG/G (ref 0–30)
MONOCYTES ABSOLUTE: 0.6 K/UL (ref 0–1.3)
MONOCYTES RELATIVE PERCENT: 13.1 %
NEUTROPHILS ABSOLUTE: 2.3 K/UL (ref 1.7–7.7)
NEUTROPHILS RELATIVE PERCENT: 53.1 %
PDW BLD-RTO: 14.2 % (ref 12.4–15.4)
PERFORMED ON: ABNORMAL
PERFORMED ON: NORMAL
PHOSPHORUS: 3.3 MG/DL (ref 2.5–4.9)
PLATELET # BLD: 131 K/UL (ref 135–450)
PMV BLD AUTO: 8.1 FL (ref 5–10.5)
POTASSIUM SERPL-SCNC: 4.9 MMOL/L (ref 3.5–5.1)
RBC # BLD: 2.97 M/UL (ref 4.2–5.9)
SODIUM BLD-SCNC: 144 MMOL/L (ref 136–145)
WBC # BLD: 4.3 K/UL (ref 4–11)

## 2020-11-07 PROCEDURE — 93005 ELECTROCARDIOGRAM TRACING: CPT | Performed by: INTERNAL MEDICINE

## 2020-11-07 PROCEDURE — 80069 RENAL FUNCTION PANEL: CPT

## 2020-11-07 PROCEDURE — 83735 ASSAY OF MAGNESIUM: CPT

## 2020-11-07 PROCEDURE — 85025 COMPLETE CBC W/AUTO DIFF WBC: CPT

## 2020-11-07 PROCEDURE — 72128 CT CHEST SPINE W/O DYE: CPT

## 2020-11-07 PROCEDURE — 6370000000 HC RX 637 (ALT 250 FOR IP): Performed by: INTERNAL MEDICINE

## 2020-11-07 PROCEDURE — 2060000000 HC ICU INTERMEDIATE R&B

## 2020-11-07 PROCEDURE — 2580000003 HC RX 258: Performed by: INTERNAL MEDICINE

## 2020-11-07 PROCEDURE — 6360000002 HC RX W HCPCS: Performed by: INTERNAL MEDICINE

## 2020-11-07 PROCEDURE — 72131 CT LUMBAR SPINE W/O DYE: CPT

## 2020-11-07 PROCEDURE — 36415 COLL VENOUS BLD VENIPUNCTURE: CPT

## 2020-11-07 PROCEDURE — 82570 ASSAY OF URINE CREATININE: CPT

## 2020-11-07 PROCEDURE — 82043 UR ALBUMIN QUANTITATIVE: CPT

## 2020-11-07 PROCEDURE — 51798 US URINE CAPACITY MEASURE: CPT

## 2020-11-07 RX ORDER — KETOTIFEN FUMARATE 0.35 MG/ML
1 SOLUTION/ DROPS OPHTHALMIC 2 TIMES DAILY
Status: DISCONTINUED | OUTPATIENT
Start: 2020-11-07 | End: 2020-11-09 | Stop reason: HOSPADM

## 2020-11-07 RX ORDER — POLYVINYL ALCOHOL 14 MG/ML
1 SOLUTION/ DROPS OPHTHALMIC
Status: DISCONTINUED | OUTPATIENT
Start: 2020-11-07 | End: 2020-11-09 | Stop reason: HOSPADM

## 2020-11-07 RX ORDER — AZELASTINE HYDROCHLORIDE 0.5 MG/ML
1 SOLUTION/ DROPS OPHTHALMIC 3 TIMES DAILY
Status: DISCONTINUED | OUTPATIENT
Start: 2020-11-07 | End: 2020-11-07 | Stop reason: SDUPTHER

## 2020-11-07 RX ADMIN — BUPROPION HYDROCHLORIDE 150 MG: 150 TABLET, FILM COATED, EXTENDED RELEASE ORAL at 19:54

## 2020-11-07 RX ADMIN — FAMOTIDINE 10 MG: 20 TABLET, FILM COATED ORAL at 08:30

## 2020-11-07 RX ADMIN — POLYETHYLENE GLYCOL 3350 17 G: 17 POWDER, FOR SOLUTION ORAL at 08:28

## 2020-11-07 RX ADMIN — ASPIRIN 325 MG: 325 TABLET, COATED ORAL at 08:29

## 2020-11-07 RX ADMIN — TRAMADOL HYDROCHLORIDE 50 MG: 50 TABLET, COATED ORAL at 19:54

## 2020-11-07 RX ADMIN — SODIUM CHLORIDE: 9 INJECTION, SOLUTION INTRAVENOUS at 08:30

## 2020-11-07 RX ADMIN — POLYVINYL ALCOHOL 1 DROP: 14 SOLUTION/ DROPS OPHTHALMIC at 12:42

## 2020-11-07 RX ADMIN — SODIUM CHLORIDE: 9 INJECTION, SOLUTION INTRAVENOUS at 21:02

## 2020-11-07 RX ADMIN — KETOTIFEN FUMARATE 1 DROP: 0.35 SOLUTION/ DROPS OPHTHALMIC at 19:55

## 2020-11-07 RX ADMIN — TAMSULOSIN HYDROCHLORIDE 0.4 MG: 0.4 CAPSULE ORAL at 08:30

## 2020-11-07 RX ADMIN — ENOXAPARIN SODIUM 30 MG: 30 INJECTION SUBCUTANEOUS at 08:29

## 2020-11-07 RX ADMIN — BUPROPION HYDROCHLORIDE 150 MG: 150 TABLET, FILM COATED, EXTENDED RELEASE ORAL at 08:29

## 2020-11-07 RX ADMIN — MIRTAZAPINE 7.5 MG: 15 TABLET, FILM COATED ORAL at 19:54

## 2020-11-07 RX ADMIN — TRAMADOL HYDROCHLORIDE 50 MG: 50 TABLET, COATED ORAL at 07:29

## 2020-11-07 RX ADMIN — ACETAMINOPHEN 650 MG: 325 TABLET ORAL at 23:01

## 2020-11-07 RX ADMIN — KETOTIFEN FUMARATE 1 DROP: 0.35 SOLUTION/ DROPS OPHTHALMIC at 13:57

## 2020-11-07 RX ADMIN — ATORVASTATIN CALCIUM 80 MG: 80 TABLET, FILM COATED ORAL at 19:55

## 2020-11-07 RX ADMIN — Medication 3 MG: at 20:02

## 2020-11-07 RX ADMIN — THERA TABS 1 TABLET: TAB at 08:29

## 2020-11-07 RX ADMIN — ACETAMINOPHEN 650 MG: 325 TABLET ORAL at 18:08

## 2020-11-07 ASSESSMENT — PAIN DESCRIPTION - FREQUENCY
FREQUENCY: CONTINUOUS
FREQUENCY: CONTINUOUS

## 2020-11-07 ASSESSMENT — PAIN SCALES - GENERAL
PAINLEVEL_OUTOF10: 0
PAINLEVEL_OUTOF10: 4
PAINLEVEL_OUTOF10: 3
PAINLEVEL_OUTOF10: 4
PAINLEVEL_OUTOF10: 7
PAINLEVEL_OUTOF10: 6
PAINLEVEL_OUTOF10: 7
PAINLEVEL_OUTOF10: 4
PAINLEVEL_OUTOF10: 5

## 2020-11-07 ASSESSMENT — PAIN - FUNCTIONAL ASSESSMENT
PAIN_FUNCTIONAL_ASSESSMENT: ACTIVITIES ARE NOT PREVENTED
PAIN_FUNCTIONAL_ASSESSMENT: ACTIVITIES ARE NOT PREVENTED

## 2020-11-07 ASSESSMENT — PAIN DESCRIPTION - PROGRESSION
CLINICAL_PROGRESSION: GRADUALLY WORSENING
CLINICAL_PROGRESSION: GRADUALLY WORSENING

## 2020-11-07 ASSESSMENT — PAIN DESCRIPTION - ONSET
ONSET: GRADUAL
ONSET: ON-GOING

## 2020-11-07 ASSESSMENT — PAIN DESCRIPTION - DESCRIPTORS
DESCRIPTORS: ACHING;DISCOMFORT
DESCRIPTORS: ACHING;DISCOMFORT

## 2020-11-07 ASSESSMENT — PAIN DESCRIPTION - ORIENTATION
ORIENTATION: LEFT;LOWER
ORIENTATION: LEFT

## 2020-11-07 ASSESSMENT — PAIN DESCRIPTION - PAIN TYPE
TYPE: ACUTE PAIN
TYPE: ACUTE PAIN

## 2020-11-07 ASSESSMENT — PAIN DESCRIPTION - LOCATION
LOCATION: ABDOMEN
LOCATION: BACK;FLANK

## 2020-11-07 NOTE — PLAN OF CARE
Problem: Pain:  Goal: Pain level will decrease  Description: Pain level will decrease  11/6/2020 2347 by Milagro Vicente RN  Outcome: Ongoing  Note: Patient c/o pain to Left flank, PRN tylenol given, upon reassessment, patient resting in bed comfortably w/eyes closed. Will continue to monitor! Problem: Falls - Risk of:  Goal: Will remain free from falls  Description: Will remain free from falls  11/6/2020 2351 by Milagro Vicente RN  Outcome: Ongoing  Note: Hourly rounding on patient for needs. Non-skid socks on, bed in lowest position and locked. Bedside table, personal belongs, and nurse call light within reach. Instructed patient to use call light for assistance. Bed alarm on. Floor clear of clutter. Patient remains free of falls at this time. Will continue to monitor.

## 2020-11-07 NOTE — PROGRESS NOTES
deformity. Pupils equal, round, and reactive to light. Extra ocular muscles intact. Conjunctivae/corneas clear. Neck: Supple, with full range of motion. No jugular venous distention. Trachea midline. Respiratory:  Normal respiratory effort. Clear to auscultation, bilaterally without Rales/Wheezes/Rhonchi. Cardiovascular:  Regular rate and rhythm with normal S1/S2 without murmurs, rubs or gallops. Abdomen: Soft, non-tender, non-distended with normal bowel sounds. No tenderness in the left lower quadrant today, no lumbar spinal tenderness appreciated on exam  Musculoskeletal:  No clubbing, cyanosis, minimal to trace edema. Full range of motion without deformity. Skin: Skin color, texture, turgor normal.  No rashes or lesions. Neurologic:  Neurovascularly intact without any focal sensory/motor deficits. Cranial nerves: II-XII intact, grossly non-focal.  Psychiatric:  Alert and oriented, thought content appropriate, normal insight  Capillary Refill: Brisk,< 3 seconds   Peripheral Pulses: +2 palpable, equal bilaterally     Labs:   Recent Labs     11/04/20 1802 11/05/20  0634 11/07/20  0651   WBC 5.0 6.2 4.3   HGB 11.0* 11.7* 10.1*   HCT 32.2* 34.7* 30.1*   * 139 131*     Recent Labs     11/05/20  0634 11/06/20  0612 11/07/20  0651    142 144   K 5.0 4.9 4.9    113* 115*   CO2 21 22 21   BUN 56* 46* 37*   CREATININE 2.6* 2.3* 1.8*   CALCIUM 9.1 8.3 8.4   PHOS 4.3 3.6 3.3     No results for input(s): AST, ALT, BILIDIR, BILITOT, ALKPHOS in the last 72 hours.   Recent Labs     11/05/20  1206   INR 1.34*     Recent Labs     11/04/20  1802 11/04/20  2126 11/05/20  0634   TROPONINI 0.02* 0.02* 0.02*       Urinalysis:      Lab Results   Component Value Date    NITRU Negative 11/04/2020    WBCUA 0-2 05/14/2014    BACTERIA Rare 05/14/2014    RBCUA 0-2 05/14/2014    BLOODU Negative 11/04/2020    SPECGRAV 1.020 11/04/2020    GLUCOSEU Negative 11/04/2020       Radiology:  CT ABDOMEN PELVIS WO CONTRAST Additional Contrast? None   Final Result      1. No acute abnormality in the abdomen or pelvis. 2. Abdominal aortic aneurysm measuring 5.4 x 4.6 cm.   3. Cholelithiasis. 4. Multiple cysts and other indeterminate lesions in the kidneys, incompletely characterized without contrast.   5. Small pericardial effusion and extensive coronary artery and aortic calcifications. 6. Moderate prostatomegaly. CT LUMBAR SPINE WO CONTRAST    (Results Pending)   CT THORACIC SPINE WO CONTRAST    (Results Pending)           Assessment/Plan:    Active Hospital Problems    Diagnosis Date Noted    Acute kidney injury superimposed on CKD ([facility] [address] [N17.9, N18.9]     CARLI (acute kidney injury) ([facility] [address] [N17.9] 11/05/2020      CARLI on CKD stage III; I suspect this is due to underlying volume depletion in the setting of diuretic use and decreased p.o. intake. On admission creatinine 2.9 with BUN 57. His baseline around 1.5. Hold lisinopril Aldactone Lasix  Continue maintenance fluids; I will decrease to 75 mL/hr with his complains of leg feeling heavier than before   CT with multiple cyst bilateral kidneys. No stone was seen.   Prostate was found to be moderately enlarged  Bladder scan every 6 hours post vital, with straight cath parameters ordered  Nephrology following     Left sided flank/lower back pain  I suspected MSK etiology, with recent pushing of his wife's wheelchair  He does have a history of lumbar degenerative disc disease, and previously has received KAR  - He is concerned about the pain  - Will check CT Thoracic and Lumbar spine for further evaluation     Frequent PVCs on tele  - No complains of chest pain, palpitations  - Reviewed labs, electrolytes K 4.9, Mag 2.4, Phos 3.3  - Will check EKG    Hypertension  Overnight systolic [id number] but this morning well controlled  Hold blood pressure medications for now     Chronic 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

## 2020-11-07 NOTE — PROGRESS NOTES
Post void bladder scan was 90. Patient was starting to get up without assistance. Bed alarm did go off. Assisted patient to chair with contact guard assistance and gait belt. The Runthrough camera is in place. Instructed to call when needing assistance. Chair alarm is on.

## 2020-11-07 NOTE — PROGRESS NOTES
Secure message sent to Dr. Lizandro Bill patient had 2.2 second pause. Also frequent bigeminy, pvcs, and pacs.

## 2020-11-07 NOTE — PROGRESS NOTES
Patient is a/o x4. Patient denies c/o nausea/pain. VSS. Non-skid socks on, SCD'S remain in place. Patient x 1 stand/pivot from chair to bed, tolerates well. Fall precautions in place. Bed locked and in lowest position, bedside table and nurse call light within reach. Instructed patient to call out for assistance. Patient remains free from falls at this time, will continue to monitor.

## 2020-11-07 NOTE — PROGRESS NOTES
EKG reviewed, junctional rhythm, patient is asymptomatic. Cardiology consult for further evaluation. Discussed CT T/L spine findings with patient's daughter Veronica Tomas, the patient's site of pain does not correlate with any spinal or neuroforaminal stenosis on CT scans.

## 2020-11-07 NOTE — PROGRESS NOTES
Patient had 2 second pause and heart rate at times goes into 40s. Asymptomatic. Call placed to notify Dr. Edward Montez. Dr. Edward Montez aware of this. Dr. Fabiano Soni aware that Dr. Edward Montez wanted EKG and telemetry strips that have pause and ectopy.

## 2020-11-08 LAB
ALBUMIN SERPL-MCNC: 3.4 G/DL (ref 3.4–5)
ANION GAP SERPL CALCULATED.3IONS-SCNC: 10 MMOL/L (ref 3–16)
BUN BLDV-MCNC: 33 MG/DL (ref 7–20)
CALCIUM SERPL-MCNC: 8.4 MG/DL (ref 8.3–10.6)
CHLORIDE BLD-SCNC: 112 MMOL/L (ref 99–110)
CO2: 18 MMOL/L (ref 21–32)
CREAT SERPL-MCNC: 1.6 MG/DL (ref 0.8–1.3)
GFR AFRICAN AMERICAN: 50
GFR NON-AFRICAN AMERICAN: 41
GLUCOSE BLD-MCNC: 107 MG/DL (ref 70–99)
GLUCOSE BLD-MCNC: 150 MG/DL (ref 70–99)
PERFORMED ON: ABNORMAL
PHOSPHORUS: 3.4 MG/DL (ref 2.5–4.9)
POTASSIUM SERPL-SCNC: 5.5 MMOL/L (ref 3.5–5.1)
SODIUM BLD-SCNC: 140 MMOL/L (ref 136–145)

## 2020-11-08 PROCEDURE — 2580000003 HC RX 258: Performed by: INTERNAL MEDICINE

## 2020-11-08 PROCEDURE — 6370000000 HC RX 637 (ALT 250 FOR IP): Performed by: INTERNAL MEDICINE

## 2020-11-08 PROCEDURE — 80069 RENAL FUNCTION PANEL: CPT

## 2020-11-08 PROCEDURE — 6370000000 HC RX 637 (ALT 250 FOR IP): Performed by: HOSPITALIST

## 2020-11-08 PROCEDURE — 6360000002 HC RX W HCPCS: Performed by: INTERNAL MEDICINE

## 2020-11-08 PROCEDURE — 2060000000 HC ICU INTERMEDIATE R&B

## 2020-11-08 PROCEDURE — 99223 1ST HOSP IP/OBS HIGH 75: CPT | Performed by: INTERNAL MEDICINE

## 2020-11-08 PROCEDURE — 36415 COLL VENOUS BLD VENIPUNCTURE: CPT

## 2020-11-08 RX ORDER — METHOCARBAMOL 750 MG/1
750 TABLET, FILM COATED ORAL EVERY 8 HOURS
Status: DISCONTINUED | OUTPATIENT
Start: 2020-11-08 | End: 2020-11-09 | Stop reason: HOSPADM

## 2020-11-08 RX ORDER — METHOCARBAMOL 750 MG/1
750 TABLET, FILM COATED ORAL ONCE
Status: COMPLETED | OUTPATIENT
Start: 2020-11-08 | End: 2020-11-08

## 2020-11-08 RX ORDER — METHOCARBAMOL 750 MG/1
750 TABLET, FILM COATED ORAL 3 TIMES DAILY
Status: DISCONTINUED | OUTPATIENT
Start: 2020-11-08 | End: 2020-11-08

## 2020-11-08 RX ADMIN — THERA TABS 1 TABLET: TAB at 07:35

## 2020-11-08 RX ADMIN — BUPROPION HYDROCHLORIDE 150 MG: 150 TABLET, FILM COATED, EXTENDED RELEASE ORAL at 20:57

## 2020-11-08 RX ADMIN — BUPROPION HYDROCHLORIDE 150 MG: 150 TABLET, FILM COATED, EXTENDED RELEASE ORAL at 07:35

## 2020-11-08 RX ADMIN — ENOXAPARIN SODIUM 30 MG: 30 INJECTION SUBCUTANEOUS at 07:35

## 2020-11-08 RX ADMIN — LORAZEPAM 0.5 MG: 0.5 TABLET ORAL at 19:35

## 2020-11-08 RX ADMIN — MIRTAZAPINE 7.5 MG: 15 TABLET, FILM COATED ORAL at 20:57

## 2020-11-08 RX ADMIN — ACETAMINOPHEN 650 MG: 325 TABLET ORAL at 06:41

## 2020-11-08 RX ADMIN — Medication 3 MG: at 20:57

## 2020-11-08 RX ADMIN — METHOCARBAMOL 750 MG: 750 TABLET ORAL at 01:10

## 2020-11-08 RX ADMIN — METHOCARBAMOL 750 MG: 750 TABLET ORAL at 11:07

## 2020-11-08 RX ADMIN — ASPIRIN 325 MG: 325 TABLET, COATED ORAL at 07:35

## 2020-11-08 RX ADMIN — TAMSULOSIN HYDROCHLORIDE 0.4 MG: 0.4 CAPSULE ORAL at 07:35

## 2020-11-08 RX ADMIN — POLYETHYLENE GLYCOL 3350 17 G: 17 POWDER, FOR SOLUTION ORAL at 20:57

## 2020-11-08 RX ADMIN — KETOTIFEN FUMARATE 1 DROP: 0.35 SOLUTION/ DROPS OPHTHALMIC at 20:57

## 2020-11-08 RX ADMIN — ATORVASTATIN CALCIUM 80 MG: 80 TABLET, FILM COATED ORAL at 20:57

## 2020-11-08 RX ADMIN — TRAMADOL HYDROCHLORIDE 50 MG: 50 TABLET, COATED ORAL at 07:41

## 2020-11-08 RX ADMIN — METHOCARBAMOL 750 MG: 750 TABLET ORAL at 14:10

## 2020-11-08 RX ADMIN — ACETAMINOPHEN 650 MG: 325 TABLET ORAL at 18:30

## 2020-11-08 RX ADMIN — METHOCARBAMOL 750 MG: 750 TABLET ORAL at 20:57

## 2020-11-08 RX ADMIN — TRAMADOL HYDROCHLORIDE 50 MG: 50 TABLET, COATED ORAL at 01:14

## 2020-11-08 RX ADMIN — KETOTIFEN FUMARATE 1 DROP: 0.35 SOLUTION/ DROPS OPHTHALMIC at 07:35

## 2020-11-08 RX ADMIN — FAMOTIDINE 10 MG: 20 TABLET, FILM COATED ORAL at 07:35

## 2020-11-08 RX ADMIN — PROMETHAZINE HYDROCHLORIDE 12.5 MG: 12.5 TABLET ORAL at 17:26

## 2020-11-08 RX ADMIN — SODIUM CHLORIDE: 9 INJECTION, SOLUTION INTRAVENOUS at 18:31

## 2020-11-08 RX ADMIN — ONDANSETRON 4 MG: 2 INJECTION INTRAMUSCULAR; INTRAVENOUS at 16:37

## 2020-11-08 RX ADMIN — Medication 10 ML: at 20:57

## 2020-11-08 ASSESSMENT — PAIN DESCRIPTION - ONSET
ONSET: GRADUAL
ONSET: GRADUAL

## 2020-11-08 ASSESSMENT — PAIN DESCRIPTION - PROGRESSION
CLINICAL_PROGRESSION: GRADUALLY WORSENING
CLINICAL_PROGRESSION: GRADUALLY WORSENING

## 2020-11-08 ASSESSMENT — PAIN SCALES - GENERAL
PAINLEVEL_OUTOF10: 7
PAINLEVEL_OUTOF10: 0
PAINLEVEL_OUTOF10: 3
PAINLEVEL_OUTOF10: 3
PAINLEVEL_OUTOF10: 2
PAINLEVEL_OUTOF10: 7
PAINLEVEL_OUTOF10: 2
PAINLEVEL_OUTOF10: 0

## 2020-11-08 ASSESSMENT — PAIN DESCRIPTION - DESCRIPTORS
DESCRIPTORS: ACHING;DISCOMFORT
DESCRIPTORS: ACHING;DISCOMFORT

## 2020-11-08 ASSESSMENT — PAIN DESCRIPTION - FREQUENCY
FREQUENCY: CONTINUOUS
FREQUENCY: CONTINUOUS

## 2020-11-08 ASSESSMENT — PAIN DESCRIPTION - PAIN TYPE
TYPE: ACUTE PAIN
TYPE: ACUTE PAIN

## 2020-11-08 ASSESSMENT — PAIN DESCRIPTION - LOCATION
LOCATION: ABDOMEN
LOCATION: ABDOMEN

## 2020-11-08 ASSESSMENT — PAIN DESCRIPTION - ORIENTATION
ORIENTATION: LEFT
ORIENTATION: LEFT

## 2020-11-08 NOTE — PROGRESS NOTES
Lovenox 30 mg daily ordered for patient. This medication is renally eliminated. Will change to Lovenox 40 mg daily per renal dose adjustment policy. Estimated Creatinine Clearance: 33 mL/min (A) (based on SCr of 1.6 mg/dL (H)). Pharmacy will continue to monitor renal function and adjust dose as necessary. Please call with any questions.   Kemi Tian, PharmD, BCPS  Main pharmacy: [id number]  11/8/2020 3:28 PM

## 2020-11-08 NOTE — PROGRESS NOTES
Hospitalist Progress Note      PCP: Angela Pritchard MD    Date of Admission: 11/4/2020    Chief Complaint: Abdominal pain    Hospital Course:   80 male with history of severe systolic congestive heart failure last echo with EF 20 to 25%, CAD s/p PTCA moderate aortic stenosis CKD stage III, history of GERD presented with back pain in the left lower quadrant pain. Found to have CARLI work-up, as well as abdominal urinalysis of 5.4 x 4.6 cm, cholelithiasis and moderate prostamegaly. Subjective:   Continues to have left lower quadrant/left flank pain  Says medication given to him last night worked    Medications:  Reviewed    Infusion Medications    sodium chloride 50 mL/hr at 11/08/20 1009    dextrose       Scheduled Medications    methocarbamol  750 mg Oral TID    [START ON 11/9/2020] enoxaparin  40 mg Subcutaneous Daily    ketotifen  1 drop Both Eyes BID    sodium chloride flush  10 mL Intravenous 2 times per day    polyethylene glycol  17 g Oral BID    aspirin  325 mg Oral Daily    atorvastatin  80 mg Oral Nightly    buPROPion  150 mg Oral BID    multivitamin  1 tablet Oral Daily    mirtazapine  7.5 mg Oral Nightly    famotidine  10 mg Oral Daily    tamsulosin  0.4 mg Oral Daily     PRN Meds: polyvinyl alcohol, sodium chloride flush, potassium chloride, magnesium sulfate, acetaminophen **OR** acetaminophen, promethazine **OR** ondansetron, famotidine, sennosides-docusate sodium, LORazepam, melatonin, glucose, dextrose, glucagon (rDNA), dextrose      Intake/Output Summary (Last 24 hours) at 11/8/2020 1548  Last data filed at 11/8/2020 1412  Gross per 24 hour   Intake 1325 ml   Output 975 ml   Net 350 ml       Physical Exam Performed:    BP (!) 149/69   Pulse 54   Temp 97.6 °F (36.4 °C) (Oral)   Resp 16   Ht 5' 6\" (1.676 m)   Wt 188 lb 11.4 oz (85.6 kg)   SpO2 98%   BMI 30.46 kg/m²     General appearance:   Anxious and worried, appears stated age and cooperative.   HEENT:  Normal in the setting of diuretic use and decreased p.o. intake. On admission creatinine 2.9 with BUN 57. His baseline around 1.5. Hold lisinopril Aldactone Lasix  Continue maintenance fluids; I will decrease to 75 mL/hr with his complains of leg feeling heavier than before   CT with multiple cyst bilateral kidneys. No stone was seen. Prostate was found to be moderately enlarged  Bladder scan every 6 hours post vital, with straight cath parameters ordered  Nephrology following     Left sided flank/lower back pain  I suspected MSK etiology, with recent pushing of his wife's wheelchair  He does have a history of lumbar degenerative disc disease, and previously has received KAR. CT of the thoracic spine did not show spinal neuroforaminal stenosis.  -Robaxin-750 milligram 3 times daily     Frequent PVCs on tele  - No complains of chest pain, palpitations  - Reviewed labs, electrolytes K 4.9, Mag 2.4, Phos 3.3  -Discussed EKG findings with cardiology, at this point we feel this is sinus bradycardia/first-degree AV block.     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 Alecia Gonzalez as an outpatient for possible elective repair of his AAA     Chronic medical prob: insomnia, depression - continue home meds    DVT Prophylaxis: Lovenox 30 mg OD  Diet: DIET RENAL;  Code Status: Full Code    PT/OT Eval Status: ordered    Dispo - Continue inpatient care, likely dc 1-2 days  Discussed with patient's daughter Amy 11/16     Carie Mcmanus MD  Hospitalist

## 2020-11-08 NOTE — PROGRESS NOTES
Secure message sent to Dr. Apollo yAala that patient reporting no relief of nausea. Nausea improved and then requested something for pain. Tylenol was given.

## 2020-11-08 NOTE — PLAN OF CARE
Problem: Pain:  Goal: Control of acute pain  Description: Control of acute pain  Outcome: Met This Shift   controlled with Tylenol and ultram until midnight pt c/o pain. One time robaxin was ordered. Pt feels he will need thi medication at d/c since it was so helpful. Problem: Falls - Risk of:  Goal: Will remain free from falls  Description: Will remain free from falls  Outcome: Met This Shift   Pt free from injury this shift and free of falls. 2/4 rails up on bed and bed is in the lowest position. Wheels locked and bed alarm set. Socks on pt and ID bands on pt. Call light in reach of pt and pt educated to call out to get up [id number] with GB. Will continue to monitor for safety.

## 2020-11-08 NOTE — PROGRESS NOTES
Secure message sent to Dr. Daphne Orta Patient is nauseated and already had Zofran. Denies chest pain or any pain at all. /73, HR 59, R 16, Sp02 98% RA, Temp 97.7 orally.

## 2020-11-08 NOTE — CONSULTS
[facility]  Cardiology Inpatient Consult Service                                                                                          Pt Name: Leeanna Beltran  Age: 80 y.o. Sex: male  : 1932  Location: [address] [place] [address]    Referring Physician: Sherin Hui MD      Reason for Consult:       Reason for Consultation/Chief Complaint: Abnormal EKG/CHF/coronary disease/bradycardia. HPI:      Leeanna Beltran is a 80 y.o. male with a past medical history of hypertension, CAD, CKD, chronic systolic heart failure most recent EF 20-25 who presented to the hospital with abdominal and back pain. CT scan showed a infrarenal AAA for what vascular was consulted. Admission EKG shows sinus bradycardia, no evidence of junctional rhythm, few PVCs. Histories     Past Medical History:   has a past medical history of Arthritis, Ascending aortic aneurysm ([address] CAD (coronary artery disease), Chronic kidney disease, Constipation, DDD (degenerative disc disease), lumbar, Dental disease, Hearing loss, Hyperlipidemia, Hypertension, Mild aortic stenosis, Prostate enlargement, and TIA (transient ischemic attack). Surgical History:   has a past surgical history that includes TURP; Coronary angioplasty with stent (98); Skin tag removal (2/2/10); and eye surgery. Social History:   reports that he has never smoked. He has never used smokeless tobacco. He reports current alcohol use. He reports that he does not use drugs. Family History:  No evidence for sudden cardiac death or premature CAD      Medications:       Home Medications  Were reviewed and are listed in nursing record. and/or listed below  Prior to Admission medications    Medication Sig Start Date End Date Taking? Authorizing Provider   LORazepam (ATIVAN) 0.5 MG tablet Take 0.5 mg by mouth every 8 hours as needed for Anxiety (give 0.25mg to 0.5mg every 8 hours as needed).    Yes Historical Provider, MD   buPROPion (WELLBUTRIN XL) 150 MG extended release tablet Take 1 tablet by mouth 2 times daily 7/30/20  Yes Josafat Rai MD   nitroGLYCERIN (NITROSTAT) 0.4 MG SL tablet PLACE 1 TABLET UNDER THE TONGUE EVERY 5 MINUTES IF NEEDED FOR CHEST PAIN UP TO MAXIMUM OF 3 TOTAL DOSES.  IF NO RELIEF AFTER 1 DOSE CALL 911 7/30/20  Yes Josafat Rai MD   lisinopril (PRINIVIL;ZESTRIL) 10 MG tablet Take 1 tablet by mouth daily 7/24/20  Yes DEBORAH Saucedo CNP   spironolactone (ALDACTONE) 50 MG tablet TAKE 1 TABLET BY MOUTH DAILY 7/22/20  Yes Joe Rasmussen MD   ondansetron (ZOFRAN ODT) 4 MG disintegrating tablet Take 1 tablet by mouth every 8 hours as needed for Nausea 7/13/20  Yes Neisha Burrows MD   mirtazapine (REMERON) 7.5 MG tablet Take 1 tablet by mouth nightly 7/13/20  Yes Joe Rasmussen MD   furosemide (LASIX) 40 MG tablet Take 80 mg in the morning and 40 mg in the afternoon 6/22/20  Yes Alaina Cormier MD   melatonin (RA MELATONIN) 3 MG TABS tablet Take 1 tablet by mouth nightly as needed (sleep) 6/22/20  Yes Alaina Cormier MD   azelastine (OPTIVAR) 0.05 % ophthalmic solution Place 1 drop into both eyes 3 times daily    Yes Historical Provider, MD   polyethylene glycol (GLYCOLAX) 17 GM/SCOOP powder Take 17 g by mouth daily   Yes Historical Provider, MD   tamsulosin (FLOMAX) 0.4 MG capsule Take 0.4 mg by mouth daily   Yes Historical Provider, MD   aspirin 325 MG EC tablet Take 1 tablet by mouth daily 6/16/20  Yes Jerone Cushing, MD    MG capsule TAKE 1 CAPSULE BY MOUTH 2 TIMES DAILY AS NEEDED FOR CONSTIPATION 4/30/20  Yes Tom Cruz MD   atorvastatin (LIPITOR) 80 MG tablet Take 1 tablet by mouth nightly 2/17/20  Yes Tomás Garcia MD   Multiple Vitamin (MULTI-VITAMIN) TABS Take 1 tablet by mouth daily 7/26/19  Yes Rene Hernandez MD   polyvinyl alcohol (LIQUIFILM TEARS) 1.4 % ophthalmic solution Place 1 drop into both eyes every 3 hours as needed    Historical Provider, MD   hydrocortisone 2.5 % cream Apply topically 2 times daily. Patient not taking: Reported on 8/4/2020 6/16/20   Margaret Forte MD   ranitidine (ZANTAC) 150 MG tablet Take 1 tablet by mouth 2 times daily as needed for Heartburn 1/14/20   Ronit Ramírez MD   Handicap Placard MISC by Does not apply route effective November 25,2019 through November 24,2020  Patient not taking: Reported on 8/4/2020 11/25/19   Madina Read MD   diclofenac sodium 1 % GEL Apply 4 g topically 4 times daily as needed for Pain  Patient not taking: Reported on 8/4/2020 7/26/19   Madina Read MD          Inpatient Medications:   ketotifen  1 drop Both Eyes BID    sodium chloride flush  10 mL Intravenous 2 times per day    enoxaparin  30 mg Subcutaneous Daily    polyethylene glycol  17 g Oral BID    aspirin  325 mg Oral Daily    atorvastatin  80 mg Oral Nightly    buPROPion  150 mg Oral BID    multivitamin  1 tablet Oral Daily    mirtazapine  7.5 mg Oral Nightly    famotidine  10 mg Oral Daily    tamsulosin  0.4 mg Oral Daily       IV drips:   sodium chloride 75 mL/hr at 11/07/20 2102    dextrose         PRN:  polyvinyl alcohol, traMADol, sodium chloride flush, potassium chloride, magnesium sulfate, acetaminophen **OR** acetaminophen, promethazine **OR** ondansetron, famotidine, sennosides-docusate sodium, LORazepam, melatonin, glucose, dextrose, glucagon (rDNA), dextrose    Allergy:     Patient has no known allergies. Review of Systems:     All 12 point review of symptoms completed. Pertinent positives identified in the HPI, all other review of symptoms negative as below. CONSTITUTIONAL: Nounanticipated weight loss. No change in energy level, sleep pattern, or activity level. SKIN: No rash or pruritis. EYES: No visual changes or diplopia. No scleral icterus. ENT: No Headaches, hearing loss or vertigo. No mouth sores or sore throat. CARDIOVASCULAR: No chest pain/chest pressure/chest discomfort. No palpitations.    RESPIRATORY: No cough or wheezing, no sputum production. No hematemesis. GASTROINTESTINAL: No N/V/D. No abdominal pain, appetite loss, blood in stools. GENITOURINARY: Back pain, slight abdominal pain  MUSCULOSKELETAL:  No gait disturbance, weakness or joint complaints. NEUROLOGICAL: No headache, diplopia, change in muscle strength, numbness or tingling. No change in gait, balance, coordination, mood, affect, memory, mentation, behavior. PSHYCH: No anxiety, loss of interest, change in sexual behavior, feelings of self-harm, or confusion. ENDOCRINE: No malaise, fatigue or temperature intolerance. No excessive thirst, fluid intake, or urination. No tremor. HEMATOLOGIC: No abnormal bruising or bleeding. ALLERGY: No nasal congestion or hives.       Physical Examination:     Vitals:    11/07/20 1914 11/07/20 2202 11/08/20 0215 11/08/20 0644   BP: 136/71 136/62 (!) 151/72 (!) 157/75   Pulse: 52 85 52 61   Resp: 16 16 16 16   Temp: 98 °F (36.7 °C) 97.4 °F (36.3 °C) 98.3 °F (36.8 °C) 98.2 °F (36.8 °C)   TempSrc: Oral Oral Oral Oral   SpO2: 98% 96% 96% 94%   Weight:       Height:           Wt Readings from Last 3 Encounters:   11/07/20 188 lb 11.4 oz (85.6 kg)   08/04/20 191 lb (86.6 kg)   07/30/20 189 lb 8 oz (86 kg)       Objective      General Appearance:  Alert, cooperative, no distress, appears stated age Appropriate weight   Head:  Normocephalic, without obvious abnormality, atraumatic   Eyes:  PERRL, conjunctiva/corneas clear EOM intact  Ears normal   Throat no lesions       Nose: Nares normal, no drainage or sinus tenderness   Throat: Lips, mucosa, and tongue normal   Neck: Supple, symmetrical, trachea midline, no adenopathy, thyroid: not enlarged, symmetric, no tenderness/mass/nodules, no carotid bruit or JVD       Lungs:   Clear to auscultation bilaterally, respirations unlabored   Chest Wall:  No tenderness or deformity   Heart:  Regular rate and rhythm, S1, S2 normal, no murmur, rub or gallop PMI intact   Abdomen:   Soft, non-tender, bowel sounds active all four quadrants,  no masses, no organomegaly       Extremities: Extremities normal, atraumatic, no cyanosis or edema   Pulses: 2+ and symmetric   Skin: Skin color, texture, turgor normal, no rashes or lesions   Pysch: Normal mood and affect   Neurologic: Normal gross motor and sensory exam.  Cranial nerves intact        Labs:     Recent Labs     11/06/20  0612 11/07/20  0651    144   K 4.9 4.9   BUN 46* 37*   CREATININE 2.3* 1.8*   * 115*   CO2 22 21   GLUCOSE 94 95   CALCIUM 8.3 8.4   MG  --  2.40     Recent Labs     11/07/20  0651   WBC 4.3   HGB 10.1*   HCT 30.1*   *   .3*     No results for input(s): CHOLTOT, TRIG, HDL in the last 72 hours. Invalid input(s): LIPIDCOMM, CHOLHDL, VLDCHOL, LDL  Recent Labs     11/05/20  1206   INR 1.34*     No results for input(s): CKTOTAL, CKMB, CKMBINDEX, TROPONINI in the last 72 hours. No results for input(s): BNP in the last 72 hours. No results for input(s): TSH in the last 72 hours. No results for input(s): CHOL, HDL, LDLCALC, TRIG in the last 72 hours.]    Lab Results   Component Value Date    TROPONINI 0.02 (H) 11/05/2020         Imaging:     I personally reviewed imaging studies including CXR, Stress test, TTE/LORI. Last ECG (if available) - EKG:  I have reviewed EKG with the following interpretation  Sinus bradycardia with PVCs, IVCD    Telemetry:  Sinus bradycardia    Last Stress (if available):    Last TTE/LORI(if available):  Left ventricular cavity size is dilated with mild concentric left   ventricular hypertrophy. Overall left ventricular systolic function appears reduced with an ejection   fraction of 20-25%. There is severe diffuse hypokinesis. Diastolic filling parameters suggest grade I diastolic dysfunction. The left atrium appears dilated. Moderate aortic stenosis with a peak velocity of 3.2m/s and a mean pressure   gradient of 23 mmHg. Mild to moderate aortic regurgitation.    Mild mitral, pulmonic, and tricuspid regurgitation. Estimated pulmonary artery systolic pressure is 33 mmHg assuming a right   atrial pressure of 3 mmHg. Last Cath (if available):    Last CMR  (if available):      Assessment / Plan:     Chronic systolic CHF/CAD/bradycardia. -EKG showed normal sinus rhythm with PVCs, reviewed telemetry, no junctional rhythm.  -We will continue current heart failure medications once kidney function improves. -We will reassess tomorrow. Tobacco use was discussed with the patient and educated on the negative effects. I have asked the patient to not utilize these agents. Thank you for allowing to us to participate in the care or Alex Amaya. Further evaluation will be based upon the patient's clinical course and testing results. I have spent 40 minutes of face to face time with the patient with more than 50% spent counseling and coordinating care. All questions and concerns were addressed to the patient/family. Alternatives to my treatment were discussed. The note was completed using EMR. Every effort was made to ensure accuracy; however, inadvertent computerized transcription errors may be present. I have personally reviewed the reports and images of labs, radiological studies, cardiac studies including ECG's and telemetry, current and old medical records. Alexey Metzger MD, [facility], [facility]

## 2020-11-08 NOTE — PROGRESS NOTES
VSS.  Pt given all PO interventions per orders for pain. Pt still states , \"Big Pain\". Hospitalist messaged at [phone]. Await response. HR SB with PVC's overnight. Bladder scan showed 103 ml post void residual. Pt voiding to urinal as well. avasys for safety. Bed alarm set. Call light in reach. Will continue to monitor. Addendum:     4864: One time robaxin ordered per MD. Pt would like a prescription for this when he leaves if possible. osmany pass along to day team.    0021: IV leaking. Three nurses have tried to gain access without success. Will call ED to place PIV.    4933 ED was able to get 20 g in L FA. infusing per orders. 0310: pt new IV just now infiltrated. 22g placed to L knuckle as pt is hard stick. Infusing per orders. 445: pt second IV of the night IV  not infusing and now hard to flush. pt requested it be removed. Will have to call ICU to try.    0500: ICU team cannot come at this time. MD made aware of loss of access.

## 2020-11-09 ENCOUNTER — TELEPHONE (OUTPATIENT)
Dept: VASCULAR SURGERY | Age: 85
End: 2020-11-09

## 2020-11-09 VITALS
SYSTOLIC BLOOD PRESSURE: 152 MMHG | WEIGHT: 206.35 LBS | HEIGHT: 66 IN | RESPIRATION RATE: 16 BRPM | TEMPERATURE: 96.2 F | BODY MASS INDEX: 33.16 KG/M2 | DIASTOLIC BLOOD PRESSURE: 79 MMHG | HEART RATE: 60 BPM | OXYGEN SATURATION: 96 %

## 2020-11-09 LAB
ALBUMIN SERPL-MCNC: 3.8 G/DL (ref 3.4–5)
ANION GAP SERPL CALCULATED.3IONS-SCNC: 6 MMOL/L (ref 3–16)
BUN BLDV-MCNC: 27 MG/DL (ref 7–20)
CALCIUM SERPL-MCNC: 8.5 MG/DL (ref 8.3–10.6)
CHLORIDE BLD-SCNC: 113 MMOL/L (ref 99–110)
CO2: 22 MMOL/L (ref 21–32)
CREAT SERPL-MCNC: 1.5 MG/DL (ref 0.8–1.3)
EKG ATRIAL RATE: 47 BPM
EKG DIAGNOSIS: NORMAL
EKG Q-T INTERVAL: 486 MS
EKG QRS DURATION: 130 MS
EKG QTC CALCULATION (BAZETT): 473 MS
EKG R AXIS: 15 DEGREES
EKG T AXIS: 100 DEGREES
EKG VENTRICULAR RATE: 57 BPM
GFR AFRICAN AMERICAN: 53
GFR NON-AFRICAN AMERICAN: 44
GLUCOSE BLD-MCNC: 113 MG/DL (ref 70–99)
PHOSPHORUS: 3.4 MG/DL (ref 2.5–4.9)
POTASSIUM SERPL-SCNC: 4.6 MMOL/L (ref 3.5–5.1)
REASON FOR REJECTION: NORMAL
REJECTED TEST: NORMAL
SODIUM BLD-SCNC: 141 MMOL/L (ref 136–145)

## 2020-11-09 PROCEDURE — 6370000000 HC RX 637 (ALT 250 FOR IP): Performed by: INTERNAL MEDICINE

## 2020-11-09 PROCEDURE — 6370000000 HC RX 637 (ALT 250 FOR IP): Performed by: HOSPITALIST

## 2020-11-09 PROCEDURE — 80069 RENAL FUNCTION PANEL: CPT

## 2020-11-09 PROCEDURE — 93010 ELECTROCARDIOGRAM REPORT: CPT | Performed by: INTERNAL MEDICINE

## 2020-11-09 PROCEDURE — 6360000002 HC RX W HCPCS: Performed by: INTERNAL MEDICINE

## 2020-11-09 PROCEDURE — 36415 COLL VENOUS BLD VENIPUNCTURE: CPT

## 2020-11-09 RX ORDER — METHOCARBAMOL 500 MG/1
500 TABLET, FILM COATED ORAL 4 TIMES DAILY
Qty: 20 TABLET | Refills: 0 | Status: SHIPPED | OUTPATIENT
Start: 2020-11-09 | End: 2020-11-09 | Stop reason: SDUPTHER

## 2020-11-09 RX ORDER — LORAZEPAM 0.5 MG/1
0.5 TABLET ORAL EVERY 8 HOURS PRN
Qty: 9 TABLET | Refills: 0 | Status: SHIPPED | OUTPATIENT
Start: 2020-11-09 | End: 2020-11-09 | Stop reason: SDUPTHER

## 2020-11-09 RX ORDER — SODIUM BICARBONATE 650 MG/1
650 TABLET ORAL 2 TIMES DAILY
Qty: 14 TABLET | Refills: 0 | Status: SHIPPED | OUTPATIENT
Start: 2020-11-09 | End: 2020-11-16

## 2020-11-09 RX ORDER — METHOCARBAMOL 500 MG/1
500 TABLET, FILM COATED ORAL 4 TIMES DAILY
Qty: 20 TABLET | Refills: 0 | Status: SHIPPED | OUTPATIENT
Start: 2020-11-09 | End: 2020-11-14

## 2020-11-09 RX ORDER — LORAZEPAM 0.5 MG/1
0.5 TABLET ORAL EVERY 8 HOURS PRN
Qty: 9 TABLET | Refills: 0 | Status: SHIPPED | OUTPATIENT
Start: 2020-11-09 | End: 2020-11-12

## 2020-11-09 RX ORDER — SODIUM BICARBONATE 650 MG/1
650 TABLET ORAL 2 TIMES DAILY
Status: DISCONTINUED | OUTPATIENT
Start: 2020-11-09 | End: 2020-11-09 | Stop reason: HOSPADM

## 2020-11-09 RX ORDER — FUROSEMIDE 40 MG/1
40 TABLET ORAL DAILY PRN
Qty: 60 TABLET | Refills: 3 | Status: SHIPPED | OUTPATIENT
Start: 2020-11-09 | End: 2021-02-26 | Stop reason: DRUGHIGH

## 2020-11-09 RX ADMIN — SODIUM BICARBONATE 650 MG TABLET 650 MG: at 10:33

## 2020-11-09 RX ADMIN — TAMSULOSIN HYDROCHLORIDE 0.4 MG: 0.4 CAPSULE ORAL at 10:38

## 2020-11-09 RX ADMIN — KETOTIFEN FUMARATE 1 DROP: 0.35 SOLUTION/ DROPS OPHTHALMIC at 13:13

## 2020-11-09 RX ADMIN — BUPROPION HYDROCHLORIDE 150 MG: 150 TABLET, FILM COATED, EXTENDED RELEASE ORAL at 10:34

## 2020-11-09 RX ADMIN — THERA TABS 1 TABLET: TAB at 10:38

## 2020-11-09 RX ADMIN — METHOCARBAMOL 750 MG: 750 TABLET ORAL at 05:11

## 2020-11-09 RX ADMIN — LORAZEPAM 0.5 MG: 0.5 TABLET ORAL at 05:12

## 2020-11-09 RX ADMIN — LORAZEPAM 0.5 MG: 0.5 TABLET ORAL at 13:13

## 2020-11-09 RX ADMIN — METHOCARBAMOL 750 MG: 750 TABLET ORAL at 13:13

## 2020-11-09 RX ADMIN — ACETAMINOPHEN 650 MG: 325 TABLET ORAL at 01:32

## 2020-11-09 RX ADMIN — FAMOTIDINE 10 MG: 20 TABLET, FILM COATED ORAL at 10:36

## 2020-11-09 RX ADMIN — ASPIRIN 325 MG: 325 TABLET, COATED ORAL at 10:33

## 2020-11-09 RX ADMIN — POLYETHYLENE GLYCOL 3350 17 G: 17 POWDER, FOR SOLUTION ORAL at 10:32

## 2020-11-09 RX ADMIN — ENOXAPARIN SODIUM 40 MG: 40 INJECTION SUBCUTANEOUS at 10:33

## 2020-11-09 ASSESSMENT — PAIN SCALES - GENERAL
PAINLEVEL_OUTOF10: 3
PAINLEVEL_OUTOF10: 0

## 2020-11-09 ASSESSMENT — PAIN DESCRIPTION - LOCATION: LOCATION: ABDOMEN;FLANK

## 2020-11-09 ASSESSMENT — PAIN - FUNCTIONAL ASSESSMENT: PAIN_FUNCTIONAL_ASSESSMENT: ACTIVITIES ARE NOT PREVENTED

## 2020-11-09 ASSESSMENT — PAIN DESCRIPTION - PAIN TYPE: TYPE: ACUTE PAIN

## 2020-11-09 ASSESSMENT — PAIN DESCRIPTION - DESCRIPTORS: DESCRIPTORS: ACHING;DISCOMFORT

## 2020-11-09 ASSESSMENT — PAIN DESCRIPTION - ONSET: ONSET: ON-GOING

## 2020-11-09 ASSESSMENT — PAIN DESCRIPTION - FREQUENCY: FREQUENCY: CONTINUOUS

## 2020-11-09 ASSESSMENT — PAIN DESCRIPTION - ORIENTATION: ORIENTATION: LEFT

## 2020-11-09 ASSESSMENT — PAIN DESCRIPTION - PROGRESSION: CLINICAL_PROGRESSION: GRADUALLY WORSENING

## 2020-11-09 NOTE — PROGRESS NOTES
Patient is a/o x4. Patient denies c/o nausea, has c/o pain medicated per e-Mar. VSS. Non-skid socks on. Patient x 1 SBA w/gait belt and walker to bathroom return to bed, tolerates fairly well. Fall precautions in place, camera in use. Bed locked and in lowest position, bedside table and nurse call light within reach. Instructed patient to call out for assistance. Patient remains free from falls at this time, will continue to monitor.

## 2020-11-09 NOTE — PROGRESS NOTES
Report was called to [facility] at 1330. Mr. Lori Barger was discharged with all personal belongings. He left with his daughter. They were able to discuss with Dr. Ronal Shay further cardiology-related questions before leaving. Reviewed follow-up appointments and recognizing fluid-overload symptoms as well as maintaining adequate hydration for his kidneys.

## 2020-11-09 NOTE — PLAN OF CARE
Problem: Pain:  Goal: Pain level will decrease  Description: Pain level will decrease  Outcome: Ongoing  Note: Mr. Wicho Verduzco reports muscle stiffness. Methocarbamol is managing pain adequately. Problem: Falls - Risk of:  Goal: Will remain free from falls  Description: Will remain free from falls  Outcome: Ongoing  Note: Fall precautions in place (nonskid socks, fall sign posted, bed/chair alarm in use, call light/bedside within reach).

## 2020-11-09 NOTE — PLAN OF CARE
Problem: Pain:  Goal: Pain level will decrease  Description: Pain level will decrease  Outcome: Ongoing  Note: Patient denies pain at this time, patient exhibits no objective characteristics of pain, will continue to monitor. Problem: Falls - Risk of:  Goal: Will remain free from falls  Description: Will remain free from falls  Outcome: Ongoing  Note: Hourly rounding on patient for needs. Non-skid socks on, bed in lowest position and locked. Bedside table, personal belongs, and nurse call light within reach. Instructed patient to use call light for assistance. Bed alarm on, camera in use for safety. Floor clear of clutter. Patient remains free of falls at this time. Will continue to monitor.

## 2020-11-09 NOTE — PROGRESS NOTES
[facility]    [facility]              [phone]                        Interval History and plan:      He has underlying cardiorenal syndrome and his creatinine fluctuates with the use of diuretics. BP better  Urine is 500 ml     I will hold lisinopril, Aldactone, Lasix. Distance discontinue IV fluid  Creatinine 2.6 >> 2.3>> 1.6 and is improving  Serum for free light chain ratio is 2.1. As no significant proteinuria                   Assessment :     Acute kidney injury: He presented with a creatinine of 2.9 with a BUN of 57. Baseline creatinine is 1.5. At home he was taking lisinopril, spironolactone, Lasix and Flomax. Stage III chronic kidney disease: Baseline GFR is 45 mL/min. He has chronic kidney disease since 2015. Urinalysis is bland with no proteinuria or hematuria. Ultrasound the kidney showed bilateral renal cyst in 2015. CT scan showed normal adrenal.  Multiple cysts within bilateral kidneys. There was no kidney stone. Prostate is enlarged. He has underlying chronic kidney disease from vascular disease. Creatinine fluctuates with the use of diuretics and cardiorenal syndrome. Hypertension: Well controlled. Echocardiogram with 2025% EF with grade 1 diastolic dysfunction. [facility] would like to thank Muna Whiting MD   for opportunity to serve this patient      Please call with questions at-   24 Hrs Answering service [phone] or  7 am- 5 pm via Perfect serve or cell phone  [address] [address]          CC/reason for consult :     Acute kidney injury     HPI :     Panfilo Goff is a 80 y.o. male presented to   the hospital on 11/4/2020 with abdominal pain. He has past medical history of AAA, stage III chronic kidney disease, DJD, hyperlipidemia, hypertension, BPH status post TURP presented with left flank pain and abdominal pain. Patient's pain started at which was dull ache which started becoming sharp.   The pain radiated to his left side of his lower abdomen. He had some nausea as well. He denies diarrhea constipation or vomiting. He had no fever or chills. He does take Lasix. When he arrived in the ER EKG showed multiple PACs. He was also hyperkalemic with a potassium of 5.3. He was given calcium gluconate, insulin and dextrose. Creatinine was elevated 2.9 from baseline of 1.5. CT showed AAA but no kidney stone. He is now admitted for further management. ROS:     Seen with-resident    positives in bold   Constitutional:  fever, chills, weakness, weight change, fatigue  Skin:  rash, pruritus, hair loss, bruising, dry skin, petechiae  Head, Face, Neck   headaches, swelling,  cervical adenopathy  Respiratory: shortness of breath, cough, or wheezing  Cardiovascular: chest pain, palpitations, dizzy, edema  Gastrointestinal: nausea, vomiting, diarrhea, constipation,belly pain    Yellow skin, blood in stool  Musculoskeletal:  back pain, muscle weakness, gait problems,       joint pain or swelling. Genitourinary:  dysuria, poor urine flow, flank pain, blood in urine  Neurologic:  vertigo, TIA'S, syncope, seizures, focal weakness  Psychosocial:  insomnia, anxiety, or depression. All other remaining systems are negative or unable to obtain        PMH/PSH/SH/Family History:     Past Medical History:   Diagnosis Date    Arthritis     Ascending aortic aneurysm ([address]     CAD (coronary artery disease)     Chronic kidney disease     Constipation     DDD (degenerative disc disease), lumbar     L5, S1    Dental disease     Hearing loss     Hyperlipidemia     Hypertension     Mild aortic stenosis     Prostate enlargement     TIA (transient ischemic attack)        Past Surgical History:   Procedure Laterality Date    CORONARY ANGIOPLASTY WITH STENT PLACEMENT  1/12/98    EYE SURGERY      SKIN TAG REMOVAL  2/2/10    TURP          reports that he has never smoked.  He has never used Oral, PRN  dextrose 50 % IV solution, 12.5 g, Intravenous, PRN  glucagon (rDNA) injection 1 mg, 1 mg, Intramuscular, PRN  dextrose 5 % solution, 100 mL/hr, Intravenous, PRN       Vitals :     Vitals:    11/09/20 0715   BP: (!) 161/82   Pulse: 60   Resp: 16   Temp: 97.9 °F (36.6 °C)   SpO2: 96%       I & O :       Intake/Output Summary (Last 24 hours) at 11/9/2020 7433  Last data filed at 11/9/2020 0715  Gross per 24 hour   Intake 2319.35 ml   Output 500 ml   Net 1819.35 ml        Physical Examination :     General appearance: Anxious- no, distressed- no, in good spirits- yes    HEENT: Lips- normal, teeth- ok , oral mucosa- moist  Neck : Mass- no, appears symmetrical, JVD- not visible  Respiratory: Respiratory effort-okay, wheeze- no, crackles -none  Cardiovascular:  Ausculation- No M/R/G, Edema none  Abdomen: visible mass- no, distention- no, scar- no, tenderness- no                            hepatosplenomegaly-  no  Musculoskeletal:  clubbing no,cyanosis- no , digital ischemia- no                           muscle strength- grossly normal , tone - grossly normal  Skin: rashes- no , ulcers- no, induration- no, tightening - no  Psychiatric:  Judgement and insight- normal           AAO X 3       LABS:     Recent Labs     11/07/20  0651   WBC 4.3   HGB 10.1*   HCT 30.1*   *     Recent Labs     11/07/20  0651 11/08/20  0959    140   K 4.9 5.5*   * 112*   CO2 21 18*   BUN 37* 33*   CREATININE 1.8* 1.6*   GLUCOSE 95 150*   MG 2.40  --    PHOS 3.3 3.4        Nephrology  Maynor [address], [address]  Office: [phone]  Cell: [phone]  Fax: [phone]

## 2020-11-09 NOTE — CARE COORDINATION
Case Management Assessment            Discharge Note                    Date / Time of Note: 11/9/2020 11:39 AM                  Discharge Note Completed by: Bijal Knott    Patient Name: Austin Velasco   YOB: 1932  Diagnosis: CARLI (acute kidney injury) ([facility] [N17.9]  CARLI (acute kidney injury) ([facility] [N17.9]  CARLI (acute kidney injury) ([facility] [N17.9]   Date / Time: 11/4/2020  4:40 PM    Current PCP: Gerald Vicente MD  Clinic patient: No    Hospitalization in the last 30 days: No    Advance Directives:  Code Status: Full Code  [place] DNR form completed and on chart: Not Indicated    Financial:  Payor: MEDICARE / Plan: MEDICARE PART A AND B / Product Type: *No Product type* /      Pharmacy:    [address] [place] [address]  Phone: [phone] Fax: [phone]      Assistance purchasing medications?:    Assistance provided by Case Management: None at this time    Does patient want to participate in local refill/ meds to beds program?:      Meds To Beds General Rules:  1. Can ONLY be done Monday- Friday between 8:30am-5pm  2. Prescription(s) must be in pharmacy by 3pm to be filled same day  3. Copy of patient's insurance/ prescription drug card and patient face sheet must be sent along with the prescription(s)  4. Cost of Rx cannot be added to hospital bill. If financial assistance is needed, please contact unit  or ;  or  CANNOT provide pharmacy voucher for patients co-pays  5.  Patients can then  the prescription on their way out of the hospital at discharge, or pharmacy can deliver to the bedside if staff is available. (payment due at time of pick-up or delivery - cash, check, or card accepted)     Able to afford home medications/ co-pay costs: Yes    ADLS:  Current PT AM-PAC Score: 20 /24  Current OT AM-PAC Score: 18 /24      DISCHARGE Disposition: Ismael Willis (SNF): [facility] Phone: [phone] Fax: [phone]    LOC at discharge: Vaibhav Ashley Completed: Yes    Notification completed in HENS/PAS?:  Not Applicable    IMM Completed:   Yes, Case management has presented and reviewed IMM letter #2 to the patient and/or family/ POA. Patient and/or family/POA verbalized understanding of their medicare rights and appeal process if needed. Patient and/or family/POA has signed, initialed and placed today's date (11-) and time (11:43am) on IMM letter #2 on the the appropriate lines. Patient and/or family/POA, copy of letter offered and they are aware that this original copy of IMM letter #2 is available prior to discharge from the paper chart on the unit. Electronic documentation has been entered into epic for IMM letter #2 and original paper copy has been added to the paper chart at the nurses station. Transportation:  Transportation PLAN for discharge: family   Mode of Transport: Private Car  Reason for medical transport: Not Applicable  Name of [address] [place] [address],[address]: Not Applicable  Time of Transport: 1:30-2 pm    Transport form completed: Not Indicated    Home Care:  [address] ordered at discharge: Not [address]: Not Applicable  Orders faxed: No    Durable Medical Equipment:  DME Provider: NA  Equipment obtained during hospitalization: NA    Home Oxygen and Respiratory Equipment:  Oxygen needed at discharge?: Not [address]: Not Applicable  Portable tank available for discharge?: Not Indicated    Dialysis:  Dialysis patient: No    Dialysis Center:  Not Applicable    Hospice Services:  Location: Not Applicable  Agency: Not Applicable    Consents signed: Not Indicated    Referrals made at [facility] for outpatient continued care:  Not Applicable    Additional CM Notes:   Patient to discharge back to [facility]. Covid negative.  Daughter will transport between 1:30-2pm.  CM updated RN Sharad Patrick. CM also spoke to [employer]. Notified of discharge. CM discussed discharge with patient. Patient denied any concerns/questions r/t discharge. Rx    these medications from [place] [address] [address] [phone] - F [phone]    furosemide    sodium bicarbonate      Follow up appointments  Schedule an appointment with Jazmine Altamirano MD as soon as possible for a visit in 1 week(s)    [address] [id number]   [address] [place] [address] Office Visit 2:00 PM   Destiny Zuniga, APRN - CNP   [address] Cardiology - [address]   [phone]   Please arrive 15 minutes prior to appointment, bring photo ID and insurance card. The Plan for Transition of Care is related to the following treatment goals of CARLI (acute kidney injury) ([address] [N17.9]  CARLI (acute kidney injury) ([address] [N17.9]  CARLI (acute kidney injury) ([address] [N17.9]    The Patient and/or patient representative Jay Kraus and his family were provided with a choice of provider and agrees with the discharge plan Yes    Freedom of choice list was provided with basic dialogue that supports the patient's individualized plan of care/goals and shares the quality data associated with the providers.  Yes    Care Transitions patient: Yes    Dorie Gordon RN  [facility] [employer]  Case Management Department  Ph: [phone]  Fax: [phone]

## 2020-11-09 NOTE — TELEPHONE ENCOUNTER
----- Message from Merna English MD sent at 11/6/2020  2:19 PM EST -----  This patient is currently admitted, but will need appt to see me in about a month for AAA evaluation. Please contact his daughter Martha Castillo to come with him [phone]. Does not need any further testing prior.

## 2020-11-09 NOTE — DISCHARGE SUMMARY
Discharge Summary    Date:11/9/2020        Patient June Le     YOB: 1932     Age:87 y.o. Admit Date:11/4/2020   Admission Condition:poor   Discharged Condition:good  Discharge Date: 11/09/20    Discharge Diagnoses   Active Problems:    CARLI (acute kidney injury) [facility]    Acute kidney injury superimposed on CKD ([facility]    Chronic systolic HF    htn    Hx of CAD     AAA       Hospital Stay   Narrative of Hospital Course:     63-year-old male presented with acute kidney injury suspected secondary to overdiuresis. Patient was admitted. Patient was started on gentle hydration. His creatinine showed excellent improvement with IV hydration and holding nephrotoxic medications. His last  creatinine is at 1.6. Which appears to be close to baseline. I have discussed with Dr. Sarah Lima the kidney doctor who suggested patient is okay for discharge from his standpoint. We are awaiting the renal panel for today once it is okay, and once cleared from cardiology patient will be discharged. Patient will be given Lasix prescription for as needed use with edema and sob     Patient was incidentally discovered to have enlargement of his abdominal aortic aneurysm. Vascular surgery was consulted, they have suggested outpatient follow-up    On dc Patient denies any CP,SOB,Denies any nausea, vomiting, abdominal pain. Denies any diarrhea. Patient denies any palpitations, lightheadedness, orthopnea, PND. Patient doesn't appear to be in any distress on discharge . Physical exam   Vitals:    11/09/20 0255 11/09/20 0600 11/09/20 0715 11/09/20 1045   BP: 131/66  (!) 161/82 (!) 152/79   Pulse: 73  60 60   Resp: 16  16 16   Temp: 97.5 °F (36.4 °C)  97.9 °F (36.6 °C) 96.2 °F (35.7 °C)   TempSrc: Oral  Oral Oral   SpO2: 93%  96% 96%   Weight:  206 lb 5.6 oz (93.6 kg)     Height:             Physical Exam  Constitutional:       Appearance: Normal appearance. HENT:      Head: Normocephalic and atraumatic.    Neck: Musculoskeletal: Normal range of motion and neck supple. Cardiovascular:      Rate and Rhythm: Normal rate and regular rhythm. Pulses: Normal pulses. Heart sounds: Murmur present. Pulmonary:      Effort: Pulmonary effort is normal.      Breath sounds: Normal breath sounds. Abdominal:      General: Abdomen is flat. Palpations: Abdomen is soft. Musculoskeletal: Normal range of motion. General: No swelling or tenderness. Skin:     General: Skin is warm and dry. Capillary Refill: Capillary refill takes less than 2 seconds. Coloration: Skin is not jaundiced. Neurological:      General: No focal deficit present. Mental Status: He is alert and oriented to person, place, and time. Psychiatric:         Mood and Affect: Mood normal.         Behavior: Behavior normal.           Consultants:   IP CONSULT TO HOSPITALIST  IP CONSULT TO VASCULAR SURGERY  IP CONSULT TO CARDIOLOGY    Time Spent on Discharge:  45 minutes were spent in patient examination, evaluation, counseling as well as medication reconciliation, prescriptions for required medications, discharge plan and follow up. Surgeries/Procedures Performed:   none        Significant Diagnostic Studies:   Recent Labs:  CBC:   Recent Labs     11/07/20  0651   WBC 4.3   HGB 10.1*   HCT 30.1*   .3*   *     BMP:   Recent Labs     11/07/20  0651 11/08/20  0959    140   K 4.9 5.5*   * 112*   CO2 21 18*   PHOS 3.3 3.4   BUN 37* 33*   CREATININE 1.8* 1.6*     Mag:   Lab Results   Component Value Date    MG 2.40 11/07/2020     LIVER PROFILE: No results for input(s): AST, ALT, LIPASE, BILIDIR, BILITOT, ALKPHOS in the last 72 hours. Invalid input(s): AMYLASE,  ALB  PT/INR: No results for input(s): PROTIME, INR in the last 72 hours. APTT: No results for input(s): APTT in the last 72 hours. BNP:  No results for input(s): BNP in the last 72 hours.   CARDIAC ENZYMES: No results for input(s): CKTOTAL, CKMB, TROPONINI in the last 72 hours. Radiology Last 7 Days:  CT LUMBAR SPINE WO CONTRAST   Final Result      1. Osteopenia. 2. Mild dextro convex scoliosis. Grade 1 anterolisthesis of L4 on L5.   3. Bilateral small pleural effusions. 4. Diffuse idiopathic skeletal hyperostosis. 5. Multilevel thoracic mild degenerative disc disease. No evidence of high-grade spinal canal or neuroforaminal stenosis. 6. Multilevel lumbar degenerative disease disease, most pronounced at L4-5 where there is moderate thecal sac narrowing. 7. Multilevel neural foraminal stenosis most pronounced at left L2-3, bilateral L3-4, bilateral L4-5 and right L5-S1.   8. 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 CT THORACIC SPINE WO CONTRAST   Final Result      1. Osteopenia. 2. Mild dextro convex scoliosis. Grade 1 anterolisthesis of L4 on L5.   3. Bilateral small pleural effusions. 4. Diffuse idiopathic skeletal hyperostosis. 5. Multilevel thoracic mild degenerative disc disease. No evidence of high-grade spinal canal or neuroforaminal stenosis. 6. Multilevel lumbar degenerative disease disease, most pronounced at L4-5 where there is moderate thecal sac narrowing. 7. Multilevel neural foraminal stenosis most pronounced at left L2-3, bilateral L3-4, bilateral L4-5 and right L5-S1.   8. 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 CT ABDOMEN PELVIS WO CONTRAST Additional Contrast? None   Final Result      1. No acute abnormality in the abdomen or pelvis. 2. Abdominal aortic aneurysm measuring 5.4 x 4.6 cm.   3. Cholelithiasis. 4. Multiple cysts and other indeterminate lesions in the kidneys, incompletely characterized without contrast.   5. Small pericardial effusion and extensive coronary artery and aortic calcifications. 6. Moderate prostatomegaly. Discharge Plan   Disposition:  To a non-[facility] facility    Provider Follow-Up:   Erminio Favre, MD  [address]  [phone]    In 2 month      Roberto Carlos Montiel, [address] [facility] [address]  [phone]    Schedule an appointment as soon as possible for a visit in 1 week         Hospital/Incidental Findings Requiring Follow-Up:  CT LUMBAR SPINE WO CONTRAST   Final Result      1. Osteopenia. 2. Mild dextro convex scoliosis. Grade 1 anterolisthesis of L4 on L5.   3. Bilateral small pleural effusions. 4. Diffuse idiopathic skeletal hyperostosis. 5. Multilevel thoracic mild degenerative disc disease. No evidence of high-grade spinal canal or neuroforaminal stenosis. 6. Multilevel lumbar degenerative disease disease, most pronounced at L4-5 where there is moderate thecal sac narrowing. 7. Multilevel neural foraminal stenosis most pronounced at left L2-3, bilateral L3-4, bilateral L4-5 and right L5-S1.   8. 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 CT THORACIC SPINE WO CONTRAST   Final Result      1. Osteopenia. 2. Mild dextro convex scoliosis. Grade 1 anterolisthesis of L4 on L5.   3. Bilateral small pleural effusions. 4. Diffuse idiopathic skeletal hyperostosis. 5. Multilevel thoracic mild degenerative disc disease. No evidence of high-grade spinal canal or neuroforaminal stenosis. 6. Multilevel lumbar degenerative disease disease, most pronounced at L4-5 where there is moderate thecal sac narrowing. 7. Multilevel neural foraminal stenosis most pronounced at left L2-3, bilateral L3-4, bilateral L4-5 and right L5-S1.   8. 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 CT ABDOMEN PELVIS WO CONTRAST Additional Contrast? None   Final Result      1. No acute abnormality in the abdomen or pelvis. 2. Abdominal aortic aneurysm measuring 5.4 x 4.6 cm.   3. Cholelithiasis.    4. Multiple cysts and other indeterminate lesions in the kidneys, incompletely characterized without contrast.   5. Small pericardial effusion and extensive coronary artery and aortic calcifications. 6. Moderate prostatomegaly. Discharge Medications         Medication List      START taking these medications    sodium bicarbonate 650 MG tablet  Take 1 tablet by mouth 2 times daily for 7 days        CHANGE how you take these medications    furosemide 40 MG tablet  Commonly known as:  Lasix  Take 1 tablet by mouth daily as needed (sob, edema, or weight gain >3lbs)  What changed:    · how much to take  · how to take this  · when to take this  · reasons to take this  · additional instructions        CONTINUE taking these medications    aspirin 325 MG EC tablet  Take 1 tablet by mouth daily     atorvastatin 80 MG tablet  Commonly known as:  LIPITOR  Take 1 tablet by mouth nightly     azelastine 0.05 % ophthalmic solution  Commonly known as:  OPTIVAR     buPROPion 150 MG extended release tablet  Commonly known as:  WELLBUTRIN XL  Take 1 tablet by mouth 2 times daily     diclofenac sodium 1 % Gel  Commonly known as:  VOLTAREN  Apply 4 g topically 4 times daily as needed for Pain      MG capsule  Generic drug:  docusate sodium  TAKE 1 CAPSULE BY MOUTH 2 TIMES DAILY AS NEEDED FOR CONSTIPATION     Handicap Placard Misc  by Does not apply route effective November 25,2019 through November 24,2020     hydrocortisone 2.5 % cream  Apply topically 2 times daily. lisinopril 10 MG tablet  Commonly known as:  PRINIVIL;ZESTRIL  Take 1 tablet by mouth daily     LORazepam 0.5 MG tablet  Commonly known as:  ATIVAN     melatonin 3 MG Tabs tablet  Commonly known as:  RA Melatonin  Take 1 tablet by mouth nightly as needed (sleep)     mirtazapine 7.5 MG tablet  Commonly known as:  REMERON  Take 1 tablet by mouth nightly     Multi-Vitamin Tabs  Take 1 tablet by mouth daily     nitroGLYCERIN 0.4 MG SL tablet  Commonly known as:  NITROSTAT  PLACE 1 TABLET UNDER THE TONGUE EVERY 5 MINUTES IF NEEDED FOR CHEST PAIN UP TO MAXIMUM OF 3 TOTAL DOSES.  IF NO RELIEF AFTER 1 DOSE CALL 911     ondansetron 4 MG disintegrating tablet  Commonly known as:  Zofran ODT  Take 1 tablet by mouth every 8 hours as needed for Nausea     polyethylene glycol 17 GM/SCOOP powder  Commonly known as:  GLYCOLAX     polyvinyl alcohol 1.4 % ophthalmic solution  Commonly known as:  LIQUIFILM TEARS     raNITIdine 150 MG tablet  Commonly known as:  ZANTAC  Take 1 tablet by mouth 2 times daily as needed for Heartburn     tamsulosin 0.4 MG capsule  Commonly known as:  FLOMAX        STOP taking these medications    spironolactone 50 MG tablet  Commonly known as:  ALDACTONE           Where to Get Your Medications      [facility] [address]  [address] Ele[place] Half [phone] - F [phone]  [address] [facility] [address]    Phone:  [phone]   · furosemide 40 MG tablet  · sodium bicarbonate 650 MG tablet         Electronically signed by Reese Phelps MD on 11/9/20 at 11:56 AM EST

## 2020-12-10 ENCOUNTER — OFFICE VISIT (OUTPATIENT)
Dept: VASCULAR SURGERY | Age: 85
End: 2020-12-10
Payer: MEDICARE

## 2020-12-10 VITALS
WEIGHT: 198.8 LBS | SYSTOLIC BLOOD PRESSURE: 126 MMHG | BODY MASS INDEX: 31.95 KG/M2 | DIASTOLIC BLOOD PRESSURE: 69 MMHG | HEIGHT: 66 IN | TEMPERATURE: 99.6 F | HEART RATE: 68 BPM

## 2020-12-10 PROCEDURE — 1123F ACP DISCUSS/DSCN MKR DOCD: CPT | Performed by: SURGERY

## 2020-12-10 PROCEDURE — 4040F PNEUMOC VAC/ADMIN/RCVD: CPT | Performed by: SURGERY

## 2020-12-10 PROCEDURE — G8427 DOCREV CUR MEDS BY ELIG CLIN: HCPCS | Performed by: SURGERY

## 2020-12-10 PROCEDURE — G8417 CALC BMI ABV UP PARAM F/U: HCPCS | Performed by: SURGERY

## 2020-12-10 PROCEDURE — G8484 FLU IMMUNIZE NO ADMIN: HCPCS | Performed by: SURGERY

## 2020-12-10 PROCEDURE — 99214 OFFICE O/P EST MOD 30 MIN: CPT | Performed by: SURGERY

## 2020-12-10 PROCEDURE — 1036F TOBACCO NON-USER: CPT | Performed by: SURGERY

## 2020-12-10 RX ORDER — LORAZEPAM 0.5 MG/1
0.5 TABLET ORAL EVERY 6 HOURS PRN
COMMUNITY

## 2020-12-10 NOTE — PROGRESS NOTES
[facility]) Vascular Surgery--[place]  Debra Reyes Courser, [address] [place] [address], [facility], [address]    Follow-up    Referring Provider:  Trang Linares MD     Patient Name: Rosalind Loomis  YOB: 1932  Date: 12/10/20    History:  Rosalind Loomis is a 80 y.o. male he returns today in follow-up of AAA. He was recently admitted to the hospital and CT abdomen pelvis was obtained which showed a 5.4 cm AAA. As referenced, a 2018 study showed the aneurysm to measure only 4.3 cm. He is asymptomatic regarding this. Denies chest, abdomen, or back pain. He is in a facility to be with his wife, who is in poor shape. He does have a history of congestive heart failure, but denies dyspnea on exertion. He had a 2D echo in July which showed ejection fraction 20 to 25% and moderate aortic stenosis. He presents today with his daughter, and all information is exchanged through a qualified [facility] . I personally reviewed images the following study:  CT ABDOMEN PELVIS WO CONTRAST  11/4/2020    Vital Signs  /69   Pulse 68   Temp 99.6 °F (37.6 °C)   Ht 5' 6\" (1.676 m)   Wt 198 lb 12.8 oz (90.2 kg)   BMI 32.09 kg/m²     Physical Examination  General: Alert and oriented x3 in no acute distress. Elderly appearing, appears stated age. Appears to have good mental capacity and normal short and long-term memory. HEENT: Neck is supple. No carotid bruits. Carotids 2+ bilaterally. No JVD. Heart: Loud systolic ejection murmur, normal sinus rhythm  Lungs: Clear to all station bilaterally, no wheezes rales or rhonchi  Abdomen: soft, NT/ND. No palpable pulsatile masses, though slightly obese abdome. No bruit. Extremities (musculoskeletal and neurologic): No cyanosis, no clubbing, chronic 2+ edema bilaterally. Skin/integumentary: Skin color, texture, turgor normal. No rashes or lesions.     Vascular exam:   Pulses:    carotid brachial radial femoral popliteal DP PT   RIGHT 2 2 2 2 2 2 2   LEFT 2 2 2 2 2 2 2         Impression:  Asymptomatic abdominal aortic aneurysm 5.4 cm (compared to 4.3 cm December 2018 study). Plan:  All information was delivered through a qualified [facility] . All questions were answered. I would not recommend urgent repair at this time for this asymptomatic AAA, given the Covid situation. I feel that it would be unnecessary to expose him during a hospitalization electively. Also there is concern for his aortic stenosis and history of heart failure. I have recommended returning in 3 months with a repeat noncontrast CT abdomen pelvis to see if there is any change in size. If there is continued growth, then I would recommend elective endovascular aortic aneurysm repair, pending cardiac evaluation. Fortunately, he is a candidate for EVAR. All questions were answered and the patient and daughter agreed with the current plan. I will see him back in 3 months.

## 2021-01-07 ENCOUNTER — TELEPHONE (OUTPATIENT)
Dept: CARDIOLOGY CLINIC | Age: 86
End: 2021-01-07

## 2021-01-07 NOTE — TELEPHONE ENCOUNTER
Patients daughter called stating she would like a call regarding her father needing an echo before he schedules an appointment. Patient is currently in [address].

## 2021-01-08 DIAGNOSIS — R06.02 SOB (SHORTNESS OF BREATH): Primary | ICD-10-CM

## 2021-01-08 NOTE — TELEPHONE ENCOUNTER
Talked with Ulysses Rasmussen, will put in order for echo and please put on my schedule for Friday 1/15 at 145pm. Jing Torres

## 2021-01-29 ENCOUNTER — PROCEDURE VISIT (OUTPATIENT)
Dept: CARDIOLOGY CLINIC | Age: 86
End: 2021-01-29
Payer: MEDICARE

## 2021-01-29 DIAGNOSIS — R06.02 SOB (SHORTNESS OF BREATH): ICD-10-CM

## 2021-01-29 LAB
LV EF: 30 %
LVEF MODALITY: NORMAL

## 2021-02-01 PROCEDURE — 93306 TTE W/DOPPLER COMPLETE: CPT | Performed by: INTERNAL MEDICINE

## 2021-02-02 ENCOUNTER — OFFICE VISIT (OUTPATIENT)
Dept: CARDIOLOGY CLINIC | Age: 86
End: 2021-02-02
Payer: MEDICARE

## 2021-02-02 VITALS
BODY MASS INDEX: 32.12 KG/M2 | DIASTOLIC BLOOD PRESSURE: 80 MMHG | SYSTOLIC BLOOD PRESSURE: 114 MMHG | WEIGHT: 199 LBS | HEART RATE: 60 BPM

## 2021-02-02 DIAGNOSIS — I25.5 ISCHEMIC CARDIOMYOPATHY: ICD-10-CM

## 2021-02-02 DIAGNOSIS — I25.10 CAD IN NATIVE ARTERY: ICD-10-CM

## 2021-02-02 DIAGNOSIS — I50.22 CHRONIC SYSTOLIC CONGESTIVE HEART FAILURE (HCC): Primary | ICD-10-CM

## 2021-02-02 DIAGNOSIS — R00.1 BRADYCARDIA: ICD-10-CM

## 2021-02-02 DIAGNOSIS — Z95.1 HX OF CABG: ICD-10-CM

## 2021-02-02 DIAGNOSIS — I35.0 NONRHEUMATIC AORTIC VALVE STENOSIS: ICD-10-CM

## 2021-02-02 PROCEDURE — 1123F ACP DISCUSS/DSCN MKR DOCD: CPT | Performed by: INTERNAL MEDICINE

## 2021-02-02 PROCEDURE — G8427 DOCREV CUR MEDS BY ELIG CLIN: HCPCS | Performed by: INTERNAL MEDICINE

## 2021-02-02 PROCEDURE — 4040F PNEUMOC VAC/ADMIN/RCVD: CPT | Performed by: INTERNAL MEDICINE

## 2021-02-02 PROCEDURE — 99214 OFFICE O/P EST MOD 30 MIN: CPT | Performed by: INTERNAL MEDICINE

## 2021-02-02 PROCEDURE — 1036F TOBACCO NON-USER: CPT | Performed by: INTERNAL MEDICINE

## 2021-02-02 PROCEDURE — G8484 FLU IMMUNIZE NO ADMIN: HCPCS | Performed by: INTERNAL MEDICINE

## 2021-02-02 PROCEDURE — G8417 CALC BMI ABV UP PARAM F/U: HCPCS | Performed by: INTERNAL MEDICINE

## 2021-02-02 RX ORDER — ACETAMINOPHEN 500 MG
500 TABLET ORAL EVERY 6 HOURS PRN
COMMUNITY

## 2021-02-02 RX ORDER — M-VIT,TX,IRON,MINS/CALC/FOLIC 27MG-0.4MG
1 TABLET ORAL DAILY
COMMUNITY

## 2021-02-02 RX ORDER — TAMSULOSIN HYDROCHLORIDE 0.4 MG/1
0.4 CAPSULE ORAL DAILY
COMMUNITY

## 2021-02-02 NOTE — PROGRESS NOTES
Subjective:      Patient ID: John Flores is a 80 y.o. male. HPI: Eudequan Schwarz ia  being seen today in follow up for CAD/PTCA/AS/cardiomyopathy and abn myoview. 2 wks ago. Sob. Started on lasix  Now on 80 mg bid. Breathing better. No chest pain. HR good. No chest pain. Remains active. No exertional chest pain. Edema stable. . No syncope. No tachycardia. No pnd. Past Medical History:   Diagnosis Date    Arthritis     Ascending aortic aneurysm ([address]     CAD (coronary artery disease)     Chronic kidney disease     Constipation     DDD (degenerative disc disease), lumbar     L5, S1    Dental disease     Hearing loss     Hyperlipidemia     Hypertension     Mild aortic stenosis     Prostate enlargement     TIA (transient ischemic attack)      Past Surgical History:   Procedure Laterality Date    CORONARY ANGIOPLASTY WITH STENT PLACEMENT  1/12/98    EYE SURGERY      SKIN TAG REMOVAL  2/2/10    TURP           No Known Allergies     Social History     Socioeconomic History    Marital status:      Spouse name: Not on file    Number of children: Not on file    Years of education: Not on file    Highest education level: Not on file   Occupational History    Not on file   Social Needs    Financial resource strain: Not on file    Food insecurity     Worry: Not on file     Inability: Not on file    Transportation needs     Medical: Not on file     Non-medical: Not on file   Tobacco Use    Smoking status: Never Smoker    Smokeless tobacco: Never Used   Substance and Sexual Activity    Alcohol use:  Yes     Alcohol/week: 0.0 standard drinks     Frequency: 2-4 times a month     Drinks per session: 1 or 2     Binge frequency: Never    Drug use: No    Sexual activity: Not on file   Lifestyle    Physical activity     Days per week: Not on file     Minutes per session: Not on file    Stress: Not on file   Relationships    Social connections     Talks on phone: Not on file     Gets together: Not on file     Attends [employer] service: Not on file     Active member of club or organization: Not on file     Attends meetings of clubs or organizations: Not on file     Relationship status: Not on file    Intimate partner violence     Fear of current or ex partner: Not on file     Emotionally abused: Not on file     Physically abused: Not on file     Forced sexual activity: Not on file   Other Topics Concern    Not on file   Social History Narrative    Not on file        FH reviewed , denies FH cardiac issues. .           Current Outpatient Medications   Medication Sig Dispense Refill    LORazepam (ATIVAN) 0.5 MG tablet Take 0.5 mg by mouth every 6 hours as needed for Anxiety.  furosemide (LASIX) 40 MG tablet Take 1 tablet by mouth daily as needed (sob, edema, or weight gain >3lbs) 60 tablet 3    buPROPion (WELLBUTRIN XL) 150 MG extended release tablet Take 1 tablet by mouth 2 times daily 180 tablet 1    nitroGLYCERIN (NITROSTAT) 0.4 MG SL tablet PLACE 1 TABLET UNDER THE TONGUE EVERY 5 MINUTES IF NEEDED FOR CHEST PAIN UP TO MAXIMUM OF 3 TOTAL DOSES.  IF NO RELIEF AFTER 1 DOSE CALL 911 25 tablet 3    lisinopril (PRINIVIL;ZESTRIL) 10 MG tablet Take 1 tablet by mouth daily 60 tablet 3    ondansetron (ZOFRAN ODT) 4 MG disintegrating tablet Take 1 tablet by mouth every 8 hours as needed for Nausea 20 tablet 0    mirtazapine (REMERON) 7.5 MG tablet Take 1 tablet by mouth nightly 30 tablet 5    melatonin (RA MELATONIN) 3 MG TABS tablet Take 1 tablet by mouth nightly as needed (sleep) 30 tablet 0    azelastine (OPTIVAR) 0.05 % ophthalmic solution Place 1 drop into both eyes 3 times daily       polyethylene glycol (GLYCOLAX) 17 GM/SCOOP powder Take 17 g by mouth daily      polyvinyl alcohol (LIQUIFILM TEARS) 1.4 % ophthalmic solution Place 1 drop into both eyes every 3 hours as needed      aspirin 325 MG EC tablet Take 1 tablet by mouth daily 30 tablet 3    hydrocortisone 2.5 % cream Apply 2+ on the left side. Pulmonary/Chest: Effort normal and breath sounds normal. No respiratory distress. He has no wheezes. He has no rales. Abdominal: Soft. Bowel sounds are normal. There is no tenderness. Musculoskeletal: Normal range of motion. He exhibits + edema. Neurological: He is alert and oriented to person, place, and time. Skin: Skin is warm and dry. Psychiatric: He has a normal mood and affect. His behavior is normal. Thought content normal.       Assessment:       Diagnosis Orders   1. Chronic systolic congestive heart failure ([address]     2. CAD in native artery     3. Hx of CABG     4. Ischemic cardiomyopathy     5. Bradycardia     6. Nonrheumatic aortic valve stenosis             Plan:      Some sob and edema. Better with lasix 80mg bid. Remains active. BP is good. HR good. On coreg of 12.5  mg bid. Lisinopril daily. No angina. No exertional sx. Wt stable on lasix. . Continue  lasix 80 mg bid. .   Dr Christofer Bernardo following aorta. Reviewed previous records and testing including cath 12/15 ,echo 71/21, myoview 10/17. Echo now with EF 30%, some better. Follow up 6-8  wks.

## 2021-02-19 DIAGNOSIS — I10 ESSENTIAL HYPERTENSION: Primary | ICD-10-CM

## 2021-02-24 NOTE — PROGRESS NOTES
[facility]   Congestive Heart Failure    Primary Care Doctor:  Laura Scott MD  Primary Cardiologist: Jazzy Garcia    Chief Complaint: SOB    History of Present Illness:  Merle Lisa is a 80 y.o. male with PMH AS, HTN, CAD, PCI, ICM,  HFrEF (30%) who presents today with c/o increased SOB and fatigue. He had repeat echo in January that he wants to repeat. He is now sleeping with O2 and tells me he has been taking lasix 80 bid for the past 2 weeks but this is not listed on med rec and he does not feel better/different with increased dose. His wt is down 2 pounds on our scale over the past 2 weeks. He is frustrated with his decline in fx ability and wants to feel better. I have offered changing ACE to ARNI and repeating echo in 90 days, he agrees to this today  He denies chest pain,  palpitations, orthopnea,  exertional chest pressure/discomfort, dizziness or syncope. Baseline Weight: previously 185      EF: 30%  Cardiac Imaging: Echo 1/29/21  Summary   Left ventricular cavity size is dilated. There is mild concentric left   ventricular hypertrophy. Left ventricular function is reduced with ejection   fraction estimated at 30%. There is diffuse hypokinesis, but the   anterolateral wall appears essentially akinetic. The anterior wall is also   essentially akinetic. Diastolic filling parameters suggest grade II   diastolic dysfunction. Mild-moderate mitral regurgitation is present. The left atrium is dilated. Mild-moderate to severe aortic stenosis with a peak velocity of 4.24m/s and   a mean pressure gradient of 39 mm Hg. Mild to moderate aortic regurgitation. Mild tricuspid regurgitation. Estimated pulmonary artery systolic pressure is at 37 mmHg assuming a right   atrial pressure of 3 mmHg. Echo 7/1/20   Summary   Left ventricular cavity size is dilated with mild concentric left   ventricular hypertrophy.    Overall left ventricular systolic function appears reduced with an ejection fraction of 20-25%. There is severe diffuse hypokinesis. Diastolic filling parameters suggest grade I diastolic dysfunction. The left atrium appears dilated. Moderate aortic stenosis with a peak velocity of 3.2m/s and a mean pressure   gradient of 23 mmHg. Mild to moderate aortic regurgitation. Mild mitral, pulmonic, and tricuspid regurgitation. Estimated pulmonary artery systolic pressure is 33 mmHg assuming a right   atrial pressure of 3 mmHg. Echo 3/26/19   Left ventricular cavity size is normal. There is moderate-severe concentric   left ventricular hypertrophy. The mid anteroseptum is hypokinetic. The   apical septum is hypokinetic. . Mild anterolateral hypokinesis. The apical   inferior and anterior walls are hypokinetic. Overall LVEF is roughly 40%.   The inferolateral wall is severely hypokinetic. Diastolic filling parameters   suggest grade I diastolic dysfunction.   The left atrium is minimally dilated.   Mitral annular calcification is present. Mild mitral regurgitation is   present.   At least moderate aortic stenosis with a peak velocity of 3.69 m/s and a   mean pressure gradient of 30mmHg. Given the LV dysfunction, the severity of   the stenosis may be underestimated. Mild to moderate aortic regurgitation.   The aortic root is mildly dilated measuring 3.9 cm.   Mild tricuspid regurgitation.  IVC size is normal (<2.1cm) and collapses > 50% with respiration consistent   with normal RA pressure (3mmHg). Estimated pulmonary artery systolic   pressure is at 37 mmHg assuming a right atrial pressure of 3 mmHg. Device: No     Activity: below baseline  Can you walk 1-2 blocks or do a moderate amount of house/yard work?yes     NYHA Class:  I II    Sodium Restrictions: no salt added  Fluid Restrictions: 48-64 oz/day  Sodium and fluid restriction compliance: fair Pt Education: The patient has received education on the following topics: dietary sodium restriction, heart failure medications, the importance of physical activity, symptom management and weight monitoring         Past Medical History:   has a past medical history of Arthritis, Ascending aortic aneurysm ([address] CAD (coronary artery disease), Chronic kidney disease, Constipation, DDD (degenerative disc disease), lumbar, Dental disease, Hearing loss, Hyperlipidemia, Hypertension, Mild aortic stenosis, Prostate enlargement, and TIA (transient ischemic attack). Surgical History:   has a past surgical history that includes TURP; Coronary angioplasty with stent (1/12/98); Skin tag removal (2/2/10); and eye surgery. Social History:   reports that he has never smoked. He has never used smokeless tobacco. He reports current alcohol use. He reports that he does not use drugs. Family History:   Family History   Problem Relation Age of Onset    Hypertension Mother     Cancer Mother     Stroke Father        Home Medications:  Prior to Admission medications    Medication Sig Start Date End Date Taking? Authorizing Provider   acetaminophen (TYLENOL) 500 MG tablet Take 500 mg by mouth every 6 hours as needed for Pain    Historical Provider, MD   Multiple Vitamins-Minerals (THERAPEUTIC MULTIVITAMIN-MINERALS) tablet Take 1 tablet by mouth daily    Historical Provider, MD   tamsulosin (FLOMAX) 0.4 MG capsule Take 0.4 mg by mouth daily    Historical Provider, MD   LORazepam (ATIVAN) 0.5 MG tablet Take 0.5 mg by mouth every 6 hours as needed for Anxiety.     Historical Provider, MD   furosemide (LASIX) 40 MG tablet Take 1 tablet by mouth daily as needed (sob, edema, or weight gain >3lbs) 11/9/20   Panchito Cook MD   buPROPion (WELLBUTRIN XL) 150 MG extended release tablet Take 1 tablet by mouth 2 times daily 7/30/20   Patrizia Mcgee MD nitroGLYCERIN (NITROSTAT) 0.4 MG SL tablet PLACE 1 TABLET UNDER THE TONGUE EVERY 5 MINUTES IF NEEDED FOR CHEST PAIN UP TO MAXIMUM OF 3 TOTAL DOSES. IF NO RELIEF AFTER 1 DOSE CALL 911 7/30/20   Devendra Still MD   lisinopril (PRINIVIL;ZESTRIL) 10 MG tablet Take 1 tablet by mouth daily 7/24/20   DEBORAH Segura CNP   ondansetron (ZOFRAN ODT) 4 MG disintegrating tablet Take 1 tablet by mouth every 8 hours as needed for Nausea 7/13/20   Yogi Ann MD   mirtazapine (REMERON) 7.5 MG tablet Take 1 tablet by mouth nightly 7/13/20   Maria Luisa Galvan MD   melatonin (RA MELATONIN) 3 MG TABS tablet Take 1 tablet by mouth nightly as needed (sleep) 6/22/20   Baljinder Tsai MD   azelastine (OPTIVAR) 0.05 % ophthalmic solution Place 1 drop into both eyes 3 times daily     Historical Provider, MD   polyethylene glycol (GLYCOLAX) 17 GM/SCOOP powder Take 17 g by mouth daily    Historical Provider, MD   polyvinyl alcohol (LIQUIFILM TEARS) 1.4 % ophthalmic solution Place 1 drop into both eyes every 3 hours as needed    Historical Provider, MD   aspirin 325 MG EC tablet Take 1 tablet by mouth daily 6/16/20   Danitza Flood MD   hydrocortisone 2.5 % cream Apply topically 2 times daily.  6/16/20   Danitza Flood MD    MG capsule TAKE 1 CAPSULE BY MOUTH 2 TIMES DAILY AS NEEDED FOR CONSTIPATION 4/30/20   Alice Solomon MD   atorvastatin (LIPITOR) 80 MG tablet Take 1 tablet by mouth nightly 2/17/20   Aníbal Neves MD   ranitidine (ZANTAC) 150 MG tablet Take 1 tablet by mouth 2 times daily as needed for Heartburn 1/14/20   Aislinn Ma MD   Handicap Placard MISC by Does not apply route effective November 25,2019 through November 24,2020 11/25/19   Deejay Bill MD   diclofenac sodium 1 % GEL Apply 4 g topically 4 times daily as needed for Pain 7/26/19   Deejay Bill MD   Multiple Vitamin (MULTI-VITAMIN) TABS Take 1 tablet by mouth daily 7/26/19   Deejay Bill MD        Allergies: Patient has no known allergies. ROS:   Review of Systems   Constitutional: Positive for fatigue. Respiratory: Positive for shortness of breath. Cardiovascular: Positive for leg swelling. Gastrointestinal: Positive for abdominal distention. Genitourinary: Negative. Musculoskeletal: Negative. Skin: Negative. Neurological: Negative. Hematological: Negative. Psychiatric/Behavioral: Negative. Physical Examination:    Vitals:    02/26/21 1258   BP: 136/88   Pulse: 83   Temp: 97.3 °F (36.3 °C)   Weight: 197 lb 12.8 oz (89.7 kg)   Height: 5' 6\" (1.676 m)           Physical Exam  Constitutional:       Appearance: Normal appearance. He is well-developed. HENT:      Head: Normocephalic and atraumatic. Eyes:      Extraocular Movements: Extraocular movements intact. Pupils: Pupils are equal, round, and reactive to light. Neck:      Musculoskeletal: Normal range of motion and neck supple. Cardiovascular:      Rate and Rhythm: Bradycardia present. Rhythm irregular. Heart sounds: Murmur present. Pulmonary:      Effort: Pulmonary effort is normal.      Breath sounds: Normal breath sounds. Abdominal:      Palpations: Abdomen is soft. Musculoskeletal: Normal range of motion. General: Swelling present. Comments: 1+ bilaterally   Skin:     General: Skin is warm and dry. Neurological:      General: No focal deficit present. Mental Status: He is alert and oriented to person, place, and time. Mental status is at baseline. Psychiatric:         Mood and Affect: Mood normal.         Behavior: Behavior normal.         Thought Content:  Thought content normal.         Judgment: Judgment normal.         Lab Data:    CBC:   Lab Results   Component Value Date    WBC 4.3 11/07/2020    WBC 6.2 11/05/2020    WBC 5.0 11/04/2020    RBC 2.97 11/07/2020    RBC 3.46 11/05/2020    RBC 3.23 11/04/2020    HGB 10.1 11/07/2020    HGB 11.7 11/05/2020    HGB 11.0 11/04/2020 HCT 30.1 11/07/2020    HCT 34.7 11/05/2020    HCT 32.2 11/04/2020    .3 11/07/2020    .1 11/05/2020    MCV 99.8 11/04/2020    RDW 14.2 11/07/2020    RDW 13.9 11/05/2020    RDW 14.2 11/04/2020     11/07/2020     11/05/2020     11/04/2020     BMP:  Lab Results   Component Value Date     11/09/2020     11/08/2020     11/07/2020    K 4.6 11/09/2020    K 5.5 11/08/2020    K 4.9 11/07/2020    K 5.3 11/04/2020    K 4.3 07/12/2020    K 4.2 10/16/2019     11/09/2020     11/08/2020     11/07/2020    CO2 22 11/09/2020    CO2 18 11/08/2020    CO2 21 11/07/2020    PHOS 3.4 11/09/2020    PHOS 3.4 11/08/2020    PHOS 3.3 11/07/2020    BUN 27 11/09/2020    BUN 33 11/08/2020    BUN 37 11/07/2020    CREATININE 1.5 11/09/2020    CREATININE 1.6 11/08/2020    CREATININE 1.8 11/07/2020     BNP:   Lab Results   Component Value Date    PROBNP 3,503 11/04/2020    PROBNP 3,258 07/30/2020    PROBNP 6,062 07/12/2020     Iron Studies:  No results found for: FERRITIN  Iron Deficiency Anemia:  No    IV Iron Therapy:  No  2017 ACC/AHA HF Guidelines:   intravenous iron replacement in patients with New York Heart Association (NYHA) class II and III HF and iron deficiency(ferritin <100 ng/ml or 100-300 ng/ml if transferrin saturation <20%), to improve functional status and QoL.        Assessment/Plan:    Encounter Diagnoses        Chronic systolic congestive heart failure ([address] May still be some overload, get labs today and adjust diuretics, hx hyperk so no li    Coronary artery disease involving native heart without angina pectoris, unspecified vessel or lesion type No CP, continue statin, ASA    Ischemic cardiomyopathy Stop ACE and start entresto after 36hour washout, no BB due to azra, repeat echo in 90 days    Dyspnea on exertion Try entresto     [facility] for med changes [phone]    Instructions: 1. Medications: stop lisinopril and tomorrow night start entresto 24/26mg one pill twice a day  2. Labs: today  3. Lifestyle Recommendations: Weigh yourself every day in the morning after urination, call Rony Washburn if wt increases 2-3lb in one day or 5lb in one week, Limit sodium to 2000mg/day and fluids to 2L or 64oz/day. 4. Follow up:2 weeks      Heart Failure Hotline: [phone] [employer] [facility]      I appreciate the opportunity of cooperating in the care of this individual.    Juan Reyes CNP, 2/24/2021, 8:42 AM    QUALITY MEASURES  1. Tobacco Cessation Counseling: NA  2. Retake of BP if >140/90:   NA  3. Documentation to PCP/referring for new patient:  Sent to PCP at close of office visit  4. CAD patient on anti-platelet: Yes  5. CAD patient on STATIN therapy:  Yes  6. Patient with CHF and aFib on anticoagulation:  NA   7. Patient Education:  Yes   8. BB for LVSD :  no, azra  9. ACE/ARB for LVSD:  yes  10.  Left Ventricular Ejection Fraction (LVEF) Assessment:  Yes

## 2021-02-25 DIAGNOSIS — M19.90 ARTHRITIS: ICD-10-CM

## 2021-02-26 ENCOUNTER — OFFICE VISIT (OUTPATIENT)
Dept: CARDIOLOGY CLINIC | Age: 86
End: 2021-02-26
Payer: MEDICARE

## 2021-02-26 VITALS
TEMPERATURE: 97.3 F | BODY MASS INDEX: 31.79 KG/M2 | HEART RATE: 55 BPM | WEIGHT: 197.8 LBS | DIASTOLIC BLOOD PRESSURE: 88 MMHG | HEIGHT: 66 IN | SYSTOLIC BLOOD PRESSURE: 136 MMHG

## 2021-02-26 DIAGNOSIS — I50.22 CHRONIC SYSTOLIC CONGESTIVE HEART FAILURE (HCC): Primary | ICD-10-CM

## 2021-02-26 DIAGNOSIS — I25.5 ISCHEMIC CARDIOMYOPATHY: ICD-10-CM

## 2021-02-26 DIAGNOSIS — I50.22 CHRONIC SYSTOLIC CONGESTIVE HEART FAILURE (HCC): ICD-10-CM

## 2021-02-26 DIAGNOSIS — R06.09 DYSPNEA ON EXERTION: ICD-10-CM

## 2021-02-26 DIAGNOSIS — I25.10 CORONARY ARTERY DISEASE INVOLVING NATIVE HEART WITHOUT ANGINA PECTORIS, UNSPECIFIED VESSEL OR LESION TYPE: ICD-10-CM

## 2021-02-26 PROBLEM — I50.23 ACUTE ON CHRONIC SYSTOLIC CONGESTIVE HEART FAILURE (HCC): Status: RESOLVED | Noted: 2019-04-04 | Resolved: 2021-02-26

## 2021-02-26 PROCEDURE — 99214 OFFICE O/P EST MOD 30 MIN: CPT | Performed by: NURSE PRACTITIONER

## 2021-02-26 PROCEDURE — 1036F TOBACCO NON-USER: CPT | Performed by: NURSE PRACTITIONER

## 2021-02-26 PROCEDURE — G8427 DOCREV CUR MEDS BY ELIG CLIN: HCPCS | Performed by: NURSE PRACTITIONER

## 2021-02-26 PROCEDURE — 1123F ACP DISCUSS/DSCN MKR DOCD: CPT | Performed by: NURSE PRACTITIONER

## 2021-02-26 PROCEDURE — G8484 FLU IMMUNIZE NO ADMIN: HCPCS | Performed by: NURSE PRACTITIONER

## 2021-02-26 PROCEDURE — G8417 CALC BMI ABV UP PARAM F/U: HCPCS | Performed by: NURSE PRACTITIONER

## 2021-02-26 PROCEDURE — 4040F PNEUMOC VAC/ADMIN/RCVD: CPT | Performed by: NURSE PRACTITIONER

## 2021-02-26 RX ORDER — FUROSEMIDE 40 MG/1
80 TABLET ORAL 2 TIMES DAILY
Qty: 60 TABLET | Refills: 3 | Status: SHIPPED | OUTPATIENT
Start: 2021-02-26 | End: 2021-03-01 | Stop reason: ALTCHOICE

## 2021-02-26 RX ORDER — SACUBITRIL AND VALSARTAN 24; 26 MG/1; MG/1
1 TABLET, FILM COATED ORAL 2 TIMES DAILY
Qty: 60 TABLET | Refills: 3 | Status: SHIPPED | OUTPATIENT
Start: 2021-02-26

## 2021-02-26 ASSESSMENT — ENCOUNTER SYMPTOMS
ABDOMINAL DISTENTION: 1
SHORTNESS OF BREATH: 1

## 2021-02-26 NOTE — PATIENT INSTRUCTIONS
Instructions:   1. Medications: stop lisinopril and tomorrow night start entresto 24/26mg one pill twice a day  2. Labs: today  3. Lifestyle Recommendations: Weigh yourself every day in the morning after urination, call Oksana Comer if wt increases 2-3lb in one day or 5lb in one week, Limit sodium to 2000mg/day and fluids to 2L or 64oz/day.    4. Follow up:2 weeks      Heart Failure Hotline: [phone] - Awais Rivera

## 2021-02-27 LAB
ANION GAP SERPL CALCULATED.3IONS-SCNC: 13 MMOL/L (ref 3–16)
BUN BLDV-MCNC: 35 MG/DL (ref 7–20)
CALCIUM SERPL-MCNC: 8.9 MG/DL (ref 8.3–10.6)
CHLORIDE BLD-SCNC: 106 MMOL/L (ref 99–110)
CO2: 25 MMOL/L (ref 21–32)
CREAT SERPL-MCNC: 1.7 MG/DL (ref 0.8–1.3)
GFR AFRICAN AMERICAN: 46
GFR NON-AFRICAN AMERICAN: 38
GLUCOSE BLD-MCNC: 92 MG/DL (ref 70–99)
POTASSIUM SERPL-SCNC: 3.9 MMOL/L (ref 3.5–5.1)
PRO-BNP: 7984 PG/ML (ref 0–449)
SODIUM BLD-SCNC: 144 MMOL/L (ref 136–145)

## 2021-03-01 ENCOUNTER — TELEPHONE (OUTPATIENT)
Dept: CARDIOLOGY CLINIC | Age: 86
End: 2021-03-01

## 2021-03-01 DIAGNOSIS — I50.22 CHRONIC SYSTOLIC CONGESTIVE HEART FAILURE (HCC): Primary | ICD-10-CM

## 2021-03-01 RX ORDER — TORSEMIDE 20 MG/1
40 TABLET ORAL 2 TIMES DAILY
Qty: 60 TABLET | Refills: 3 | OUTPATIENT
Start: 2021-03-01

## 2021-03-01 NOTE — TELEPHONE ENCOUNTER
Spoke with Daniel Pearce, at [facility]. Relayed KV's orders for torsemide 40 mg BID with breakfast and lunch and to check BNP and BMP in 1 week. Sacha repeated back orders and verbalized understanding.

## 2021-03-19 ENCOUNTER — HOSPITAL ENCOUNTER (OUTPATIENT)
Dept: CT IMAGING | Age: 86
Discharge: HOME | End: 2021-03-19
Payer: MEDICARE

## 2021-03-19 DIAGNOSIS — I71.40 ABDOMINAL AORTIC ANEURYSM (AAA) WITHOUT RUPTURE: ICD-10-CM

## 2021-03-19 PROCEDURE — 74176 CT ABD & PELVIS W/O CONTRAST: CPT

## 2021-03-25 ENCOUNTER — OFFICE VISIT (OUTPATIENT)
Dept: VASCULAR SURGERY | Age: 86
End: 2021-03-25
Payer: MEDICARE

## 2021-03-25 VITALS
HEART RATE: 66 BPM | WEIGHT: 193 LBS | DIASTOLIC BLOOD PRESSURE: 77 MMHG | BODY MASS INDEX: 31.02 KG/M2 | SYSTOLIC BLOOD PRESSURE: 126 MMHG | HEIGHT: 66 IN | TEMPERATURE: 97.5 F

## 2021-03-25 DIAGNOSIS — I71.40 ABDOMINAL AORTIC ANEURYSM (AAA) WITHOUT RUPTURE: Primary | ICD-10-CM

## 2021-03-25 PROCEDURE — 1036F TOBACCO NON-USER: CPT | Performed by: SURGERY

## 2021-03-25 PROCEDURE — G8417 CALC BMI ABV UP PARAM F/U: HCPCS | Performed by: SURGERY

## 2021-03-25 PROCEDURE — G8427 DOCREV CUR MEDS BY ELIG CLIN: HCPCS | Performed by: SURGERY

## 2021-03-25 PROCEDURE — 99215 OFFICE O/P EST HI 40 MIN: CPT | Performed by: SURGERY

## 2021-03-25 PROCEDURE — 1123F ACP DISCUSS/DSCN MKR DOCD: CPT | Performed by: SURGERY

## 2021-03-25 PROCEDURE — 4040F PNEUMOC VAC/ADMIN/RCVD: CPT | Performed by: SURGERY

## 2021-03-25 PROCEDURE — G8484 FLU IMMUNIZE NO ADMIN: HCPCS | Performed by: SURGERY

## 2021-03-25 NOTE — PROGRESS NOTES
Luz Marina Caba (:  1932) is a 80 y.o. male,Established patient, here for evaluation of the following chief complaint(s):  Follow-up (3 month f/u AAA prior CT performed; arrived with an  )      ASSESSMENT/PLAN:  1. Abdominal aortic aneurysm (AAA) without rupture ([facility] [place] [address]    I discussed the findings of the CT abdomen pelvis from 3/19/2021 at length. All information was provided through a trained . He appears to have increased growth of the AAA sac. Though the official report states 5.4 cm, I measure this at 5.7 cm. I have correlated this with the same area of the AAA sac on his last imaging study in November, which I measured at 5.3 cm. Again, I think that this reflects a significant increase over 4 months. Also given the absolute size, I think it would be reasonable to proceed with intervention. He does have a history of congestive heart failure, and would need to be evaluated and optimized by cardiology. He has an appointment with his cardiologist next week. If they feel that he is a reasonable candidate, then we will recommend endovascular aortic aneurysm repair (EVAR). All questions were answered. No follow-ups on file. SUBJECTIVE/OBJECTIVE:  HPI  Mr. Cathy Corado today in follow-up of AAA. He had a repeat CTA abdomen pelvis, images which I personally reviewed which shows slight increase in AAA size to 5.7 cm. He has had no abdominal, back, or chest pain. He is back at [facility] with his wife, where he resides. He states that he does his own grooming, feeding, bathing, etc.  He does have a history of congestive heart failure but no recent admissions or exacerbations. He has an appointment next week with his cardiologist.    I personally reviewed images the following study:  Leela 3/19/21  It should be noted that I personally measured the size of the AAA sac myself. I measure this to be 5.7 cm. The report states 5.4 cm.     Physical Exam Vitals:    03/25/21 1301   BP: 126/77   Site: Left Upper Arm   Position: Sitting   Cuff Size: Large Adult   Pulse: 66   Temp: 97.5 °F (36.4 °C)   TempSrc: Temporal   Weight: 193 lb (87.5 kg)   Height: 5' 6\" (1.676 m)     Is awake alert and oriented x3 no acute distress. Pleasant and conversive. No changes from prior exam.    On this date 3/25/2021 I have spent 45 minutes reviewing previous notes, test results and face to face with the patient discussing the diagnosis and importance of compliance with the treatment plan as well as documenting on the day of the visit. An electronic signature was used to authenticate this note.     --Mark Patton MD

## 2021-04-01 ENCOUNTER — OFFICE VISIT (OUTPATIENT)
Dept: CARDIOLOGY CLINIC | Age: 86
End: 2021-04-01
Payer: MEDICARE

## 2021-04-01 VITALS
DIASTOLIC BLOOD PRESSURE: 80 MMHG | HEART RATE: 60 BPM | TEMPERATURE: 97.3 F | WEIGHT: 194 LBS | BODY MASS INDEX: 31.31 KG/M2 | SYSTOLIC BLOOD PRESSURE: 130 MMHG

## 2021-04-01 DIAGNOSIS — R00.1 BRADYCARDIA: ICD-10-CM

## 2021-04-01 DIAGNOSIS — I35.0 NONRHEUMATIC AORTIC VALVE STENOSIS: ICD-10-CM

## 2021-04-01 DIAGNOSIS — I50.22 CHRONIC SYSTOLIC CONGESTIVE HEART FAILURE (HCC): Primary | ICD-10-CM

## 2021-04-01 DIAGNOSIS — I25.5 ISCHEMIC CARDIOMYOPATHY: ICD-10-CM

## 2021-04-01 DIAGNOSIS — Z95.1 HX OF CABG: ICD-10-CM

## 2021-04-01 DIAGNOSIS — I25.10 CAD IN NATIVE ARTERY: ICD-10-CM

## 2021-04-01 PROCEDURE — 4040F PNEUMOC VAC/ADMIN/RCVD: CPT | Performed by: INTERNAL MEDICINE

## 2021-04-01 PROCEDURE — 1123F ACP DISCUSS/DSCN MKR DOCD: CPT | Performed by: INTERNAL MEDICINE

## 2021-04-01 PROCEDURE — G8417 CALC BMI ABV UP PARAM F/U: HCPCS | Performed by: INTERNAL MEDICINE

## 2021-04-01 PROCEDURE — 99213 OFFICE O/P EST LOW 20 MIN: CPT | Performed by: INTERNAL MEDICINE

## 2021-04-01 PROCEDURE — G8427 DOCREV CUR MEDS BY ELIG CLIN: HCPCS | Performed by: INTERNAL MEDICINE

## 2021-04-01 PROCEDURE — 1036F TOBACCO NON-USER: CPT | Performed by: INTERNAL MEDICINE

## 2021-04-01 PROCEDURE — 93000 ELECTROCARDIOGRAM COMPLETE: CPT | Performed by: INTERNAL MEDICINE

## 2021-04-01 RX ORDER — METHOCARBAMOL 500 MG/1
500 TABLET, FILM COATED ORAL 4 TIMES DAILY
COMMUNITY

## 2021-04-01 RX ORDER — POTASSIUM CHLORIDE 1.5 G/1.77G
20 POWDER, FOR SOLUTION ORAL 2 TIMES DAILY
COMMUNITY

## 2021-04-01 NOTE — PROGRESS NOTES
Subjective:      Patient ID: Amy Davis is a 80 y.o. male. HPI: Liana Leahyviral ia  being seen today in follow up for CAD/PTCA/AS/cardiomyopathy and abn myoview. 2 wks ago. Sob. Started on lasix  Now on torsemide 40 mb bid. Breathing better. No chest pain. HR good. No chest pain. Walks daily. No exertional chest pain. Edema stable. . No syncope. No tachycardia. No pnd. Past Medical History:   Diagnosis Date    Arthritis     Ascending aortic aneurysm ([address]     CAD (coronary artery disease)     Chronic kidney disease     Constipation     DDD (degenerative disc disease), lumbar     L5, S1    Dental disease     Hearing loss     Hyperlipidemia     Hypertension     Mild aortic stenosis     Prostate enlargement     TIA (transient ischemic attack)      Past Surgical History:   Procedure Laterality Date    CORONARY ANGIOPLASTY WITH STENT PLACEMENT  1/12/98    EYE SURGERY      SKIN TAG REMOVAL  2/2/10    TURP           No Known Allergies     Social History     Socioeconomic History    Marital status:      Spouse name: Not on file    Number of children: Not on file    Years of education: Not on file    Highest education level: Not on file   Occupational History    Not on file   Social Needs    Financial resource strain: Not on file    Food insecurity     Worry: Not on file     Inability: Not on file    Transportation needs     Medical: Not on file     Non-medical: Not on file   Tobacco Use    Smoking status: Never Smoker    Smokeless tobacco: Never Used   Substance and Sexual Activity    Alcohol use:  Yes     Alcohol/week: 0.0 standard drinks     Frequency: 2-4 times a month     Drinks per session: 1 or 2     Binge frequency: Never    Drug use: No    Sexual activity: Not on file   Lifestyle    Physical activity     Days per week: Not on file     Minutes per session: Not on file    Stress: Not on file   Relationships    Social connections     Talks on phone: Not on file Gets together: Not on file     Attends Oriental orthodox service: Not on file     Active member of club or organization: Not on file     Attends meetings of clubs or organizations: Not on file     Relationship status: Not on file    Intimate partner violence     Fear of current or ex partner: Not on file     Emotionally abused: Not on file     Physically abused: Not on file     Forced sexual activity: Not on file   Other Topics Concern    Not on file   Social History Narrative    Not on file        FH reviewed , denies FH cardiac issues. .           Current Outpatient Medications   Medication Sig Dispense Refill    diclofenac sodium (VOLTAREN) 1 % GEL APPLY 4 G TOPICALLY 4 TIMES DAILY AS NEEDED FOR PAIN 400 g 3    sacubitril-valsartan (ENTRESTO) 24-26 MG per tablet Take 1 tablet by mouth 2 times daily 60 tablet 3    acetaminophen (TYLENOL) 500 MG tablet Take 500 mg by mouth every 6 hours as needed for Pain      tamsulosin (FLOMAX) 0.4 MG capsule Take 0.4 mg by mouth daily      LORazepam (ATIVAN) 0.5 MG tablet Take 0.5 mg by mouth every 6 hours as needed for Anxiety.  melatonin (RA MELATONIN) 3 MG TABS tablet Take 1 tablet by mouth nightly as needed (sleep) (Patient taking differently: Take 10 mg by mouth nightly as needed (sleep) ) 30 tablet 0    azelastine (OPTIVAR) 0.05 % ophthalmic solution Place 1 drop into both eyes 3 times daily       aspirin 325 MG EC tablet Take 1 tablet by mouth daily 30 tablet 3    hydrocortisone 2.5 % cream Apply topically 2 times daily.  3.5 g 3     MG capsule TAKE 1 CAPSULE BY MOUTH 2 TIMES DAILY AS NEEDED FOR CONSTIPATION 60 capsule 3    atorvastatin (LIPITOR) 80 MG tablet Take 1 tablet by mouth nightly 90 tablet 1    Multiple Vitamin (MULTI-VITAMIN) TABS Take 1 tablet by mouth daily 90 tablet 2    torsemide (DEMADEX) 20 MG tablet Take 2 tablets by mouth 2 times daily 60 tablet 3    Multiple Vitamins-Minerals (THERAPEUTIC MULTIVITAMIN-MINERALS) tablet Take 1 tablet by mouth daily      buPROPion (WELLBUTRIN XL) 150 MG extended release tablet Take 1 tablet by mouth 2 times daily 180 tablet 1    nitroGLYCERIN (NITROSTAT) 0.4 MG SL tablet PLACE 1 TABLET UNDER THE TONGUE EVERY 5 MINUTES IF NEEDED FOR CHEST PAIN UP TO MAXIMUM OF 3 TOTAL DOSES. IF NO RELIEF AFTER 1 DOSE CALL 911 25 tablet 3    ondansetron (ZOFRAN ODT) 4 MG disintegrating tablet Take 1 tablet by mouth every 8 hours as needed for Nausea 20 tablet 0    mirtazapine (REMERON) 7.5 MG tablet Take 1 tablet by mouth nightly (Patient taking differently: Take 15 mg by mouth nightly ) 30 tablet 5    polyethylene glycol (GLYCOLAX) 17 GM/SCOOP powder Take 17 g by mouth daily      polyvinyl alcohol (LIQUIFILM TEARS) 1.4 % ophthalmic solution Place 1 drop into both eyes every 3 hours as needed      ranitidine (ZANTAC) 150 MG tablet Take 1 tablet by mouth 2 times daily as needed for Heartburn 60 tablet 1    Handicap Placard MISC by Does not apply route effective November 25,2019 through November 24,2020 1 each 0     No current facility-administered medications for this visit. Vitals:    04/01/21 0955   BP: 130/80   Pulse: 60   Temp: 97.3 °F (36.3 °C)         Wt 194    Review of Systems   Constitutional: Negative for activity change and fatigue. Respiratory: Negative for apnea, cough, choking,  and shortness of breath. isolate episode of chest discomfort. Cardiovascular: Negative for chest pain, palpitations and leg swelling. No PND or orthopnea. No tachycardia. Gastrointestinal: Negative for abdominal distention. Musculoskeletal: Negative for myalgias. Neurological: Negative for dizziness, syncope and light-headedness. Psychiatric/Behavioral: Negative for agitation, behavioral problems and confusion. All other systems reviewed negative as done. Objective:   Physical Exam   Constitutional: He is oriented to person, place, and time. He appears well-developed and well-nourished. No distress. HENT:   Head: Normocephalic and atraumatic. Eyes: Conjunctivae and EOM are normal. Right eye exhibits no discharge. Left eye exhibits no discharge. Neck: Normal range of motion. Neck supple. No JVD present. Cardiovascular: Normal rate, regular rhythm, S1 normal and S2 normal.  Exam reveals no gallop. Murmur heard. Systolic murmur is present with a grade of 2/6   Pulses:       Radial pulses are 2+ on the right side, and 2+ on the left side. Pulmonary/Chest: Effort normal and breath sounds normal. No respiratory distress. He has no wheezes. He has no rales. Abdominal: Soft. Bowel sounds are normal. There is no tenderness. Musculoskeletal: Normal range of motion. He exhibits + edema. Neurological: He is alert and oriented to person, place, and time. Skin: Skin is warm and dry. Psychiatric: He has a normal mood and affect. His behavior is normal. Thought content normal.       Assessment:       Diagnosis Orders   1. Chronic systolic congestive heart failure ([address]     2. CAD in native artery     3. Hx of CABG     4. Ischemic cardiomyopathy     5. Bradycardia     6. Nonrheumatic aortic valve stenosis             Plan:      Breathing good. Feeling better. Remains active. BP is good. HR good. On coreg of 12.5  mg bid. Entresto daily. No angina. No exertional sx. Wt stable on lasix. Continue  lasix 80 mg bid. .   Dr Uma Orozco following aorta. Dr Naomi Flores seeing for possible endovascular repair. EKG shows NSR,first degree AVB,  PVC, IVCD, septal mi AU, NSSTTW, it is unchanged . Clinically he is much better. He is compensate. Having no angina  With his age, CHF, cardiomyopathy/CAD and significant aortic stenosis he is at considerable risk. Son understands and will inform family. IF they understand risk involved may proceed with endovascular repair. Reviewed previous records and testing including cath 12/15 ,echo 1/21, myoview 3/19. Echo now with EF 30%, some better. Follow up 6-8  wks.

## 2021-04-02 ENCOUNTER — TELEPHONE (OUTPATIENT)
Dept: SURGERY | Age: 86
End: 2021-04-02

## 2021-04-02 NOTE — TELEPHONE ENCOUNTER
Daughter calling with some concerns. Pt was seen yesterday at Cardiology. Note in Epic. They consider him to be at considerate risk, but as long as patient understands the risk, may proceed. The daughter would like to know what Dr. Naomi Flores thinks. Also, if ok with scheduling, would like to set that up.       [phone] Sahara Menjivar, daughter

## 2021-04-02 NOTE — TELEPHONE ENCOUNTER
Will inform Dr. Omer Moon of the patient's family concerns and will contact the patient with an update

## 2021-04-05 ENCOUNTER — TELEPHONE (OUTPATIENT)
Dept: SURGERY | Age: 86
End: 2021-04-05

## 2021-04-05 NOTE — TELEPHONE ENCOUNTER
Father is not scheduled yet but, daughter wants to know what Dr. Laquita Beatty recommends. Patient told family he wants to move forward with surgery despite the risk. He did see his cardiologist (note in epic).   Please advise

## 2021-04-07 NOTE — TELEPHONE ENCOUNTER
Per Dr. Tata Parra she has already spoken with the family of tis patient as of 4/6/21, she has also spoken with the cardiologist with plans to call the patients family back today regarding the conversation with the cardiologist.

## 2021-04-12 ENCOUNTER — TELEPHONE (OUTPATIENT)
Dept: VASCULAR SURGERY | Age: 86
End: 2021-04-12

## 2021-04-13 ENCOUNTER — TELEPHONE (OUTPATIENT)
Dept: SURGERY | Age: 86
End: 2021-04-13

## 2021-04-13 NOTE — TELEPHONE ENCOUNTER
Patient's daughter, Hima Brennan, on HIPAA, would like to schedule surgery-they thought it would be in April    Please call Hima Brennan: [phone]

## 2021-05-04 ENCOUNTER — TELEPHONE (OUTPATIENT)
Dept: VASCULAR SURGERY | Age: 86
End: 2021-05-04

## 2021-05-04 NOTE — TELEPHONE ENCOUNTER
The patients daughter called and would like the patient to repeat his CT scan instead of moving forward with surgery at this time. She would like to know if Dr. Robin Funes can place the CT order and when the patient should repeat the test.    Pls Advise.

## 2021-05-04 NOTE — TELEPHONE ENCOUNTER
The patients daughter called and would like the patient to repeat his CT scan instead of moving forward with surgery at this time. She would like to know if Dr. Zoran Garcia can place the CT order and when the patient should repeat the test. Please call.

## 2021-05-05 DIAGNOSIS — I71.40 ABDOMINAL AORTIC ANEURYSM (AAA) GREATER THAN 5.5 CM IN DIAMETER IN MALE: Primary | ICD-10-CM

## 2021-05-05 NOTE — TELEPHONE ENCOUNTER
It would typically be done 6 months from the last (3/19). I have placed order and recommend having it done in August with office visit to follow. It would be helpful if she could attend the office visit with him. Thank you.

## 2021-05-20 ENCOUNTER — OFFICE VISIT (OUTPATIENT)
Dept: CARDIOLOGY CLINIC | Age: 86
End: 2021-05-20
Payer: MEDICARE

## 2021-05-20 VITALS
DIASTOLIC BLOOD PRESSURE: 60 MMHG | SYSTOLIC BLOOD PRESSURE: 118 MMHG | WEIGHT: 189.6 LBS | BODY MASS INDEX: 30.6 KG/M2 | HEART RATE: 60 BPM

## 2021-05-20 DIAGNOSIS — I25.5 ISCHEMIC CARDIOMYOPATHY: ICD-10-CM

## 2021-05-20 DIAGNOSIS — I35.0 NONRHEUMATIC AORTIC VALVE STENOSIS: ICD-10-CM

## 2021-05-20 DIAGNOSIS — I50.22 CHRONIC SYSTOLIC CONGESTIVE HEART FAILURE (HCC): Primary | ICD-10-CM

## 2021-05-20 DIAGNOSIS — R00.1 BRADYCARDIA: ICD-10-CM

## 2021-05-20 DIAGNOSIS — I25.10 CAD IN NATIVE ARTERY: ICD-10-CM

## 2021-05-20 DIAGNOSIS — Z95.1 HX OF CABG: ICD-10-CM

## 2021-05-20 PROCEDURE — 1123F ACP DISCUSS/DSCN MKR DOCD: CPT | Performed by: INTERNAL MEDICINE

## 2021-05-20 PROCEDURE — G8427 DOCREV CUR MEDS BY ELIG CLIN: HCPCS | Performed by: INTERNAL MEDICINE

## 2021-05-20 PROCEDURE — G8417 CALC BMI ABV UP PARAM F/U: HCPCS | Performed by: INTERNAL MEDICINE

## 2021-05-20 PROCEDURE — 4040F PNEUMOC VAC/ADMIN/RCVD: CPT | Performed by: INTERNAL MEDICINE

## 2021-05-20 PROCEDURE — 99213 OFFICE O/P EST LOW 20 MIN: CPT | Performed by: INTERNAL MEDICINE

## 2021-05-20 PROCEDURE — 1036F TOBACCO NON-USER: CPT | Performed by: INTERNAL MEDICINE

## 2021-05-20 NOTE — PROGRESS NOTES
Subjective:      Patient ID: Johanna Thomson is a 80 y.o. male. HPI: Naveen juarez  being seen today in follow up for CAD/PTCA/AS/cardiomyopathy and abn myoview. Breathing better. No chest pain. HR good. No chest pain. Walks daily. No exertional chest pain. Edema stable. . No syncope. No tachycardia. No pnd. Past Medical History:   Diagnosis Date    Arthritis     Ascending aortic aneurysm ([address]     CAD (coronary artery disease)     Chronic kidney disease     Constipation     DDD (degenerative disc disease), lumbar     L5, S1    Dental disease     Hearing loss     Hyperlipidemia     Hypertension     Mild aortic stenosis     Prostate enlargement     TIA (transient ischemic attack)      Past Surgical History:   Procedure Laterality Date    CORONARY ANGIOPLASTY WITH STENT PLACEMENT  1/12/98    EYE SURGERY      SKIN TAG REMOVAL  2/2/10    TURP           No Known Allergies     Social History     Socioeconomic History    Marital status:      Spouse name: Not on file    Number of children: Not on file    Years of education: Not on file    Highest education level: Not on file   Occupational History    Not on file   Tobacco Use    Smoking status: Never Smoker    Smokeless tobacco: Never Used   Substance and Sexual Activity    Alcohol use: Yes     Alcohol/week: 0.0 standard drinks    Drug use: No    Sexual activity: Not on file   Other Topics Concern    Not on file   Social History Narrative    Not on file     Social Determinants of Health     Financial Resource Strain:     Difficulty of Paying Living Expenses:    Food Insecurity:     Worried About Running Out of Food in the Last Year:     [address] in the Last Year:    Transportation Needs:     Lack of Transportation (Medical):      Lack of Transportation (Non-Medical):    Physical Activity:     Days of Exercise per Week:     Minutes of Exercise per Session:    Stress:     Feeling of Stress :    Social Connections:  Frequency of Communication with Friends and Family:     Frequency of Social Gatherings with Friends and Family:     Attends Ep[employer]op[employer] Services:     Active Member of Clubs or Organizations:     Attends Club or Organization Meetings:     Marital Status:    Intimate Partner Violence:     Fear of Current or Ex-Partner:     Emotionally Abused:     Physically Abused:     Sexually Abused:         FH reviewed , denies FH cardiac issues. .           Current Outpatient Medications   Medication Sig Dispense Refill    acetaminophen (TYLENOL) 500 MG tablet Take 500 mg by mouth every 6 hours as needed for Pain      aspirin 325 MG EC tablet Take 1 tablet by mouth daily 30 tablet 3    methocarbamol (ROBAXIN) 500 MG tablet Take 500 mg by mouth 4 times daily      potassium chloride (KLOR-CON) 20 MEQ packet Take 20 mEq by mouth 2 times daily      torsemide (DEMADEX) 20 MG tablet Take 2 tablets by mouth 2 times daily 60 tablet 3    diclofenac sodium (VOLTAREN) 1 % GEL APPLY 4 G TOPICALLY 4 TIMES DAILY AS NEEDED FOR PAIN 400 g 3    sacubitril-valsartan (ENTRESTO) 24-26 MG per tablet Take 1 tablet by mouth 2 times daily 60 tablet 3    Multiple Vitamins-Minerals (THERAPEUTIC MULTIVITAMIN-MINERALS) tablet Take 1 tablet by mouth daily      tamsulosin (FLOMAX) 0.4 MG capsule Take 0.4 mg by mouth daily      LORazepam (ATIVAN) 0.5 MG tablet Take 0.5 mg by mouth every 6 hours as needed for Anxiety.  buPROPion (WELLBUTRIN XL) 150 MG extended release tablet Take 1 tablet by mouth 2 times daily 180 tablet 1    nitroGLYCERIN (NITROSTAT) 0.4 MG SL tablet PLACE 1 TABLET UNDER THE TONGUE EVERY 5 MINUTES IF NEEDED FOR CHEST PAIN UP TO MAXIMUM OF 3 TOTAL DOSES.  IF NO RELIEF AFTER 1 DOSE CALL 911 25 tablet 3    ondansetron (ZOFRAN ODT) 4 MG disintegrating tablet Take 1 tablet by mouth every 8 hours as needed for Nausea 20 tablet 0    mirtazapine (REMERON) 7.5 MG tablet Take 1 tablet by mouth nightly (Patient taking differently: Take 15 mg by mouth nightly ) 30 tablet 5    melatonin (RA MELATONIN) 3 MG TABS tablet Take 1 tablet by mouth nightly as needed (sleep) (Patient taking differently: Take 10 mg by mouth nightly as needed (sleep) ) 30 tablet 0    azelastine (OPTIVAR) 0.05 % ophthalmic solution Place 1 drop into both eyes 3 times daily       polyethylene glycol (GLYCOLAX) 17 GM/SCOOP powder Take 17 g by mouth daily      polyvinyl alcohol (LIQUIFILM TEARS) 1.4 % ophthalmic solution Place 1 drop into both eyes every 3 hours as needed      hydrocortisone 2.5 % cream Apply topically 2 times daily. 3.5 g 3     MG capsule TAKE 1 CAPSULE BY MOUTH 2 TIMES DAILY AS NEEDED FOR CONSTIPATION 60 capsule 3    atorvastatin (LIPITOR) 80 MG tablet Take 1 tablet by mouth nightly 90 tablet 1    ranitidine (ZANTAC) 150 MG tablet Take 1 tablet by mouth 2 times daily as needed for Heartburn 60 tablet 1    Handicap Placard MISC by Does not apply route effective November 25,2019 through November 24,2020 1 each 0    Multiple Vitamin (MULTI-VITAMIN) TABS Take 1 tablet by mouth daily 90 tablet 2     No current facility-administered medications for this visit. There were no vitals filed for this visit. Wt 194    Review of Systems   Constitutional: Negative for activity change and fatigue. Respiratory: Negative for apnea, cough, choking,  and shortness of breath. isolate episode of chest discomfort. Cardiovascular: Negative for chest pain, palpitations and leg swelling. No PND or orthopnea. No tachycardia. Gastrointestinal: Negative for abdominal distention. Musculoskeletal: Negative for myalgias. Neurological: Negative for dizziness, syncope and light-headedness. Psychiatric/Behavioral: Negative for agitation, behavioral problems and confusion. All other systems reviewed negative as done. Objective:   Physical Exam   Constitutional: He is oriented to person, place, and time.  He appears well-developed and well-nourished. No distress. HENT:   Head: Normocephalic and atraumatic. Eyes: Conjunctivae and EOM are normal. Right eye exhibits no discharge. Left eye exhibits no discharge. Neck: Normal range of motion. Neck supple. No JVD present. Cardiovascular: Normal rate, regular rhythm, S1 normal and S2 normal.  Exam reveals no gallop. Murmur heard. Systolic murmur is present with a grade of 2/6   Pulses:       Radial pulses are 2+ on the right side, and 2+ on the left side. Pulmonary/Chest: Effort normal and breath sounds normal. No respiratory distress. He has no wheezes. He has no rales. Abdominal: Soft. Bowel sounds are normal. There is no tenderness. Musculoskeletal: Normal range of motion. He exhibits + edema. Neurological: He is alert and oriented to person, place, and time. Skin: Skin is warm and dry. Psychiatric: He has a normal mood and affect. His behavior is normal. Thought content normal.       Assessment:       Diagnosis Orders   1. Chronic systolic congestive heart failure ([address]     2. Ischemic cardiomyopathy     3. CAD in native artery     4. Hx of CABG     5. Bradycardia     6. Nonrheumatic aortic valve stenosis             Plan:      Breathing good. Feeling better. Remains active. BP is good. HR good. On coreg of 12.5  mg bid. Entresto daily. Volume status good/stable. No angina. No exertional sx. Wt stable on lasix. Continue  lasix 80 mg bid. .   Dr Eladia Avilez following aorta. Clinically he is much better. He is compensate. Having no angina Reviewed previous records and testing including cath 12/15 ,echo 1/21, myoview 3/19. Echo now with EF 30%, some better. Follow up 6-8  wks.

## 2021-07-21 ENCOUNTER — OFFICE VISIT (OUTPATIENT)
Dept: CARDIOLOGY CLINIC | Age: 86
End: 2021-07-21
Payer: MEDICARE

## 2021-07-21 VITALS
WEIGHT: 189 LBS | HEART RATE: 56 BPM | SYSTOLIC BLOOD PRESSURE: 126 MMHG | BODY MASS INDEX: 30.51 KG/M2 | DIASTOLIC BLOOD PRESSURE: 64 MMHG

## 2021-07-21 DIAGNOSIS — I35.0 NONRHEUMATIC AORTIC VALVE STENOSIS: ICD-10-CM

## 2021-07-21 DIAGNOSIS — Z95.1 HX OF CABG: ICD-10-CM

## 2021-07-21 DIAGNOSIS — I25.5 ISCHEMIC CARDIOMYOPATHY: ICD-10-CM

## 2021-07-21 DIAGNOSIS — I25.10 CAD IN NATIVE ARTERY: ICD-10-CM

## 2021-07-21 DIAGNOSIS — I50.22 CHRONIC SYSTOLIC CONGESTIVE HEART FAILURE (HCC): Primary | ICD-10-CM

## 2021-07-21 DIAGNOSIS — R00.1 BRADYCARDIA: ICD-10-CM

## 2021-07-21 PROCEDURE — G8417 CALC BMI ABV UP PARAM F/U: HCPCS | Performed by: INTERNAL MEDICINE

## 2021-07-21 PROCEDURE — 1036F TOBACCO NON-USER: CPT | Performed by: INTERNAL MEDICINE

## 2021-07-21 PROCEDURE — 4040F PNEUMOC VAC/ADMIN/RCVD: CPT | Performed by: INTERNAL MEDICINE

## 2021-07-21 PROCEDURE — G8427 DOCREV CUR MEDS BY ELIG CLIN: HCPCS | Performed by: INTERNAL MEDICINE

## 2021-07-21 PROCEDURE — 1123F ACP DISCUSS/DSCN MKR DOCD: CPT | Performed by: INTERNAL MEDICINE

## 2021-07-21 PROCEDURE — 99213 OFFICE O/P EST LOW 20 MIN: CPT | Performed by: INTERNAL MEDICINE

## 2021-07-21 NOTE — PROGRESS NOTES
Connections:     Frequency of Communication with Friends and Family:     Frequency of Social Gatherings with Friends and Family:     Attends Protestant Services:     Active Member of Clubs or Organizations:     Attends Club or Organization Meetings:     Marital Status:    Intimate Partner Violence:     Fear of Current or Ex-Partner:     Emotionally Abused:     Physically Abused:     Sexually Abused:         FH reviewed , denies FH cardiac issues. .           Current Outpatient Medications   Medication Sig Dispense Refill    methocarbamol (ROBAXIN) 500 MG tablet Take 500 mg by mouth 4 times daily      potassium chloride (KLOR-CON) 20 MEQ packet Take 20 mEq by mouth 2 times daily      torsemide (DEMADEX) 20 MG tablet Take 2 tablets by mouth 2 times daily 60 tablet 3    diclofenac sodium (VOLTAREN) 1 % GEL APPLY 4 G TOPICALLY 4 TIMES DAILY AS NEEDED FOR PAIN 400 g 3    sacubitril-valsartan (ENTRESTO) 24-26 MG per tablet Take 1 tablet by mouth 2 times daily 60 tablet 3    acetaminophen (TYLENOL) 500 MG tablet Take 500 mg by mouth every 6 hours as needed for Pain      Multiple Vitamins-Minerals (THERAPEUTIC MULTIVITAMIN-MINERALS) tablet Take 1 tablet by mouth daily      tamsulosin (FLOMAX) 0.4 MG capsule Take 0.4 mg by mouth daily      LORazepam (ATIVAN) 0.5 MG tablet Take 0.5 mg by mouth every 6 hours as needed for Anxiety.  buPROPion (WELLBUTRIN XL) 150 MG extended release tablet Take 1 tablet by mouth 2 times daily 180 tablet 1    nitroGLYCERIN (NITROSTAT) 0.4 MG SL tablet PLACE 1 TABLET UNDER THE TONGUE EVERY 5 MINUTES IF NEEDED FOR CHEST PAIN UP TO MAXIMUM OF 3 TOTAL DOSES.  IF NO RELIEF AFTER 1 DOSE CALL 911 25 tablet 3    ondansetron (ZOFRAN ODT) 4 MG disintegrating tablet Take 1 tablet by mouth every 8 hours as needed for Nausea 20 tablet 0    mirtazapine (REMERON) 7.5 MG tablet Take 1 tablet by mouth nightly (Patient taking differently: Take 15 mg by mouth nightly ) 30 tablet 5    melatonin (RA MELATONIN) 3 MG TABS tablet Take 1 tablet by mouth nightly as needed (sleep) (Patient taking differently: Take 10 mg by mouth nightly as needed (sleep) ) 30 tablet 0    azelastine (OPTIVAR) 0.05 % ophthalmic solution Place 1 drop into both eyes 3 times daily       polyethylene glycol (GLYCOLAX) 17 GM/SCOOP powder Take 17 g by mouth daily      polyvinyl alcohol (LIQUIFILM TEARS) 1.4 % ophthalmic solution Place 1 drop into both eyes every 3 hours as needed      aspirin 325 MG EC tablet Take 1 tablet by mouth daily 30 tablet 3    hydrocortisone 2.5 % cream Apply topically 2 times daily. 3.5 g 3     MG capsule TAKE 1 CAPSULE BY MOUTH 2 TIMES DAILY AS NEEDED FOR CONSTIPATION 60 capsule 3    atorvastatin (LIPITOR) 80 MG tablet Take 1 tablet by mouth nightly 90 tablet 1    ranitidine (ZANTAC) 150 MG tablet Take 1 tablet by mouth 2 times daily as needed for Heartburn 60 tablet 1    Handicap Placard MISC by Does not apply route effective November 25,2019 through November 24,2020 1 each 0    Multiple Vitamin (MULTI-VITAMIN) TABS Take 1 tablet by mouth daily 90 tablet 2     No current facility-administered medications for this visit. Vitals:    07/21/21 1021   BP: 126/64   Pulse: 56         Wt 189    Review of Systems   Constitutional: Negative for activity change and fatigue. Respiratory: Negative for apnea, cough, choking,  and shortness of breath. isolate episode of chest discomfort. Cardiovascular: Negative for chest pain, palpitations and leg swelling. No PND or orthopnea. No tachycardia. Gastrointestinal: Negative for abdominal distention. Musculoskeletal: Negative for myalgias. Neurological: Negative for dizziness, syncope and light-headedness. Psychiatric/Behavioral: Negative for agitation, behavioral problems and confusion. All other systems reviewed negative as done. Objective:   Physical Exam   Constitutional: He is oriented to person, place, and time. He appears well-developed and well-nourished. No distress. HENT:   Head: Normocephalic and atraumatic. Eyes: Conjunctivae and EOM are normal. Right eye exhibits no discharge. Left eye exhibits no discharge. Neck: Normal range of motion. Neck supple. No JVD present. Cardiovascular: Normal rate, regular rhythm, S1 normal and S2 normal.  Exam reveals no gallop. Murmur heard. Systolic murmur is present with a grade of 2/6   Pulses:       Radial pulses are 2+ on the right side, and 2+ on the left side. Pulmonary/Chest: Effort normal and breath sounds normal. No respiratory distress. He has no wheezes. He has no rales. Abdominal: Soft. Bowel sounds are normal. There is no tenderness. Musculoskeletal: Normal range of motion. He exhibits + edema. Neurological: He is alert and oriented to person, place, and time. Skin: Skin is warm and dry. Psychiatric: He has a normal mood and affect. His behavior is normal. Thought content normal.       Assessment:       Diagnosis Orders   1. Chronic systolic congestive heart failure ([address]     2. Ischemic cardiomyopathy     3. CAD in native artery     4. Hx of CABG     5. Bradycardia     6. Nonrheumatic aortic valve stenosis             Plan:      Breathing good. Feeling better. Remains active. BP is good. HR good. On coreg of 12.5  mg bid. Entresto daily. Volume status good/stable. No angina. No exertional sx. Wt stable on lasix. Continue  lasix 80 mg bid. .   Dr Meza Nicely following aorta. Clinically he is much better. He is compensate. Having no angina Reviewed previous records and testing including cath 12/15 ,echo 1/21, myoview 3/19. Follow up 3 months.

## 2021-08-02 ENCOUNTER — HOSPITAL ENCOUNTER (OUTPATIENT)
Dept: CT IMAGING | Age: 86
Discharge: HOME | End: 2021-08-02
Payer: MEDICARE

## 2021-08-02 DIAGNOSIS — I71.40 ABDOMINAL AORTIC ANEURYSM (AAA) GREATER THAN 5.5 CM IN DIAMETER IN MALE: ICD-10-CM

## 2021-08-02 PROCEDURE — 74176 CT ABD & PELVIS W/O CONTRAST: CPT

## 2021-08-12 ENCOUNTER — OFFICE VISIT (OUTPATIENT)
Dept: VASCULAR SURGERY | Age: 86
End: 2021-08-12
Payer: MEDICARE

## 2021-08-12 VITALS
HEIGHT: 66 IN | WEIGHT: 188.8 LBS | DIASTOLIC BLOOD PRESSURE: 51 MMHG | HEART RATE: 55 BPM | SYSTOLIC BLOOD PRESSURE: 88 MMHG | TEMPERATURE: 97.5 F | BODY MASS INDEX: 30.34 KG/M2

## 2021-08-12 DIAGNOSIS — I71.40 ABDOMINAL AORTIC ANEURYSM (AAA) WITHOUT RUPTURE: Primary | ICD-10-CM

## 2021-08-12 PROCEDURE — G8427 DOCREV CUR MEDS BY ELIG CLIN: HCPCS | Performed by: SURGERY

## 2021-08-12 PROCEDURE — 1123F ACP DISCUSS/DSCN MKR DOCD: CPT | Performed by: SURGERY

## 2021-08-12 PROCEDURE — 4040F PNEUMOC VAC/ADMIN/RCVD: CPT | Performed by: SURGERY

## 2021-08-12 PROCEDURE — 99213 OFFICE O/P EST LOW 20 MIN: CPT | Performed by: SURGERY

## 2021-08-12 PROCEDURE — G8417 CALC BMI ABV UP PARAM F/U: HCPCS | Performed by: SURGERY

## 2021-08-12 PROCEDURE — 1036F TOBACCO NON-USER: CPT | Performed by: SURGERY

## 2021-08-12 NOTE — PROGRESS NOTES
[facility]) Vascular Surgery--Max Larios Mike, [address] [facility], [facility], [address]    Follow-up    Referring Provider:  Alfredito Tucker MD     Patient Name: Genie Segundo  YOB: 1932  Date: 08/12/21    History:  Genie Segundo is a 80 y.o. male being managed and followed for AAA. Feels very well. Walking 30 min/day outside. Denies CP, SOB or AYALA. No abdominal or back pain. Appetite is good. I personally reviewed images of the following study:  CT ABDOMEN PELVIS WO CONTRAST 8/2/2021  Impression   Impression:        Stable fusiform aneurysmal dilation of the infrarenal abdominal aorta, measuring 5.1 cm in greatest diameter. True AP and lateral measurements were made on this study, accounting for differences in measurement of the prior study. Vital Signs  BP (!) 88/51   Pulse 55   Temp 97.5 °F (36.4 °C)   Ht 5' 6\" (1.676 m)   Wt 188 lb 12.8 oz (85.6 kg)   BMI 30.47 kg/m²     Physical Examination  General:  alert and oriented to person, place and time, well-developed and well-nourished, in no acute distress  Heart: Loud ejection murmur RUSB  Lungs:  CTA B no w/r/r  Abdomen: soft, NT/ND. Skin/integumentary: Skin color, texture, turgor normal. No rashes or lesions. Impression:  1. Stable AAA   ---Radiology report states 5.1cm. I continue to measure 5.6cm; however, this is  unchanged from his March exam  2. Cardiomyopathy   --stable; follows with Dr. Mary Kate Vides   3. Chronic renal insufficiency   --stable    Plan:  Follow-up in the office in 6 months with a noncontrast CT scan.

## 2021-11-02 ENCOUNTER — OFFICE VISIT (OUTPATIENT)
Dept: CARDIOLOGY CLINIC | Age: 86
End: 2021-11-02
Payer: MEDICARE

## 2021-11-02 VITALS
SYSTOLIC BLOOD PRESSURE: 126 MMHG | DIASTOLIC BLOOD PRESSURE: 80 MMHG | WEIGHT: 192 LBS | BODY MASS INDEX: 30.99 KG/M2 | HEART RATE: 60 BPM

## 2021-11-02 DIAGNOSIS — I35.0 NONRHEUMATIC AORTIC VALVE STENOSIS: ICD-10-CM

## 2021-11-02 DIAGNOSIS — Z95.1 HX OF CABG: ICD-10-CM

## 2021-11-02 DIAGNOSIS — R00.1 BRADYCARDIA: ICD-10-CM

## 2021-11-02 DIAGNOSIS — I25.5 ISCHEMIC CARDIOMYOPATHY: ICD-10-CM

## 2021-11-02 DIAGNOSIS — I25.10 CAD IN NATIVE ARTERY: ICD-10-CM

## 2021-11-02 DIAGNOSIS — I50.22 CHRONIC SYSTOLIC CONGESTIVE HEART FAILURE (HCC): Primary | ICD-10-CM

## 2021-11-02 PROCEDURE — G8428 CUR MEDS NOT DOCUMENT: HCPCS | Performed by: INTERNAL MEDICINE

## 2021-11-02 PROCEDURE — 99214 OFFICE O/P EST MOD 30 MIN: CPT | Performed by: INTERNAL MEDICINE

## 2021-11-02 PROCEDURE — 1123F ACP DISCUSS/DSCN MKR DOCD: CPT | Performed by: INTERNAL MEDICINE

## 2021-11-02 PROCEDURE — G8484 FLU IMMUNIZE NO ADMIN: HCPCS | Performed by: INTERNAL MEDICINE

## 2021-11-02 PROCEDURE — G8417 CALC BMI ABV UP PARAM F/U: HCPCS | Performed by: INTERNAL MEDICINE

## 2021-11-02 PROCEDURE — 4040F PNEUMOC VAC/ADMIN/RCVD: CPT | Performed by: INTERNAL MEDICINE

## 2021-11-02 PROCEDURE — 1036F TOBACCO NON-USER: CPT | Performed by: INTERNAL MEDICINE

## 2021-11-02 RX ORDER — LIDOCAINE 40 MG/G
CREAM TOPICAL PRN
COMMUNITY

## 2021-11-02 NOTE — PROGRESS NOTES
Subjective:      Patient ID: Precilla Kawasaki is a 80 y.o. male. HPI: Shobha Galo ia  being seen today in follow up for CAD/PTCA/AS/cardiomyopathy and abn myoview. Breathing good. Active. Lulu Hermitage No chest pain. HR good. No chest pain. Walks daily. Swimming 1x per week. No exertional chest pain. Edema stable. Wt stable. No syncope. No tachycardia. No pnd. Past Medical History:   Diagnosis Date    Arthritis     Ascending aortic aneurysm ([address]     CAD (coronary artery disease)     Chronic kidney disease     Constipation     DDD (degenerative disc disease), lumbar     L5, S1    Dental disease     Hearing loss     Hyperlipidemia     Hypertension     Mild aortic stenosis     Prostate enlargement     TIA (transient ischemic attack)      Past Surgical History:   Procedure Laterality Date    CORONARY ANGIOPLASTY WITH STENT PLACEMENT  1/12/98    EYE SURGERY      SKIN TAG REMOVAL  2/2/10    TURP           No Known Allergies     Social History     Socioeconomic History    Marital status:      Spouse name: Not on file    Number of children: Not on file    Years of education: Not on file    Highest education level: Not on file   Occupational History    Not on file   Tobacco Use    Smoking status: Never Smoker    Smokeless tobacco: Never Used   Substance and Sexual Activity    Alcohol use: Yes     Alcohol/week: 0.0 standard drinks    Drug use: No    Sexual activity: Not on file   Other Topics Concern    Not on file   Social History Narrative    Not on file     Social Determinants of Health     Financial Resource Strain:     Difficulty of Paying Living Expenses:    Food Insecurity:     Worried About Running Out of Food in the Last Year:     [address] in the Last Year:    Transportation Needs:     Lack of Transportation (Medical):      Lack of Transportation (Non-Medical):    Physical Activity:     Days of Exercise per Week:     Minutes of Exercise per Session:    Stress:     Feeling of Stress :    Social Connections:     Frequency of Communication with Friends and Family:     Frequency of Social Gatherings with Friends and Family:     Attends Congregation Services:     Active Member of Clubs or Organizations:     Attends Club or Organization Meetings:     Marital Status:    Intimate Partner Violence:     Fear of Current or Ex-Partner:     Emotionally Abused:     Physically Abused:     Sexually Abused:         FH reviewed , denies FH cardiac issues. .           Current Outpatient Medications   Medication Sig Dispense Refill    lidocaine (LMX) 4 % cream Apply topically as needed for Pain Apply topically as needed.  methocarbamol (ROBAXIN) 500 MG tablet Take 500 mg by mouth 4 times daily      potassium chloride (KLOR-CON) 20 MEQ packet Take 20 mEq by mouth 2 times daily      torsemide (DEMADEX) 20 MG tablet Take 2 tablets by mouth 2 times daily 60 tablet 3    diclofenac sodium (VOLTAREN) 1 % GEL APPLY 4 G TOPICALLY 4 TIMES DAILY AS NEEDED FOR PAIN 400 g 3    sacubitril-valsartan (ENTRESTO) 24-26 MG per tablet Take 1 tablet by mouth 2 times daily 60 tablet 3    acetaminophen (TYLENOL) 500 MG tablet Take 500 mg by mouth every 6 hours as needed for Pain      Multiple Vitamins-Minerals (THERAPEUTIC MULTIVITAMIN-MINERALS) tablet Take 1 tablet by mouth daily      tamsulosin (FLOMAX) 0.4 MG capsule Take 0.4 mg by mouth daily      LORazepam (ATIVAN) 0.5 MG tablet Take 0.5 mg by mouth every 6 hours as needed for Anxiety.  buPROPion (WELLBUTRIN XL) 150 MG extended release tablet Take 1 tablet by mouth 2 times daily 180 tablet 1    nitroGLYCERIN (NITROSTAT) 0.4 MG SL tablet PLACE 1 TABLET UNDER THE TONGUE EVERY 5 MINUTES IF NEEDED FOR CHEST PAIN UP TO MAXIMUM OF 3 TOTAL DOSES.  IF NO RELIEF AFTER 1 DOSE CALL 911 25 tablet 3    ondansetron (ZOFRAN ODT) 4 MG disintegrating tablet Take 1 tablet by mouth every 8 hours as needed for Nausea 20 tablet 0    melatonin (RA MELATONIN) 3 MG TABS tablet Take 1 tablet by mouth nightly as needed (sleep) (Patient taking differently: Take 10 mg by mouth nightly as needed (sleep) ) 30 tablet 0    azelastine (OPTIVAR) 0.05 % ophthalmic solution Place 1 drop into both eyes 3 times daily       polyethylene glycol (GLYCOLAX) 17 GM/SCOOP powder Take 17 g by mouth daily      polyvinyl alcohol (LIQUIFILM TEARS) 1.4 % ophthalmic solution Place 1 drop into both eyes every 3 hours as needed      aspirin 325 MG EC tablet Take 1 tablet by mouth daily 30 tablet 3     MG capsule TAKE 1 CAPSULE BY MOUTH 2 TIMES DAILY AS NEEDED FOR CONSTIPATION 60 capsule 3    atorvastatin (LIPITOR) 80 MG tablet Take 1 tablet by mouth nightly 90 tablet 1    ranitidine (ZANTAC) 150 MG tablet Take 1 tablet by mouth 2 times daily as needed for Heartburn 60 tablet 1    Multiple Vitamin (MULTI-VITAMIN) TABS Take 1 tablet by mouth daily 90 tablet 2    mirtazapine (REMERON) 7.5 MG tablet Take 1 tablet by mouth nightly (Patient taking differently: Take 15 mg by mouth nightly ) 30 tablet 5    hydrocortisone 2.5 % cream Apply topically 2 times daily. 3.5 g 3    Handicap Placard MISC by Does not apply route effective November 25,2019 through November 24,2020 1 each 0     No current facility-administered medications for this visit. Vitals:    11/02/21 1041   BP: 126/80   Pulse: 60         Wt 192    Review of Systems   Constitutional: Negative for activity change and fatigue. Respiratory: Negative for apnea, cough, choking,  and shortness of breath. isolate episode of chest discomfort. Cardiovascular: Negative for chest pain, palpitations and leg swelling. No PND or orthopnea. No tachycardia. Gastrointestinal: Negative for abdominal distention. Musculoskeletal: Negative for myalgias. Neurological: Negative for dizziness, syncope and light-headedness. Psychiatric/Behavioral: Negative for agitation, behavioral problems and confusion.    All other systems reviewed negative as done. Objective:   Physical Exam   Constitutional: He is oriented to person, place, and time. He appears well-developed and well-nourished. No distress. HENT:   Head: Normocephalic and atraumatic. Eyes: Conjunctivae and EOM are normal. Right eye exhibits no discharge. Left eye exhibits no discharge. Neck: Normal range of motion. Neck supple. No JVD present. Cardiovascular: Normal rate, regular rhythm, S1 normal and S2 normal.  Exam reveals no gallop. Murmur heard. Systolic murmur is present with a grade of 2/6   Pulses:       Radial pulses are 2+ on the right side, and 2+ on the left side. Pulmonary/Chest: Effort normal and breath sounds normal. No respiratory distress. He has no wheezes. He has no rales. Abdominal: Soft. Bowel sounds are normal. There is no tenderness. Musculoskeletal: Normal range of motion. He exhibits + edema. Neurological: He is alert and oriented to person, place, and time. Skin: Skin is warm and dry. Psychiatric: He has a normal mood and affect. His behavior is normal. Thought content normal.       Assessment:       Diagnosis Orders   1. Chronic systolic congestive heart failure ([address]     2. Ischemic cardiomyopathy     3. CAD in native artery     4. Hx of CABG     5. Bradycardia     6. Nonrheumatic aortic valve stenosis             Plan:      Breathing good. Remains active. BP is good. HR good. On coreg of 12.5  mg bid. Continue Entresto daily. Volume status good/stable. No angina. No exertional sx. Wt stable on demadex. Continue demadex. Dr Huma Almaguer following aorta. Clinically he is much better. He is compensated. Having no angina Reviewed previous records and testing including cath 12/15 ,echo 1/21, myoview 3/19. Follow up 3 months.

## 2022-02-14 ENCOUNTER — HOSPITAL ENCOUNTER (OUTPATIENT)
Dept: CT IMAGING | Age: 87
Discharge: HOME | End: 2022-02-14
Payer: MEDICARE

## 2022-02-14 DIAGNOSIS — I71.40 ABDOMINAL AORTIC ANEURYSM (AAA) WITHOUT RUPTURE: ICD-10-CM

## 2022-02-14 PROCEDURE — 74176 CT ABD & PELVIS W/O CONTRAST: CPT

## 2022-02-15 ENCOUNTER — OFFICE VISIT (OUTPATIENT)
Dept: VASCULAR SURGERY | Age: 87
End: 2022-02-15
Payer: MEDICARE

## 2022-02-15 VITALS
HEART RATE: 54 BPM | DIASTOLIC BLOOD PRESSURE: 67 MMHG | TEMPERATURE: 98.1 F | HEIGHT: 66 IN | WEIGHT: 194.2 LBS | SYSTOLIC BLOOD PRESSURE: 136 MMHG | BODY MASS INDEX: 31.21 KG/M2

## 2022-02-15 DIAGNOSIS — I71.40 ABDOMINAL AORTIC ANEURYSM (AAA) WITHOUT RUPTURE: Primary | ICD-10-CM

## 2022-02-15 PROCEDURE — G8427 DOCREV CUR MEDS BY ELIG CLIN: HCPCS | Performed by: SURGERY

## 2022-02-15 PROCEDURE — G8417 CALC BMI ABV UP PARAM F/U: HCPCS | Performed by: SURGERY

## 2022-02-15 PROCEDURE — 4040F PNEUMOC VAC/ADMIN/RCVD: CPT | Performed by: SURGERY

## 2022-02-15 PROCEDURE — 1036F TOBACCO NON-USER: CPT | Performed by: SURGERY

## 2022-02-15 PROCEDURE — 1123F ACP DISCUSS/DSCN MKR DOCD: CPT | Performed by: SURGERY

## 2022-02-15 PROCEDURE — 99214 OFFICE O/P EST MOD 30 MIN: CPT | Performed by: SURGERY

## 2022-02-15 PROCEDURE — G8484 FLU IMMUNIZE NO ADMIN: HCPCS | Performed by: SURGERY

## 2022-02-15 NOTE — PROGRESS NOTES
[facility]) Vascular Surgery--Max Darling, [address], [facility], [address]    Follow-up    Referring Provider:  Brad Evans MD     Patient Name: Marjorie Gaitan  YOB: 1932  Date: 02/15/22    History:  Marjorie Gaitan is a 80 y.o. male being managed and followed for AAA. Feels very well. Walking 30 min/day and swimming at his facility. Overall, states he feels well. Denies CP, SOB or AYALA. No abdominal or back pain. Appetite is good. Denies any back or chest pain. I personally reviewed images of the following study:  CT ABDOMEN PELVIS WO CONTRAST 2/14/2022  Impression       Stable size of the infrarenal abdominal aortic aneurysm measuring up to 5.1 cm.         Vital Signs  /67   Pulse 54   Temp 98.1 °F (36.7 °C)   Ht 5' 6\" (1.676 m)   Wt 194 lb 3.2 oz (88.1 kg)   BMI 31.34 kg/m²     Physical Examination  General:  alert and oriented to person, place and time, well-developed and well-nourished, in no acute distress  Heart: Loud ejection murmur RUSB  Lungs:  CTA B no w/r/r  Abdomen: soft, NT/ND. Skin/integumentary: Skin color, texture, turgor normal. No rashes or lesions. Impression:  1. Stable AAA   2. Cardiomyopathy   --stable; follows with Dr. Bushra Ernst   3. Chronic renal insufficiency   --stable    Plan:  Follow-up in the office in 6 months with a noncontrast CT scan.

## 2022-03-14 ENCOUNTER — OFFICE VISIT (OUTPATIENT)
Dept: CARDIOLOGY CLINIC | Age: 87
End: 2022-03-14
Payer: MEDICARE

## 2022-03-14 VITALS
SYSTOLIC BLOOD PRESSURE: 132 MMHG | DIASTOLIC BLOOD PRESSURE: 80 MMHG | BODY MASS INDEX: 31.64 KG/M2 | WEIGHT: 196 LBS | HEART RATE: 80 BPM

## 2022-03-14 DIAGNOSIS — I25.5 ISCHEMIC CARDIOMYOPATHY: ICD-10-CM

## 2022-03-14 DIAGNOSIS — Z95.1 HX OF CABG: ICD-10-CM

## 2022-03-14 DIAGNOSIS — R00.1 BRADYCARDIA: ICD-10-CM

## 2022-03-14 DIAGNOSIS — I25.10 CAD IN NATIVE ARTERY: ICD-10-CM

## 2022-03-14 DIAGNOSIS — I50.22 CHRONIC SYSTOLIC CONGESTIVE HEART FAILURE (HCC): Primary | ICD-10-CM

## 2022-03-14 DIAGNOSIS — I35.0 NONRHEUMATIC AORTIC VALVE STENOSIS: ICD-10-CM

## 2022-03-14 PROCEDURE — 1036F TOBACCO NON-USER: CPT | Performed by: INTERNAL MEDICINE

## 2022-03-14 PROCEDURE — 4040F PNEUMOC VAC/ADMIN/RCVD: CPT | Performed by: INTERNAL MEDICINE

## 2022-03-14 PROCEDURE — G8484 FLU IMMUNIZE NO ADMIN: HCPCS | Performed by: INTERNAL MEDICINE

## 2022-03-14 PROCEDURE — 1123F ACP DISCUSS/DSCN MKR DOCD: CPT | Performed by: INTERNAL MEDICINE

## 2022-03-14 PROCEDURE — 99214 OFFICE O/P EST MOD 30 MIN: CPT | Performed by: INTERNAL MEDICINE

## 2022-03-14 PROCEDURE — G8417 CALC BMI ABV UP PARAM F/U: HCPCS | Performed by: INTERNAL MEDICINE

## 2022-03-14 PROCEDURE — G8427 DOCREV CUR MEDS BY ELIG CLIN: HCPCS | Performed by: INTERNAL MEDICINE

## 2022-03-14 NOTE — PROGRESS NOTES
Not on file    Minutes of Exercise per Session: Not on file   Stress:     Feeling of Stress : Not on file   Social Connections:     Frequency of Communication with Friends and Family: Not on file    Frequency of Social Gatherings with Friends and Family: Not on file    Attends Taoist Services: Not on file    Active Member of Clubs or Organizations: Not on file    Attends Club or Organization Meetings: Not on file    Marital Status: Not on file   Intimate Partner Violence:     Fear of Current or Ex-Partner: Not on file    Emotionally Abused: Not on file    Physically Abused: Not on file    Sexually Abused: Not on file   Housing Stability:     Unable to Pay for Housing in the Last Year: Not on file    Number of [place] in the Last Year: Not on file    Unstable Housing in the Last Year: Not on file        FH reviewed , denies FH cardiac issues. .           Current Outpatient Medications   Medication Sig Dispense Refill    lidocaine (LMX) 4 % cream Apply topically as needed for Pain Apply topically as needed.  methocarbamol (ROBAXIN) 500 MG tablet Take 500 mg by mouth 4 times daily       potassium chloride (KLOR-CON) 20 MEQ packet Take 20 mEq by mouth 2 times daily      torsemide (DEMADEX) 20 MG tablet Take 2 tablets by mouth 2 times daily 60 tablet 3    diclofenac sodium (VOLTAREN) 1 % GEL APPLY 4 G TOPICALLY 4 TIMES DAILY AS NEEDED FOR PAIN 400 g 3    sacubitril-valsartan (ENTRESTO) 24-26 MG per tablet Take 1 tablet by mouth 2 times daily 60 tablet 3    acetaminophen (TYLENOL) 500 MG tablet Take 500 mg by mouth every 6 hours as needed for Pain      Multiple Vitamins-Minerals (THERAPEUTIC MULTIVITAMIN-MINERALS) tablet Take 1 tablet by mouth daily      tamsulosin (FLOMAX) 0.4 MG capsule Take 0.4 mg by mouth daily      LORazepam (ATIVAN) 0.5 MG tablet Take 0.5 mg by mouth every 6 hours as needed for Anxiety.       buPROPion (WELLBUTRIN XL) 150 MG extended release tablet Take 1 tablet by mouth 2 times daily 180 tablet 1    nitroGLYCERIN (NITROSTAT) 0.4 MG SL tablet PLACE 1 TABLET UNDER THE TONGUE EVERY 5 MINUTES IF NEEDED FOR CHEST PAIN UP TO MAXIMUM OF 3 TOTAL DOSES. IF NO RELIEF AFTER 1 DOSE CALL 911 25 tablet 3    ondansetron (ZOFRAN ODT) 4 MG disintegrating tablet Take 1 tablet by mouth every 8 hours as needed for Nausea 20 tablet 0    mirtazapine (REMERON) 7.5 MG tablet Take 1 tablet by mouth nightly (Patient taking differently: Take 15 mg by mouth nightly ) 30 tablet 5    melatonin (RA MELATONIN) 3 MG TABS tablet Take 1 tablet by mouth nightly as needed (sleep) (Patient taking differently: Take 10 mg by mouth nightly as needed (sleep) ) 30 tablet 0    azelastine (OPTIVAR) 0.05 % ophthalmic solution Place 1 drop into both eyes 3 times daily       polyethylene glycol (GLYCOLAX) 17 GM/SCOOP powder Take 17 g by mouth daily      polyvinyl alcohol (LIQUIFILM TEARS) 1.4 % ophthalmic solution Place 1 drop into both eyes every 3 hours as needed      aspirin 325 MG EC tablet Take 1 tablet by mouth daily 30 tablet 3    hydrocortisone 2.5 % cream Apply topically 2 times daily. 3.5 g 3     MG capsule TAKE 1 CAPSULE BY MOUTH 2 TIMES DAILY AS NEEDED FOR CONSTIPATION 60 capsule 3    atorvastatin (LIPITOR) 80 MG tablet Take 1 tablet by mouth nightly 90 tablet 1    ranitidine (ZANTAC) 150 MG tablet Take 1 tablet by mouth 2 times daily as needed for Heartburn 60 tablet 1    Handicap Placard MISC by Does not apply route effective November 25,2019 through November 24,2020 1 each 0    Multiple Vitamin (MULTI-VITAMIN) TABS Take 1 tablet by mouth daily 90 tablet 2     No current facility-administered medications for this visit. Vitals:    03/14/22 1349   BP: 132/80   Pulse: 80         Wt 192    Review of Systems   Constitutional: Negative for activity change and fatigue. Respiratory: Negative for apnea, cough, choking,  and shortness of breath. isolate episode of chest discomfort. Cardiovascular: Negative for chest pain, palpitations and leg swelling. No PND or orthopnea. No tachycardia. Gastrointestinal: Negative for abdominal distention. Musculoskeletal: Negative for myalgias. Neurological: Negative for dizziness, syncope and light-headedness. Psychiatric/Behavioral: Negative for agitation, behavioral problems and confusion. All other systems reviewed negative as done. Objective:   Physical Exam   Constitutional: He is oriented to person, place, and time. He appears well-developed and well-nourished. No distress. HENT:   Head: Normocephalic and atraumatic. Eyes: Conjunctivae and EOM are normal. Right eye exhibits no discharge. Left eye exhibits no discharge. Neck: Normal range of motion. Neck supple. No JVD present. Cardiovascular: Normal rate, regular rhythm, S1 normal and S2 normal.  Exam reveals no gallop. Murmur heard. Systolic murmur is present with a grade of 2/6   Pulses:       Radial pulses are 2+ on the right side, and 2+ on the left side. Pulmonary/Chest: Effort normal and breath sounds normal. No respiratory distress. He has no wheezes. He has no rales. Abdominal: Soft. Bowel sounds are normal. There is no tenderness. Musculoskeletal: Normal range of motion. He exhibits Tr edema. Neurological: He is alert and oriented to person, place, and time. Skin: Skin is warm and dry. Psychiatric: He has a normal mood and affect. His behavior is normal. Thought content normal.       Assessment:       Diagnosis Orders   1. Chronic systolic congestive heart failure ([address]     2. Ischemic cardiomyopathy     3. CAD in native artery     4. Hx of CABG     5. Bradycardia     6. Nonrheumatic aortic valve stenosis               Plan:      CV stable. Feeling good. Breathing good. Remains active. BP is good. HR good. On coreg of 12.5  mg bid. Continue Entresto daily. Volume status good/stable. No angina. No exertional sx. Wt stable on demadex. Continue demadex. Recent visit with Dr Lisa Darling following aorta. Clinically he is much better. He is compensated. Having no angina Reviewed previous records and testing including cath 12/15 ,echo 1/21, myoview 3/19. Follow up 3 months.

## 2022-08-01 ENCOUNTER — OFFICE VISIT (OUTPATIENT)
Dept: CARDIOLOGY CLINIC | Age: 87
End: 2022-08-01
Payer: MEDICARE

## 2022-08-01 VITALS
HEART RATE: 56 BPM | OXYGEN SATURATION: 98 % | WEIGHT: 189.8 LBS | DIASTOLIC BLOOD PRESSURE: 60 MMHG | SYSTOLIC BLOOD PRESSURE: 110 MMHG | BODY MASS INDEX: 30.63 KG/M2

## 2022-08-01 DIAGNOSIS — I50.22 CHRONIC SYSTOLIC CONGESTIVE HEART FAILURE (HCC): Primary | ICD-10-CM

## 2022-08-01 DIAGNOSIS — I35.0 NONRHEUMATIC AORTIC VALVE STENOSIS: ICD-10-CM

## 2022-08-01 DIAGNOSIS — I25.10 CAD IN NATIVE ARTERY: ICD-10-CM

## 2022-08-01 DIAGNOSIS — Z95.1 HX OF CABG: ICD-10-CM

## 2022-08-01 DIAGNOSIS — R00.1 BRADYCARDIA: ICD-10-CM

## 2022-08-01 DIAGNOSIS — I25.5 ISCHEMIC CARDIOMYOPATHY: ICD-10-CM

## 2022-08-01 PROCEDURE — 1123F ACP DISCUSS/DSCN MKR DOCD: CPT | Performed by: INTERNAL MEDICINE

## 2022-08-01 PROCEDURE — 99214 OFFICE O/P EST MOD 30 MIN: CPT | Performed by: INTERNAL MEDICINE

## 2022-08-01 NOTE — PROGRESS NOTES
Subjective:      Patient ID: Shahzad Marroquin is a 80 y.o. male. HPI: Hemalathatyler Castellanos ia  being seen today in follow up for CAD/PTCA/AS/cardiomyopathy and abn myoview. Breathing good. Active. Bernarda Edinsonyuli No chest pain. HR good. No chest pain. Walks daily. Swimming 1x per week. No exertional chest pain. Edema stable. Wt stable. No syncope. No tachycardia. No pnd. Past Medical History:   Diagnosis Date    Arthritis     Ascending aortic aneurysm (HCC)     CAD (coronary artery disease)     Chronic kidney disease     Constipation     DDD (degenerative disc disease), lumbar     L5, S1    Dental disease     Hearing loss     Hyperlipidemia     Hypertension     Mild aortic stenosis     Prostate enlargement     TIA (transient ischemic attack)      Past Surgical History:   Procedure Laterality Date    CORONARY ANGIOPLASTY WITH STENT PLACEMENT  1/12/98    EYE SURGERY      SKIN TAG REMOVAL  2/2/10    TURP           No Known Allergies     Social History     Socioeconomic History    Marital status:      Spouse name: Not on file    Number of children: Not on file    Years of education: Not on file    Highest education level: Not on file   Occupational History    Not on file   Tobacco Use    Smoking status: Never    Smokeless tobacco: Never   Substance and Sexual Activity    Alcohol use: Yes     Alcohol/week: 0.0 standard drinks    Drug use: No    Sexual activity: Not on file   Other Topics Concern    Not on file   Social History Narrative    Not on file     Social Determinants of Health     Financial Resource Strain: Not on file   Food Insecurity: Not on file   Transportation Needs: Not on file   Physical Activity: Not on file   Stress: Not on file   Social Connections: Not on file   Intimate Partner Violence: Not on file   Housing Stability: Not on file        FH reviewed , denies FH cardiac issues.  .           Current Outpatient Medications   Medication Sig Dispense Refill    methocarbamol (ROBAXIN) 500 MG tablet Take 500 mg by mouth 4 times daily       potassium chloride (KLOR-CON) 20 MEQ packet Take 20 mEq by mouth 2 times daily      torsemide (DEMADEX) 20 MG tablet Take 2 tablets by mouth 2 times daily 60 tablet 3    sacubitril-valsartan (ENTRESTO) 24-26 MG per tablet Take 1 tablet by mouth 2 times daily 60 tablet 3    acetaminophen (TYLENOL) 500 MG tablet Take 500 mg by mouth every 6 hours as needed for Pain      tamsulosin (FLOMAX) 0.4 MG capsule Take 0.4 mg by mouth daily      LORazepam (ATIVAN) 0.5 MG tablet Take 0.5 mg by mouth every 6 hours as needed for Anxiety. nitroGLYCERIN (NITROSTAT) 0.4 MG SL tablet PLACE 1 TABLET UNDER THE TONGUE EVERY 5 MINUTES IF NEEDED FOR CHEST PAIN UP TO MAXIMUM OF 3 TOTAL DOSES. IF NO RELIEF AFTER 1 DOSE CALL 911 25 tablet 3    ondansetron (ZOFRAN ODT) 4 MG disintegrating tablet Take 1 tablet by mouth every 8 hours as needed for Nausea 20 tablet 0    mirtazapine (REMERON) 7.5 MG tablet Take 1 tablet by mouth nightly (Patient taking differently: Take 15 mg by mouth nightly) 30 tablet 5    melatonin (RA MELATONIN) 3 MG TABS tablet Take 1 tablet by mouth nightly as needed (sleep) (Patient taking differently: Take 10 mg by mouth nightly as needed (sleep)) 30 tablet 0    azelastine (OPTIVAR) 0.05 % ophthalmic solution Place 1 drop into both eyes 3 times daily       polyethylene glycol (GLYCOLAX) 17 GM/SCOOP powder Take 17 g by mouth daily      polyvinyl alcohol (LIQUIFILM TEARS) 1.4 % ophthalmic solution Place 1 drop into both eyes every 3 hours as needed      aspirin 325 MG EC tablet Take 1 tablet by mouth daily 30 tablet 3    hydrocortisone 2.5 % cream Apply topically 2 times daily.  3.5 g 3    atorvastatin (LIPITOR) 80 MG tablet Take 1 tablet by mouth nightly 90 tablet 1    ranitidine (ZANTAC) 150 MG tablet Take 1 tablet by mouth 2 times daily as needed for Heartburn 60 tablet 1    Handicap Placard MISC by Does not apply route effective November 25,2019 through November 24,2020 1 each 0 Multiple Vitamin (MULTI-VITAMIN) TABS Take 1 tablet by mouth daily 90 tablet 2    lidocaine (LMX) 4 % cream Apply topically as needed for Pain Apply topically as needed. diclofenac sodium (VOLTAREN) 1 % GEL APPLY 4 G TOPICALLY 4 TIMES DAILY AS NEEDED FOR PAIN 400 g 3    Multiple Vitamins-Minerals (THERAPEUTIC MULTIVITAMIN-MINERALS) tablet Take 1 tablet by mouth daily      buPROPion (WELLBUTRIN XL) 150 MG extended release tablet Take 1 tablet by mouth 2 times daily 180 tablet 1     MG capsule TAKE 1 CAPSULE BY MOUTH 2 TIMES DAILY AS NEEDED FOR CONSTIPATION 60 capsule 3     No current facility-administered medications for this visit. Vitals:    08/01/22 1239   BP: 110/60   Pulse: 56   SpO2: 98%         Wt 192    Review of Systems   Constitutional: Negative for activity change and fatigue. Respiratory: Negative for apnea, cough, choking,  and shortness of breath. isolate episode of chest discomfort. Cardiovascular: Negative for chest pain, palpitations and leg swelling. No PND or orthopnea. No tachycardia. Gastrointestinal: Negative for abdominal distention. Musculoskeletal: Negative for myalgias. Neurological: Negative for dizziness, syncope and light-headedness. Psychiatric/Behavioral: Negative for agitation, behavioral problems and confusion. All other systems reviewed negative as done. Objective:   Physical Exam   Constitutional: He is oriented to person, place, and time. He appears well-developed and well-nourished. No distress. HENT:   Head: Normocephalic and atraumatic. Eyes: Conjunctivae and EOM are normal. Right eye exhibits no discharge. Left eye exhibits no discharge. Neck: Normal range of motion. Neck supple. No JVD present. Cardiovascular: Normal rate, regular rhythm, S1 normal and S2 normal.  Exam reveals no gallop. Murmur heard.    Systolic murmur is present with a grade of 2/6   Pulses:       Radial pulses are 2+ on the right side, and 2+ on the left side.   Pulmonary/Chest: Effort normal and breath sounds normal. No respiratory distress. He has no wheezes. He has no rales. Abdominal: Soft. Bowel sounds are normal. There is no tenderness. Musculoskeletal: Normal range of motion. He exhibits Tr edema. Neurological: He is alert and oriented to person, place, and time. Skin: Skin is warm and dry. Psychiatric: He has a normal mood and affect. His behavior is normal. Thought content normal.       Assessment:         Diagnosis Orders   1. Chronic systolic congestive heart failure ([address]        2. Ischemic cardiomyopathy        3. CAD in native artery        4. Hx of CABG        5. Bradycardia        6. Nonrheumatic aortic valve stenosis                  Plan:      CV stable. Feeling good. Breathing good. Remains active. BP is good. HR good. On coreg of 12.5  mg bid. Continue Entresto daily. Volume status good/stable. No angina. No exertional sx. Wt stable on demadex. Continue demadex. Recent visit with Dr Raúl Ewing following aorta. Clinically he is much better. He is compensated. Having no angina Reviewed previous records and testing including cath 12/15 ,echo 1/21, myoview 3/19. Follow up 3 months.

## 2022-08-15 PROBLEM — I25.10 CORONARY ATHEROSCLEROSIS: Status: ACTIVE | Noted: 2022-08-15

## 2022-08-15 PROBLEM — E78.5 ELEVATED LIPIDS: Status: ACTIVE | Noted: 2022-08-15

## 2022-08-15 PROBLEM — Z90.79 S/P TURP: Status: ACTIVE | Noted: 2022-08-15

## 2022-08-15 RX ORDER — ONDANSETRON 4 MG/1
TABLET, FILM COATED ORAL
COMMUNITY
Start: 2022-07-26

## 2022-08-15 RX ORDER — CIPROFLOXACIN HYDROCHLORIDE 3.5 MG/ML
SOLUTION/ DROPS TOPICAL
COMMUNITY
Start: 2022-07-19

## 2022-08-16 ENCOUNTER — HOSPITAL ENCOUNTER (OUTPATIENT)
Dept: CT IMAGING | Age: 87
Discharge: HOME | End: 2022-08-16
Payer: MEDICARE

## 2022-08-16 DIAGNOSIS — I71.40 ABDOMINAL AORTIC ANEURYSM (AAA) WITHOUT RUPTURE: ICD-10-CM

## 2022-08-16 PROCEDURE — 74176 CT ABD & PELVIS W/O CONTRAST: CPT

## 2022-08-17 ENCOUNTER — TELEPHONE (OUTPATIENT)
Dept: VASCULAR SURGERY | Age: 87
End: 2022-08-17

## 2022-08-17 NOTE — TELEPHONE ENCOUNTER
Informed dtr of results. Appt canceled. Reminder set to call re 6 mo FU CT. Dtr verbalized understanding.

## 2022-08-17 NOTE — TELEPHONE ENCOUNTER
----- Message from Anam Segura MD sent at 8/17/2022 11:52 AM EDT -----  You can call his dgtr and let her know there is no change or growth in the AAA. We can continue to monitor for now, with CT scan every 6 months. They do not have to come into the office, if they don't want. If they have questions, and he still wan  ts to keep appt to discuss in person, that is perfectly fine, too.

## 2023-01-30 DIAGNOSIS — I71.40 ABDOMINAL AORTIC ANEURYSM (AAA) GREATER THAN 5.5 CM IN DIAMETER IN MALE: Primary | ICD-10-CM

## 2023-02-16 ENCOUNTER — HOSPITAL ENCOUNTER (OUTPATIENT)
Dept: CT IMAGING | Age: 88
Discharge: HOME | End: 2023-02-16
Payer: MEDICARE

## 2023-02-16 DIAGNOSIS — I71.40 ABDOMINAL AORTIC ANEURYSM (AAA) GREATER THAN 5.5 CM IN DIAMETER IN MALE: ICD-10-CM

## 2023-02-16 PROCEDURE — 74176 CT ABD & PELVIS W/O CONTRAST: CPT

## 2023-02-16 NOTE — PLAN OF CARE
Problem: Falls - Risk of:  Goal: Will remain free from falls  Description: Will remain free from falls  Outcome: Ongoing   Pt ambulating in hallway without assistance, gait steady.  Remains free from falls Trilobed Flap Text: The defect edges were debeveled with a #15 scalpel blade.  Given the location of the defect and the proximity to free margins a trilobed flap was deemed most appropriate.  Using a sterile surgical marker, an appropriate trilobed flap drawn around the defect.    The area thus outlined was incised deep to adipose tissue with a #15 scalpel blade.  The skin margins were undermined to an appropriate distance in all directions utilizing iris scissors.

## 2023-02-20 RX ORDER — MIRTAZAPINE 15 MG/1
TABLET, FILM COATED ORAL
COMMUNITY
Start: 2023-02-02

## 2023-02-20 RX ORDER — ALLOPURINOL 100 MG/1
TABLET ORAL
COMMUNITY
Start: 2023-01-21

## 2023-02-21 ENCOUNTER — TELEPHONE (OUTPATIENT)
Dept: CARDIOLOGY CLINIC | Age: 88
End: 2023-02-21

## 2023-02-21 ENCOUNTER — TELEPHONE (OUTPATIENT)
Dept: VASCULAR SURGERY | Age: 88
End: 2023-02-21

## 2023-02-21 ENCOUNTER — OFFICE VISIT (OUTPATIENT)
Dept: VASCULAR SURGERY | Age: 88
End: 2023-02-21
Payer: MEDICARE

## 2023-02-21 VITALS — WEIGHT: 187 LBS | BODY MASS INDEX: 30.05 KG/M2 | HEIGHT: 66 IN

## 2023-02-21 DIAGNOSIS — I71.40 ABDOMINAL AORTIC ANEURYSM (AAA) WITHOUT RUPTURE, UNSPECIFIED PART: Primary | ICD-10-CM

## 2023-02-21 DIAGNOSIS — I50.42 CHRONIC COMBINED SYSTOLIC (CONGESTIVE) AND DIASTOLIC (CONGESTIVE) HEART FAILURE (HCC): ICD-10-CM

## 2023-02-21 DIAGNOSIS — I50.9 CHRONIC CONGESTIVE HEART FAILURE, UNSPECIFIED HEART FAILURE TYPE (HCC): Primary | ICD-10-CM

## 2023-02-21 DIAGNOSIS — I35.9 AORTIC VALVE DISEASE: ICD-10-CM

## 2023-02-21 PROCEDURE — 1123F ACP DISCUSS/DSCN MKR DOCD: CPT | Performed by: SURGERY

## 2023-02-21 PROCEDURE — G8427 DOCREV CUR MEDS BY ELIG CLIN: HCPCS | Performed by: SURGERY

## 2023-02-21 PROCEDURE — G8484 FLU IMMUNIZE NO ADMIN: HCPCS | Performed by: SURGERY

## 2023-02-21 PROCEDURE — G8417 CALC BMI ABV UP PARAM F/U: HCPCS | Performed by: SURGERY

## 2023-02-21 PROCEDURE — 1036F TOBACCO NON-USER: CPT | Performed by: SURGERY

## 2023-02-21 PROCEDURE — 99215 OFFICE O/P EST HI 40 MIN: CPT | Performed by: SURGERY

## 2023-02-21 NOTE — PROGRESS NOTES
[facility]) Vascular Surgery--Max Paul, [address] [facility], [facility],  [address]    Follow-up    Referring Provider:  Kirti Batista MD     Patient Name: Zoila Ram  YOB: 1932  Date: 02/21/23    History:  Zoila Ram is a 80 y.o. male being managed and followed for AAA. He presents today in follow-up of recent CT abdomen pelvis which demonstrated increased size of AAA to 6.3 cm. Last measurement was 5.1 cm 1 year ago. Unfortunately, he also has a significant history of aortic stenosis with last echo 2021 which showed aortic valve velocity greater than 400 cm/s.  EF at that time was 30% with grade 2 diastolic dysfunction. PA pressure 37mmHg. Symptoms: Overall, feels well. Continues to swim 4 laps twice a week at his pool. Denies chest pain, shortness of breath or dyspnea on exertion. He has some concern about the AAA and is eager to have it repaired. He is accompanied today by his daughter and all information was exchanged with a certified [facility]  present. I personally reviewed images of the following study:  CT ABDOMEN PELVIS WO CONTRAST 2/16/2023  Impression       No renal or ureteral calculi are identified. Abdominal aortic aneurysm measuring up to 6.3 cm is increased slightly from prior study when it measured up to 5.5 cm. Cholelithiasis. Small right pleural effusion. Pericardial effusion. Vital Signs  Ht 5' 6\" (1.676 m)   Wt 187 lb (84.8 kg)   BMI 30.18 kg/m²     Physical Examination  General:  alert and oriented to person, place and time, well-developed and well-nourished, in no acute distress. He appears in his usual state of health  Heart: Loud ejection murmur RUSB  Lungs:  CTA B no w/r/r  Abdomen: soft, NT/ND. Skin/integumentary: Skin color, texture, turgor normal. No rashes or lesions. Impression:  1. Enlarging asymptomatic 6.3 cm AAA--patient and family desire repair   2.   Cardiomyopathy   --severe aortic stenosis on last echo 2021   -- Symptoms otherwise stable  3. Chronic renal insufficiency   --stable    Plan: We will go ahead and schedule repeat 2D Echo to evaluate current cardiac function; however, pt remains physically active without symptoms. I have asked pt to see Dr. Adam Rodriguez next week for eval and optimization of cardiac function prior to EVAR. Tentatively plan to have pt see Dr. Carlos Gibson following EVAR for TAVR evaluation.       Total encounter time:  60 min with > 50% spent with face-to-face counseling and coordination of care, including: Discussion, counseling, coordination of care

## 2023-02-21 NOTE — TELEPHONE ENCOUNTER
Dr. Carl Velazco would like a phone call from [address] [place] [address] when he has time to discuss this pt for pos. TAVR pt.  Please call Dr. Power Foil [phone]

## 2023-02-21 NOTE — TELEPHONE ENCOUNTER
I spoke with dtr and informed her of the ECHO scheduled 3/14 @9a at [facility]. Also the EVAR is scheduled 3/22 @830a arriving @630a at [facility]. Dtr verbalized understanding.

## 2023-02-21 NOTE — TELEPHONE ENCOUNTER
----- Message from Rose Neil MD sent at 2/21/2023 12:38 PM EST -----  Please call pt and schedule for repeat Echo, since last one in 2021, and want to make sure no big changes in heart function. Still see Dr. Alejandra Larsen next week as scheduled. I have placed referral to Dr. Ethan Alanis the heart failure specialist and he will see him following AAA repair.

## 2023-02-23 NOTE — PROGRESS NOTES
[address] [facility]     Outpatient Cardiology         Patient Name:  Susan Moncada  Requesting Physician: No admitting provider for patient encounter. Primary Care Physician: Nolan Roland MD    Reason for Consultation/Chief Complaint:   Chief Complaint   Patient presents with    Congestive Heart Failure    Pre-op Exam     Aortic aneurysm         History of Present Illness:    HPI     Chandan Harrison a 80 y.o. male with PMH of CAD-CABG in 1998, bradycardia, HFrEF, AAA, HLD, HTN, severe aortic stenosis, TIA, CKD. Here for CV risk assessment for EVAR. Referred by Dr. Joanna Guy. He seems to be doing okay from a cardiovascular perspective. He does have aortic stenosis. I discussed the case with Dr. Justyna Bejarano, who at the same time discussed the case with our TAVR team, he was decided to proceed with endovascular repair of his AAA prior to taking care of his aortic valve. His blood pressure is well controlled and he currently takes Entresto and torsemide for his chronic systolic heart failure. After his endovascular repair I would like to adjust further his medications and ideally start him on Jardiance as well      PMH  Past Medical History:   Diagnosis Date    Arthritis     Ascending aortic aneurysm     CAD (coronary artery disease)     Chronic kidney disease     Constipation     DDD (degenerative disc disease), lumbar     L5, S1    Dental disease     Hearing loss     Hyperlipidemia     Hypertension     Mild aortic stenosis     Prostate enlargement     TIA (transient ischemic attack)        PSH  Past Surgical History:   Procedure Laterality Date    CORONARY ANGIOPLASTY WITH STENT PLACEMENT  1/12/98    EYE SURGERY      SKIN TAG REMOVAL  2/2/10    TURP          Social HIstory  Social History     Tobacco Use    Smoking status: Never    Smokeless tobacco: Never   Substance Use Topics    Alcohol use:  Yes     Alcohol/week: 0.0 standard drinks    Drug use: No       Family History  Family History Problem Relation Age of Onset    Hypertension Mother     Cancer Mother     Stroke Father        Allergies   No Known Allergies    Medications:     Home Medications:  Were reviewed and are listed in nursing record. and/or listed below    Prior to Admission medications    Medication Sig Start Date End Date Taking? Authorizing Provider   allopurinol (ZYLOPRIM) 100 MG tablet  1/21/23  Yes Historical Provider, MD   mirtazapine (REMERON) 15 MG tablet  2/2/23  Yes Historical Provider, MD   ondansetron (ZOFRAN) 4 MG tablet  7/26/22  Yes Historical Provider, MD   methocarbamol (ROBAXIN) 500 MG tablet Take 500 mg by mouth 4 times daily    Yes Historical Provider, MD   potassium chloride (KLOR-CON) 20 MEQ packet Take 20 mEq by mouth 2 times daily   Yes Historical Provider, MD   torsemide (DEMADEX) 20 MG tablet Take 2 tablets by mouth 2 times daily 3/1/21  Yes DEBORAH Avila CNP   diclofenac sodium (VOLTAREN) 1 % GEL APPLY 4 G TOPICALLY 4 TIMES DAILY AS NEEDED FOR PAIN 2/26/21  Yes Matilda Hammans, MD   sacubitril-valsartan (ENTRESTO) 24-26 MG per tablet Take 1 tablet by mouth 2 times daily 2/26/21  Yes DEBORAH Avila CNP   acetaminophen (TYLENOL) 500 MG tablet Take 500 mg by mouth every 6 hours as needed for Pain   Yes Historical Provider, MD   Multiple Vitamins-Minerals (THERAPEUTIC MULTIVITAMIN-MINERALS) tablet Take 1 tablet by mouth daily   Yes Historical Provider, MD   tamsulosin (FLOMAX) 0.4 MG capsule Take 0.4 mg by mouth daily   Yes Historical Provider, MD   LORazepam (ATIVAN) 0.5 MG tablet Take 0.5 mg by mouth every 6 hours as needed for Anxiety. Yes Historical Provider, MD   nitroGLYCERIN (NITROSTAT) 0.4 MG SL tablet PLACE 1 TABLET UNDER THE TONGUE EVERY 5 MINUTES IF NEEDED FOR CHEST PAIN UP TO MAXIMUM OF 3 TOTAL DOSES.  IF NO RELIEF AFTER 1 DOSE CALL 911 7/30/20  Yes Matilda Hammans, MD   ondansetron (ZOFRAN ODT) 4 MG disintegrating tablet Take 1 tablet by mouth every 8 hours as needed for Nausea 7/13/20  Yes Leland Nixon MD   mirtazapine (REMERON) 7.5 MG tablet Take 1 tablet by mouth nightly  Patient taking differently: Take 15 mg by mouth nightly 7/13/20  Yes Filippo Pfeiffer MD   melatonin (RA MELATONIN) 3 MG TABS tablet Take 1 tablet by mouth nightly as needed (sleep)  Patient taking differently: Take 10 mg by mouth nightly as needed (sleep) 6/22/20  Yes Augustina Gustafson MD   azelastine (OPTIVAR) 0.05 % ophthalmic solution Place 1 drop into both eyes 3 times daily    Yes Historical Provider, MD   polyethylene glycol (GLYCOLAX) 17 GM/SCOOP powder Take 17 g by mouth daily   Yes Historical Provider, MD   aspirin 325 MG EC tablet Take 1 tablet by mouth daily 6/16/20  Yes Jose R Mata MD   hydrocortisone 2.5 % cream Apply topically 2 times daily. 6/16/20  Yes Jose R Mata MD    MG capsule TAKE 1 CAPSULE BY MOUTH 2 TIMES DAILY AS NEEDED FOR CONSTIPATION 4/30/20  Yes Ten Garza MD   atorvastatin (LIPITOR) 80 MG tablet Take 1 tablet by mouth nightly 2/17/20  Yes Angela Quesada MD   Handicap Placard MISC by Does not apply route effective November 25,2019 through November 24,2020 11/25/19  Yes Carla Ramos MD   Multiple Vitamin (MULTI-VITAMIN) TABS Take 1 tablet by mouth daily 7/26/19  Yes Carla Ramos MD   ciprofloxacin (CILOXAN) 0.3 % ophthalmic solution  7/19/22   Historical Provider, MD   lidocaine (LMX) 4 % cream Apply topically as needed for Pain Apply topically as needed.     Historical Provider, MD   buPROPion (WELLBUTRIN XL) 150 MG extended release tablet Take 1 tablet by mouth 2 times daily  Patient not taking: Reported on 3/1/2023 7/30/20   Jamia Redman MD   polyvinyl alcohol (LIQUIFILM TEARS) 1.4 % ophthalmic solution Place 1 drop into both eyes every 3 hours as needed  Patient not taking: Reported on 3/1/2023    Historical Provider, MD   ranitidine (ZANTAC) 150 MG tablet Take 1 tablet by mouth 2 times daily as needed for Heartburn  Patient not taking: Reported on 3/1/2023 1/14/20 Eligio Sánchez MD        Review of Systems   Constitutional:  Negative for activity change, appetite change, diaphoresis, fatigue, fever and unexpected weight change. HENT:  Negative for congestion, facial swelling, mouth sores and nosebleeds. Eyes:  Negative for discharge and visual disturbance. Respiratory:  Negative for cough, chest tightness, shortness of breath and wheezing. Cardiovascular:  Negative for chest pain, palpitations and leg swelling. Gastrointestinal:  Negative for abdominal distention, abdominal pain, blood in stool and vomiting. Endocrine: Negative for cold intolerance, heat intolerance and polyuria. Genitourinary:  Negative for difficulty urinating, dysuria, frequency and hematuria. Musculoskeletal:  Negative for back pain, joint swelling, myalgias and neck pain. Skin:  Negative for color change, pallor and rash. Allergic/Immunologic: Negative for immunocompromised state. Neurological:  Negative for dizziness, syncope, weakness, light-headedness, numbness and headaches. Hematological:  Negative for adenopathy. Does not bruise/bleed easily. Psychiatric/Behavioral:  Negative for behavioral problems, confusion, decreased concentration and suicidal ideas. The patient is not nervous/anxious. Vitals:    03/01/23 1111   BP: 110/70   Pulse: 56    Weight: 186 lb 9.6 oz (84.6 kg)       Vitals:    03/01/23 1111   BP: 110/70   Site: Left Upper Arm   Position: Sitting   Cuff Size: Medium Adult   Pulse: 56   Weight: 186 lb 9.6 oz (84.6 kg)       BP Readings from Last 3 Encounters:   03/01/23 110/70   08/01/22 110/60   03/14/22 132/80       Wt Readings from Last 3 Encounters:   03/01/23 186 lb 9.6 oz (84.6 kg)   02/21/23 187 lb (84.8 kg)   08/01/22 189 lb 12.8 oz (86.1 kg)       Physical Exam  Constitutional:       General: He is not in acute distress. Appearance: He is well-developed. He is not diaphoretic. HENT:      Head: Normocephalic and atraumatic.    Eyes: Pupils: Pupils are equal, round, and reactive to light. Neck:      Thyroid: No thyromegaly. Vascular: No JVD. Cardiovascular:      Rate and Rhythm: Normal rate and regular rhythm. Chest Wall: PMI is not displaced. Heart sounds: S1 normal and S2 normal. Murmur heard. No friction rub. No gallop. Pulmonary:      Effort: Pulmonary effort is normal. No respiratory distress. Breath sounds: Normal breath sounds. No stridor. No wheezing or rales. Chest:      Chest wall: No tenderness. Abdominal:      General: Bowel sounds are normal. There is no distension. Palpations: Abdomen is soft. Tenderness: There is no abdominal tenderness. There is no guarding or rebound. Musculoskeletal:         General: No tenderness. Normal range of motion. Cervical back: Normal range of motion. Lymphadenopathy:      Cervical: No cervical adenopathy. Skin:     General: Skin is warm and dry. Findings: No erythema or rash. Neurological:      Mental Status: He is alert and oriented to person, place, and time. Coordination: Coordination normal.   Psychiatric:         Behavior: Behavior normal.         Thought Content:  Thought content normal.         Judgment: Judgment normal.       Labs:       Lab Results   Component Value Date    WBC 4.3 11/07/2020    HGB 10.1 (L) 11/07/2020    HCT 30.1 (L) 11/07/2020    .3 (H) 11/07/2020     (L) 11/07/2020     Lab Results   Component Value Date     02/26/2021    K 3.9 02/26/2021     02/26/2021    CO2 25 02/26/2021    BUN 35 (H) 02/26/2021    CREATININE 1.7 (H) 02/26/2021    GLUCOSE 92 02/26/2021    CALCIUM 8.9 02/26/2021    PROT 6.9 06/30/2020    LABALBU 3.8 11/09/2020    BILITOT 0.7 06/30/2020    ALKPHOS 80 06/30/2020    AST 14 (L) 06/30/2020    ALT 11 06/30/2020    LABGLOM 38 (A) 02/26/2021    GFRAA 46 (A) 02/26/2021    AGRATIO 1.4 06/30/2020    GLOB 2.9 06/30/2020         Lab Results   Component Value Date    CHOL 133 06/30/2020    CHOL 114 10/17/2019    CHOL 100 04/30/2019     Lab Results   Component Value Date    TRIG 84 06/30/2020    TRIG 66 10/17/2019    TRIG 39 04/30/2019     Lab Results   Component Value Date    HDL 47 06/30/2020    HDL 48 06/11/2020    HDL 39 (L) 10/17/2019     Lab Results   Component Value Date    LDLCALC 69 06/30/2020    LDLCALC 70 06/11/2020    LDLCALC 62 10/17/2019     Lab Results   Component Value Date    LABVLDL 17 06/30/2020    LABVLDL 15 06/11/2020    LABVLDL 13 10/17/2019     No results found for: [facility]    Lab Results   Component Value Date    INR 1.34 (H) 11/05/2020    INR 1.50 (H) 06/20/2020    INR 1.60 (H) 10/16/2019    PROTIME 15.6 (H) 11/05/2020    PROTIME 17.5 (H) 06/20/2020    PROTIME 18.2 (H) 10/16/2019       The ASCVD Risk score (Soila DK, et al., 2019) failed to calculate for the following reasons: The 2019 ASCVD risk score is only valid for ages 36 to 78      Imaging:       Last ECG (if available, Personally interpreted):    Last Monitor/Holter (if available): 7 day Holter (6/2019): showed SR with average HR 49 (34-75 bpm). 3 episodes of brief NSAT and one 4 beat run of NSVT. Last Stress (if available): 3/27/19  Summary    There is normal isotope uptake at stress and rest. There is no evidence of    myocardial ischemia or scar. Severely dilated LV with diffuse moderate hypokinesis. Left ventricular ejection fraction of 36 %. Normal TID of 1.0    Study findings are abnormal and most consistent with non-ischemic    cardiomyopathy. Last Cath (if available):    Last TTE/LORI(if available): 1/29/21  Summary   Left ventricular cavity size is dilated. There is mild concentric left   ventricular hypertrophy. Left ventricular function is reduced with ejection   fraction estimated at 30%. There is diffuse hypokinesis, but the   anterolateral wall appears essentially akinetic. The anterior wall is also   essentially akinetic.  Diastolic filling parameters suggest grade II   diastolic dysfunction. Mild-moderate mitral regurgitation is present. The left atrium is dilated. Mild-moderate to severe aortic stenosis with a peak velocity of 4.24m/s and   a mean pressure gradient of 39 mm Hg. Mild to moderate aortic regurgitation. Mild tricuspid regurgitation. Estimated pulmonary artery systolic pressure is at 37 mmHg assuming a right   atrial pressure of 3 mmHg. Last CMR  (if available):    Last Coronary Artery Calcium Score: Ankle-brachial index:    Carotid ultrasound screening:    Abdominal aortic aneurysm screening:       Assessment / Plan:     Elevated lipids  Continue Lipitor. No side effects    Aortic valve disease  Does have severe aortic stenosis. The case was discussed between vascular surgery and our TAVR team, it was decided to proceed with endovascular AAA repair prior to undergoing TAVR  Updated echo pending for this month    Preop cardiovascular exam  Okay to proceed with endovascular AAA repair with the help of cardiac anesthesia    CHF (congestive heart failure), NYHA class I, acute on chronic, combined ([address]  Compensated. Continue Entresto and torsemide. We will further adjust medications as an outpatient after surgery    Follow up in 3 months. I had the opportunity to review the clinical symptoms and presentation of Sada Pierce. Patient's allergies and medications were reviewed and updated. Patient's past medical, surgical, social and family history were reviewed and updated. Patient's testing including laboratory, ECGs, monitor, imaging (TTE,LORI,CMR,cath) were reviewed. All questions and concerns were addressed to the patient/family. Alternatives to my treatment were discussed. The note was completed using EMR. Every effort wasmade to ensure accuracy; however, inadvertent computerized transcription errors may be present. Thank you for allowing me to participate in thecare or [address].  Smitty Hodgkins, MD, [facility], [address]

## 2023-03-01 ENCOUNTER — OFFICE VISIT (OUTPATIENT)
Dept: CARDIOLOGY CLINIC | Age: 88
End: 2023-03-01
Payer: MEDICARE

## 2023-03-01 VITALS
WEIGHT: 186.6 LBS | BODY MASS INDEX: 30.12 KG/M2 | HEART RATE: 56 BPM | SYSTOLIC BLOOD PRESSURE: 110 MMHG | DIASTOLIC BLOOD PRESSURE: 70 MMHG

## 2023-03-01 DIAGNOSIS — I50.43 CHF (CONGESTIVE HEART FAILURE), NYHA CLASS I, ACUTE ON CHRONIC, COMBINED (HCC): ICD-10-CM

## 2023-03-01 DIAGNOSIS — I25.9 ISCHEMIC HEART DISEASE: ICD-10-CM

## 2023-03-01 DIAGNOSIS — I35.9 AORTIC VALVE DISEASE: Primary | ICD-10-CM

## 2023-03-01 DIAGNOSIS — R01.1 MURMUR, HEART: ICD-10-CM

## 2023-03-01 DIAGNOSIS — E78.00 HYPERCHOLESTEROLEMIA: ICD-10-CM

## 2023-03-01 DIAGNOSIS — Z01.810 PREOP CARDIOVASCULAR EXAM: ICD-10-CM

## 2023-03-01 DIAGNOSIS — E78.5 ELEVATED LIPIDS: ICD-10-CM

## 2023-03-01 PROCEDURE — 93000 ELECTROCARDIOGRAM COMPLETE: CPT | Performed by: INTERNAL MEDICINE

## 2023-03-01 PROCEDURE — G8417 CALC BMI ABV UP PARAM F/U: HCPCS | Performed by: INTERNAL MEDICINE

## 2023-03-01 PROCEDURE — G8484 FLU IMMUNIZE NO ADMIN: HCPCS | Performed by: INTERNAL MEDICINE

## 2023-03-01 PROCEDURE — 1123F ACP DISCUSS/DSCN MKR DOCD: CPT | Performed by: INTERNAL MEDICINE

## 2023-03-01 PROCEDURE — 99214 OFFICE O/P EST MOD 30 MIN: CPT | Performed by: INTERNAL MEDICINE

## 2023-03-01 PROCEDURE — G8427 DOCREV CUR MEDS BY ELIG CLIN: HCPCS | Performed by: INTERNAL MEDICINE

## 2023-03-01 PROCEDURE — 1036F TOBACCO NON-USER: CPT | Performed by: INTERNAL MEDICINE

## 2023-03-01 ASSESSMENT — ENCOUNTER SYMPTOMS
FACIAL SWELLING: 0
BACK PAIN: 0
WHEEZING: 0
CHEST TIGHTNESS: 0
COUGH: 0
EYE DISCHARGE: 0
VOMITING: 0
BLOOD IN STOOL: 0
SHORTNESS OF BREATH: 0
COLOR CHANGE: 0
ABDOMINAL PAIN: 0
ABDOMINAL DISTENTION: 0

## 2023-03-01 NOTE — ASSESSMENT & PLAN NOTE
Compensated. Continue Entresto and torsemide.   We will further adjust medications as an outpatient after surgery

## 2023-03-01 NOTE — ASSESSMENT & PLAN NOTE
Does have severe aortic stenosis.   The case was discussed between vascular surgery and our TAVR team, it was decided to proceed with endovascular AAA repair prior to undergoing TAVR  Updated echo pending for this month

## 2023-03-03 ENCOUNTER — ANESTHESIA EVENT (OUTPATIENT)
Dept: OPERATING ROOM | Age: 88
End: 2023-03-03
Payer: MEDICARE

## 2023-03-14 ENCOUNTER — HOSPITAL ENCOUNTER (OUTPATIENT)
Dept: NON INVASIVE DIAGNOSTICS | Age: 88
Discharge: HOME | End: 2023-03-14
Payer: MEDICARE

## 2023-03-14 DIAGNOSIS — I50.9 CHRONIC CONGESTIVE HEART FAILURE, UNSPECIFIED HEART FAILURE TYPE (HCC): ICD-10-CM

## 2023-03-14 DIAGNOSIS — I50.42 CHRONIC COMBINED SYSTOLIC (CONGESTIVE) AND DIASTOLIC (CONGESTIVE) HEART FAILURE (HCC): ICD-10-CM

## 2023-03-14 LAB
LV EF: 28 %
LVEF MODALITY: NORMAL

## 2023-03-14 PROCEDURE — C8929 TTE W OR WO FOL WCON,DOPPLER: HCPCS

## 2023-03-14 PROCEDURE — 6360000004 HC RX CONTRAST MEDICATION: Performed by: INTERNAL MEDICINE

## 2023-03-14 RX ADMIN — PERFLUTREN 1.5 ML: 6.52 INJECTION, SUSPENSION INTRAVENOUS at 11:06

## 2023-03-15 ENCOUNTER — TELEPHONE (OUTPATIENT)
Dept: CARDIOLOGY CLINIC | Age: 88
End: 2023-03-15

## 2023-03-15 NOTE — TELEPHONE ENCOUNTER
Patient daughter Blair Washington called regarding her father's test results. She wants a call back at [phone].

## 2023-03-15 NOTE — TELEPHONE ENCOUNTER
Spoke with patient's daughter and reviewed results. EVAR scheduled next week 3/22/23. Follow up appointment made after.

## 2023-03-16 ENCOUNTER — TELEPHONE (OUTPATIENT)
Dept: VASCULAR SURGERY | Age: 88
End: 2023-03-16

## 2023-03-20 NOTE — PROGRESS NOTES
Place patient label inside box (if no patient label, complete below)  Name:  :  MR#:     Julia Dockeryter / PROCEDURE  I (we), Francisca Merlin (Patient Name) authorize DR. CHELE ARIAS (Provider / [facility]) and/or such assistants as may be selected by him/her, to perform the following operation/procedure(s): ENDOVASCULAR ABDOMINAL AORTIC ANEURYSM REPAIR       Note: If unable to obtain consent prior to an emergent procedure, document the emergent reason in the medical record. This procedure has been explained to my (our) satisfaction and included in the explanation was: The intended benefit, nature, and extent of the procedure to be performed; The significant risks involved and the probability of success; Alternative procedures and methods of treatment; The dangers and probable consequences of such alternatives (including no procedure or treatment); The expected consequences of the procedure on my future health; Whether other qualified individuals would be performing important surgical tasks and/or whether  would be present to advise or support the procedure. I (we) understand that there are other risks of infection and other serious complications in the pre-operative/procedural and postoperative/procedural stages of my (our) care. I (we) have asked all of the questions which I (we) thought were important in deciding whether or not to undergo treatment or diagnosis. These questions have been answered to my (our) satisfaction. I (we) understand that no assurance can be given that the procedure will be a success, and no guarantee or warranty of success has been given to me (us). It has been explained to me (us) that during the course of the operation/procedure, unforeseen conditions may be revealed that necessitate extension of the original procedure(s) or different procedure(s) than those set forth in Paragraph 1.  I (we) authorize and ______________________________________________________________________________________________    If a phone consent is obtained, consent will be documented by using two health care professionals, each affirming that the consenting party has no questions and gives consent for the procedure discussed with the physician/provider.   _____________________          ____________________       _____/_____am/pm   2nd witness to phone consent        Printed name           Date / Time    Informed Consent:  I have provided the explanation described above in section 1 to the patient and/or legal representative.  I have provided the patient and/or legal representative with an opportunity to ask any questions about the proposed operation/procedure.   ___________________________          ____________________         ____/____am/pm  Provider / Proceduralist                            Printed name            Date / Time  Revised 8/2/2021                                                                      Page 2 of 2

## 2023-03-20 NOTE — PROGRESS NOTES
[facility] PRE-SURGICAL TESTING INSTRUCTIONS                      PRIOR TO PROCEDURE DATE:    1. PLEASE FOLLOW ANY INSTRUCTIONS GIVEN TO YOU PER YOUR SURGEON. 2. Arrange for someone to drive you home and be with you for the first 24 hours after discharge for your safety after your procedure for which you received sedation. Ensure it is someone we can share information with regarding your discharge. NOTE: At this time ONLY 2 ADULTS may accompany you   One person ENCOURAGED to stay at hospital entire time if outpatient surgery      3. You must contact your surgeon for instructions IF:  You are taking any blood thinners, aspirin, anti-inflammatory or vitamins. There is a change in your physical condition such as a cold, fever, rash, cuts, sores, or any other infection, especially near your surgical site. 4. Do not drink alcohol the day before or day of your procedure. Do not use any recreational marijuana at least 24 hours or street drugs (heroin, cocaine) at minimum 5 days prior to your procedure. 5. A Pre-Surgical History and Physical MUST be completed WITHIN 30 DAYS OR LESS prior to your procedure. by your Physician or an Urgent Care        THE DAY OF YOUR PROCEDURE:  1. Follow instructions for ARRIVAL TIME as DIRECTED BY YOUR SURGEON. 2. Enter the MAIN entrance from [address] and follow the signs to the free Parking [employer] or [employer] (offered free of charge 7 am-5pm). 3. Enter the Main Entrance of the hospital (do not enter from the lower level of the parking garage). Upon entrance, check in with the  at the surgical information desk on your LEFT. Bring your insurance card and photo ID to register      4. DO NOT EAT ANYTHING 8 hours prior to arrival for surgery. You may have up to 8 ounces of water 4 hours prior to your arrival for surgery.    NOTE: ALL Gastric, Bariatric & Bowel surgery patients - you MUST follow your surgeon's instructions regarding eating/ drinking as you will have very specific instructions to follow. If you did not receive these, call your surgeon's office immediately. 5. MEDICATIONS:  Take the following medications with a SMALL sip of water:   Use your usual dose of inhalers the morning of surgery. BRING your rescue inhaler with you to hospital.   Anesthesia does NOT want you to take insulin the morning of surgery. They will control your blood sugar while you are at the hospital. Please contact your ordering physician for instructions regarding your insulin the night before your procedure. If you have an insulin pump, please keep it set on basal rate. Bariatric patient's call your surgeon if on diabetic medications as some may need to be stopped 1 week prior to surgery    6. Do not swallow additional water when brushing teeth. No gum, candy, mints, or ice chips. Refrain from smoking or at least decrease the amount on day of surgery. 7. Morning of surgery:   Take a shower with an antibacterial soap (i.e., Safeguard or Dial) OR your physician may have instructed you to use Hibiclens. Dress in loose, comfortable clothing appropriate for redressing after your procedure. Do not wear jewelry (including body piercings), make-up (especially NO eye make-up), fingernail polish (NO toenail polish if foot/leg surgery), lotion, powders, or metal hairclips. Do not shave or wax for 72 hours prior to procedure near your operative site. Shaving with a razor can irritate your skin and make it easier to develop an infection. On the day of your procedure, any hair that needs to be removed near the surgical site will be 'clipped' by a healthcare worker using a special clipper designed to avoid skin irritation. 8. Dentures, glasses, or contacts will need to be removed before your procedure. Bring cases for your glasses, contacts, dentures, or hearing aids to protect them while you are in surgery.       9. If you use a CPAP, please bring it with

## 2023-03-20 NOTE — PROGRESS NOTES
Dr Pablito Vargas of Anesthesia ordered labs DOS because pt has no recent   labs except for small amount drawn by cardiologist on 3/1/23. /rd  No labs ordered by Dr. Renata Swanson. /rd  Pt states he does not need a [place]  and understands English well; answered all questions with phone interview but he was not sure of meds taken and stated that he knew to bring a list of them DOS. / rd

## 2023-03-22 ENCOUNTER — HOSPITAL ENCOUNTER (INPATIENT)
Age: 88
LOS: 2 days | Discharge: HOME | End: 2023-03-24
Attending: SURGERY | Admitting: SURGERY
Payer: MEDICARE

## 2023-03-22 ENCOUNTER — HOSPITAL ENCOUNTER (OUTPATIENT)
Dept: INTERVENTIONAL RADIOLOGY/VASCULAR | Age: 88
Setting detail: SURGERY ADMIT
Discharge: HOME | End: 2023-03-22
Attending: SURGERY
Payer: MEDICARE

## 2023-03-22 ENCOUNTER — ANESTHESIA (OUTPATIENT)
Dept: OPERATING ROOM | Age: 88
End: 2023-03-22
Payer: MEDICARE

## 2023-03-22 ENCOUNTER — APPOINTMENT (OUTPATIENT)
Dept: GENERAL RADIOLOGY | Age: 88
End: 2023-03-22
Attending: SURGERY
Payer: MEDICARE

## 2023-03-22 DIAGNOSIS — Z86.79 STATUS POST ENDOVASCULAR ANEURYSM REPAIR (EVAR): ICD-10-CM

## 2023-03-22 DIAGNOSIS — Z98.890 STATUS POST ENDOVASCULAR ANEURYSM REPAIR (EVAR): ICD-10-CM

## 2023-03-22 DIAGNOSIS — I50.22 CHRONIC SYSTOLIC HEART FAILURE (HCC): ICD-10-CM

## 2023-03-22 DIAGNOSIS — Z01.810 PREOP CARDIOVASCULAR EXAM: Primary | ICD-10-CM

## 2023-03-22 PROBLEM — I71.40 AAA (ABDOMINAL AORTIC ANEURYSM) WITHOUT RUPTURE (HCC): Status: ACTIVE | Noted: 2023-03-22

## 2023-03-22 LAB
ABO + RH BLD: NORMAL
ALBUMIN SERPL-MCNC: 4.1 G/DL (ref 3.4–5)
ALBUMIN/GLOB SERPL: 1.6 {RATIO} (ref 1.1–2.2)
ALP SERPL-CCNC: 106 U/L (ref 40–129)
ALT SERPL-CCNC: 8 U/L (ref 10–40)
ANION GAP SERPL CALCULATED.3IONS-SCNC: 11 MMOL/L (ref 3–16)
ANION GAP SERPL CALCULATED.3IONS-SCNC: 9 MMOL/L (ref 3–16)
AST SERPL-CCNC: 14 U/L (ref 15–37)
BASE EXCESS BLDA CALC-SCNC: 1.3 MMOL/L (ref -3–3)
BASOPHILS # BLD: 0 K/UL (ref 0–0.2)
BASOPHILS NFR BLD: 0.6 %
BILIRUB SERPL-MCNC: 0.8 MG/DL (ref 0–1)
BLD GP AB SCN SERPL QL: NORMAL
BUN SERPL-MCNC: 49 MG/DL (ref 7–20)
BUN SERPL-MCNC: 52 MG/DL (ref 7–20)
CALCIUM SERPL-MCNC: 8.8 MG/DL (ref 8.3–10.6)
CALCIUM SERPL-MCNC: 9 MG/DL (ref 8.3–10.6)
CHLORIDE SERPL-SCNC: 107 MMOL/L (ref 99–110)
CHLORIDE SERPL-SCNC: 108 MMOL/L (ref 99–110)
CO2 BLDA-SCNC: 29 MMOL/L
CO2 SERPL-SCNC: 27 MMOL/L (ref 21–32)
CO2 SERPL-SCNC: 28 MMOL/L (ref 21–32)
COHGB MFR BLDA: 1 % (ref 0–1.5)
CREAT SERPL-MCNC: 2.4 MG/DL (ref 0.8–1.3)
CREAT SERPL-MCNC: 2.5 MG/DL (ref 0.8–1.3)
DEPRECATED RDW RBC AUTO: 14.4 % (ref 12.4–15.4)
DEPRECATED RDW RBC AUTO: 14.8 % (ref 12.4–15.4)
EOSINOPHIL # BLD: 0.4 K/UL (ref 0–0.6)
EOSINOPHIL NFR BLD: 9.7 %
GFR SERPLBLD CREATININE-BSD FMLA CKD-EPI: 24 ML/MIN/{1.73_M2}
GFR SERPLBLD CREATININE-BSD FMLA CKD-EPI: 25 ML/MIN/{1.73_M2}
GLUCOSE SERPL-MCNC: 102 MG/DL (ref 70–99)
GLUCOSE SERPL-MCNC: 104 MG/DL (ref 70–99)
HCO3 BLDA-SCNC: 27 MMOL/L (ref 21–29)
HCT VFR BLD AUTO: 28.8 % (ref 40.5–52.5)
HCT VFR BLD AUTO: 31.1 % (ref 40.5–52.5)
HGB BLD-MCNC: 10.5 G/DL (ref 13.5–17.5)
HGB BLD-MCNC: 9.6 G/DL (ref 13.5–17.5)
HGB BLDA-MCNC: 9.8 G/DL
LYMPHOCYTES # BLD: 1.1 K/UL (ref 1–5.1)
LYMPHOCYTES NFR BLD: 29 %
MCH RBC QN AUTO: 33.4 PG (ref 26–34)
MCH RBC QN AUTO: 33.6 PG (ref 26–34)
MCHC RBC AUTO-ENTMCNC: 33.5 G/DL (ref 31–36)
MCHC RBC AUTO-ENTMCNC: 34 G/DL (ref 31–36)
MCV RBC AUTO: 99 FL (ref 80–100)
MCV RBC AUTO: 99.8 FL (ref 80–100)
METHGB MFR BLDA: 0.2 % (ref 0–1.4)
MONOCYTES # BLD: 0.4 K/UL (ref 0–1.3)
MONOCYTES NFR BLD: 10.5 %
NEUTROPHILS # BLD: 2 K/UL (ref 1.7–7.7)
NEUTROPHILS NFR BLD: 50.2 %
PCO2 BLDA: 49.2 MMHG (ref 35–45)
PH BLDA: 7.35 [PH] (ref 7.35–7.45)
PLATELET # BLD AUTO: 113 K/UL (ref 135–450)
PLATELET # BLD AUTO: 95 K/UL (ref 135–450)
PMV BLD AUTO: 7.8 FL (ref 5–10.5)
PMV BLD AUTO: 8.2 FL (ref 5–10.5)
PO2 BLDA: 164 MMHG (ref 75–108)
POTASSIUM SERPL-SCNC: 3.7 MMOL/L (ref 3.5–5.1)
POTASSIUM SERPL-SCNC: 3.9 MMOL/L (ref 3.5–5.1)
PROT SERPL-MCNC: 6.6 G/DL (ref 6.4–8.2)
RBC # BLD AUTO: 2.89 M/UL (ref 4.2–5.9)
RBC # BLD AUTO: 3.14 M/UL (ref 4.2–5.9)
SAO2 % BLDA: 100 % (ref 93–100)
SODIUM SERPL-SCNC: 144 MMOL/L (ref 136–145)
SODIUM SERPL-SCNC: 146 MMOL/L (ref 136–145)
WBC # BLD AUTO: 3.8 K/UL (ref 4–11)
WBC # BLD AUTO: 3.9 K/UL (ref 4–11)

## 2023-03-22 PROCEDURE — 2500000003 HC RX 250 WO HCPCS: Performed by: NURSE ANESTHETIST, CERTIFIED REGISTERED

## 2023-03-22 PROCEDURE — C1768 GRAFT, VASCULAR: HCPCS | Performed by: SURGERY

## 2023-03-22 PROCEDURE — 2580000003 HC RX 258: Performed by: ANESTHESIOLOGY

## 2023-03-22 PROCEDURE — 3700000000 HC ANESTHESIA ATTENDED CARE: Performed by: SURGERY

## 2023-03-22 PROCEDURE — 7100000001 HC PACU RECOVERY - ADDTL 15 MIN: Performed by: SURGERY

## 2023-03-22 PROCEDURE — 82803 BLOOD GASES ANY COMBINATION: CPT

## 2023-03-22 PROCEDURE — 99223 1ST HOSP IP/OBS HIGH 75: CPT | Performed by: INTERNAL MEDICINE

## 2023-03-22 PROCEDURE — 85025 COMPLETE CBC W/AUTO DIFF WBC: CPT

## 2023-03-22 PROCEDURE — 3600000014 HC SURGERY LEVEL 4 ADDTL 15MIN: Performed by: SURGERY

## 2023-03-22 PROCEDURE — C1894 INTRO/SHEATH, NON-LASER: HCPCS

## 2023-03-22 PROCEDURE — C1887 CATHETER, GUIDING: HCPCS

## 2023-03-22 PROCEDURE — 2780000010 HC IMPLANT OTHER: Performed by: SURGERY

## 2023-03-22 PROCEDURE — 2580000003 HC RX 258: Performed by: SURGERY

## 2023-03-22 PROCEDURE — C1887 CATHETER, GUIDING: HCPCS | Performed by: SURGERY

## 2023-03-22 PROCEDURE — 6370000000 HC RX 637 (ALT 250 FOR IP): Performed by: SURGERY

## 2023-03-22 PROCEDURE — A4217 STERILE WATER/SALINE, 500 ML: HCPCS | Performed by: SURGERY

## 2023-03-22 PROCEDURE — 2000000000 HC ICU R&B

## 2023-03-22 PROCEDURE — 6360000004 HC RX CONTRAST MEDICATION: Performed by: SURGERY

## 2023-03-22 PROCEDURE — C1725 CATH, TRANSLUMIN NON-LASER: HCPCS | Performed by: SURGERY

## 2023-03-22 PROCEDURE — 1200000000 HC SEMI PRIVATE

## 2023-03-22 PROCEDURE — 3600000004 HC SURGERY LEVEL 4 BASE: Performed by: SURGERY

## 2023-03-22 PROCEDURE — 04V03DZ RESTRICTION OF ABDOMINAL AORTA WITH INTRALUMINAL DEVICE, PERCUTANEOUS APPROACH: ICD-10-PCS | Performed by: SURGERY

## 2023-03-22 PROCEDURE — 85027 COMPLETE CBC AUTOMATED: CPT

## 2023-03-22 PROCEDURE — C1760 CLOSURE DEV, VASC: HCPCS

## 2023-03-22 PROCEDURE — 2580000003 HC RX 258: Performed by: NURSE ANESTHETIST, CERTIFIED REGISTERED

## 2023-03-22 PROCEDURE — 6360000002 HC RX W HCPCS: Performed by: NURSE ANESTHETIST, CERTIFIED REGISTERED

## 2023-03-22 PROCEDURE — 6360000002 HC RX W HCPCS: Performed by: SURGERY

## 2023-03-22 PROCEDURE — 86900 BLOOD TYPING SEROLOGIC ABO: CPT

## 2023-03-22 PROCEDURE — C2628 CATHETER, OCCLUSION: HCPCS | Performed by: SURGERY

## 2023-03-22 PROCEDURE — 86850 RBC ANTIBODY SCREEN: CPT

## 2023-03-22 PROCEDURE — 80053 COMPREHEN METABOLIC PANEL: CPT

## 2023-03-22 PROCEDURE — 7100000000 HC PACU RECOVERY - FIRST 15 MIN: Performed by: SURGERY

## 2023-03-22 PROCEDURE — 86901 BLOOD TYPING SEROLOGIC RH(D): CPT

## 2023-03-22 PROCEDURE — 2709999900 HC NON-CHARGEABLE SUPPLY: Performed by: SURGERY

## 2023-03-22 PROCEDURE — C1762 CONN TISS, HUMAN(INC FASCIA): HCPCS | Performed by: SURGERY

## 2023-03-22 PROCEDURE — 34709 PLMT XTN PROSTH EVASC RPR: CPT | Performed by: SURGERY

## 2023-03-22 PROCEDURE — C1769 GUIDE WIRE: HCPCS | Performed by: SURGERY

## 2023-03-22 PROCEDURE — 71045 X-RAY EXAM CHEST 1 VIEW: CPT

## 2023-03-22 PROCEDURE — 34713 PERQ ACCESS & CLSR FEM ART: CPT | Performed by: SURGERY

## 2023-03-22 PROCEDURE — C1894 INTRO/SHEATH, NON-LASER: HCPCS | Performed by: SURGERY

## 2023-03-22 PROCEDURE — 34705 EVAC RPR A-BIILIAC NDGFT: CPT | Performed by: SURGERY

## 2023-03-22 PROCEDURE — C1769 GUIDE WIRE: HCPCS

## 2023-03-22 PROCEDURE — 3700000001 HC ADD 15 MINUTES (ANESTHESIA): Performed by: SURGERY

## 2023-03-22 PROCEDURE — 2580000003 HC RX 258: Performed by: INTERNAL MEDICINE

## 2023-03-22 PROCEDURE — C1760 CLOSURE DEV, VASC: HCPCS | Performed by: SURGERY

## 2023-03-22 PROCEDURE — 2500000003 HC RX 250 WO HCPCS: Performed by: SURGERY

## 2023-03-22 DEVICE — GRAFT EVAR L9.5CM DIA18MM CONTRALATERAL LEG FOR AAA: Type: IMPLANTABLE DEVICE | Status: FUNCTIONAL

## 2023-03-22 DEVICE — GRAFT STENT TRUNK IPSILATERAL LEG 26X14.5 MMX12 CM EXCLUDER: Type: IMPLANTABLE DEVICE | Status: FUNCTIONAL

## 2023-03-22 DEVICE — GRAFT EVAR L13.5CM DST DIA16MM FEP NIT CONTRALATERAL LEG IL: Type: IMPLANTABLE DEVICE | Status: FUNCTIONAL

## 2023-03-22 RX ORDER — PROPOFOL 10 MG/ML
INJECTION, EMULSION INTRAVENOUS PRN
Status: DISCONTINUED | OUTPATIENT
Start: 2023-03-22 | End: 2023-03-22 | Stop reason: SDUPTHER

## 2023-03-22 RX ORDER — FENTANYL CITRATE 50 UG/ML
50 INJECTION, SOLUTION INTRAMUSCULAR; INTRAVENOUS EVERY 5 MIN PRN
Status: DISCONTINUED | OUTPATIENT
Start: 2023-03-22 | End: 2023-03-22 | Stop reason: HOSPADM

## 2023-03-22 RX ORDER — METOCLOPRAMIDE HYDROCHLORIDE 5 MG/ML
10 INJECTION INTRAMUSCULAR; INTRAVENOUS
Status: DISCONTINUED | OUTPATIENT
Start: 2023-03-22 | End: 2023-03-22 | Stop reason: HOSPADM

## 2023-03-22 RX ORDER — IPRATROPIUM BROMIDE AND ALBUTEROL SULFATE 2.5; .5 MG/3ML; MG/3ML
1 SOLUTION RESPIRATORY (INHALATION)
Status: DISCONTINUED | OUTPATIENT
Start: 2023-03-22 | End: 2023-03-22 | Stop reason: HOSPADM

## 2023-03-22 RX ORDER — SODIUM CHLORIDE 9 MG/ML
INJECTION, SOLUTION INTRAVENOUS CONTINUOUS
Status: DISCONTINUED | OUTPATIENT
Start: 2023-03-22 | End: 2023-03-23

## 2023-03-22 RX ORDER — PHENYLEPHRINE HYDROCHLORIDE 10 MG/ML
INJECTION INTRAVENOUS PRN
Status: DISCONTINUED | OUTPATIENT
Start: 2023-03-22 | End: 2023-03-22 | Stop reason: SDUPTHER

## 2023-03-22 RX ORDER — SODIUM CHLORIDE, SODIUM LACTATE, POTASSIUM CHLORIDE, CALCIUM CHLORIDE 600; 310; 30; 20 MG/100ML; MG/100ML; MG/100ML; MG/100ML
INJECTION, SOLUTION INTRAVENOUS CONTINUOUS
Status: DISCONTINUED | OUTPATIENT
Start: 2023-03-22 | End: 2023-03-22 | Stop reason: HOSPADM

## 2023-03-22 RX ORDER — HEPARIN SODIUM 1000 [USP'U]/ML
INJECTION, SOLUTION INTRAVENOUS; SUBCUTANEOUS PRN
Status: DISCONTINUED | OUTPATIENT
Start: 2023-03-22 | End: 2023-03-22 | Stop reason: SDUPTHER

## 2023-03-22 RX ORDER — CALCIUM CHLORIDE 100 MG/ML
INJECTION INTRAVENOUS; INTRAVENTRICULAR PRN
Status: DISCONTINUED | OUTPATIENT
Start: 2023-03-22 | End: 2023-03-22 | Stop reason: SDUPTHER

## 2023-03-22 RX ORDER — SODIUM CHLORIDE 0.9 % (FLUSH) 0.9 %
5-40 SYRINGE (ML) INJECTION PRN
Status: DISCONTINUED | OUTPATIENT
Start: 2023-03-22 | End: 2023-03-22 | Stop reason: HOSPADM

## 2023-03-22 RX ORDER — HYDRALAZINE HYDROCHLORIDE 20 MG/ML
5 INJECTION INTRAMUSCULAR; INTRAVENOUS EVERY 6 HOURS PRN
Status: DISCONTINUED | OUTPATIENT
Start: 2023-03-22 | End: 2023-03-23

## 2023-03-22 RX ORDER — MORPHINE SULFATE 4 MG/ML
4 INJECTION, SOLUTION INTRAMUSCULAR; INTRAVENOUS
Status: DISCONTINUED | OUTPATIENT
Start: 2023-03-22 | End: 2023-03-23

## 2023-03-22 RX ORDER — ONDANSETRON 2 MG/ML
4 INJECTION INTRAMUSCULAR; INTRAVENOUS EVERY 6 HOURS PRN
Status: DISCONTINUED | OUTPATIENT
Start: 2023-03-22 | End: 2023-03-24 | Stop reason: HOSPADM

## 2023-03-22 RX ORDER — SODIUM CHLORIDE 9 MG/ML
25 INJECTION, SOLUTION INTRAVENOUS PRN
Status: DISCONTINUED | OUTPATIENT
Start: 2023-03-22 | End: 2023-03-22 | Stop reason: HOSPADM

## 2023-03-22 RX ORDER — ONDANSETRON 2 MG/ML
4 INJECTION INTRAMUSCULAR; INTRAVENOUS
Status: DISCONTINUED | OUTPATIENT
Start: 2023-03-22 | End: 2023-03-22 | Stop reason: HOSPADM

## 2023-03-22 RX ORDER — SODIUM CHLORIDE 0.9 % (FLUSH) 0.9 %
5-40 SYRINGE (ML) INJECTION EVERY 12 HOURS SCHEDULED
Status: DISCONTINUED | OUTPATIENT
Start: 2023-03-22 | End: 2023-03-24 | Stop reason: HOSPADM

## 2023-03-22 RX ORDER — SODIUM CHLORIDE 0.9 % (FLUSH) 0.9 %
5-40 SYRINGE (ML) INJECTION EVERY 12 HOURS SCHEDULED
Status: DISCONTINUED | OUTPATIENT
Start: 2023-03-22 | End: 2023-03-22 | Stop reason: HOSPADM

## 2023-03-22 RX ORDER — ONDANSETRON 2 MG/ML
INJECTION INTRAMUSCULAR; INTRAVENOUS PRN
Status: DISCONTINUED | OUTPATIENT
Start: 2023-03-22 | End: 2023-03-22 | Stop reason: SDUPTHER

## 2023-03-22 RX ORDER — FENTANYL CITRATE 50 UG/ML
INJECTION, SOLUTION INTRAMUSCULAR; INTRAVENOUS PRN
Status: DISCONTINUED | OUTPATIENT
Start: 2023-03-22 | End: 2023-03-22 | Stop reason: SDUPTHER

## 2023-03-22 RX ORDER — IODIXANOL 320 MG/ML
INJECTION, SOLUTION INTRAVASCULAR PRN
Status: DISCONTINUED | OUTPATIENT
Start: 2023-03-22 | End: 2023-03-22 | Stop reason: HOSPADM

## 2023-03-22 RX ORDER — SODIUM CHLORIDE 0.9 % (FLUSH) 0.9 %
5-40 SYRINGE (ML) INJECTION PRN
Status: DISCONTINUED | OUTPATIENT
Start: 2023-03-22 | End: 2023-03-24 | Stop reason: HOSPADM

## 2023-03-22 RX ORDER — PROTAMINE SULFATE 10 MG/ML
INJECTION, SOLUTION INTRAVENOUS PRN
Status: DISCONTINUED | OUTPATIENT
Start: 2023-03-22 | End: 2023-03-22 | Stop reason: SDUPTHER

## 2023-03-22 RX ORDER — LIDOCAINE HYDROCHLORIDE 10 MG/ML
INJECTION, SOLUTION EPIDURAL; INFILTRATION; INTRACAUDAL; PERINEURAL PRN
Status: DISCONTINUED | OUTPATIENT
Start: 2023-03-22 | End: 2023-03-22 | Stop reason: ALTCHOICE

## 2023-03-22 RX ORDER — SODIUM CHLORIDE 9 MG/ML
INJECTION, SOLUTION INTRAVENOUS CONTINUOUS PRN
Status: DISCONTINUED | OUTPATIENT
Start: 2023-03-22 | End: 2023-03-22 | Stop reason: SDUPTHER

## 2023-03-22 RX ORDER — ASPIRIN 81 MG/1
81 TABLET ORAL DAILY
Status: DISCONTINUED | OUTPATIENT
Start: 2023-03-22 | End: 2023-03-24 | Stop reason: HOSPADM

## 2023-03-22 RX ORDER — MIDAZOLAM HYDROCHLORIDE 1 MG/ML
2 INJECTION INTRAMUSCULAR; INTRAVENOUS
Status: DISCONTINUED | OUTPATIENT
Start: 2023-03-22 | End: 2023-03-22 | Stop reason: HOSPADM

## 2023-03-22 RX ORDER — SODIUM CHLORIDE 9 MG/ML
INJECTION, SOLUTION INTRAVENOUS PRN
Status: DISCONTINUED | OUTPATIENT
Start: 2023-03-22 | End: 2023-03-24 | Stop reason: HOSPADM

## 2023-03-22 RX ORDER — ROCURONIUM BROMIDE 10 MG/ML
INJECTION, SOLUTION INTRAVENOUS PRN
Status: DISCONTINUED | OUTPATIENT
Start: 2023-03-22 | End: 2023-03-22 | Stop reason: SDUPTHER

## 2023-03-22 RX ORDER — MORPHINE SULFATE 2 MG/ML
2 INJECTION, SOLUTION INTRAMUSCULAR; INTRAVENOUS
Status: DISCONTINUED | OUTPATIENT
Start: 2023-03-22 | End: 2023-03-23

## 2023-03-22 RX ORDER — LABETALOL HYDROCHLORIDE 5 MG/ML
10 INJECTION, SOLUTION INTRAVENOUS
Status: DISCONTINUED | OUTPATIENT
Start: 2023-03-22 | End: 2023-03-22 | Stop reason: HOSPADM

## 2023-03-22 RX ADMIN — ROCURONIUM BROMIDE 10 MG: 10 INJECTION INTRAVENOUS at 10:21

## 2023-03-22 RX ADMIN — ROCURONIUM BROMIDE 70 MG: 10 INJECTION INTRAVENOUS at 08:42

## 2023-03-22 RX ADMIN — ASPIRIN 81 MG: 81 TABLET, COATED ORAL at 14:22

## 2023-03-22 RX ADMIN — CALCIUM CHLORIDE 0.5 G: 100 INJECTION, SOLUTION INTRAVENOUS at 09:30

## 2023-03-22 RX ADMIN — CEFAZOLIN 2000 MG: 2 INJECTION, POWDER, FOR SOLUTION INTRAMUSCULAR; INTRAVENOUS at 08:41

## 2023-03-22 RX ADMIN — SODIUM CHLORIDE, POTASSIUM CHLORIDE, SODIUM LACTATE AND CALCIUM CHLORIDE: 600; 310; 30; 20 INJECTION, SOLUTION INTRAVENOUS at 07:20

## 2023-03-22 RX ADMIN — SODIUM CHLORIDE, PRESERVATIVE FREE 10 ML: 5 INJECTION INTRAVENOUS at 20:26

## 2023-03-22 RX ADMIN — PROPOFOL 30 MG: 10 INJECTION, EMULSION INTRAVENOUS at 08:45

## 2023-03-22 RX ADMIN — Medication 10 ML: at 17:11

## 2023-03-22 RX ADMIN — ONDANSETRON 4 MG: 2 INJECTION INTRAMUSCULAR; INTRAVENOUS at 10:46

## 2023-03-22 RX ADMIN — LIDOCAINE HYDROCHLORIDE 100 MG: 10 INJECTION, SOLUTION EPIDURAL; INFILTRATION; INTRACAUDAL; PERINEURAL at 08:42

## 2023-03-22 RX ADMIN — HEPARIN SODIUM 3000 UNITS: 1000 INJECTION INTRAVENOUS; SUBCUTANEOUS at 09:49

## 2023-03-22 RX ADMIN — FENTANYL CITRATE 25 MCG: 50 INJECTION, SOLUTION INTRAMUSCULAR; INTRAVENOUS at 11:00

## 2023-03-22 RX ADMIN — FENTANYL CITRATE 25 MCG: 50 INJECTION, SOLUTION INTRAMUSCULAR; INTRAVENOUS at 11:04

## 2023-03-22 RX ADMIN — PHENYLEPHRINE HYDROCHLORIDE 200 MCG: 10 INJECTION INTRAVENOUS at 09:01

## 2023-03-22 RX ADMIN — PROTAMINE SULFATE 10 MG: 10 INJECTION, SOLUTION INTRAVENOUS at 10:40

## 2023-03-22 RX ADMIN — SODIUM CHLORIDE: 9 INJECTION, SOLUTION INTRAVENOUS at 16:58

## 2023-03-22 RX ADMIN — HEPARIN SODIUM 3000 UNITS: 1000 INJECTION INTRAVENOUS; SUBCUTANEOUS at 09:35

## 2023-03-22 RX ADMIN — CALCIUM CHLORIDE 0.25 G: 100 INJECTION, SOLUTION INTRAVENOUS at 10:20

## 2023-03-22 RX ADMIN — FENTANYL CITRATE 50 MCG: 50 INJECTION, SOLUTION INTRAMUSCULAR; INTRAVENOUS at 08:42

## 2023-03-22 RX ADMIN — PHENYLEPHRINE HYDROCHLORIDE 25 MCG/MIN: 50 INJECTION INTRAVENOUS at 08:55

## 2023-03-22 RX ADMIN — SODIUM CHLORIDE: 9 INJECTION, SOLUTION INTRAVENOUS at 09:00

## 2023-03-22 RX ADMIN — MORPHINE SULFATE 2 MG: 2 INJECTION, SOLUTION INTRAMUSCULAR; INTRAVENOUS at 21:56

## 2023-03-22 RX ADMIN — PROPOFOL 50 MG: 10 INJECTION, EMULSION INTRAVENOUS at 08:42

## 2023-03-22 ASSESSMENT — PAIN DESCRIPTION - ONSET: ONSET: ON-GOING

## 2023-03-22 ASSESSMENT — PAIN DESCRIPTION - ORIENTATION
ORIENTATION: LEFT;UPPER
ORIENTATION: LEFT;UPPER

## 2023-03-22 ASSESSMENT — PAIN DESCRIPTION - PAIN TYPE: TYPE: ACUTE PAIN

## 2023-03-22 ASSESSMENT — PAIN DESCRIPTION - LOCATION
LOCATION: CHEST
LOCATION: CHEST

## 2023-03-22 ASSESSMENT — PAIN DESCRIPTION - FREQUENCY: FREQUENCY: INTERMITTENT

## 2023-03-22 ASSESSMENT — PAIN DESCRIPTION - DESCRIPTORS
DESCRIPTORS: SQUEEZING
DESCRIPTORS: SQUEEZING

## 2023-03-22 ASSESSMENT — LIFESTYLE VARIABLES: SMOKING_STATUS: 0

## 2023-03-22 ASSESSMENT — PAIN - FUNCTIONAL ASSESSMENT
PAIN_FUNCTIONAL_ASSESSMENT: ACTIVITIES ARE NOT PREVENTED
PAIN_FUNCTIONAL_ASSESSMENT: 0-10
PAIN_FUNCTIONAL_ASSESSMENT: ACTIVITIES ARE NOT PREVENTED

## 2023-03-22 ASSESSMENT — PAIN SCALES - GENERAL
PAINLEVEL_OUTOF10: 3
PAINLEVEL_OUTOF10: 4
PAINLEVEL_OUTOF10: 0

## 2023-03-22 NOTE — FLOWSHEET NOTE
PACU Transfer Note    Vitals:    03/22/23 1245   BP: (!) 142/63   Pulse: 63   Resp: 16   Temp: 97.4 °F (36.3 °C)   SpO2: 99%   BP is within 20% of baseline value. In: 48 [I.V.:3]  Out: 525 [Urine:525]    Pain assessment:  none  Pain Level: 0    Report given to Receiving unit RNLuiz by phone.  Transferred with all belongings to room 4502 in bed with monitor on per pacu transport and this RN.     3/22/2023 1:12 PM

## 2023-03-22 NOTE — PROGRESS NOTES
4 Eyes Admission Assessment     I agree as the admission nurse that 2 RN's have performed a thorough Head to Toe Skin Assessment on the patient. ALL assessment sites listed below have been assessed on admission. Areas assessed by both nurses: Reanna Hawley RN and Brock RN  [x]   Head, Face, and Ears   [x]   Shoulders, Back, and Chest  [x]   Arms, Elbows, and Hands   [x]   Coccyx, Sacrum, and Ischium  [x]   Legs, Feet, and Heels        CHG bath given, skin intact. Bilateral ABD incision sites noted with surgical glue dressing. No drainage, no hematoma, incision sites are clean, dry, and intact. Does the Patient have Skin Breakdown?   No         Juan Prevention initiated:  No   Wound Care Orders initiated:  No      WOC nurse consulted for Pressure Injury (Stage 3,4, Unstageable, DTI, NWPT, and Complex wounds) or Juan score 18 or lower:  No      Nurse 1 eSignature: Electronically signed by Richa Richardson RN on 3/22/23 at 1:44 PM EDT    **SHARE this note so that the co-signing nurse is able to place an eSignature**    Nurse 2 eSignature: Electronically signed by Mirella Hayes RN on 3/22/23 at 1:47 PM EDT

## 2023-03-22 NOTE — CONSULTS
Authorizing Provider   allopurinol (ZYLOPRIM) 100 MG tablet  1/21/23   Historical Provider, MD   mirtazapine (REMERON) 15 MG tablet  2/2/23   Historical Provider, MD   ciprofloxacin (CILOXAN) 0.3 % ophthalmic solution  7/19/22   Historical Provider, MD   ondansetron (ZOFRAN) 4 MG tablet  7/26/22   Historical Provider, MD   lidocaine (LMX) 4 % cream Apply topically as needed for Pain Apply topically as needed. Historical Provider, MD   methocarbamol (ROBAXIN) 500 MG tablet Take 500 mg by mouth 4 times daily     Historical Provider, MD   potassium chloride (KLOR-CON) 20 MEQ packet Take 20 mEq by mouth 2 times daily    Historical Provider, MD   torsemide (DEMADEX) 20 MG tablet Take 2 tablets by mouth 2 times daily 3/1/21   DEBORAH Lopez CNP   diclofenac sodium (VOLTAREN) 1 % GEL APPLY 4 G TOPICALLY 4 TIMES DAILY AS NEEDED FOR PAIN 2/26/21   Saniya Patel MD   sacubitril-valsartan (ENTRESTO) 24-26 MG per tablet Take 1 tablet by mouth 2 times daily 2/26/21   DEBORAH Lopez CNP   acetaminophen (TYLENOL) 500 MG tablet Take 500 mg by mouth every 6 hours as needed for Pain    Historical Provider, MD   Multiple Vitamins-Minerals (THERAPEUTIC MULTIVITAMIN-MINERALS) tablet Take 1 tablet by mouth daily    Historical Provider, MD   tamsulosin (FLOMAX) 0.4 MG capsule Take 0.4 mg by mouth daily    Historical Provider, MD   LORazepam (ATIVAN) 0.5 MG tablet Take 0.5 mg by mouth every 6 hours as needed for Anxiety. Historical Provider, MD   buPROPion (WELLBUTRIN XL) 150 MG extended release tablet Take 1 tablet by mouth 2 times daily 7/30/20   Saniya Patel MD   nitroGLYCERIN (NITROSTAT) 0.4 MG SL tablet PLACE 1 TABLET UNDER THE TONGUE EVERY 5 MINUTES IF NEEDED FOR CHEST PAIN UP TO MAXIMUM OF 3 TOTAL DOSES.  IF NO RELIEF AFTER 1 DOSE CALL 911 7/30/20   Saniya Patel MD   ondansetron (ZOFRAN ODT) 4 MG disintegrating tablet Take 1 tablet by mouth every 8 hours as needed for Nausea 7/13/20   Terra Goff MD   mirtazapine 10.5* 9.6*   HCT 31.1* 28.8*   * 95*   MCV 99.0 99.8       Imaging:     I personally reviewed imaging studies including CXR, Stress test, TTE/LORI. Last ECG (3/1/2023): I have reviewed EKG with the following interpretation:  Sinus bradycardia, type I AV block, old anteroseptal infarct, nonspecific ST-T changes. Telemetry: NA     Last Stress (if available): 3/26/2019  Summary    There is normal isotope uptake at stress and rest. There is no evidence of    myocardial ischemia or scar. Severely dilated LV with diffuse moderate hypokinesis. Left ventricular ejection fraction of 36 %. Normal TID of 1.0    Study findings are abnormal and most consistent with non-ischemic    cardiomyopathy. Last TTE/LORI (if available): 3/14/2023  Summary  IV contrast was used to visualize the endocardium. There is mild left  ventricular enlargement. Mild concentric left ventricular hypertrophy. Severely decreased left ventricular systolic function with an estimated ejection fraction of 25-30%. Diffuse hypokinesis. Grade II diastolic dysfunction with elevated LV filling pressures. There is no apical clot. The aortic valve is heavily calcific with limited cusp movement. Moderate to severe aortic stenosis with a peak velocity of 3.36m/s and a mean pressure gradient of 25mmHg, DI 0.23, SVI 29 ml/m2 (low flow low gradient aortic stenosis). Mild aortic regurgitation. Last Cath (if available): 12/24/2015  Unable to view report    Last CMR (if available): NA    Assessment / Plan:   Mr. Fabrizio Montenegro is a 81 yo M with valvular and ischemic heart disease s/p EVAR on 3/22/2023. Cardiology was consulted for post-procedural medication management. #CHF, Systolic and Diastolic Dysfunction  Most recent ECHO on 3/14/23  shows stable LVEF of 25-30% and grade II diastolic dysfunction. Current home medications include Entresto 24-26mg BID and torsemide 40mg BID.  Creatinine elevated to 2.5 on admission (baseline in 2020

## 2023-03-22 NOTE — OP NOTE
Operative Note      Patient: Leopoldo Case  YOB: 1932  MRN: [id number]     Date of Procedure: 3/22/2023     Pre-Op Diagnosis: Abdominal aortic aneurysm (AAA) without rupture, unspecified part [I71.40]     Post-Op Diagnosis: Same       Procedure(s):  ENDOVASCULAR ABDOMINAL AORTIC ANEURYSM REPAIR  WL [place] Excluder graft--Main body 26 x 14.5 x 12cm; ipsilateral extension 18mm x 9.5cm; contralateral limb 16mm x 13.5cm     Surgeon(s):  Aria Larsen MD     Assistant:  Surgical Assistant: Lindsay Blanchard  Resident: Blanca Garcia DPM     Anesthesia: General     Estimated Blood Loss (mL): less than 916      Complications: None     Specimens:   * No specimens in log *     Implants:  * No implants in log *      Drains:   Urinary Catheter 03/22/23 Bro (Active)      Findings: Exclusion of AAA sac with small type 2 endoleak. No obvious Type 1 or 3 leaks noted. Detailed Description of Procedure: The patient was taken to the operating room placed in supine position prepped and draped in the total sterile fashion using Betadine solution. Timeout was called and patient operative site were appropriate identified. An arterial line was placed in the right radial artery per anesthesia. General endotracheal anesthesia was induced. He was given IV antibiotics prior to the incisions. Patient was systemically heparinized with 6000 units of IV heparin. The bilateral common femoral arteries were accessed percutaneously with a micropuncture systems and micropuncture sheaths were applied bilaterally. Each was then exchanged over a Bentson wire and the tract between the skin and the artery was dilated with a 6 [place] sheath. Starting on the right, 2 ProGlide devices were then deployed in a preclose maneuver and the sutures laid aside for later use. This was also performed in the left. Bilaterally, 8 Sami sheath were placed. The Bentson wires were then exchanged for a long Amplatz wires.   The right femoral sheath was exchanged for a 12 Haitian dry seal sheath in the left changed for a 16 Haitian dry seal sheath. The main body device ([place] Excluder 26 x 14.5 x 12cm) was then inserted through the left femoral sheath and positioned in L1. Abdominal aortogram was then performed, identifying the renal arteries. The proximal portion of the main body device was then deployed down to the contralateral gate. Through the right femoral sheath, the pigtail catheter was exchanged for a glide catheter which was used to selectively cannulate the contralateral gate. The catheter was then exchanged again for the pigtail catheter and this was rotated within the proximal portion of the main body to ensure that we were in proper position. Repeat imaging was performed and a steeper cranial position to reduce parallax and the proximal edge of the main body device was slightly retracted to a better position just below the lowest left renal artery. A sheath injection was performed through the right femoral sheath in the Japanese position and the contralateral right hypogastric artery was identified. The contralateral limb (16mm x 13.5cm) was inserted and deployed down to the distal common iliac artery. Gantry was then repositioned in the AN position and retrograde sheath injection performed through the left, identifying the left hypogastric artery. The ipsilateral limb (18mm x 9.5cm) was then extended down to the distal common iliac artery. Balloon angioplasty was performed with an M OB balloon throughout the entire graft. Repeat imaging showed a blush of contrast along the right lateral main body and therefore repeat balloon angioplasty was performed. Final imaging showed only a small type II endoleak and no evidence of type I or type III endoleak. The bilateral sheaths were removed and hemostasis achieved with the Pro-glide devices. He had a palpable femoral and DP pulses bilaterally following the procedure.   He was taken to

## 2023-03-22 NOTE — ANESTHESIA PRE PROCEDURE
Department of Anesthesiology  Preprocedure Note       Name:  Elyse Neal   Age:  80 y.o.  :  1932                                          MRN:  [id number]         Date:  3/22/2023      Surgeon: Phylliss Cranker):  Augusto Altamirano MD    Procedure: Procedure(s):  ENDOVASCULAR ABDOMINAL AORTIC ANEURYSM REPAIR    Medications prior to admission:   Prior to Admission medications    Medication Sig Start Date End Date Taking? Authorizing Provider   allopurinol (ZYLOPRIM) 100 MG tablet  23   Historical Provider, MD   mirtazapine (REMERON) 15 MG tablet  23   Historical Provider, MD   ciprofloxacin (CILOXAN) 0.3 % ophthalmic solution  22   Historical Provider, MD   ondansetron (ZOFRAN) 4 MG tablet  22   Historical Provider, MD   lidocaine (LMX) 4 % cream Apply topically as needed for Pain Apply topically as needed. Historical Provider, MD   methocarbamol (ROBAXIN) 500 MG tablet Take 500 mg by mouth 4 times daily     Historical Provider, MD   potassium chloride (KLOR-CON) 20 MEQ packet Take 20 mEq by mouth 2 times daily    Historical Provider, MD   torsemide (DEMADEX) 20 MG tablet Take 2 tablets by mouth 2 times daily 3/1/21   Cha Franz APRN - CNP   diclofenac sodium (VOLTAREN) 1 % GEL APPLY 4 G TOPICALLY 4 TIMES DAILY AS NEEDED FOR PAIN 21   David Luque MD   sacubitril-valsartan (ENTRESTO) 24-26 MG per tablet Take 1 tablet by mouth 2 times daily 21   DEBORAH Drew - CNP   acetaminophen (TYLENOL) 500 MG tablet Take 500 mg by mouth every 6 hours as needed for Pain    Historical Provider, MD   Multiple Vitamins-Minerals (THERAPEUTIC MULTIVITAMIN-MINERALS) tablet Take 1 tablet by mouth daily    Historical Provider, MD   tamsulosin (FLOMAX) 0.4 MG capsule Take 0.4 mg by mouth daily    Historical Provider, MD   LORazepam (ATIVAN) 0.5 MG tablet Take 0.5 mg by mouth every 6 hours as needed for Anxiety.     Historical Provider, MD   buPROPion (WELLBUTRIN XL) 150 MG extended release tablet

## 2023-03-22 NOTE — PROGRESS NOTES
Patient admitted to ICU room 4502 at 1300. Alert and oriented x3 but disoriented to time, able to follow commands in all four extremities, pupils equal, round, briskly reactive to light, able to track and cross midline. No neurological deficits to note. HR is in the 50s, arterial line reading SBP in the 130-140s, afebrile, he denies pain at this time. Normal saline running at 50mL/hr    Bro catheter in place, patient is making urine. Urine is cherry red/clear which is consistent from report given by PACU RN. Urology, Nephrology, and Cardiology all consulted. Surgical incisions are clean, dry, and intact. Soft, no drainage, no hematoma noted. See 4 Eyes note for admission skin assessment. Dr. Kayla Brooks and residents came to bedside to assess patient post op. Remains on bedrest and then plan to add a diet order. Daughter Nitin Cueto at bedside, all questions answered between RN and Dr Tamela Trejo team.    Patient in bed, locked, alarm on, in lowest position with call light in reach.

## 2023-03-22 NOTE — PROGRESS NOTES
Vascular Surgery  Post-operative Note      Procedure(s) Performed: Endovascular Abdominal Aortic Aneurysm Repair     Subjective: Patient sleeping comfortably upon entering room. On bedrest with HOB at 30 degrees. He denies any pain with minimal discomfort at his groin sites. He denies any changes in sensation or motor function to his upper or lower extremities bilaterally. Objective:    Vitals:   Vitals:    03/22/23 1115 03/22/23 1120 03/22/23 1130 03/22/23 1145   BP: (!) 114/56 126/60 125/77 (!) 140/71   Pulse: (!) 43 64 56 60   Resp: 11 14 12 14   Temp:       TempSrc:       SpO2: 100% 100% 100% 100%   Weight:       Height:           Physical Exam:  Post-op vital signs:  Stable   General appearance: alert, no acute distress, grooming appropriate  Eyes: No scleral icterus, EOM grossly intact  Neck: trachea midline, neck supple  Chest/Lungs: normal effort with no accessory muscle use on 3L NC  Cardiovascular: sinus bradycardia   Abdomen: Soft, non-tender, non-distended, no rebound, guarding, or rigidity. Skin: warm and dry, no rashes  Extremities: Groin puncture sites bilaterally, dry and intact and soft without hematoma  Genitourinary: Bro in place with bloody urine   Neuro: A&Ox3, no focal deficits, sensation intact    Pulses    R F P PT DP   R + + + -/+ +   L + + + -/+ +    +, -/+ ,-/-         ASSESSMENT AND PLAN:   Demarco Morales is a 80 y.o. male, who was admitted following endovascular abdominal aortic aneurysm repair on 3/22/23 for an unruptured abdominal aneurysm that had been stable before a recent growth to 6.5 cm. POD#0     NEUROLOGIC:  Analgesia  - Morphine pain panel PRN       CARDIOVASCULAR:  S/P Endovascular abdominal aortic aneurysm repair.  POD#0  - SBP goal 100 - 160   - IV Hydralazine 5 mg q 6 PRN   - Must lay flat for 6 hours post-op with HOB at 30 degrees or less   - Okay for diet and OOB with assistance after bedrest restrictions are finished   - q4 neurovascular checks   - ASA 81 mg daily Hx of chronic systolic heart failure with severe aortic stenosis   - Patient followed by Dr. Zonia Marcial outpatient, who saw inpatient    - Hold Entresto and Toresmide    - IV Hydralazine PRN for blood pressure control with likely switch to PO tomorrow  - No beta blockers given baseline bradycardia        PULMONARY:   - No history of pulmonary disease   - Encourage deep breathing and coughing   - Aggressive IS use   - Aspiration precautions-- Elevate HOB greater than 30 degrees  - Wean oxygen as able       GASTROINTESTINAL:   - Zofran PRN       FLUIDS/ELECTROLYTES/NUTRITION:   - IVF: NS 50 cc/hr   - Will replace electrolytes as needed   - NPO until after bedrest complete, then okay for diet       GENITOURINARY:   Chronic kidney disease   - Baseline Cr: 2.5 preop  - Nephrology has been consulted, will follow-up     Post-op bloody urine   - Bro placed in OR h with bloody urine noted since surgery   - Bro is to remain in place per vascular surgery   - Patient with possible TURP history in 2000 with Dr. Edna Gustafson.  No original op note could be located, patient and family both deny previous TURP history   - Urology consulted       ENDOCRINE  - No history of DM   - Last random glucose 102, 104   - Will continue to monitor       HEMATOLOGIC/INFECTIOUS DISEASE:   - Hgb 9.6 from 10.5 pre-op   - Leukocytes 3.8 from 3.9 pre-op       ACTIVITY:   - Must lie flat x6 hours following surgery   - Okay for OOB with assistance after bedrest restrictions completed       DISPO:  - ICU       CODE STATUS:   - Full code       Haroldo Lai MD  PGY1, General Surgery  03/22/23  12:34 PM  [facility]#[phone]

## 2023-03-22 NOTE — FLOWSHEET NOTE
Patient received from the OR to pACU #13 post ENDOVASCULAR ABDOMINAL AORTIC ANEURYSM REPAIR of Dr. Vasquez Milligan. Placed on PACU monitoring equipment. Report given per CRNA and OR RN. Per report, patient required BP support intra op. Had IV infiltration early in case at left FA and new IV catheter was placed. Was bradycardic pre and intra op. On arrival, patient is still asleep from surgery with oral airway in place. No signs of distress or pain.

## 2023-03-22 NOTE — ANESTHESIA POSTPROCEDURE EVALUATION
Department of Anesthesiology  Postprocedure Note    Patient: Jodee Trejo  MRN: [id number]  [place]: 11/26/1932  Date of evaluation: 3/22/2023      Procedure Summary     Date: 03/22/23 Room / Location: [address]    Anesthesia Start: 9358 Anesthesia Stop: 1120    Procedure: ENDOVASCULAR ABDOMINAL AORTIC ANEURYSM REPAIR Diagnosis:       Abdominal aortic aneurysm (AAA) without rupture, unspecified part ([address]      (Abdominal aortic aneurysm (AAA) without rupture, unspecified part [I71.40])    Surgeons: Ana Garg MD Responsible Provider: Luca Tidwell MD    Anesthesia Type: General ASA Status: 4          Anesthesia Type: General    Glenroy Phase I: Glenroy Score: 9    Glenroy Phase II:        Anesthesia Post Evaluation    Patient location during evaluation: PACU  Level of consciousness: awake and alert  Airway patency: patent  Nausea & Vomiting: no vomiting  Complications: no  Cardiovascular status: blood pressure returned to baseline  Respiratory status: acceptable  Hydration status: euvolemic  Multimodal analgesia pain management approach

## 2023-03-22 NOTE — ANESTHESIA PROCEDURE NOTES
Arterial Line:    An arterial line was placed using ultrasound guidance and surface landmarks, in the OR for the following indication(s): continuous blood pressure monitoring and blood sampling needed. A 20 gauge (size), 1 and 3/8 inch (length), Arrow (type) catheter was placed, Seldinger technique used, into the right radial artery, secured by Tegaderm and tape. Anesthesia type: General    Events:  patient tolerated procedure well with no complications and EBL 0mL.   Anesthesiologist: Diana Lin MD  Performed: Resident/CRNA   Preanesthetic Checklist  Completed: patient identified, IV checked, site marked, risks and benefits discussed, surgical/procedural consents, equipment checked, pre-op evaluation, timeout performed, anesthesia consent given, oxygen available and monitors applied/VS acknowledged

## 2023-03-22 NOTE — BRIEF OP NOTE
Brief Postoperative Note      Patient: Cm Sanchez  YOB: 1932  MRN: [id number]    Date of Procedure: 3/22/2023    Pre-Op Diagnosis: Abdominal aortic aneurysm (AAA) without rupture, unspecified part [I71.40]    Post-Op Diagnosis: Same       Procedure(s):  ENDOVASCULAR ABDOMINAL AORTIC ANEURYSM REPAIR  WL [place] Excluder graft--Main body 26 x 14.5 x 12cm; ipsilateral extension 18mm x 9.5cm; contralateral limb 16mm x 13.5cm    Surgeon(s):  Norah Busby MD    Assistant:  Surgical Assistant: Kayce Marrufo  Resident: Andrew Greenfield DPM    Anesthesia: General    Estimated Blood Loss (mL): less than 414     Complications: None    Specimens:   * No specimens in log *    Implants:  * No implants in log *      Drains:   Urinary Catheter 03/22/23 Bro (Active)       Findings: Exclusion of AAA sac with small type 2 endoleak. No obvious Type 1 or 3 leaks noted.      Electronically signed by Norah Busby MD on 3/22/2023 at 10:48 AM

## 2023-03-22 NOTE — H&P
HISTORY AND PHYSICAL             Date: 3/22/2023        Patient Name: Boone Hawkins     YOB: 1932      Age:  80 y.o. Chief Complaint   No chief complaint on file. Abdominal aortic aneurysm without rupture. History of Present Illness   Patient is a 72-year-old gentleman who has been followed conservatively for abdominal aortic aneurysm without rupture. He has a history of severe aortic stenosis and therefore prior intervention was not performed because the AAA remains stable. Unfortunately, he recently had a significant growth to 6.5 cm. He was also very anxious and concerned about this, pursuing intervention. Long discussions were held in the office with him and his daughter, all performed in the presence of a qualified [facility] . He wanted to proceed with AAA repair if at all possible. Repeat echo continues to show severe aortic stenosis, though this was stable. He was also seen in evaluation preoperatively by Dr. Abdirahman Arguelles and risk factors were addressed. He now presents for elective repair. Past Medical History     Past Medical History:   Diagnosis Date    Arthritis     Ascending aortic aneurysm     CAD (coronary artery disease)     Chronic kidney disease     Constipation     DDD (degenerative disc disease), lumbar     L5, S1    Dental disease     Hearing loss     Hyperlipidemia     Hypertension     Mild aortic stenosis     Prostate enlargement     TIA (transient ischemic attack)         Past Surgical History     Past Surgical History:   Procedure Laterality Date    CORONARY ANGIOPLASTY WITH STENT PLACEMENT  01/12/1998    EYE SURGERY      SKIN TAG REMOVAL  02/02/2010    TURP          Medications Prior to Admission     Prior to Admission medications    Medication Sig Start Date End Date Taking?  Authorizing Provider   allopurinol (ZYLOPRIM) 100 MG tablet  1/21/23   Historical Provider, MD   mirtazapine (REMERON) 15 MG tablet  2/2/23   Historical Provider, MD   ciprofloxacin pool several times a week. Lives independently. Denies chest pain or shortness of breath. All other pertinent items are negative. Physical Exam   /67   Pulse 56   Temp 98.2 °F (36.8 °C) (Temporal)   Resp 18   Ht 5' 6\" (1.676 m)   Wt 184 lb 3.2 oz (83.6 kg)   SpO2 95%   BMI 29.73 kg/m²     Physical Exam  General:  AAO x 3. NAD. Appears in usual state of health. Heart: Loud systolic ejection murmur. Heart rate 40s to 50s, normal sinus rhythm. Lungs: Clear to auscultation bilaterally  Abdomen soft, nontender nondistended with palpable pulsatile mass consistent with AAA. Bilateral femoral pulses 2+ palpable and bilateral DP 2+ palpable.     Labs      Recent Results (from the past 24 hour(s))   TYPE AND SCREEN    Collection Time: 03/22/23  7:18 AM   Result Value Ref Range    ABO/Rh O POS     Antibody Screen NEG    CBC with Auto Differential    Collection Time: 03/22/23  7:18 AM   Result Value Ref Range    WBC 3.9 (L) 4.0 - 11.0 K/uL    RBC 3.14 (L) 4.20 - 5.90 M/uL    Hemoglobin 10.5 (L) 13.5 - 17.5 g/dL    Hematocrit 31.1 (L) 40.5 - 52.5 %    MCV 99.0 80.0 - 100.0 fL    MCH 33.6 26.0 - 34.0 pg    MCHC 34.0 31.0 - 36.0 g/dL    RDW 14.8 12.4 - 15.4 %    Platelets 403 (L) 677 - 450 K/uL    MPV 8.2 5.0 - 10.5 fL    Neutrophils % 50.2 %    Lymphocytes % 29.0 %    Monocytes % 10.5 %    Eosinophils % 9.7 %    Basophils % 0.6 %    Neutrophils Absolute 2.0 1.7 - 7.7 K/uL    Lymphocytes Absolute 1.1 1.0 - 5.1 K/uL    Monocytes Absolute 0.4 0.0 - 1.3 K/uL    Eosinophils Absolute 0.4 0.0 - 0.6 K/uL    Basophils Absolute 0.0 0.0 - 0.2 K/uL   Comprehensive Metabolic Panel    Collection Time: 03/22/23  7:18 AM   Result Value Ref Range    Sodium 146 (H) 136 - 145 mmol/L    Potassium 3.9 3.5 - 5.1 mmol/L    Chloride 107 99 - 110 mmol/L    CO2 28 21 - 32 mmol/L    Anion Gap 11 3 - 16    Glucose 104 (H) 70 - 99 mg/dL    BUN 52 (H) 7 - 20 mg/dL    Creatinine 2.5 (H) 0.8 - 1.3 mg/dL    Est, Glom Filt Rate 24 (A) >60

## 2023-03-23 ENCOUNTER — APPOINTMENT (OUTPATIENT)
Dept: GENERAL RADIOLOGY | Age: 88
End: 2023-03-23
Attending: SURGERY
Payer: MEDICARE

## 2023-03-23 LAB
ALBUMIN SERPL-MCNC: 3.5 G/DL (ref 3.4–5)
ANION GAP SERPL CALCULATED.3IONS-SCNC: 11 MMOL/L (ref 3–16)
BASOPHILS # BLD: 0 K/UL (ref 0–0.2)
BASOPHILS NFR BLD: 0.3 %
BUN SERPL-MCNC: 51 MG/DL (ref 7–20)
CALCIUM SERPL-MCNC: 8.5 MG/DL (ref 8.3–10.6)
CHLORIDE SERPL-SCNC: 112 MMOL/L (ref 99–110)
CO2 SERPL-SCNC: 24 MMOL/L (ref 21–32)
CREAT SERPL-MCNC: 2.6 MG/DL (ref 0.8–1.3)
DEPRECATED RDW RBC AUTO: 15.1 % (ref 12.4–15.4)
EKG ATRIAL RATE: 56 BPM
EKG DIAGNOSIS: NORMAL
EKG P AXIS: 67 DEGREES
EKG P-R INTERVAL: 304 MS
EKG Q-T INTERVAL: 510 MS
EKG QRS DURATION: 132 MS
EKG QTC CALCULATION (BAZETT): 492 MS
EKG R AXIS: -22 DEGREES
EKG T AXIS: 11 DEGREES
EKG VENTRICULAR RATE: 56 BPM
EOSINOPHIL # BLD: 0.3 K/UL (ref 0–0.6)
EOSINOPHIL NFR BLD: 7.2 %
GFR SERPLBLD CREATININE-BSD FMLA CKD-EPI: 23 ML/MIN/{1.73_M2}
GLUCOSE SERPL-MCNC: 96 MG/DL (ref 70–99)
HCT VFR BLD AUTO: 29.8 % (ref 40.5–52.5)
HGB BLD-MCNC: 10.1 G/DL (ref 13.5–17.5)
LYMPHOCYTES # BLD: 0.4 K/UL (ref 1–5.1)
LYMPHOCYTES NFR BLD: 8.6 %
MAGNESIUM SERPL-MCNC: 2.2 MG/DL (ref 1.8–2.4)
MCH RBC QN AUTO: 34 PG (ref 26–34)
MCHC RBC AUTO-ENTMCNC: 33.8 G/DL (ref 31–36)
MCV RBC AUTO: 100.7 FL (ref 80–100)
MONOCYTES # BLD: 0.3 K/UL (ref 0–1.3)
MONOCYTES NFR BLD: 7 %
NEUTROPHILS # BLD: 3.3 K/UL (ref 1.7–7.7)
NEUTROPHILS NFR BLD: 76.9 %
PHOSPHATE SERPL-MCNC: 4.3 MG/DL (ref 2.5–4.9)
PLATELET # BLD AUTO: 90 K/UL (ref 135–450)
PMV BLD AUTO: 8.2 FL (ref 5–10.5)
POTASSIUM SERPL-SCNC: 4 MMOL/L (ref 3.5–5.1)
RBC # BLD AUTO: 2.96 M/UL (ref 4.2–5.9)
SODIUM SERPL-SCNC: 147 MMOL/L (ref 136–145)
TROPONIN T SERPL-MCNC: 0.06 NG/ML
TROPONIN T SERPL-MCNC: 0.07 NG/ML
WBC # BLD AUTO: 4.4 K/UL (ref 4–11)

## 2023-03-23 PROCEDURE — 2580000003 HC RX 258: Performed by: STUDENT IN AN ORGANIZED HEALTH CARE EDUCATION/TRAINING PROGRAM

## 2023-03-23 PROCEDURE — 2580000003 HC RX 258: Performed by: SURGERY

## 2023-03-23 PROCEDURE — 6360000002 HC RX W HCPCS: Performed by: SURGERY

## 2023-03-23 PROCEDURE — 85025 COMPLETE CBC W/AUTO DIFF WBC: CPT

## 2023-03-23 PROCEDURE — 83735 ASSAY OF MAGNESIUM: CPT

## 2023-03-23 PROCEDURE — 71045 X-RAY EXAM CHEST 1 VIEW: CPT

## 2023-03-23 PROCEDURE — 84484 ASSAY OF TROPONIN QUANT: CPT

## 2023-03-23 PROCEDURE — 99232 SBSQ HOSP IP/OBS MODERATE 35: CPT | Performed by: INTERNAL MEDICINE

## 2023-03-23 PROCEDURE — 6370000000 HC RX 637 (ALT 250 FOR IP): Performed by: SURGERY

## 2023-03-23 PROCEDURE — 6360000002 HC RX W HCPCS: Performed by: STUDENT IN AN ORGANIZED HEALTH CARE EDUCATION/TRAINING PROGRAM

## 2023-03-23 PROCEDURE — 93005 ELECTROCARDIOGRAM TRACING: CPT | Performed by: SURGERY

## 2023-03-23 PROCEDURE — 2000000000 HC ICU R&B

## 2023-03-23 PROCEDURE — 37799 UNLISTED PX VASCULAR SURGERY: CPT

## 2023-03-23 PROCEDURE — 80069 RENAL FUNCTION PANEL: CPT

## 2023-03-23 PROCEDURE — 2580000003 HC RX 258: Performed by: INTERNAL MEDICINE

## 2023-03-23 PROCEDURE — 6370000000 HC RX 637 (ALT 250 FOR IP): Performed by: STUDENT IN AN ORGANIZED HEALTH CARE EDUCATION/TRAINING PROGRAM

## 2023-03-23 PROCEDURE — 93010 ELECTROCARDIOGRAM REPORT: CPT | Performed by: INTERNAL MEDICINE

## 2023-03-23 PROCEDURE — 36415 COLL VENOUS BLD VENIPUNCTURE: CPT

## 2023-03-23 PROCEDURE — 94150 VITAL CAPACITY TEST: CPT

## 2023-03-23 RX ORDER — HYDRALAZINE HYDROCHLORIDE 10 MG/1
10 TABLET, FILM COATED ORAL EVERY 12 HOURS PRN
Status: DISCONTINUED | OUTPATIENT
Start: 2023-03-23 | End: 2023-03-24 | Stop reason: HOSPADM

## 2023-03-23 RX ORDER — HEPARIN SODIUM 5000 [USP'U]/ML
5000 INJECTION, SOLUTION INTRAVENOUS; SUBCUTANEOUS EVERY 8 HOURS SCHEDULED
Status: DISCONTINUED | OUTPATIENT
Start: 2023-03-23 | End: 2023-03-23

## 2023-03-23 RX ORDER — SODIUM CHLORIDE 450 MG/100ML
INJECTION, SOLUTION INTRAVENOUS CONTINUOUS
Status: DISCONTINUED | OUTPATIENT
Start: 2023-03-23 | End: 2023-03-23

## 2023-03-23 RX ORDER — KETOTIFEN FUMARATE 0.35 MG/ML
1 SOLUTION/ DROPS OPHTHALMIC 2 TIMES DAILY
Status: DISCONTINUED | OUTPATIENT
Start: 2023-03-23 | End: 2023-03-24 | Stop reason: HOSPADM

## 2023-03-23 RX ORDER — OXYCODONE HYDROCHLORIDE 5 MG/1
5 TABLET ORAL EVERY 4 HOURS PRN
Status: DISCONTINUED | OUTPATIENT
Start: 2023-03-23 | End: 2023-03-24

## 2023-03-23 RX ORDER — ACETAMINOPHEN 325 MG/1
650 TABLET ORAL EVERY 4 HOURS PRN
Status: DISCONTINUED | OUTPATIENT
Start: 2023-03-23 | End: 2023-03-24

## 2023-03-23 RX ADMIN — SODIUM CHLORIDE, PRESERVATIVE FREE 10 ML: 5 INJECTION INTRAVENOUS at 09:21

## 2023-03-23 RX ADMIN — SODIUM CHLORIDE: 4.5 INJECTION, SOLUTION INTRAVENOUS at 09:22

## 2023-03-23 RX ADMIN — SODIUM CHLORIDE: 9 INJECTION, SOLUTION INTRAVENOUS at 06:09

## 2023-03-23 RX ADMIN — MORPHINE SULFATE 2 MG: 2 INJECTION, SOLUTION INTRAMUSCULAR; INTRAVENOUS at 06:01

## 2023-03-23 RX ADMIN — KETOTIFEN FUMARATE 1 DROP: 0.25 SOLUTION/ DROPS OPHTHALMIC at 19:57

## 2023-03-23 RX ADMIN — HYDRALAZINE HYDROCHLORIDE 5 MG: 20 INJECTION INTRAMUSCULAR; INTRAVENOUS at 09:27

## 2023-03-23 RX ADMIN — SODIUM CHLORIDE, PRESERVATIVE FREE 10 ML: 5 INJECTION INTRAVENOUS at 19:58

## 2023-03-23 RX ADMIN — ASPIRIN 81 MG: 81 TABLET, COATED ORAL at 09:20

## 2023-03-23 ASSESSMENT — PAIN SCALES - GENERAL
PAINLEVEL_OUTOF10: 0
PAINLEVEL_OUTOF10: 4
PAINLEVEL_OUTOF10: 0
PAINLEVEL_OUTOF10: 0

## 2023-03-23 NOTE — PROGRESS NOTES
AA&Ox4, bradycardia, 1LNC, afebrile. 2mg of Morphine given prn for left upper lateral chest squeezing pain 4/10. VSS, NAD. Bilateral femoral puncture sites C/D/I w/o complication. DP and PT pulses are palpable bilaterally. Ramirez bloody UOP, Urology aware. Will continue to monitor.

## 2023-03-23 NOTE — CONSULTS
Urology Attending Consult Note      Reason for Consultation: Gross hematuria     History: 81 yo M admitted to [facility] after AAA repair. He is unsure if he has seen a urologist but reports baseline nocturia at home. Intraoperative catheter was placed yesterday without difficulty with cherry red output noted after case. Staff reports that urine has been clearing since yesterday. UOP 1410cc yesterday. Family History, Social History, Review of Systems:  Reviewed and agreed to as per chart    Vitals:  BP (!) 102/52   Pulse 55   Temp 98.5 °F (36.9 °C) (Oral)   Resp 18   Ht 5' 6\" (1.676 m)   Wt 188 lb 0.8 oz (85.3 kg)   SpO2 96%   BMI 30.35 kg/m²   Temp  Av.8 °F (36.6 °C)  Min: 97.3 °F (36.3 °C)  Max: 98.6 °F (37 °C)    Intake/Output Summary (Last 24 hours) at 3/23/2023 0941  Last data filed at 3/23/2023 1363  Gross per 24 hour   Intake 1455 ml   Output 1710 ml   Net -255 ml         Physical:  Well developed, well nourished in no acute distress  Mood indicates no abnormalities. Pt doesnt appear depressed  Orientated to time and place  Neck is supple, trachea is midline  Respiratory effort is normal  Cardiovascular show no extremity swelling  Abdomen no masses or hernias are palpated, there is no tenderness. Liver and Spleen appear normal.  Skin show no abnormal lesions  Lymph nodes are not palpated in the inguinal, neck, or axillary area.      Male :  Urine padmini in catheter      Labs:  WBC:    Lab Results   Component Value Date/Time    WBC 4.4 2023 04:45 AM     Hemoglobin/Hematocrit:    Lab Results   Component Value Date/Time    HGB 10.1 2023 04:45 AM    HCT 29.8 2023 04:45 AM     BMP:    Lab Results   Component Value Date/Time     2023 04:45 AM    K 4.0 2023 04:45 AM    K 5.3 2020 06:02 PM     2023 04:45 AM    CO2 24 2023 04:45 AM    BUN 51 2023 04:45 AM    LABALBU 3.5 2023 04:45 AM    CREATININE 2.6 2023 04:45 AM

## 2023-03-23 NOTE — PROGRESS NOTES
Interval History and plan:      Blood pressure is well controlled. Urine is 1710 ml       Plan:      Creatinine is higher 2.5 > 2.6   He is hypernatremic   Hold diuretics. Hold Entresto. Continue IV fluids. Continue supportive treatment to keep systolic blood pressure around 90. Last serum for free light chain ratio was 2.1. He has no significant proteinuria. Assessment :     Acute kidney injury:    Stage IIIb chronic kidney disease: Patient's baseline creatinine is around 1.5-1.9 since 2015. Ultrasound of the kidney in 2015 showed bilateral renal cyst.  CT scan showed multiple cysts within bilateral kidneys. He has underlying cardiorenal syndrome and creatinine fluctuates with the use of diuretics. Hypertension: well controlled. Congestive heart failure: last EF of 20 to 25% with grade 1 diastolic dysfunction. Cardiology is following the case. [facility] would like to thank Ewa Ta MD   for opportunity to serve this patient      Please call with questions at-   24 Hrs Answering service [phone] or  7 am- 5 pm via Perfect serve or cell phone  Merlene Kelly MD          CC/reason for consult :     Acute kidney injury     HPI :     Mariajose Molina is a 80 y.o. male presented to   the hospital on 3/22/2023 for AAA repair. He has past medical history of stage IIIb chronic kidney disease, osteoarthritis, AAA, coronary disease, DJD, hearing loss, hyperlipidemia, hypertension, aortic stenosis and enlarged prostate. He is history of heart failure with a EF of 25 to 30%. He was admitted for elective AAA repair on March 22. Recently his AAA size increased to 6.5 cm. He underwent repair today uneventfully. Entresto and torsemide was held. He is getting as needed blood pressure medication. Bro was placed in the OR which showed hematuria. He has history of BPH with TURP 20 years ago. He was evaluated by cardiology for heart failure.

## 2023-03-23 NOTE — PROGRESS NOTES
L + + + -/+ +    +, -/+ ,-/-       Labs:  CBC:   Recent Labs     03/22/23  0718 03/22/23  1140 03/23/23  0445   WBC 3.9* 3.8* 4.4   HGB 10.5* 9.6* 10.1*   HCT 31.1* 28.8* 29.8*   * 95* 90*       BMP:   Recent Labs     03/22/23  0718 03/22/23  1140 03/23/23  0445   * 144 147*   K 3.9 3.7 4.0    108 112*   CO2 28 27 24   BUN 52* 49* 51*   CREATININE 2.5* 2.4* 2.6*   GLUCOSE 104* 102* 96     LFT's:   Recent Labs     03/22/23  0718   AST 14*   ALT 8*   BILITOT 0.8   ALKPHOS 106     Troponin:   Recent Labs     03/23/23  0629   TROPONINI 0.06*     BNP: No results for input(s): BNP in the last 72 hours. ABGs:   Recent Labs     03/22/23  1140   PHART 7.353   KEB4GPH 49.2*   PO2ART 164.0*     INR: No results for input(s): INR in the last 72 hours. U/A:No results for input(s): NITRITE, COLORU, PHUR, LABCAST, WBCUA, RBCUA, MUCUS, TRICHOMONAS, YEAST, BACTERIA, CLARITYU, SPECGRAV, LEUKOCYTESUR, UROBILINOGEN, BILIRUBINUR, BLOODU, GLUCOSEU, AMORPHOUS in the last 72 hours. Invalid input(s): Sherron Bower     Rad:   XR CHEST PORTABLE   Final Result      Mild pulmonary edema. Severe cardiomegaly. No visualized pleural effusion on this frontal radiograph. IR AORTAGRAM    (Results Pending)   XR CHEST PORTABLE    (Results Pending)       ASSESSMENT AND PLAN:   Ambrocio Dean is a 80 y.o. male, who was admitted following endovascular abdominal aortic aneurysm repair on 3/22/23 for an unruptured abdominal aneurysm that had been stable before a recent growth to 6.5 cm. POD#1      NEUROLOGIC:  Analgesia  - Morphine pain panel PRN         CARDIOVASCULAR:  S/P Endovascular abdominal aortic aneurysm repair.  POD#1  - SBP goal 100 - 160   - IV Hydralazine 5 mg q 6 PRN   - q4 neurovascular checks   - ASA 81 mg daily      Hx of chronic systolic heart failure with severe aortic stenosis   - Patient followed by Dr. Leeanna Lorenzo outpatient, who saw inpatient               - Hold Entresto and Toresmide               - IV Hydralazine PRN for blood pressure control with likely switch to PO tomorrow  - No beta blockers given baseline bradycardia       Left lateral chest pain   - CXR, EKG, troponin ordered this morning   - Will follow-up with cardiology      PULMONARY:   - No history of pulmonary disease   - Encourage deep breathing and coughing   - Aggressive IS use. IS was to 900 this morning   - Aspiration precautions-- Elevate HOB greater than 30 degrees  - Wean oxygen as able. Currently on 0.5 L NC         GASTROINTESTINAL:   - No history of GERD   - Zofran PRN         FLUIDS/ELECTROLYTES/NUTRITION:   - IVF: NS 50 cc/hr   - Will replace electrolytes as needed   - Continue clear liquid diet for today         GENITOURINARY:   Stage IIIb chronic kidney disease   - Cr 2.6 from 2.4 from baseline Cr: 2.5 preop   - Appreciate nephrology recs    - Patient with underlying cardiorenal syndrome    - Hold diuretics and Entresto    - Continue IV fluids      Post-op bloody urine   - Bro placed in OR with bloody urine noted since surgery, clearer this AM compared to yesterday   - Bro is to remain in place per vascular surgery   - Patient with possible TURP history in 2000 with Dr. Aram Rudolph.  No original op note could be located, patient and family both deny previous TURP history   - Urology consulted, will follow-up         ENDOCRINE  - No history of DM   - Last random glucose 96, 102, 104  - Will continue to monitor         HEMATOLOGIC/INFECTIOUS DISEASE:   - Hgb 10.1 from 9.6 from 10.5 pre-op   - Leukocytes 4.4 from 3.8 from 3.9 pre-op         ACTIVITY:   - Okay for OOB with assistance  - Please walk with patient in halls at least once per shift         DISPO:  - ICU         CODE STATUS:   - Full code       Ashely Owens MD  PGY1, General Surgery  03/23/23  8:30 AM  YKMDQ#[phone]

## 2023-03-23 NOTE — CARE COORDINATION
Case Management Assessment  Initial Evaluation    Date/Time of Evaluation: 3/23/2023 10:00 AM  Assessment Completed by: Julia Mckinney RN    If patient is discharged prior to next notation, then this note serves as note for discharge by case management. Patient Name: Gasper Patient                   YOB: 1932  Diagnosis: Abdominal aortic aneurysm (AAA) without rupture, unspecified part ([facility] [address] [I71.40]  AAA (abdominal aortic aneurysm) without rupture ([facility] [address] [I71.40]                   Date / Time: 3/22/2023  6:33 AM    Patient Admission Status: Inpatient   Readmission Risk (Low < 19, Mod (19-27), High > 27): Readmission Risk Score: 17.3    Current PCP: Preet Saldana MD  PCP verified by CM? Yes Preet Saldana MD)    Chart Reviewed: Yes      History Provided by: Medical Record, Patient, Other (see comment) [facility] staff)  Patient Orientation: Alert and Oriented    Patient Cognition: Alert    Hospitalization in the last 30 days (Readmission):  No    If yes, Readmission Assessment in  Navigator will be completed. Advance Directives:      Code Status: Full Code   Patient's Primary Decision Maker is: Patient Declined (Legal Next of Kin Remains as Decision Maker)      Discharge Planning:    Patient lives with:  Other (Comment) (care facility) Type of Home: Long-Term Care [facility] [address] [facility]. [facility]  [phone])  Primary Care Giver:  (long term care resident @ )  Patient Support Systems include: Family Members, /, Other (Comment) (care facility staff)   Current Financial resources: Medicaid, Medicare  Current community resources: ECF/Home Care  Current services prior to admission: Extended Care Placement            Current DME:              Type of Home Care services:  None    ADLS  Prior functional level: Assistance with the following:, Bathing, Dressing, Toileting, Cooking, Mobility, Shopping, Housework  Current functional level: Assistance with the following:, Bathing, Dressing, Toileting, Cooking, Housework, Shopping, Mobility    PT AM-PAC:   /24  OT AM-PAC:   /24    Family can provide assistance at DC: No  Would you like Case Management to discuss the discharge plan with any other family members/significant others, and if so, who? Yes (daughter)  Plans to Return to Present Housing: Yes (bed is on hold per Nydia in admissions)  Potential Assistance needed at discharge: Javier (will need transportation back to LTCF. Bed on hold)            Potential DME:  deferred  Patient expects to discharge to: Long-term care  Plan for transportation at discharge:  [facility] [place]    Financial    Payor: MEDICARE / Plan: MEDICARE PART A AND B / Product Type: *No Product type* /     Potential assistance Purchasing Medications: No    No Pharmacies Listed    Notes:    Factors facilitating achievement of predicted outcomes: Family support, Caregiver support, and Cooperative    Barriers to discharge: Long standing deficits    The Patient and/or Patient Representative Agree with the Discharge Plan?   Thomas Johnson RN  Case Management Department  [phone]

## 2023-03-23 NOTE — PROGRESS NOTES
Recent Labs     03/22/23  0718 03/22/23  1140 03/23/23  0445   * 144 147*   K 3.9 3.7 4.0   BUN 52* 49* 51*   CREATININE 2.5* 2.4* 2.6*    108 112*   CO2 28 27 24   GLUCOSE 104* 102* 96   CALCIUM 8.8 9.0 8.5   MG  --   --  2.20     Recent Labs     03/22/23  0718 03/22/23  1140 03/23/23  0445   WBC 3.9* 3.8* 4.4   HGB 10.5* 9.6* 10.1*   HCT 31.1* 28.8* 29.8*   * 95* 90*   MCV 99.0 99.8 100.7*     No results for input(s): CHOLTOT, TRIG, HDL, CHOLHDL, LDL in the last 72 hours. Invalid input(s): Irvin Bingham  No results for input(s): PTT, INR in the last 72 hours. Invalid input(s): PT  Recent Labs     03/23/23  0629   TROPONINI 0.06*     No results for input(s): BNP in the last 72 hours. No results for input(s): NTPROBNP in the last 72 hours. No results for input(s): TSH in the last 72 hours. Imaging:   I personally reviewed imaging studies including CXR, Stress test, TTE/ABRAHAN. Assessment & Plan:     Chronic systolic heart failure/severe AS  -Seems compensated. No volume overload. -Given worsening creatinine, hold off on Entresto. Does have hematuria as well.  -As needed hydralazine, likely transition to p.o. hydralazine given known heart failure  -Not on beta-blocker.  -Chest pain, noncardiac in nature. Thank you for allowing to us to participate in the care of Balbina Solano. Please call our service with questions. All questions and concerns were addressed to the patient/family. Alternatives to my treatment were discussed. The note was completed using EMR. Every effort was made to ensure accuracy; however, inadvertent computerized transcription errors may be present. I have personally reviewed the reports and images of labs, radiological studies, cardiac studies including ECG's and telemetry, current and old medical records. Alexey Butts MD, [facility], [facility] [address]    3/23/2023 10:00 AM

## 2023-03-23 NOTE — DISCHARGE INSTRUCTIONS
Discharge Instructions:    Diet:   You may resume a regular diet. Wound Care:   Skin glue with mesh was used to cover your groin incisions. It will fall off on its own in about 10 days. You may shower, but do not scrub the incision sites directly or soak (tub, pool, etc.). Activity:   No heavy lifting greater than a milk jug until follow up. Pain management:   Unless informed of any restrictions by your primary care physician, please use your preferred over-the-counter pain reliever as your primary pain medication. Return Precautions:   Call/ Return to ED for increased redness, worsening pain, drainage from wound, fevers, or any other concerns about your incision or post op course. Please continue to take your aspirin daily as prescribed. Follow up with Dr. Roxianne Lefort in 4 weeks. April 20th at 10:30am. Please call [phone] if any issues of questions. Please obtain your CTA scan the day before or the day of your appointment with Dr. Roxianne Lefort. Please see Dr. Bharti Andrew (nephrologist) in the office in 2 weeks Please call his office to make an appointment.

## 2023-03-23 NOTE — PROGRESS NOTES
Patient alert and oriented x4, able to follow commands in all four extremities. Slept well overnight. Endorses some chest pain, PRN morphine given x2 overnight, no SOB, on .5L NC with O2Sat 98%. Sinus rhythm to sinus azra, SBP remains greater than 100. Afebrile. Pulses still palpable except a doppler was needed for PT pulses. Groin site remains unchanged. Clear, dry, intact, no drainage, no hematoma, soft to the touch.

## 2023-03-24 VITALS
DIASTOLIC BLOOD PRESSURE: 65 MMHG | HEIGHT: 66 IN | OXYGEN SATURATION: 97 % | BODY MASS INDEX: 30.26 KG/M2 | SYSTOLIC BLOOD PRESSURE: 115 MMHG | HEART RATE: 62 BPM | TEMPERATURE: 98.2 F | WEIGHT: 188.27 LBS | RESPIRATION RATE: 17 BRPM

## 2023-03-24 LAB
ALBUMIN SERPL-MCNC: 3.2 G/DL (ref 3.4–5)
ANION GAP SERPL CALCULATED.3IONS-SCNC: 12 MMOL/L (ref 3–16)
BASOPHILS # BLD: 0 K/UL (ref 0–0.2)
BASOPHILS NFR BLD: 0.2 %
BUN SERPL-MCNC: 48 MG/DL (ref 7–20)
CALCIUM SERPL-MCNC: 8.4 MG/DL (ref 8.3–10.6)
CHLORIDE SERPL-SCNC: 114 MMOL/L (ref 99–110)
CO2 SERPL-SCNC: 23 MMOL/L (ref 21–32)
CREAT SERPL-MCNC: 2.4 MG/DL (ref 0.8–1.3)
DEPRECATED RDW RBC AUTO: 14.8 % (ref 12.4–15.4)
EOSINOPHIL # BLD: 0.6 K/UL (ref 0–0.6)
EOSINOPHIL NFR BLD: 9 %
GFR SERPLBLD CREATININE-BSD FMLA CKD-EPI: 25 ML/MIN/{1.73_M2}
GLUCOSE SERPL-MCNC: 127 MG/DL (ref 70–99)
HCT VFR BLD AUTO: 29.9 % (ref 40.5–52.5)
HGB BLD-MCNC: 10 G/DL (ref 13.5–17.5)
LYMPHOCYTES # BLD: 0.7 K/UL (ref 1–5.1)
LYMPHOCYTES NFR BLD: 10.2 %
MAGNESIUM SERPL-MCNC: 2.1 MG/DL (ref 1.8–2.4)
MCH RBC QN AUTO: 33.6 PG (ref 26–34)
MCHC RBC AUTO-ENTMCNC: 33.3 G/DL (ref 31–36)
MCV RBC AUTO: 100.8 FL (ref 80–100)
MONOCYTES # BLD: 0.6 K/UL (ref 0–1.3)
MONOCYTES NFR BLD: 9.9 %
NEUTROPHILS # BLD: 4.6 K/UL (ref 1.7–7.7)
NEUTROPHILS NFR BLD: 70.7 %
PHOSPHATE SERPL-MCNC: 3 MG/DL (ref 2.5–4.9)
PLATELET # BLD AUTO: 84 K/UL (ref 135–450)
PMV BLD AUTO: 8 FL (ref 5–10.5)
POTASSIUM SERPL-SCNC: 4.3 MMOL/L (ref 3.5–5.1)
RBC # BLD AUTO: 2.97 M/UL (ref 4.2–5.9)
SODIUM SERPL-SCNC: 149 MMOL/L (ref 136–145)
TROPONIN T SERPL-MCNC: 0.05 NG/ML
WBC # BLD AUTO: 6.4 K/UL (ref 4–11)

## 2023-03-24 PROCEDURE — 6370000000 HC RX 637 (ALT 250 FOR IP): Performed by: SURGERY

## 2023-03-24 PROCEDURE — 2580000003 HC RX 258: Performed by: SURGERY

## 2023-03-24 PROCEDURE — 84484 ASSAY OF TROPONIN QUANT: CPT

## 2023-03-24 PROCEDURE — 85025 COMPLETE CBC W/AUTO DIFF WBC: CPT

## 2023-03-24 PROCEDURE — 94761 N-INVAS EAR/PLS OXIMETRY MLT: CPT

## 2023-03-24 PROCEDURE — 6370000000 HC RX 637 (ALT 250 FOR IP): Performed by: STUDENT IN AN ORGANIZED HEALTH CARE EDUCATION/TRAINING PROGRAM

## 2023-03-24 PROCEDURE — 2700000000 HC OXYGEN THERAPY PER DAY

## 2023-03-24 PROCEDURE — 83735 ASSAY OF MAGNESIUM: CPT

## 2023-03-24 PROCEDURE — 51798 US URINE CAPACITY MEASURE: CPT

## 2023-03-24 PROCEDURE — 2580000003 HC RX 258: Performed by: INTERNAL MEDICINE

## 2023-03-24 PROCEDURE — 36415 COLL VENOUS BLD VENIPUNCTURE: CPT

## 2023-03-24 PROCEDURE — 80069 RENAL FUNCTION PANEL: CPT

## 2023-03-24 PROCEDURE — 99232 SBSQ HOSP IP/OBS MODERATE 35: CPT | Performed by: INTERNAL MEDICINE

## 2023-03-24 RX ORDER — METHOCARBAMOL 500 MG/1
250 TABLET, FILM COATED ORAL EVERY 8 HOURS PRN
COMMUNITY

## 2023-03-24 RX ORDER — ALLOPURINOL 100 MG/1
100 TABLET ORAL DAILY
COMMUNITY

## 2023-03-24 RX ORDER — TORSEMIDE 20 MG/1
40 TABLET ORAL 2 TIMES DAILY WITH MEALS
COMMUNITY

## 2023-03-24 RX ORDER — LIDOCAINE 4 G/G
1 PATCH TOPICAL 2 TIMES DAILY
COMMUNITY

## 2023-03-24 RX ORDER — DEXTROSE MONOHYDRATE 50 MG/ML
INJECTION, SOLUTION INTRAVENOUS CONTINUOUS
Status: ACTIVE | OUTPATIENT
Start: 2023-03-24 | End: 2023-03-24

## 2023-03-24 RX ORDER — SWAB
SWAB, NON-MEDICATED MISCELLANEOUS
COMMUNITY

## 2023-03-24 RX ORDER — LORAZEPAM 0.5 MG/1
0.5 TABLET ORAL 2 TIMES DAILY
COMMUNITY

## 2023-03-24 RX ORDER — ASPIRIN 325 MG
325 TABLET, DELAYED RELEASE (ENTERIC COATED) ORAL DAILY
Qty: 30 TABLET | Refills: 3 | Status: CANCELLED | OUTPATIENT
Start: 2023-03-24 | End: 2024-03-23

## 2023-03-24 RX ORDER — ONDANSETRON 4 MG/1
4 TABLET, FILM COATED ORAL EVERY 8 HOURS PRN
COMMUNITY

## 2023-03-24 RX ORDER — NITROGLYCERIN 0.4 MG/1
0.4 TABLET SUBLINGUAL EVERY 5 MIN PRN
COMMUNITY

## 2023-03-24 RX ORDER — ATORVASTATIN CALCIUM 80 MG/1
80 TABLET, FILM COATED ORAL NIGHTLY
Status: DISCONTINUED | OUTPATIENT
Start: 2023-03-24 | End: 2023-03-24 | Stop reason: HOSPADM

## 2023-03-24 RX ORDER — SACUBITRIL AND VALSARTAN 24; 26 MG/1; MG/1
1 TABLET, FILM COATED ORAL 2 TIMES DAILY
COMMUNITY

## 2023-03-24 RX ORDER — BUPROPION HYDROCHLORIDE 150 MG/1
150 TABLET ORAL 2 TIMES DAILY
Status: DISCONTINUED | OUTPATIENT
Start: 2023-03-24 | End: 2023-03-24 | Stop reason: HOSPADM

## 2023-03-24 RX ORDER — DOCUSATE SODIUM 100 MG/1
100 CAPSULE, LIQUID FILLED ORAL 2 TIMES DAILY PRN
COMMUNITY

## 2023-03-24 RX ORDER — MIRTAZAPINE 15 MG/1
15 TABLET, FILM COATED ORAL NIGHTLY
COMMUNITY

## 2023-03-24 RX ORDER — ATORVASTATIN CALCIUM 80 MG/1
80 TABLET, FILM COATED ORAL
COMMUNITY

## 2023-03-24 RX ORDER — HYDROCORTISONE 25 MG/ML
LOTION TOPICAL PRN
COMMUNITY

## 2023-03-24 RX ORDER — ACETAMINOPHEN 325 MG/1
650 TABLET ORAL EVERY 4 HOURS PRN
Status: DISCONTINUED | OUTPATIENT
Start: 2023-03-24 | End: 2023-03-24 | Stop reason: HOSPADM

## 2023-03-24 RX ORDER — TAMSULOSIN HYDROCHLORIDE 0.4 MG/1
0.4 CAPSULE ORAL DAILY
Status: DISCONTINUED | OUTPATIENT
Start: 2023-03-24 | End: 2023-03-24 | Stop reason: HOSPADM

## 2023-03-24 RX ORDER — POTASSIUM CHLORIDE 750 MG/1
30 CAPSULE, EXTENDED RELEASE ORAL DAILY
COMMUNITY

## 2023-03-24 RX ORDER — PHENOL 1.4 %
10 AEROSOL, SPRAY (ML) MUCOUS MEMBRANE
COMMUNITY

## 2023-03-24 RX ADMIN — BUPROPION HYDROCHLORIDE 150 MG: 150 TABLET, EXTENDED RELEASE ORAL at 09:20

## 2023-03-24 RX ADMIN — ASPIRIN 81 MG: 81 TABLET, COATED ORAL at 07:46

## 2023-03-24 RX ADMIN — SODIUM CHLORIDE, PRESERVATIVE FREE 10 ML: 5 INJECTION INTRAVENOUS at 07:46

## 2023-03-24 RX ADMIN — KETOTIFEN FUMARATE 1 DROP: 0.25 SOLUTION/ DROPS OPHTHALMIC at 07:46

## 2023-03-24 RX ADMIN — TAMSULOSIN HYDROCHLORIDE 0.4 MG: 0.4 CAPSULE ORAL at 09:20

## 2023-03-24 RX ADMIN — DEXTROSE MONOHYDRATE: 50 INJECTION, SOLUTION INTRAVENOUS at 10:00

## 2023-03-24 ASSESSMENT — PAIN SCALES - GENERAL
PAINLEVEL_OUTOF10: 0

## 2023-03-24 NOTE — CARE COORDINATION
Addendum 5  Mr. Jose R Galdamez daughter will transport him back to [facility] today via private car/ Updated [facility]. Case Management Assessment            Discharge Note                    Date / Time of Note: 3/24/2023 1:08 PM                  Discharge Note Completed by: Emile Escobar RN    Mr. Armando Islas will discharge back to [facility] today. [facility] will transport around 630p. RN CALL REPORT [phone]  AVS [phone]    A call was placed to Mr. Jose R Galdamez daughter, Yenifer Graft [phone], to notify of the discharge date/time. Left a HIPAA compliant message requesting a call back. Patient Name: Yadira Holm   YOB: 1932  Diagnosis: Abdominal aortic aneurysm (AAA) without rupture, unspecified part ([address] [I71.40]  AAA (abdominal aortic aneurysm) without rupture ([address] [I71.40]   Date / Time: 3/22/2023  6:33 AM    Current PCP: Fifi Woodruff MD     Advance Directives:  Code Status: Full Code    Financial:  Payor: MEDICARE / Plan: MEDICARE PART A AND B / Product Type: *No Product type* /      Pharmacy:    [place] [address]  Phone: [phone] Fax: [phone]      DISCHARGE Disposition:    Long-Term Care ([facility] [address] Jaquelinmichelled[place] Magdaleno [place]  [phone])    LOC at discharge: [address]  SUZY Completed: Yes    IMM Completed:   Yes, Case management has presented and reviewed IMM letter #2 to the patient and/or family/ POA. Patient and/or family/POA verbalized understanding of their medicare rights and appeal process if needed. Patient and/or family/POA has signed, initialed and placed today's date (3/24/2023) and time (1330) on letter #2 on the the appropriate lines.  Patient and/or family/POA, copy of signed letter provided and they are aware that this original copy of IMM letter #2 is available prior to discharge from the paper chart on the unit. Electronic documentation has been entered into epic for IMM letter #2 and original paper copy has been added to the paper chart at the nurses station. Transportation:  Transportation PLAN for discharge: EMS transportation   Mode of Transport: Ambulance stretcher - [address]: [place] [address] Ambulance  Phone: [phone]  Time of Transport: [phone] form completed: Yes    The Patient and/or patient representative Swapna Sinclair and his family were provided with a choice of provider and agrees with the discharge plan Yes    Freedom of choice list was provided with basic dialogue that supports the patient's individualized plan of care/goals and shares the quality data associated with the providers.  Yes    Care Transitions patient: No    Mckinley Castillo RN  [facility]  Case Management Department  [phone]

## 2023-03-24 NOTE — PLAN OF CARE
Problem: Chronic Conditions and Co-morbidities  Goal: Patient's chronic conditions and co-morbidity symptoms are monitored and maintained or improved  Outcome: Progressing     Problem: Safety - Adult  Goal: Free from fall injury  Outcome: Progressing     Problem: Pain  Goal: Verbalizes/displays adequate comfort level or baseline comfort level  Outcome: Progressing     Problem: Skin/Tissue Integrity - Adult  Goal: Incisions, wounds, or drain sites healing without S/S of infection  Outcome: Progressing
Problem: Skin/Tissue Integrity - Adult  Goal: Incisions, wounds, or drain sites healing without S/S of infection  Outcome: Progressing     Problem: Pain  Goal: Verbalizes/displays adequate comfort level or baseline comfort level  Outcome: Progressing  Flowsheets (Taken 3/22/2023 2000)  Verbalizes/displays adequate comfort level or baseline comfort level:   Encourage patient to monitor pain and request assistance   Assess pain using appropriate pain scale   Administer analgesics based on type and severity of pain and evaluate response   Implement non-pharmacological measures as appropriate and evaluate response     Problem: Safety - Adult  Goal: Free from fall injury  Outcome: Progressing     Problem: Chronic Conditions and Co-morbidities  Goal: Patient's chronic conditions and co-morbidity symptoms are monitored and maintained or improved  Outcome: Progressing  Flowsheets (Taken 3/22/2023 2000)  Care Plan - Patient's Chronic Conditions and Co-Morbidity Symptoms are Monitored and Maintained or Improved:   Monitor and assess patient's chronic conditions and comorbid symptoms for stability, deterioration, or improvement   Collaborate with multidisciplinary team to address chronic and comorbid conditions and prevent exacerbation or deterioration     Problem: Discharge Planning  Goal: Discharge to home or other facility with appropriate resources  Outcome: Progressing  Flowsheets (Taken 3/22/2023 2000)  Discharge to home or other facility with appropriate resources: Identify barriers to discharge with patient and caregiver
Pain  Goal: Verbalizes/displays adequate comfort level or baseline comfort level  3/24/2023 1433 by Ashely Rocha RN  Outcome: Completed  Flowsheets (Taken 3/24/2023 0800)  Verbalizes/displays adequate comfort level or baseline comfort level:   Assess pain using appropriate pain scale   Administer analgesics based on type and severity of pain and evaluate response  3/24/2023 0701 by Elisabeth Bach RN  Outcome: Progressing     Problem: ABCDS Injury Assessment  Goal: Absence of physical injury  3/24/2023 1433 by Ashely Rocha RN  Outcome: Completed  Flowsheets (Taken 3/24/2023 1000)  Absence of Physical Injury: Implement safety measures based on patient assessment  3/24/2023 0701 by Elisabeth Bach RN  Outcome: Progressing
Verbalizes/displays adequate comfort level or baseline comfort level  3/23/2023 0809 by Rene Oro RN  Outcome: Progressing  Flowsheets (Taken 3/22/2023 2000 by Ayala Vincent RN)  Verbalizes/displays adequate comfort level or baseline comfort level:   Encourage patient to monitor pain and request assistance   Assess pain using appropriate pain scale   Administer analgesics based on type and severity of pain and evaluate response   Implement non-pharmacological measures as appropriate and evaluate response  3/23/2023 0432 by Ayala Vincent RN  Outcome: Progressing  Flowsheets (Taken 3/22/2023 2000)  Verbalizes/displays adequate comfort level or baseline comfort level:   Encourage patient to monitor pain and request assistance   Assess pain using appropriate pain scale   Administer analgesics based on type and severity of pain and evaluate response   Implement non-pharmacological measures as appropriate and evaluate response     Problem: Skin/Tissue Integrity - Adult  Goal: Incisions, wounds, or drain sites healing without S/S of infection  3/23/2023 0809 by Rene Oro RN  Outcome: Progressing  Note: Absence of new skin breakdown. Patient turned routinely with pillow support. Heels and arms elevated off of bed. Skin assessed. Prophylactic sacral heart in place. Patient on special air mattress. Patient educated on risks associated with prolonged bed rest. Patient checked routinely for incontinence, cleansed thoroughly in its presence. Lines off loaded from skin. Will continue to monitor and reassess.      3/23/2023 0432 by Ayala Vincent RN  Outcome: Progressing

## 2023-03-24 NOTE — PROGRESS NOTES
Urology Attending Progress Note      Subjective: Urine stayed clear overnight. Bro was removed this AM for voiding trial    Vitals:  /68   Pulse 54   Temp 98.1 °F (36.7 °C) (Oral)   Resp 25   Ht 5' 6\" (1.676 m)   Wt 188 lb 4.4 oz (85.4 kg)   SpO2 96%   BMI 30.39 kg/m²   Temp  Av.5 °F (36.9 °C)  Min: 98.1 °F (36.7 °C)  Max: 98.9 °F (37.2 °C)    Intake/Output Summary (Last 24 hours) at 3/24/2023 1002  Last data filed at 3/24/2023 0900  Gross per 24 hour   Intake 2343 ml   Output 1650 ml   Net 693 ml       Exam: No distress    Labs:  WBC:    Lab Results   Component Value Date/Time    WBC 6.4 2023 05:28 AM     Hemoglobin/Hematocrit:    Lab Results   Component Value Date/Time    HGB 10.0 2023 05:28 AM    HCT 29.9 2023 05:28 AM     BMP:    Lab Results   Component Value Date/Time     2023 05:28 AM    K 4.3 2023 05:28 AM    K 5.3 2020 06:02 PM     2023 05:28 AM    CO2 23 2023 05:28 AM    BUN 48 2023 05:28 AM    LABALBU 3.2 2023 05:28 AM    CREATININE 2.4 2023 05:28 AM    CALCIUM 8.4 2023 05:28 AM    GFRAA 46 2021 02:16 PM    GFRAA 76 2012 09:50 AM    LABGLOM 25 2023 05:28 AM     PT/INR:    Lab Results   Component Value Date/Time    PROTIME 15.6 2020 12:06 PM    INR 1.34 2020 12:06 PM     PTT:    Lab Results   Component Value Date/Time    APTT 28.2 2020 12:06 PM   [APTT      Impression/Plan: 79 yo M POD#2 sp AAA repair. We were consulted for gross hematuria. -Urine cleared overnight. No current  complaints  -Bro removed this AM for voiding trial. PVR check after first void. If <500cc, call urology  -Will arrange outpatient FU in 2-3 weeks.  Call with any questions    GAURAV Trujillo

## 2023-03-24 NOTE — PROGRESS NOTES
Pt alert and oriented x 4 and up to the chair with a stand by assist for breakfast. Pt is on room air with an SpO2 of 97%. Bro removed at 0900.  Pt has no needs at this time

## 2023-03-24 NOTE — PROGRESS NOTES
ICU SURGERY DAILY PROGRESS NOTE    CC: Endovascular Abdominal Aortic Aneurysm Repair     SUBJECTIVE:   Interval Hx: Patient sleeping soundly upon entering room. Denies any chest pain this morning. Denies changes to motor or sensation to bilateral lower extremities. Ambulated in the halls. Tolerating a regular diet. UOP appropriate per Bro. Urine is padmini colored this AM without visible blood. IS to 900. Eye drops ordered for dry eyes, helping. ROS: A 10 point review of systems was conducted, significant findings as noted above. All other systems negative. OBJECTIVE:   Vitals:   Vitals:    03/23/23 2200 03/23/23 2300 03/24/23 0000 03/24/23 0100   BP: (!) 107/58 (!) 104/53 (!) 109/52 113/60   Pulse: 59 55 62 57   Resp: 16 16 17 18   Temp:   98.6 °F (37 °C)    TempSrc:   Oral    SpO2: 98%  99% 97%   Weight:       Height:           I/O:   Intake/Output Summary (Last 24 hours) at 3/24/2023 0601  Last data filed at 3/24/2023 0000  Gross per 24 hour   Intake 3648 ml   Output 1285 ml   Net 2363 ml       I/O last 3 completed shifts: In: 9651 [P.O.:1540; I.V.:2761; IV Piggyback:50]  Out: 2560 [Urine:2560]    Diet: ADULT DIET; Regular; Low Potassium (Less than 3000 mg/day); Low Phosphorus (Less than 1000 mg)      Physical Examination:   General appearance: resting, no acute distress  Eyes: No scleral icterus, EOM grossly intact  Neck: trachea midline, neck supple  Chest/Lungs: normal effort with no accessory muscle use on 1 L NC   Cardiovascular: sinus bradycardia   Abdomen: Soft, non-tender, non-distended, no rebound, guarding, or rigidity. Skin: warm and dry, no rashes  Extremities: Groin puncture sites bilaterally, dry and intact and soft without hematoma  Genitourinary:  Bro in place with padmini urine   Neuro: A&Ox3, no focal deficits, sensation intact     Pulses     R F P PT DP   R + + + -/+ +   L + + + -/+ +    +, -/+ ,-/-       Labs:  CBC:   Recent Labs     03/22/23  1140 03/23/23  0445 03/24/23  0528   WBC needed   - No beta blockers given baseline bradycardia       Left lateral chest pain   - Likely a component of patient's severe AS per cardiology   - Troponin 0.07 from 0.06     PULMONARY:   - No history of pulmonary disease   - Encourage deep breathing and coughing   - Aggressive IS use. IS was to 900 this morning   - Aspiration precautions-- Elevate HOB greater than 30 degrees  - Wean oxygen as able. Currently on 1 L NC         GASTROINTESTINAL:   - No history of GERD   - Zofran PRN         FLUIDS/ELECTROLYTES/NUTRITION:   - Continue regular low potassium, low phosphorus diet   - Replace electrolytes as needed    - Follow up renal panel this morning         GENITOURINARY:   Stage IIIb chronic kidney disease   - Cr 2.6 from 2.4 from baseline Cr: 2.5 preop   - Appreciate nephrology recs    - Patient with underlying cardiorenal syndrome    - Hold diuretics and Entresto        Post-op bloody urine   - Urine padmini colored this morning   - Patient with possible TURP history in 2000 with Dr. Patrick Stout.  No original op note could be located, patient and family both deny previous TURP history   - Urology consulted, appreciate recs         ENDOCRINE  - No history of DM   - Last random glucose 96, 102, 104  - Will continue to monitor         HEMATOLOGIC/INFECTIOUS DISEASE:   - Hgb stable at 10.0 from 10.1  - Leukocytes stable at 6.4 from 4.4        ACTIVITY:   - Okay for OOB with assistance  - Please walk with patient in halls at least once per shift         DISPO:  - Home later today pending removal of Bro per urology and successful void trial         CODE STATUS:   - Full code       Kassandra Dang MD  PGY1, General Surgery  03/24/23  6:01 AM  TXSVK#[phone]

## 2023-03-24 NOTE — PROGRESS NOTES
records. Alexey Lorenzo MD, [facility], [facility] [address]    3/24/2023 9:30 AM

## 2023-03-24 NOTE — PROGRESS NOTES
Pt discharged with his daughter, Neo William, who is transporting his to [facility]. Report called to Gema Everett at [facility] and AVS/SUZY faxed to her preferred fax number- [phone]. All questions answered in report to Gema Everett. Discharge instructions completed with pt's daughter, Neo William. She was given a copy of the AVS as well as the order for pt's repeat CT scan. She has no questions about discharge. Pt alert and agreeable to discharge.

## 2023-03-24 NOTE — CONSULTS
nitroGLYCERIN (NITROSTAT) 0.4 mg, SubLINGual, EVERY 5 MIN PRN, up to max of 3 total doses. If no relief after 1 dose, call 911. ondansetron (ZOFRAN) 4 mg, Oral, EVERY 8 HOURS PRN    polyethylene glycol (GLYCOLAX) 17 g, Oral, DAILY    potassium chloride (MICRO-K) 10 MEQ extended release capsule 30 mEq, Oral, DAILY    pyrithione zinc (DANDRUFF DRY SCALP CARE) 1 % shampoo Topical, TWICE WEEKLY, Apply topically to scalp Tues/Fri    sacubitril-valsartan (ENTRESTO) 24-26 MG per tablet 1 tablet, Oral, 2 TIMES DAILY    tamsulosin (FLOMAX) 0.4 mg, Oral, DAILY    torsemide (DEMADEX) 40 mg, Oral, 2 times daily with meals       Please call with any questions.   Patrick Maddox PharmD., BCPS   3/24/2023 10:52 AM  Wireless: [phone]

## 2023-03-24 NOTE — DISCHARGE INSTR - COC
30-59 ml/min ([facility]) N18.30    Chronic systolic congestive heart failure ([facility]) I50.22    Hypertensive emergency I16.1    Acute on chronic combined systolic and diastolic congestive heart failure ([facility]) I50.43    Respiratory distress R06.03    Dyspnea on exertion R06.09    Fluid overload E87.70    Bradycardia R00.1    Mass of left hand R22.32    Ischemic cardiomyopathy I25.5    Acute bilateral low back pain with left-sided sciatica M54.42    Abdominal aortic aneurysm (AAA) without rupture I71.40    TIA (transient ischemic attack) G45.9    CHF (congestive heart failure), NYHA class I, acute on chronic, combined ([facility] [address] I50.43    Mixed hyperlipidemia E78.2    CARLI (acute kidney injury) ([facility] [address] N17.9    Acute kidney injury superimposed on CKD ([facility]) N17.9, N18.9    Aortic valve disease I35.9    Elevated lipids E78.5    Leg swelling M79.89    Murmur, heart R01.1    S/P TURP Z90.79    Coronary atherosclerosis I25.10    Ischemic heart disease I25.9    Preop cardiovascular exam Z01.810    AAA (abdominal aortic aneurysm) without rupture I71.40       Isolation/Infection:   Isolation            No Isolation          Patient Infection Status       Infection Onset Added Last Indicated Last Indicated By Review Planned Expiration Resolved Resolved By    None active    Resolved    COVID-19 (Rule Out) 11/06/20 11/06/20 11/06/20 COVID-19 (Ordered)   11/06/20 Rule-Out Test Resulted            Nurse Assessment:  Last Vital Signs: /60   Pulse 59   Temp 98.2 °F (36.8 °C) (Oral)   Resp 21   Ht 5' 6\" (1.676 m)   Wt 188 lb 4.4 oz (85.4 kg)   SpO2 93%   BMI 30.39 kg/m²     Last documented pain score (0-10 scale): Pain Level: 0  Last Weight:   Wt Readings from Last 1 Encounters:   03/24/23 188 lb 4.4 oz (85.4 kg)     Mental Status:  oriented, alert, and able to concentrate and follow conversation    IV Access:  - None    Nursing Mobility/ADLs:  Walking   Independent  Transfer  Independent  Bathing  Independent  Dressing are sent with patient):  Dentures upper and lower    RN SIGNATURE:  Electronically signed by Maurice Nolen RN on 3/24/23 at 3:18 PM EDT    CASE MANAGEMENT/SOCIAL WORK SECTION    Inpatient Status Date: 3/24/2023    Readmission Risk Assessment Score:  Readmission Risk              Risk of Unplanned Readmission:  19           Discharging to Facility/ [address]  [phone]    / signature: Electronically signed by Mckinley Castillo RN on 3/24/23 at 1:03 PM EDT    PHYSICIAN SECTION    Prognosis: {Prognosis:0916567766}    Condition at Discharge: [address] [place] [address] Patient Condition:710000683}    Rehab Potential (if transferring to Rehab): {Prognosis:7811735700}    Recommended Labs or Other Treatments After Discharge: ***    Physician Certification: I certify the above information and transfer of Sandro Mancera  is necessary for the continuing treatment of the diagnosis listed and that he requires {Admit to Appropriate Level of Care:98219} for {GREATER/LESS:140799901} 30 days.      Update Admission H&P: {CHP DME Changes in KNLND:420853690}    PHYSICIAN SIGNATURE:  {Esignature:105578352}

## 2023-03-24 NOTE — PROGRESS NOTES
Interval History and plan:      Blood pressure is well controlled. Urine is 1650 ml       Plan:      Creatinine is stabilizing 2.5 > 2.6 > 2.4  He is hypernatremic   Hold diuretics. Hold Entresto. I will give him some fluids back   Continue supportive treatment to keep systolic blood pressure around 90. Last serum for free light chain ratio was 2.1. He has no significant proteinuria. Assessment :     Acute kidney injury:    Stage IIIb chronic kidney disease: Patient's baseline creatinine is around 1.5-1.9 since 2015. Ultrasound of the kidney in 2015 showed bilateral renal cyst.  CT scan showed multiple cysts within bilateral kidneys. He has underlying cardiorenal syndrome and creatinine fluctuates with the use of diuretics. Hypertension: well controlled. Congestive heart failure: last EF of 20 to 25% with grade 1 diastolic dysfunction. Cardiology is following the case. [facility] would like to thank Eron Salomon MD   for opportunity to serve this patient      Please call with questions at-   24 Hrs Answering service [phone] or  7 am- 5 pm via Perfect serve or cell phone  Vianey Aldridge MD          CC/reason for consult :     Acute kidney injury     HPI :     Hank Peraza is a 80 y.o. male presented to   the hospital on 3/22/2023 for AAA repair. He has past medical history of stage IIIb chronic kidney disease, osteoarthritis, AAA, coronary disease, DJD, hearing loss, hyperlipidemia, hypertension, aortic stenosis and enlarged prostate. He is history of heart failure with a EF of 25 to 30%. He was admitted for elective AAA repair on March 22. Recently his AAA size increased to 6.5 cm. He underwent repair today uneventfully. Entresto and torsemide was held. He is getting as needed blood pressure medication. Bro was placed in the OR which showed hematuria. He has history of BPH with TURP 20 years ago.     He was evaluated by cardiology for heart failure. Home regimen included torsemide, Entresto which were held. Creatinine increased from 1.7>>2.5. I was called for further management of acute kidney injury. ROS:     Seen with- resident     positives in bold   Constitutional:  fever, chills, weakness, weight change, fatigue  Skin:  rash, pruritus, hair loss, bruising, dry skin, petechiae  Head, Face, Neck   headaches, swelling,  cervical adenopathy  Respiratory: shortness of breath, cough, or wheezing  Cardiovascular: chest pain, palpitations, dizzy, edema  Gastrointestinal: nausea, vomiting, diarrhea, constipation,belly pain    Yellow skin, blood in stool  Musculoskeletal:  back pain, muscle weakness, gait problems,       joint pain or swelling. Genitourinary:  dysuria, poor urine flow, flank pain, blood in urine  Neurologic:  vertigo, TIA'S, syncope, seizures, focal weakness  Psychosocial:  insomnia, anxiety, or depression. Additional positive findings:                    All other remaining systems are negative or unable to obtain        PMH/PSH/SH/Family History:     Past Medical History:   Diagnosis Date    Arthritis     Ascending aortic aneurysm     CAD (coronary artery disease)     Chronic kidney disease     Constipation     DDD (degenerative disc disease), lumbar     L5, S1    Dental disease     Hearing loss     Hyperlipidemia     Hypertension     Mild aortic stenosis     Prostate enlargement     TIA (transient ischemic attack)        Past Surgical History:   Procedure Laterality Date    ABDOMINAL AORTIC ANEURYSM REPAIR, ENDOVASCULAR N/A 3/22/2023    ENDOVASCULAR ABDOMINAL AORTIC ANEURYSM REPAIR performed by Ewa Ta MD at [facility]  01/12/1998    EYE SURGERY      SKIN TAG REMOVAL  02/02/2010    TURP          reports that he has never smoked. He has never used smokeless tobacco. He reports that he does not currently use alcohol.  He reports that he does not use

## 2023-03-31 PROBLEM — Z01.810 PREOP CARDIOVASCULAR EXAM: Status: RESOLVED | Noted: 2023-03-01 | Resolved: 2023-03-31

## 2023-04-07 ENCOUNTER — FOLLOWUP TELEPHONE ENCOUNTER (OUTPATIENT)
Dept: ICU | Age: 88
End: 2023-04-07

## 2023-04-12 ASSESSMENT — ENCOUNTER SYMPTOMS
BLOOD IN STOOL: 0
BACK PAIN: 0
ABDOMINAL PAIN: 0
COLOR CHANGE: 0
FACIAL SWELLING: 0
WHEEZING: 0
VOMITING: 0
COUGH: 0
SHORTNESS OF BREATH: 0
EYE DISCHARGE: 0
CHEST TIGHTNESS: 0
ABDOMINAL DISTENTION: 0

## 2023-04-17 ENCOUNTER — HOSPITAL ENCOUNTER (OUTPATIENT)
Dept: CT IMAGING | Age: 88
Discharge: HOME | End: 2023-04-17
Payer: MEDICARE

## 2023-04-17 DIAGNOSIS — I71.43 INFRARENAL ABDOMINAL AORTIC ANEURYSM (AAA) WITHOUT RUPTURE (HCC): ICD-10-CM

## 2023-04-17 PROCEDURE — 74176 CT ABD & PELVIS W/O CONTRAST: CPT

## 2023-04-18 ENCOUNTER — TELEPHONE (OUTPATIENT)
Dept: CARDIOLOGY CLINIC | Age: 88
End: 2023-04-18

## 2023-04-18 ENCOUNTER — OFFICE VISIT (OUTPATIENT)
Dept: CARDIOLOGY CLINIC | Age: 88
End: 2023-04-18
Payer: MEDICARE

## 2023-04-18 ENCOUNTER — OFFICE VISIT (OUTPATIENT)
Dept: VASCULAR SURGERY | Age: 88
End: 2023-04-18
Payer: MEDICARE

## 2023-04-18 VITALS
WEIGHT: 187.6 LBS | HEART RATE: 66 BPM | DIASTOLIC BLOOD PRESSURE: 50 MMHG | SYSTOLIC BLOOD PRESSURE: 100 MMHG | BODY MASS INDEX: 30.28 KG/M2 | OXYGEN SATURATION: 97 %

## 2023-04-18 VITALS
SYSTOLIC BLOOD PRESSURE: 110 MMHG | BODY MASS INDEX: 30.18 KG/M2 | DIASTOLIC BLOOD PRESSURE: 60 MMHG | HEART RATE: 59 BPM | WEIGHT: 187 LBS

## 2023-04-18 DIAGNOSIS — I71.40 ABDOMINAL AORTIC ANEURYSM (AAA) WITHOUT RUPTURE, UNSPECIFIED PART (HCC): ICD-10-CM

## 2023-04-18 DIAGNOSIS — E78.2 MIXED HYPERLIPIDEMIA: ICD-10-CM

## 2023-04-18 DIAGNOSIS — I50.22 CHRONIC SYSTOLIC CONGESTIVE HEART FAILURE (HCC): ICD-10-CM

## 2023-04-18 DIAGNOSIS — I35.9 AORTIC VALVE DISEASE: Primary | ICD-10-CM

## 2023-04-18 DIAGNOSIS — I35.9 AORTIC VALVE DISEASE: ICD-10-CM

## 2023-04-18 PROCEDURE — 1111F DSCHRG MED/CURRENT MED MERGE: CPT | Performed by: INTERNAL MEDICINE

## 2023-04-18 PROCEDURE — G8417 CALC BMI ABV UP PARAM F/U: HCPCS | Performed by: INTERNAL MEDICINE

## 2023-04-18 PROCEDURE — 1036F TOBACCO NON-USER: CPT | Performed by: SURGERY

## 2023-04-18 PROCEDURE — 99214 OFFICE O/P EST MOD 30 MIN: CPT | Performed by: INTERNAL MEDICINE

## 2023-04-18 PROCEDURE — G8427 DOCREV CUR MEDS BY ELIG CLIN: HCPCS | Performed by: SURGERY

## 2023-04-18 PROCEDURE — 1111F DSCHRG MED/CURRENT MED MERGE: CPT | Performed by: SURGERY

## 2023-04-18 PROCEDURE — G8417 CALC BMI ABV UP PARAM F/U: HCPCS | Performed by: SURGERY

## 2023-04-18 PROCEDURE — 1124F ACP DISCUSS-NO DSCNMKR DOCD: CPT | Performed by: SURGERY

## 2023-04-18 PROCEDURE — 99214 OFFICE O/P EST MOD 30 MIN: CPT | Performed by: SURGERY

## 2023-04-18 PROCEDURE — G8427 DOCREV CUR MEDS BY ELIG CLIN: HCPCS | Performed by: INTERNAL MEDICINE

## 2023-04-18 PROCEDURE — 1036F TOBACCO NON-USER: CPT | Performed by: INTERNAL MEDICINE

## 2023-04-18 PROCEDURE — 1124F ACP DISCUSS-NO DSCNMKR DOCD: CPT | Performed by: INTERNAL MEDICINE

## 2023-04-18 RX ORDER — TORSEMIDE 20 MG/1
20 TABLET ORAL DAILY
Qty: 4 TABLET | Refills: 0 | Status: SHIPPED | OUTPATIENT
Start: 2023-04-18 | End: 2023-04-22

## 2023-04-18 NOTE — ASSESSMENT & PLAN NOTE
Seems compensated although having some shortness of breath at night. CT scan showed bilateral pleural effusions. Continue torsemide twice a day, will add an extra dose of 20 for 4 days.

## 2023-04-19 ENCOUNTER — TELEPHONE (OUTPATIENT)
Dept: CARDIOLOGY CLINIC | Age: 88
End: 2023-04-19

## 2023-05-05 ENCOUNTER — APPOINTMENT (OUTPATIENT)
Dept: GENERAL RADIOLOGY | Age: 88
End: 2023-05-05
Payer: MEDICARE

## 2023-05-05 ENCOUNTER — TELEPHONE (OUTPATIENT)
Dept: CARDIOLOGY CLINIC | Age: 88
End: 2023-05-05

## 2023-05-05 ENCOUNTER — HOSPITAL ENCOUNTER (INPATIENT)
Age: 88
LOS: 7 days | Discharge: HOME | End: 2023-05-12
Attending: EMERGENCY MEDICINE | Admitting: INTERNAL MEDICINE
Payer: MEDICARE

## 2023-05-05 DIAGNOSIS — R06.03 RESPIRATORY DISTRESS: ICD-10-CM

## 2023-05-05 DIAGNOSIS — R06.02 SHORTNESS OF BREATH: ICD-10-CM

## 2023-05-05 DIAGNOSIS — J81.0 ACUTE PULMONARY EDEMA (HCC): Primary | ICD-10-CM

## 2023-05-05 PROBLEM — I50.9 ACUTE ON CHRONIC HEART FAILURE, UNSPECIFIED HEART FAILURE TYPE (HCC): Status: ACTIVE | Noted: 2023-05-05

## 2023-05-05 LAB
ALBUMIN SERPL-MCNC: 3.8 G/DL (ref 3.4–5)
ALBUMIN/GLOB SERPL: 1.5 {RATIO} (ref 1.1–2.2)
ALP SERPL-CCNC: 105 U/L (ref 40–129)
ALT SERPL-CCNC: 22 U/L (ref 10–40)
ANION GAP SERPL CALCULATED.3IONS-SCNC: 10 MMOL/L (ref 3–16)
AST SERPL-CCNC: 31 U/L (ref 15–37)
BASOPHILS # BLD: 0 K/UL (ref 0–0.2)
BASOPHILS NFR BLD: 0.9 %
BILIRUB SERPL-MCNC: 0.7 MG/DL (ref 0–1)
BUN SERPL-MCNC: 56 MG/DL (ref 7–20)
CALCIUM SERPL-MCNC: 9.4 MG/DL (ref 8.3–10.6)
CHLORIDE SERPL-SCNC: 105 MMOL/L (ref 99–110)
CO2 SERPL-SCNC: 28 MMOL/L (ref 21–32)
CREAT SERPL-MCNC: 2.4 MG/DL (ref 0.8–1.3)
DEPRECATED RDW RBC AUTO: 15.7 % (ref 12.4–15.4)
EKG ATRIAL RATE: 58 BPM
EKG DIAGNOSIS: NORMAL
EKG P AXIS: 22 DEGREES
EKG P-R INTERVAL: 312 MS
EKG Q-T INTERVAL: 480 MS
EKG QRS DURATION: 136 MS
EKG QTC CALCULATION (BAZETT): 471 MS
EKG R AXIS: -17 DEGREES
EKG T AXIS: 152 DEGREES
EKG VENTRICULAR RATE: 58 BPM
EOSINOPHIL # BLD: 0.2 K/UL (ref 0–0.6)
EOSINOPHIL NFR BLD: 4.7 %
GFR SERPLBLD CREATININE-BSD FMLA CKD-EPI: 25 ML/MIN/{1.73_M2}
GLUCOSE SERPL-MCNC: 120 MG/DL (ref 70–99)
HCT VFR BLD AUTO: 29.1 % (ref 40.5–52.5)
HGB BLD-MCNC: 9.5 G/DL (ref 13.5–17.5)
LYMPHOCYTES # BLD: 0.8 K/UL (ref 1–5.1)
LYMPHOCYTES NFR BLD: 22.2 %
MCH RBC QN AUTO: 33 PG (ref 26–34)
MCHC RBC AUTO-ENTMCNC: 32.5 G/DL (ref 31–36)
MCV RBC AUTO: 101.7 FL (ref 80–100)
MONOCYTES # BLD: 0.4 K/UL (ref 0–1.3)
MONOCYTES NFR BLD: 11.2 %
NEUTROPHILS # BLD: 2.3 K/UL (ref 1.7–7.7)
NEUTROPHILS NFR BLD: 61 %
NT-PROBNP SERPL-MCNC: 9473 PG/ML (ref 0–449)
PLATELET # BLD AUTO: 123 K/UL (ref 135–450)
PMV BLD AUTO: 8.5 FL (ref 5–10.5)
POTASSIUM SERPL-SCNC: 3.8 MMOL/L (ref 3.5–5.1)
PROT SERPL-MCNC: 6.4 G/DL (ref 6.4–8.2)
RBC # BLD AUTO: 2.87 M/UL (ref 4.2–5.9)
SODIUM SERPL-SCNC: 143 MMOL/L (ref 136–145)
TROPONIN, HIGH SENSITIVITY: 72 NG/L (ref 0–22)
TROPONIN, HIGH SENSITIVITY: 72 NG/L (ref 0–22)
WBC # BLD AUTO: 3.7 K/UL (ref 4–11)

## 2023-05-05 PROCEDURE — 6370000000 HC RX 637 (ALT 250 FOR IP): Performed by: INTERNAL MEDICINE

## 2023-05-05 PROCEDURE — 94760 N-INVAS EAR/PLS OXIMETRY 1: CPT

## 2023-05-05 PROCEDURE — 99285 EMERGENCY DEPT VISIT HI MDM: CPT

## 2023-05-05 PROCEDURE — 6360000002 HC RX W HCPCS: Performed by: INTERNAL MEDICINE

## 2023-05-05 PROCEDURE — 83550 IRON BINDING TEST: CPT

## 2023-05-05 PROCEDURE — 84484 ASSAY OF TROPONIN QUANT: CPT

## 2023-05-05 PROCEDURE — 2580000003 HC RX 258: Performed by: INTERNAL MEDICINE

## 2023-05-05 PROCEDURE — 84443 ASSAY THYROID STIM HORMONE: CPT

## 2023-05-05 PROCEDURE — 36415 COLL VENOUS BLD VENIPUNCTURE: CPT

## 2023-05-05 PROCEDURE — 71045 X-RAY EXAM CHEST 1 VIEW: CPT

## 2023-05-05 PROCEDURE — 85025 COMPLETE CBC W/AUTO DIFF WBC: CPT

## 2023-05-05 PROCEDURE — 84439 ASSAY OF FREE THYROXINE: CPT

## 2023-05-05 PROCEDURE — 1200000000 HC SEMI PRIVATE

## 2023-05-05 PROCEDURE — 80053 COMPREHEN METABOLIC PANEL: CPT

## 2023-05-05 PROCEDURE — 93010 ELECTROCARDIOGRAM REPORT: CPT | Performed by: INTERNAL MEDICINE

## 2023-05-05 PROCEDURE — 93005 ELECTROCARDIOGRAM TRACING: CPT | Performed by: INTERNAL MEDICINE

## 2023-05-05 PROCEDURE — 83880 ASSAY OF NATRIURETIC PEPTIDE: CPT

## 2023-05-05 PROCEDURE — 83540 ASSAY OF IRON: CPT

## 2023-05-05 PROCEDURE — 93005 ELECTROCARDIOGRAM TRACING: CPT | Performed by: EMERGENCY MEDICINE

## 2023-05-05 PROCEDURE — 6360000002 HC RX W HCPCS: Performed by: PHYSICIAN ASSISTANT

## 2023-05-05 RX ORDER — FUROSEMIDE 10 MG/ML
40 INJECTION INTRAMUSCULAR; INTRAVENOUS 3 TIMES DAILY
Status: DISCONTINUED | OUTPATIENT
Start: 2023-05-05 | End: 2023-05-08

## 2023-05-05 RX ORDER — HYDRALAZINE HYDROCHLORIDE 20 MG/ML
10 INJECTION INTRAMUSCULAR; INTRAVENOUS EVERY 6 HOURS PRN
Status: DISCONTINUED | OUTPATIENT
Start: 2023-05-05 | End: 2023-05-12 | Stop reason: HOSPADM

## 2023-05-05 RX ORDER — NITROGLYCERIN 0.4 MG/1
0.4 TABLET SUBLINGUAL EVERY 5 MIN PRN
Status: DISCONTINUED | OUTPATIENT
Start: 2023-05-05 | End: 2023-05-12 | Stop reason: HOSPADM

## 2023-05-05 RX ORDER — AZELASTINE HYDROCHLORIDE 0.5 MG/ML
1 SOLUTION/ DROPS OPHTHALMIC 3 TIMES DAILY
Status: DISCONTINUED | OUTPATIENT
Start: 2023-05-05 | End: 2023-05-12 | Stop reason: HOSPADM

## 2023-05-05 RX ORDER — SODIUM CHLORIDE 0.9 % (FLUSH) 0.9 %
5-40 SYRINGE (ML) INJECTION EVERY 12 HOURS SCHEDULED
Status: DISCONTINUED | OUTPATIENT
Start: 2023-05-05 | End: 2023-05-12 | Stop reason: HOSPADM

## 2023-05-05 RX ORDER — DOCUSATE SODIUM 100 MG/1
100 CAPSULE, LIQUID FILLED ORAL 2 TIMES DAILY PRN
Status: DISCONTINUED | OUTPATIENT
Start: 2023-05-05 | End: 2023-05-12 | Stop reason: HOSPADM

## 2023-05-05 RX ORDER — ALLOPURINOL 100 MG/1
100 TABLET ORAL DAILY
Status: DISCONTINUED | OUTPATIENT
Start: 2023-05-06 | End: 2023-05-12 | Stop reason: HOSPADM

## 2023-05-05 RX ORDER — POTASSIUM CHLORIDE 7.45 MG/ML
10 INJECTION INTRAVENOUS PRN
Status: DISCONTINUED | OUTPATIENT
Start: 2023-05-05 | End: 2023-05-05

## 2023-05-05 RX ORDER — ONDANSETRON 4 MG/1
4 TABLET, ORALLY DISINTEGRATING ORAL EVERY 8 HOURS PRN
Status: DISCONTINUED | OUTPATIENT
Start: 2023-05-05 | End: 2023-05-12 | Stop reason: HOSPADM

## 2023-05-05 RX ORDER — SODIUM CHLORIDE 0.9 % (FLUSH) 0.9 %
10 SYRINGE (ML) INJECTION PRN
Status: DISCONTINUED | OUTPATIENT
Start: 2023-05-05 | End: 2023-05-12 | Stop reason: HOSPADM

## 2023-05-05 RX ORDER — FUROSEMIDE 10 MG/ML
20 INJECTION INTRAMUSCULAR; INTRAVENOUS ONCE
Status: COMPLETED | OUTPATIENT
Start: 2023-05-05 | End: 2023-05-05

## 2023-05-05 RX ORDER — TAMSULOSIN HYDROCHLORIDE 0.4 MG/1
0.4 CAPSULE ORAL DAILY
Status: DISCONTINUED | OUTPATIENT
Start: 2023-05-06 | End: 2023-05-12 | Stop reason: HOSPADM

## 2023-05-05 RX ORDER — LORAZEPAM 0.5 MG/1
0.5 TABLET ORAL 2 TIMES DAILY
Status: DISCONTINUED | OUTPATIENT
Start: 2023-05-05 | End: 2023-05-12 | Stop reason: HOSPADM

## 2023-05-05 RX ORDER — M-VIT,TX,IRON,MINS/CALC/FOLIC 27MG-0.4MG
1 TABLET ORAL DAILY
Status: DISCONTINUED | OUTPATIENT
Start: 2023-05-06 | End: 2023-05-12 | Stop reason: HOSPADM

## 2023-05-05 RX ORDER — ATORVASTATIN CALCIUM 80 MG/1
80 TABLET, FILM COATED ORAL
Status: DISCONTINUED | OUTPATIENT
Start: 2023-05-05 | End: 2023-05-12 | Stop reason: HOSPADM

## 2023-05-05 RX ORDER — POLYETHYLENE GLYCOL 3350 17 G/17G
17 POWDER, FOR SOLUTION ORAL DAILY
Status: DISCONTINUED | OUTPATIENT
Start: 2023-05-06 | End: 2023-05-12 | Stop reason: HOSPADM

## 2023-05-05 RX ORDER — POTASSIUM CHLORIDE 20 MEQ/1
40 TABLET, EXTENDED RELEASE ORAL PRN
Status: DISCONTINUED | OUTPATIENT
Start: 2023-05-05 | End: 2023-05-05

## 2023-05-05 RX ORDER — POLYETHYLENE GLYCOL 3350 17 G/17G
17 POWDER, FOR SOLUTION ORAL DAILY
Status: DISCONTINUED | OUTPATIENT
Start: 2023-05-06 | End: 2023-05-05 | Stop reason: RX

## 2023-05-05 RX ORDER — ACETAMINOPHEN 500 MG
1000 TABLET ORAL EVERY 8 HOURS PRN
Status: DISCONTINUED | OUTPATIENT
Start: 2023-05-05 | End: 2023-05-05 | Stop reason: SDUPTHER

## 2023-05-05 RX ORDER — ACETAMINOPHEN 325 MG/1
650 TABLET ORAL EVERY 6 HOURS PRN
Status: DISCONTINUED | OUTPATIENT
Start: 2023-05-05 | End: 2023-05-12 | Stop reason: HOSPADM

## 2023-05-05 RX ORDER — ACETAMINOPHEN 650 MG/1
650 SUPPOSITORY RECTAL EVERY 6 HOURS PRN
Status: DISCONTINUED | OUTPATIENT
Start: 2023-05-05 | End: 2023-05-12 | Stop reason: HOSPADM

## 2023-05-05 RX ORDER — ONDANSETRON 2 MG/ML
4 INJECTION INTRAMUSCULAR; INTRAVENOUS EVERY 6 HOURS PRN
Status: DISCONTINUED | OUTPATIENT
Start: 2023-05-05 | End: 2023-05-12 | Stop reason: HOSPADM

## 2023-05-05 RX ORDER — POTASSIUM CHLORIDE 750 MG/1
30 TABLET, FILM COATED, EXTENDED RELEASE ORAL DAILY
Status: DISCONTINUED | OUTPATIENT
Start: 2023-05-06 | End: 2023-05-12 | Stop reason: HOSPADM

## 2023-05-05 RX ORDER — SODIUM CHLORIDE 9 MG/ML
INJECTION, SOLUTION INTRAVENOUS PRN
Status: DISCONTINUED | OUTPATIENT
Start: 2023-05-05 | End: 2023-05-12 | Stop reason: HOSPADM

## 2023-05-05 RX ORDER — MIRTAZAPINE 15 MG/1
15 TABLET, FILM COATED ORAL NIGHTLY
Status: DISCONTINUED | OUTPATIENT
Start: 2023-05-05 | End: 2023-05-12 | Stop reason: HOSPADM

## 2023-05-05 RX ORDER — LANOLIN ALCOHOL/MO/W.PET/CERES
10 CREAM (GRAM) TOPICAL
Status: DISCONTINUED | OUTPATIENT
Start: 2023-05-05 | End: 2023-05-12 | Stop reason: HOSPADM

## 2023-05-05 RX ORDER — METHOCARBAMOL 500 MG/1
250 TABLET, FILM COATED ORAL EVERY 8 HOURS PRN
Status: DISCONTINUED | OUTPATIENT
Start: 2023-05-05 | End: 2023-05-12 | Stop reason: HOSPADM

## 2023-05-05 RX ORDER — 0.9 % SODIUM CHLORIDE 0.9 %
500 INTRAVENOUS SOLUTION INTRAVENOUS PRN
Status: DISCONTINUED | OUTPATIENT
Start: 2023-05-05 | End: 2023-05-12 | Stop reason: HOSPADM

## 2023-05-05 RX ORDER — ONDANSETRON 4 MG/1
4 TABLET, FILM COATED ORAL EVERY 8 HOURS PRN
Status: DISCONTINUED | OUTPATIENT
Start: 2023-05-05 | End: 2023-05-05 | Stop reason: SDUPTHER

## 2023-05-05 RX ORDER — ENOXAPARIN SODIUM 100 MG/ML
30 INJECTION SUBCUTANEOUS DAILY
Status: DISCONTINUED | OUTPATIENT
Start: 2023-05-05 | End: 2023-05-12 | Stop reason: HOSPADM

## 2023-05-05 RX ADMIN — MIRTAZAPINE 15 MG: 15 TABLET, FILM COATED ORAL at 19:55

## 2023-05-05 RX ADMIN — Medication 10 ML: at 19:55

## 2023-05-05 RX ADMIN — SACUBITRIL AND VALSARTAN 1 TABLET: 24; 26 TABLET, FILM COATED ORAL at 19:55

## 2023-05-05 RX ADMIN — ATORVASTATIN CALCIUM 80 MG: 80 TABLET, FILM COATED ORAL at 22:19

## 2023-05-05 RX ADMIN — LORAZEPAM 0.5 MG: 0.5 TABLET ORAL at 22:18

## 2023-05-05 RX ADMIN — MELATONIN TAB 3 MG 10.5 MG: 3 TAB at 22:18

## 2023-05-05 RX ADMIN — FUROSEMIDE 20 MG: 10 INJECTION, SOLUTION INTRAMUSCULAR; INTRAVENOUS at 14:37

## 2023-05-05 RX ADMIN — AZELASTINE HYDROCHLORIDE 1 DROP: 0.5 SOLUTION/ DROPS INTRAOCULAR at 19:55

## 2023-05-05 RX ADMIN — FUROSEMIDE 40 MG: 10 INJECTION, SOLUTION INTRAMUSCULAR; INTRAVENOUS at 19:55

## 2023-05-05 ASSESSMENT — ENCOUNTER SYMPTOMS
BACK PAIN: 0
PHOTOPHOBIA: 0
ABDOMINAL PAIN: 0
VOMITING: 0
DIARRHEA: 0
CONSTIPATION: 0
COUGH: 0
COLOR CHANGE: 0
SHORTNESS OF BREATH: 1
CHEST TIGHTNESS: 0
NAUSEA: 0

## 2023-05-05 ASSESSMENT — PAIN SCALES - GENERAL
PAINLEVEL_OUTOF10: 0
PAINLEVEL_OUTOF10: 0

## 2023-05-05 ASSESSMENT — PAIN - FUNCTIONAL ASSESSMENT: PAIN_FUNCTIONAL_ASSESSMENT: 0-10

## 2023-05-05 NOTE — TELEPHONE ENCOUNTER
05/05 Yazmin from [facility] called and stated pt is going to be taken to ED and requesting returned call from Dr. Vik Donovan @ [phone]

## 2023-05-05 NOTE — TELEPHONE ENCOUNTER
Spoke with physician. He wanted to let Dr. Nette Reece patient would be coming to ED. He previously had a conversation with patient's primary cardiologist Dr. Andree Elias and they agreed to send him to ED for fluid overload. He is unsure if they are going to [facility] or [facility]. Patient currently has appointment with Loreto Busch in [facility] clinic Monday 5/8. Will watch ED and have DCE see pt inpatient if at [facility].

## 2023-05-06 LAB
ALBUMIN SERPL-MCNC: 3.4 G/DL (ref 3.4–5)
ALBUMIN/GLOB SERPL: 1.3 {RATIO} (ref 1.1–2.2)
ALP SERPL-CCNC: 99 U/L (ref 40–129)
ALT SERPL-CCNC: 18 U/L (ref 10–40)
ANION GAP SERPL CALCULATED.3IONS-SCNC: 10 MMOL/L (ref 3–16)
AST SERPL-CCNC: 23 U/L (ref 15–37)
BASOPHILS # BLD: 0 K/UL (ref 0–0.2)
BASOPHILS NFR BLD: 0.8 %
BILIRUB SERPL-MCNC: 0.8 MG/DL (ref 0–1)
BUN SERPL-MCNC: 58 MG/DL (ref 7–20)
CALCIUM SERPL-MCNC: 8.7 MG/DL (ref 8.3–10.6)
CHLORIDE SERPL-SCNC: 109 MMOL/L (ref 99–110)
CHOLEST SERPL-MCNC: 122 MG/DL (ref 0–199)
CO2 SERPL-SCNC: 28 MMOL/L (ref 21–32)
CREAT SERPL-MCNC: 2.5 MG/DL (ref 0.8–1.3)
DEPRECATED RDW RBC AUTO: 15.1 % (ref 12.4–15.4)
EKG ATRIAL RATE: 70 BPM
EKG DIAGNOSIS: NORMAL
EKG Q-T INTERVAL: 450 MS
EKG QRS DURATION: 128 MS
EKG QTC CALCULATION (BAZETT): 464 MS
EKG R AXIS: -4 DEGREES
EKG T AXIS: 151 DEGREES
EKG VENTRICULAR RATE: 64 BPM
EOSINOPHIL # BLD: 0.5 K/UL (ref 0–0.6)
EOSINOPHIL NFR BLD: 10.9 %
GFR SERPLBLD CREATININE-BSD FMLA CKD-EPI: 24 ML/MIN/{1.73_M2}
GLUCOSE SERPL-MCNC: 98 MG/DL (ref 70–99)
HCT VFR BLD AUTO: 28.9 % (ref 40.5–52.5)
HDLC SERPL-MCNC: 49 MG/DL (ref 40–60)
HGB BLD-MCNC: 9.3 G/DL (ref 13.5–17.5)
IRON SATN MFR SERPL: 22 % (ref 20–50)
IRON SERPL-MCNC: 51 UG/DL (ref 59–158)
LDLC SERPL CALC-MCNC: 64 MG/DL
LYMPHOCYTES # BLD: 1.1 K/UL (ref 1–5.1)
LYMPHOCYTES NFR BLD: 25.1 %
MAGNESIUM SERPL-MCNC: 2.5 MG/DL (ref 1.8–2.4)
MCH RBC QN AUTO: 32.5 PG (ref 26–34)
MCHC RBC AUTO-ENTMCNC: 32.3 G/DL (ref 31–36)
MCV RBC AUTO: 100.6 FL (ref 80–100)
MONOCYTES # BLD: 0.6 K/UL (ref 0–1.3)
MONOCYTES NFR BLD: 12.7 %
NEUTROPHILS # BLD: 2.3 K/UL (ref 1.7–7.7)
NEUTROPHILS NFR BLD: 50.5 %
PLATELET # BLD AUTO: 113 K/UL (ref 135–450)
PMV BLD AUTO: 8.2 FL (ref 5–10.5)
POTASSIUM SERPL-SCNC: 3.8 MMOL/L (ref 3.5–5.1)
PROT SERPL-MCNC: 6 G/DL (ref 6.4–8.2)
RBC # BLD AUTO: 2.87 M/UL (ref 4.2–5.9)
SODIUM SERPL-SCNC: 147 MMOL/L (ref 136–145)
T4 FREE SERPL-MCNC: 1.3 NG/DL (ref 0.9–1.8)
TIBC SERPL-MCNC: 231 UG/DL (ref 260–445)
TRIGL SERPL-MCNC: 43 MG/DL (ref 0–150)
TSH SERPL DL<=0.005 MIU/L-ACNC: 5.6 UIU/ML (ref 0.27–4.2)
VLDLC SERPL CALC-MCNC: 9 MG/DL
WBC # BLD AUTO: 4.5 K/UL (ref 4–11)

## 2023-05-06 PROCEDURE — 85025 COMPLETE CBC W/AUTO DIFF WBC: CPT

## 2023-05-06 PROCEDURE — 36415 COLL VENOUS BLD VENIPUNCTURE: CPT

## 2023-05-06 PROCEDURE — 80053 COMPREHEN METABOLIC PANEL: CPT

## 2023-05-06 PROCEDURE — 1200000000 HC SEMI PRIVATE

## 2023-05-06 PROCEDURE — 80061 LIPID PANEL: CPT

## 2023-05-06 PROCEDURE — 2580000003 HC RX 258: Performed by: INTERNAL MEDICINE

## 2023-05-06 PROCEDURE — 99223 1ST HOSP IP/OBS HIGH 75: CPT | Performed by: INTERNAL MEDICINE

## 2023-05-06 PROCEDURE — 6360000002 HC RX W HCPCS: Performed by: INTERNAL MEDICINE

## 2023-05-06 PROCEDURE — 6370000000 HC RX 637 (ALT 250 FOR IP): Performed by: INTERNAL MEDICINE

## 2023-05-06 PROCEDURE — 93010 ELECTROCARDIOGRAM REPORT: CPT | Performed by: INTERNAL MEDICINE

## 2023-05-06 PROCEDURE — 83735 ASSAY OF MAGNESIUM: CPT

## 2023-05-06 RX ADMIN — AZELASTINE HYDROCHLORIDE 1 DROP: 0.5 SOLUTION/ DROPS INTRAOCULAR at 15:13

## 2023-05-06 RX ADMIN — FUROSEMIDE 40 MG: 10 INJECTION, SOLUTION INTRAMUSCULAR; INTRAVENOUS at 22:35

## 2023-05-06 RX ADMIN — TAMSULOSIN HYDROCHLORIDE 0.4 MG: 0.4 CAPSULE ORAL at 08:14

## 2023-05-06 RX ADMIN — Medication 10 ML: at 08:15

## 2023-05-06 RX ADMIN — MULTIPLE VITAMINS W/ MINERALS TAB 1 TABLET: TAB at 08:15

## 2023-05-06 RX ADMIN — FUROSEMIDE 40 MG: 10 INJECTION, SOLUTION INTRAMUSCULAR; INTRAVENOUS at 08:17

## 2023-05-06 RX ADMIN — ACETAMINOPHEN 650 MG: 325 TABLET ORAL at 12:06

## 2023-05-06 RX ADMIN — POLYETHYLENE GLYCOL 3350 17 G: 17 POWDER, FOR SOLUTION ORAL at 08:18

## 2023-05-06 RX ADMIN — MELATONIN TAB 3 MG 10.5 MG: 3 TAB at 22:28

## 2023-05-06 RX ADMIN — ALLOPURINOL 100 MG: 100 TABLET ORAL at 08:15

## 2023-05-06 RX ADMIN — SACUBITRIL AND VALSARTAN 1 TABLET: 24; 26 TABLET, FILM COATED ORAL at 08:13

## 2023-05-06 RX ADMIN — AZELASTINE HYDROCHLORIDE 1 DROP: 0.5 SOLUTION/ DROPS INTRAOCULAR at 08:16

## 2023-05-06 RX ADMIN — POTASSIUM CHLORIDE 30 MEQ: 750 TABLET, FILM COATED, EXTENDED RELEASE ORAL at 08:15

## 2023-05-06 RX ADMIN — LORAZEPAM 0.5 MG: 0.5 TABLET ORAL at 08:15

## 2023-05-06 RX ADMIN — ACETAMINOPHEN 650 MG: 325 TABLET ORAL at 18:55

## 2023-05-06 RX ADMIN — AZELASTINE HYDROCHLORIDE 1 DROP: 0.5 SOLUTION/ DROPS INTRAOCULAR at 22:30

## 2023-05-06 RX ADMIN — SACUBITRIL AND VALSARTAN 1 TABLET: 24; 26 TABLET, FILM COATED ORAL at 22:34

## 2023-05-06 RX ADMIN — MIRTAZAPINE 15 MG: 15 TABLET, FILM COATED ORAL at 22:29

## 2023-05-06 RX ADMIN — FUROSEMIDE 40 MG: 10 INJECTION, SOLUTION INTRAMUSCULAR; INTRAVENOUS at 14:55

## 2023-05-06 RX ADMIN — LORAZEPAM 0.5 MG: 0.5 TABLET ORAL at 22:34

## 2023-05-06 RX ADMIN — Medication 10 ML: at 22:29

## 2023-05-06 RX ADMIN — ASPIRIN 325 MG: 325 TABLET, COATED ORAL at 08:14

## 2023-05-06 RX ADMIN — ATORVASTATIN CALCIUM 80 MG: 80 TABLET, FILM COATED ORAL at 22:29

## 2023-05-06 ASSESSMENT — PAIN DESCRIPTION - DESCRIPTORS: DESCRIPTORS: ACHING

## 2023-05-06 ASSESSMENT — PAIN SCALES - GENERAL
PAINLEVEL_OUTOF10: 3
PAINLEVEL_OUTOF10: 3

## 2023-05-06 ASSESSMENT — PAIN DESCRIPTION - LOCATION
LOCATION: THROAT
LOCATION: THROAT

## 2023-05-07 ENCOUNTER — APPOINTMENT (OUTPATIENT)
Dept: GENERAL RADIOLOGY | Age: 88
End: 2023-05-07
Payer: MEDICARE

## 2023-05-07 LAB
ANION GAP SERPL CALCULATED.3IONS-SCNC: 9 MMOL/L (ref 3–16)
BASOPHILS # BLD: 0 K/UL (ref 0–0.2)
BASOPHILS NFR BLD: 0.7 %
BUN SERPL-MCNC: 57 MG/DL (ref 7–20)
CALCIUM SERPL-MCNC: 8.5 MG/DL (ref 8.3–10.6)
CHLORIDE SERPL-SCNC: 103 MMOL/L (ref 99–110)
CO2 SERPL-SCNC: 27 MMOL/L (ref 21–32)
CREAT SERPL-MCNC: 2.2 MG/DL (ref 0.8–1.3)
DEPRECATED RDW RBC AUTO: 15.3 % (ref 12.4–15.4)
EOSINOPHIL # BLD: 0.4 K/UL (ref 0–0.6)
EOSINOPHIL NFR BLD: 7.2 %
GFR SERPLBLD CREATININE-BSD FMLA CKD-EPI: 28 ML/MIN/{1.73_M2}
GLUCOSE SERPL-MCNC: 106 MG/DL (ref 70–99)
HCT VFR BLD AUTO: 30.1 % (ref 40.5–52.5)
HETEROPH AB BLD QL IA: NEGATIVE
HGB BLD-MCNC: 9.8 G/DL (ref 13.5–17.5)
LYMPHOCYTES # BLD: 1.1 K/UL (ref 1–5.1)
LYMPHOCYTES NFR BLD: 19.5 %
MAGNESIUM SERPL-MCNC: 2.7 MG/DL (ref 1.8–2.4)
MCH RBC QN AUTO: 32.4 PG (ref 26–34)
MCHC RBC AUTO-ENTMCNC: 32.5 G/DL (ref 31–36)
MCV RBC AUTO: 99.9 FL (ref 80–100)
MONOCYTES # BLD: 0.7 K/UL (ref 0–1.3)
MONOCYTES NFR BLD: 12.1 %
NEUTROPHILS # BLD: 3.5 K/UL (ref 1.7–7.7)
NEUTROPHILS NFR BLD: 60.5 %
NT-PROBNP SERPL-MCNC: 7488 PG/ML (ref 0–449)
PLATELET # BLD AUTO: 114 K/UL (ref 135–450)
PMV BLD AUTO: 8.3 FL (ref 5–10.5)
POTASSIUM SERPL-SCNC: 3.7 MMOL/L (ref 3.5–5.1)
RBC # BLD AUTO: 3.02 M/UL (ref 4.2–5.9)
S PYO AG THROAT QL: NEGATIVE
SODIUM SERPL-SCNC: 139 MMOL/L (ref 136–145)
WBC # BLD AUTO: 5.8 K/UL (ref 4–11)

## 2023-05-07 PROCEDURE — 6360000002 HC RX W HCPCS: Performed by: INTERNAL MEDICINE

## 2023-05-07 PROCEDURE — 6370000000 HC RX 637 (ALT 250 FOR IP): Performed by: INTERNAL MEDICINE

## 2023-05-07 PROCEDURE — 87081 CULTURE SCREEN ONLY: CPT

## 2023-05-07 PROCEDURE — 86308 HETEROPHILE ANTIBODY SCREEN: CPT

## 2023-05-07 PROCEDURE — 2580000003 HC RX 258: Performed by: INTERNAL MEDICINE

## 2023-05-07 PROCEDURE — 85025 COMPLETE CBC W/AUTO DIFF WBC: CPT

## 2023-05-07 PROCEDURE — 36415 COLL VENOUS BLD VENIPUNCTURE: CPT

## 2023-05-07 PROCEDURE — 80048 BASIC METABOLIC PNL TOTAL CA: CPT

## 2023-05-07 PROCEDURE — 83735 ASSAY OF MAGNESIUM: CPT

## 2023-05-07 PROCEDURE — 97161 PT EVAL LOW COMPLEX 20 MIN: CPT

## 2023-05-07 PROCEDURE — 92526 ORAL FUNCTION THERAPY: CPT

## 2023-05-07 PROCEDURE — 70360 X-RAY EXAM OF NECK: CPT

## 2023-05-07 PROCEDURE — 92610 EVALUATE SWALLOWING FUNCTION: CPT

## 2023-05-07 PROCEDURE — 97535 SELF CARE MNGMENT TRAINING: CPT

## 2023-05-07 PROCEDURE — 99233 SBSQ HOSP IP/OBS HIGH 50: CPT | Performed by: INTERNAL MEDICINE

## 2023-05-07 PROCEDURE — 1200000000 HC SEMI PRIVATE

## 2023-05-07 PROCEDURE — 83880 ASSAY OF NATRIURETIC PEPTIDE: CPT

## 2023-05-07 PROCEDURE — 97165 OT EVAL LOW COMPLEX 30 MIN: CPT

## 2023-05-07 PROCEDURE — 87880 STREP A ASSAY W/OPTIC: CPT

## 2023-05-07 PROCEDURE — 97530 THERAPEUTIC ACTIVITIES: CPT

## 2023-05-07 RX ORDER — PANTOPRAZOLE SODIUM 40 MG/1
40 TABLET, DELAYED RELEASE ORAL
Status: DISCONTINUED | OUTPATIENT
Start: 2023-05-07 | End: 2023-05-12 | Stop reason: HOSPADM

## 2023-05-07 RX ORDER — CODEINE PHOSPHATE AND GUAIFENESIN 10; 100 MG/5ML; MG/5ML
5 SOLUTION ORAL EVERY 4 HOURS PRN
Status: DISCONTINUED | OUTPATIENT
Start: 2023-05-07 | End: 2023-05-12 | Stop reason: HOSPADM

## 2023-05-07 RX ADMIN — FUROSEMIDE 40 MG: 10 INJECTION, SOLUTION INTRAMUSCULAR; INTRAVENOUS at 14:08

## 2023-05-07 RX ADMIN — LORAZEPAM 0.5 MG: 0.5 TABLET ORAL at 08:55

## 2023-05-07 RX ADMIN — FUROSEMIDE 40 MG: 10 INJECTION, SOLUTION INTRAMUSCULAR; INTRAVENOUS at 19:58

## 2023-05-07 RX ADMIN — AZELASTINE HYDROCHLORIDE 1 DROP: 0.5 SOLUTION/ DROPS INTRAOCULAR at 09:16

## 2023-05-07 RX ADMIN — AZELASTINE HYDROCHLORIDE 1 DROP: 0.5 SOLUTION/ DROPS INTRAOCULAR at 19:58

## 2023-05-07 RX ADMIN — ALLOPURINOL 100 MG: 100 TABLET ORAL at 08:55

## 2023-05-07 RX ADMIN — ENOXAPARIN SODIUM 30 MG: 100 INJECTION SUBCUTANEOUS at 09:02

## 2023-05-07 RX ADMIN — AZELASTINE HYDROCHLORIDE 1 DROP: 0.5 SOLUTION/ DROPS INTRAOCULAR at 14:13

## 2023-05-07 RX ADMIN — MULTIPLE VITAMINS W/ MINERALS TAB 1 TABLET: TAB at 08:55

## 2023-05-07 RX ADMIN — TAMSULOSIN HYDROCHLORIDE 0.4 MG: 0.4 CAPSULE ORAL at 08:55

## 2023-05-07 RX ADMIN — GUAIFENESIN AND CODEINE PHOSPHATE 5 ML: 100; 10 SOLUTION ORAL at 23:01

## 2023-05-07 RX ADMIN — ATORVASTATIN CALCIUM 80 MG: 80 TABLET, FILM COATED ORAL at 23:01

## 2023-05-07 RX ADMIN — LORAZEPAM 0.5 MG: 0.5 TABLET ORAL at 19:58

## 2023-05-07 RX ADMIN — MELATONIN TAB 3 MG 10.5 MG: 3 TAB at 23:01

## 2023-05-07 RX ADMIN — PANTOPRAZOLE SODIUM 40 MG: 40 TABLET, DELAYED RELEASE ORAL at 09:05

## 2023-05-07 RX ADMIN — FUROSEMIDE 40 MG: 10 INJECTION, SOLUTION INTRAMUSCULAR; INTRAVENOUS at 08:58

## 2023-05-07 RX ADMIN — MIRTAZAPINE 15 MG: 15 TABLET, FILM COATED ORAL at 19:58

## 2023-05-07 RX ADMIN — GUAIFENESIN AND CODEINE PHOSPHATE 5 ML: 100; 10 SOLUTION ORAL at 14:08

## 2023-05-07 RX ADMIN — GUAIFENESIN AND CODEINE PHOSPHATE 5 ML: 100; 10 SOLUTION ORAL at 09:09

## 2023-05-07 RX ADMIN — POLYETHYLENE GLYCOL 3350 17 G: 17 POWDER, FOR SOLUTION ORAL at 08:56

## 2023-05-07 RX ADMIN — POTASSIUM CHLORIDE 30 MEQ: 750 TABLET, FILM COATED, EXTENDED RELEASE ORAL at 08:55

## 2023-05-07 RX ADMIN — Medication 10 ML: at 19:58

## 2023-05-07 RX ADMIN — Medication 10 ML: at 08:58

## 2023-05-07 RX ADMIN — ACETAMINOPHEN 650 MG: 325 TABLET ORAL at 20:08

## 2023-05-07 RX ADMIN — ASPIRIN 325 MG: 325 TABLET, COATED ORAL at 08:55

## 2023-05-07 RX ADMIN — GUAIFENESIN AND CODEINE PHOSPHATE 5 ML: 100; 10 SOLUTION ORAL at 18:59

## 2023-05-07 RX ADMIN — SACUBITRIL AND VALSARTAN 1 TABLET: 24; 26 TABLET, FILM COATED ORAL at 08:55

## 2023-05-07 RX ADMIN — ACETAMINOPHEN 650 MG: 325 TABLET ORAL at 05:10

## 2023-05-07 ASSESSMENT — PAIN SCALES - GENERAL
PAINLEVEL_OUTOF10: 3
PAINLEVEL_OUTOF10: 6
PAINLEVEL_OUTOF10: 0
PAINLEVEL_OUTOF10: 3
PAINLEVEL_OUTOF10: 3

## 2023-05-07 ASSESSMENT — PAIN DESCRIPTION - LOCATION
LOCATION: THROAT

## 2023-05-07 ASSESSMENT — PAIN DESCRIPTION - PAIN TYPE: TYPE: ACUTE PAIN

## 2023-05-07 ASSESSMENT — PAIN DESCRIPTION - DESCRIPTORS
DESCRIPTORS: SORE
DESCRIPTORS: SORE

## 2023-05-08 ENCOUNTER — APPOINTMENT (OUTPATIENT)
Dept: GENERAL RADIOLOGY | Age: 88
End: 2023-05-08
Payer: MEDICARE

## 2023-05-08 PROBLEM — J81.0 ACUTE PULMONARY EDEMA (HCC): Status: ACTIVE | Noted: 2023-05-08

## 2023-05-08 LAB
ANION GAP SERPL CALCULATED.3IONS-SCNC: 8 MMOL/L (ref 3–16)
BASOPHILS # BLD: 0 K/UL (ref 0–0.2)
BASOPHILS NFR BLD: 0.3 %
BUN SERPL-MCNC: 50 MG/DL (ref 7–20)
CALCIUM SERPL-MCNC: 8.8 MG/DL (ref 8.3–10.6)
CHLORIDE SERPL-SCNC: 102 MMOL/L (ref 99–110)
CO2 SERPL-SCNC: 30 MMOL/L (ref 21–32)
CREAT SERPL-MCNC: 2.1 MG/DL (ref 0.8–1.3)
DEPRECATED RDW RBC AUTO: 15.3 % (ref 12.4–15.4)
EOSINOPHIL # BLD: 0.3 K/UL (ref 0–0.6)
EOSINOPHIL NFR BLD: 4.4 %
GFR SERPLBLD CREATININE-BSD FMLA CKD-EPI: 29 ML/MIN/{1.73_M2}
GLUCOSE SERPL-MCNC: 121 MG/DL (ref 70–99)
HCT VFR BLD AUTO: 32.2 % (ref 40.5–52.5)
HGB BLD-MCNC: 10.6 G/DL (ref 13.5–17.5)
LV EF: 23 %
LVEF MODALITY: NORMAL
LYMPHOCYTES # BLD: 0.7 K/UL (ref 1–5.1)
LYMPHOCYTES NFR BLD: 10.6 %
MAGNESIUM SERPL-MCNC: 2.4 MG/DL (ref 1.8–2.4)
MCH RBC QN AUTO: 33 PG (ref 26–34)
MCHC RBC AUTO-ENTMCNC: 32.9 G/DL (ref 31–36)
MCV RBC AUTO: 100.2 FL (ref 80–100)
MONOCYTES # BLD: 0.9 K/UL (ref 0–1.3)
MONOCYTES NFR BLD: 13.4 %
NEUTROPHILS # BLD: 5 K/UL (ref 1.7–7.7)
NEUTROPHILS NFR BLD: 71.3 %
PLATELET # BLD AUTO: 129 K/UL (ref 135–450)
PMV BLD AUTO: 8.8 FL (ref 5–10.5)
POTASSIUM SERPL-SCNC: 4.2 MMOL/L (ref 3.5–5.1)
RBC # BLD AUTO: 3.22 M/UL (ref 4.2–5.9)
SODIUM SERPL-SCNC: 140 MMOL/L (ref 136–145)
WBC # BLD AUTO: 7 K/UL (ref 4–11)

## 2023-05-08 PROCEDURE — 92526 ORAL FUNCTION THERAPY: CPT

## 2023-05-08 PROCEDURE — 97530 THERAPEUTIC ACTIVITIES: CPT

## 2023-05-08 PROCEDURE — 80048 BASIC METABOLIC PNL TOTAL CA: CPT

## 2023-05-08 PROCEDURE — 99152 MOD SED SAME PHYS/QHP 5/>YRS: CPT | Performed by: INTERNAL MEDICINE

## 2023-05-08 PROCEDURE — B2111ZZ FLUOROSCOPY OF MULTIPLE CORONARY ARTERIES USING LOW OSMOLAR CONTRAST: ICD-10-PCS | Performed by: INTERNAL MEDICINE

## 2023-05-08 PROCEDURE — B2151ZZ FLUOROSCOPY OF LEFT HEART USING LOW OSMOLAR CONTRAST: ICD-10-PCS | Performed by: INTERNAL MEDICINE

## 2023-05-08 PROCEDURE — 6360000004 HC RX CONTRAST MEDICATION: Performed by: INTERNAL MEDICINE

## 2023-05-08 PROCEDURE — 74230 X-RAY XM SWLNG FUNCJ C+: CPT

## 2023-05-08 PROCEDURE — 6370000000 HC RX 637 (ALT 250 FOR IP): Performed by: INTERNAL MEDICINE

## 2023-05-08 PROCEDURE — C1769 GUIDE WIRE: HCPCS

## 2023-05-08 PROCEDURE — 36415 COLL VENOUS BLD VENIPUNCTURE: CPT

## 2023-05-08 PROCEDURE — 6360000002 HC RX W HCPCS

## 2023-05-08 PROCEDURE — 31575 DIAGNOSTIC LARYNGOSCOPY: CPT | Performed by: OTOLARYNGOLOGY

## 2023-05-08 PROCEDURE — 2580000003 HC RX 258: Performed by: INTERNAL MEDICINE

## 2023-05-08 PROCEDURE — 4A023N7 MEASUREMENT OF CARDIAC SAMPLING AND PRESSURE, LEFT HEART, PERCUTANEOUS APPROACH: ICD-10-PCS | Performed by: INTERNAL MEDICINE

## 2023-05-08 PROCEDURE — 2580000003 HC RX 258

## 2023-05-08 PROCEDURE — 2709999900 HC NON-CHARGEABLE SUPPLY

## 2023-05-08 PROCEDURE — 99152 MOD SED SAME PHYS/QHP 5/>YRS: CPT

## 2023-05-08 PROCEDURE — C1894 INTRO/SHEATH, NON-LASER: HCPCS

## 2023-05-08 PROCEDURE — 99153 MOD SED SAME PHYS/QHP EA: CPT

## 2023-05-08 PROCEDURE — 93306 TTE W/DOPPLER COMPLETE: CPT

## 2023-05-08 PROCEDURE — 2500000003 HC RX 250 WO HCPCS

## 2023-05-08 PROCEDURE — 92611 MOTION FLUOROSCOPY/SWALLOW: CPT

## 2023-05-08 PROCEDURE — 2580000003 HC RX 258: Performed by: NURSE PRACTITIONER

## 2023-05-08 PROCEDURE — 99222 1ST HOSP IP/OBS MODERATE 55: CPT | Performed by: OTOLARYNGOLOGY

## 2023-05-08 PROCEDURE — 1200000000 HC SEMI PRIVATE

## 2023-05-08 PROCEDURE — 97116 GAIT TRAINING THERAPY: CPT

## 2023-05-08 PROCEDURE — 6360000002 HC RX W HCPCS: Performed by: NURSE PRACTITIONER

## 2023-05-08 PROCEDURE — 83735 ASSAY OF MAGNESIUM: CPT

## 2023-05-08 PROCEDURE — 93458 L HRT ARTERY/VENTRICLE ANGIO: CPT

## 2023-05-08 PROCEDURE — 93458 L HRT ARTERY/VENTRICLE ANGIO: CPT | Performed by: INTERNAL MEDICINE

## 2023-05-08 PROCEDURE — 85025 COMPLETE CBC W/AUTO DIFF WBC: CPT

## 2023-05-08 RX ORDER — ACETYLCYSTEINE 200 MG/ML
600 SOLUTION ORAL; RESPIRATORY (INHALATION) 2 TIMES DAILY
Status: ACTIVE | OUTPATIENT
Start: 2023-05-08 | End: 2023-05-11

## 2023-05-08 RX ORDER — ALBUTEROL SULFATE 2.5 MG/3ML
2.5 SOLUTION RESPIRATORY (INHALATION) 2 TIMES DAILY
Status: DISCONTINUED | OUTPATIENT
Start: 2023-05-08 | End: 2023-05-12 | Stop reason: HOSPADM

## 2023-05-08 RX ORDER — ASCORBIC ACID 500 MG
500 TABLET ORAL 2 TIMES DAILY
Status: COMPLETED | OUTPATIENT
Start: 2023-05-08 | End: 2023-05-11

## 2023-05-08 RX ORDER — METHYLPREDNISOLONE SODIUM SUCCINATE 40 MG/ML
40 INJECTION, POWDER, LYOPHILIZED, FOR SOLUTION INTRAMUSCULAR; INTRAVENOUS EVERY 8 HOURS
Status: DISCONTINUED | OUTPATIENT
Start: 2023-05-08 | End: 2023-05-08

## 2023-05-08 RX ORDER — DEXAMETHASONE SODIUM PHOSPHATE 10 MG/ML
8 INJECTION, SOLUTION INTRAMUSCULAR; INTRAVENOUS DAILY
Status: DISCONTINUED | OUTPATIENT
Start: 2023-05-08 | End: 2023-05-10

## 2023-05-08 RX ORDER — DOBUTAMINE HYDROCHLORIDE 200 MG/100ML
10 INJECTION INTRAVENOUS CONTINUOUS
Status: DISCONTINUED | OUTPATIENT
Start: 2023-05-09 | End: 2023-05-12 | Stop reason: HOSPADM

## 2023-05-08 RX ORDER — DOBUTAMINE HYDROCHLORIDE 200 MG/100ML
10 INJECTION INTRAVENOUS CONTINUOUS
Status: DISCONTINUED | OUTPATIENT
Start: 2023-05-08 | End: 2023-05-08

## 2023-05-08 RX ADMIN — DEXAMETHASONE SODIUM PHOSPHATE 8 MG: 10 INJECTION, SOLUTION INTRAMUSCULAR; INTRAVENOUS at 23:16

## 2023-05-08 RX ADMIN — MELATONIN TAB 3 MG 10.5 MG: 3 TAB at 21:23

## 2023-05-08 RX ADMIN — MIRTAZAPINE 15 MG: 15 TABLET, FILM COATED ORAL at 21:24

## 2023-05-08 RX ADMIN — ACETAMINOPHEN 650 MG: 325 TABLET ORAL at 02:26

## 2023-05-08 RX ADMIN — ATORVASTATIN CALCIUM 80 MG: 80 TABLET, FILM COATED ORAL at 21:23

## 2023-05-08 RX ADMIN — PANTOPRAZOLE SODIUM 40 MG: 40 TABLET, DELAYED RELEASE ORAL at 06:43

## 2023-05-08 RX ADMIN — Medication 10 ML: at 10:00

## 2023-05-08 RX ADMIN — SODIUM CHLORIDE 3000 MG: 900 INJECTION INTRAVENOUS at 22:01

## 2023-05-08 RX ADMIN — AZELASTINE HYDROCHLORIDE 1 DROP: 0.5 SOLUTION/ DROPS INTRAOCULAR at 09:55

## 2023-05-08 RX ADMIN — LORAZEPAM 0.5 MG: 0.5 TABLET ORAL at 21:24

## 2023-05-08 RX ADMIN — IOPAMIDOL 25 ML: 755 INJECTION, SOLUTION INTRAVENOUS at 15:29

## 2023-05-08 RX ADMIN — AZELASTINE HYDROCHLORIDE 1 DROP: 0.5 SOLUTION/ DROPS INTRAOCULAR at 17:51

## 2023-05-08 RX ADMIN — Medication 10 ML: at 21:24

## 2023-05-08 RX ADMIN — OXYCODONE HYDROCHLORIDE AND ACETAMINOPHEN 500 MG: 500 TABLET ORAL at 18:20

## 2023-05-08 RX ADMIN — AZELASTINE HYDROCHLORIDE 1 DROP: 0.5 SOLUTION/ DROPS INTRAOCULAR at 21:28

## 2023-05-08 RX ADMIN — GUAIFENESIN AND CODEINE PHOSPHATE 5 ML: 100; 10 SOLUTION ORAL at 09:54

## 2023-05-08 ASSESSMENT — PAIN SCALES - GENERAL
PAINLEVEL_OUTOF10: 0
PAINLEVEL_OUTOF10: 5
PAINLEVEL_OUTOF10: 3
PAINLEVEL_OUTOF10: 3
PAINLEVEL_OUTOF10: 8

## 2023-05-08 ASSESSMENT — PAIN DESCRIPTION - LOCATION
LOCATION: THROAT
LOCATION: THROAT;EAR
LOCATION: THROAT
LOCATION: EAR

## 2023-05-08 ASSESSMENT — PAIN DESCRIPTION - ORIENTATION
ORIENTATION: RIGHT

## 2023-05-08 ASSESSMENT — PAIN DESCRIPTION - PAIN TYPE
TYPE: ACUTE PAIN
TYPE: ACUTE PAIN

## 2023-05-08 ASSESSMENT — PAIN DESCRIPTION - DESCRIPTORS
DESCRIPTORS: ACHING
DESCRIPTORS: ACHING

## 2023-05-08 NOTE — CONSULTS
Session ID: [id number]  Request DT:[id number]  Language:Brazilian  Status:Fulfilled  Agent DZ:#[id number]  Agent Name:Leelee

## 2023-05-08 NOTE — OP NOTE
[address]  [facility] Operative Note     PROCEDURE SUMMARY   Procedure [facility]   Indication AORTIC STENOSIS   Consent Obtained   Access RRA   US US guidance used to determine artery patency, size (>2mm), anatomic variations and ideal puncture location. Real-time US utilized concurrent with vascular needle entry into artery. Image(s) permanently recorded and reported in chart. Bleed Risk Low   Sedation Minimal conscious sedation for patient comfort. Independent trained observer pushed medications at my direction. We monitored the patient's level of consciousness and vital signs/physiologic status throughout the procedure duration (see start and stop times above, as well as medication dosages). Start Time 1450   Stop Time 1520   Versed 0.5mg   Fentanyl 25mcg   Contrast 23cc   Flouro 53min   EBL <14QP   Complicat None   Specimens None     FINDINGS   Artery Findings   LM Normal   LAD 40% mid, 90% ostial D2   Cx 50% mid at bifurcation   RI N/A   RCA 30% ostial, 40% mid   LVEDP 17mmHg Normal 3-12mmHg   LVG N/A Normal >/= 55%   AVG Abnormal with gradient     INTERVENTION(S)   None    POST CATH  RECOMMENDATIONS   Continued workup of AS  Dobutamine stress echo tomorrow.

## 2023-05-08 NOTE — PROCEDURES
Facility/Department: [address]  MODIFIED BARIUM SWALLOW EVALUATION    Patient: Elsie Pal   : 1932   MRN: [id number]      Evaluation Date: 2023   Admitting Diagnosis: Shortness of breath [R06.02]  Acute pulmonary edema ([address] [J81.0]  Acute on chronic heart failure, unspecified heart failure type ([address] [I50.9]  Treatment Diagnosis: Oropharyngeal Dysphagia   Pain: Pt did not report pain                          Ordering MD: Jamee Gonzalez MD  Radiologist: Dr. Ninoska Haro   Date of Evaluation: 2023  Type of Study: Modified Barium Swallowing Study (MBS)  Diet Prior to Study:  Dysphagia I/Puree with Mildly (nectar) thick liquids   Reason for referral/HPI: 80 y.o. male with past medical history of CAD status post CABG , CHF, hyperlipidemia, hypertension, chronic kidney disease, previous TIA and abdominal aortic aneurysm status post endovascular repair by vascular surgeon, Dr. Will Navarro, on 3/22/2023 who presents ED with complaint of shortness of breath. Patient states he has been doing well postoperatively from the abdominal aortic aneurysm repair. He reports he does have a history of CHF. He also reports he has a history of aortic stenosis. He has been following up with cardiology because he has had problems with fluid overload ever since surgery. He reports he is on torsemide. He has had his torsemide increased since surgery to 60 mg twice daily. Despite increasing his furosemide he has had continued symptoms of shortness of breath, orthopnea and leg swelling. Impression:  Modified Barium Swallow evaluation completed on 2023. Patient presents with a moderate-severe oropharyngeal dysphagia secondary to prolonged mastication, reduced AP propulsion, pharyngeal pooling, reduced tongue base retraction, diminished pharyngeal stripping wave, reduced hyolaryngeal excursion and decreased epiglottic inversion.  As a result, build up of pharyngeal residue within the vallecular space and

## 2023-05-08 NOTE — PRE SEDATION
[facility]  Pre-sedation Assessment   PROCEDURE   Planned Procedure Cardiac catheterization   Consent I have discussed with the patient and/or the patient representative the indication, alternatives, and the possible risks and/or complications of the planned procedure and the anesthesia methods. The patient and/or patient representative appear to understand and agree to proceed. INDICATION   Chief Complaint Dyspnea  Pre-Op Clearance   Presentation Stable Known CAD, Valvular Disease - Aortic Stenosis; Stenosis Severity: Mild and Aortic Regurgitation; Regurgitation Severity: Mild (1+), and LV Dysfunction   Anginal Class (2 wks) CCS III - Symptoms with everyday living activities, i.e., moderate limitation   Anginal Symptoms Typical chest pain or anginal equivalent   CHF Class (2 wks) Yes:  Heart Failure Type: Systolic Severity:  Class III - Symptoms of HF on less-than-ordinary exertion   CV Instability Yes   ISCHEMIC WORKUP/MANAGEMENT/EVALUTION   Stress Test No   Anginal Meds Yes: Aspirin, Non-STATIN Cholesterol (Any), and STATIN   UPCOMING SURGERY   Valve surgery Yes:  Aortic Stenosis; Stenosis Severity: mod-severe and Aortic Regurgitation; Regurgitation Severity: Mild (1+) N/A N/A   MEDICAL HISTORY   Vital Signs /64   Pulse 73   Temp 98.2 °F (36.8 °C) (Temporal)   Resp 20   Ht 5' 6\" (1.676 m)   Wt 181 lb 9.6 oz (82.4 kg)   SpO2 92%   BMI 29.31 kg/m²    Allergies Reviewed in EMR   Medications Reviewed in EMR   Medical History Reviewed in EMR   Surgical History Reviewed in EMR   PRE-SEDATION   Exam I have assessed the patient and reviewed the H&P on the chart.    Hx Anesthesia Issue None   Mod Mallampati II   ASA Class Class 2 - A normal healthy patient with mild systemic disease   MINDI SCALE   Activity 2 - Able to move 4 extrem on command   Respiration 2 - Able to breath deeply and cough freely   Circulation 2 - BP +/- 20mmHg of normal   Consciousness 2 - Fully awake   Oxygen Saturation 2

## 2023-05-09 PROBLEM — J38.00 VOCAL CORD PARALYSIS: Status: ACTIVE | Noted: 2023-05-09

## 2023-05-09 PROBLEM — J02.9 PHARYNGITIS, ACUTE: Status: ACTIVE | Noted: 2023-05-09

## 2023-05-09 PROBLEM — R49.0 HOARSENESS OF VOICE: Status: ACTIVE | Noted: 2023-05-09

## 2023-05-09 PROBLEM — I50.9 ACUTE ON CHRONIC HEART FAILURE, UNSPECIFIED HEART FAILURE TYPE (HCC): Status: RESOLVED | Noted: 2023-05-05 | Resolved: 2023-05-09

## 2023-05-09 LAB
ALBUMIN SERPL-MCNC: 3.4 G/DL (ref 3.4–5)
ANION GAP SERPL CALCULATED.3IONS-SCNC: 9 MMOL/L (ref 3–16)
BUN SERPL-MCNC: 48 MG/DL (ref 7–20)
CALCIUM SERPL-MCNC: 8.9 MG/DL (ref 8.3–10.6)
CHLORIDE SERPL-SCNC: 104 MMOL/L (ref 99–110)
CO2 SERPL-SCNC: 29 MMOL/L (ref 21–32)
CREAT SERPL-MCNC: 2.2 MG/DL (ref 0.8–1.3)
GFR SERPLBLD CREATININE-BSD FMLA CKD-EPI: 28 ML/MIN/{1.73_M2}
GLUCOSE SERPL-MCNC: 139 MG/DL (ref 70–99)
MAGNESIUM SERPL-MCNC: 2.7 MG/DL (ref 1.8–2.4)
NT-PROBNP SERPL-MCNC: ABNORMAL PG/ML (ref 0–449)
PHOSPHATE SERPL-MCNC: 4.9 MG/DL (ref 2.5–4.9)
POTASSIUM SERPL-SCNC: 4.1 MMOL/L (ref 3.5–5.1)
S PYO THROAT QL CULT: NORMAL
SODIUM SERPL-SCNC: 142 MMOL/L (ref 136–145)

## 2023-05-09 PROCEDURE — 2060000000 HC ICU INTERMEDIATE R&B

## 2023-05-09 PROCEDURE — 6360000002 HC RX W HCPCS: Performed by: INTERNAL MEDICINE

## 2023-05-09 PROCEDURE — 6360000002 HC RX W HCPCS: Performed by: NURSE PRACTITIONER

## 2023-05-09 PROCEDURE — 93351 STRESS TTE COMPLETE: CPT

## 2023-05-09 PROCEDURE — 36415 COLL VENOUS BLD VENIPUNCTURE: CPT

## 2023-05-09 PROCEDURE — 93308 TTE F-UP OR LMTD: CPT

## 2023-05-09 PROCEDURE — 99232 SBSQ HOSP IP/OBS MODERATE 35: CPT | Performed by: INTERNAL MEDICINE

## 2023-05-09 PROCEDURE — 2580000003 HC RX 258: Performed by: NURSE PRACTITIONER

## 2023-05-09 PROCEDURE — 6370000000 HC RX 637 (ALT 250 FOR IP): Performed by: INTERNAL MEDICINE

## 2023-05-09 PROCEDURE — 94761 N-INVAS EAR/PLS OXIMETRY MLT: CPT

## 2023-05-09 PROCEDURE — 93321 DOPPLER ECHO F-UP/LMTD STD: CPT

## 2023-05-09 PROCEDURE — 83735 ASSAY OF MAGNESIUM: CPT

## 2023-05-09 PROCEDURE — 80069 RENAL FUNCTION PANEL: CPT

## 2023-05-09 PROCEDURE — 93325 DOPPLER ECHO COLOR FLOW MAPG: CPT

## 2023-05-09 PROCEDURE — 2700000000 HC OXYGEN THERAPY PER DAY

## 2023-05-09 PROCEDURE — 2580000003 HC RX 258: Performed by: INTERNAL MEDICINE

## 2023-05-09 PROCEDURE — 83880 ASSAY OF NATRIURETIC PEPTIDE: CPT

## 2023-05-09 PROCEDURE — 94640 AIRWAY INHALATION TREATMENT: CPT

## 2023-05-09 RX ORDER — FUROSEMIDE 10 MG/ML
20 INJECTION INTRAMUSCULAR; INTRAVENOUS 3 TIMES DAILY
Status: DISCONTINUED | OUTPATIENT
Start: 2023-05-09 | End: 2023-05-11

## 2023-05-09 RX ADMIN — ASPIRIN 325 MG: 325 TABLET, COATED ORAL at 10:56

## 2023-05-09 RX ADMIN — AZELASTINE HYDROCHLORIDE 1 DROP: 0.5 SOLUTION/ DROPS INTRAOCULAR at 20:57

## 2023-05-09 RX ADMIN — POTASSIUM CHLORIDE 30 MEQ: 750 TABLET, FILM COATED, EXTENDED RELEASE ORAL at 10:57

## 2023-05-09 RX ADMIN — SODIUM CHLORIDE 3000 MG: 900 INJECTION INTRAVENOUS at 11:02

## 2023-05-09 RX ADMIN — ACETYLCYSTEINE 600 MG: 200 SOLUTION ORAL; RESPIRATORY (INHALATION) at 19:15

## 2023-05-09 RX ADMIN — DEXAMETHASONE SODIUM PHOSPHATE 8 MG: 10 INJECTION, SOLUTION INTRAMUSCULAR; INTRAVENOUS at 10:57

## 2023-05-09 RX ADMIN — MIRTAZAPINE 15 MG: 15 TABLET, FILM COATED ORAL at 20:56

## 2023-05-09 RX ADMIN — Medication 10 ML: at 20:57

## 2023-05-09 RX ADMIN — FUROSEMIDE 20 MG: 10 INJECTION, SOLUTION INTRAMUSCULAR; INTRAVENOUS at 18:44

## 2023-05-09 RX ADMIN — ENOXAPARIN SODIUM 30 MG: 100 INJECTION SUBCUTANEOUS at 11:50

## 2023-05-09 RX ADMIN — ALBUTEROL SULFATE 2.5 MG: 2.5 SOLUTION RESPIRATORY (INHALATION) at 19:15

## 2023-05-09 RX ADMIN — MULTIPLE VITAMINS W/ MINERALS TAB 1 TABLET: TAB at 10:57

## 2023-05-09 RX ADMIN — ALLOPURINOL 100 MG: 100 TABLET ORAL at 10:57

## 2023-05-09 RX ADMIN — FUROSEMIDE 20 MG: 10 INJECTION, SOLUTION INTRAMUSCULAR; INTRAVENOUS at 11:51

## 2023-05-09 RX ADMIN — SODIUM CHLORIDE 3000 MG: 900 INJECTION INTRAVENOUS at 21:01

## 2023-05-09 RX ADMIN — ACETYLCYSTEINE 600 MG: 200 SOLUTION ORAL; RESPIRATORY (INHALATION) at 11:46

## 2023-05-09 RX ADMIN — DOBUTAMINE HYDROCHLORIDE 5 MCG/KG/MIN: 200 INJECTION INTRAVENOUS at 09:36

## 2023-05-09 RX ADMIN — ATORVASTATIN CALCIUM 80 MG: 80 TABLET, FILM COATED ORAL at 20:56

## 2023-05-09 RX ADMIN — POLYETHYLENE GLYCOL 3350 17 G: 17 POWDER, FOR SOLUTION ORAL at 10:56

## 2023-05-09 RX ADMIN — LORAZEPAM 0.5 MG: 0.5 TABLET ORAL at 20:56

## 2023-05-09 RX ADMIN — TAMSULOSIN HYDROCHLORIDE 0.4 MG: 0.4 CAPSULE ORAL at 10:57

## 2023-05-09 RX ADMIN — ALBUTEROL SULFATE 2.5 MG: 2.5 SOLUTION RESPIRATORY (INHALATION) at 11:46

## 2023-05-09 RX ADMIN — AZELASTINE HYDROCHLORIDE 1 DROP: 0.5 SOLUTION/ DROPS INTRAOCULAR at 10:59

## 2023-05-09 RX ADMIN — OXYCODONE HYDROCHLORIDE AND ACETAMINOPHEN 500 MG: 500 TABLET ORAL at 10:57

## 2023-05-09 RX ADMIN — LORAZEPAM 0.5 MG: 0.5 TABLET ORAL at 10:57

## 2023-05-09 RX ADMIN — AZELASTINE HYDROCHLORIDE 1 DROP: 0.5 SOLUTION/ DROPS INTRAOCULAR at 14:17

## 2023-05-09 RX ADMIN — FUROSEMIDE 20 MG: 10 INJECTION, SOLUTION INTRAMUSCULAR; INTRAVENOUS at 20:56

## 2023-05-09 RX ADMIN — Medication 10 ML: at 10:59

## 2023-05-09 RX ADMIN — MELATONIN TAB 3 MG 10.5 MG: 3 TAB at 20:56

## 2023-05-09 RX ADMIN — OXYCODONE HYDROCHLORIDE AND ACETAMINOPHEN 500 MG: 500 TABLET ORAL at 20:56

## 2023-05-09 ASSESSMENT — PAIN SCALES - GENERAL
PAINLEVEL_OUTOF10: 0
PAINLEVEL_OUTOF10: 3
PAINLEVEL_OUTOF10: 0

## 2023-05-09 NOTE — DISCHARGE INSTR - COC
test R94.39    S/P coronary angiogram Z98.890    Weakness of both lower extremities R29.898    CKD (chronic kidney disease) stage 3, GFR 30-59 ml/min ([facility]) N18.30    Chronic systolic congestive heart failure (HCC) I50.22    Hypertensive emergency I16.1    Acute on chronic combined systolic and diastolic congestive heart failure (HCC) I50.43    Respiratory distress R06.03    Dyspnea on exertion R06.09    Fluid overload E87.70    Bradycardia R00.1    Mass of left hand R22.32    Ischemic cardiomyopathy I25.5    Acute bilateral low back pain with left-sided sciatica M54.42    Abdominal aortic aneurysm (AAA) without rupture ([facility]) I71.40    TIA (transient ischemic attack) G45.9    CHF (congestive heart failure), NYHA class I, acute on chronic, combined ([facility] I50.43    Mixed hyperlipidemia E78.2    CARLI (acute kidney injury) ([facility] N17.9    Acute kidney injury superimposed on CKD ([facility]) N17.9, N18.9    Aortic valve disease I35.9    Elevated lipids E78.5    Leg swelling M79.89    Murmur, heart R01.1    S/P TURP Z90.79    Coronary atherosclerosis I25.10    Ischemic heart disease I25.9    AAA (abdominal aortic aneurysm) without rupture ([facility]) I71.40    Acute pulmonary edema ([facility]) J81.0    Hoarseness of voice R49.0    Vocal cord paralysis J38.00    Pharyngitis, acute J02.9       Isolation/Infection:   Isolation            No Isolation          Patient Infection Status       Infection Onset Added Last Indicated Last Indicated By Review Planned Expiration Resolved Resolved By    None active    Resolved    COVID-19 (Rule Out) 11/06/20 11/06/20 11/06/20 COVID-19 (Ordered)   11/06/20 Rule-Out Test Resulted            Nurse Assessment:  Last Vital Signs: /71   Pulse 62   Temp 97.9 °F (36.6 °C) (Temporal)   Resp 18   Ht 5' 6\" (1.676 m)   Wt 180 lb (81.6 kg)   SpO2 100%   BMI 29.05 kg/m²     Last documented pain score (0-10 scale): Pain Level: 0  Last Weight:   Wt Readings from Last 1 Encounters:   05/09/23 180 lb (81.6 kg)

## 2023-05-09 NOTE — CONSULTS
[facility] ENT       Otolaryngology Consultation      Requesting Physician / Provider:  Alla Mendes PA-C       Date of Consultation:  5/08/2023        Date of Admission:   5/05/2023      Admission diagnosis:  Shortness of breath [R06.02]  Acute pulmonary edema ([facility] [J81.0]  Acute on chronic heart failure, unspecified heart failure type ([address] [I50.9]      History Obtained From:  patient, family member - daughter      [facility] / Reason for Consult:  throat pain      HISTORY OF PRESENT ILLNESS:    Kenneth Perez is a 80 y.o. male is a new patient to me. An otolaryngologic consultation was requested for evaluation of throat pain and odynophagia. He has also \"lost his voice\" with severe hoarseness.         REVIEW OF SYSTEMS:    I reviewed the ROS in the admission history and physical.      Past Medical History:       Diagnosis Date    Arthritis     Ascending aortic aneurysm (HCC)     CAD (coronary artery disease)     Chronic kidney disease     Constipation     DDD (degenerative disc disease), lumbar     L5, S1    Dental disease     Hearing loss     Hyperlipidemia     Hypertension     Mild aortic stenosis     Prostate enlargement     TIA (transient ischemic attack)        Past Surgical History:        Procedure Laterality Date    ABDOMINAL AORTIC ANEURYSM REPAIR, ENDOVASCULAR N/A 3/22/2023    ENDOVASCULAR ABDOMINAL AORTIC ANEURYSM REPAIR performed by Ilir Valenzuela MD at [facility]  01/12/1998    EYE SURGERY      SKIN TAG REMOVAL  02/02/2010    TURP         Current Medications:   Current Facility-Administered Medications: torsemide (DEMADEX) tablet 20 mg, 20 mg, Oral, BID  amoxicillin-clavulanate (AUGMENTIN) 500-125 MG per tablet 1 tablet, 1 tablet, Oral, 2 times per day  dexamethasone (DECADRON) tablet 4 mg, 4 mg, Oral, 2 times per day  albuterol (PROVENTIL) nebulizer solution 2.5 mg, 2.5 mg, Nebulization, BID  DOBUTamine (DOBUTREX) 500

## 2023-05-09 NOTE — CARE COORDINATION
Case Management Assessment  Initial Evaluation    Date/Time of Evaluation: 5/9/2023 9:06 AM  Assessment Completed by: Kayla Duval RN    If patient is discharged prior to next notation, then this note serves as note for discharge by case management. Patient Name: Volodymyr Hampton                   YOB: 1932  Diagnosis: Shortness of breath [R06.02]  Acute pulmonary edema ([address] [J81.0]  Acute on chronic heart failure, unspecified heart failure type ([address] [I50.9]                   Date / Time: 5/5/2023 11:24 AM    Patient Admission Status: Inpatient   Readmission Risk (Low < 19, Mod (19-27), High > 27): Readmission Risk Score: 21    Current PCP: Darius Crews MD  PCP verified by ? Yes    Chart Reviewed: Yes      History Provided by: Child/Family  Patient Orientation: Alert and Oriented    Patient Cognition: Alert    Hospitalization in the last 30 days (Readmission):  No    If yes, Readmission Assessment in CM Navigator will be completed. Advance Directives:      Code Status: Full Code   Patient's Primary Decision Maker is: Legal Next of Kin      Discharge Planning:    [facility]  for transportation at discharge:      Financial    Payor: Adam Counter / Plan: MEDICARE PART A AND B / Product Type: *No Product type* /         Current Nursing Home Information:  Patient admitted from:  [address] [place] [address] Road: [address]  Phone: [phone]  Fax: [phone]     Call to [facility] ,, [phone] in admissions  who [facility] no Prec[facility] required for return.       Patient Covid vaccination status:    Internal Administration   First Dose COVID-19, PFIZER PURPLE top, DILUTE for use, (age 15 y+), 30mcg/0.3mL  01/07/2021   Second Dose COVID-19, PFIZER PURPLE top, DILUTE for use, (age 15 y+), 30mcg/0.3mL   01/28/2021       Last COVID Lab SARS-CoV-2, COOKIE (no units)   Date Value Laparoscopic Appendectomy

## 2023-05-09 NOTE — CONSULTS
[address]      Daphne Schwartz 11/26/1932    History:  Past Medical History:   Diagnosis Date    Arthritis     Ascending aortic aneurysm ([address]     CAD (coronary artery disease)     Chronic kidney disease     Constipation     DDD (degenerative disc disease), lumbar     L5, S1    Dental disease     Hearing loss     Hyperlipidemia     Hypertension     Mild aortic stenosis     Prostate enlargement     TIA (transient ischemic attack)        ECHO:  5/8/23  Summary   Left ventricular systolic function is reduced with ejection fraction   estimated at 20-25 %. Global hypokinesis with abnormal septal wall motion as well as regional wall   motion abnormalities. Left ventricular size is mild-moderately increased. There is mild concentric left ventricular hypertrophy. Grade III diastolic dysfunction with elevated filling pressure. Bi-atrial enlargement. Aortic root is mildly dilated. Ascending aorta is moderately dilated (4.5 cm)   The aortic valve area is calculated at 1.37 cm2 with a maximum pressure   gradient of 36.48 mmHg and a mean pressure gradient of 22 mmHg. This is c/w   moderate aortic stenosis. Mild-to-moderate aortic regurgitation is present. Mitral annular calcification is present. Moderate mitral regurgitation. Mild-to-moderate tricuspid regurgitation. There is a moderate bilateral pleural effusion. ACE/ARB/ARNi: not on d/t kole--entresto on hold   BB: had been stopped in past d/t bradycardia  Aldosterone Antagonist: not on with kole  SGLT2: Patient not on- consider if not contraindicated     DM History: No    Last Hospital Admission: 3/22/23 for AAA repair at [facility]  Code Status: full code  Discharge plans: from [facility]    Family Present: yes, daughter    Daphne Schwartz was admitted to the hospital with increased shortness of breath. Patient with hx HF and follows with cardiology. Patient is at [facility].  Daughter states they do not

## 2023-05-10 LAB
ALBUMIN SERPL-MCNC: 3.3 G/DL (ref 3.4–5)
ANION GAP SERPL CALCULATED.3IONS-SCNC: 11 MMOL/L (ref 3–16)
BUN SERPL-MCNC: 62 MG/DL (ref 7–20)
CALCIUM SERPL-MCNC: 8.6 MG/DL (ref 8.3–10.6)
CHLORIDE SERPL-SCNC: 105 MMOL/L (ref 99–110)
CO2 SERPL-SCNC: 27 MMOL/L (ref 21–32)
CREAT SERPL-MCNC: 2.5 MG/DL (ref 0.8–1.3)
GFR SERPLBLD CREATININE-BSD FMLA CKD-EPI: 24 ML/MIN/{1.73_M2}
GLUCOSE SERPL-MCNC: 132 MG/DL (ref 70–99)
MAGNESIUM SERPL-MCNC: 2.9 MG/DL (ref 1.8–2.4)
PHOSPHATE SERPL-MCNC: 5.3 MG/DL (ref 2.5–4.9)
POTASSIUM SERPL-SCNC: 4.4 MMOL/L (ref 3.5–5.1)
SODIUM SERPL-SCNC: 143 MMOL/L (ref 136–145)

## 2023-05-10 PROCEDURE — 2580000003 HC RX 258: Performed by: NURSE PRACTITIONER

## 2023-05-10 PROCEDURE — 6360000002 HC RX W HCPCS: Performed by: INTERNAL MEDICINE

## 2023-05-10 PROCEDURE — 6370000000 HC RX 637 (ALT 250 FOR IP): Performed by: INTERNAL MEDICINE

## 2023-05-10 PROCEDURE — 92526 ORAL FUNCTION THERAPY: CPT

## 2023-05-10 PROCEDURE — 80069 RENAL FUNCTION PANEL: CPT

## 2023-05-10 PROCEDURE — 97535 SELF CARE MNGMENT TRAINING: CPT

## 2023-05-10 PROCEDURE — 97110 THERAPEUTIC EXERCISES: CPT

## 2023-05-10 PROCEDURE — 36415 COLL VENOUS BLD VENIPUNCTURE: CPT

## 2023-05-10 PROCEDURE — 97530 THERAPEUTIC ACTIVITIES: CPT

## 2023-05-10 PROCEDURE — 83735 ASSAY OF MAGNESIUM: CPT

## 2023-05-10 PROCEDURE — 94640 AIRWAY INHALATION TREATMENT: CPT

## 2023-05-10 PROCEDURE — 2700000000 HC OXYGEN THERAPY PER DAY

## 2023-05-10 PROCEDURE — 2060000000 HC ICU INTERMEDIATE R&B

## 2023-05-10 PROCEDURE — 6360000002 HC RX W HCPCS: Performed by: NURSE PRACTITIONER

## 2023-05-10 PROCEDURE — 94761 N-INVAS EAR/PLS OXIMETRY MLT: CPT

## 2023-05-10 PROCEDURE — 97116 GAIT TRAINING THERAPY: CPT

## 2023-05-10 PROCEDURE — 2580000003 HC RX 258: Performed by: INTERNAL MEDICINE

## 2023-05-10 PROCEDURE — 99232 SBSQ HOSP IP/OBS MODERATE 35: CPT | Performed by: INTERNAL MEDICINE

## 2023-05-10 RX ORDER — DEXAMETHASONE 4 MG/1
4 TABLET ORAL EVERY 12 HOURS SCHEDULED
Status: DISCONTINUED | OUTPATIENT
Start: 2023-05-10 | End: 2023-05-12 | Stop reason: HOSPADM

## 2023-05-10 RX ORDER — AMOXICILLIN AND CLAVULANATE POTASSIUM 500; 125 MG/1; MG/1
1 TABLET, FILM COATED ORAL EVERY 12 HOURS SCHEDULED
Status: DISCONTINUED | OUTPATIENT
Start: 2023-05-10 | End: 2023-05-12 | Stop reason: HOSPADM

## 2023-05-10 RX ADMIN — ENOXAPARIN SODIUM 30 MG: 100 INJECTION SUBCUTANEOUS at 10:15

## 2023-05-10 RX ADMIN — ALLOPURINOL 100 MG: 100 TABLET ORAL at 10:14

## 2023-05-10 RX ADMIN — AMOXICILLIN AND CLAVULANATE POTASSIUM 1 TABLET: 500; 125 TABLET, FILM COATED ORAL at 21:02

## 2023-05-10 RX ADMIN — LORAZEPAM 0.5 MG: 0.5 TABLET ORAL at 10:15

## 2023-05-10 RX ADMIN — MELATONIN TAB 3 MG 10.5 MG: 3 TAB at 21:02

## 2023-05-10 RX ADMIN — MULTIPLE VITAMINS W/ MINERALS TAB 1 TABLET: TAB at 10:16

## 2023-05-10 RX ADMIN — ACETYLCYSTEINE 600 MG: 200 SOLUTION ORAL; RESPIRATORY (INHALATION) at 08:39

## 2023-05-10 RX ADMIN — ASPIRIN 325 MG: 325 TABLET, COATED ORAL at 10:14

## 2023-05-10 RX ADMIN — AZELASTINE HYDROCHLORIDE 1 DROP: 0.5 SOLUTION/ DROPS INTRAOCULAR at 10:14

## 2023-05-10 RX ADMIN — POLYETHYLENE GLYCOL 3350 17 G: 17 POWDER, FOR SOLUTION ORAL at 10:15

## 2023-05-10 RX ADMIN — MIRTAZAPINE 15 MG: 15 TABLET, FILM COATED ORAL at 21:02

## 2023-05-10 RX ADMIN — SODIUM CHLORIDE 3000 MG: 900 INJECTION INTRAVENOUS at 10:13

## 2023-05-10 RX ADMIN — Medication 10 ML: at 21:11

## 2023-05-10 RX ADMIN — DEXAMETHASONE SODIUM PHOSPHATE 8 MG: 10 INJECTION, SOLUTION INTRAMUSCULAR; INTRAVENOUS at 10:20

## 2023-05-10 RX ADMIN — OXYCODONE HYDROCHLORIDE AND ACETAMINOPHEN 500 MG: 500 TABLET ORAL at 10:14

## 2023-05-10 RX ADMIN — ATORVASTATIN CALCIUM 80 MG: 80 TABLET, FILM COATED ORAL at 21:03

## 2023-05-10 RX ADMIN — LORAZEPAM 0.5 MG: 0.5 TABLET ORAL at 21:02

## 2023-05-10 RX ADMIN — PANTOPRAZOLE SODIUM 40 MG: 40 TABLET, DELAYED RELEASE ORAL at 10:15

## 2023-05-10 RX ADMIN — AZELASTINE HYDROCHLORIDE 1 DROP: 0.5 SOLUTION/ DROPS INTRAOCULAR at 21:13

## 2023-05-10 RX ADMIN — Medication 10 ML: at 10:15

## 2023-05-10 RX ADMIN — OXYCODONE HYDROCHLORIDE AND ACETAMINOPHEN 500 MG: 500 TABLET ORAL at 21:02

## 2023-05-10 RX ADMIN — AZELASTINE HYDROCHLORIDE 1 DROP: 0.5 SOLUTION/ DROPS INTRAOCULAR at 15:29

## 2023-05-10 RX ADMIN — ALBUTEROL SULFATE 2.5 MG: 2.5 SOLUTION RESPIRATORY (INHALATION) at 08:39

## 2023-05-10 RX ADMIN — POTASSIUM CHLORIDE 30 MEQ: 750 TABLET, FILM COATED, EXTENDED RELEASE ORAL at 10:15

## 2023-05-10 RX ADMIN — TAMSULOSIN HYDROCHLORIDE 0.4 MG: 0.4 CAPSULE ORAL at 10:14

## 2023-05-10 RX ADMIN — DEXAMETHASONE 4 MG: 4 TABLET ORAL at 21:02

## 2023-05-10 ASSESSMENT — PAIN SCALES - GENERAL
PAINLEVEL_OUTOF10: 0

## 2023-05-10 NOTE — PLAN OF CARE
Problem: Safety - Adult  Goal: Free from fall injury  Outcome: Progressing   Pt will remain free from falls. Fall precautions in place and alarm engaged. Bedside table and call light within reach. Pt aware to use call light for assistance ambulating. Problem: Pain  Goal: Verbalizes/displays adequate comfort level or baseline comfort level  Outcome: Progressing   Pt can rate pain on a 0-10 scale. Pt pain will remain at a level that is tolerable to pt. PRN pain medication as needed. Problem: Skin/Tissue Integrity  Goal: Absence of new skin breakdown  Description: 1. Monitor for areas of redness and/or skin breakdown  2. Assess vascular access sites hourly  3. Every 4-6 hours minimum:  Change oxygen saturation probe site  4. Every 4-6 hours:  If on nasal continuous positive airway pressure, respiratory therapy assess nares and determine need for appliance change or resting period. Outcome: Progressing   Pt will remain free from skin breakdown. Pt turned Q2hrs, skin kept clean and dry, and skin assessed per shift or as needed.

## 2023-05-11 LAB
ALBUMIN SERPL-MCNC: 3.3 G/DL (ref 3.4–5)
ANION GAP SERPL CALCULATED.3IONS-SCNC: 8 MMOL/L (ref 3–16)
BUN SERPL-MCNC: 70 MG/DL (ref 7–20)
CALCIUM SERPL-MCNC: 8.6 MG/DL (ref 8.3–10.6)
CHLORIDE SERPL-SCNC: 105 MMOL/L (ref 99–110)
CO2 SERPL-SCNC: 28 MMOL/L (ref 21–32)
CREAT SERPL-MCNC: 2.5 MG/DL (ref 0.8–1.3)
DEPRECATED RDW RBC AUTO: 15.1 % (ref 12.4–15.4)
GFR SERPLBLD CREATININE-BSD FMLA CKD-EPI: 24 ML/MIN/{1.73_M2}
GLUCOSE SERPL-MCNC: 149 MG/DL (ref 70–99)
HCT VFR BLD AUTO: 29.7 % (ref 40.5–52.5)
HGB BLD-MCNC: 9.8 G/DL (ref 13.5–17.5)
MAGNESIUM SERPL-MCNC: 3.1 MG/DL (ref 1.8–2.4)
MCH RBC QN AUTO: 32.9 PG (ref 26–34)
MCHC RBC AUTO-ENTMCNC: 32.9 G/DL (ref 31–36)
MCV RBC AUTO: 100 FL (ref 80–100)
NT-PROBNP SERPL-MCNC: ABNORMAL PG/ML (ref 0–449)
PHOSPHATE SERPL-MCNC: 4.8 MG/DL (ref 2.5–4.9)
PLATELET # BLD AUTO: 137 K/UL (ref 135–450)
PMV BLD AUTO: 8.6 FL (ref 5–10.5)
POTASSIUM SERPL-SCNC: 4.9 MMOL/L (ref 3.5–5.1)
RBC # BLD AUTO: 2.97 M/UL (ref 4.2–5.9)
SODIUM SERPL-SCNC: 141 MMOL/L (ref 136–145)
WBC # BLD AUTO: 6.1 K/UL (ref 4–11)

## 2023-05-11 PROCEDURE — 6370000000 HC RX 637 (ALT 250 FOR IP): Performed by: INTERNAL MEDICINE

## 2023-05-11 PROCEDURE — 80069 RENAL FUNCTION PANEL: CPT

## 2023-05-11 PROCEDURE — 6360000002 HC RX W HCPCS: Performed by: INTERNAL MEDICINE

## 2023-05-11 PROCEDURE — 85027 COMPLETE CBC AUTOMATED: CPT

## 2023-05-11 PROCEDURE — 83735 ASSAY OF MAGNESIUM: CPT

## 2023-05-11 PROCEDURE — 94640 AIRWAY INHALATION TREATMENT: CPT

## 2023-05-11 PROCEDURE — 83880 ASSAY OF NATRIURETIC PEPTIDE: CPT

## 2023-05-11 PROCEDURE — 2580000003 HC RX 258: Performed by: INTERNAL MEDICINE

## 2023-05-11 PROCEDURE — 2060000000 HC ICU INTERMEDIATE R&B

## 2023-05-11 PROCEDURE — 97116 GAIT TRAINING THERAPY: CPT

## 2023-05-11 PROCEDURE — 94761 N-INVAS EAR/PLS OXIMETRY MLT: CPT

## 2023-05-11 PROCEDURE — 99232 SBSQ HOSP IP/OBS MODERATE 35: CPT | Performed by: INTERNAL MEDICINE

## 2023-05-11 PROCEDURE — 92526 ORAL FUNCTION THERAPY: CPT

## 2023-05-11 PROCEDURE — 97530 THERAPEUTIC ACTIVITIES: CPT

## 2023-05-11 RX ORDER — TORSEMIDE 20 MG/1
20 TABLET ORAL 2 TIMES DAILY
Status: DISCONTINUED | OUTPATIENT
Start: 2023-05-11 | End: 2023-05-12 | Stop reason: HOSPADM

## 2023-05-11 RX ADMIN — TORSEMIDE 20 MG: 20 TABLET ORAL at 12:35

## 2023-05-11 RX ADMIN — ACETYLCYSTEINE 600 MG: 200 SOLUTION ORAL; RESPIRATORY (INHALATION) at 10:24

## 2023-05-11 RX ADMIN — OXYCODONE HYDROCHLORIDE AND ACETAMINOPHEN 500 MG: 500 TABLET ORAL at 10:01

## 2023-05-11 RX ADMIN — POTASSIUM CHLORIDE 30 MEQ: 750 TABLET, FILM COATED, EXTENDED RELEASE ORAL at 10:01

## 2023-05-11 RX ADMIN — AZELASTINE HYDROCHLORIDE 1 DROP: 0.5 SOLUTION/ DROPS INTRAOCULAR at 14:19

## 2023-05-11 RX ADMIN — AMOXICILLIN AND CLAVULANATE POTASSIUM 1 TABLET: 500; 125 TABLET, FILM COATED ORAL at 10:11

## 2023-05-11 RX ADMIN — AMOXICILLIN AND CLAVULANATE POTASSIUM 1 TABLET: 500; 125 TABLET, FILM COATED ORAL at 20:52

## 2023-05-11 RX ADMIN — AZELASTINE HYDROCHLORIDE 1 DROP: 0.5 SOLUTION/ DROPS INTRAOCULAR at 20:51

## 2023-05-11 RX ADMIN — Medication 10 ML: at 10:07

## 2023-05-11 RX ADMIN — TAMSULOSIN HYDROCHLORIDE 0.4 MG: 0.4 CAPSULE ORAL at 10:01

## 2023-05-11 RX ADMIN — DEXAMETHASONE 4 MG: 4 TABLET ORAL at 10:01

## 2023-05-11 RX ADMIN — ALBUTEROL SULFATE 2.5 MG: 2.5 SOLUTION RESPIRATORY (INHALATION) at 21:31

## 2023-05-11 RX ADMIN — LORAZEPAM 0.5 MG: 0.5 TABLET ORAL at 20:52

## 2023-05-11 RX ADMIN — LORAZEPAM 0.5 MG: 0.5 TABLET ORAL at 10:01

## 2023-05-11 RX ADMIN — PANTOPRAZOLE SODIUM 40 MG: 40 TABLET, DELAYED RELEASE ORAL at 06:19

## 2023-05-11 RX ADMIN — DEXAMETHASONE 4 MG: 4 TABLET ORAL at 20:52

## 2023-05-11 RX ADMIN — AZELASTINE HYDROCHLORIDE 1 DROP: 0.5 SOLUTION/ DROPS INTRAOCULAR at 10:06

## 2023-05-11 RX ADMIN — Medication 10 ML: at 20:54

## 2023-05-11 RX ADMIN — ATORVASTATIN CALCIUM 80 MG: 80 TABLET, FILM COATED ORAL at 20:53

## 2023-05-11 RX ADMIN — TORSEMIDE 20 MG: 20 TABLET ORAL at 20:52

## 2023-05-11 RX ADMIN — MELATONIN TAB 3 MG 10.5 MG: 3 TAB at 20:52

## 2023-05-11 RX ADMIN — MIRTAZAPINE 15 MG: 15 TABLET, FILM COATED ORAL at 20:54

## 2023-05-11 RX ADMIN — ENOXAPARIN SODIUM 30 MG: 100 INJECTION SUBCUTANEOUS at 10:01

## 2023-05-11 RX ADMIN — MULTIPLE VITAMINS W/ MINERALS TAB 1 TABLET: TAB at 10:01

## 2023-05-11 RX ADMIN — ASPIRIN 325 MG: 325 TABLET, COATED ORAL at 10:01

## 2023-05-11 RX ADMIN — ALLOPURINOL 100 MG: 100 TABLET ORAL at 10:01

## 2023-05-11 RX ADMIN — ALBUTEROL SULFATE 2.5 MG: 2.5 SOLUTION RESPIRATORY (INHALATION) at 10:24

## 2023-05-11 ASSESSMENT — PAIN DESCRIPTION - LOCATION: LOCATION: THROAT

## 2023-05-11 ASSESSMENT — PAIN DESCRIPTION - DESCRIPTORS: DESCRIPTORS: ACHING

## 2023-05-11 ASSESSMENT — PAIN DESCRIPTION - ORIENTATION: ORIENTATION: RIGHT

## 2023-05-12 VITALS
HEIGHT: 66 IN | BODY MASS INDEX: 29.98 KG/M2 | RESPIRATION RATE: 18 BRPM | OXYGEN SATURATION: 96 % | HEART RATE: 66 BPM | DIASTOLIC BLOOD PRESSURE: 63 MMHG | SYSTOLIC BLOOD PRESSURE: 127 MMHG | WEIGHT: 186.56 LBS | TEMPERATURE: 97.1 F

## 2023-05-12 PROBLEM — J38.01 UNILATERAL VOCAL CORD PARALYSIS: Status: ACTIVE | Noted: 2023-05-09

## 2023-05-12 LAB
ALBUMIN SERPL-MCNC: 3.3 G/DL (ref 3.4–5)
ANION GAP SERPL CALCULATED.3IONS-SCNC: 8 MMOL/L (ref 3–16)
BUN SERPL-MCNC: 71 MG/DL (ref 7–20)
CALCIUM SERPL-MCNC: 8.7 MG/DL (ref 8.3–10.6)
CHLORIDE SERPL-SCNC: 102 MMOL/L (ref 99–110)
CO2 SERPL-SCNC: 28 MMOL/L (ref 21–32)
CREAT SERPL-MCNC: 2.5 MG/DL (ref 0.8–1.3)
GFR SERPLBLD CREATININE-BSD FMLA CKD-EPI: 24 ML/MIN/{1.73_M2}
GLUCOSE SERPL-MCNC: 137 MG/DL (ref 70–99)
MAGNESIUM SERPL-MCNC: 3.1 MG/DL (ref 1.8–2.4)
PHOSPHATE SERPL-MCNC: 4.6 MG/DL (ref 2.5–4.9)
POTASSIUM SERPL-SCNC: 4.9 MMOL/L (ref 3.5–5.1)
SODIUM SERPL-SCNC: 138 MMOL/L (ref 136–145)

## 2023-05-12 PROCEDURE — 6370000000 HC RX 637 (ALT 250 FOR IP): Performed by: INTERNAL MEDICINE

## 2023-05-12 PROCEDURE — 6360000002 HC RX W HCPCS: Performed by: INTERNAL MEDICINE

## 2023-05-12 PROCEDURE — 94640 AIRWAY INHALATION TREATMENT: CPT

## 2023-05-12 PROCEDURE — 94761 N-INVAS EAR/PLS OXIMETRY MLT: CPT

## 2023-05-12 PROCEDURE — 2580000003 HC RX 258: Performed by: INTERNAL MEDICINE

## 2023-05-12 PROCEDURE — 80069 RENAL FUNCTION PANEL: CPT

## 2023-05-12 PROCEDURE — 92526 ORAL FUNCTION THERAPY: CPT

## 2023-05-12 PROCEDURE — 36415 COLL VENOUS BLD VENIPUNCTURE: CPT

## 2023-05-12 PROCEDURE — 83735 ASSAY OF MAGNESIUM: CPT

## 2023-05-12 RX ORDER — ALBUTEROL SULFATE 2.5 MG/3ML
2.5 SOLUTION RESPIRATORY (INHALATION) 2 TIMES DAILY
Qty: 120 EACH | Refills: 3 | Status: SHIPPED | OUTPATIENT
Start: 2023-05-12

## 2023-05-12 RX ORDER — LORAZEPAM 0.5 MG/1
0.5 TABLET ORAL EVERY 8 HOURS PRN
Qty: 15 TABLET | Refills: 0 | Status: SHIPPED | OUTPATIENT
Start: 2023-05-12 | End: 2023-05-17

## 2023-05-12 RX ORDER — TORSEMIDE 20 MG/1
20 TABLET ORAL 2 TIMES DAILY
Qty: 30 TABLET | Refills: 3 | Status: SHIPPED | OUTPATIENT
Start: 2023-05-12

## 2023-05-12 RX ORDER — AMOXICILLIN AND CLAVULANATE POTASSIUM 500; 125 MG/1; MG/1
1 TABLET, FILM COATED ORAL EVERY 12 HOURS SCHEDULED
Qty: 6 TABLET | Refills: 0 | Status: SHIPPED | OUTPATIENT
Start: 2023-05-12 | End: 2023-05-15

## 2023-05-12 RX ORDER — PANTOPRAZOLE SODIUM 40 MG/1
40 TABLET, DELAYED RELEASE ORAL
Qty: 30 TABLET | Refills: 3 | Status: SHIPPED | OUTPATIENT
Start: 2023-05-13

## 2023-05-12 RX ORDER — POLYETHYLENE GLYCOL 3350 17 G/17G
17 POWDER, FOR SOLUTION ORAL DAILY
COMMUNITY
Start: 2023-05-12

## 2023-05-12 RX ORDER — MIRTAZAPINE 15 MG/1
15 TABLET, FILM COATED ORAL NIGHTLY
Qty: 30 TABLET | Refills: 0 | Status: SHIPPED | OUTPATIENT
Start: 2023-05-12

## 2023-05-12 RX ORDER — DEXAMETHASONE 4 MG/1
4 TABLET ORAL EVERY 12 HOURS SCHEDULED
Qty: 2 TABLET | Refills: 0 | Status: SHIPPED | OUTPATIENT
Start: 2023-05-12 | End: 2023-05-13

## 2023-05-12 RX ADMIN — PANTOPRAZOLE SODIUM 40 MG: 40 TABLET, DELAYED RELEASE ORAL at 06:02

## 2023-05-12 RX ADMIN — TORSEMIDE 20 MG: 20 TABLET ORAL at 10:09

## 2023-05-12 RX ADMIN — POTASSIUM CHLORIDE 30 MEQ: 750 TABLET, FILM COATED, EXTENDED RELEASE ORAL at 10:10

## 2023-05-12 RX ADMIN — AMOXICILLIN AND CLAVULANATE POTASSIUM 1 TABLET: 500; 125 TABLET, FILM COATED ORAL at 10:10

## 2023-05-12 RX ADMIN — LORAZEPAM 0.5 MG: 0.5 TABLET ORAL at 10:09

## 2023-05-12 RX ADMIN — ALBUTEROL SULFATE 2.5 MG: 2.5 SOLUTION RESPIRATORY (INHALATION) at 09:32

## 2023-05-12 RX ADMIN — AZELASTINE HYDROCHLORIDE 1 DROP: 0.5 SOLUTION/ DROPS INTRAOCULAR at 10:13

## 2023-05-12 RX ADMIN — ENOXAPARIN SODIUM 30 MG: 100 INJECTION SUBCUTANEOUS at 10:08

## 2023-05-12 RX ADMIN — ALLOPURINOL 100 MG: 100 TABLET ORAL at 10:10

## 2023-05-12 RX ADMIN — Medication 10 ML: at 10:14

## 2023-05-12 RX ADMIN — DEXAMETHASONE 4 MG: 4 TABLET ORAL at 10:09

## 2023-05-12 RX ADMIN — TAMSULOSIN HYDROCHLORIDE 0.4 MG: 0.4 CAPSULE ORAL at 10:09

## 2023-05-12 RX ADMIN — MULTIPLE VITAMINS W/ MINERALS TAB 1 TABLET: TAB at 10:09

## 2023-05-12 RX ADMIN — ASPIRIN 325 MG: 325 TABLET, COATED ORAL at 10:08

## 2023-05-12 ASSESSMENT — PAIN SCALES - GENERAL: PAINLEVEL_OUTOF10: 0

## 2023-05-12 NOTE — DISCHARGE INSTRUCTIONS
Radial Angiogram      Care of your puncture site:  Remove clear bandage 24 hours after the procedure. May shower in 24 hours  Inspect the site daily and gently clean using soap and water. Dry thoroughly and apply a Band-Aid. Normal Observations:  Soreness or tenderness which may last one week. Mild oozing from the incision site. Possible bruising that could last 2 weeks. Activity:  You may resume driving 24 hours following the procedure. You may resume normal activity in 3 days or after the wound heals. Avoid lifting more than 10 pounds for 3 days with affected arm. Nutrition:  Regular diet or cardiac. Drink at least 8 to 10 glasses of decaffeinated, non-alcoholic fluid for the next 24 hours to flush the x-ray dye used for your angiogram out of your body. Call your doctor immediately if your condition worsens, for any other concerns, for a follow-up appointment or if you experience any of the following:  Significant bleeding that does not stop after 10 minutes of applying firm pressure on the puncture site. Increased swelling of the wrist.  Unusual pain, numbness, or tingling of the wrist/arm. Any signs of infection such as: redness, yellow drainage at the site, swelling or pain. Other Instructions:  Hold Metformin or Metformin containing drugs for 48 hours after procedure.

## 2023-05-12 NOTE — CARE COORDINATION
Case Management -  Discharge Note      Patient Name: Lenn Spatz                   YOB: 1932            Readmission Risk (Low < 19, Mod (19-27), High > 27): Readmission Risk Score: 22.4    Current PCP: Oscar Saldana MD    ([facility]) Important Message from Medicare:    Date: 5/12/2023    PT AM-PAC: 16 /24  OT AM-PAC: 18 /24     Patient/patient representative has acknowledged the information provided and decided on the following discharge plan. Patient/ patient representative has been provided freedom of choice regarding service provider, supported by basic dialogue that supports the patient's individualized plan of care/goals. Patient noted to have a discharge order. Pt has been medically cleared for transition to [address] [place] [address]    Patient discharged to   [address] [place] [address] Road: [address]  [facility]  Phone: [phone]  Fax: [phone]        HENS Completed:  N/A  Pre-cert required/obtained:  N/A  Transportation provided by ; Daughter will be transporting patient back to the facility  AVS faxed and agency notified:  [phone]  The following prescriptions sent with pt: None  Family Notified:  Daughter  Nurse Kacie Melendez  to call report to facility  [phone]      Financial    Payor: MEDICARE / Plan: MEDICARE PART A AND B / Product Type: *No Product type* /     Pharmacy:  Potential assistance Purchasing Medications: No  Meds-to-Beds request: No      [facility] Mail [facility] - [facility] [phone] - F [phone]  [address] [place] [address]  Phone: [phone] Fax: [phone] [facility] - [address] [place] [address] [facility] [phone]  [address] [facility] [address]  Phone: [phone] Fax: [phone]      Notes: Additional Case Management Notes:    The facility's admission liaison- Kala Noland was informed of patient's discharge and that

## 2023-05-12 NOTE — CARE COORDINATION
05/12/23 1358   IMM Letter   IMM Letter given to Patient/Family/Significant other/Guardian/POA/by: Second IMM lettrer given to patient and daughter by CM  (Daughter signed)   IMM Letter date given: 05/12/23   IMM Letter time given: 1350     Patient and spouse are in agreement with not having the four hors Notice.

## 2023-05-16 ENCOUNTER — OFFICE VISIT (OUTPATIENT)
Dept: CARDIOLOGY CLINIC | Age: 88
End: 2023-05-16
Payer: MEDICARE

## 2023-05-16 VITALS
SYSTOLIC BLOOD PRESSURE: 132 MMHG | WEIGHT: 186 LBS | HEART RATE: 66 BPM | DIASTOLIC BLOOD PRESSURE: 60 MMHG | BODY MASS INDEX: 29.89 KG/M2 | OXYGEN SATURATION: 97 % | HEIGHT: 66 IN

## 2023-05-16 DIAGNOSIS — I35.0 NONRHEUMATIC AORTIC VALVE STENOSIS: ICD-10-CM

## 2023-05-16 DIAGNOSIS — R00.1 BRADYCARDIA: ICD-10-CM

## 2023-05-16 DIAGNOSIS — I50.21 ACUTE SYSTOLIC CONGESTIVE HEART FAILURE (HCC): Primary | ICD-10-CM

## 2023-05-16 DIAGNOSIS — I25.5 ISCHEMIC CARDIOMYOPATHY: ICD-10-CM

## 2023-05-16 DIAGNOSIS — Z09 HOSPITAL DISCHARGE FOLLOW-UP: ICD-10-CM

## 2023-05-16 PROCEDURE — 1111F DSCHRG MED/CURRENT MED MERGE: CPT | Performed by: NURSE PRACTITIONER

## 2023-05-16 PROCEDURE — 99214 OFFICE O/P EST MOD 30 MIN: CPT | Performed by: NURSE PRACTITIONER

## 2023-05-16 PROCEDURE — 1123F ACP DISCUSS/DSCN MKR DOCD: CPT | Performed by: NURSE PRACTITIONER

## 2023-05-16 PROCEDURE — G8417 CALC BMI ABV UP PARAM F/U: HCPCS | Performed by: NURSE PRACTITIONER

## 2023-05-16 PROCEDURE — 1036F TOBACCO NON-USER: CPT | Performed by: NURSE PRACTITIONER

## 2023-05-16 PROCEDURE — G8427 DOCREV CUR MEDS BY ELIG CLIN: HCPCS | Performed by: NURSE PRACTITIONER

## 2023-05-16 ASSESSMENT — ENCOUNTER SYMPTOMS
GASTROINTESTINAL NEGATIVE: 1
SHORTNESS OF BREATH: 1

## 2023-05-16 NOTE — PROGRESS NOTES
K 4.4 05/10/2023 03:33 AM    K 3.8 05/05/2023 12:19 PM    K 5.3 11/04/2020 06:02 PM    K 4.3 07/12/2020 11:22 PM     05/12/2023 05:08 AM     05/11/2023 05:13 AM     05/10/2023 03:33 AM    CO2 28 05/12/2023 05:08 AM    CO2 28 05/11/2023 05:13 AM    CO2 27 05/10/2023 03:33 AM    PHOS 4.6 05/12/2023 05:08 AM    PHOS 4.8 05/11/2023 05:13 AM    PHOS 5.3 05/10/2023 03:33 AM    BUN 71 05/12/2023 05:08 AM    BUN 70 05/11/2023 05:13 AM    BUN 62 05/10/2023 03:33 AM    CREATININE 2.5 05/12/2023 05:08 AM    CREATININE 2.5 05/11/2023 05:13 AM    CREATININE 2.5 05/10/2023 03:33 AM     BNP:   Lab Results   Component Value Date/Time    PROBNP 18,796 05/11/2023 05:13 AM    PROBNP 16,415 05/09/2023 04:25 AM    PROBNP 7,488 05/07/2023 05:04 AM     Iron Studies:  No results found for: FERRITIN  Iron Deficiency Anemia:  No    IV Iron Therapy:  No  2017 ACC/AHA HF Guidelines:   intravenous iron replacement in patients with New York Heart Association (NYHA) class II and III HF and iron deficiency(ferritin <100 ng/ml or 100-300 ng/ml if transferrin saturation <20%), to improve functional status and QoL. Assessment/Plan:    Encounter Diagnoses        systolic congestive heart failure ([address] Appears compensated, continue torsemide labs monday    Coronary artery disease involving native heart without angina pectoris, unspecified vessel or lesion type No CP, continue statin, ASA    Ischemic cardiomyopathy Continue holding entresto until repeat labs, will restart if able and then add farxiga and verquvo.  no BB due to azra, will hold off on li for now given CARLI and hx hyperkalemia    AS Continued, plan as above for medical management     Call [address] for med changes [phone]    Instructions:   Medications: no change today  Labs: monday  Lifestyle Recommendations: Weigh yourself every day in the morning after urination, call Lilliana Punt if wt increases 2-3lb in one day or 5lb in one week, Limit sodium to 2000mg/day and

## 2023-05-16 NOTE — PATIENT INSTRUCTIONS
Instructions:   Medications: no change today  Labs: monday  Lifestyle Recommendations: Weigh yourself every day in the morning after urination, call Alex Evangelista if wt increases 2-3lb in one day or 5lb in one week, Limit sodium to 2000mg/day and fluids to 2L or 64oz/day.    Follow up:2 weeks

## 2023-05-18 ENCOUNTER — OFFICE VISIT (OUTPATIENT)
Dept: ENT CLINIC | Age: 88
End: 2023-05-18
Payer: MEDICARE

## 2023-05-18 VITALS
TEMPERATURE: 97.6 F | DIASTOLIC BLOOD PRESSURE: 57 MMHG | SYSTOLIC BLOOD PRESSURE: 119 MMHG | HEART RATE: 76 BPM | HEIGHT: 66 IN | BODY MASS INDEX: 30.05 KG/M2 | WEIGHT: 187 LBS

## 2023-05-18 DIAGNOSIS — H61.23 BILATERAL IMPACTED CERUMEN: ICD-10-CM

## 2023-05-18 DIAGNOSIS — R13.10 ODYNOPHAGIA: ICD-10-CM

## 2023-05-18 DIAGNOSIS — J04.30 SUPRAGLOTTITIS WITHOUT AIRWAY OBSTRUCTION: Primary | ICD-10-CM

## 2023-05-18 PROCEDURE — 31575 DIAGNOSTIC LARYNGOSCOPY: CPT | Performed by: OTOLARYNGOLOGY

## 2023-05-18 PROCEDURE — G8417 CALC BMI ABV UP PARAM F/U: HCPCS | Performed by: OTOLARYNGOLOGY

## 2023-05-18 PROCEDURE — 69210 REMOVE IMPACTED EAR WAX UNI: CPT | Performed by: OTOLARYNGOLOGY

## 2023-05-18 PROCEDURE — 1036F TOBACCO NON-USER: CPT | Performed by: OTOLARYNGOLOGY

## 2023-05-18 PROCEDURE — G8427 DOCREV CUR MEDS BY ELIG CLIN: HCPCS | Performed by: OTOLARYNGOLOGY

## 2023-05-18 PROCEDURE — 99204 OFFICE O/P NEW MOD 45 MIN: CPT | Performed by: OTOLARYNGOLOGY

## 2023-05-18 PROCEDURE — 1111F DSCHRG MED/CURRENT MED MERGE: CPT | Performed by: OTOLARYNGOLOGY

## 2023-05-18 PROCEDURE — 1123F ACP DISCUSS/DSCN MKR DOCD: CPT | Performed by: OTOLARYNGOLOGY

## 2023-05-18 RX ORDER — AMOXICILLIN AND CLAVULANATE POTASSIUM 875; 125 MG/1; MG/1
1 TABLET, FILM COATED ORAL 2 TIMES DAILY
Qty: 20 TABLET | Refills: 0 | Status: SHIPPED | OUTPATIENT
Start: 2023-05-18 | End: 2023-05-28

## 2023-05-18 RX ORDER — AMOXICILLIN AND CLAVULANATE POTASSIUM 500; 125 MG/1; MG/1
1 TABLET, FILM COATED ORAL 3 TIMES DAILY
COMMUNITY
End: 2023-05-18

## 2023-05-18 RX ORDER — METHYLPREDNISOLONE 4 MG/1
TABLET ORAL
Qty: 1 KIT | Refills: 0 | Status: SHIPPED | OUTPATIENT
Start: 2023-05-18

## 2023-05-18 RX ORDER — LORAZEPAM 0.5 MG/1
0.5 TABLET ORAL EVERY 6 HOURS PRN
COMMUNITY

## 2023-05-18 NOTE — PROGRESS NOTES
capsule, Take 1 capsule by mouth 2 times daily as needed for Constipation, Disp: , Rfl:     Melatonin 10 MG TABS, Take 10 mg by mouth nightly, Disp: , Rfl:     methocarbamol (ROBAXIN) 500 MG tablet, Take 0.5 tablets by mouth every 8 hours as needed (low back pain), Disp: , Rfl:     nitroGLYCERIN (NITROSTAT) 0.4 MG SL tablet, Place 1 tablet under the tongue every 5 minutes as needed for Chest pain up to max of 3 total doses.  If no relief after 1 dose, call 911., Disp: , Rfl:     ondansetron (ZOFRAN) 4 MG tablet, Take 1 tablet by mouth every 8 hours as needed for Nausea or Vomiting, Disp: , Rfl:     acetaminophen (TYLENOL) 500 MG tablet, Take 2 tablets by mouth every 8 hours as needed (mild pain), Disp: , Rfl:     Multiple Vitamins-Minerals (THERAPEUTIC MULTIVITAMIN-MINERALS) tablet, Take 1 tablet by mouth daily, Disp: , Rfl:     tamsulosin (FLOMAX) 0.4 MG capsule, Take 1 capsule by mouth daily, Disp: , Rfl:     azelastine (OPTIVAR) 0.05 % ophthalmic solution, Place 1 drop into both eyes 3 times daily, Disp: , Rfl:     Handicap Placard MISC, by Does not apply route effective November 25,2019 through November 24,2020, Disp: 1 each, Rfl: 0                                                 Past Medical History:   Diagnosis Date    Arthritis     Ascending aortic aneurysm ([facility]     CAD (coronary artery disease)     Chronic kidney disease     Constipation     DDD (degenerative disc disease), lumbar     L5, S1    Dental disease     Hearing loss     Hyperlipidemia     Hypertension     Mild aortic stenosis     Prostate enlargement     TIA (transient ischemic attack)                                                     Past Surgical History:   Procedure Laterality Date    ABDOMINAL AORTIC ANEURYSM REPAIR, ENDOVASCULAR N/A 3/22/2023    ENDOVASCULAR ABDOMINAL AORTIC ANEURYSM REPAIR performed by Virgilio Coronel MD at [facility]  01/12/1998    EYE SURGERY      SKIN TAG REMOVAL  02/02/2010

## 2023-05-18 NOTE — PROGRESS NOTES
Attempted to see patient twice but he was off the floor both times. Daughter reported that he was having throat pain with swallowing, trouble swallowing, and trouble breathing. Had SLP eval today with OP dysphagia so esophagram not done. ENT consulted. To have cardiac cath per cardiology. Will eval tomorrow and consider EGD +/- PEG if needed but appears he was cleared for dysphagia diet.       Discussed with Dr. Mayuri Yousif, [address] [place] [address]
[address]  H+P  Consult  OP Visit  FU Visit   CC/INTERVAL HX   Admit 5/5/2023   CC AS, CAD   Subjective No acute events overnight. LHC yesterday. Dobutamine stress echo today for AS severity.     Tele Reviewed available   EXAM   VS /65   Pulse 61   Temp 97.2 °F (36.2 °C) (Temporal)   Resp 18   Ht 5' 6\" (1.676 m)   Wt 180 lb (81.6 kg)   SpO2 98%   BMI 29.05 kg/m²    Gen Alert, coop, no distress Heart  RRR, 3/6   Head NC, AT, no abnorm Abd  Soft, NT, +BS, no mass, no OM   Eyes PER, conj/corn clear Ext  Ext nl, AT, no C/C/E   Nose Nares nl, no drain, NT Pulse 2+ and symmetric   Throat Lips, mucosa, tongue nl Skin Col/text/turg nl, no vis rash/les   Neck S/S, TM, NT, no bruit/JVD Psych Nl mood and affect   Lung CTA-B, unlabored, no DTP Lymph   No cervical or axillary LA   Ch wall NT, no deform Neuro  Nl gross M/S exam      MEDICATIONS    acetylcysteine  600 mg Inhalation BID    ascorbic acid  500 mg Oral BID    albuterol  2.5 mg Nebulization BID    ampicillin-sulbactam  3,000 mg IntraVENous Q12H    dexamethasone  8 mg IntraVENous Daily    pantoprazole  40 mg Oral QAM AC    azelastine  1 drop Both Eyes TID    therapeutic multivitamin-minerals  1 tablet Oral Daily    tamsulosin  0.4 mg Oral Daily    allopurinol  100 mg Oral Daily    aspirin  325 mg Oral Daily    atorvastatin  80 mg Oral QHS    LORazepam  0.5 mg Oral BID    melatonin  10.5 mg Oral QHS    mirtazapine  15 mg Oral Nightly    potassium chloride  30 mEq Oral Daily    sodium chloride flush  5-40 mL IntraVENous 2 times per day    enoxaparin  30 mg SubCUTAneous Daily    polyethylene glycol  17 g Oral Daily      DOBUTamine      sodium chloride        LABS   Hgb Lab Results   Component Value Date    HGB 10.6 (L) 05/08/2023      Cr Lab Results   Component Value Date    CREATININE 2.2 (H) 05/09/2023      Trop Lab Results   Component Value Date    TROPHS 72 (H) 05/05/2023    TROPHS 72 (H) 05/05/2023       ASSESSMENT TIME   More than 35
[address]  H+P  Consult  OP Visit  FU Visit   CC/INTERVAL HX   Admit 5/5/2023   CC AS, CAD   Subjective No acute events overnight. Severe AS by dobutamine. Long discussion with daughter regarding additional workup, specifically contrast CT and TAVR with contrast.  Discussed renal risk and patient daughter hesitant to proceed with testing.     Tele Reviewed available   EXAM   VS BP (!) 100/49   Pulse 58   Temp 97.3 °F (36.3 °C) (Temporal)   Resp 16   Ht 5' 6\" (1.676 m)   Wt 179 lb 3.2 oz (81.3 kg)   SpO2 96%   BMI 28.92 kg/m²    Gen Alert, coop, no distress Heart  RRR, 3/6   Head NC, AT, no abnorm Abd  Soft, NT, +BS, no mass, no OM   Eyes PER, conj/corn clear Ext  Ext nl, AT, no C/C/E   Nose Nares nl, no drain, NT Pulse 2+ and symmetric   Throat Lips, mucosa, tongue nl Skin Col/text/turg nl, no vis rash/les   Neck S/S, TM, NT, no bruit/JVD Psych Nl mood and affect   Lung CTA-B, unlabored, no DTP Lymph   No cervical or axillary LA   Ch wall NT, no deform Neuro  Nl gross M/S exam      MEDICATIONS    furosemide  20 mg IntraVENous TID    acetylcysteine  600 mg Inhalation BID    ascorbic acid  500 mg Oral BID    albuterol  2.5 mg Nebulization BID    ampicillin-sulbactam  3,000 mg IntraVENous Q12H    dexamethasone  8 mg IntraVENous Daily    pantoprazole  40 mg Oral QAM AC    azelastine  1 drop Both Eyes TID    therapeutic multivitamin-minerals  1 tablet Oral Daily    tamsulosin  0.4 mg Oral Daily    allopurinol  100 mg Oral Daily    aspirin  325 mg Oral Daily    atorvastatin  80 mg Oral QHS    LORazepam  0.5 mg Oral BID    melatonin  10.5 mg Oral QHS    mirtazapine  15 mg Oral Nightly    potassium chloride  30 mEq Oral Daily    sodium chloride flush  5-40 mL IntraVENous 2 times per day    enoxaparin  30 mg SubCUTAneous Daily    polyethylene glycol  17 g Oral Daily      DOBUTamine Stopped (05/09/23 0956)    sodium chloride        LABS   Hgb Lab Results   Component Value Date    HGB 10.6 (L)
[address]  H+P  Consult  OP Visit  FU Visit   CC/INTERVAL HX   Admit 5/5/2023   CC AS, CAD   Subjective No acute events overnight. Voice better. No cp, sob. Plan for conservative therapy for valve per patient and daughter.      Tele Reviewed available   EXAM   VS BP (!) 110/56   Pulse 65   Temp 97 °F (36.1 °C) (Temporal)   Resp 22   Ht 5' 6\" (1.676 m)   Wt 180 lb 7 oz (81.8 kg)   SpO2 100%   BMI 29.12 kg/m²    Gen Alert, coop, no distress Heart  RRR, 3/6   Head NC, AT, no abnorm Abd  Soft, NT, +BS, no mass, no OM   Eyes PER, conj/corn clear Ext  Ext nl, AT, no C/C/E   Nose Nares nl, no drain, NT Pulse 2+ and symmetric   Throat Lips, mucosa, tongue nl Skin Col/text/turg nl, no vis rash/les   Neck S/S, TM, NT, no bruit/JVD Psych Nl mood and affect   Lung CTA-B, unlabored, no DTP Lymph   No cervical or axillary LA   Ch wall NT, no deform Neuro  Nl gross M/S exam      MEDICATIONS    amoxicillin-clavulanate  1 tablet Oral 2 times per day    dexamethasone  4 mg Oral 2 times per day    [Held by provider] furosemide  20 mg IntraVENous TID    acetylcysteine  600 mg Inhalation BID    ascorbic acid  500 mg Oral BID    albuterol  2.5 mg Nebulization BID    pantoprazole  40 mg Oral QAM AC    azelastine  1 drop Both Eyes TID    therapeutic multivitamin-minerals  1 tablet Oral Daily    tamsulosin  0.4 mg Oral Daily    allopurinol  100 mg Oral Daily    aspirin  325 mg Oral Daily    atorvastatin  80 mg Oral QHS    LORazepam  0.5 mg Oral BID    melatonin  10.5 mg Oral QHS    mirtazapine  15 mg Oral Nightly    potassium chloride  30 mEq Oral Daily    sodium chloride flush  5-40 mL IntraVENous 2 times per day    enoxaparin  30 mg SubCUTAneous Daily    polyethylene glycol  17 g Oral Daily      DOBUTamine Stopped (05/09/23 0956)    sodium chloride        LABS   Hgb Lab Results   Component Value Date    HGB 9.8 (L) 05/11/2023      Cr Lab Results   Component Value Date    CREATININE 2.5 (H) 05/11/2023      Trop Lab
Data- discharge order received, pt and daughter (appointed legal authority) verbalized agreement to discharge, disposition to [facility] #[phone], Gabi Patricio reviewed and signed by physician. Action- AVS prepared, SUZY completed/ reported faxed by case management/. Discharge instruction summary: Diet- cardiac, dysphagia minced and moist, low sodium, Activity- SBA with walker, immunizations reviewed, medications prescriptions to be filled at receiving facility except for the controlled prescriptions to be sent: [address], Transfer code status: Full Code, LDAs to remain with discharge: n/a. DME used: [address]. Response- Bedside RN to call report to receiving facility. Pt belongings gathered, peripheral IV and cardiac monitoring removed. Disposition to Discharged in a wheelchair to skilled nursing by family, no complications reported. 1. WEIGHT: Admit Weight - Scale: 187 lb (84.8 kg) (05/05/23 1130)        Today  Weight - Scale: 186 lb 9 oz (84.6 kg) (05/12/23 0400)       2.  O2 SAT.: SpO2: 96 % (05/12/23 0934)
Department of Internal Medicine  General Internal Medicine   Progress Note      SUBJECTIVE: clinically further improved     History obtained from chart review, the patient, and nursing staff   General ROS: positive for  - fatigue, malaise, and sleep disturbance  negative for - chills, fever, or night sweats  Psychological ROS: negative  Ophthalmic ROS: negative  Respiratory ROS: positive for - cough, shortness of breath, and tachypnea  negative for - hemoptysis or wheezing  Cardiovascular ROS: positive for - dyspnea on exertion, orthopnea, and shortness of breath  negative for - chest pain, edema, or murmur  Gastrointestinal ROS: no abdominal pain, change in bowel habits, or black or bloody stools  Genito-Urinary ROS: no dysuria, trouble voiding, or hematuria  Musculoskeletal ROS: chronic pain   Neurological ROS: no TIA or stroke symptoms  Dermatological ROS: negative    OBJECTIVE      Medications      Current Facility-Administered Medications: torsemide (DEMADEX) tablet 20 mg, 20 mg, Oral, BID  amoxicillin-clavulanate (AUGMENTIN) 500-125 MG per tablet 1 tablet, 1 tablet, Oral, 2 times per day  dexamethasone (DECADRON) tablet 4 mg, 4 mg, Oral, 2 times per day  albuterol (PROVENTIL) nebulizer solution 2.5 mg, 2.5 mg, Nebulization, BID  DOBUTamine (DOBUTREX) 500 mg in dextrose 5 % 250 mL infusion, 10 mcg/kg/min, IntraVENous, Continuous  phenol 1.4 % mouth spray 1 spray, 1 spray, Mouth/Throat, Q2H PRN  guaiFENesin-codeine (GUAIFENESIN AC) 100-10 MG/5ML liquid 5 mL, 5 mL, Oral, Q4H PRN  pantoprazole (PROTONIX) tablet 40 mg, 40 mg, Oral, QAM AC  azelastine (OPTIVAR) 0.05 % ophthalmic solution 1 drop, 1 drop, Both Eyes, TID  therapeutic multivitamin-minerals 1 tablet, 1 tablet, Oral, Daily  tamsulosin (FLOMAX) capsule 0.4 mg, 0.4 mg, Oral, Daily  allopurinol (ZYLOPRIM) tablet 100 mg, 100 mg, Oral, Daily  aspirin EC tablet 325 mg, 325 mg, Oral, Daily  atorvastatin (LIPITOR) tablet 80 mg, 80 mg, Oral, QHS  docusate sodium
Department of Internal Medicine  General Internal Medicine   Progress Note      SUBJECTIVE: hoarseness of voice ,  SOB has O2 requirement , unable to verbalize  some orthopnea denies angina     History obtained from chart review, the patient, and nursing staff   General ROS: positive for  - fatigue, malaise, and sleep disturbance  negative for - chills, fever, or night sweats  Psychological ROS: negative  Ophthalmic ROS: negative  Respiratory ROS: positive for - cough, shortness of breath, and tachypnea  negative for - hemoptysis or wheezing  Cardiovascular ROS: positive for - dyspnea on exertion, orthopnea, and shortness of breath  negative for - chest pain, edema, or murmur  Gastrointestinal ROS: no abdominal pain, change in bowel habits, or black or bloody stools  Genito-Urinary ROS: no dysuria, trouble voiding, or hematuria  Musculoskeletal ROS: chronic pain   Neurological ROS: no TIA or stroke symptoms  Dermatological ROS: negative    OBJECTIVE      Medications      Current Facility-Administered Medications: acetylcysteine (MUCOMYST) 20 % solution 600 mg, 600 mg, Inhalation, BID  ascorbic acid (VITAMIN C) tablet 500 mg, 500 mg, Oral, BID  albuterol (PROVENTIL) nebulizer solution 2.5 mg, 2.5 mg, Nebulization, BID  DOBUTamine (DOBUTREX) 500 mg in dextrose 5 % 250 mL infusion, 10 mcg/kg/min, IntraVENous, Continuous  ampicillin-sulbactam (UNASYN) 3,000 mg in sodium chloride 0.9 % 100 mL IVPB (mini-bag), 3,000 mg, IntraVENous, Q12H  dexamethasone (PF) (DECADRON) injection 8 mg, 8 mg, IntraVENous, Daily  phenol 1.4 % mouth spray 1 spray, 1 spray, Mouth/Throat, Q2H PRN  guaiFENesin-codeine (GUAIFENESIN AC) 100-10 MG/5ML liquid 5 mL, 5 mL, Oral, Q4H PRN  pantoprazole (PROTONIX) tablet 40 mg, 40 mg, Oral, QAM AC  azelastine (OPTIVAR) 0.05 % ophthalmic solution 1 drop, 1 drop, Both Eyes, TID  therapeutic multivitamin-minerals 1 tablet, 1 tablet, Oral, Daily  tamsulosin (FLOMAX) capsule 0.4 mg, 0.4 mg, Oral,
Department of Internal Medicine  General Internal Medicine   Progress Note      SUBJECTIVE: hoarseness of voice improved substantially now able to speak understandably ,moderate SOB     History obtained from chart review, the patient, and nursing staff   General ROS: positive for  - fatigue, malaise, and sleep disturbance  negative for - chills, fever, or night sweats  Psychological ROS: negative  Ophthalmic ROS: negative  Respiratory ROS: positive for - cough, shortness of breath, and tachypnea  negative for - hemoptysis or wheezing  Cardiovascular ROS: positive for - dyspnea on exertion, orthopnea, and shortness of breath  negative for - chest pain, edema, or murmur  Gastrointestinal ROS: no abdominal pain, change in bowel habits, or black or bloody stools  Genito-Urinary ROS: no dysuria, trouble voiding, or hematuria  Musculoskeletal ROS: chronic pain   Neurological ROS: no TIA or stroke symptoms  Dermatological ROS: negative    OBJECTIVE      Medications      Current Facility-Administered Medications: [Held by provider] furosemide (LASIX) injection 20 mg, 20 mg, IntraVENous, TID  acetylcysteine (MUCOMYST) 20 % solution 600 mg, 600 mg, Inhalation, BID  ascorbic acid (VITAMIN C) tablet 500 mg, 500 mg, Oral, BID  albuterol (PROVENTIL) nebulizer solution 2.5 mg, 2.5 mg, Nebulization, BID  DOBUTamine (DOBUTREX) 500 mg in dextrose 5 % 250 mL infusion, 10 mcg/kg/min, IntraVENous, Continuous  ampicillin-sulbactam (UNASYN) 3,000 mg in sodium chloride 0.9 % 100 mL IVPB (mini-bag), 3,000 mg, IntraVENous, Q12H  dexamethasone (PF) (DECADRON) injection 8 mg, 8 mg, IntraVENous, Daily  phenol 1.4 % mouth spray 1 spray, 1 spray, Mouth/Throat, Q2H PRN  guaiFENesin-codeine (GUAIFENESIN AC) 100-10 MG/5ML liquid 5 mL, 5 mL, Oral, Q4H PRN  pantoprazole (PROTONIX) tablet 40 mg, 40 mg, Oral, QAM AC  azelastine (OPTIVAR) 0.05 % ophthalmic solution 1 drop, 1 drop, Both Eyes, TID  therapeutic multivitamin-minerals 1 tablet, 1 tablet,
Dr. Desmond Randall ENT, evaluated patient requested to have IV steroid and IV Unasyn,  was notified by RN to call [phone], no answer, LM general, as unsure of mailbox location. New orders placed  Acute Pharyngitis  Right Vocal cord Paresis/paralysis  Dysphagia  Unasyn 3000 mg q 12 hours  Solumedrol 40 mg q 8 hours. Ordered for standard dosing. ENT consult note did not note specific doses.   Juan C CABRERA-CNP  Internal Medicine Hospitalist   Acute Care [facility]  5/8/2023  9:23 PM    Update - 2221  Solumedrol unavailable per pharmacy, steroid changed to Decadron 8 mg IV daily f or 7 days,
ENT    Patient seen. Full consult report to follow. IMPRESSION:  Right vocal cord paresis/paralysis  Dysphagia/odynophagia  Acute pharyngitis  Excessive oropharyngeal secretions      RECOMMENDATIONS/PLAN:  Unasyn IV  Steroid IV  Will need w/u for vocal cord paralysis if persists. Will need cerumenectomy.
Facility/Department: [address]  Speech Language Pathology   Dysphagia Treatment Note    Patient: Vamsi Ramirez   : 1932   MRN: [id number]      Evaluation Date: 5/10/2023      Admitting Dx: Shortness of breath [R06.02]  Acute pulmonary edema ([address] [J81.0]  Acute on chronic heart failure, unspecified heart failure type ([address] [I50.9]  Treatment Diagnosis: Oropharyngeal Dysphagia   Pain: Did not state                                              MBS: 2023  \"Modified Barium Swallow evaluation completed on 2023. Patient presents with a moderate-severe oropharyngeal dysphagia secondary to prolonged mastication, reduced AP propulsion, pharyngeal pooling, reduced tongue base retraction, diminished pharyngeal stripping wave, reduced hyolaryngeal excursion and decreased epiglottic inversion. As a result, build up of pharyngeal residue within the vallecular space and pyriform sinuses were noted to spill into the airway with trials of thin liquids and mildly thick liquids. All episodes of spillage into the airway progressed to aspiration at the level of and below the vocal folds. Pt was unable to expel material from airway despite reflexive response. No laryngeal penetration or aspiration was viewed with moderately thick liquids, puree, soft solids or regular solids. Suspect improvement related to improved pharyngeal clearing (trace-mild) secondary to these consistencies being a heavier bolus. Overall, pt is increased risk of aspiration secondary to spillage of pharyngeal residue into the airway after the swallow. Recommend initiation of Dysphagia II/Minced and Moist with Moderate (honey) thick liquids and ongoing SLP intervention. If respiratory status were to decline, recommend downgrade to NPO with SLP to further assess. \"    Diet and Treatment Recommendations 5/10/2023:  Diet Solids Recommendation:  Dysphagia II Minced and Moist  Liquid Consistency Recommendation:  Moderately (honey) thick liquids  Recommended
Facility/Department: [address]  Speech Language Pathology   Dysphagia Treatment Note    Patient: Rosalie Ta   : 1932   MRN: [id number]      Evaluation Date: 2023      Admitting Dx: Shortness of breath [R06.02]  Acute pulmonary edema ([address] [J81.0]  Acute on chronic heart failure, unspecified heart failure type ([address] [I50.9]  Treatment Diagnosis: Oropharyngeal Dysphagia   Pain: Did not state                                              MBS: 2023  \"Modified Barium Swallow evaluation completed on 2023. Patient presents with a moderate-severe oropharyngeal dysphagia secondary to prolonged mastication, reduced AP propulsion, pharyngeal pooling, reduced tongue base retraction, diminished pharyngeal stripping wave, reduced hyolaryngeal excursion and decreased epiglottic inversion. As a result, build up of pharyngeal residue within the vallecular space and pyriform sinuses were noted to spill into the airway with trials of thin liquids and mildly thick liquids. All episodes of spillage into the airway progressed to aspiration at the level of and below the vocal folds. Pt was unable to expel material from airway despite reflexive response. No laryngeal penetration or aspiration was viewed with moderately thick liquids, puree, soft solids or regular solids. Suspect improvement related to improved pharyngeal clearing (trace-mild) secondary to these consistencies being a heavier bolus. Overall, pt is increased risk of aspiration secondary to spillage of pharyngeal residue into the airway after the swallow. Recommend initiation of Dysphagia II/Minced and Moist with Moderate (honey) thick liquids and ongoing SLP intervention. If respiratory status were to decline, recommend downgrade to NPO with SLP to further assess. \"    Diet and Treatment Recommendations 2023:  Diet Solids Recommendation:  Dysphagia II Minced and Moist  Liquid Consistency Recommendation:  Mildly thick (nectar) liquids Recommended
Gastroenterology Progress Note      Jigna Conteh is a 80 y.o. male patient. 1. Acute pulmonary edema ([address]    2. Shortness of breath        SUBJECTIVE:  Daughter reports pt has had throat pain and trouble swallowing. Physical    VITALS:  /71   Pulse 62   Temp 97.9 °F (36.6 °C) (Temporal)   Resp 18   Ht 5' 6\" (1.676 m)   Wt 180 lb (81.6 kg)   SpO2 100%   BMI 29.05 kg/m²   TEMPERATURE:  Current - Temp: 97.9 °F (36.6 °C); Max - Temp  Av.4 °F (36.9 °C)  Min: 97.2 °F (36.2 °C)  Max: 99.8 °F (37.7 °C)    Constitutional: Alert and oriented x 4. No acute distress. Respiratory: Respirations nonlabored, no crepitus  GI: Abdomen nondistended, soft, and nontender. Neurological: No focal deficits noted. No asterixis. Data    Data Review:    Recent Labs     23  0504 23  0413   WBC 5.8 7.0   HGB 9.8* 10.6*   HCT 30.1* 32.2*   MCV 99.9 100.2*   * 129*     Recent Labs     23  0504 23  0413 23  0425    140 142   K 3.7 4.2 4.1    102 104   CO2 27 30 29   PHOS  --   --  4.9   BUN 57* 50* 48*   CREATININE 2.2* 2.1* 2.2*     No results for input(s): AST, ALT, ALB, BILIDIR, BILITOT, ALKPHOS in the last 72 hours. No results for input(s): LIPASE, AMYLASE in the last 72 hours. No results for input(s): PROTIME, INR in the last 72 hours. No results for input(s): PTT in the last 72 hours. ASSESSMENT / PLAN      Acute pharyngitis - ENT following. Noted to have excessive oropharyngeal secretions. On antibiotics and steroids. Oropharyngeal dysphagia - SLP following. Ok'd for dysphagia diet. Noted to have right vocal cord paresis/paralysis per ENT eval.     Aortic stenosis - cardiology evaluating. Will sign off. Please call if questions.     Discussed with Dr. Luciana Ulloa, PA-C  [id number]    Patient was seen and examined with nurse practitioner, I was present for all parts of history and physical exam, I helped
[facility] [address] Department   Phone: [phone]    Physical Therapy    [] Initial Evaluation            [x] Daily Treatment Note         [] Discharge Summary      Patient: Gerrit Bence   : 1932   MRN: [id number]   Date of Service:  2023  Admitting Diagnosis: Acute on chronic combined systolic and diastolic congestive heart failure [facility])  Current Admission Summary: Per Dr. Vasile Chacon  Patient is a 80 y.o.  male admitted with Shortness of breath, Acute pulmonary edema, Acute on chronic heart failure, unspecified heart failure type who is seen in consult for odynophagia. Patient states having discomfort in his throat every since wearing an oxygen mask at his extended care facility immediately prior to admission to the hospital.  He feels that the back of his throat is dry and uncomfortable. He is able to swallow medications, hot cereal, and yogurt this morning which is an improvement from last p.m. He denies dysphagia, pyrosis, regurgitation, nausea, vomiting, abdominal pain, chest pain. He does have dyspnea. He is currently being evaluated for aortic stenosis and possible valvular intervention. He denies fevers chills or sweats he denies rhinorrhea or congestion. I communicated with the patient and his daughter over the phone to obtain this history. [place] is his native language. Past Medical History:  has a past medical history of Arthritis, Ascending aortic aneurysm [address] CAD (coronary artery disease), Chronic kidney disease, Constipation, DDD (degenerative disc disease), lumbar, Dental disease, Hearing loss, Hyperlipidemia, Hypertension, Mild aortic stenosis, Prostate enlargement, and TIA (transient ischemic attack). Past Surgical History:  has a past surgical history that includes TURP; Coronary angioplasty with stent (1998); Skin tag removal (2010); eye surgery; and AAA repair, endovascular (N/A, 3/22/2023).   Discharge
[facility] [address] Department   Phone: [phone]    Occupational Therapy    [] Initial Evaluation            [x] Daily Treatment Note         [] Discharge Summary      Patient: Yany Murillo   : 1932   MRN: [id number]   Date of Service:  5/10/2023    Admitting Diagnosis:  Acute on chronic combined systolic and diastolic congestive heart failure [facility])  Current Admission Summary: Per Dr. Driss Rabago  Patient is a 80 y.o.  male admitted with Shortness of breath [R06.02]  Acute pulmonary edema (HCC) [J81.0]  Acute on chronic heart failure, unspecified heart failure type ([address] [I50.9] who is seen in consult for odynophagia. Patient states having discomfort in his throat every since wearing an oxygen mask at his extended care facility immediately prior to admission to the hospital.  He feels that the back of his throat is dry and uncomfortable. He is able to swallow medications, hot cereal, and yogurt this morning which is an improvement from last p.m. He denies dysphagia, pyrosis, regurgitation, nausea, vomiting, abdominal pain, chest pain. He does have dyspnea. He is currently being evaluated for aortic stenosis and possible valvular intervention. He denies fevers chills or sweats he denies rhinorrhea or congestion. I communicated with the patient and his daughter over the phone to obtain this history. [place] is his native language. Past Medical History:  has a past medical history of Arthritis, Ascending aortic aneurysm ([address] CAD (coronary artery disease), Chronic kidney disease, Constipation, DDD (degenerative disc disease), lumbar, Dental disease, Hearing loss, Hyperlipidemia, Hypertension, Mild aortic stenosis, Prostate enlargement, and TIA (transient ischemic attack). Past Surgical History:  has a past surgical history that includes TURP; Coronary angioplasty with stent (1998);  Skin tag removal (2010); eye surgery; and AAA repair,
[facility] [address] Department   Phone: [phone]    Physical Therapy    [] Initial Evaluation            [x] Daily Treatment Note         [] Discharge Summary      Patient: Lenn Spatz   : 1932   MRN: [id number]   Date of Service:  2023  Admitting Diagnosis: Acute on chronic combined systolic and diastolic congestive heart failure [facility])  Current Admission Summary: Per Dr. Collins Sep  Patient is a 80 y.o.  male admitted with Shortness of breath, Acute pulmonary edema, Acute on chronic heart failure, unspecified heart failure type who is seen in consult for odynophagia. Patient states having discomfort in his throat every since wearing an oxygen mask at his extended care facility immediately prior to admission to the hospital.  He feels that the back of his throat is dry and uncomfortable. He is able to swallow medications, hot cereal, and yogurt this morning which is an improvement from last p.m. He denies dysphagia, pyrosis, regurgitation, nausea, vomiting, abdominal pain, chest pain. He does have dyspnea. He is currently being evaluated for aortic stenosis and possible valvular intervention. He denies fevers chills or sweats he denies rhinorrhea or congestion. I communicated with the patient and his daughter over the phone to obtain this history. [place] is his native language. Past Medical History:  has a past medical history of Arthritis, Ascending aortic aneurysm ([address] CAD (coronary artery disease), Chronic kidney disease, Constipation, DDD (degenerative disc disease), lumbar, Dental disease, Hearing loss, Hyperlipidemia, Hypertension, Mild aortic stenosis, Prostate enlargement, and TIA (transient ischemic attack). Past Surgical History:  has a past surgical history that includes TURP; Coronary angioplasty with stent (1998); Skin tag removal (2010); eye surgery; and AAA repair, endovascular (N/A, 3/22/2023).   Discharge
MD Eliot Lei MD Miquel Cowing, MD                                  Office: [phone]                 Fax: [phone]          [email or web address]                     NEPHROLOGY INPATIENT PROGRESS  NOTE:     PATIENT NAME: Coral Burrell  : 1932  MRN: [id number]      Name:  Coral Burrell Date/Time of Admission: 2023 11:24 AM    CSN: [id number] Attending Provider: June Bethea MD   Room/Bed: [place] 75778/0498-72 : 1932 Age: 80 y.o.            ASSESSMENT   Admitted for- Shortness of breath [R06.02]  Acute pulmonary edema ([address] [J81.0]  Acute on chronic heart failure, unspecified heart failure type ([address] [I50.9]      Acute kidney injury-worsening-with improved urine output after IV diuretics. Acute pulmonary edema. Fluid overload. Mild to moderate bilateral pleural effusion. Chronic kidney disease stage IIIb. History of chronic diastolic congestive heart failure. Recent abdominal aortic aneurysm repair-2023. Anemia of likely chronic disease   History of prostate enlargement,    ECHO - on 2023  - EF 20 to 25%, with grade 3 diastolic dysfunction, with elevated LV filling pressures  + Bilateral effusion    [id number]-Dr. Jazmine Saunders  IV contrast 23 cc  LVEDP 17  -Mild fluid overload       PLAN       Today - likely showing signs of ATN with contrast associated acute kidney injury    With fluid overload signs, on echo and [facility],  - Lasix was resumed,   with 20 IV twice daily, due to IV contrast    - Due to worsening dysfunction today, hold Lasix    - DAISY ppx:   No need for IVF   Added Mucomyst, vitamin C,-might have some benefits for contrast-induced prophylaxis. Continue high-dose statin, might have some benefits. Minimize IV dye exposure as much as possible. Leidy Dubois has been on hold. -During active IV diuresis    With history of prostate enlargement monitor PVR as needed      Discharge plans from renal standpoint-likely has DAISY today ; worsening
MD Gigi Robles MD Kirsten Girt, MD                                  Office: [phone]                 Fax: [phone]          [place]                     NEPHROLOGY INPATIENT PROGRESS  NOTE:     PATIENT NAME: Daphne Schwartz  : 1932  MRN: [id number]      Name:  Daphne Schwartz Date/Time of Admission: 2023 11:24 AM    CSN: [id number] Attending Provider: Heather Wagoner MD   Room/Bed: A1G-6634/5203-93 : 1932 Age: 80 y.o.            ASSESSMENT   Admitted for- Shortness of breath [R06.02]  Acute pulmonary edema ([address] [J81.0]  Acute on chronic heart failure, unspecified heart failure type ([address] [I50.9]      Acute kidney injury-worsening-with improved urine output after IV diuretics. Acute pulmonary edema. Fluid overload. Mild to moderate bilateral pleural effusion. Chronic kidney disease stage IIIb. History of chronic diastolic congestive heart failure. Recent abdominal aortic aneurysm repair-2023. Anemia of likely chronic disease   History of prostate enlargement,    ECHO - on 2023  - EF 20 to 25%, with grade 3 diastolic dysfunction, with elevated LV filling pressures  + Bilateral effusion    [id number]-Dr. Solis Ran  IV contrast 23 cc  LVEDP 17  -Mild fluid overload       PLAN       So far no signs of contrast associated acute kidney injury    With fluid overload signs, an echo and [facility],  - Resume Lasix, start with 20 IV twice daily, due to IV contrast yesterday  - Incremental dose- in next 1 or 2 days if renal function remains stable,    - DAISY ppx:   No need for IVF   Added Mucomyst, vitamin C,-might have some benefits for contrast-induced prophylaxis. Continue high-dose statin, might have some benefits. Minimize IV dye exposure as much as possible. Say Butler has been on hold. -During active IV diuresis    With history of prostate enlargement monitor PVR as needed      Acute kidney injury-moderate to severe-worsening on admission.  -
MD Sayra Granados MD Marleta Pinto, MD                                  Office: [phone]                 Fax: [phone]          [place]                     NEPHROLOGY INPATIENT PROGRESS  NOTE:     PATIENT NAME: Elsie Pal  : 1932  MRN: [id number]      Name:  Elsie Pal Date/Time of Admission: 2023 11:24 AM    CSN: [id number] Attending Provider: Panfilo Meza MD   Room/Bed: [place] [address]/1348-88 : 1932 Age: 80 y.o.            ASSESSMENT   Admitted for- Shortness of breath [R06.02]  Acute pulmonary edema ([address] [J81.0]  Acute on chronic heart failure, unspecified heart failure type ([address] [I50.9]      Acute kidney injury-worsening-with improved urine output after IV diuretics. Acute pulmonary edema. Fluid overload. Mild to moderate bilateral pleural effusion. Chronic kidney disease stage IIIb. History of chronic diastolic congestive heart failure. Recent abdominal aortic aneurysm repair-2023. Anemia of likely chronic disease   History of prostate enlargement,         PLAN       Discussed with pt, daughter Kim Talbert and team - cardiology nurse - Dinorah Martinez - re: lower risk compared to earlier this admission  for CA-CARLI today, w/ improving kidney function,   -We have discussed about his current still at risk but lower than what he had a couple days ago about contrast-induced nephropathy,      Hold lasix -I have requested nursing staff in the morning to hold at  No need for IVF   Add Mucomyst, vitamin C,-might have some benefits for contrast-induced prophylaxis. Continue high-dose statin, might have some benefits. Minimize IV dye exposure as much as possible. Fup LHC, LVFDV to decide IVF vs diuretics        Enresto has been on hold.     With history of prostate enlargement monitor PVR as needed      Acute kidney injury-moderate to severe-worsening on admission.  - Creatinine on admission is 2.5.  - His baseline creatinine is approximately
Nutrition Note    RECOMMENDATIONS  PO Diet: Continue the current diet  ONS: none      NUTRITION ASSESSMENT   Pt triggeres nutrition assessment d/t LOS. He is curently on a dysphagia II Minced and Moist , Moderately (honey) thick liquids per SLP. Pt experienced +7lb wt gain during the admisson. He reported his appetitie is good, Po intake is % per EMR/PT. UBW is 183lb per pt. Pt is on diuretics regimen which may lead to fluid wt change. No new nutrition recommendations at this time. Will cont to monitor for adequate po intake while inpatient. Nutrition Related Findings: Na+ 138, K+ 4.9, . BLE/BUE: trace edema noted. I/O: -5.1L. No BM noted. Wounds: None  Nutrition Education:  Education not indicated   Nutrition Goals: PO intake 75% or greater, by next RD assessment     MALNUTRITION ASSESSMENT      Malnutrition Status: No malnutrition        NUTRITION DIAGNOSIS   No nutrition diagnosis at this time      CURRENT NUTRITION THERAPIES  ADULT DIET; Dysphagia - Minced and Moist; Low Sodium (2 gm); Mildly Thick ([place])     PO Intake: %   PO Supplement Intake:None Ordered      ANTHROPOMETRICS  Current Height: 5' 6\" (167.6 cm)  Current Weight - Scale: 186 lb 9 oz (84.6 kg)    Ideal Body Weight (IBW): 142 lbs  (65 kg)    Usual Bodyweight 183 lb (83 kg)       BMI: 30.1        The patient will be monitored per nutrition standards of care. Consult dietitian if additional nutrition interventions are needed prior to RD reassessment.      Radha Forrester RD    Contact: [phone]
Occupational Therapy/Physical Therapy  OT tx attempt this morning; however, pt NERI for medical testing. Will f/u later this date as schedule allows. Thank you, Walker Olivarez, [address], OTR/L, [id number]. OT tx attempt this afternoon. Pt supine in bed with daughter present. Daughter requesting OT reattempt later this date as pt awaiting MD clearance to eat secondary to NPO for earlier medical testing. No further needs requested and pt with s/s of distress or pain. Thank you, Walker Olivarez, [address], OTR/L, [id number].   Suma Caceres, [address] [place] [address], [phone]
Patient stated his throat was not sore but it was hard to swallow and felt like it was closing up. Daughter call concerned about her father and ask if doctor could be notified. Informed hospitalist who ordered stat xray. Hospitalist said xray result were normal and cause could be from dry throat d/t previous oxygen use.
Physician Progress Note      Gary Kendrick  KALIE #:                  [id number]  :                       1932  ADMIT DATE:       2023 11:24 AM  DISCH DATE:        2023 2:00 PM  RESPONDING  PROVIDER #:        Palmira Alcantara MD          QUERY TEXT:    Patient admitted with acute CHF. Noted documentation of ATN in nephrology   notes and \"less likely ATN\" in nephrology notes. If possible, please document in progress notes and discharge summary if you   are evaluating and /or treating any of the following: The medical record reflects the following:  Risk Factors: 89yo with CKD and CHF    Clinical Indicators: Serum Creatinine levels during admission 2.4, 2.5, 2.2,   2.1, 2.2, 2.5, 2.5, 2.5  Neprho , \"Overall renal function has not improved but plateaued currently   and stable,- likely showing signs of ATN with contrast associated acute kidney   injury\"     \"Etiology of acute kidney injury most likely prerenal/cardiorenal   due to CHF exacerbation.- Less likely ATN\"    Treatment: stopped lasix, mucomyst, vit c, high dose statin  Options provided:  -- Possible ATN confirmed after study  -- ATN ruled out after study  -- Other - I will add my own diagnosis  -- Disagree - Not applicable / Not valid  -- Disagree - Clinically unable to determine / Unknown  -- Refer to Clinical Documentation Reviewer    PROVIDER RESPONSE TEXT:    Please refer to note that is all I can document. Query created by:  Kisha Soto on 2023 10:14 AM      Electronically signed by:  Palmira Alcantara MD 2023 11:56 AM
Pt awake in bed and VSS. Daughter at bedside and denies any needs. Call light in reach and bed alarm engaged.
Pt awake in bed and daughter at bedside. VSS and medications given. Purewick, brief and linens changed new and pt repositioned. RT in room giving treatment. Pt and daughter deny any needs. Call light in reach and alarm engaged.
Pt awake in bed. Pt did not have oxygen on and 02 sat were in the 80's. 2 liters NC applied and pt came up to 91%. Pt spitting up a lot of thick mucus. VSS and daughter at bedside. Per daughter he is answering questions correctly and is oriented but it hurts to speak and he is writing everything down. Transport here to take pt and daughter to stress echo in wheelchair.
Pt awake in chair watching TV. VSS, and medications given. Daughter in law at bedside. Pt c/o pain in right ear. Denies any other needs. Call light in reach and chair alarm engaged.
Pt awake in chair. VSS, assessment complete and medications given in applesauce. Pt denies any needs. Call light in reach and chair alarm engaged. Daughter at bedside.
Pt c/o pain in throat when swallowing and states he cannot eat. Pt coughing up thick phylum. Pt NPO for procedure.
Pt gone down to echo via wc.
Pt returned from cath lab. A&o x4. O2 sats 88% on RA, 2 L NC applied. Pt coughing up thick phlym as previous assessment, pt denies pain. RT radial cath site TR band patent, no bleeding /hematoma. Pt needs met.  Bed low, call bell in reach, instructed to call for assist.
Pt up to chair with assist x1. Daugyter at bedside, fall precautions in place.
Report given to [facility], report given.  Call back number and AVS sent with Daughter
SLP ATTEMPT  Chase Ha  11/26/1932    Attempted to see Pt for Speech/Dysphagia follow up treatment. Pt is currently held NPO for pending procedure this date with treatment unable to be completed at this time. Will re-attempt at a later time as schedule allows.     Maggie DE SOUZA-[facility]#[phone]
Speech Language Pathology    Alex Avelar  11/26/1932    RN reported daughter is requesting to see SLP for education regarding findings and recommendations from [facility]. Upon arrival, pt getting ready to leave the floor therefore education was brief. Discussed findings from [facility], unknown etiology of dysphagia and recommendations for diet as well as ongoing SLP intervention. Daughter verbalized understanding however was concerned that pt ear drainage was related to vocal wetness and questioned whether pt could be started on a steroid. RN notified. Ongoing education provided regarding the mechanics of swallow function. Pt daughter verbalized understanding of education.      Thank you,     Damon Henderson M.A., [address] Located within [facility]  Speech-Language Pathologist
Recommendations: June Kim scored a 17/24 on the AM-PAC short mobility form. Current research shows that an AM-PAC score of 17 or less is typically not associated with a discharge to the patient's home setting. Based on the patient's AM-PAC score and their current functional mobility deficits, it is recommended that the patient have 3-5 sessions per week of Physical Therapy at d/c to increase the patient's independence. Please see assessment section for further patient specific details. If patient discharges prior to next session this note will serve as a discharge summary. Please see below for the latest assessment towards goals. *Per pts daughter, pt lives in a long term care setting. In this case, recommend SNF. If the pt lives in assisted living, recommend HHPT (S1 level). DME Required For Discharge: DME to be determined at next level of care  Precautions/Restrictions: high fall risk, up as tolerated  Weight Bearing Restrictions: no restrictions  Required Braces/Orthotics: no braces required  Positional Restrictions:no positional restrictions    Pre-Admission Information   Lives With: spouse    [facility]  Home Access: level entry  Home Equipment: rollator - 4 wheeled walker  Transfer Assistance: Independent without use of device  Ambulation Assistance:Independent without use of device- uses walker \"seldomly\"   ADL Assistance: independent with all ADL's  IADL Assistance: requires assistance with all homemaking tasks  Active :        [] Yes  [x] No  Hand Dominance: [x] Left  [] Right  Current Employment: retired.   Occupation:   Recent Falls: Pt denies falls in the past 6 months    Examination   Vision:   Vision Gross Assessment: WFL  Hearing:   WFL  Observation:   General Observation:  Pt on  on arrival.  Posture:   Mild forward head, rounded shoulders, and increased thoracic kyphosis in sitting and
form of Meds: Meds crushed as able in puree    If respiratory status declines, recommend downgrade to NPO pending SLP re-evaluation    Compensatory strategies:   Effortful Swallows , Upright as possible with all PO intake , External Pacing , Small bites/sips , Swallow 2 times per bite , Eat/feed slowly, Remain upright 30-45 min     Assessment of Texture Tolerance:  Diet level prior to treatment: Dysphagia II Minced and Moist , Mildly (nectar) thick liquids   Tolerance of Current Diet Level:Per chart, no noted difficulty with current diet level     Impressions: Pt was positioned Upright in chair, awake and alert. Currently on room air. Pt had finished breakfast tray prior to SLP entry, reports sensation of something \"sticking\" in lower throat. Trials of mildly (nectar) thick liquids and moderately (honey) thick liquids were provided to assess swallow function. SLP provided set-up assistance and reviewed compensatory swallowing strategies for increased safety with swallowing. Patient able to feed self and implemented safe swallowing strategies (I.e., small sips, effortful swallows) with min cues. Continues to present with increased rate of intake with liquids. Oral phase characterized by functional oral acceptance and labial seal and complete A-P transit. No anterior spillage or oral residue observed post swallow. Pt with delayed cough x 1 following moderately thick liquids, no overt clinical s/s aspiration with mildly thick liquids. Pt continues to be at risk for aspiration due to co morbidities  and dysphagia as viewed on instrumental swallow assessment . Based on today's assessment recommend continuation of Dysphagia II Minced and Moist  with mildly (nectar) thick  liquids, Meds crushed as able in puree . If respiratory status declines, recommend downgrade to NPO pending SLP re-evaluation.     Dysphagia Goals:   Pt will functionally tolerate recommended diet level with use of recommended compensatory strategies with
Pharyngitis, acute 5/9/2023 Yes     dIscussed with patient and family further    Informed them of Stress Echo  findings will switch to PO antibiotics and steroids   Monitor renal function , over all outlook still gloomy due to severe AS , advanced age and poor LVEF  on top of CKD
PORTABLE    Result Date: 5/5/2023  EXAMINATION: ONE XRAY VIEW OF THE CHEST 5/5/2023 11:51 am COMPARISON: Chest x-ray dated 22 March 2023 HISTORY: ORDERING SYSTEM PROVIDED HISTORY: sob TECHNOLOGIST PROVIDED HISTORY: Reason for exam:->sob Reason for Exam: Shortness of Breath (SOB & CHF concerns following aneurysm repair on 3/22; given additional diuretics at [facility] without relief.  ) FINDINGS: Cardiomegaly with pulmonary vascular congestion. Small to moderate bilateral pleural effusions with associated bibasilar airspace opacities. No pneumothorax. Cardiomegaly with pulmonary vascular congestion. Small to moderate bilateral pleural effusions with associated bibasilar airspace opacities. This could reflect atelectasis versus pneumonia in the appropriate clinical setting. Imaging Results. Chest X Ray reviwed by me               Summary    Left ventricular systolic function is reduced with ejection fraction    estimated at 20-25 %. Global hypokinesis with abnormal septal wall motion as well as regional wall    motion abnormalities. Left ventricular size is mild-moderately increased. There is mild concentric left ventricular hypertrophy. Grade III diastolic dysfunction with elevated filling pressure. Bi-atrial enlargement. Aortic root is mildly dilated. Ascending aorta is moderately dilated (4.5 cm)    The aortic valve area is calculated at 1.37 cm2 with a maximum pressure    gradient of 36.48 mmHg and a mean pressure gradient of 22 mmHg. This is c/w    moderate aortic stenosis. Mild-to-moderate aortic regurgitation is present. Mitral annular calcification is present. Moderate mitral regurgitation. Mild-to-moderate tricuspid regurgitation. There is a moderate bilateral pleural effusion.         Signature        ------------------------------------------------------------------    Electronically signed by Elvi Barger MD (Interpreting    physician) on 05/09/2023 at
09:36 AM    ------------------------------------------------------------------             Electronically Signed:  Volodymyr Berry MD 5/12/2023

## 2023-05-18 NOTE — CONSULTS
Session ID: [id number]  Request IQ:[id number]  Language:Zambian  Status:Fulfilled  Agent ID:#[id number]  Agent Name:Pili

## 2023-05-22 ENCOUNTER — TELEPHONE (OUTPATIENT)
Dept: CARDIOLOGY CLINIC | Age: 88
End: 2023-05-22

## 2023-05-22 NOTE — TELEPHONE ENCOUNTER
[facility] fax machine's are down they will call with the results.   Elle Wagner from [facility] called with a critical lab BNP  2860.3

## 2023-05-22 NOTE — TELEPHONE ENCOUNTER
Pt had labs drawn this morning at [facility].  Asking to review results and call daughter Thad Zurita to advise

## 2023-05-23 ENCOUNTER — OFFICE VISIT (OUTPATIENT)
Dept: ENT CLINIC | Age: 88
End: 2023-05-23
Payer: MEDICARE

## 2023-05-23 VITALS
HEART RATE: 73 BPM | OXYGEN SATURATION: 94 % | SYSTOLIC BLOOD PRESSURE: 122 MMHG | DIASTOLIC BLOOD PRESSURE: 63 MMHG | BODY MASS INDEX: 30.02 KG/M2 | HEIGHT: 66 IN | WEIGHT: 186.8 LBS | TEMPERATURE: 97.6 F

## 2023-05-23 DIAGNOSIS — J04.30 SUPRAGLOTTITIS WITHOUT AIRWAY OBSTRUCTION: Primary | ICD-10-CM

## 2023-05-23 PROCEDURE — 1036F TOBACCO NON-USER: CPT | Performed by: OTOLARYNGOLOGY

## 2023-05-23 PROCEDURE — 31575 DIAGNOSTIC LARYNGOSCOPY: CPT | Performed by: OTOLARYNGOLOGY

## 2023-05-23 PROCEDURE — 99212 OFFICE O/P EST SF 10 MIN: CPT | Performed by: OTOLARYNGOLOGY

## 2023-05-23 PROCEDURE — G8417 CALC BMI ABV UP PARAM F/U: HCPCS | Performed by: OTOLARYNGOLOGY

## 2023-05-23 PROCEDURE — 1111F DSCHRG MED/CURRENT MED MERGE: CPT | Performed by: OTOLARYNGOLOGY

## 2023-05-23 PROCEDURE — G8427 DOCREV CUR MEDS BY ELIG CLIN: HCPCS | Performed by: OTOLARYNGOLOGY

## 2023-05-23 PROCEDURE — 1123F ACP DISCUSS/DSCN MKR DOCD: CPT | Performed by: OTOLARYNGOLOGY

## 2023-05-23 NOTE — TELEPHONE ENCOUNTER
Per VO NPKV give extra torsemide 20 mg for 3 days. Tried to reach floor nurse no answer. Spoke to patient's daughter she verbalized understanding and she will try to reach the floor nurse and have them call our office. Spoke to patient's floor nurse carissa she verbalized understanding.

## 2023-05-23 NOTE — TELEPHONE ENCOUNTER
Daughter calling back. States pt has a little fluid in lungs and having sob at night. Asking if medication should be adjusted. Please call today to advise.

## 2023-05-23 NOTE — TELEPHONE ENCOUNTER
Pt daughter states [facility] has sent the results. She is asking for a call back to discuss if we have them and any med changes. Please advise.

## 2023-05-23 NOTE — PROGRESS NOTES
15year-old FOLLOW UP VISIT:    CHIEF COMPLAINT: Supraglottitis    INTERIM HISTORY: Seen 5 days ago with with throat pain and odynophagia . I noted erythema of the supraglottic larynx. He was treated with Augmentin and a Medrol Dosepak. His throat pain, odynophagia, have resolved. PAST MEDICAL HISTORY:   Social History     Tobacco Use   Smoking Status Never   Smokeless Tobacco Never                                                    Social History     Substance and Sexual Activity   Alcohol Use Not Currently                                                    Current Outpatient Medications:     LORazepam (ATIVAN) 0.5 MG tablet, Take 1 tablet by mouth every 6 hours as needed for Anxiety.  Max Daily Amount: 2 mg, Disp: , Rfl:     amoxicillin-clavulanate (AUGMENTIN) 875-125 MG per tablet, Take 1 tablet by mouth 2 times daily for 10 days, Disp: 20 tablet, Rfl: 0    methylPREDNISolone (MEDROL, ANTOINE,) 4 MG tablet, As directed., Disp: 1 kit, Rfl: 0    albuterol (PROVENTIL) (2.5 MG/3ML) 0.083% nebulizer solution, Take 3 mLs by nebulization in the morning and 3 mLs in the evening., Disp: 120 each, Rfl: 3    mirtazapine (REMERON) 15 MG tablet, Take 1 tablet by mouth nightly, Disp: 30 tablet, Rfl: 0    torsemide (DEMADEX) 20 MG tablet, Take 1 tablet by mouth in the morning and at bedtime, Disp: 30 tablet, Rfl: 3    polyethylene glycol (GLYCOLAX) 17 GM/SCOOP powder, Take 17 g by mouth daily, Disp: , Rfl:     pantoprazole (PROTONIX) 40 MG tablet, Take 1 tablet by mouth every morning (before breakfast), Disp: 30 tablet, Rfl: 3    diclofenac sodium (VOLTAREN) 1 % GEL, Apply topically 2 times daily, Disp: , Rfl:     allopurinol (ZYLOPRIM) 100 MG tablet, Take 1 tablet by mouth daily, Disp: , Rfl:     lidocaine 4 % external patch, Place 1 patch onto the skin in the morning and at bedtime Apply topically to L flank two times daily; AM on - PM off, Disp: , Rfl:     aspirin 325 MG EC tablet, Take 1 tablet by mouth daily, Disp: ,

## 2023-05-23 NOTE — TELEPHONE ENCOUNTER
Merna Wilde from [facility] is calling to report the patient's daughter told her we needed lab results. Their fax machines are down and she is providing lab results verbally. The patient had a BNP drawn on 5/22/23 and those results were 2860.3.  They also did a BMP:  Glucose: 127  Sodium: 140  Potassium: 4.7  Chloride: 105  CO2: 25  BUN: 71  Creatinine:2.7  Bun/Creatine ratio: 26  GFR : 27  GFR non : 22  Calcium: 8.4

## 2023-05-25 ASSESSMENT — ENCOUNTER SYMPTOMS
SHORTNESS OF BREATH: 1
GASTROINTESTINAL NEGATIVE: 1

## 2023-05-30 ENCOUNTER — OFFICE VISIT (OUTPATIENT)
Dept: CARDIOLOGY CLINIC | Age: 88
End: 2023-05-30
Payer: MEDICARE

## 2023-05-30 VITALS
BODY MASS INDEX: 29.41 KG/M2 | HEIGHT: 66 IN | HEART RATE: 54 BPM | DIASTOLIC BLOOD PRESSURE: 60 MMHG | OXYGEN SATURATION: 95 % | WEIGHT: 183 LBS | SYSTOLIC BLOOD PRESSURE: 136 MMHG

## 2023-05-30 DIAGNOSIS — N18.4 STAGE 4 CHRONIC KIDNEY DISEASE (HCC): ICD-10-CM

## 2023-05-30 DIAGNOSIS — I50.43 ACUTE ON CHRONIC COMBINED SYSTOLIC AND DIASTOLIC CONGESTIVE HEART FAILURE (HCC): Primary | ICD-10-CM

## 2023-05-30 DIAGNOSIS — R00.1 BRADYCARDIA: ICD-10-CM

## 2023-05-30 DIAGNOSIS — I25.5 ISCHEMIC CARDIOMYOPATHY: ICD-10-CM

## 2023-05-30 PROCEDURE — 99214 OFFICE O/P EST MOD 30 MIN: CPT | Performed by: NURSE PRACTITIONER

## 2023-05-30 PROCEDURE — 1123F ACP DISCUSS/DSCN MKR DOCD: CPT | Performed by: NURSE PRACTITIONER

## 2023-05-30 PROCEDURE — 1036F TOBACCO NON-USER: CPT | Performed by: NURSE PRACTITIONER

## 2023-05-30 PROCEDURE — G8427 DOCREV CUR MEDS BY ELIG CLIN: HCPCS | Performed by: NURSE PRACTITIONER

## 2023-05-30 PROCEDURE — G8417 CALC BMI ABV UP PARAM F/U: HCPCS | Performed by: NURSE PRACTITIONER

## 2023-05-30 PROCEDURE — 1111F DSCHRG MED/CURRENT MED MERGE: CPT | Performed by: NURSE PRACTITIONER

## 2023-05-30 RX ORDER — OMEPRAZOLE 20 MG/1
CAPSULE, DELAYED RELEASE ORAL
COMMUNITY
Start: 2023-05-12

## 2023-05-30 RX ORDER — TORSEMIDE 20 MG/1
60 TABLET ORAL DAILY
Qty: 30 TABLET | Refills: 3 | Status: SHIPPED | OUTPATIENT
Start: 2023-05-30

## 2023-05-30 RX ORDER — HYDRALAZINE HYDROCHLORIDE 10 MG/1
10 TABLET, FILM COATED ORAL 2 TIMES DAILY
Qty: 60 TABLET | Refills: 3 | Status: SHIPPED | OUTPATIENT
Start: 2023-05-30

## 2023-05-30 NOTE — PATIENT INSTRUCTIONS
Instructions:   Medications: start  hydralazine 10mg twice a day, increase torsemide to 40mg am, 20mg pm  Labs: weekly  Lifestyle Recommendations: Weigh yourself every day in the morning after urination, call [employer] if wt increases 2-3lb in one day or 5lb in one week, Limit sodium to 2000mg/day and fluids to 2L or 64oz/day.    Follow up: 2-3 weeks

## 2023-06-13 PROBLEM — I95.2 HYPOTENSION DUE TO DRUGS: Status: ACTIVE | Noted: 2023-06-13

## 2023-06-16 ENCOUNTER — HOSPITAL ENCOUNTER (INPATIENT)
Age: 88
LOS: 14 days | Discharge: NURSING, REHAB OR LONG-TERM CARE | DRG: 291 | End: 2023-06-30
Attending: EMERGENCY MEDICINE | Admitting: ANESTHESIOLOGY
Payer: MEDICARE

## 2023-06-16 ENCOUNTER — APPOINTMENT (OUTPATIENT)
Dept: GENERAL RADIOLOGY | Age: 88
DRG: 291 | End: 2023-06-16
Payer: MEDICARE

## 2023-06-16 ENCOUNTER — APPOINTMENT (OUTPATIENT)
Dept: CT IMAGING | Age: 88
DRG: 291 | End: 2023-06-16
Payer: MEDICARE

## 2023-06-16 DIAGNOSIS — I50.23 ACUTE ON CHRONIC SYSTOLIC CONGESTIVE HEART FAILURE (HCC): Primary | ICD-10-CM

## 2023-06-16 DIAGNOSIS — I50.43 ACUTE ON CHRONIC COMBINED SYSTOLIC AND DIASTOLIC CONGESTIVE HEART FAILURE (HCC): ICD-10-CM

## 2023-06-16 LAB
ALBUMIN SERPL-MCNC: 3.7 G/DL (ref 3.4–5)
ALBUMIN/GLOB SERPL: 1.2 {RATIO} (ref 1.1–2.2)
ALP SERPL-CCNC: 163 U/L (ref 40–129)
ALT SERPL-CCNC: 31 U/L (ref 10–40)
ANION GAP SERPL CALCULATED.3IONS-SCNC: 10 MMOL/L (ref 3–16)
ANION GAP SERPL CALCULATED.3IONS-SCNC: 13 MMOL/L (ref 3–16)
AST SERPL-CCNC: 29 U/L (ref 15–37)
BASE EXCESS BLDV CALC-SCNC: 3.6 MMOL/L (ref -2–3)
BASE EXCESS BLDV CALC-SCNC: 5.7 MMOL/L (ref -2–3)
BASOPHILS # BLD: 0 K/UL (ref 0–0.2)
BASOPHILS NFR BLD: 0.5 %
BILIRUB SERPL-MCNC: 0.7 MG/DL (ref 0–1)
BILIRUB UR QL STRIP.AUTO: NEGATIVE
BUN SERPL-MCNC: 50 MG/DL (ref 7–20)
BUN SERPL-MCNC: 51 MG/DL (ref 7–20)
CALCIUM SERPL-MCNC: 9 MG/DL (ref 8.3–10.6)
CALCIUM SERPL-MCNC: 9.1 MG/DL (ref 8.3–10.6)
CHLORIDE SERPL-SCNC: 101 MMOL/L (ref 99–110)
CHLORIDE SERPL-SCNC: 102 MMOL/L (ref 99–110)
CLARITY UR: CLEAR
CO2 BLDV-SCNC: 31 MMOL/L
CO2 BLDV-SCNC: 34 MMOL/L
CO2 SERPL-SCNC: 26 MMOL/L (ref 21–32)
CO2 SERPL-SCNC: 30 MMOL/L (ref 21–32)
COHGB MFR BLDV: 1.3 % (ref 0–1.5)
COHGB MFR BLDV: 1.5 % (ref 0–1.5)
COLOR UR: YELLOW
CREAT SERPL-MCNC: 2.4 MG/DL (ref 0.8–1.3)
CREAT SERPL-MCNC: 2.4 MG/DL (ref 0.8–1.3)
DEPRECATED RDW RBC AUTO: 15.8 % (ref 12.4–15.4)
DO-HGB MFR BLDV: 45.4 %
EOSINOPHIL # BLD: 0.1 K/UL (ref 0–0.6)
EOSINOPHIL NFR BLD: 1.3 %
FLUAV RNA RESP QL NAA+PROBE: NOT DETECTED
FLUBV RNA RESP QL NAA+PROBE: NOT DETECTED
GFR SERPLBLD CREATININE-BSD FMLA CKD-EPI: 25 ML/MIN/{1.73_M2}
GFR SERPLBLD CREATININE-BSD FMLA CKD-EPI: 25 ML/MIN/{1.73_M2}
GLUCOSE SERPL-MCNC: 125 MG/DL (ref 70–99)
GLUCOSE SERPL-MCNC: 127 MG/DL (ref 70–99)
GLUCOSE UR STRIP.AUTO-MCNC: NEGATIVE MG/DL
HCO3 BLDV-SCNC: 29.1 MMOL/L (ref 24–28)
HCO3 BLDV-SCNC: 31.9 MMOL/L (ref 24–28)
HCT VFR BLD AUTO: 29.1 % (ref 40.5–52.5)
HGB BLD-MCNC: 9.7 G/DL (ref 13.5–17.5)
HGB UR QL STRIP.AUTO: NEGATIVE
KETONES UR STRIP.AUTO-MCNC: NEGATIVE MG/DL
LEUKOCYTE ESTERASE UR QL STRIP.AUTO: NEGATIVE
LYMPHOCYTES # BLD: 1.1 K/UL (ref 1–5.1)
LYMPHOCYTES NFR BLD: 19.1 %
MAGNESIUM SERPL-MCNC: 2.5 MG/DL (ref 1.8–2.4)
MCH RBC QN AUTO: 32.5 PG (ref 26–34)
MCHC RBC AUTO-ENTMCNC: 33.3 G/DL (ref 31–36)
MCV RBC AUTO: 97.7 FL (ref 80–100)
METHGB MFR BLDV: 0.1 % (ref 0–1.5)
METHGB MFR BLDV: 0.6 % (ref 0–1.5)
MONOCYTES # BLD: 0.8 K/UL (ref 0–1.3)
MONOCYTES NFR BLD: 13.3 %
NEUTROPHILS # BLD: 3.8 K/UL (ref 1.7–7.7)
NEUTROPHILS NFR BLD: 65.8 %
NITRITE UR QL STRIP.AUTO: NEGATIVE
NT-PROBNP SERPL-MCNC: ABNORMAL PG/ML (ref 0–449)
PCO2 BLDV: 47.5 MMHG (ref 41–51)
PCO2 BLDV: 55.6 MMHG (ref 41–51)
PH BLDV: 7.37 [PH] (ref 7.35–7.45)
PH BLDV: 7.4 [PH] (ref 7.35–7.45)
PH UR STRIP.AUTO: 7 [PH] (ref 5–8)
PLATELET # BLD AUTO: 180 K/UL (ref 135–450)
PMV BLD AUTO: 7.8 FL (ref 5–10.5)
PO2 BLDV: 160 MMHG (ref 25–40)
PO2 BLDV: 33.5 MMHG (ref 25–40)
POTASSIUM SERPL-SCNC: 4 MMOL/L (ref 3.5–5.1)
POTASSIUM SERPL-SCNC: 4.1 MMOL/L (ref 3.5–5.1)
PROT SERPL-MCNC: 6.7 G/DL (ref 6.4–8.2)
PROT UR STRIP.AUTO-MCNC: NEGATIVE MG/DL
RBC # BLD AUTO: 2.98 M/UL (ref 4.2–5.9)
SAO2 % BLDV: 100 %
SAO2 % BLDV: 54 %
SARS-COV-2 RNA RESP QL NAA+PROBE: NOT DETECTED
SODIUM SERPL-SCNC: 140 MMOL/L (ref 136–145)
SODIUM SERPL-SCNC: 142 MMOL/L (ref 136–145)
SP GR UR STRIP.AUTO: 1.01 (ref 1–1.03)
TROPONIN, HIGH SENSITIVITY: 88 NG/L (ref 0–22)
TROPONIN, HIGH SENSITIVITY: 96 NG/L (ref 0–22)
UA COMPLETE W REFLEX CULTURE PNL UR: NORMAL
UA DIPSTICK W REFLEX MICRO PNL UR: NORMAL
URN SPEC COLLECT METH UR: NORMAL
UROBILINOGEN UR STRIP-ACNC: 0.2 E.U./DL
WBC # BLD AUTO: 5.7 K/UL (ref 4–11)

## 2023-06-16 PROCEDURE — 36415 COLL VENOUS BLD VENIPUNCTURE: CPT

## 2023-06-16 PROCEDURE — 87636 SARSCOV2 & INF A&B AMP PRB: CPT

## 2023-06-16 PROCEDURE — 96374 THER/PROPH/DIAG INJ IV PUSH: CPT

## 2023-06-16 PROCEDURE — 6370000000 HC RX 637 (ALT 250 FOR IP)

## 2023-06-16 PROCEDURE — 93005 ELECTROCARDIOGRAM TRACING: CPT | Performed by: ANESTHESIOLOGY

## 2023-06-16 PROCEDURE — 71045 X-RAY EXAM CHEST 1 VIEW: CPT

## 2023-06-16 PROCEDURE — 81003 URINALYSIS AUTO W/O SCOPE: CPT

## 2023-06-16 PROCEDURE — 83880 ASSAY OF NATRIURETIC PEPTIDE: CPT

## 2023-06-16 PROCEDURE — 84484 ASSAY OF TROPONIN QUANT: CPT

## 2023-06-16 PROCEDURE — 6360000002 HC RX W HCPCS

## 2023-06-16 PROCEDURE — 2580000003 HC RX 258

## 2023-06-16 PROCEDURE — 2060000000 HC ICU INTERMEDIATE R&B

## 2023-06-16 PROCEDURE — 80053 COMPREHEN METABOLIC PANEL: CPT

## 2023-06-16 PROCEDURE — 82803 BLOOD GASES ANY COMBINATION: CPT

## 2023-06-16 PROCEDURE — 6360000002 HC RX W HCPCS: Performed by: ANESTHESIOLOGY

## 2023-06-16 PROCEDURE — 85025 COMPLETE CBC W/AUTO DIFF WBC: CPT

## 2023-06-16 PROCEDURE — 2580000003 HC RX 258: Performed by: ANESTHESIOLOGY

## 2023-06-16 PROCEDURE — 70490 CT SOFT TISSUE NECK W/O DYE: CPT

## 2023-06-16 PROCEDURE — 83735 ASSAY OF MAGNESIUM: CPT

## 2023-06-16 PROCEDURE — 6370000000 HC RX 637 (ALT 250 FOR IP): Performed by: ANESTHESIOLOGY

## 2023-06-16 PROCEDURE — 99285 EMERGENCY DEPT VISIT HI MDM: CPT

## 2023-06-16 PROCEDURE — 71046 X-RAY EXAM CHEST 2 VIEWS: CPT

## 2023-06-16 RX ORDER — ONDANSETRON 4 MG/1
4 TABLET, ORALLY DISINTEGRATING ORAL EVERY 8 HOURS PRN
Status: DISCONTINUED | OUTPATIENT
Start: 2023-06-16 | End: 2023-06-30 | Stop reason: HOSPADM

## 2023-06-16 RX ORDER — FUROSEMIDE 10 MG/ML
80 INJECTION INTRAMUSCULAR; INTRAVENOUS 2 TIMES DAILY
Status: CANCELLED | OUTPATIENT
Start: 2023-06-16

## 2023-06-16 RX ORDER — ONDANSETRON 2 MG/ML
4 INJECTION INTRAMUSCULAR; INTRAVENOUS EVERY 6 HOURS PRN
Status: DISCONTINUED | OUTPATIENT
Start: 2023-06-16 | End: 2023-06-30 | Stop reason: HOSPADM

## 2023-06-16 RX ORDER — SODIUM CHLORIDE 0.9 % (FLUSH) 0.9 %
5-40 SYRINGE (ML) INJECTION PRN
Status: DISCONTINUED | OUTPATIENT
Start: 2023-06-16 | End: 2023-06-30 | Stop reason: HOSPADM

## 2023-06-16 RX ORDER — LORAZEPAM 0.5 MG/1
0.5 TABLET ORAL EVERY 6 HOURS PRN
Status: DISCONTINUED | OUTPATIENT
Start: 2023-06-16 | End: 2023-06-30 | Stop reason: HOSPADM

## 2023-06-16 RX ORDER — ACETAMINOPHEN 325 MG/1
650 TABLET ORAL EVERY 6 HOURS PRN
Status: DISCONTINUED | OUTPATIENT
Start: 2023-06-16 | End: 2023-06-20

## 2023-06-16 RX ORDER — POLYETHYLENE GLYCOL 3350 17 G/17G
17 POWDER, FOR SOLUTION ORAL DAILY PRN
Status: DISCONTINUED | OUTPATIENT
Start: 2023-06-16 | End: 2023-06-30 | Stop reason: HOSPADM

## 2023-06-16 RX ORDER — FUROSEMIDE 10 MG/ML
60 INJECTION INTRAMUSCULAR; INTRAVENOUS ONCE
Status: COMPLETED | OUTPATIENT
Start: 2023-06-16 | End: 2023-06-16

## 2023-06-16 RX ORDER — METHOCARBAMOL 500 MG/1
250 TABLET, FILM COATED ORAL EVERY 8 HOURS PRN
Status: DISCONTINUED | OUTPATIENT
Start: 2023-06-16 | End: 2023-06-21

## 2023-06-16 RX ORDER — AZELASTINE HYDROCHLORIDE 0.5 MG/ML
1 SOLUTION/ DROPS OPHTHALMIC 3 TIMES DAILY
Status: DISCONTINUED | OUTPATIENT
Start: 2023-06-16 | End: 2023-06-17

## 2023-06-16 RX ORDER — FUROSEMIDE 10 MG/ML
40 INJECTION INTRAMUSCULAR; INTRAVENOUS ONCE
Status: COMPLETED | OUTPATIENT
Start: 2023-06-16 | End: 2023-06-16

## 2023-06-16 RX ORDER — LEVOTHYROXINE SODIUM 0.03 MG/1
25 TABLET ORAL DAILY
Status: DISCONTINUED | OUTPATIENT
Start: 2023-06-17 | End: 2023-06-30 | Stop reason: HOSPADM

## 2023-06-16 RX ORDER — PROMETHAZINE HYDROCHLORIDE 25 MG/ML
6.25 INJECTION, SOLUTION INTRAMUSCULAR; INTRAVENOUS EVERY 6 HOURS PRN
Status: DISCONTINUED | OUTPATIENT
Start: 2023-06-16 | End: 2023-06-30 | Stop reason: HOSPADM

## 2023-06-16 RX ORDER — LEVOTHYROXINE SODIUM 0.03 MG/1
TABLET ORAL
COMMUNITY
Start: 2023-06-06

## 2023-06-16 RX ORDER — SODIUM CHLORIDE 9 MG/ML
INJECTION, SOLUTION INTRAVENOUS PRN
Status: DISCONTINUED | OUTPATIENT
Start: 2023-06-16 | End: 2023-06-30 | Stop reason: HOSPADM

## 2023-06-16 RX ORDER — PANTOPRAZOLE SODIUM 40 MG/1
40 TABLET, DELAYED RELEASE ORAL
Status: DISCONTINUED | OUTPATIENT
Start: 2023-06-17 | End: 2023-06-17

## 2023-06-16 RX ORDER — DEXAMETHASONE SODIUM PHOSPHATE 4 MG/ML
8 INJECTION, SOLUTION INTRA-ARTICULAR; INTRALESIONAL; INTRAMUSCULAR; INTRAVENOUS; SOFT TISSUE EVERY 8 HOURS
Status: COMPLETED | OUTPATIENT
Start: 2023-06-16 | End: 2023-06-17

## 2023-06-16 RX ORDER — AZITHROMYCIN 250 MG/1
500 TABLET, FILM COATED ORAL DAILY
Status: COMPLETED | OUTPATIENT
Start: 2023-06-16 | End: 2023-06-16

## 2023-06-16 RX ORDER — MIRTAZAPINE 15 MG/1
15 TABLET, FILM COATED ORAL NIGHTLY
Status: DISCONTINUED | OUTPATIENT
Start: 2023-06-16 | End: 2023-06-30 | Stop reason: HOSPADM

## 2023-06-16 RX ORDER — NITROGLYCERIN 0.4 MG/1
0.4 TABLET SUBLINGUAL EVERY 5 MIN PRN
Status: DISCONTINUED | OUTPATIENT
Start: 2023-06-16 | End: 2023-06-30 | Stop reason: HOSPADM

## 2023-06-16 RX ORDER — AZITHROMYCIN 250 MG/1
250 TABLET, FILM COATED ORAL DAILY
Status: DISCONTINUED | OUTPATIENT
Start: 2023-06-17 | End: 2023-06-20

## 2023-06-16 RX ORDER — HEPARIN SODIUM 5000 [USP'U]/ML
5000 INJECTION, SOLUTION INTRAVENOUS; SUBCUTANEOUS EVERY 8 HOURS SCHEDULED
Status: DISCONTINUED | OUTPATIENT
Start: 2023-06-16 | End: 2023-06-30 | Stop reason: HOSPADM

## 2023-06-16 RX ORDER — METOPROLOL TARTRATE 5 MG/5ML
5 INJECTION INTRAVENOUS ONCE
Status: COMPLETED | OUTPATIENT
Start: 2023-06-16 | End: 2023-06-17

## 2023-06-16 RX ORDER — SODIUM CHLORIDE 0.9 % (FLUSH) 0.9 %
5-40 SYRINGE (ML) INJECTION EVERY 12 HOURS SCHEDULED
Status: DISCONTINUED | OUTPATIENT
Start: 2023-06-16 | End: 2023-06-30 | Stop reason: HOSPADM

## 2023-06-16 RX ORDER — ALBUTEROL SULFATE 2.5 MG/3ML
2.5 SOLUTION RESPIRATORY (INHALATION) EVERY 6 HOURS PRN
Status: DISCONTINUED | OUTPATIENT
Start: 2023-06-16 | End: 2023-06-30 | Stop reason: HOSPADM

## 2023-06-16 RX ORDER — SPIRONOLACTONE 25 MG/1
12.5 TABLET ORAL EVERY OTHER DAY
Status: DISCONTINUED | OUTPATIENT
Start: 2023-06-18 | End: 2023-06-19

## 2023-06-16 RX ORDER — FUROSEMIDE 10 MG/ML
80 INJECTION INTRAMUSCULAR; INTRAVENOUS DAILY
Status: DISCONTINUED | OUTPATIENT
Start: 2023-06-16 | End: 2023-06-16

## 2023-06-16 RX ORDER — TAMSULOSIN HYDROCHLORIDE 0.4 MG/1
0.4 CAPSULE ORAL DAILY
Status: DISCONTINUED | OUTPATIENT
Start: 2023-06-17 | End: 2023-06-30 | Stop reason: HOSPADM

## 2023-06-16 RX ORDER — ACETAMINOPHEN 650 MG/1
650 SUPPOSITORY RECTAL EVERY 6 HOURS PRN
Status: DISCONTINUED | OUTPATIENT
Start: 2023-06-16 | End: 2023-06-20

## 2023-06-16 RX ORDER — ATORVASTATIN CALCIUM 80 MG/1
80 TABLET, FILM COATED ORAL
Status: DISCONTINUED | OUTPATIENT
Start: 2023-06-16 | End: 2023-06-30 | Stop reason: HOSPADM

## 2023-06-16 RX ORDER — ASPIRIN 325 MG
325 TABLET, DELAYED RELEASE (ENTERIC COATED) ORAL DAILY
Status: DISCONTINUED | OUTPATIENT
Start: 2023-06-17 | End: 2023-06-30 | Stop reason: HOSPADM

## 2023-06-16 RX ADMIN — FUROSEMIDE 60 MG: 10 INJECTION, SOLUTION INTRAMUSCULAR; INTRAVENOUS at 13:03

## 2023-06-16 RX ADMIN — HEPARIN SODIUM 5000 UNITS: 5000 INJECTION INTRAVENOUS; SUBCUTANEOUS at 21:23

## 2023-06-16 RX ADMIN — AMPICILLIN SODIUM AND SULBACTAM SODIUM 3000 MG: 2; 1 INJECTION, POWDER, FOR SOLUTION INTRAMUSCULAR; INTRAVENOUS at 22:04

## 2023-06-16 RX ADMIN — ONDANSETRON 4 MG: 4 TABLET, ORALLY DISINTEGRATING ORAL at 22:02

## 2023-06-16 RX ADMIN — MIRTAZAPINE 15 MG: 15 TABLET, FILM COATED ORAL at 19:29

## 2023-06-16 RX ADMIN — SODIUM CHLORIDE, PRESERVATIVE FREE 10 ML: 5 INJECTION INTRAVENOUS at 21:00

## 2023-06-16 RX ADMIN — ATORVASTATIN CALCIUM 80 MG: 80 TABLET, FILM COATED ORAL at 21:24

## 2023-06-16 RX ADMIN — FUROSEMIDE 40 MG: 10 INJECTION, SOLUTION INTRAMUSCULAR; INTRAVENOUS at 19:31

## 2023-06-16 RX ADMIN — AZITHROMYCIN MONOHYDRATE 500 MG: 250 TABLET ORAL at 21:24

## 2023-06-16 RX ADMIN — DEXAMETHASONE SODIUM PHOSPHATE 8 MG: 4 INJECTION, SOLUTION INTRAMUSCULAR; INTRAVENOUS at 19:29

## 2023-06-16 ASSESSMENT — ENCOUNTER SYMPTOMS
NAUSEA: 0
DIARRHEA: 0
COUGH: 0
ABDOMINAL PAIN: 0
CONSTIPATION: 0
TROUBLE SWALLOWING: 1
VOMITING: 0
APNEA: 0
WHEEZING: 0
SHORTNESS OF BREATH: 1
SORE THROAT: 1
VOICE CHANGE: 1
CHEST TIGHTNESS: 0

## 2023-06-16 ASSESSMENT — PAIN - FUNCTIONAL ASSESSMENT: PAIN_FUNCTIONAL_ASSESSMENT: NONE - DENIES PAIN

## 2023-06-17 ENCOUNTER — APPOINTMENT (OUTPATIENT)
Dept: GENERAL RADIOLOGY | Age: 88
DRG: 291 | End: 2023-06-17
Payer: MEDICARE

## 2023-06-17 PROBLEM — I50.23 ACUTE ON CHRONIC SYSTOLIC CONGESTIVE HEART FAILURE (HCC): Status: ACTIVE | Noted: 2023-06-17

## 2023-06-17 LAB
ANION GAP SERPL CALCULATED.3IONS-SCNC: 14 MMOL/L (ref 3–16)
BASE EXCESS BLDV CALC-SCNC: 3.9 MMOL/L (ref -2–3)
BASOPHILS # BLD: 0 K/UL (ref 0–0.2)
BASOPHILS NFR BLD: 0 %
BUN SERPL-MCNC: 54 MG/DL (ref 7–20)
CALCIUM SERPL-MCNC: 8.8 MG/DL (ref 8.3–10.6)
CHLORIDE SERPL-SCNC: 103 MMOL/L (ref 99–110)
CO2 BLDV-SCNC: 32 MMOL/L
CO2 SERPL-SCNC: 27 MMOL/L (ref 21–32)
COHGB MFR BLDV: 1.6 % (ref 0–1.5)
CREAT SERPL-MCNC: 2.5 MG/DL (ref 0.8–1.3)
DEPRECATED RDW RBC AUTO: 15.7 % (ref 12.4–15.4)
DO-HGB MFR BLDV: 16.2 %
EKG ATRIAL RATE: 89 BPM
EKG DIAGNOSIS: NORMAL
EKG P AXIS: 17 DEGREES
EKG Q-T INTERVAL: 400 MS
EKG QRS DURATION: 130 MS
EKG QTC CALCULATION (BAZETT): 489 MS
EKG R AXIS: -8 DEGREES
EKG T AXIS: 115 DEGREES
EKG VENTRICULAR RATE: 90 BPM
EOSINOPHIL # BLD: 0 K/UL (ref 0–0.6)
EOSINOPHIL NFR BLD: 0 %
GFR SERPLBLD CREATININE-BSD FMLA CKD-EPI: 24 ML/MIN/{1.73_M2}
GLUCOSE SERPL-MCNC: 147 MG/DL (ref 70–99)
HCO3 BLDV-SCNC: 30.3 MMOL/L (ref 24–28)
HCT VFR BLD AUTO: 28.3 % (ref 40.5–52.5)
HGB BLD-MCNC: 9.4 G/DL (ref 13.5–17.5)
LYMPHOCYTES # BLD: 0.4 K/UL (ref 1–5.1)
LYMPHOCYTES NFR BLD: 9.2 %
MAGNESIUM SERPL-MCNC: 2.5 MG/DL (ref 1.8–2.4)
MCH RBC QN AUTO: 32.7 PG (ref 26–34)
MCHC RBC AUTO-ENTMCNC: 33.2 G/DL (ref 31–36)
MCV RBC AUTO: 98.4 FL (ref 80–100)
METHGB MFR BLDV: 0.3 % (ref 0–1.5)
MONOCYTES # BLD: 0.1 K/UL (ref 0–1.3)
MONOCYTES NFR BLD: 1.2 %
NEUTROPHILS # BLD: 4.2 K/UL (ref 1.7–7.7)
NEUTROPHILS NFR BLD: 89.6 %
PCO2 BLDV: 54.6 MMHG (ref 41–51)
PH BLDV: 7.35 [PH] (ref 7.35–7.45)
PLATELET # BLD AUTO: 167 K/UL (ref 135–450)
PMV BLD AUTO: 7.6 FL (ref 5–10.5)
PO2 BLDV: 52 MMHG (ref 25–40)
POTASSIUM SERPL-SCNC: 4.5 MMOL/L (ref 3.5–5.1)
PROCALCITONIN SERPL IA-MCNC: 0.1 NG/ML (ref 0–0.15)
RBC # BLD AUTO: 2.88 M/UL (ref 4.2–5.9)
SAO2 % BLDV: 84 %
SODIUM SERPL-SCNC: 144 MMOL/L (ref 136–145)
WBC # BLD AUTO: 4.7 K/UL (ref 4–11)

## 2023-06-17 PROCEDURE — 6370000000 HC RX 637 (ALT 250 FOR IP)

## 2023-06-17 PROCEDURE — 6360000002 HC RX W HCPCS

## 2023-06-17 PROCEDURE — 99223 1ST HOSP IP/OBS HIGH 75: CPT | Performed by: INTERNAL MEDICINE

## 2023-06-17 PROCEDURE — 84145 PROCALCITONIN (PCT): CPT

## 2023-06-17 PROCEDURE — 71045 X-RAY EXAM CHEST 1 VIEW: CPT

## 2023-06-17 PROCEDURE — 83735 ASSAY OF MAGNESIUM: CPT

## 2023-06-17 PROCEDURE — 2500000003 HC RX 250 WO HCPCS: Performed by: ANESTHESIOLOGY

## 2023-06-17 PROCEDURE — 36415 COLL VENOUS BLD VENIPUNCTURE: CPT

## 2023-06-17 PROCEDURE — 80069 RENAL FUNCTION PANEL: CPT

## 2023-06-17 PROCEDURE — 2580000003 HC RX 258

## 2023-06-17 PROCEDURE — 85025 COMPLETE CBC W/AUTO DIFF WBC: CPT

## 2023-06-17 PROCEDURE — 51798 US URINE CAPACITY MEASURE: CPT

## 2023-06-17 PROCEDURE — 94640 AIRWAY INHALATION TREATMENT: CPT

## 2023-06-17 PROCEDURE — 2580000003 HC RX 258: Performed by: ANESTHESIOLOGY

## 2023-06-17 PROCEDURE — 92526 ORAL FUNCTION THERAPY: CPT

## 2023-06-17 PROCEDURE — 2060000000 HC ICU INTERMEDIATE R&B

## 2023-06-17 PROCEDURE — 6360000002 HC RX W HCPCS: Performed by: ANESTHESIOLOGY

## 2023-06-17 PROCEDURE — 93010 ELECTROCARDIOGRAM REPORT: CPT | Performed by: INTERNAL MEDICINE

## 2023-06-17 PROCEDURE — 82803 BLOOD GASES ANY COMBINATION: CPT

## 2023-06-17 PROCEDURE — 99232 SBSQ HOSP IP/OBS MODERATE 35: CPT | Performed by: OTOLARYNGOLOGY

## 2023-06-17 PROCEDURE — C9113 INJ PANTOPRAZOLE SODIUM, VIA: HCPCS

## 2023-06-17 PROCEDURE — 92610 EVALUATE SWALLOWING FUNCTION: CPT

## 2023-06-17 RX ORDER — PANTOPRAZOLE SODIUM 40 MG/10ML
40 INJECTION, POWDER, LYOPHILIZED, FOR SOLUTION INTRAVENOUS DAILY
Status: DISCONTINUED | OUTPATIENT
Start: 2023-06-17 | End: 2023-06-27 | Stop reason: ALTCHOICE

## 2023-06-17 RX ORDER — KETOTIFEN FUMARATE 0.35 MG/ML
1 SOLUTION/ DROPS OPHTHALMIC 2 TIMES DAILY
Status: DISCONTINUED | OUTPATIENT
Start: 2023-06-17 | End: 2023-06-30 | Stop reason: HOSPADM

## 2023-06-17 RX ORDER — GUAIFENESIN 600 MG/1
600 TABLET, EXTENDED RELEASE ORAL ONCE
Status: COMPLETED | OUTPATIENT
Start: 2023-06-17 | End: 2023-06-17

## 2023-06-17 RX ADMIN — PROMETHAZINE HYDROCHLORIDE 6.25 MG: 25 INJECTION INTRAMUSCULAR; INTRAVENOUS at 01:09

## 2023-06-17 RX ADMIN — MIRTAZAPINE 15 MG: 15 TABLET, FILM COATED ORAL at 20:28

## 2023-06-17 RX ADMIN — GUAIFENESIN 600 MG: 600 TABLET ORAL at 20:28

## 2023-06-17 RX ADMIN — ATORVASTATIN CALCIUM 80 MG: 80 TABLET, FILM COATED ORAL at 20:28

## 2023-06-17 RX ADMIN — FUROSEMIDE 7.5 MG/HR: 10 INJECTION, SOLUTION INTRAMUSCULAR; INTRAVENOUS at 20:25

## 2023-06-17 RX ADMIN — HEPARIN SODIUM 5000 UNITS: 5000 INJECTION INTRAVENOUS; SUBCUTANEOUS at 06:39

## 2023-06-17 RX ADMIN — METOPROLOL TARTRATE 5 MG: 1 INJECTION, SOLUTION INTRAVENOUS at 00:24

## 2023-06-17 RX ADMIN — PANTOPRAZOLE SODIUM 40 MG: 40 INJECTION, POWDER, FOR SOLUTION INTRAVENOUS at 15:26

## 2023-06-17 RX ADMIN — LORAZEPAM 0.5 MG: 0.5 TABLET ORAL at 15:36

## 2023-06-17 RX ADMIN — DEXAMETHASONE SODIUM PHOSPHATE 8 MG: 4 INJECTION, SOLUTION INTRAMUSCULAR; INTRAVENOUS at 11:32

## 2023-06-17 RX ADMIN — HEPARIN SODIUM 5000 UNITS: 5000 INJECTION INTRAVENOUS; SUBCUTANEOUS at 20:27

## 2023-06-17 RX ADMIN — HEPARIN SODIUM 5000 UNITS: 5000 INJECTION INTRAVENOUS; SUBCUTANEOUS at 15:26

## 2023-06-17 RX ADMIN — AMPICILLIN SODIUM AND SULBACTAM SODIUM 3000 MG: 2; 1 INJECTION, POWDER, FOR SOLUTION INTRAMUSCULAR; INTRAVENOUS at 20:25

## 2023-06-17 RX ADMIN — SODIUM CHLORIDE, PRESERVATIVE FREE 10 ML: 5 INJECTION INTRAVENOUS at 08:33

## 2023-06-17 RX ADMIN — ALBUTEROL SULFATE 2.5 MG: 2.5 SOLUTION RESPIRATORY (INHALATION) at 19:38

## 2023-06-17 RX ADMIN — DEXAMETHASONE SODIUM PHOSPHATE 8 MG: 4 INJECTION, SOLUTION INTRAMUSCULAR; INTRAVENOUS at 02:49

## 2023-06-17 RX ADMIN — LORAZEPAM 0.5 MG: 0.5 TABLET ORAL at 22:35

## 2023-06-17 RX ADMIN — KETOTIFEN FUMARATE 1 DROP: 0.25 SOLUTION/ DROPS OPHTHALMIC at 20:27

## 2023-06-17 RX ADMIN — AMPICILLIN SODIUM AND SULBACTAM SODIUM 3000 MG: 2; 1 INJECTION, POWDER, FOR SOLUTION INTRAMUSCULAR; INTRAVENOUS at 08:17

## 2023-06-17 RX ADMIN — SODIUM CHLORIDE, PRESERVATIVE FREE 10 ML: 5 INJECTION INTRAVENOUS at 20:29

## 2023-06-17 RX ADMIN — ALBUTEROL SULFATE 2.5 MG: 2.5 SOLUTION RESPIRATORY (INHALATION) at 01:44

## 2023-06-17 RX ADMIN — FUROSEMIDE 7.5 MG/HR: 10 INJECTION, SOLUTION INTRAMUSCULAR; INTRAVENOUS at 10:41

## 2023-06-18 LAB
ALBUMIN SERPL-MCNC: 3.2 G/DL (ref 3.4–5)
ALBUMIN SERPL-MCNC: 3.3 G/DL (ref 3.4–5)
ANION GAP SERPL CALCULATED.3IONS-SCNC: 11 MMOL/L (ref 3–16)
ANION GAP SERPL CALCULATED.3IONS-SCNC: 12 MMOL/L (ref 3–16)
ANION GAP SERPL CALCULATED.3IONS-SCNC: 12 MMOL/L (ref 3–16)
BASOPHILS # BLD: 0 K/UL (ref 0–0.2)
BASOPHILS NFR BLD: 0.1 %
BUN SERPL-MCNC: 68 MG/DL (ref 7–20)
BUN SERPL-MCNC: 70 MG/DL (ref 7–20)
BUN SERPL-MCNC: 76 MG/DL (ref 7–20)
CALCIUM SERPL-MCNC: 8.6 MG/DL (ref 8.3–10.6)
CALCIUM SERPL-MCNC: 8.7 MG/DL (ref 8.3–10.6)
CALCIUM SERPL-MCNC: 8.9 MG/DL (ref 8.3–10.6)
CHLORIDE SERPL-SCNC: 100 MMOL/L (ref 99–110)
CHLORIDE SERPL-SCNC: 102 MMOL/L (ref 99–110)
CHLORIDE SERPL-SCNC: 102 MMOL/L (ref 99–110)
CO2 SERPL-SCNC: 28 MMOL/L (ref 21–32)
CO2 SERPL-SCNC: 29 MMOL/L (ref 21–32)
CO2 SERPL-SCNC: 30 MMOL/L (ref 21–32)
CREAT SERPL-MCNC: 2.9 MG/DL (ref 0.8–1.3)
CREAT SERPL-MCNC: 2.9 MG/DL (ref 0.8–1.3)
CREAT SERPL-MCNC: 3 MG/DL (ref 0.8–1.3)
DEPRECATED RDW RBC AUTO: 15.4 % (ref 12.4–15.4)
EOSINOPHIL # BLD: 0 K/UL (ref 0–0.6)
EOSINOPHIL NFR BLD: 0 %
GFR SERPLBLD CREATININE-BSD FMLA CKD-EPI: 19 ML/MIN/{1.73_M2}
GFR SERPLBLD CREATININE-BSD FMLA CKD-EPI: 20 ML/MIN/{1.73_M2}
GFR SERPLBLD CREATININE-BSD FMLA CKD-EPI: 20 ML/MIN/{1.73_M2}
GLUCOSE SERPL-MCNC: 149 MG/DL (ref 70–99)
GLUCOSE SERPL-MCNC: 165 MG/DL (ref 70–99)
GLUCOSE SERPL-MCNC: 177 MG/DL (ref 70–99)
HCT VFR BLD AUTO: 27.6 % (ref 40.5–52.5)
HGB BLD-MCNC: 9.1 G/DL (ref 13.5–17.5)
LYMPHOCYTES # BLD: 0.5 K/UL (ref 1–5.1)
LYMPHOCYTES NFR BLD: 8 %
MAGNESIUM SERPL-MCNC: 2.6 MG/DL (ref 1.8–2.4)
MAGNESIUM SERPL-MCNC: 2.7 MG/DL (ref 1.8–2.4)
MAGNESIUM SERPL-MCNC: 2.7 MG/DL (ref 1.8–2.4)
MCH RBC QN AUTO: 32.3 PG (ref 26–34)
MCHC RBC AUTO-ENTMCNC: 32.9 G/DL (ref 31–36)
MCV RBC AUTO: 98.2 FL (ref 80–100)
MONOCYTES # BLD: 0.4 K/UL (ref 0–1.3)
MONOCYTES NFR BLD: 7.5 %
NEUTROPHILS # BLD: 4.8 K/UL (ref 1.7–7.7)
NEUTROPHILS NFR BLD: 84.4 %
PHOSPHATE SERPL-MCNC: 5.7 MG/DL (ref 2.5–4.9)
PHOSPHATE SERPL-MCNC: 6.4 MG/DL (ref 2.5–4.9)
PLATELET # BLD AUTO: 173 K/UL (ref 135–450)
PMV BLD AUTO: 8 FL (ref 5–10.5)
POTASSIUM SERPL-SCNC: 4.2 MMOL/L (ref 3.5–5.1)
POTASSIUM SERPL-SCNC: 4.4 MMOL/L (ref 3.5–5.1)
POTASSIUM SERPL-SCNC: 4.6 MMOL/L (ref 3.5–5.1)
RBC # BLD AUTO: 2.81 M/UL (ref 4.2–5.9)
SODIUM SERPL-SCNC: 140 MMOL/L (ref 136–145)
SODIUM SERPL-SCNC: 143 MMOL/L (ref 136–145)
SODIUM SERPL-SCNC: 143 MMOL/L (ref 136–145)
WBC # BLD AUTO: 5.7 K/UL (ref 4–11)

## 2023-06-18 PROCEDURE — 36415 COLL VENOUS BLD VENIPUNCTURE: CPT

## 2023-06-18 PROCEDURE — 85025 COMPLETE CBC W/AUTO DIFF WBC: CPT

## 2023-06-18 PROCEDURE — 6370000000 HC RX 637 (ALT 250 FOR IP)

## 2023-06-18 PROCEDURE — 2580000003 HC RX 258: Performed by: ANESTHESIOLOGY

## 2023-06-18 PROCEDURE — 94761 N-INVAS EAR/PLS OXIMETRY MLT: CPT

## 2023-06-18 PROCEDURE — 6360000002 HC RX W HCPCS: Performed by: ANESTHESIOLOGY

## 2023-06-18 PROCEDURE — 6360000002 HC RX W HCPCS

## 2023-06-18 PROCEDURE — 83735 ASSAY OF MAGNESIUM: CPT

## 2023-06-18 PROCEDURE — 99233 SBSQ HOSP IP/OBS HIGH 50: CPT | Performed by: INTERNAL MEDICINE

## 2023-06-18 PROCEDURE — C9113 INJ PANTOPRAZOLE SODIUM, VIA: HCPCS

## 2023-06-18 PROCEDURE — 6370000000 HC RX 637 (ALT 250 FOR IP): Performed by: ANESTHESIOLOGY

## 2023-06-18 PROCEDURE — 2580000003 HC RX 258

## 2023-06-18 PROCEDURE — 80069 RENAL FUNCTION PANEL: CPT

## 2023-06-18 PROCEDURE — 94640 AIRWAY INHALATION TREATMENT: CPT

## 2023-06-18 PROCEDURE — 2700000000 HC OXYGEN THERAPY PER DAY

## 2023-06-18 PROCEDURE — 2060000000 HC ICU INTERMEDIATE R&B

## 2023-06-18 RX ORDER — MILRINONE LACTATE 0.2 MG/ML
0.12 INJECTION, SOLUTION INTRAVENOUS CONTINUOUS
Status: CANCELLED | OUTPATIENT
Start: 2023-06-18

## 2023-06-18 RX ADMIN — AMPICILLIN SODIUM AND SULBACTAM SODIUM 3000 MG: 2; 1 INJECTION, POWDER, FOR SOLUTION INTRAMUSCULAR; INTRAVENOUS at 08:34

## 2023-06-18 RX ADMIN — SODIUM CHLORIDE, PRESERVATIVE FREE 10 ML: 5 INJECTION INTRAVENOUS at 08:36

## 2023-06-18 RX ADMIN — LORAZEPAM 0.5 MG: 0.5 TABLET ORAL at 05:26

## 2023-06-18 RX ADMIN — HEPARIN SODIUM 5000 UNITS: 5000 INJECTION INTRAVENOUS; SUBCUTANEOUS at 05:23

## 2023-06-18 RX ADMIN — HEPARIN SODIUM 5000 UNITS: 5000 INJECTION INTRAVENOUS; SUBCUTANEOUS at 20:46

## 2023-06-18 RX ADMIN — LORAZEPAM 0.5 MG: 0.5 TABLET ORAL at 23:36

## 2023-06-18 RX ADMIN — FUROSEMIDE 7.5 MG/HR: 10 INJECTION, SOLUTION INTRAMUSCULAR; INTRAVENOUS at 05:23

## 2023-06-18 RX ADMIN — ATORVASTATIN CALCIUM 80 MG: 80 TABLET, FILM COATED ORAL at 20:46

## 2023-06-18 RX ADMIN — KETOTIFEN FUMARATE 1 DROP: 0.25 SOLUTION/ DROPS OPHTHALMIC at 20:46

## 2023-06-18 RX ADMIN — SODIUM CHLORIDE: 9 INJECTION, SOLUTION INTRAVENOUS at 08:31

## 2023-06-18 RX ADMIN — PANTOPRAZOLE SODIUM 40 MG: 40 INJECTION, POWDER, FOR SOLUTION INTRAVENOUS at 08:36

## 2023-06-18 RX ADMIN — TAMSULOSIN HYDROCHLORIDE 0.4 MG: 0.4 CAPSULE ORAL at 08:37

## 2023-06-18 RX ADMIN — LEVOTHYROXINE SODIUM 25 MCG: 0.03 TABLET ORAL at 05:23

## 2023-06-18 RX ADMIN — FUROSEMIDE 7.5 MG/HR: 10 INJECTION, SOLUTION INTRAMUSCULAR; INTRAVENOUS at 19:27

## 2023-06-18 RX ADMIN — ALBUTEROL SULFATE 2.5 MG: 2.5 SOLUTION RESPIRATORY (INHALATION) at 07:41

## 2023-06-18 RX ADMIN — AMPICILLIN SODIUM AND SULBACTAM SODIUM 3000 MG: 2; 1 INJECTION, POWDER, FOR SOLUTION INTRAMUSCULAR; INTRAVENOUS at 20:48

## 2023-06-18 RX ADMIN — MIRTAZAPINE 15 MG: 15 TABLET, FILM COATED ORAL at 20:46

## 2023-06-18 RX ADMIN — ASPIRIN 325 MG: 325 TABLET, COATED ORAL at 08:37

## 2023-06-18 RX ADMIN — HEPARIN SODIUM 5000 UNITS: 5000 INJECTION INTRAVENOUS; SUBCUTANEOUS at 13:44

## 2023-06-18 RX ADMIN — AZITHROMYCIN DIHYDRATE 250 MG: 250 TABLET ORAL at 08:37

## 2023-06-19 ENCOUNTER — APPOINTMENT (OUTPATIENT)
Dept: GENERAL RADIOLOGY | Age: 88
DRG: 291 | End: 2023-06-19
Payer: MEDICARE

## 2023-06-19 PROBLEM — I25.10 CAD (CORONARY ARTERY DISEASE): Status: ACTIVE | Noted: 2020-11-03

## 2023-06-19 LAB
ALBUMIN SERPL-MCNC: 3.1 G/DL (ref 3.4–5)
ALBUMIN SERPL-MCNC: 3.3 G/DL (ref 3.4–5)
ALBUMIN SERPL-MCNC: 3.3 G/DL (ref 3.4–5)
ANION GAP SERPL CALCULATED.3IONS-SCNC: 10 MMOL/L (ref 3–16)
ANION GAP SERPL CALCULATED.3IONS-SCNC: 10 MMOL/L (ref 3–16)
ANION GAP SERPL CALCULATED.3IONS-SCNC: 11 MMOL/L (ref 3–16)
ANION GAP SERPL CALCULATED.3IONS-SCNC: 12 MMOL/L (ref 3–16)
BASOPHILS # BLD: 0 K/UL (ref 0–0.2)
BASOPHILS NFR BLD: 0.1 %
BUN SERPL-MCNC: 66 MG/DL (ref 7–20)
BUN SERPL-MCNC: 73 MG/DL (ref 7–20)
BUN SERPL-MCNC: 77 MG/DL (ref 7–20)
BUN SERPL-MCNC: 77 MG/DL (ref 7–20)
CALCIUM SERPL-MCNC: 8.3 MG/DL (ref 8.3–10.6)
CALCIUM SERPL-MCNC: 8.5 MG/DL (ref 8.3–10.6)
CALCIUM SERPL-MCNC: 8.6 MG/DL (ref 8.3–10.6)
CALCIUM SERPL-MCNC: 8.7 MG/DL (ref 8.3–10.6)
CHLORIDE SERPL-SCNC: 102 MMOL/L (ref 99–110)
CHLORIDE SERPL-SCNC: 104 MMOL/L (ref 99–110)
CHLORIDE SERPL-SCNC: 105 MMOL/L (ref 99–110)
CHLORIDE SERPL-SCNC: 107 MMOL/L (ref 99–110)
CO2 SERPL-SCNC: 31 MMOL/L (ref 21–32)
CO2 SERPL-SCNC: 32 MMOL/L (ref 21–32)
CREAT SERPL-MCNC: 2.7 MG/DL (ref 0.8–1.3)
CREAT SERPL-MCNC: 2.8 MG/DL (ref 0.8–1.3)
CREAT SERPL-MCNC: 2.9 MG/DL (ref 0.8–1.3)
CREAT SERPL-MCNC: 3 MG/DL (ref 0.8–1.3)
DEPRECATED RDW RBC AUTO: 15.3 % (ref 12.4–15.4)
EOSINOPHIL # BLD: 0 K/UL (ref 0–0.6)
EOSINOPHIL NFR BLD: 0.1 %
FERRITIN SERPL IA-MCNC: 286 NG/ML (ref 30–400)
GFR SERPLBLD CREATININE-BSD FMLA CKD-EPI: 19 ML/MIN/{1.73_M2}
GFR SERPLBLD CREATININE-BSD FMLA CKD-EPI: 20 ML/MIN/{1.73_M2}
GFR SERPLBLD CREATININE-BSD FMLA CKD-EPI: 21 ML/MIN/{1.73_M2}
GFR SERPLBLD CREATININE-BSD FMLA CKD-EPI: 22 ML/MIN/{1.73_M2}
GLUCOSE SERPL-MCNC: 122 MG/DL (ref 70–99)
GLUCOSE SERPL-MCNC: 122 MG/DL (ref 70–99)
GLUCOSE SERPL-MCNC: 144 MG/DL (ref 70–99)
GLUCOSE SERPL-MCNC: 166 MG/DL (ref 70–99)
HCT VFR BLD AUTO: 27.1 % (ref 40.5–52.5)
HGB BLD-MCNC: 8.9 G/DL (ref 13.5–17.5)
LYMPHOCYTES # BLD: 0.6 K/UL (ref 1–5.1)
LYMPHOCYTES NFR BLD: 7.1 %
MAGNESIUM SERPL-MCNC: 2.6 MG/DL (ref 1.8–2.4)
MAGNESIUM SERPL-MCNC: 2.7 MG/DL (ref 1.8–2.4)
MAGNESIUM SERPL-MCNC: 2.8 MG/DL (ref 1.8–2.4)
MAGNESIUM SERPL-MCNC: 2.8 MG/DL (ref 1.8–2.4)
MCH RBC QN AUTO: 32.2 PG (ref 26–34)
MCHC RBC AUTO-ENTMCNC: 32.9 G/DL (ref 31–36)
MCV RBC AUTO: 97.8 FL (ref 80–100)
MONOCYTES # BLD: 0.9 K/UL (ref 0–1.3)
MONOCYTES NFR BLD: 11.4 %
NEUTROPHILS # BLD: 6.5 K/UL (ref 1.7–7.7)
NEUTROPHILS NFR BLD: 81.3 %
NT-PROBNP SERPL-MCNC: ABNORMAL PG/ML (ref 0–449)
PHOSPHATE SERPL-MCNC: 4.1 MG/DL (ref 2.5–4.9)
PHOSPHATE SERPL-MCNC: 4.9 MG/DL (ref 2.5–4.9)
PHOSPHATE SERPL-MCNC: 5.6 MG/DL (ref 2.5–4.9)
PLATELET # BLD AUTO: 168 K/UL (ref 135–450)
PMV BLD AUTO: 7.9 FL (ref 5–10.5)
POTASSIUM SERPL-SCNC: 3.7 MMOL/L (ref 3.5–5.1)
POTASSIUM SERPL-SCNC: 3.8 MMOL/L (ref 3.5–5.1)
POTASSIUM SERPL-SCNC: 3.9 MMOL/L (ref 3.5–5.1)
POTASSIUM SERPL-SCNC: 4.2 MMOL/L (ref 3.5–5.1)
RBC # BLD AUTO: 2.77 M/UL (ref 4.2–5.9)
SODIUM SERPL-SCNC: 143 MMOL/L (ref 136–145)
SODIUM SERPL-SCNC: 145 MMOL/L (ref 136–145)
SODIUM SERPL-SCNC: 149 MMOL/L (ref 136–145)
SODIUM SERPL-SCNC: 149 MMOL/L (ref 136–145)
WBC # BLD AUTO: 8 K/UL (ref 4–11)

## 2023-06-19 PROCEDURE — 6360000002 HC RX W HCPCS: Performed by: ANESTHESIOLOGY

## 2023-06-19 PROCEDURE — 6370000000 HC RX 637 (ALT 250 FOR IP)

## 2023-06-19 PROCEDURE — 6370000000 HC RX 637 (ALT 250 FOR IP): Performed by: INTERNAL MEDICINE

## 2023-06-19 PROCEDURE — 92526 ORAL FUNCTION THERAPY: CPT

## 2023-06-19 PROCEDURE — 2060000000 HC ICU INTERMEDIATE R&B

## 2023-06-19 PROCEDURE — 82728 ASSAY OF FERRITIN: CPT

## 2023-06-19 PROCEDURE — 99233 SBSQ HOSP IP/OBS HIGH 50: CPT | Performed by: INTERNAL MEDICINE

## 2023-06-19 PROCEDURE — 36415 COLL VENOUS BLD VENIPUNCTURE: CPT

## 2023-06-19 PROCEDURE — 6360000002 HC RX W HCPCS: Performed by: INTERNAL MEDICINE

## 2023-06-19 PROCEDURE — 6360000002 HC RX W HCPCS

## 2023-06-19 PROCEDURE — 83735 ASSAY OF MAGNESIUM: CPT

## 2023-06-19 PROCEDURE — 2580000003 HC RX 258: Performed by: INTERNAL MEDICINE

## 2023-06-19 PROCEDURE — 83880 ASSAY OF NATRIURETIC PEPTIDE: CPT

## 2023-06-19 PROCEDURE — C9113 INJ PANTOPRAZOLE SODIUM, VIA: HCPCS

## 2023-06-19 PROCEDURE — 2580000003 HC RX 258

## 2023-06-19 PROCEDURE — 2580000003 HC RX 258: Performed by: ANESTHESIOLOGY

## 2023-06-19 PROCEDURE — 80069 RENAL FUNCTION PANEL: CPT

## 2023-06-19 PROCEDURE — 94640 AIRWAY INHALATION TREATMENT: CPT

## 2023-06-19 PROCEDURE — 6370000000 HC RX 637 (ALT 250 FOR IP): Performed by: ANESTHESIOLOGY

## 2023-06-19 PROCEDURE — 92611 MOTION FLUOROSCOPY/SWALLOW: CPT

## 2023-06-19 PROCEDURE — 94761 N-INVAS EAR/PLS OXIMETRY MLT: CPT

## 2023-06-19 PROCEDURE — 74230 X-RAY XM SWLNG FUNCJ C+: CPT

## 2023-06-19 PROCEDURE — 85025 COMPLETE CBC W/AUTO DIFF WBC: CPT

## 2023-06-19 PROCEDURE — 71045 X-RAY EXAM CHEST 1 VIEW: CPT

## 2023-06-19 PROCEDURE — 2700000000 HC OXYGEN THERAPY PER DAY

## 2023-06-19 RX ORDER — GUAIFENESIN 600 MG/1
600 TABLET, EXTENDED RELEASE ORAL ONCE
Status: COMPLETED | OUTPATIENT
Start: 2023-06-19 | End: 2023-06-19

## 2023-06-19 RX ORDER — METOPROLOL SUCCINATE 25 MG/1
12.5 TABLET, EXTENDED RELEASE ORAL DAILY
Status: DISCONTINUED | OUTPATIENT
Start: 2023-06-19 | End: 2023-06-21

## 2023-06-19 RX ORDER — MILRINONE LACTATE 0.2 MG/ML
0.12 INJECTION, SOLUTION INTRAVENOUS CONTINUOUS
Status: DISCONTINUED | OUTPATIENT
Start: 2023-06-19 | End: 2023-06-22

## 2023-06-19 RX ORDER — LACTULOSE 10 G/15ML
10 SOLUTION ORAL 3 TIMES DAILY
Status: DISCONTINUED | OUTPATIENT
Start: 2023-06-19 | End: 2023-06-30 | Stop reason: HOSPADM

## 2023-06-19 RX ADMIN — SODIUM CHLORIDE, PRESERVATIVE FREE 10 ML: 5 INJECTION INTRAVENOUS at 11:14

## 2023-06-19 RX ADMIN — ALBUTEROL SULFATE 2.5 MG: 2.5 SOLUTION RESPIRATORY (INHALATION) at 01:15

## 2023-06-19 RX ADMIN — KETOTIFEN FUMARATE 1 DROP: 0.25 SOLUTION/ DROPS OPHTHALMIC at 20:08

## 2023-06-19 RX ADMIN — METOPROLOL SUCCINATE 12.5 MG: 25 TABLET, EXTENDED RELEASE ORAL at 11:03

## 2023-06-19 RX ADMIN — HEPARIN SODIUM 5000 UNITS: 5000 INJECTION INTRAVENOUS; SUBCUTANEOUS at 16:10

## 2023-06-19 RX ADMIN — LACTULOSE 10 G: 20 SOLUTION ORAL at 16:10

## 2023-06-19 RX ADMIN — AZITHROMYCIN DIHYDRATE 250 MG: 250 TABLET ORAL at 11:03

## 2023-06-19 RX ADMIN — MIRTAZAPINE 15 MG: 15 TABLET, FILM COATED ORAL at 20:04

## 2023-06-19 RX ADMIN — POTASSIUM BICARBONATE 20 MEQ: 782 TABLET, EFFERVESCENT ORAL at 11:12

## 2023-06-19 RX ADMIN — HEPARIN SODIUM 5000 UNITS: 5000 INJECTION INTRAVENOUS; SUBCUTANEOUS at 05:38

## 2023-06-19 RX ADMIN — MILRINONE LACTATE IN DEXTROSE 0.12 MCG/KG/MIN: 200 INJECTION, SOLUTION INTRAVENOUS at 18:07

## 2023-06-19 RX ADMIN — SODIUM CHLORIDE, PRESERVATIVE FREE 10 ML: 5 INJECTION INTRAVENOUS at 20:06

## 2023-06-19 RX ADMIN — LEVOTHYROXINE SODIUM 25 MCG: 0.03 TABLET ORAL at 05:38

## 2023-06-19 RX ADMIN — PANTOPRAZOLE SODIUM 40 MG: 40 INJECTION, POWDER, FOR SOLUTION INTRAVENOUS at 11:13

## 2023-06-19 RX ADMIN — TAMSULOSIN HYDROCHLORIDE 0.4 MG: 0.4 CAPSULE ORAL at 11:03

## 2023-06-19 RX ADMIN — HEPARIN SODIUM 5000 UNITS: 5000 INJECTION INTRAVENOUS; SUBCUTANEOUS at 21:55

## 2023-06-19 RX ADMIN — ATORVASTATIN CALCIUM 80 MG: 80 TABLET, FILM COATED ORAL at 20:04

## 2023-06-19 RX ADMIN — AMPICILLIN SODIUM AND SULBACTAM SODIUM 3000 MG: 2; 1 INJECTION, POWDER, FOR SOLUTION INTRAMUSCULAR; INTRAVENOUS at 11:42

## 2023-06-19 RX ADMIN — ASPIRIN 325 MG: 325 TABLET, COATED ORAL at 11:02

## 2023-06-19 RX ADMIN — FUROSEMIDE 5 MG/HR: 10 INJECTION, SOLUTION INTRAMUSCULAR; INTRAVENOUS at 21:55

## 2023-06-19 RX ADMIN — KETOTIFEN FUMARATE 1 DROP: 0.25 SOLUTION/ DROPS OPHTHALMIC at 11:47

## 2023-06-19 RX ADMIN — AMPICILLIN SODIUM AND SULBACTAM SODIUM 3000 MG: 2; 1 INJECTION, POWDER, FOR SOLUTION INTRAMUSCULAR; INTRAVENOUS at 20:12

## 2023-06-19 RX ADMIN — GUAIFENESIN 600 MG: 600 TABLET ORAL at 18:49

## 2023-06-19 RX ADMIN — FUROSEMIDE 7.5 MG/HR: 10 INJECTION, SOLUTION INTRAMUSCULAR; INTRAVENOUS at 05:39

## 2023-06-19 RX ADMIN — POTASSIUM BICARBONATE 20 MEQ: 782 TABLET, EFFERVESCENT ORAL at 16:10

## 2023-06-20 LAB
ALBUMIN SERPL-MCNC: 3.1 G/DL (ref 3.4–5)
ALBUMIN SERPL-MCNC: 3.2 G/DL (ref 3.4–5)
ANION GAP SERPL CALCULATED.3IONS-SCNC: 10 MMOL/L (ref 3–16)
ANION GAP SERPL CALCULATED.3IONS-SCNC: 9 MMOL/L (ref 3–16)
BASOPHILS # BLD: 0 K/UL (ref 0–0.2)
BASOPHILS NFR BLD: 0.5 %
BUN SERPL-MCNC: 58 MG/DL (ref 7–20)
BUN SERPL-MCNC: 68 MG/DL (ref 7–20)
CALCIUM SERPL-MCNC: 8.5 MG/DL (ref 8.3–10.6)
CALCIUM SERPL-MCNC: 8.8 MG/DL (ref 8.3–10.6)
CHLORIDE SERPL-SCNC: 105 MMOL/L (ref 99–110)
CHLORIDE SERPL-SCNC: 105 MMOL/L (ref 99–110)
CO2 SERPL-SCNC: 32 MMOL/L (ref 21–32)
CO2 SERPL-SCNC: 33 MMOL/L (ref 21–32)
CREAT SERPL-MCNC: 2.5 MG/DL (ref 0.8–1.3)
CREAT SERPL-MCNC: 2.6 MG/DL (ref 0.8–1.3)
DEPRECATED RDW RBC AUTO: 15.5 % (ref 12.4–15.4)
EKG ATRIAL RATE: 81 BPM
EKG DIAGNOSIS: NORMAL
EKG Q-T INTERVAL: 402 MS
EKG QRS DURATION: 134 MS
EKG QTC CALCULATION (BAZETT): 466 MS
EKG R AXIS: -8 DEGREES
EKG T AXIS: 135 DEGREES
EKG VENTRICULAR RATE: 81 BPM
EOSINOPHIL # BLD: 0.1 K/UL (ref 0–0.6)
EOSINOPHIL NFR BLD: 1.7 %
GFR SERPLBLD CREATININE-BSD FMLA CKD-EPI: 23 ML/MIN/{1.73_M2}
GFR SERPLBLD CREATININE-BSD FMLA CKD-EPI: 24 ML/MIN/{1.73_M2}
GLUCOSE SERPL-MCNC: 109 MG/DL (ref 70–99)
GLUCOSE SERPL-MCNC: 110 MG/DL (ref 70–99)
HCT VFR BLD AUTO: 27.5 % (ref 40.5–52.5)
HGB BLD-MCNC: 9.2 G/DL (ref 13.5–17.5)
LYMPHOCYTES # BLD: 0.9 K/UL (ref 1–5.1)
LYMPHOCYTES NFR BLD: 13.6 %
MAGNESIUM SERPL-MCNC: 2.7 MG/DL (ref 1.8–2.4)
MAGNESIUM SERPL-MCNC: 2.7 MG/DL (ref 1.8–2.4)
MCH RBC QN AUTO: 32.5 PG (ref 26–34)
MCHC RBC AUTO-ENTMCNC: 33.5 G/DL (ref 31–36)
MCV RBC AUTO: 97.1 FL (ref 80–100)
MONOCYTES # BLD: 0.9 K/UL (ref 0–1.3)
MONOCYTES NFR BLD: 13.6 %
NEUTROPHILS # BLD: 4.7 K/UL (ref 1.7–7.7)
NEUTROPHILS NFR BLD: 70.6 %
PHOSPHATE SERPL-MCNC: 3 MG/DL (ref 2.5–4.9)
PHOSPHATE SERPL-MCNC: 3.8 MG/DL (ref 2.5–4.9)
PLATELET # BLD AUTO: 159 K/UL (ref 135–450)
PMV BLD AUTO: 7.3 FL (ref 5–10.5)
POTASSIUM SERPL-SCNC: 3.6 MMOL/L (ref 3.5–5.1)
POTASSIUM SERPL-SCNC: 3.7 MMOL/L (ref 3.5–5.1)
RBC # BLD AUTO: 2.84 M/UL (ref 4.2–5.9)
SODIUM SERPL-SCNC: 146 MMOL/L (ref 136–145)
SODIUM SERPL-SCNC: 148 MMOL/L (ref 136–145)
WBC # BLD AUTO: 6.6 K/UL (ref 4–11)

## 2023-06-20 PROCEDURE — C9113 INJ PANTOPRAZOLE SODIUM, VIA: HCPCS

## 2023-06-20 PROCEDURE — 6360000002 HC RX W HCPCS

## 2023-06-20 PROCEDURE — 6370000000 HC RX 637 (ALT 250 FOR IP)

## 2023-06-20 PROCEDURE — 85025 COMPLETE CBC W/AUTO DIFF WBC: CPT

## 2023-06-20 PROCEDURE — 2580000003 HC RX 258: Performed by: INTERNAL MEDICINE

## 2023-06-20 PROCEDURE — 6370000000 HC RX 637 (ALT 250 FOR IP): Performed by: INTERNAL MEDICINE

## 2023-06-20 PROCEDURE — 2580000003 HC RX 258

## 2023-06-20 PROCEDURE — 2060000000 HC ICU INTERMEDIATE R&B

## 2023-06-20 PROCEDURE — 36415 COLL VENOUS BLD VENIPUNCTURE: CPT

## 2023-06-20 PROCEDURE — 6360000002 HC RX W HCPCS: Performed by: INTERNAL MEDICINE

## 2023-06-20 PROCEDURE — 2700000000 HC OXYGEN THERAPY PER DAY

## 2023-06-20 PROCEDURE — 6370000000 HC RX 637 (ALT 250 FOR IP): Performed by: STUDENT IN AN ORGANIZED HEALTH CARE EDUCATION/TRAINING PROGRAM

## 2023-06-20 PROCEDURE — 83735 ASSAY OF MAGNESIUM: CPT

## 2023-06-20 PROCEDURE — 94761 N-INVAS EAR/PLS OXIMETRY MLT: CPT

## 2023-06-20 PROCEDURE — 99291 CRITICAL CARE FIRST HOUR: CPT | Performed by: INTERNAL MEDICINE

## 2023-06-20 PROCEDURE — 80069 RENAL FUNCTION PANEL: CPT

## 2023-06-20 RX ORDER — ACETAMINOPHEN 325 MG/1
650 TABLET ORAL EVERY 6 HOURS PRN
Status: DISCONTINUED | OUTPATIENT
Start: 2023-06-20 | End: 2023-06-30 | Stop reason: HOSPADM

## 2023-06-20 RX ORDER — ACETAMINOPHEN 650 MG/1
650 SUPPOSITORY RECTAL EVERY 6 HOURS PRN
Status: DISCONTINUED | OUTPATIENT
Start: 2023-06-20 | End: 2023-06-30 | Stop reason: HOSPADM

## 2023-06-20 RX ADMIN — POTASSIUM BICARBONATE 20 MEQ: 782 TABLET, EFFERVESCENT ORAL at 10:24

## 2023-06-20 RX ADMIN — LEVOTHYROXINE SODIUM 25 MCG: 0.03 TABLET ORAL at 05:24

## 2023-06-20 RX ADMIN — ACETAMINOPHEN 650 MG: 325 TABLET ORAL at 12:50

## 2023-06-20 RX ADMIN — LORAZEPAM 0.5 MG: 0.5 TABLET ORAL at 00:35

## 2023-06-20 RX ADMIN — MILRINONE LACTATE IN DEXTROSE 0.12 MCG/KG/MIN: 200 INJECTION, SOLUTION INTRAVENOUS at 14:39

## 2023-06-20 RX ADMIN — LACTULOSE 10 G: 20 SOLUTION ORAL at 10:05

## 2023-06-20 RX ADMIN — HEPARIN SODIUM 5000 UNITS: 5000 INJECTION INTRAVENOUS; SUBCUTANEOUS at 05:30

## 2023-06-20 RX ADMIN — MIRTAZAPINE 15 MG: 15 TABLET, FILM COATED ORAL at 20:08

## 2023-06-20 RX ADMIN — METOPROLOL SUCCINATE 12.5 MG: 25 TABLET, EXTENDED RELEASE ORAL at 10:23

## 2023-06-20 RX ADMIN — HEPARIN SODIUM 5000 UNITS: 5000 INJECTION INTRAVENOUS; SUBCUTANEOUS at 22:11

## 2023-06-20 RX ADMIN — LACTULOSE 10 G: 20 SOLUTION ORAL at 14:13

## 2023-06-20 RX ADMIN — ATORVASTATIN CALCIUM 80 MG: 80 TABLET, FILM COATED ORAL at 20:08

## 2023-06-20 RX ADMIN — POTASSIUM BICARBONATE 20 MEQ: 782 TABLET, EFFERVESCENT ORAL at 20:08

## 2023-06-20 RX ADMIN — TAMSULOSIN HYDROCHLORIDE 0.4 MG: 0.4 CAPSULE ORAL at 09:58

## 2023-06-20 RX ADMIN — ASPIRIN 325 MG: 325 TABLET, COATED ORAL at 09:58

## 2023-06-20 RX ADMIN — SODIUM CHLORIDE, PRESERVATIVE FREE 10 ML: 5 INJECTION INTRAVENOUS at 10:19

## 2023-06-20 RX ADMIN — POTASSIUM BICARBONATE 20 MEQ: 782 TABLET, EFFERVESCENT ORAL at 14:14

## 2023-06-20 RX ADMIN — FUROSEMIDE 5 MG/HR: 10 INJECTION, SOLUTION INTRAMUSCULAR; INTRAVENOUS at 14:40

## 2023-06-20 RX ADMIN — PANTOPRAZOLE SODIUM 40 MG: 40 INJECTION, POWDER, FOR SOLUTION INTRAVENOUS at 10:00

## 2023-06-20 RX ADMIN — HEPARIN SODIUM 5000 UNITS: 5000 INJECTION INTRAVENOUS; SUBCUTANEOUS at 14:13

## 2023-06-20 RX ADMIN — KETOTIFEN FUMARATE 1 DROP: 0.25 SOLUTION/ DROPS OPHTHALMIC at 10:32

## 2023-06-20 RX ADMIN — DICLOFENAC SODIUM 2 G: 10 GEL TOPICAL at 20:47

## 2023-06-20 RX ADMIN — KETOTIFEN FUMARATE 1 DROP: 0.25 SOLUTION/ DROPS OPHTHALMIC at 20:09

## 2023-06-20 ASSESSMENT — PAIN DESCRIPTION - PAIN TYPE
TYPE: ACUTE PAIN
TYPE: ACUTE PAIN

## 2023-06-20 ASSESSMENT — PAIN SCALES - GENERAL
PAINLEVEL_OUTOF10: 0
PAINLEVEL_OUTOF10: 5

## 2023-06-20 ASSESSMENT — PAIN DESCRIPTION - ONSET
ONSET: SUDDEN
ONSET: SUDDEN

## 2023-06-20 ASSESSMENT — PAIN DESCRIPTION - LOCATION
LOCATION: LEG
LOCATION: LEG

## 2023-06-20 ASSESSMENT — PAIN DESCRIPTION - ORIENTATION
ORIENTATION: LEFT
ORIENTATION: LEFT

## 2023-06-20 ASSESSMENT — PAIN - FUNCTIONAL ASSESSMENT
PAIN_FUNCTIONAL_ASSESSMENT: PREVENTS OR INTERFERES SOME ACTIVE ACTIVITIES AND ADLS
PAIN_FUNCTIONAL_ASSESSMENT: PREVENTS OR INTERFERES SOME ACTIVE ACTIVITIES AND ADLS

## 2023-06-20 ASSESSMENT — PAIN DESCRIPTION - FREQUENCY
FREQUENCY: CONTINUOUS
FREQUENCY: CONTINUOUS

## 2023-06-20 ASSESSMENT — PAIN DESCRIPTION - DESCRIPTORS
DESCRIPTORS: SHARP
DESCRIPTORS: SHARP

## 2023-06-21 ENCOUNTER — APPOINTMENT (OUTPATIENT)
Dept: VASCULAR LAB | Age: 88
DRG: 291 | End: 2023-06-21
Payer: MEDICARE

## 2023-06-21 LAB
ALBUMIN SERPL-MCNC: 3.1 G/DL (ref 3.4–5)
ALBUMIN SERPL-MCNC: 3.3 G/DL (ref 3.4–5)
ANION GAP SERPL CALCULATED.3IONS-SCNC: 4 MMOL/L (ref 3–16)
ANION GAP SERPL CALCULATED.3IONS-SCNC: 8 MMOL/L (ref 3–16)
BASOPHILS # BLD: 0 K/UL (ref 0–0.2)
BASOPHILS NFR BLD: 0.6 %
BUN SERPL-MCNC: 53 MG/DL (ref 7–20)
BUN SERPL-MCNC: 59 MG/DL (ref 7–20)
CALCIUM SERPL-MCNC: 8.7 MG/DL (ref 8.3–10.6)
CALCIUM SERPL-MCNC: 8.8 MG/DL (ref 8.3–10.6)
CHLORIDE SERPL-SCNC: 104 MMOL/L (ref 99–110)
CHLORIDE SERPL-SCNC: 107 MMOL/L (ref 99–110)
CO2 SERPL-SCNC: 34 MMOL/L (ref 21–32)
CO2 SERPL-SCNC: 34 MMOL/L (ref 21–32)
CREAT SERPL-MCNC: 2.4 MG/DL (ref 0.8–1.3)
CREAT SERPL-MCNC: 2.5 MG/DL (ref 0.8–1.3)
DEPRECATED RDW RBC AUTO: 15.6 % (ref 12.4–15.4)
EOSINOPHIL # BLD: 0.3 K/UL (ref 0–0.6)
EOSINOPHIL NFR BLD: 4.9 %
GFR SERPLBLD CREATININE-BSD FMLA CKD-EPI: 24 ML/MIN/{1.73_M2}
GFR SERPLBLD CREATININE-BSD FMLA CKD-EPI: 25 ML/MIN/{1.73_M2}
GLUCOSE SERPL-MCNC: 128 MG/DL (ref 70–99)
GLUCOSE SERPL-MCNC: 135 MG/DL (ref 70–99)
HCT VFR BLD AUTO: 29 % (ref 40.5–52.5)
HGB BLD-MCNC: 9.4 G/DL (ref 13.5–17.5)
LYMPHOCYTES # BLD: 0.9 K/UL (ref 1–5.1)
LYMPHOCYTES NFR BLD: 16 %
MAGNESIUM SERPL-MCNC: 2.6 MG/DL (ref 1.8–2.4)
MAGNESIUM SERPL-MCNC: 2.7 MG/DL (ref 1.8–2.4)
MCH RBC QN AUTO: 31.7 PG (ref 26–34)
MCHC RBC AUTO-ENTMCNC: 32.2 G/DL (ref 31–36)
MCV RBC AUTO: 98.2 FL (ref 80–100)
MONOCYTES # BLD: 0.7 K/UL (ref 0–1.3)
MONOCYTES NFR BLD: 12.3 %
NEUTROPHILS # BLD: 3.7 K/UL (ref 1.7–7.7)
NEUTROPHILS NFR BLD: 66.2 %
PHOSPHATE SERPL-MCNC: 2.6 MG/DL (ref 2.5–4.9)
PHOSPHATE SERPL-MCNC: 3.1 MG/DL (ref 2.5–4.9)
PLATELET # BLD AUTO: 169 K/UL (ref 135–450)
PMV BLD AUTO: 7.8 FL (ref 5–10.5)
POTASSIUM SERPL-SCNC: 3.8 MMOL/L (ref 3.5–5.1)
POTASSIUM SERPL-SCNC: 4.1 MMOL/L (ref 3.5–5.1)
RBC # BLD AUTO: 2.96 M/UL (ref 4.2–5.9)
SODIUM SERPL-SCNC: 145 MMOL/L (ref 136–145)
SODIUM SERPL-SCNC: 146 MMOL/L (ref 136–145)
WBC # BLD AUTO: 5.6 K/UL (ref 4–11)

## 2023-06-21 PROCEDURE — 6370000000 HC RX 637 (ALT 250 FOR IP): Performed by: INTERNAL MEDICINE

## 2023-06-21 PROCEDURE — 6370000000 HC RX 637 (ALT 250 FOR IP)

## 2023-06-21 PROCEDURE — 2580000003 HC RX 258

## 2023-06-21 PROCEDURE — 83735 ASSAY OF MAGNESIUM: CPT

## 2023-06-21 PROCEDURE — 6360000002 HC RX W HCPCS

## 2023-06-21 PROCEDURE — 93971 EXTREMITY STUDY: CPT

## 2023-06-21 PROCEDURE — 36415 COLL VENOUS BLD VENIPUNCTURE: CPT

## 2023-06-21 PROCEDURE — 99291 CRITICAL CARE FIRST HOUR: CPT | Performed by: INTERNAL MEDICINE

## 2023-06-21 PROCEDURE — C9113 INJ PANTOPRAZOLE SODIUM, VIA: HCPCS

## 2023-06-21 PROCEDURE — 6360000002 HC RX W HCPCS: Performed by: INTERNAL MEDICINE

## 2023-06-21 PROCEDURE — 85025 COMPLETE CBC W/AUTO DIFF WBC: CPT

## 2023-06-21 PROCEDURE — 80069 RENAL FUNCTION PANEL: CPT

## 2023-06-21 PROCEDURE — 2060000000 HC ICU INTERMEDIATE R&B

## 2023-06-21 RX ORDER — METHOCARBAMOL 500 MG/1
250 TABLET, FILM COATED ORAL EVERY 8 HOURS PRN
Status: DISCONTINUED | OUTPATIENT
Start: 2023-06-21 | End: 2023-06-30 | Stop reason: HOSPADM

## 2023-06-21 RX ORDER — POTASSIUM CHLORIDE 20 MEQ/1
20 TABLET, EXTENDED RELEASE ORAL ONCE
Status: COMPLETED | OUTPATIENT
Start: 2023-06-21 | End: 2023-06-21

## 2023-06-21 RX ORDER — CAPSAICIN 0.025 %
CREAM (GRAM) TOPICAL 3 TIMES DAILY
Status: DISCONTINUED | OUTPATIENT
Start: 2023-06-21 | End: 2023-06-26

## 2023-06-21 RX ORDER — METOPROLOL SUCCINATE 25 MG/1
25 TABLET, EXTENDED RELEASE ORAL DAILY
Status: DISCONTINUED | OUTPATIENT
Start: 2023-06-22 | End: 2023-06-28

## 2023-06-21 RX ORDER — METOPROLOL SUCCINATE 25 MG/1
12.5 TABLET, EXTENDED RELEASE ORAL ONCE
Status: COMPLETED | OUTPATIENT
Start: 2023-06-21 | End: 2023-06-21

## 2023-06-21 RX ADMIN — POTASSIUM CHLORIDE 20 MEQ: 1500 TABLET, EXTENDED RELEASE ORAL at 09:49

## 2023-06-21 RX ADMIN — DICLOFENAC SODIUM 2 G: 10 GEL TOPICAL at 09:05

## 2023-06-21 RX ADMIN — TAMSULOSIN HYDROCHLORIDE 0.4 MG: 0.4 CAPSULE ORAL at 09:48

## 2023-06-21 RX ADMIN — HEPARIN SODIUM 5000 UNITS: 5000 INJECTION INTRAVENOUS; SUBCUTANEOUS at 14:01

## 2023-06-21 RX ADMIN — SODIUM CHLORIDE, PRESERVATIVE FREE 10 ML: 5 INJECTION INTRAVENOUS at 09:51

## 2023-06-21 RX ADMIN — DICLOFENAC SODIUM 2 G: 10 GEL TOPICAL at 15:50

## 2023-06-21 RX ADMIN — LEVOTHYROXINE SODIUM 25 MCG: 0.03 TABLET ORAL at 05:32

## 2023-06-21 RX ADMIN — METOPROLOL SUCCINATE 12.5 MG: 25 TABLET, EXTENDED RELEASE ORAL at 09:50

## 2023-06-21 RX ADMIN — PANTOPRAZOLE SODIUM 40 MG: 40 INJECTION, POWDER, FOR SOLUTION INTRAVENOUS at 09:49

## 2023-06-21 RX ADMIN — LORAZEPAM 0.5 MG: 0.5 TABLET ORAL at 21:04

## 2023-06-21 RX ADMIN — SODIUM CHLORIDE, PRESERVATIVE FREE 10 ML: 5 INJECTION INTRAVENOUS at 20:56

## 2023-06-21 RX ADMIN — KETOTIFEN FUMARATE 1 DROP: 0.25 SOLUTION/ DROPS OPHTHALMIC at 10:07

## 2023-06-21 RX ADMIN — MIRTAZAPINE 15 MG: 15 TABLET, FILM COATED ORAL at 20:45

## 2023-06-21 RX ADMIN — METOPROLOL SUCCINATE 12.5 MG: 25 TABLET, EXTENDED RELEASE ORAL at 13:52

## 2023-06-21 RX ADMIN — KETOTIFEN FUMARATE 1 DROP: 0.25 SOLUTION/ DROPS OPHTHALMIC at 20:56

## 2023-06-21 RX ADMIN — HEPARIN SODIUM 5000 UNITS: 5000 INJECTION INTRAVENOUS; SUBCUTANEOUS at 05:32

## 2023-06-21 RX ADMIN — CAPSAICIN: 0.25 CREAM TOPICAL at 20:45

## 2023-06-21 RX ADMIN — METHOCARBAMOL 250 MG: 500 TABLET ORAL at 10:08

## 2023-06-21 RX ADMIN — ASPIRIN 325 MG: 325 TABLET, COATED ORAL at 09:48

## 2023-06-21 RX ADMIN — LACTULOSE 10 G: 20 SOLUTION ORAL at 20:45

## 2023-06-21 RX ADMIN — HEPARIN SODIUM 5000 UNITS: 5000 INJECTION INTRAVENOUS; SUBCUTANEOUS at 21:05

## 2023-06-21 RX ADMIN — MILRINONE LACTATE IN DEXTROSE 0.12 MCG/KG/MIN: 200 INJECTION, SOLUTION INTRAVENOUS at 15:55

## 2023-06-21 RX ADMIN — ATORVASTATIN CALCIUM 80 MG: 80 TABLET, FILM COATED ORAL at 20:45

## 2023-06-21 ASSESSMENT — PAIN DESCRIPTION - LOCATION
LOCATION: LEG

## 2023-06-21 ASSESSMENT — PAIN DESCRIPTION - FREQUENCY
FREQUENCY: CONTINUOUS
FREQUENCY: CONTINUOUS
FREQUENCY: INTERMITTENT
FREQUENCY: CONTINUOUS
FREQUENCY: INTERMITTENT

## 2023-06-21 ASSESSMENT — PAIN DESCRIPTION - PAIN TYPE
TYPE: ACUTE PAIN

## 2023-06-21 ASSESSMENT — PAIN DESCRIPTION - DESCRIPTORS
DESCRIPTORS: SHARP

## 2023-06-21 ASSESSMENT — PAIN SCALES - GENERAL
PAINLEVEL_OUTOF10: 4
PAINLEVEL_OUTOF10: 4
PAINLEVEL_OUTOF10: 0
PAINLEVEL_OUTOF10: 4
PAINLEVEL_OUTOF10: 0
PAINLEVEL_OUTOF10: 5
PAINLEVEL_OUTOF10: 5

## 2023-06-21 ASSESSMENT — PAIN DESCRIPTION - ORIENTATION
ORIENTATION: LEFT

## 2023-06-21 ASSESSMENT — PAIN DESCRIPTION - ONSET
ONSET: ON-GOING
ONSET: SUDDEN
ONSET: ON-GOING

## 2023-06-22 ENCOUNTER — APPOINTMENT (OUTPATIENT)
Dept: ULTRASOUND IMAGING | Age: 88
DRG: 291 | End: 2023-06-22
Payer: MEDICARE

## 2023-06-22 LAB
ALBUMIN SERPL-MCNC: 3.1 G/DL (ref 3.4–5)
ALBUMIN SERPL-MCNC: 3.1 G/DL (ref 3.4–5)
ANION GAP SERPL CALCULATED.3IONS-SCNC: 10 MMOL/L (ref 3–16)
ANION GAP SERPL CALCULATED.3IONS-SCNC: 7 MMOL/L (ref 3–16)
BASOPHILS # BLD: 0 K/UL (ref 0–0.2)
BASOPHILS NFR BLD: 0.8 %
BUN SERPL-MCNC: 50 MG/DL (ref 7–20)
BUN SERPL-MCNC: 54 MG/DL (ref 7–20)
CALCIUM SERPL-MCNC: 8.7 MG/DL (ref 8.3–10.6)
CALCIUM SERPL-MCNC: 8.8 MG/DL (ref 8.3–10.6)
CHLORIDE SERPL-SCNC: 104 MMOL/L (ref 99–110)
CHLORIDE SERPL-SCNC: 106 MMOL/L (ref 99–110)
CO2 SERPL-SCNC: 32 MMOL/L (ref 21–32)
CO2 SERPL-SCNC: 33 MMOL/L (ref 21–32)
CREAT SERPL-MCNC: 2.3 MG/DL (ref 0.8–1.3)
CREAT SERPL-MCNC: 2.5 MG/DL (ref 0.8–1.3)
DEPRECATED RDW RBC AUTO: 15.4 % (ref 12.4–15.4)
EOSINOPHIL # BLD: 0.3 K/UL (ref 0–0.6)
EOSINOPHIL NFR BLD: 4.1 %
GFR SERPLBLD CREATININE-BSD FMLA CKD-EPI: 24 ML/MIN/{1.73_M2}
GFR SERPLBLD CREATININE-BSD FMLA CKD-EPI: 26 ML/MIN/{1.73_M2}
GLUCOSE SERPL-MCNC: 119 MG/DL (ref 70–99)
GLUCOSE SERPL-MCNC: 144 MG/DL (ref 70–99)
HCT VFR BLD AUTO: 28.9 % (ref 40.5–52.5)
HGB BLD-MCNC: 9.6 G/DL (ref 13.5–17.5)
LYMPHOCYTES # BLD: 1.1 K/UL (ref 1–5.1)
LYMPHOCYTES NFR BLD: 17.7 %
MAGNESIUM SERPL-MCNC: 2.6 MG/DL (ref 1.8–2.4)
MAGNESIUM SERPL-MCNC: 2.6 MG/DL (ref 1.8–2.4)
MCH RBC QN AUTO: 31.8 PG (ref 26–34)
MCHC RBC AUTO-ENTMCNC: 33.3 G/DL (ref 31–36)
MCV RBC AUTO: 95.6 FL (ref 80–100)
MONOCYTES # BLD: 0.6 K/UL (ref 0–1.3)
MONOCYTES NFR BLD: 10.3 %
NEUTROPHILS # BLD: 4.1 K/UL (ref 1.7–7.7)
NEUTROPHILS NFR BLD: 67.1 %
NT-PROBNP SERPL-MCNC: ABNORMAL PG/ML (ref 0–449)
OSMOLALITY UR: 360 MOSM/KG (ref 390–1070)
PHOSPHATE SERPL-MCNC: 2.9 MG/DL (ref 2.5–4.9)
PHOSPHATE SERPL-MCNC: 3.5 MG/DL (ref 2.5–4.9)
PLATELET # BLD AUTO: 163 K/UL (ref 135–450)
PMV BLD AUTO: 7.6 FL (ref 5–10.5)
POTASSIUM SERPL-SCNC: 3.9 MMOL/L (ref 3.5–5.1)
POTASSIUM SERPL-SCNC: 4.1 MMOL/L (ref 3.5–5.1)
RBC # BLD AUTO: 3.02 M/UL (ref 4.2–5.9)
SODIUM SERPL-SCNC: 144 MMOL/L (ref 136–145)
SODIUM SERPL-SCNC: 148 MMOL/L (ref 136–145)
SODIUM UR-SCNC: 73 MMOL/L
WBC # BLD AUTO: 6.1 K/UL (ref 4–11)

## 2023-06-22 PROCEDURE — C9113 INJ PANTOPRAZOLE SODIUM, VIA: HCPCS

## 2023-06-22 PROCEDURE — 85025 COMPLETE CBC W/AUTO DIFF WBC: CPT

## 2023-06-22 PROCEDURE — 2580000003 HC RX 258: Performed by: INTERNAL MEDICINE

## 2023-06-22 PROCEDURE — 6360000002 HC RX W HCPCS

## 2023-06-22 PROCEDURE — 84300 ASSAY OF URINE SODIUM: CPT

## 2023-06-22 PROCEDURE — 92526 ORAL FUNCTION THERAPY: CPT

## 2023-06-22 PROCEDURE — 94150 VITAL CAPACITY TEST: CPT

## 2023-06-22 PROCEDURE — 83935 ASSAY OF URINE OSMOLALITY: CPT

## 2023-06-22 PROCEDURE — 36415 COLL VENOUS BLD VENIPUNCTURE: CPT

## 2023-06-22 PROCEDURE — 6360000002 HC RX W HCPCS: Performed by: ANESTHESIOLOGY

## 2023-06-22 PROCEDURE — 6360000002 HC RX W HCPCS: Performed by: INTERNAL MEDICINE

## 2023-06-22 PROCEDURE — 94761 N-INVAS EAR/PLS OXIMETRY MLT: CPT

## 2023-06-22 PROCEDURE — 6370000000 HC RX 637 (ALT 250 FOR IP): Performed by: INTERNAL MEDICINE

## 2023-06-22 PROCEDURE — 6370000000 HC RX 637 (ALT 250 FOR IP)

## 2023-06-22 PROCEDURE — 2580000003 HC RX 258

## 2023-06-22 PROCEDURE — 2060000000 HC ICU INTERMEDIATE R&B

## 2023-06-22 PROCEDURE — 94640 AIRWAY INHALATION TREATMENT: CPT

## 2023-06-22 PROCEDURE — 2700000000 HC OXYGEN THERAPY PER DAY

## 2023-06-22 PROCEDURE — 83735 ASSAY OF MAGNESIUM: CPT

## 2023-06-22 PROCEDURE — 99291 CRITICAL CARE FIRST HOUR: CPT | Performed by: INTERNAL MEDICINE

## 2023-06-22 PROCEDURE — 76770 US EXAM ABDO BACK WALL COMP: CPT

## 2023-06-22 PROCEDURE — 83880 ASSAY OF NATRIURETIC PEPTIDE: CPT

## 2023-06-22 PROCEDURE — 80069 RENAL FUNCTION PANEL: CPT

## 2023-06-22 RX ORDER — FUROSEMIDE 10 MG/ML
20 INJECTION INTRAMUSCULAR; INTRAVENOUS 3 TIMES DAILY
Status: DISCONTINUED | OUTPATIENT
Start: 2023-06-22 | End: 2023-06-23

## 2023-06-22 RX ORDER — POTASSIUM CHLORIDE 750 MG/1
10 TABLET, EXTENDED RELEASE ORAL ONCE
Status: DISCONTINUED | OUTPATIENT
Start: 2023-06-22 | End: 2023-06-22

## 2023-06-22 RX ORDER — HYDROMORPHONE HYDROCHLORIDE 1 MG/ML
0.25 INJECTION, SOLUTION INTRAMUSCULAR; INTRAVENOUS; SUBCUTANEOUS ONCE
Status: COMPLETED | OUTPATIENT
Start: 2023-06-22 | End: 2023-06-22

## 2023-06-22 RX ADMIN — ATORVASTATIN CALCIUM 80 MG: 80 TABLET, FILM COATED ORAL at 20:43

## 2023-06-22 RX ADMIN — LORAZEPAM 0.5 MG: 0.5 TABLET ORAL at 20:43

## 2023-06-22 RX ADMIN — MIRTAZAPINE 15 MG: 15 TABLET, FILM COATED ORAL at 20:43

## 2023-06-22 RX ADMIN — KETOTIFEN FUMARATE 1 DROP: 0.25 SOLUTION/ DROPS OPHTHALMIC at 20:44

## 2023-06-22 RX ADMIN — ACETAMINOPHEN 650 MG: 325 TABLET ORAL at 11:22

## 2023-06-22 RX ADMIN — SODIUM CHLORIDE, PRESERVATIVE FREE 5 ML: 5 INJECTION INTRAVENOUS at 11:31

## 2023-06-22 RX ADMIN — PANTOPRAZOLE SODIUM 40 MG: 40 INJECTION, POWDER, FOR SOLUTION INTRAVENOUS at 08:12

## 2023-06-22 RX ADMIN — SODIUM CHLORIDE, PRESERVATIVE FREE 10 ML: 5 INJECTION INTRAVENOUS at 20:48

## 2023-06-22 RX ADMIN — FUROSEMIDE 20 MG: 10 INJECTION, SOLUTION INTRAMUSCULAR; INTRAVENOUS at 20:43

## 2023-06-22 RX ADMIN — CAPSAICIN: 0.25 CREAM TOPICAL at 20:44

## 2023-06-22 RX ADMIN — HEPARIN SODIUM 5000 UNITS: 5000 INJECTION INTRAVENOUS; SUBCUTANEOUS at 20:44

## 2023-06-22 RX ADMIN — HEPARIN SODIUM 5000 UNITS: 5000 INJECTION INTRAVENOUS; SUBCUTANEOUS at 15:18

## 2023-06-22 RX ADMIN — DICLOFENAC SODIUM 2 G: 10 GEL TOPICAL at 11:24

## 2023-06-22 RX ADMIN — HYDROMORPHONE HYDROCHLORIDE 0.25 MG: 1 INJECTION, SOLUTION INTRAMUSCULAR; INTRAVENOUS; SUBCUTANEOUS at 15:19

## 2023-06-22 RX ADMIN — METOPROLOL SUCCINATE 25 MG: 25 TABLET, EXTENDED RELEASE ORAL at 08:13

## 2023-06-22 RX ADMIN — LEVOTHYROXINE SODIUM 25 MCG: 0.03 TABLET ORAL at 05:27

## 2023-06-22 RX ADMIN — ALBUTEROL SULFATE 2.5 MG: 2.5 SOLUTION RESPIRATORY (INHALATION) at 01:12

## 2023-06-22 RX ADMIN — LACTULOSE 10 G: 20 SOLUTION ORAL at 08:13

## 2023-06-22 RX ADMIN — CAPSAICIN: 0.25 CREAM TOPICAL at 11:28

## 2023-06-22 RX ADMIN — CAPSAICIN: 0.25 CREAM TOPICAL at 15:30

## 2023-06-22 RX ADMIN — HEPARIN SODIUM 5000 UNITS: 5000 INJECTION INTRAVENOUS; SUBCUTANEOUS at 05:31

## 2023-06-22 RX ADMIN — FUROSEMIDE 7.5 MG/HR: 10 INJECTION, SOLUTION INTRAMUSCULAR; INTRAVENOUS at 00:03

## 2023-06-22 RX ADMIN — ACETAMINOPHEN 650 MG: 325 TABLET ORAL at 05:27

## 2023-06-22 RX ADMIN — FUROSEMIDE 20 MG: 10 INJECTION, SOLUTION INTRAMUSCULAR; INTRAVENOUS at 15:19

## 2023-06-22 RX ADMIN — LACTULOSE 10 G: 20 SOLUTION ORAL at 20:43

## 2023-06-22 RX ADMIN — ASPIRIN 325 MG: 325 TABLET, COATED ORAL at 08:13

## 2023-06-22 RX ADMIN — KETOTIFEN FUMARATE 1 DROP: 0.25 SOLUTION/ DROPS OPHTHALMIC at 11:27

## 2023-06-22 RX ADMIN — TAMSULOSIN HYDROCHLORIDE 0.4 MG: 0.4 CAPSULE ORAL at 08:13

## 2023-06-22 RX ADMIN — LACTULOSE 10 G: 20 SOLUTION ORAL at 15:20

## 2023-06-22 ASSESSMENT — PAIN DESCRIPTION - ONSET
ONSET: PROGRESSIVE
ONSET: ON-GOING

## 2023-06-22 ASSESSMENT — PAIN - FUNCTIONAL ASSESSMENT
PAIN_FUNCTIONAL_ASSESSMENT: ACTIVITIES ARE NOT PREVENTED

## 2023-06-22 ASSESSMENT — PAIN DESCRIPTION - PAIN TYPE
TYPE: ACUTE PAIN
TYPE: ACUTE PAIN

## 2023-06-22 ASSESSMENT — PAIN DESCRIPTION - DESCRIPTORS
DESCRIPTORS: SHARP
DESCRIPTORS: SORE;SHARP
DESCRIPTORS: SHARP
DESCRIPTORS: ACHING;SORE

## 2023-06-22 ASSESSMENT — PAIN DESCRIPTION - ORIENTATION
ORIENTATION: LEFT;OUTER
ORIENTATION: LEFT;OUTER
ORIENTATION: LEFT
ORIENTATION: LEFT

## 2023-06-22 ASSESSMENT — PAIN SCALES - GENERAL
PAINLEVEL_OUTOF10: 5
PAINLEVEL_OUTOF10: 6
PAINLEVEL_OUTOF10: 6
PAINLEVEL_OUTOF10: 3

## 2023-06-22 ASSESSMENT — PAIN DESCRIPTION - LOCATION
LOCATION: ANKLE

## 2023-06-22 ASSESSMENT — PAIN DESCRIPTION - FREQUENCY
FREQUENCY: CONTINUOUS
FREQUENCY: CONTINUOUS

## 2023-06-23 LAB
ALBUMIN SERPL-MCNC: 3.1 G/DL (ref 3.4–5)
ALBUMIN SERPL-MCNC: 3.1 G/DL (ref 3.4–5)
ANION GAP SERPL CALCULATED.3IONS-SCNC: 9 MMOL/L (ref 3–16)
ANION GAP SERPL CALCULATED.3IONS-SCNC: 9 MMOL/L (ref 3–16)
BASOPHILS # BLD: 0 K/UL (ref 0–0.2)
BASOPHILS NFR BLD: 0.5 %
BUN SERPL-MCNC: 48 MG/DL (ref 7–20)
BUN SERPL-MCNC: 50 MG/DL (ref 7–20)
CALCIUM SERPL-MCNC: 8.6 MG/DL (ref 8.3–10.6)
CALCIUM SERPL-MCNC: 8.7 MG/DL (ref 8.3–10.6)
CHLORIDE SERPL-SCNC: 104 MMOL/L (ref 99–110)
CHLORIDE SERPL-SCNC: 105 MMOL/L (ref 99–110)
CO2 SERPL-SCNC: 30 MMOL/L (ref 21–32)
CO2 SERPL-SCNC: 33 MMOL/L (ref 21–32)
CREAT SERPL-MCNC: 2.4 MG/DL (ref 0.8–1.3)
CREAT SERPL-MCNC: 2.5 MG/DL (ref 0.8–1.3)
DEPRECATED RDW RBC AUTO: 15.5 % (ref 12.4–15.4)
EOSINOPHIL # BLD: 0.3 K/UL (ref 0–0.6)
EOSINOPHIL NFR BLD: 5.1 %
GFR SERPLBLD CREATININE-BSD FMLA CKD-EPI: 24 ML/MIN/{1.73_M2}
GFR SERPLBLD CREATININE-BSD FMLA CKD-EPI: 25 ML/MIN/{1.73_M2}
GLUCOSE SERPL-MCNC: 105 MG/DL (ref 70–99)
GLUCOSE SERPL-MCNC: 166 MG/DL (ref 70–99)
HCT VFR BLD AUTO: 29.1 % (ref 40.5–52.5)
HGB BLD-MCNC: 9.7 G/DL (ref 13.5–17.5)
LYMPHOCYTES # BLD: 0.9 K/UL (ref 1–5.1)
LYMPHOCYTES NFR BLD: 16.3 %
MAGNESIUM SERPL-MCNC: 2.6 MG/DL (ref 1.8–2.4)
MAGNESIUM SERPL-MCNC: 2.6 MG/DL (ref 1.8–2.4)
MCH RBC QN AUTO: 32.4 PG (ref 26–34)
MCHC RBC AUTO-ENTMCNC: 33.4 G/DL (ref 31–36)
MCV RBC AUTO: 96.9 FL (ref 80–100)
MONOCYTES # BLD: 0.6 K/UL (ref 0–1.3)
MONOCYTES NFR BLD: 9.6 %
NEUTROPHILS # BLD: 4 K/UL (ref 1.7–7.7)
NEUTROPHILS NFR BLD: 68.5 %
PHOSPHATE SERPL-MCNC: 3.9 MG/DL (ref 2.5–4.9)
PHOSPHATE SERPL-MCNC: 3.9 MG/DL (ref 2.5–4.9)
PLATELET # BLD AUTO: 160 K/UL (ref 135–450)
PMV BLD AUTO: 7.8 FL (ref 5–10.5)
POTASSIUM SERPL-SCNC: 3.9 MMOL/L (ref 3.5–5.1)
POTASSIUM SERPL-SCNC: 4.5 MMOL/L (ref 3.5–5.1)
RBC # BLD AUTO: 3 M/UL (ref 4.2–5.9)
SODIUM SERPL-SCNC: 143 MMOL/L (ref 136–145)
SODIUM SERPL-SCNC: 144 MMOL/L (ref 136–145)
SODIUM SERPL-SCNC: 147 MMOL/L (ref 136–145)
WBC # BLD AUTO: 5.8 K/UL (ref 4–11)

## 2023-06-23 PROCEDURE — 99233 SBSQ HOSP IP/OBS HIGH 50: CPT | Performed by: INTERNAL MEDICINE

## 2023-06-23 PROCEDURE — 97166 OT EVAL MOD COMPLEX 45 MIN: CPT

## 2023-06-23 PROCEDURE — 6360000002 HC RX W HCPCS

## 2023-06-23 PROCEDURE — 97162 PT EVAL MOD COMPLEX 30 MIN: CPT

## 2023-06-23 PROCEDURE — 80069 RENAL FUNCTION PANEL: CPT

## 2023-06-23 PROCEDURE — 6370000000 HC RX 637 (ALT 250 FOR IP)

## 2023-06-23 PROCEDURE — 94640 AIRWAY INHALATION TREATMENT: CPT

## 2023-06-23 PROCEDURE — 6360000002 HC RX W HCPCS: Performed by: ANESTHESIOLOGY

## 2023-06-23 PROCEDURE — 97530 THERAPEUTIC ACTIVITIES: CPT

## 2023-06-23 PROCEDURE — 94761 N-INVAS EAR/PLS OXIMETRY MLT: CPT

## 2023-06-23 PROCEDURE — 2700000000 HC OXYGEN THERAPY PER DAY

## 2023-06-23 PROCEDURE — 97535 SELF CARE MNGMENT TRAINING: CPT

## 2023-06-23 PROCEDURE — 6370000000 HC RX 637 (ALT 250 FOR IP): Performed by: INTERNAL MEDICINE

## 2023-06-23 PROCEDURE — 97116 GAIT TRAINING THERAPY: CPT

## 2023-06-23 PROCEDURE — 94010 BREATHING CAPACITY TEST: CPT

## 2023-06-23 PROCEDURE — 6360000002 HC RX W HCPCS: Performed by: INTERNAL MEDICINE

## 2023-06-23 PROCEDURE — 94150 VITAL CAPACITY TEST: CPT

## 2023-06-23 PROCEDURE — 2060000000 HC ICU INTERMEDIATE R&B

## 2023-06-23 PROCEDURE — 36415 COLL VENOUS BLD VENIPUNCTURE: CPT

## 2023-06-23 PROCEDURE — 83735 ASSAY OF MAGNESIUM: CPT

## 2023-06-23 PROCEDURE — 84295 ASSAY OF SERUM SODIUM: CPT

## 2023-06-23 PROCEDURE — C9113 INJ PANTOPRAZOLE SODIUM, VIA: HCPCS

## 2023-06-23 PROCEDURE — 85025 COMPLETE CBC W/AUTO DIFF WBC: CPT

## 2023-06-23 PROCEDURE — 2580000003 HC RX 258

## 2023-06-23 RX ORDER — FUROSEMIDE 40 MG/1
40 TABLET ORAL DAILY
Qty: 60 TABLET | Refills: 3 | Status: SHIPPED | OUTPATIENT
Start: 2023-06-23 | End: 2023-06-30 | Stop reason: HOSPADM

## 2023-06-23 RX ORDER — FUROSEMIDE 10 MG/ML
40 INJECTION INTRAMUSCULAR; INTRAVENOUS 3 TIMES DAILY
Status: DISCONTINUED | OUTPATIENT
Start: 2023-06-23 | End: 2023-06-23

## 2023-06-23 RX ORDER — METOPROLOL SUCCINATE 25 MG/1
25 TABLET, EXTENDED RELEASE ORAL DAILY
Qty: 30 TABLET | Refills: 3 | Status: SHIPPED | OUTPATIENT
Start: 2023-06-24 | End: 2023-06-30 | Stop reason: HOSPADM

## 2023-06-23 RX ORDER — FUROSEMIDE 10 MG/ML
40 INJECTION INTRAMUSCULAR; INTRAVENOUS 3 TIMES DAILY
Status: DISCONTINUED | OUTPATIENT
Start: 2023-06-23 | End: 2023-06-24

## 2023-06-23 RX ORDER — FUROSEMIDE 10 MG/ML
20 INJECTION INTRAMUSCULAR; INTRAVENOUS 3 TIMES DAILY
Status: DISCONTINUED | OUTPATIENT
Start: 2023-06-23 | End: 2023-06-23

## 2023-06-23 RX ORDER — PREDNISONE 10 MG/1
10 TABLET ORAL ONCE
Status: DISCONTINUED | OUTPATIENT
Start: 2023-06-23 | End: 2023-06-23 | Stop reason: SDUPTHER

## 2023-06-23 RX ORDER — PREDNISONE 10 MG/1
10 TABLET ORAL ONCE
Status: DISCONTINUED | OUTPATIENT
Start: 2023-06-23 | End: 2023-06-23 | Stop reason: ALTCHOICE

## 2023-06-23 RX ORDER — FUROSEMIDE 40 MG/1
40 TABLET ORAL DAILY
Status: DISCONTINUED | OUTPATIENT
Start: 2023-06-23 | End: 2023-06-23

## 2023-06-23 RX ORDER — PREDNISONE 10 MG/1
10 TABLET ORAL DAILY
Status: DISCONTINUED | OUTPATIENT
Start: 2023-06-23 | End: 2023-06-23

## 2023-06-23 RX ADMIN — SODIUM CHLORIDE, PRESERVATIVE FREE 10 ML: 5 INJECTION INTRAVENOUS at 08:19

## 2023-06-23 RX ADMIN — TAMSULOSIN HYDROCHLORIDE 0.4 MG: 0.4 CAPSULE ORAL at 08:17

## 2023-06-23 RX ADMIN — LORAZEPAM 0.5 MG: 0.5 TABLET ORAL at 20:28

## 2023-06-23 RX ADMIN — KETOTIFEN FUMARATE 1 DROP: 0.25 SOLUTION/ DROPS OPHTHALMIC at 08:18

## 2023-06-23 RX ADMIN — METOPROLOL SUCCINATE 25 MG: 25 TABLET, EXTENDED RELEASE ORAL at 08:17

## 2023-06-23 RX ADMIN — ALBUTEROL SULFATE 2.5 MG: 2.5 SOLUTION RESPIRATORY (INHALATION) at 20:57

## 2023-06-23 RX ADMIN — LACTULOSE 10 G: 20 SOLUTION ORAL at 08:17

## 2023-06-23 RX ADMIN — CAPSAICIN: 0.25 CREAM TOPICAL at 15:11

## 2023-06-23 RX ADMIN — MIRTAZAPINE 15 MG: 15 TABLET, FILM COATED ORAL at 20:14

## 2023-06-23 RX ADMIN — HEPARIN SODIUM 5000 UNITS: 5000 INJECTION INTRAVENOUS; SUBCUTANEOUS at 13:18

## 2023-06-23 RX ADMIN — LORAZEPAM 0.5 MG: 0.5 TABLET ORAL at 08:34

## 2023-06-23 RX ADMIN — ASPIRIN 325 MG: 325 TABLET, COATED ORAL at 08:17

## 2023-06-23 RX ADMIN — FUROSEMIDE 40 MG: 10 INJECTION, SOLUTION INTRAMUSCULAR; INTRAVENOUS at 20:16

## 2023-06-23 RX ADMIN — LEVOTHYROXINE SODIUM 25 MCG: 0.03 TABLET ORAL at 05:36

## 2023-06-23 RX ADMIN — CAPSAICIN: 0.25 CREAM TOPICAL at 20:22

## 2023-06-23 RX ADMIN — ATORVASTATIN CALCIUM 80 MG: 80 TABLET, FILM COATED ORAL at 20:14

## 2023-06-23 RX ADMIN — HEPARIN SODIUM 5000 UNITS: 5000 INJECTION INTRAVENOUS; SUBCUTANEOUS at 21:58

## 2023-06-23 RX ADMIN — CAPSAICIN: 0.25 CREAM TOPICAL at 08:18

## 2023-06-23 RX ADMIN — PREDNISONE 10 MG: 10 TABLET ORAL at 11:14

## 2023-06-23 RX ADMIN — KETOTIFEN FUMARATE 1 DROP: 0.25 SOLUTION/ DROPS OPHTHALMIC at 20:21

## 2023-06-23 RX ADMIN — FUROSEMIDE 20 MG: 10 INJECTION, SOLUTION INTRAMUSCULAR; INTRAVENOUS at 08:17

## 2023-06-23 RX ADMIN — PANTOPRAZOLE SODIUM 40 MG: 40 INJECTION, POWDER, FOR SOLUTION INTRAVENOUS at 08:18

## 2023-06-23 RX ADMIN — SODIUM CHLORIDE, PRESERVATIVE FREE 10 ML: 5 INJECTION INTRAVENOUS at 20:21

## 2023-06-23 RX ADMIN — FUROSEMIDE 40 MG: 10 INJECTION, SOLUTION INTRAMUSCULAR; INTRAVENOUS at 13:18

## 2023-06-23 RX ADMIN — HEPARIN SODIUM 5000 UNITS: 5000 INJECTION INTRAVENOUS; SUBCUTANEOUS at 05:36

## 2023-06-23 RX ADMIN — LACTULOSE 10 G: 20 SOLUTION ORAL at 20:15

## 2023-06-23 RX ADMIN — LACTULOSE 10 G: 20 SOLUTION ORAL at 13:19

## 2023-06-23 ASSESSMENT — PAIN SCALES - GENERAL
PAINLEVEL_OUTOF10: 3
PAINLEVEL_OUTOF10: 0
PAINLEVEL_OUTOF10: 0

## 2023-06-23 ASSESSMENT — PAIN DESCRIPTION - LOCATION
LOCATION: ANKLE

## 2023-06-23 ASSESSMENT — PAIN DESCRIPTION - ORIENTATION: ORIENTATION: LEFT

## 2023-06-23 ASSESSMENT — PAIN DESCRIPTION - DESCRIPTORS: DESCRIPTORS: SHARP

## 2023-06-24 ENCOUNTER — APPOINTMENT (OUTPATIENT)
Dept: GENERAL RADIOLOGY | Age: 88
DRG: 291 | End: 2023-06-24
Payer: MEDICARE

## 2023-06-24 LAB
ALBUMIN SERPL-MCNC: 3 G/DL (ref 3.4–5)
ALBUMIN SERPL-MCNC: 3.2 G/DL (ref 3.4–5)
ANION GAP SERPL CALCULATED.3IONS-SCNC: 17 MMOL/L (ref 3–16)
ANION GAP SERPL CALCULATED.3IONS-SCNC: 8 MMOL/L (ref 3–16)
BASOPHILS # BLD: 0 K/UL (ref 0–0.2)
BASOPHILS NFR BLD: 0.6 %
BUN SERPL-MCNC: 46 MG/DL (ref 7–20)
BUN SERPL-MCNC: 47 MG/DL (ref 7–20)
CALCIUM SERPL-MCNC: 8.8 MG/DL (ref 8.3–10.6)
CALCIUM SERPL-MCNC: 8.8 MG/DL (ref 8.3–10.6)
CHLORIDE SERPL-SCNC: 104 MMOL/L (ref 99–110)
CHLORIDE SERPL-SCNC: 104 MMOL/L (ref 99–110)
CO2 SERPL-SCNC: 24 MMOL/L (ref 21–32)
CO2 SERPL-SCNC: 32 MMOL/L (ref 21–32)
CREAT SERPL-MCNC: 2.3 MG/DL (ref 0.8–1.3)
CREAT SERPL-MCNC: 2.4 MG/DL (ref 0.8–1.3)
DEPRECATED RDW RBC AUTO: 15.1 % (ref 12.4–15.4)
EOSINOPHIL # BLD: 0.2 K/UL (ref 0–0.6)
EOSINOPHIL NFR BLD: 2.5 %
GFR SERPLBLD CREATININE-BSD FMLA CKD-EPI: 25 ML/MIN/{1.73_M2}
GFR SERPLBLD CREATININE-BSD FMLA CKD-EPI: 26 ML/MIN/{1.73_M2}
GLUCOSE SERPL-MCNC: 104 MG/DL (ref 70–99)
GLUCOSE SERPL-MCNC: 182 MG/DL (ref 70–99)
HCT VFR BLD AUTO: 29.2 % (ref 40.5–52.5)
HGB BLD-MCNC: 9.5 G/DL (ref 13.5–17.5)
LYMPHOCYTES # BLD: 1.1 K/UL (ref 1–5.1)
LYMPHOCYTES NFR BLD: 17 %
MAGNESIUM SERPL-MCNC: 2.6 MG/DL (ref 1.8–2.4)
MAGNESIUM SERPL-MCNC: 2.6 MG/DL (ref 1.8–2.4)
MCH RBC QN AUTO: 32 PG (ref 26–34)
MCHC RBC AUTO-ENTMCNC: 32.5 G/DL (ref 31–36)
MCV RBC AUTO: 98.6 FL (ref 80–100)
MONOCYTES # BLD: 0.6 K/UL (ref 0–1.3)
MONOCYTES NFR BLD: 8.9 %
NEUTROPHILS # BLD: 4.6 K/UL (ref 1.7–7.7)
NEUTROPHILS NFR BLD: 71 %
PHOSPHATE SERPL-MCNC: 3.4 MG/DL (ref 2.5–4.9)
PHOSPHATE SERPL-MCNC: 4.1 MG/DL (ref 2.5–4.9)
PLATELET # BLD AUTO: 149 K/UL (ref 135–450)
PMV BLD AUTO: 7.6 FL (ref 5–10.5)
POTASSIUM SERPL-SCNC: 3.5 MMOL/L (ref 3.5–5.1)
POTASSIUM SERPL-SCNC: 4 MMOL/L (ref 3.5–5.1)
RBC # BLD AUTO: 2.96 M/UL (ref 4.2–5.9)
SODIUM SERPL-SCNC: 143 MMOL/L (ref 136–145)
SODIUM SERPL-SCNC: 144 MMOL/L (ref 136–145)
SODIUM SERPL-SCNC: 145 MMOL/L (ref 136–145)
WBC # BLD AUTO: 6.5 K/UL (ref 4–11)

## 2023-06-24 PROCEDURE — 94761 N-INVAS EAR/PLS OXIMETRY MLT: CPT

## 2023-06-24 PROCEDURE — 6360000002 HC RX W HCPCS

## 2023-06-24 PROCEDURE — 6370000000 HC RX 637 (ALT 250 FOR IP)

## 2023-06-24 PROCEDURE — 2060000000 HC ICU INTERMEDIATE R&B

## 2023-06-24 PROCEDURE — 99233 SBSQ HOSP IP/OBS HIGH 50: CPT | Performed by: NURSE PRACTITIONER

## 2023-06-24 PROCEDURE — 6360000002 HC RX W HCPCS: Performed by: INTERNAL MEDICINE

## 2023-06-24 PROCEDURE — 6370000000 HC RX 637 (ALT 250 FOR IP): Performed by: INTERNAL MEDICINE

## 2023-06-24 PROCEDURE — 94640 AIRWAY INHALATION TREATMENT: CPT

## 2023-06-24 PROCEDURE — 2580000003 HC RX 258: Performed by: INTERNAL MEDICINE

## 2023-06-24 PROCEDURE — 84295 ASSAY OF SERUM SODIUM: CPT

## 2023-06-24 PROCEDURE — 6360000002 HC RX W HCPCS: Performed by: ANESTHESIOLOGY

## 2023-06-24 PROCEDURE — 36415 COLL VENOUS BLD VENIPUNCTURE: CPT

## 2023-06-24 PROCEDURE — 71045 X-RAY EXAM CHEST 1 VIEW: CPT

## 2023-06-24 PROCEDURE — 85025 COMPLETE CBC W/AUTO DIFF WBC: CPT

## 2023-06-24 PROCEDURE — 94150 VITAL CAPACITY TEST: CPT

## 2023-06-24 PROCEDURE — 2580000003 HC RX 258

## 2023-06-24 PROCEDURE — 83735 ASSAY OF MAGNESIUM: CPT

## 2023-06-24 PROCEDURE — C9113 INJ PANTOPRAZOLE SODIUM, VIA: HCPCS

## 2023-06-24 PROCEDURE — 80069 RENAL FUNCTION PANEL: CPT

## 2023-06-24 PROCEDURE — 2700000000 HC OXYGEN THERAPY PER DAY

## 2023-06-24 RX ORDER — MILRINONE LACTATE 0.2 MG/ML
0.12 INJECTION, SOLUTION INTRAVENOUS CONTINUOUS
Status: DISCONTINUED | OUTPATIENT
Start: 2023-06-24 | End: 2023-06-24

## 2023-06-24 RX ADMIN — LEVOTHYROXINE SODIUM 25 MCG: 0.03 TABLET ORAL at 06:43

## 2023-06-24 RX ADMIN — HEPARIN SODIUM 5000 UNITS: 5000 INJECTION INTRAVENOUS; SUBCUTANEOUS at 06:41

## 2023-06-24 RX ADMIN — LACTULOSE 10 G: 20 SOLUTION ORAL at 08:12

## 2023-06-24 RX ADMIN — HEPARIN SODIUM 5000 UNITS: 5000 INJECTION INTRAVENOUS; SUBCUTANEOUS at 21:00

## 2023-06-24 RX ADMIN — HEPARIN SODIUM 5000 UNITS: 5000 INJECTION INTRAVENOUS; SUBCUTANEOUS at 13:24

## 2023-06-24 RX ADMIN — ALBUTEROL SULFATE 2.5 MG: 2.5 SOLUTION RESPIRATORY (INHALATION) at 10:37

## 2023-06-24 RX ADMIN — ASPIRIN 325 MG: 325 TABLET, COATED ORAL at 08:12

## 2023-06-24 RX ADMIN — FUROSEMIDE 10 MG/HR: 10 INJECTION, SOLUTION INTRAMUSCULAR; INTRAVENOUS at 13:25

## 2023-06-24 RX ADMIN — MIRTAZAPINE 15 MG: 15 TABLET, FILM COATED ORAL at 21:00

## 2023-06-24 RX ADMIN — SODIUM CHLORIDE, PRESERVATIVE FREE 10 ML: 5 INJECTION INTRAVENOUS at 08:14

## 2023-06-24 RX ADMIN — METOPROLOL SUCCINATE 25 MG: 25 TABLET, EXTENDED RELEASE ORAL at 08:12

## 2023-06-24 RX ADMIN — FUROSEMIDE 10 MG/HR: 10 INJECTION, SOLUTION INTRAMUSCULAR; INTRAVENOUS at 21:15

## 2023-06-24 RX ADMIN — CAPSAICIN: 0.25 CREAM TOPICAL at 08:13

## 2023-06-24 RX ADMIN — LORAZEPAM 0.5 MG: 0.5 TABLET ORAL at 20:59

## 2023-06-24 RX ADMIN — KETOTIFEN FUMARATE 1 DROP: 0.25 SOLUTION/ DROPS OPHTHALMIC at 08:12

## 2023-06-24 RX ADMIN — POTASSIUM BICARBONATE 20 MEQ: 782 TABLET, EFFERVESCENT ORAL at 15:39

## 2023-06-24 RX ADMIN — TAMSULOSIN HYDROCHLORIDE 0.4 MG: 0.4 CAPSULE ORAL at 08:12

## 2023-06-24 RX ADMIN — PANTOPRAZOLE SODIUM 40 MG: 40 INJECTION, POWDER, FOR SOLUTION INTRAVENOUS at 08:13

## 2023-06-24 RX ADMIN — CAPSAICIN: 0.25 CREAM TOPICAL at 15:35

## 2023-06-24 RX ADMIN — FUROSEMIDE 40 MG: 10 INJECTION, SOLUTION INTRAMUSCULAR; INTRAVENOUS at 08:13

## 2023-06-24 RX ADMIN — LORAZEPAM 0.5 MG: 0.5 TABLET ORAL at 08:18

## 2023-06-24 RX ADMIN — KETOTIFEN FUMARATE 1 DROP: 0.25 SOLUTION/ DROPS OPHTHALMIC at 21:00

## 2023-06-24 RX ADMIN — POTASSIUM BICARBONATE 20 MEQ: 782 TABLET, EFFERVESCENT ORAL at 08:13

## 2023-06-24 RX ADMIN — LACTULOSE 10 G: 20 SOLUTION ORAL at 13:24

## 2023-06-24 RX ADMIN — ALBUTEROL SULFATE 2.5 MG: 2.5 SOLUTION RESPIRATORY (INHALATION) at 17:46

## 2023-06-24 RX ADMIN — ATORVASTATIN CALCIUM 80 MG: 80 TABLET, FILM COATED ORAL at 21:00

## 2023-06-24 ASSESSMENT — PAIN SCALES - GENERAL: PAINLEVEL_OUTOF10: 0

## 2023-06-25 LAB
ALBUMIN SERPL-MCNC: 3.1 G/DL (ref 3.4–5)
ALBUMIN SERPL-MCNC: 3.3 G/DL (ref 3.4–5)
ANION GAP SERPL CALCULATED.3IONS-SCNC: 7 MMOL/L (ref 3–16)
ANION GAP SERPL CALCULATED.3IONS-SCNC: 9 MMOL/L (ref 3–16)
BASOPHILS # BLD: 0 K/UL (ref 0–0.2)
BASOPHILS NFR BLD: 0.5 %
BUN SERPL-MCNC: 45 MG/DL (ref 7–20)
BUN SERPL-MCNC: 46 MG/DL (ref 7–20)
CALCIUM SERPL-MCNC: 8.7 MG/DL (ref 8.3–10.6)
CALCIUM SERPL-MCNC: 8.7 MG/DL (ref 8.3–10.6)
CHLORIDE SERPL-SCNC: 102 MMOL/L (ref 99–110)
CHLORIDE SERPL-SCNC: 106 MMOL/L (ref 99–110)
CO2 SERPL-SCNC: 33 MMOL/L (ref 21–32)
CO2 SERPL-SCNC: 34 MMOL/L (ref 21–32)
CREAT SERPL-MCNC: 2.3 MG/DL (ref 0.8–1.3)
CREAT SERPL-MCNC: 2.4 MG/DL (ref 0.8–1.3)
DEPRECATED RDW RBC AUTO: 15.6 % (ref 12.4–15.4)
EOSINOPHIL # BLD: 0.2 K/UL (ref 0–0.6)
EOSINOPHIL NFR BLD: 3.3 %
GFR SERPLBLD CREATININE-BSD FMLA CKD-EPI: 25 ML/MIN/{1.73_M2}
GFR SERPLBLD CREATININE-BSD FMLA CKD-EPI: 26 ML/MIN/{1.73_M2}
GLUCOSE SERPL-MCNC: 105 MG/DL (ref 70–99)
GLUCOSE SERPL-MCNC: 111 MG/DL (ref 70–99)
HCT VFR BLD AUTO: 27.6 % (ref 40.5–52.5)
HGB BLD-MCNC: 9.2 G/DL (ref 13.5–17.5)
LYMPHOCYTES # BLD: 1.2 K/UL (ref 1–5.1)
LYMPHOCYTES NFR BLD: 17.8 %
MAGNESIUM SERPL-MCNC: 2.5 MG/DL (ref 1.8–2.4)
MAGNESIUM SERPL-MCNC: 2.5 MG/DL (ref 1.8–2.4)
MCH RBC QN AUTO: 32.1 PG (ref 26–34)
MCHC RBC AUTO-ENTMCNC: 33.2 G/DL (ref 31–36)
MCV RBC AUTO: 96.6 FL (ref 80–100)
MONOCYTES # BLD: 0.7 K/UL (ref 0–1.3)
MONOCYTES NFR BLD: 9.9 %
NEUTROPHILS # BLD: 4.7 K/UL (ref 1.7–7.7)
NEUTROPHILS NFR BLD: 68.5 %
PHOSPHATE SERPL-MCNC: 3.6 MG/DL (ref 2.5–4.9)
PHOSPHATE SERPL-MCNC: 3.7 MG/DL (ref 2.5–4.9)
PLATELET # BLD AUTO: 152 K/UL (ref 135–450)
PMV BLD AUTO: 7.7 FL (ref 5–10.5)
POTASSIUM SERPL-SCNC: 4 MMOL/L (ref 3.5–5.1)
POTASSIUM SERPL-SCNC: 4.4 MMOL/L (ref 3.5–5.1)
RBC # BLD AUTO: 2.86 M/UL (ref 4.2–5.9)
SODIUM SERPL-SCNC: 143 MMOL/L (ref 136–145)
SODIUM SERPL-SCNC: 148 MMOL/L (ref 136–145)
WBC # BLD AUTO: 6.8 K/UL (ref 4–11)

## 2023-06-25 PROCEDURE — C9113 INJ PANTOPRAZOLE SODIUM, VIA: HCPCS

## 2023-06-25 PROCEDURE — 6370000000 HC RX 637 (ALT 250 FOR IP): Performed by: INTERNAL MEDICINE

## 2023-06-25 PROCEDURE — 83735 ASSAY OF MAGNESIUM: CPT

## 2023-06-25 PROCEDURE — 6360000002 HC RX W HCPCS

## 2023-06-25 PROCEDURE — 80069 RENAL FUNCTION PANEL: CPT

## 2023-06-25 PROCEDURE — 36415 COLL VENOUS BLD VENIPUNCTURE: CPT

## 2023-06-25 PROCEDURE — 2060000000 HC ICU INTERMEDIATE R&B

## 2023-06-25 PROCEDURE — 6370000000 HC RX 637 (ALT 250 FOR IP)

## 2023-06-25 PROCEDURE — 94150 VITAL CAPACITY TEST: CPT

## 2023-06-25 PROCEDURE — 99232 SBSQ HOSP IP/OBS MODERATE 35: CPT | Performed by: NURSE PRACTITIONER

## 2023-06-25 PROCEDURE — 2580000003 HC RX 258

## 2023-06-25 PROCEDURE — 2700000000 HC OXYGEN THERAPY PER DAY

## 2023-06-25 PROCEDURE — 6360000002 HC RX W HCPCS: Performed by: INTERNAL MEDICINE

## 2023-06-25 PROCEDURE — 94761 N-INVAS EAR/PLS OXIMETRY MLT: CPT

## 2023-06-25 PROCEDURE — 85025 COMPLETE CBC W/AUTO DIFF WBC: CPT

## 2023-06-25 PROCEDURE — 2580000003 HC RX 258: Performed by: INTERNAL MEDICINE

## 2023-06-25 RX ADMIN — HEPARIN SODIUM 5000 UNITS: 5000 INJECTION INTRAVENOUS; SUBCUTANEOUS at 13:23

## 2023-06-25 RX ADMIN — CAPSAICIN: 0.25 CREAM TOPICAL at 08:06

## 2023-06-25 RX ADMIN — KETOTIFEN FUMARATE 1 DROP: 0.25 SOLUTION/ DROPS OPHTHALMIC at 20:32

## 2023-06-25 RX ADMIN — CAPSAICIN: 0.25 CREAM TOPICAL at 14:22

## 2023-06-25 RX ADMIN — SODIUM CHLORIDE, PRESERVATIVE FREE 10 ML: 5 INJECTION INTRAVENOUS at 20:45

## 2023-06-25 RX ADMIN — LACTULOSE 10 G: 20 SOLUTION ORAL at 17:44

## 2023-06-25 RX ADMIN — ASPIRIN 325 MG: 325 TABLET, COATED ORAL at 08:05

## 2023-06-25 RX ADMIN — LEVOTHYROXINE SODIUM 25 MCG: 0.03 TABLET ORAL at 07:09

## 2023-06-25 RX ADMIN — HEPARIN SODIUM 5000 UNITS: 5000 INJECTION INTRAVENOUS; SUBCUTANEOUS at 06:58

## 2023-06-25 RX ADMIN — LACTULOSE 10 G: 20 SOLUTION ORAL at 20:31

## 2023-06-25 RX ADMIN — FUROSEMIDE 10 MG/HR: 10 INJECTION, SOLUTION INTRAMUSCULAR; INTRAVENOUS at 17:42

## 2023-06-25 RX ADMIN — LORAZEPAM 0.5 MG: 0.5 TABLET ORAL at 20:31

## 2023-06-25 RX ADMIN — METOPROLOL SUCCINATE 25 MG: 25 TABLET, EXTENDED RELEASE ORAL at 08:05

## 2023-06-25 RX ADMIN — PANTOPRAZOLE SODIUM 40 MG: 40 INJECTION, POWDER, FOR SOLUTION INTRAVENOUS at 08:05

## 2023-06-25 RX ADMIN — KETOTIFEN FUMARATE 1 DROP: 0.25 SOLUTION/ DROPS OPHTHALMIC at 08:06

## 2023-06-25 RX ADMIN — MIRTAZAPINE 15 MG: 15 TABLET, FILM COATED ORAL at 20:31

## 2023-06-25 RX ADMIN — FUROSEMIDE 10 MG/HR: 10 INJECTION, SOLUTION INTRAMUSCULAR; INTRAVENOUS at 04:30

## 2023-06-25 RX ADMIN — TAMSULOSIN HYDROCHLORIDE 0.4 MG: 0.4 CAPSULE ORAL at 08:05

## 2023-06-25 RX ADMIN — ATORVASTATIN CALCIUM 80 MG: 80 TABLET, FILM COATED ORAL at 20:31

## 2023-06-25 RX ADMIN — CAPSAICIN: 0.25 CREAM TOPICAL at 20:32

## 2023-06-25 RX ADMIN — HEPARIN SODIUM 5000 UNITS: 5000 INJECTION INTRAVENOUS; SUBCUTANEOUS at 21:22

## 2023-06-25 RX ADMIN — DICLOFENAC SODIUM 2 G: 10 GEL TOPICAL at 07:09

## 2023-06-25 RX ADMIN — LORAZEPAM 0.5 MG: 0.5 TABLET ORAL at 08:14

## 2023-06-26 ENCOUNTER — TELEPHONE (OUTPATIENT)
Dept: CARDIOLOGY CLINIC | Age: 88
End: 2023-06-26

## 2023-06-26 LAB
ALBUMIN SERPL-MCNC: 3.1 G/DL (ref 3.4–5)
ALBUMIN SERPL-MCNC: 3.2 G/DL (ref 3.4–5)
ANION GAP SERPL CALCULATED.3IONS-SCNC: 8 MMOL/L (ref 3–16)
ANION GAP SERPL CALCULATED.3IONS-SCNC: 9 MMOL/L (ref 3–16)
BASOPHILS # BLD: 0.1 K/UL (ref 0–0.2)
BASOPHILS NFR BLD: 1.1 %
BUN SERPL-MCNC: 46 MG/DL (ref 7–20)
BUN SERPL-MCNC: 46 MG/DL (ref 7–20)
CALCIUM SERPL-MCNC: 8.4 MG/DL (ref 8.3–10.6)
CALCIUM SERPL-MCNC: 8.8 MG/DL (ref 8.3–10.6)
CHLORIDE SERPL-SCNC: 101 MMOL/L (ref 99–110)
CHLORIDE SERPL-SCNC: 102 MMOL/L (ref 99–110)
CO2 SERPL-SCNC: 31 MMOL/L (ref 21–32)
CO2 SERPL-SCNC: 33 MMOL/L (ref 21–32)
CREAT SERPL-MCNC: 2.3 MG/DL (ref 0.8–1.3)
CREAT SERPL-MCNC: 2.3 MG/DL (ref 0.8–1.3)
DEPRECATED RDW RBC AUTO: 15.5 % (ref 12.4–15.4)
EOSINOPHIL # BLD: 0.3 K/UL (ref 0–0.6)
EOSINOPHIL NFR BLD: 5.6 %
GFR SERPLBLD CREATININE-BSD FMLA CKD-EPI: 26 ML/MIN/{1.73_M2}
GFR SERPLBLD CREATININE-BSD FMLA CKD-EPI: 26 ML/MIN/{1.73_M2}
GLUCOSE SERPL-MCNC: 104 MG/DL (ref 70–99)
GLUCOSE SERPL-MCNC: 127 MG/DL (ref 70–99)
HCT VFR BLD AUTO: 27.8 % (ref 40.5–52.5)
HGB BLD-MCNC: 9.1 G/DL (ref 13.5–17.5)
LYMPHOCYTES # BLD: 1.2 K/UL (ref 1–5.1)
LYMPHOCYTES NFR BLD: 21.2 %
MAGNESIUM SERPL-MCNC: 2.3 MG/DL (ref 1.8–2.4)
MAGNESIUM SERPL-MCNC: 2.4 MG/DL (ref 1.8–2.4)
MCH RBC QN AUTO: 31.3 PG (ref 26–34)
MCHC RBC AUTO-ENTMCNC: 32.8 G/DL (ref 31–36)
MCV RBC AUTO: 95.5 FL (ref 80–100)
MONOCYTES # BLD: 0.7 K/UL (ref 0–1.3)
MONOCYTES NFR BLD: 11.4 %
NEUTROPHILS # BLD: 3.6 K/UL (ref 1.7–7.7)
NEUTROPHILS NFR BLD: 60.7 %
PHOSPHATE SERPL-MCNC: 3.7 MG/DL (ref 2.5–4.9)
PHOSPHATE SERPL-MCNC: 4 MG/DL (ref 2.5–4.9)
PLATELET # BLD AUTO: 156 K/UL (ref 135–450)
PMV BLD AUTO: 8 FL (ref 5–10.5)
POTASSIUM SERPL-SCNC: 3.6 MMOL/L (ref 3.5–5.1)
POTASSIUM SERPL-SCNC: 4 MMOL/L (ref 3.5–5.1)
RBC # BLD AUTO: 2.91 M/UL (ref 4.2–5.9)
SODIUM SERPL-SCNC: 141 MMOL/L (ref 136–145)
SODIUM SERPL-SCNC: 143 MMOL/L (ref 136–145)
WBC # BLD AUTO: 5.9 K/UL (ref 4–11)

## 2023-06-26 PROCEDURE — 6360000002 HC RX W HCPCS: Performed by: INTERNAL MEDICINE

## 2023-06-26 PROCEDURE — 6370000000 HC RX 637 (ALT 250 FOR IP): Performed by: INTERNAL MEDICINE

## 2023-06-26 PROCEDURE — 99291 CRITICAL CARE FIRST HOUR: CPT | Performed by: INTERNAL MEDICINE

## 2023-06-26 PROCEDURE — 97530 THERAPEUTIC ACTIVITIES: CPT

## 2023-06-26 PROCEDURE — 97116 GAIT TRAINING THERAPY: CPT

## 2023-06-26 PROCEDURE — C9113 INJ PANTOPRAZOLE SODIUM, VIA: HCPCS

## 2023-06-26 PROCEDURE — 2060000000 HC ICU INTERMEDIATE R&B

## 2023-06-26 PROCEDURE — 2580000003 HC RX 258: Performed by: INTERNAL MEDICINE

## 2023-06-26 PROCEDURE — 94150 VITAL CAPACITY TEST: CPT

## 2023-06-26 PROCEDURE — 6360000002 HC RX W HCPCS

## 2023-06-26 PROCEDURE — 36415 COLL VENOUS BLD VENIPUNCTURE: CPT

## 2023-06-26 PROCEDURE — 80069 RENAL FUNCTION PANEL: CPT

## 2023-06-26 PROCEDURE — 85025 COMPLETE CBC W/AUTO DIFF WBC: CPT

## 2023-06-26 PROCEDURE — 6370000000 HC RX 637 (ALT 250 FOR IP)

## 2023-06-26 PROCEDURE — 92526 ORAL FUNCTION THERAPY: CPT

## 2023-06-26 PROCEDURE — 83735 ASSAY OF MAGNESIUM: CPT

## 2023-06-26 RX ORDER — POTASSIUM CHLORIDE 20 MEQ/1
20 TABLET, EXTENDED RELEASE ORAL ONCE
Status: DISCONTINUED | OUTPATIENT
Start: 2023-06-26 | End: 2023-06-27

## 2023-06-26 RX ORDER — SPIRONOLACTONE 25 MG/1
25 TABLET ORAL DAILY
Status: DISCONTINUED | OUTPATIENT
Start: 2023-06-26 | End: 2023-06-30

## 2023-06-26 RX ORDER — AMIODARONE HYDROCHLORIDE 200 MG/1
200 TABLET ORAL 2 TIMES DAILY
Status: DISCONTINUED | OUTPATIENT
Start: 2023-06-26 | End: 2023-06-28

## 2023-06-26 RX ADMIN — KETOTIFEN FUMARATE 1 DROP: 0.25 SOLUTION/ DROPS OPHTHALMIC at 09:21

## 2023-06-26 RX ADMIN — HEPARIN SODIUM 5000 UNITS: 5000 INJECTION INTRAVENOUS; SUBCUTANEOUS at 20:28

## 2023-06-26 RX ADMIN — DICLOFENAC SODIUM 2 G: 10 GEL TOPICAL at 19:25

## 2023-06-26 RX ADMIN — LORAZEPAM 0.5 MG: 0.5 TABLET ORAL at 20:27

## 2023-06-26 RX ADMIN — LACTULOSE 10 G: 20 SOLUTION ORAL at 13:48

## 2023-06-26 RX ADMIN — ATORVASTATIN CALCIUM 80 MG: 80 TABLET, FILM COATED ORAL at 20:27

## 2023-06-26 RX ADMIN — LACTULOSE 10 G: 20 SOLUTION ORAL at 09:22

## 2023-06-26 RX ADMIN — LEVOTHYROXINE SODIUM 25 MCG: 0.03 TABLET ORAL at 06:32

## 2023-06-26 RX ADMIN — CHLOROTHIAZIDE SODIUM 250 MG: 500 INJECTION, POWDER, LYOPHILIZED, FOR SOLUTION INTRAVENOUS at 13:52

## 2023-06-26 RX ADMIN — PANTOPRAZOLE SODIUM 40 MG: 40 INJECTION, POWDER, FOR SOLUTION INTRAVENOUS at 09:21

## 2023-06-26 RX ADMIN — LORAZEPAM 0.5 MG: 0.5 TABLET ORAL at 06:37

## 2023-06-26 RX ADMIN — MIRTAZAPINE 15 MG: 15 TABLET, FILM COATED ORAL at 20:27

## 2023-06-26 RX ADMIN — FUROSEMIDE 15 MG/HR: 10 INJECTION, SOLUTION INTRAMUSCULAR; INTRAVENOUS at 20:24

## 2023-06-26 RX ADMIN — ASPIRIN 325 MG: 325 TABLET, COATED ORAL at 09:21

## 2023-06-26 RX ADMIN — METOPROLOL SUCCINATE 25 MG: 25 TABLET, EXTENDED RELEASE ORAL at 09:21

## 2023-06-26 RX ADMIN — LACTULOSE 10 G: 20 SOLUTION ORAL at 20:28

## 2023-06-26 RX ADMIN — KETOTIFEN FUMARATE 1 DROP: 0.25 SOLUTION/ DROPS OPHTHALMIC at 20:28

## 2023-06-26 RX ADMIN — HEPARIN SODIUM 5000 UNITS: 5000 INJECTION INTRAVENOUS; SUBCUTANEOUS at 13:48

## 2023-06-26 RX ADMIN — TAMSULOSIN HYDROCHLORIDE 0.4 MG: 0.4 CAPSULE ORAL at 09:21

## 2023-06-26 RX ADMIN — FUROSEMIDE 10 MG/HR: 10 INJECTION, SOLUTION INTRAMUSCULAR; INTRAVENOUS at 03:31

## 2023-06-26 RX ADMIN — CAPSAICIN: 0.25 CREAM TOPICAL at 09:22

## 2023-06-26 RX ADMIN — HEPARIN SODIUM 5000 UNITS: 5000 INJECTION INTRAVENOUS; SUBCUTANEOUS at 06:32

## 2023-06-26 RX ADMIN — SPIRONOLACTONE 25 MG: 25 TABLET, FILM COATED ORAL at 13:48

## 2023-06-27 LAB
ALBUMIN SERPL-MCNC: 3 G/DL (ref 3.4–5)
ALBUMIN SERPL-MCNC: 3.3 G/DL (ref 3.4–5)
ANION GAP SERPL CALCULATED.3IONS-SCNC: 10 MMOL/L (ref 3–16)
ANION GAP SERPL CALCULATED.3IONS-SCNC: 10 MMOL/L (ref 3–16)
BASOPHILS # BLD: 0.1 K/UL (ref 0–0.2)
BASOPHILS NFR BLD: 1.1 %
BUN SERPL-MCNC: 52 MG/DL (ref 7–20)
BUN SERPL-MCNC: 52 MG/DL (ref 7–20)
CALCIUM SERPL-MCNC: 8.7 MG/DL (ref 8.3–10.6)
CALCIUM SERPL-MCNC: 8.8 MG/DL (ref 8.3–10.6)
CHLORIDE SERPL-SCNC: 100 MMOL/L (ref 99–110)
CHLORIDE SERPL-SCNC: 97 MMOL/L (ref 99–110)
CO2 SERPL-SCNC: 32 MMOL/L (ref 21–32)
CO2 SERPL-SCNC: 32 MMOL/L (ref 21–32)
CREAT SERPL-MCNC: 2.6 MG/DL (ref 0.8–1.3)
CREAT SERPL-MCNC: 2.6 MG/DL (ref 0.8–1.3)
DEPRECATED RDW RBC AUTO: 15.3 % (ref 12.4–15.4)
EOSINOPHIL # BLD: 0.3 K/UL (ref 0–0.6)
EOSINOPHIL NFR BLD: 5 %
GFR SERPLBLD CREATININE-BSD FMLA CKD-EPI: 23 ML/MIN/{1.73_M2}
GFR SERPLBLD CREATININE-BSD FMLA CKD-EPI: 23 ML/MIN/{1.73_M2}
GLUCOSE SERPL-MCNC: 124 MG/DL (ref 70–99)
GLUCOSE SERPL-MCNC: 96 MG/DL (ref 70–99)
HCT VFR BLD AUTO: 28.3 % (ref 40.5–52.5)
HGB BLD-MCNC: 9.3 G/DL (ref 13.5–17.5)
LYMPHOCYTES # BLD: 1.2 K/UL (ref 1–5.1)
LYMPHOCYTES NFR BLD: 21.6 %
MAGNESIUM SERPL-MCNC: 2.3 MG/DL (ref 1.8–2.4)
MAGNESIUM SERPL-MCNC: 2.3 MG/DL (ref 1.8–2.4)
MCH RBC QN AUTO: 31.6 PG (ref 26–34)
MCHC RBC AUTO-ENTMCNC: 32.9 G/DL (ref 31–36)
MCV RBC AUTO: 96.2 FL (ref 80–100)
MONOCYTES # BLD: 0.6 K/UL (ref 0–1.3)
MONOCYTES NFR BLD: 11.6 %
NEUTROPHILS # BLD: 3.4 K/UL (ref 1.7–7.7)
NEUTROPHILS NFR BLD: 60.7 %
PHOSPHATE SERPL-MCNC: 4 MG/DL (ref 2.5–4.9)
PHOSPHATE SERPL-MCNC: 4.4 MG/DL (ref 2.5–4.9)
PLATELET # BLD AUTO: 156 K/UL (ref 135–450)
PMV BLD AUTO: 8.2 FL (ref 5–10.5)
POTASSIUM SERPL-SCNC: 3.5 MMOL/L (ref 3.5–5.1)
POTASSIUM SERPL-SCNC: 4.1 MMOL/L (ref 3.5–5.1)
RBC # BLD AUTO: 2.94 M/UL (ref 4.2–5.9)
SODIUM SERPL-SCNC: 139 MMOL/L (ref 136–145)
SODIUM SERPL-SCNC: 142 MMOL/L (ref 136–145)
WBC # BLD AUTO: 5.6 K/UL (ref 4–11)

## 2023-06-27 PROCEDURE — 2060000000 HC ICU INTERMEDIATE R&B

## 2023-06-27 PROCEDURE — 94150 VITAL CAPACITY TEST: CPT

## 2023-06-27 PROCEDURE — 80069 RENAL FUNCTION PANEL: CPT

## 2023-06-27 PROCEDURE — C9113 INJ PANTOPRAZOLE SODIUM, VIA: HCPCS

## 2023-06-27 PROCEDURE — 2580000003 HC RX 258: Performed by: INTERNAL MEDICINE

## 2023-06-27 PROCEDURE — 36415 COLL VENOUS BLD VENIPUNCTURE: CPT

## 2023-06-27 PROCEDURE — 97116 GAIT TRAINING THERAPY: CPT

## 2023-06-27 PROCEDURE — 6370000000 HC RX 637 (ALT 250 FOR IP): Performed by: INTERNAL MEDICINE

## 2023-06-27 PROCEDURE — 83735 ASSAY OF MAGNESIUM: CPT

## 2023-06-27 PROCEDURE — 6370000000 HC RX 637 (ALT 250 FOR IP)

## 2023-06-27 PROCEDURE — 99291 CRITICAL CARE FIRST HOUR: CPT | Performed by: INTERNAL MEDICINE

## 2023-06-27 PROCEDURE — 6360000002 HC RX W HCPCS

## 2023-06-27 PROCEDURE — 2580000003 HC RX 258

## 2023-06-27 PROCEDURE — 85025 COMPLETE CBC W/AUTO DIFF WBC: CPT

## 2023-06-27 PROCEDURE — 97530 THERAPEUTIC ACTIVITIES: CPT

## 2023-06-27 PROCEDURE — 6360000002 HC RX W HCPCS: Performed by: INTERNAL MEDICINE

## 2023-06-27 RX ORDER — PANTOPRAZOLE SODIUM 40 MG/1
40 TABLET, DELAYED RELEASE ORAL
Status: DISCONTINUED | OUTPATIENT
Start: 2023-06-28 | End: 2023-06-30 | Stop reason: HOSPADM

## 2023-06-27 RX ADMIN — LORAZEPAM 0.5 MG: 0.5 TABLET ORAL at 08:56

## 2023-06-27 RX ADMIN — LEVOTHYROXINE SODIUM 25 MCG: 0.03 TABLET ORAL at 08:11

## 2023-06-27 RX ADMIN — KETOTIFEN FUMARATE 1 DROP: 0.25 SOLUTION/ DROPS OPHTHALMIC at 10:02

## 2023-06-27 RX ADMIN — LACTULOSE 10 G: 20 SOLUTION ORAL at 14:09

## 2023-06-27 RX ADMIN — DICLOFENAC SODIUM 2 G: 10 GEL TOPICAL at 20:54

## 2023-06-27 RX ADMIN — METOPROLOL SUCCINATE 25 MG: 25 TABLET, EXTENDED RELEASE ORAL at 10:00

## 2023-06-27 RX ADMIN — FUROSEMIDE 15 MG/HR: 10 INJECTION, SOLUTION INTRAMUSCULAR; INTRAVENOUS at 23:02

## 2023-06-27 RX ADMIN — HEPARIN SODIUM 5000 UNITS: 5000 INJECTION INTRAVENOUS; SUBCUTANEOUS at 06:01

## 2023-06-27 RX ADMIN — HEPARIN SODIUM 5000 UNITS: 5000 INJECTION INTRAVENOUS; SUBCUTANEOUS at 14:09

## 2023-06-27 RX ADMIN — PANTOPRAZOLE SODIUM 40 MG: 40 INJECTION, POWDER, FOR SOLUTION INTRAVENOUS at 10:01

## 2023-06-27 RX ADMIN — AMIODARONE HYDROCHLORIDE 200 MG: 200 TABLET ORAL at 00:05

## 2023-06-27 RX ADMIN — FUROSEMIDE 15 MG/HR: 10 INJECTION, SOLUTION INTRAMUSCULAR; INTRAVENOUS at 15:00

## 2023-06-27 RX ADMIN — MIRTAZAPINE 15 MG: 15 TABLET, FILM COATED ORAL at 21:01

## 2023-06-27 RX ADMIN — KETOTIFEN FUMARATE 1 DROP: 0.25 SOLUTION/ DROPS OPHTHALMIC at 21:07

## 2023-06-27 RX ADMIN — LACTULOSE 10 G: 20 SOLUTION ORAL at 21:04

## 2023-06-27 RX ADMIN — SPIRONOLACTONE 25 MG: 25 TABLET, FILM COATED ORAL at 10:00

## 2023-06-27 RX ADMIN — ATORVASTATIN CALCIUM 80 MG: 80 TABLET, FILM COATED ORAL at 20:59

## 2023-06-27 RX ADMIN — HEPARIN SODIUM 5000 UNITS: 5000 INJECTION INTRAVENOUS; SUBCUTANEOUS at 21:05

## 2023-06-27 RX ADMIN — LACTULOSE 10 G: 20 SOLUTION ORAL at 10:01

## 2023-06-27 RX ADMIN — LORAZEPAM 0.5 MG: 0.5 TABLET ORAL at 21:01

## 2023-06-27 RX ADMIN — FUROSEMIDE 15 MG/HR: 10 INJECTION, SOLUTION INTRAMUSCULAR; INTRAVENOUS at 09:01

## 2023-06-27 RX ADMIN — ACETAMINOPHEN 650 MG: 325 TABLET ORAL at 08:55

## 2023-06-27 RX ADMIN — SODIUM CHLORIDE, PRESERVATIVE FREE 10 ML: 5 INJECTION INTRAVENOUS at 10:02

## 2023-06-27 RX ADMIN — DICLOFENAC SODIUM 2 G: 10 GEL TOPICAL at 12:00

## 2023-06-27 RX ADMIN — TAMSULOSIN HYDROCHLORIDE 0.4 MG: 0.4 CAPSULE ORAL at 10:01

## 2023-06-27 RX ADMIN — POTASSIUM BICARBONATE 40 MEQ: 782 TABLET, EFFERVESCENT ORAL at 10:00

## 2023-06-27 RX ADMIN — ASPIRIN 325 MG: 325 TABLET, COATED ORAL at 10:01

## 2023-06-27 RX ADMIN — AMIODARONE HYDROCHLORIDE 200 MG: 200 TABLET ORAL at 10:01

## 2023-06-27 RX ADMIN — AMIODARONE HYDROCHLORIDE 200 MG: 200 TABLET ORAL at 21:00

## 2023-06-27 ASSESSMENT — PAIN DESCRIPTION - DESCRIPTORS: DESCRIPTORS: SHARP

## 2023-06-27 ASSESSMENT — PAIN DESCRIPTION - ONSET: ONSET: ON-GOING

## 2023-06-27 ASSESSMENT — PAIN DESCRIPTION - FREQUENCY: FREQUENCY: CONTINUOUS

## 2023-06-27 ASSESSMENT — PAIN SCALES - GENERAL
PAINLEVEL_OUTOF10: 3
PAINLEVEL_OUTOF10: 0
PAINLEVEL_OUTOF10: 0
PAINLEVEL_OUTOF10: 5
PAINLEVEL_OUTOF10: 0

## 2023-06-27 ASSESSMENT — PAIN DESCRIPTION - LOCATION
LOCATION: ANKLE
LOCATION: ANKLE

## 2023-06-27 ASSESSMENT — PAIN - FUNCTIONAL ASSESSMENT: PAIN_FUNCTIONAL_ASSESSMENT: ACTIVITIES ARE NOT PREVENTED

## 2023-06-27 ASSESSMENT — PAIN DESCRIPTION - PAIN TYPE: TYPE: ACUTE PAIN

## 2023-06-27 ASSESSMENT — PAIN DESCRIPTION - ORIENTATION
ORIENTATION: LEFT
ORIENTATION: LEFT

## 2023-06-28 LAB
ALBUMIN SERPL-MCNC: 2.9 G/DL (ref 3.4–5)
ANION GAP SERPL CALCULATED.3IONS-SCNC: 13 MMOL/L (ref 3–16)
BASOPHILS # BLD: 0.1 K/UL (ref 0–0.2)
BASOPHILS NFR BLD: 1 %
BUN SERPL-MCNC: 50 MG/DL (ref 7–20)
CALCIUM SERPL-MCNC: 8.6 MG/DL (ref 8.3–10.6)
CHLORIDE SERPL-SCNC: 97 MMOL/L (ref 99–110)
CO2 SERPL-SCNC: 29 MMOL/L (ref 21–32)
CREAT SERPL-MCNC: 2.6 MG/DL (ref 0.8–1.3)
DEPRECATED RDW RBC AUTO: 15.2 % (ref 12.4–15.4)
EOSINOPHIL # BLD: 0.3 K/UL (ref 0–0.6)
EOSINOPHIL NFR BLD: 5.7 %
GFR SERPLBLD CREATININE-BSD FMLA CKD-EPI: 23 ML/MIN/{1.73_M2}
GLUCOSE SERPL-MCNC: 118 MG/DL (ref 70–99)
HCT VFR BLD AUTO: 27.4 % (ref 40.5–52.5)
HGB BLD-MCNC: 9.2 G/DL (ref 13.5–17.5)
LYMPHOCYTES # BLD: 1.1 K/UL (ref 1–5.1)
LYMPHOCYTES NFR BLD: 20.6 %
MAGNESIUM SERPL-MCNC: 2.3 MG/DL (ref 1.8–2.4)
MCH RBC QN AUTO: 31.8 PG (ref 26–34)
MCHC RBC AUTO-ENTMCNC: 33.6 G/DL (ref 31–36)
MCV RBC AUTO: 94.9 FL (ref 80–100)
MONOCYTES # BLD: 0.7 K/UL (ref 0–1.3)
MONOCYTES NFR BLD: 13.6 %
NEUTROPHILS # BLD: 3.1 K/UL (ref 1.7–7.7)
NEUTROPHILS NFR BLD: 59.1 %
PHOSPHATE SERPL-MCNC: 4.3 MG/DL (ref 2.5–4.9)
PLATELET # BLD AUTO: 155 K/UL (ref 135–450)
PMV BLD AUTO: 7.9 FL (ref 5–10.5)
POTASSIUM SERPL-SCNC: 3.6 MMOL/L (ref 3.5–5.1)
RBC # BLD AUTO: 2.89 M/UL (ref 4.2–5.9)
SODIUM SERPL-SCNC: 139 MMOL/L (ref 136–145)
WBC # BLD AUTO: 5.2 K/UL (ref 4–11)

## 2023-06-28 PROCEDURE — 83735 ASSAY OF MAGNESIUM: CPT

## 2023-06-28 PROCEDURE — 85025 COMPLETE CBC W/AUTO DIFF WBC: CPT

## 2023-06-28 PROCEDURE — 2060000000 HC ICU INTERMEDIATE R&B

## 2023-06-28 PROCEDURE — 80069 RENAL FUNCTION PANEL: CPT

## 2023-06-28 PROCEDURE — 6370000000 HC RX 637 (ALT 250 FOR IP): Performed by: INTERNAL MEDICINE

## 2023-06-28 PROCEDURE — 6370000000 HC RX 637 (ALT 250 FOR IP)

## 2023-06-28 PROCEDURE — 92526 ORAL FUNCTION THERAPY: CPT

## 2023-06-28 PROCEDURE — 36415 COLL VENOUS BLD VENIPUNCTURE: CPT

## 2023-06-28 PROCEDURE — 6360000002 HC RX W HCPCS

## 2023-06-28 PROCEDURE — 6360000002 HC RX W HCPCS: Performed by: INTERNAL MEDICINE

## 2023-06-28 PROCEDURE — 99291 CRITICAL CARE FIRST HOUR: CPT | Performed by: INTERNAL MEDICINE

## 2023-06-28 PROCEDURE — 2580000003 HC RX 258: Performed by: INTERNAL MEDICINE

## 2023-06-28 PROCEDURE — 2580000003 HC RX 258

## 2023-06-28 RX ORDER — METOPROLOL SUCCINATE 25 MG/1
12.5 TABLET, EXTENDED RELEASE ORAL DAILY
Status: DISCONTINUED | OUTPATIENT
Start: 2023-06-29 | End: 2023-06-30 | Stop reason: HOSPADM

## 2023-06-28 RX ORDER — AMIODARONE HYDROCHLORIDE 200 MG/1
200 TABLET ORAL 2 TIMES DAILY
Status: DISCONTINUED | OUTPATIENT
Start: 2023-06-28 | End: 2023-06-30 | Stop reason: HOSPADM

## 2023-06-28 RX ORDER — PREDNISONE 10 MG/1
10 TABLET ORAL DAILY
Status: DISCONTINUED | OUTPATIENT
Start: 2023-06-28 | End: 2023-06-29

## 2023-06-28 RX ORDER — AMIODARONE HYDROCHLORIDE 200 MG/1
200 TABLET ORAL DAILY
Status: DISCONTINUED | OUTPATIENT
Start: 2023-06-29 | End: 2023-06-28

## 2023-06-28 RX ADMIN — LACTULOSE 10 G: 20 SOLUTION ORAL at 09:18

## 2023-06-28 RX ADMIN — ACETAMINOPHEN 650 MG: 325 TABLET ORAL at 15:56

## 2023-06-28 RX ADMIN — ACETAMINOPHEN 650 MG: 325 TABLET ORAL at 09:15

## 2023-06-28 RX ADMIN — ATORVASTATIN CALCIUM 80 MG: 80 TABLET, FILM COATED ORAL at 20:29

## 2023-06-28 RX ADMIN — METOPROLOL SUCCINATE 25 MG: 25 TABLET, EXTENDED RELEASE ORAL at 09:18

## 2023-06-28 RX ADMIN — METHOCARBAMOL 250 MG: 500 TABLET ORAL at 15:56

## 2023-06-28 RX ADMIN — DICLOFENAC SODIUM 2 G: 10 GEL TOPICAL at 05:24

## 2023-06-28 RX ADMIN — PREDNISONE 10 MG: 10 TABLET ORAL at 11:09

## 2023-06-28 RX ADMIN — HEPARIN SODIUM 5000 UNITS: 5000 INJECTION INTRAVENOUS; SUBCUTANEOUS at 05:22

## 2023-06-28 RX ADMIN — HEPARIN SODIUM 5000 UNITS: 5000 INJECTION INTRAVENOUS; SUBCUTANEOUS at 14:05

## 2023-06-28 RX ADMIN — LACTULOSE 10 G: 20 SOLUTION ORAL at 14:05

## 2023-06-28 RX ADMIN — DICLOFENAC SODIUM 2 G: 10 GEL TOPICAL at 09:18

## 2023-06-28 RX ADMIN — SPIRONOLACTONE 25 MG: 25 TABLET, FILM COATED ORAL at 09:17

## 2023-06-28 RX ADMIN — LORAZEPAM 0.5 MG: 0.5 TABLET ORAL at 05:21

## 2023-06-28 RX ADMIN — DICLOFENAC SODIUM 2 G: 10 GEL TOPICAL at 01:00

## 2023-06-28 RX ADMIN — FUROSEMIDE 15 MG/HR: 10 INJECTION, SOLUTION INTRAMUSCULAR; INTRAVENOUS at 09:20

## 2023-06-28 RX ADMIN — FUROSEMIDE 15 MG/HR: 10 INJECTION, SOLUTION INTRAMUSCULAR; INTRAVENOUS at 14:58

## 2023-06-28 RX ADMIN — SODIUM CHLORIDE, PRESERVATIVE FREE 10 ML: 5 INJECTION INTRAVENOUS at 20:29

## 2023-06-28 RX ADMIN — DICLOFENAC SODIUM 2 G: 10 GEL TOPICAL at 20:39

## 2023-06-28 RX ADMIN — METHOCARBAMOL 250 MG: 500 TABLET ORAL at 09:15

## 2023-06-28 RX ADMIN — KETOTIFEN FUMARATE 1 DROP: 0.25 SOLUTION/ DROPS OPHTHALMIC at 09:18

## 2023-06-28 RX ADMIN — LEVOTHYROXINE SODIUM 25 MCG: 0.03 TABLET ORAL at 05:18

## 2023-06-28 RX ADMIN — AMIODARONE HYDROCHLORIDE 200 MG: 200 TABLET ORAL at 20:30

## 2023-06-28 RX ADMIN — MIRTAZAPINE 15 MG: 15 TABLET, FILM COATED ORAL at 20:30

## 2023-06-28 RX ADMIN — PANTOPRAZOLE SODIUM 40 MG: 40 TABLET, DELAYED RELEASE ORAL at 05:17

## 2023-06-28 RX ADMIN — AMIODARONE HYDROCHLORIDE 200 MG: 200 TABLET ORAL at 09:17

## 2023-06-28 RX ADMIN — TAMSULOSIN HYDROCHLORIDE 0.4 MG: 0.4 CAPSULE ORAL at 09:16

## 2023-06-28 RX ADMIN — POTASSIUM BICARBONATE 40 MEQ: 782 TABLET, EFFERVESCENT ORAL at 09:16

## 2023-06-28 RX ADMIN — KETOTIFEN FUMARATE 1 DROP: 0.25 SOLUTION/ DROPS OPHTHALMIC at 20:30

## 2023-06-28 RX ADMIN — FUROSEMIDE 15 MG/HR: 10 INJECTION, SOLUTION INTRAMUSCULAR; INTRAVENOUS at 22:01

## 2023-06-28 RX ADMIN — LORAZEPAM 0.5 MG: 0.5 TABLET ORAL at 20:38

## 2023-06-28 RX ADMIN — ASPIRIN 325 MG: 325 TABLET, COATED ORAL at 09:16

## 2023-06-28 RX ADMIN — LACTULOSE 10 G: 20 SOLUTION ORAL at 20:29

## 2023-06-28 RX ADMIN — HEPARIN SODIUM 5000 UNITS: 5000 INJECTION INTRAVENOUS; SUBCUTANEOUS at 20:29

## 2023-06-28 ASSESSMENT — PAIN SCALES - GENERAL
PAINLEVEL_OUTOF10: 0
PAINLEVEL_OUTOF10: 4
PAINLEVEL_OUTOF10: 0
PAINLEVEL_OUTOF10: 1
PAINLEVEL_OUTOF10: 4
PAINLEVEL_OUTOF10: 8
PAINLEVEL_OUTOF10: 8
PAINLEVEL_OUTOF10: 0
PAINLEVEL_OUTOF10: 6
PAINLEVEL_OUTOF10: 4

## 2023-06-28 ASSESSMENT — PAIN DESCRIPTION - DESCRIPTORS
DESCRIPTORS: ACHING
DESCRIPTORS: SHARP
DESCRIPTORS: SHARP
DESCRIPTORS: SHARP;SHOOTING
DESCRIPTORS: SHARP;SHOOTING

## 2023-06-28 ASSESSMENT — PAIN DESCRIPTION - LOCATION
LOCATION: ANKLE

## 2023-06-28 ASSESSMENT — PAIN - FUNCTIONAL ASSESSMENT
PAIN_FUNCTIONAL_ASSESSMENT: ACTIVITIES ARE NOT PREVENTED
PAIN_FUNCTIONAL_ASSESSMENT: ACTIVITIES ARE NOT PREVENTED
PAIN_FUNCTIONAL_ASSESSMENT: PREVENTS OR INTERFERES SOME ACTIVE ACTIVITIES AND ADLS

## 2023-06-28 ASSESSMENT — PAIN DESCRIPTION - ORIENTATION
ORIENTATION: LEFT

## 2023-06-28 ASSESSMENT — PAIN DESCRIPTION - ONSET
ONSET: ON-GOING

## 2023-06-28 ASSESSMENT — PAIN DESCRIPTION - FREQUENCY
FREQUENCY: CONTINUOUS

## 2023-06-28 ASSESSMENT — PAIN DESCRIPTION - PAIN TYPE
TYPE: ACUTE PAIN

## 2023-06-29 LAB
ALBUMIN SERPL-MCNC: 3.5 G/DL (ref 3.4–5)
ANION GAP SERPL CALCULATED.3IONS-SCNC: 11 MMOL/L (ref 3–16)
BASOPHILS # BLD: 0 K/UL (ref 0–0.2)
BASOPHILS NFR BLD: 0.4 %
BUN SERPL-MCNC: 57 MG/DL (ref 7–20)
CALCIUM SERPL-MCNC: 9.1 MG/DL (ref 8.3–10.6)
CHLORIDE SERPL-SCNC: 98 MMOL/L (ref 99–110)
CO2 SERPL-SCNC: 31 MMOL/L (ref 21–32)
CREAT SERPL-MCNC: 2.9 MG/DL (ref 0.8–1.3)
DEPRECATED RDW RBC AUTO: 15.8 % (ref 12.4–15.4)
EOSINOPHIL # BLD: 0 K/UL (ref 0–0.6)
EOSINOPHIL NFR BLD: 0.5 %
GFR SERPLBLD CREATININE-BSD FMLA CKD-EPI: 20 ML/MIN/{1.73_M2}
GLUCOSE SERPL-MCNC: 175 MG/DL (ref 70–99)
HCT VFR BLD AUTO: 30.7 % (ref 40.5–52.5)
HGB BLD-MCNC: 10 G/DL (ref 13.5–17.5)
LYMPHOCYTES # BLD: 0.6 K/UL (ref 1–5.1)
LYMPHOCYTES NFR BLD: 10.9 %
MAGNESIUM SERPL-MCNC: 2.4 MG/DL (ref 1.8–2.4)
MCH RBC QN AUTO: 31.3 PG (ref 26–34)
MCHC RBC AUTO-ENTMCNC: 32.7 G/DL (ref 31–36)
MCV RBC AUTO: 95.5 FL (ref 80–100)
MONOCYTES # BLD: 0.2 K/UL (ref 0–1.3)
MONOCYTES NFR BLD: 3.3 %
NEUTROPHILS # BLD: 4.7 K/UL (ref 1.7–7.7)
NEUTROPHILS NFR BLD: 84.9 %
PHOSPHATE SERPL-MCNC: 4.3 MG/DL (ref 2.5–4.9)
PLATELET # BLD AUTO: 181 K/UL (ref 135–450)
PMV BLD AUTO: 7.9 FL (ref 5–10.5)
POTASSIUM SERPL-SCNC: 5.4 MMOL/L (ref 3.5–5.1)
RBC # BLD AUTO: 3.21 M/UL (ref 4.2–5.9)
SODIUM SERPL-SCNC: 140 MMOL/L (ref 136–145)
WBC # BLD AUTO: 5.6 K/UL (ref 4–11)

## 2023-06-29 PROCEDURE — 83735 ASSAY OF MAGNESIUM: CPT

## 2023-06-29 PROCEDURE — 6370000000 HC RX 637 (ALT 250 FOR IP)

## 2023-06-29 PROCEDURE — 2060000000 HC ICU INTERMEDIATE R&B

## 2023-06-29 PROCEDURE — 93005 ELECTROCARDIOGRAM TRACING: CPT

## 2023-06-29 PROCEDURE — 2580000003 HC RX 258

## 2023-06-29 PROCEDURE — 80069 RENAL FUNCTION PANEL: CPT

## 2023-06-29 PROCEDURE — 6370000000 HC RX 637 (ALT 250 FOR IP): Performed by: INTERNAL MEDICINE

## 2023-06-29 PROCEDURE — 6360000002 HC RX W HCPCS: Performed by: INTERNAL MEDICINE

## 2023-06-29 PROCEDURE — 51702 INSERT TEMP BLADDER CATH: CPT

## 2023-06-29 PROCEDURE — 6360000002 HC RX W HCPCS

## 2023-06-29 PROCEDURE — 2580000003 HC RX 258: Performed by: INTERNAL MEDICINE

## 2023-06-29 PROCEDURE — 51798 US URINE CAPACITY MEASURE: CPT

## 2023-06-29 PROCEDURE — 36415 COLL VENOUS BLD VENIPUNCTURE: CPT

## 2023-06-29 PROCEDURE — 85025 COMPLETE CBC W/AUTO DIFF WBC: CPT

## 2023-06-29 PROCEDURE — 99233 SBSQ HOSP IP/OBS HIGH 50: CPT | Performed by: INTERNAL MEDICINE

## 2023-06-29 RX ORDER — TORSEMIDE 100 MG/1
100 TABLET ORAL DAILY
Status: DISCONTINUED | OUTPATIENT
Start: 2023-06-29 | End: 2023-06-30 | Stop reason: HOSPADM

## 2023-06-29 RX ORDER — POTASSIUM CHLORIDE 20 MEQ/1
40 TABLET, EXTENDED RELEASE ORAL ONCE
Status: COMPLETED | OUTPATIENT
Start: 2023-06-29 | End: 2023-06-29

## 2023-06-29 RX ORDER — PREDNISONE 10 MG/1
10 TABLET ORAL DAILY
Status: DISCONTINUED | OUTPATIENT
Start: 2023-06-30 | End: 2023-06-30 | Stop reason: HOSPADM

## 2023-06-29 RX ORDER — POTASSIUM CHLORIDE 20 MEQ/1
40 TABLET, EXTENDED RELEASE ORAL ONCE
Status: DISCONTINUED | OUTPATIENT
Start: 2023-06-29 | End: 2023-06-29

## 2023-06-29 RX ADMIN — DICLOFENAC SODIUM 2 G: 10 GEL TOPICAL at 22:30

## 2023-06-29 RX ADMIN — TAMSULOSIN HYDROCHLORIDE 0.4 MG: 0.4 CAPSULE ORAL at 09:27

## 2023-06-29 RX ADMIN — PANTOPRAZOLE SODIUM 40 MG: 40 TABLET, DELAYED RELEASE ORAL at 06:23

## 2023-06-29 RX ADMIN — HEPARIN SODIUM 5000 UNITS: 5000 INJECTION INTRAVENOUS; SUBCUTANEOUS at 05:25

## 2023-06-29 RX ADMIN — LACTULOSE 10 G: 20 SOLUTION ORAL at 21:04

## 2023-06-29 RX ADMIN — LACTULOSE 10 G: 20 SOLUTION ORAL at 09:29

## 2023-06-29 RX ADMIN — METOPROLOL SUCCINATE 12.5 MG: 25 TABLET, EXTENDED RELEASE ORAL at 09:28

## 2023-06-29 RX ADMIN — LEVOTHYROXINE SODIUM 25 MCG: 0.03 TABLET ORAL at 06:23

## 2023-06-29 RX ADMIN — DICLOFENAC SODIUM 2 G: 10 GEL TOPICAL at 09:30

## 2023-06-29 RX ADMIN — SODIUM CHLORIDE, PRESERVATIVE FREE 10 ML: 5 INJECTION INTRAVENOUS at 21:07

## 2023-06-29 RX ADMIN — LORAZEPAM 0.5 MG: 0.5 TABLET ORAL at 19:56

## 2023-06-29 RX ADMIN — KETOTIFEN FUMARATE 1 DROP: 0.25 SOLUTION/ DROPS OPHTHALMIC at 09:35

## 2023-06-29 RX ADMIN — MIRTAZAPINE 15 MG: 15 TABLET, FILM COATED ORAL at 21:01

## 2023-06-29 RX ADMIN — SPIRONOLACTONE 25 MG: 25 TABLET, FILM COATED ORAL at 09:27

## 2023-06-29 RX ADMIN — LACTULOSE 10 G: 20 SOLUTION ORAL at 16:36

## 2023-06-29 RX ADMIN — LORAZEPAM 0.5 MG: 0.5 TABLET ORAL at 09:45

## 2023-06-29 RX ADMIN — HEPARIN SODIUM 5000 UNITS: 5000 INJECTION INTRAVENOUS; SUBCUTANEOUS at 22:28

## 2023-06-29 RX ADMIN — KETOTIFEN FUMARATE 1 DROP: 0.25 SOLUTION/ DROPS OPHTHALMIC at 21:06

## 2023-06-29 RX ADMIN — ACETAMINOPHEN 650 MG: 325 TABLET ORAL at 09:45

## 2023-06-29 RX ADMIN — FUROSEMIDE 15 MG/HR: 10 INJECTION, SOLUTION INTRAMUSCULAR; INTRAVENOUS at 04:05

## 2023-06-29 RX ADMIN — ATORVASTATIN CALCIUM 80 MG: 80 TABLET, FILM COATED ORAL at 21:02

## 2023-06-29 RX ADMIN — AMIODARONE HYDROCHLORIDE 200 MG: 200 TABLET ORAL at 09:28

## 2023-06-29 RX ADMIN — PREDNISONE 10 MG: 10 TABLET ORAL at 09:27

## 2023-06-29 RX ADMIN — ASPIRIN 325 MG: 325 TABLET, COATED ORAL at 09:27

## 2023-06-29 RX ADMIN — AMIODARONE HYDROCHLORIDE 200 MG: 200 TABLET ORAL at 21:01

## 2023-06-29 RX ADMIN — POTASSIUM CHLORIDE 40 MEQ: 1500 TABLET, EXTENDED RELEASE ORAL at 06:23

## 2023-06-29 RX ADMIN — ACETAMINOPHEN 650 MG: 325 TABLET ORAL at 17:22

## 2023-06-29 RX ADMIN — TORSEMIDE 100 MG: 100 TABLET ORAL at 12:04

## 2023-06-29 RX ADMIN — METHOCARBAMOL 250 MG: 500 TABLET ORAL at 03:32

## 2023-06-29 RX ADMIN — ACETAMINOPHEN 650 MG: 325 TABLET ORAL at 03:32

## 2023-06-29 RX ADMIN — HEPARIN SODIUM 5000 UNITS: 5000 INJECTION INTRAVENOUS; SUBCUTANEOUS at 16:37

## 2023-06-29 ASSESSMENT — PAIN SCALES - GENERAL
PAINLEVEL_OUTOF10: 0
PAINLEVEL_OUTOF10: 3
PAINLEVEL_OUTOF10: 3
PAINLEVEL_OUTOF10: 4
PAINLEVEL_OUTOF10: 3
PAINLEVEL_OUTOF10: 2
PAINLEVEL_OUTOF10: 3
PAINLEVEL_OUTOF10: 3
PAINLEVEL_OUTOF10: 0

## 2023-06-29 ASSESSMENT — PAIN DESCRIPTION - DESCRIPTORS
DESCRIPTORS: ACHING

## 2023-06-29 ASSESSMENT — PAIN DESCRIPTION - PAIN TYPE
TYPE: ACUTE PAIN

## 2023-06-29 ASSESSMENT — PAIN DESCRIPTION - ONSET
ONSET: ON-GOING

## 2023-06-29 ASSESSMENT — PAIN DESCRIPTION - LOCATION
LOCATION: ANKLE

## 2023-06-29 ASSESSMENT — PAIN DESCRIPTION - FREQUENCY
FREQUENCY: CONTINUOUS

## 2023-06-29 ASSESSMENT — PAIN DESCRIPTION - ORIENTATION
ORIENTATION: LEFT

## 2023-06-30 VITALS
SYSTOLIC BLOOD PRESSURE: 100 MMHG | WEIGHT: 161.38 LBS | HEIGHT: 66 IN | TEMPERATURE: 98 F | BODY MASS INDEX: 25.94 KG/M2 | OXYGEN SATURATION: 100 % | DIASTOLIC BLOOD PRESSURE: 57 MMHG | RESPIRATION RATE: 14 BRPM | HEART RATE: 52 BPM

## 2023-06-30 LAB
ALBUMIN SERPL-MCNC: 3.3 G/DL (ref 3.4–5)
ANION GAP SERPL CALCULATED.3IONS-SCNC: 10 MMOL/L (ref 3–16)
BASOPHILS # BLD: 0.1 K/UL (ref 0–0.2)
BASOPHILS NFR BLD: 0.8 %
BUN SERPL-MCNC: 64 MG/DL (ref 7–20)
CALCIUM SERPL-MCNC: 9 MG/DL (ref 8.3–10.6)
CHLORIDE SERPL-SCNC: 99 MMOL/L (ref 99–110)
CO2 SERPL-SCNC: 33 MMOL/L (ref 21–32)
CREAT SERPL-MCNC: 3 MG/DL (ref 0.8–1.3)
DEPRECATED RDW RBC AUTO: 15.2 % (ref 12.4–15.4)
EOSINOPHIL # BLD: 0.2 K/UL (ref 0–0.6)
EOSINOPHIL NFR BLD: 2.7 %
GFR SERPLBLD CREATININE-BSD FMLA CKD-EPI: 19 ML/MIN/{1.73_M2}
GLUCOSE SERPL-MCNC: 105 MG/DL (ref 70–99)
HCT VFR BLD AUTO: 28.8 % (ref 40.5–52.5)
HGB BLD-MCNC: 9.6 G/DL (ref 13.5–17.5)
LYMPHOCYTES # BLD: 1.4 K/UL (ref 1–5.1)
LYMPHOCYTES NFR BLD: 21 %
MAGNESIUM SERPL-MCNC: 2.4 MG/DL (ref 1.8–2.4)
MCH RBC QN AUTO: 32.1 PG (ref 26–34)
MCHC RBC AUTO-ENTMCNC: 33.4 G/DL (ref 31–36)
MCV RBC AUTO: 96 FL (ref 80–100)
MONOCYTES # BLD: 0.7 K/UL (ref 0–1.3)
MONOCYTES NFR BLD: 10.5 %
NEUTROPHILS # BLD: 4.2 K/UL (ref 1.7–7.7)
NEUTROPHILS NFR BLD: 65 %
PHOSPHATE SERPL-MCNC: 4.2 MG/DL (ref 2.5–4.9)
PLATELET # BLD AUTO: 166 K/UL (ref 135–450)
PMV BLD AUTO: 7.9 FL (ref 5–10.5)
POTASSIUM SERPL-SCNC: 4.8 MMOL/L (ref 3.5–5.1)
RBC # BLD AUTO: 3 M/UL (ref 4.2–5.9)
SODIUM SERPL-SCNC: 142 MMOL/L (ref 136–145)
WBC # BLD AUTO: 6.4 K/UL (ref 4–11)

## 2023-06-30 PROCEDURE — 6370000000 HC RX 637 (ALT 250 FOR IP): Performed by: INTERNAL MEDICINE

## 2023-06-30 PROCEDURE — 6370000000 HC RX 637 (ALT 250 FOR IP)

## 2023-06-30 PROCEDURE — 51798 US URINE CAPACITY MEASURE: CPT

## 2023-06-30 PROCEDURE — 2580000003 HC RX 258

## 2023-06-30 PROCEDURE — 80069 RENAL FUNCTION PANEL: CPT

## 2023-06-30 PROCEDURE — 85025 COMPLETE CBC W/AUTO DIFF WBC: CPT

## 2023-06-30 PROCEDURE — 36415 COLL VENOUS BLD VENIPUNCTURE: CPT

## 2023-06-30 PROCEDURE — 83735 ASSAY OF MAGNESIUM: CPT

## 2023-06-30 RX ORDER — METOPROLOL SUCCINATE 25 MG/1
12.5 TABLET, EXTENDED RELEASE ORAL DAILY
Qty: 30 TABLET | Refills: 3 | Status: SHIPPED | OUTPATIENT
Start: 2023-07-01

## 2023-06-30 RX ORDER — AMIODARONE HYDROCHLORIDE 200 MG/1
200 TABLET ORAL 2 TIMES DAILY
Qty: 80 TABLET | Refills: 0 | Status: SHIPPED | OUTPATIENT
Start: 2023-06-30

## 2023-06-30 RX ORDER — TORSEMIDE 100 MG/1
100 TABLET ORAL DAILY
Qty: 30 TABLET | Refills: 3 | Status: SHIPPED | OUTPATIENT
Start: 2023-07-01

## 2023-06-30 RX ADMIN — PREDNISONE 10 MG: 10 TABLET ORAL at 08:14

## 2023-06-30 RX ADMIN — PANTOPRAZOLE SODIUM 40 MG: 40 TABLET, DELAYED RELEASE ORAL at 06:38

## 2023-06-30 RX ADMIN — LEVOTHYROXINE SODIUM 25 MCG: 0.03 TABLET ORAL at 06:37

## 2023-06-30 RX ADMIN — LACTULOSE 10 G: 20 SOLUTION ORAL at 08:15

## 2023-06-30 RX ADMIN — AMIODARONE HYDROCHLORIDE 200 MG: 200 TABLET ORAL at 08:15

## 2023-06-30 RX ADMIN — SODIUM CHLORIDE, PRESERVATIVE FREE 10 ML: 5 INJECTION INTRAVENOUS at 08:16

## 2023-06-30 RX ADMIN — SPIRONOLACTONE 25 MG: 25 TABLET, FILM COATED ORAL at 08:14

## 2023-06-30 RX ADMIN — LORAZEPAM 0.5 MG: 0.5 TABLET ORAL at 06:39

## 2023-06-30 RX ADMIN — ASPIRIN 325 MG: 325 TABLET, COATED ORAL at 08:14

## 2023-06-30 RX ADMIN — TAMSULOSIN HYDROCHLORIDE 0.4 MG: 0.4 CAPSULE ORAL at 08:15

## 2023-06-30 ASSESSMENT — PAIN SCALES - GENERAL
PAINLEVEL_OUTOF10: 0

## 2023-07-02 LAB
EKG ATRIAL RATE: 156 BPM
EKG DIAGNOSIS: NORMAL
EKG Q-T INTERVAL: 500 MS
EKG QRS DURATION: 118 MS
EKG QTC CALCULATION (BAZETT): 465 MS
EKG R AXIS: 70 DEGREES
EKG T AXIS: 121 DEGREES
EKG VENTRICULAR RATE: 52 BPM

## 2023-07-03 ENCOUNTER — OFFICE VISIT (OUTPATIENT)
Dept: CARDIOLOGY CLINIC | Age: 88
End: 2023-07-03
Payer: MEDICARE

## 2023-07-03 VITALS
WEIGHT: 168 LBS | HEART RATE: 45 BPM | BODY MASS INDEX: 27 KG/M2 | SYSTOLIC BLOOD PRESSURE: 88 MMHG | HEIGHT: 66 IN | OXYGEN SATURATION: 99 % | DIASTOLIC BLOOD PRESSURE: 40 MMHG

## 2023-07-03 DIAGNOSIS — I35.0 NONRHEUMATIC AORTIC VALVE STENOSIS: Primary | ICD-10-CM

## 2023-07-03 PROCEDURE — 1111F DSCHRG MED/CURRENT MED MERGE: CPT | Performed by: INTERNAL MEDICINE

## 2023-07-03 PROCEDURE — G8417 CALC BMI ABV UP PARAM F/U: HCPCS | Performed by: INTERNAL MEDICINE

## 2023-07-03 PROCEDURE — G8427 DOCREV CUR MEDS BY ELIG CLIN: HCPCS | Performed by: INTERNAL MEDICINE

## 2023-07-03 PROCEDURE — 1123F ACP DISCUSS/DSCN MKR DOCD: CPT | Performed by: INTERNAL MEDICINE

## 2023-07-03 PROCEDURE — 99214 OFFICE O/P EST MOD 30 MIN: CPT | Performed by: INTERNAL MEDICINE

## 2023-07-03 PROCEDURE — 1036F TOBACCO NON-USER: CPT | Performed by: INTERNAL MEDICINE

## 2023-07-03 RX ORDER — LIDOCAINE 50 MG/G
1 PATCH TOPICAL DAILY
COMMUNITY

## 2023-07-05 ENCOUNTER — TELEPHONE (OUTPATIENT)
Dept: CARDIOLOGY CLINIC | Age: 88
End: 2023-07-05

## 2023-07-05 NOTE — TELEPHONE ENCOUNTER
left on Dr. Pop Rios office on 7/3/23 to call me re OV for pt. Called office again today, they will call pt for OV to discuss dye based testing needed for TAVR workup.  Viry ESCAMILLA

## 2023-07-07 ENCOUNTER — OFFICE VISIT (OUTPATIENT)
Dept: CARDIOLOGY CLINIC | Age: 88
End: 2023-07-07
Payer: MEDICARE

## 2023-07-07 ENCOUNTER — TELEPHONE (OUTPATIENT)
Dept: CARDIOLOGY CLINIC | Age: 88
End: 2023-07-07

## 2023-07-07 VITALS
WEIGHT: 170.4 LBS | DIASTOLIC BLOOD PRESSURE: 50 MMHG | BODY MASS INDEX: 27.5 KG/M2 | HEART RATE: 54 BPM | SYSTOLIC BLOOD PRESSURE: 96 MMHG

## 2023-07-07 DIAGNOSIS — Z79.899 LONG TERM USE OF DRUG: ICD-10-CM

## 2023-07-07 DIAGNOSIS — I50.21 ACUTE SYSTOLIC CONGESTIVE HEART FAILURE (HCC): Primary | ICD-10-CM

## 2023-07-07 PROCEDURE — G8417 CALC BMI ABV UP PARAM F/U: HCPCS | Performed by: INTERNAL MEDICINE

## 2023-07-07 PROCEDURE — 99215 OFFICE O/P EST HI 40 MIN: CPT | Performed by: INTERNAL MEDICINE

## 2023-07-07 PROCEDURE — 1123F ACP DISCUSS/DSCN MKR DOCD: CPT | Performed by: INTERNAL MEDICINE

## 2023-07-07 PROCEDURE — G8427 DOCREV CUR MEDS BY ELIG CLIN: HCPCS | Performed by: INTERNAL MEDICINE

## 2023-07-07 PROCEDURE — 1036F TOBACCO NON-USER: CPT | Performed by: INTERNAL MEDICINE

## 2023-07-07 PROCEDURE — 1111F DSCHRG MED/CURRENT MED MERGE: CPT | Performed by: INTERNAL MEDICINE

## 2023-07-07 RX ORDER — AMIODARONE HYDROCHLORIDE 200 MG/1
200 TABLET ORAL DAILY
Qty: 90 TABLET | Refills: 3 | Status: SHIPPED | OUTPATIENT
Start: 2023-07-07

## 2023-07-07 RX ORDER — AMIODARONE HYDROCHLORIDE 200 MG/1
100 TABLET ORAL DAILY
Qty: 45 TABLET | Refills: 3 | Status: CANCELLED | OUTPATIENT
Start: 2023-07-07

## 2023-07-07 RX ORDER — TORSEMIDE 100 MG/1
50 TABLET ORAL DAILY
Qty: 45 TABLET | Refills: 3 | Status: SHIPPED | OUTPATIENT
Start: 2023-07-07

## 2023-07-07 RX ORDER — PREDNISONE 10 MG/1
10 TABLET ORAL DAILY
COMMUNITY

## 2023-07-07 NOTE — TELEPHONE ENCOUNTER
Informed pt's nurse Harley Restrepo seen pt in office today and would like pt to decrease amiodarone 200 mg twice daily to once daily, hold torsemide for 3 days - Sat. Linda Ceballos., start torsemide 50 mg on Tuesday, get a BMP, Magnesium in one week. Maria De Jesus Santana verbalized understanding.

## 2023-07-20 ENCOUNTER — OFFICE VISIT (OUTPATIENT)
Dept: CARDIOLOGY CLINIC | Age: 88
End: 2023-07-20
Payer: MEDICARE

## 2023-07-20 VITALS
DIASTOLIC BLOOD PRESSURE: 64 MMHG | BODY MASS INDEX: 28.31 KG/M2 | SYSTOLIC BLOOD PRESSURE: 138 MMHG | HEART RATE: 70 BPM | WEIGHT: 175.4 LBS

## 2023-07-20 DIAGNOSIS — I50.21 ACUTE SYSTOLIC CONGESTIVE HEART FAILURE (HCC): Primary | ICD-10-CM

## 2023-07-20 DIAGNOSIS — I25.5 ISCHEMIC CARDIOMYOPATHY: ICD-10-CM

## 2023-07-20 DIAGNOSIS — Z79.899 LONG TERM USE OF DRUG: ICD-10-CM

## 2023-07-20 PROCEDURE — 1123F ACP DISCUSS/DSCN MKR DOCD: CPT | Performed by: INTERNAL MEDICINE

## 2023-07-20 PROCEDURE — 1036F TOBACCO NON-USER: CPT | Performed by: INTERNAL MEDICINE

## 2023-07-20 PROCEDURE — 99214 OFFICE O/P EST MOD 30 MIN: CPT | Performed by: INTERNAL MEDICINE

## 2023-07-20 PROCEDURE — G8417 CALC BMI ABV UP PARAM F/U: HCPCS | Performed by: INTERNAL MEDICINE

## 2023-07-20 PROCEDURE — 1111F DSCHRG MED/CURRENT MED MERGE: CPT | Performed by: INTERNAL MEDICINE

## 2023-07-20 PROCEDURE — G8428 CUR MEDS NOT DOCUMENT: HCPCS | Performed by: INTERNAL MEDICINE

## 2023-07-20 RX ORDER — METOPROLOL SUCCINATE 25 MG/1
12.5 TABLET, EXTENDED RELEASE ORAL DAILY
Qty: 45 TABLET | Refills: 3 | Status: SHIPPED | OUTPATIENT
Start: 2023-07-20

## 2023-07-20 RX ORDER — METOPROLOL SUCCINATE 25 MG/1
12.5 TABLET, EXTENDED RELEASE ORAL DAILY
Qty: 45 TABLET | Refills: 3 | Status: SHIPPED | OUTPATIENT
Start: 2023-07-20 | End: 2023-07-20

## 2023-07-20 RX ORDER — AMIODARONE HYDROCHLORIDE 200 MG/1
100 TABLET ORAL DAILY
Qty: 45 TABLET | Refills: 3 | Status: SHIPPED | OUTPATIENT
Start: 2023-07-20

## 2023-07-20 RX ORDER — AMIODARONE HYDROCHLORIDE 200 MG/1
100 TABLET ORAL DAILY
Qty: 45 TABLET | Refills: 3 | Status: SHIPPED | OUTPATIENT
Start: 2023-07-20 | End: 2023-07-20

## 2023-07-22 NOTE — PROGRESS NOTES
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 He currently lives at [facility]. Echo 05/2023: LVD 6.1 cm, LVEF 20-25%, mild LVH, grade 3 diastolic dysfunction, moderate AAS, mild to moderate AI, aortic root 4.5 cm, moderate MR, moderate TR, normal RV, moderate bilateral pleural effusions. Dobutamine stress echo 05/2023: LVEF 20% increased to 30-35% on dobutamine drip, severe aortic stenosis (V-max 4.2 cm, mean pressure gradient 34 mmHg on dobutamine drip). [facility] 5/8/23: LAD 40%, oD2 90%, mCx 50%, mRCA 40%, LVEDP 17, abnl AVG    Patient had an admission for acute heart failure in 05/2023 and 06/2023. Patient was seen by Dr. Lobito Díaz on 7/3/23 to further discuss risks and benefits of attempting a TAVR. Patient's advanced age and multiple comorbidities (severe systolic heart failure, severe aortic stenosis, arrhythmias, renal failure) were deemed to be prohibitive to a TAVR procedure. Conservative/medical therapy was elected. His Toprol 12.5 mg daily was stopped due to low BP 88/40 mmHg, HR 45 bpm.    Patient returns to the clinic today for follow-up. Due to worsening kidney function (Cr 4.6 on 7/6/23 from baseline of around 2.6), his torsemide 100 daily was held for a few days and restarted at half dose of 50 mg daily. Repeat labs 7/17 shows improved Cr of 3.0, BUN 70, K 4.4, Na 143, Bic 27. Patient reports reports stable weight, mild leg edema.         Histories     Past Medical History:   has a past medical history of Arthritis, Ascending aortic aneurysm ([address]), CAD (coronary artery disease), Chronic kidney disease, Constipation, DDD (degenerative disc disease), lumbar, Dental disease, Gout, Hearing loss, Heart disease, Hyperlipidemia,

## 2023-08-17 ENCOUNTER — OFFICE VISIT (OUTPATIENT)
Dept: CARDIOLOGY CLINIC | Age: 88
End: 2023-08-17
Payer: MEDICARE

## 2023-08-17 ENCOUNTER — TELEPHONE (OUTPATIENT)
Dept: CARDIOLOGY CLINIC | Age: 88
End: 2023-08-17

## 2023-08-17 VITALS
DIASTOLIC BLOOD PRESSURE: 70 MMHG | BODY MASS INDEX: 29.73 KG/M2 | WEIGHT: 184.2 LBS | SYSTOLIC BLOOD PRESSURE: 130 MMHG | HEART RATE: 68 BPM

## 2023-08-17 DIAGNOSIS — I50.21 ACUTE SYSTOLIC CONGESTIVE HEART FAILURE (HCC): ICD-10-CM

## 2023-08-17 DIAGNOSIS — Z79.899 LONG TERM USE OF DRUG: ICD-10-CM

## 2023-08-17 DIAGNOSIS — N18.4 STAGE 4 CHRONIC KIDNEY DISEASE (HCC): Primary | ICD-10-CM

## 2023-08-17 PROCEDURE — G8417 CALC BMI ABV UP PARAM F/U: HCPCS | Performed by: INTERNAL MEDICINE

## 2023-08-17 PROCEDURE — 1036F TOBACCO NON-USER: CPT | Performed by: INTERNAL MEDICINE

## 2023-08-17 PROCEDURE — G8428 CUR MEDS NOT DOCUMENT: HCPCS | Performed by: INTERNAL MEDICINE

## 2023-08-17 PROCEDURE — 1123F ACP DISCUSS/DSCN MKR DOCD: CPT | Performed by: INTERNAL MEDICINE

## 2023-08-17 PROCEDURE — 99215 OFFICE O/P EST HI 40 MIN: CPT | Performed by: INTERNAL MEDICINE

## 2023-08-17 RX ORDER — TORSEMIDE 100 MG/1
100 TABLET ORAL DAILY
Qty: 90 TABLET | Refills: 3 | Status: SHIPPED | OUTPATIENT
Start: 2023-08-17

## 2023-08-17 NOTE — TELEPHONE ENCOUNTER
Informed Shiraz Yeh pt's nurse Dr. Paul Wilson would like to increase pt's torsemide to 100 mg daily, follow a low sodium diet < 2000 mg, get labs BMP, Magnesium  in one week and again in 2 weeks and follow up with Dr. Paul Wilson in 3 weeks. Shiraz Yeh verbalized understanding.

## 2023-08-18 NOTE — PROGRESS NOTES
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 He currently lives at [facility]. Echo 05/2023: LVD 6.1 cm, LVEF 20-25%, mild LVH, grade 3 diastolic dysfunction, moderate AAS, mild to moderate AI, aortic root 4.5 cm, moderate MR, moderate TR, normal RV, moderate bilateral pleural effusions. Dobutamine stress echo 05/2023: LVEF 20% increased to 30-35% on dobutamine drip, severe aortic stenosis (V-max 4.2 cm, mean pressure gradient 34 mmHg on dobutamine drip). [facility] 5/8/23: LAD 40%, oD2 90%, mCx 50%, mRCA 40%, LVEDP 17, abnl AVG    Patient had an admission for acute heart failure in 05/2023 and 06/2023. Patient was seen by Dr. Julian White on 7/3/23 to further discuss risks and benefits of attempting a TAVR. Patient's advanced age and multiple comorbidities (severe systolic heart failure, severe aortic stenosis, arrhythmias, renal failure) were deemed to be prohibitive to a TAVR procedure. Conservative/medical therapy was elected. His Toprol 12.5 mg daily was stopped due to low BP 88/40 mmHg, HR 45 bpm.    Patient returns to the clinic today for follow-up. His torsemide is adjusted depending on his fluid status/edema and kidney function. Creatinine 3.3 on 8/15 close to his baseline. Patient's weight has been increasing lately due to receiving half his regular dose of torsemide that is, 50 mg instead of 100 mg daily. Sats 96% at the office today on RA.        Histories     Past Medical History:   has a past medical history of Arthritis, Ascending aortic aneurysm ([address]), CAD (coronary artery disease), Chronic kidney disease, Constipation, DDD (degenerative disc disease), lumbar, Dental disease, Gout, Hearing loss, Heart disease, Hyperlipidemia,

## 2023-08-31 ENCOUNTER — HOSPITAL ENCOUNTER (OUTPATIENT)
Dept: MRI IMAGING | Age: 88
Discharge: HOME | End: 2023-08-31
Attending: ORTHOPAEDIC SURGERY
Payer: MEDICARE

## 2023-08-31 DIAGNOSIS — M25.572 ARTHRALGIA OF LEFT ANKLE OR FOOT: ICD-10-CM

## 2023-08-31 PROCEDURE — 73721 MRI JNT OF LWR EXTRE W/O DYE: CPT

## 2023-09-07 ENCOUNTER — OFFICE VISIT (OUTPATIENT)
Dept: CARDIOLOGY CLINIC | Age: 88
End: 2023-09-07
Payer: MEDICARE

## 2023-09-07 VITALS
BODY MASS INDEX: 29.7 KG/M2 | HEART RATE: 58 BPM | SYSTOLIC BLOOD PRESSURE: 124 MMHG | DIASTOLIC BLOOD PRESSURE: 60 MMHG | WEIGHT: 184 LBS

## 2023-09-07 DIAGNOSIS — I50.43 ACUTE ON CHRONIC COMBINED SYSTOLIC AND DIASTOLIC CONGESTIVE HEART FAILURE (HCC): Primary | ICD-10-CM

## 2023-09-07 PROCEDURE — 99215 OFFICE O/P EST HI 40 MIN: CPT | Performed by: INTERNAL MEDICINE

## 2023-09-07 PROCEDURE — G8417 CALC BMI ABV UP PARAM F/U: HCPCS | Performed by: INTERNAL MEDICINE

## 2023-09-07 PROCEDURE — G8427 DOCREV CUR MEDS BY ELIG CLIN: HCPCS | Performed by: INTERNAL MEDICINE

## 2023-09-07 PROCEDURE — 1123F ACP DISCUSS/DSCN MKR DOCD: CPT | Performed by: INTERNAL MEDICINE

## 2023-09-07 PROCEDURE — 1036F TOBACCO NON-USER: CPT | Performed by: INTERNAL MEDICINE

## 2023-09-07 RX ORDER — TORSEMIDE 100 MG/1
150 TABLET ORAL DAILY
Qty: 90 TABLET | Refills: 3 | Status: SHIPPED
Start: 2023-09-07

## 2023-09-07 RX ORDER — POTASSIUM CHLORIDE 20 MEQ/1
20 TABLET, EXTENDED RELEASE ORAL DAILY
Qty: 90 TABLET | Refills: 1
Start: 2023-09-07

## 2023-09-07 NOTE — PROGRESS NOTES
thighs. Abdomen:   Soft, non-tender, bowel sounds active all four quadrants,  no masses, no organomegaly       Extremities: Extremities normal, atraumatic, no cyanosis   Pulses: 2+ and symmetric   Skin: Skin color, texture, turgor normal, no rashes or lesions   Pysch: Normal mood and affect   Neurologic: Normal gross motor and sensory exam.  Cranial nerves intact        Labs:     Lab Results   Component Value Date    WBC 6.4 06/30/2023    HGB 9.6 (L) 06/30/2023    HCT 28.8 (L) 06/30/2023    MCV 96.0 06/30/2023     06/30/2023     Lab Results   Component Value Date     06/30/2023    K 4.8 06/30/2023    CL 99 06/30/2023    CO2 33 (H) 06/30/2023    BUN 64 (H) 06/30/2023    CREATININE 3.0 (H) 06/30/2023    GLUCOSE 105 (H) 06/30/2023    CALCIUM 9.0 06/30/2023    PROT 6.7 06/16/2023    LABALBU 3.3 (L) 06/30/2023    BILITOT 0.7 06/16/2023    ALKPHOS 163 (H) 06/16/2023    AST 29 06/16/2023    ALT 31 06/16/2023    LABGLOM 19 (A) 06/30/2023    GFRAA 46 (A) 02/26/2021    AGRATIO 1.2 06/16/2023    GLOB 2.9 06/30/2020         Lab Results   Component Value Date    CHOL 122 05/06/2023    CHOL 133 06/30/2020    CHOL 114 10/17/2019     Lab Results   Component Value Date    TRIG 43 05/06/2023    TRIG 84 06/30/2020    TRIG 66 10/17/2019     Lab Results   Component Value Date    HDL 49 05/06/2023    HDL 47 06/30/2020    HDL 48 06/11/2020     Lab Results   Component Value Date    LDLCALC 64 05/06/2023    LDLCALC 69 06/30/2020    LDLCALC 70 06/11/2020     Lab Results   Component Value Date    LABVLDL 9 05/06/2023    LABVLDL 17 06/30/2020    LABVLDL 15 06/11/2020     No results found for: [facility]    Lab Results   Component Value Date    INR 1.34 (H) 11/05/2020    INR 1.50 (H) 06/20/2020    INR 1.60 (H) 10/16/2019    PROTIME 15.6 (H) 11/05/2020    PROTIME 17.5 (H) 06/20/2020    PROTIME 18.2 (H) 10/16/2019       The ASCVD Risk score (Soila SEARS, et al., 2019) failed to calculate for the following reasons:     The 2019

## 2023-09-07 NOTE — PATIENT INSTRUCTIONS
Increase Torsemide to 150 mg once daily   Start Kdur 20 mEq daily   Check BMP in 1 week and again in 3 weeks   Follow up with me in 4 weeks

## 2023-09-14 ENCOUNTER — TELEPHONE (OUTPATIENT)
Dept: CARDIOLOGY CLINIC | Age: 88
End: 2023-09-14

## 2023-09-14 NOTE — TELEPHONE ENCOUNTER
Steve Ernst and pt's nurse Marion Horton at [facility] Dr. Baltazar Silva would like pt to increase Torsemide to 150 mg daily to 100 mg twice daily and Potassium 20 mg daily to 20 mg twice daily, get BMP, Magnesium in 1 week. Yoan Hawkins and Marion Horton verbalized understanding.         [facility] (peach floor) [phone]

## 2023-09-14 NOTE — TELEPHONE ENCOUNTER
The patient's daughter Alyce Martinez called stating the patient gained 4 lbs in 2 days. The patient was 182 two days ago and today he is 186. The patient complains of SOB and swelling in his legs. Alyce Martinez would like to know if the patient should increase his medication.  [phone]

## 2023-09-15 ENCOUNTER — TELEPHONE (OUTPATIENT)
Dept: CARDIOLOGY CLINIC | Age: 88
End: 2023-09-15

## 2023-09-15 NOTE — TELEPHONE ENCOUNTER
Informed pt's nurse Miguel Angel Caraballo reviewed pts labs from 9/14/23, Dr. Aidee Caraballo would like pt to decrease Torsemide 200 mg twice daily to 100 mg daily and continue potassium 20 meq twice daily, recheck BMP and Magnesium in one week. Pt schedule to follow up with Brandon Jefferson NP 9/21/23 at 2:00. Miguel Angel Rachel verbalized understanding and will notify pt's daughter Angi Tracy.

## 2023-09-21 ENCOUNTER — TELEPHONE (OUTPATIENT)
Dept: CARDIOLOGY CLINIC | Age: 88
End: 2023-09-21

## 2023-09-21 ENCOUNTER — OFFICE VISIT (OUTPATIENT)
Dept: CARDIOLOGY CLINIC | Age: 88
End: 2023-09-21
Payer: MEDICARE

## 2023-09-21 VITALS
BODY MASS INDEX: 30.89 KG/M2 | HEART RATE: 52 BPM | SYSTOLIC BLOOD PRESSURE: 136 MMHG | WEIGHT: 191.4 LBS | DIASTOLIC BLOOD PRESSURE: 70 MMHG

## 2023-09-21 DIAGNOSIS — I50.43 ACUTE ON CHRONIC COMBINED SYSTOLIC AND DIASTOLIC CONGESTIVE HEART FAILURE (HCC): Primary | ICD-10-CM

## 2023-09-21 DIAGNOSIS — Z79.899 LONG TERM USE OF DRUG: ICD-10-CM

## 2023-09-21 PROCEDURE — G8417 CALC BMI ABV UP PARAM F/U: HCPCS | Performed by: INTERNAL MEDICINE

## 2023-09-21 PROCEDURE — G8427 DOCREV CUR MEDS BY ELIG CLIN: HCPCS | Performed by: INTERNAL MEDICINE

## 2023-09-21 PROCEDURE — 1123F ACP DISCUSS/DSCN MKR DOCD: CPT | Performed by: INTERNAL MEDICINE

## 2023-09-21 PROCEDURE — 1036F TOBACCO NON-USER: CPT | Performed by: INTERNAL MEDICINE

## 2023-09-21 PROCEDURE — 99215 OFFICE O/P EST HI 40 MIN: CPT | Performed by: INTERNAL MEDICINE

## 2023-09-21 RX ORDER — TORSEMIDE 100 MG/1
150 TABLET ORAL DAILY
Qty: 135 TABLET | Refills: 3 | Status: SHIPPED | OUTPATIENT
Start: 2023-09-21

## 2023-09-21 NOTE — TELEPHONE ENCOUNTER
Informed pt's nurse Joshua Delgado seen pt in the office today and would like the pt to increase torsemide to 150 mg daily, get daily weights for the next 2 weeks, and get BMP, Magnesium in 7-10 days and fax results to our office at [phone]. Torrie Rodríguez verbalized understanding.

## 2023-09-21 NOTE — PROGRESS NOTES
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 He currently lives at [facility]. Echo 05/2023: LVD 6.1 cm, LVEF 20-25%, mild LVH, grade 3 diastolic dysfunction, moderate AAS, mild to moderate AI, aortic root 4.5 cm, moderate MR, moderate TR, normal RV, moderate bilateral pleural effusions. Dobutamine stress echo 05/2023: LVEF 20% increased to 30-35% on dobutamine drip, severe aortic stenosis (V-max 4.2 cm, mean pressure gradient 34 mmHg on dobutamine drip). [facility] 5/8/23: LAD 40%, oD2 90%, mCx 50%, mRCA 40%, LVEDP 17, abnl AVG    Patient had an admission for acute heart failure in 05/2023 and 06/2023. Patient was seen by Dr. Penelope Matt on 7/3/23 to further discuss risks and benefits of attempting a TAVR. Patient's advanced age and multiple comorbidities (severe systolic heart failure, severe aortic stenosis, arrhythmias, renal failure) were deemed to be prohibitive to a TAVR procedure. Conservative/medical therapy was elected. His Toprol 12.5 mg daily was stopped due to low BP 88/40 mmHg, HR 45 bpm.    Patient returns to the clinic today for follow-up. He reports mildly worse dyspnea, persistent leg edema, weight increased the last few weeks, now 2-3 lbs lower. His torsemide was decreased from 200 mg to 100 mg daily due to CARLI on CKD. Labs were drawn today at the facility, no results yet.      Histories     Past Medical History:   has a past medical history of Arthritis, Ascending aortic aneurysm ([address]), CAD (coronary artery disease), Chronic kidney disease, Constipation, DDD (degenerative disc disease), lumbar, Dental disease, Gout, Hearing loss, Heart disease, Heart valve disease, HFrEF (heart failure with reduced ejection fraction) ([address]),

## 2023-09-22 ENCOUNTER — TELEPHONE (OUTPATIENT)
Dept: CARDIOLOGY CLINIC | Age: 88
End: 2023-09-22

## 2023-09-22 NOTE — TELEPHONE ENCOUNTER
Dr. Charlie Monteiro called and wanted to touch bases with the patients CHF. He wants a call back on his cell. I gave the cell number to [facility] consult.

## 2023-09-22 NOTE — TELEPHONE ENCOUNTER
Informed pt's nurse Franck Pair reviewed pt's labs from 9/21/23 no new orders at this time. [facility] verbalized understanding.

## 2023-10-05 ENCOUNTER — OFFICE VISIT (OUTPATIENT)
Dept: CARDIOLOGY CLINIC | Age: 88
End: 2023-10-05
Payer: MEDICARE

## 2023-10-05 VITALS
BODY MASS INDEX: 31.12 KG/M2 | DIASTOLIC BLOOD PRESSURE: 60 MMHG | HEART RATE: 50 BPM | SYSTOLIC BLOOD PRESSURE: 120 MMHG | WEIGHT: 192.8 LBS

## 2023-10-05 DIAGNOSIS — I50.43 ACUTE ON CHRONIC COMBINED SYSTOLIC AND DIASTOLIC CONGESTIVE HEART FAILURE (HCC): Primary | ICD-10-CM

## 2023-10-05 DIAGNOSIS — Z79.899 LONG TERM USE OF DRUG: ICD-10-CM

## 2023-10-05 PROCEDURE — 1036F TOBACCO NON-USER: CPT | Performed by: INTERNAL MEDICINE

## 2023-10-05 PROCEDURE — 1123F ACP DISCUSS/DSCN MKR DOCD: CPT | Performed by: INTERNAL MEDICINE

## 2023-10-05 PROCEDURE — G8417 CALC BMI ABV UP PARAM F/U: HCPCS | Performed by: INTERNAL MEDICINE

## 2023-10-05 PROCEDURE — 99214 OFFICE O/P EST MOD 30 MIN: CPT | Performed by: INTERNAL MEDICINE

## 2023-10-05 PROCEDURE — G8484 FLU IMMUNIZE NO ADMIN: HCPCS | Performed by: INTERNAL MEDICINE

## 2023-10-05 PROCEDURE — G8427 DOCREV CUR MEDS BY ELIG CLIN: HCPCS | Performed by: INTERNAL MEDICINE

## 2023-10-05 RX ORDER — TORSEMIDE 100 MG/1
200 TABLET ORAL DAILY
Qty: 30 TABLET | Refills: 3 | Status: SHIPPED | OUTPATIENT
Start: 2023-10-05

## 2023-10-05 NOTE — PROGRESS NOTES
by mouth daily 5/12/23  Yes Dung Sher MD   diclofenac sodium (VOLTAREN) 1 % GEL Apply topically 2 times daily   Yes Neo Miranda MD   allopurinol (ZYLOPRIM) 100 MG tablet Take 1 tablet by mouth daily   Yes Neo Miranda MD   aspirin 325 MG EC tablet Take 1 tablet by mouth daily   Yes Neo Miranda MD   atorvastatin (LIPITOR) 80 MG tablet Take 1 tablet by mouth nightly   Yes Neo Miranda MD   docusate sodium (COLACE) 100 MG capsule Take 1 capsule by mouth 2 times daily as needed for Constipation   Yes Neo Miranda MD   Melatonin 10 MG TABS Take 10 mg by mouth nightly   Yes Neo Miranda MD   methocarbamol (ROBAXIN) 500 MG tablet Take 0.5 tablets by mouth every 8 hours as needed (low back pain)   Yes Neo Miranda MD   nitroGLYCERIN (NITROSTAT) 0.4 MG SL tablet Place 1 tablet under the tongue every 5 minutes as needed for Chest pain up to max of 3 total doses. If no relief after 1 dose, call 911. Yes Neo Miranda MD   ondansetron (ZOFRAN) 4 MG tablet Take 1 tablet by mouth every 8 hours as needed for Nausea or Vomiting   Yes Neo Miranda MD   acetaminophen (TYLENOL) 500 MG tablet Take 2 tablets by mouth every 8 hours as needed (mild pain)   Yes Neo Miranda MD   Multiple Vitamins-Minerals (THERAPEUTIC MULTIVITAMIN-MINERALS) tablet Take 1 tablet by mouth daily   Yes Neo Miranda MD   tamsulosin (FLOMAX) 0.4 MG capsule Take 1 capsule by mouth daily   Yes Neo Miranda MD   azelastine (OPTIVAR) 0.05 % ophthalmic solution Place 1 drop into both eyes 3 times daily   Yes Provider, Historical, MD   Handicap Placard MISC by Does not apply route effective November 25,2019 through November 24,2020 11/25/19  Yes Alyse Fernández MD   predniSONE (DELTASONE) 10 MG tablet Take 1 tablet by mouth daily    Neo Miranda MD          Allergy:     Patient has no known allergies.        Review of Systems:     All 12

## 2023-11-09 ENCOUNTER — OFFICE VISIT (OUTPATIENT)
Dept: CARDIOLOGY CLINIC | Age: 88
End: 2023-11-09
Payer: MEDICAID

## 2023-11-09 VITALS
BODY MASS INDEX: 31.44 KG/M2 | WEIGHT: 194.8 LBS | SYSTOLIC BLOOD PRESSURE: 120 MMHG | HEART RATE: 63 BPM | DIASTOLIC BLOOD PRESSURE: 60 MMHG

## 2023-11-09 DIAGNOSIS — I50.43 ACUTE ON CHRONIC COMBINED SYSTOLIC AND DIASTOLIC CONGESTIVE HEART FAILURE (HCC): Primary | ICD-10-CM

## 2023-11-09 PROCEDURE — 99215 OFFICE O/P EST HI 40 MIN: CPT | Performed by: INTERNAL MEDICINE

## 2023-11-09 PROCEDURE — 1123F ACP DISCUSS/DSCN MKR DOCD: CPT | Performed by: INTERNAL MEDICINE

## 2023-11-09 RX ORDER — METOLAZONE 5 MG/1
5 TABLET ORAL DAILY
Qty: 30 TABLET | Refills: 5 | Status: SHIPPED | OUTPATIENT
Start: 2023-11-09

## 2023-11-09 NOTE — PROGRESS NOTES
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 He currently lives at [facility]. Echo 05/2023: LVD 6.1 cm, LVEF 20-25%, mild LVH, grade 3 diastolic dysfunction, moderate AAS, mild to moderate AI, aortic root 4.5 cm, moderate MR, moderate TR, normal RV, moderate bilateral pleural effusions. Dobutamine stress echo 05/2023: LVEF 20% increased to 30-35% on dobutamine drip, severe aortic stenosis (V-max 4.2 cm, mean pressure gradient 34 mmHg on dobutamine drip). [facility] 5/8/23: LAD 40%, oD2 90%, mCx 50%, mRCA 40%, LVEDP 17, abnl AVG    Patient had an admission for acute heart failure in 05/2023 and 06/2023. Patient was seen by Dr. Carter Martin on 7/3/23 to further discuss risks and benefits of attempting a TAVR. Patient's advanced age and multiple comorbidities (severe systolic heart failure, severe aortic stenosis, arrhythmias, renal failure) were deemed to be prohibitive to a TAVR procedure. Conservative/medical therapy was elected. His Toprol 12.5 mg daily was stopped due to low BP 88/40 mmHg, HR 45 bpm.    Patient returns to the clinic today with his daughter. He reports slightly worse exertional dyspnea, worse lower extremity edema and generalized weakness. He has been compliant with his medications including torsemide 200 mg p.o. daily. He now has a relatively large blister on his left shin. There is no erythema around it. Labs from 10/6, 10/12, 10/19, 11/6 were reviewed. Creatinine up at 3.9 from 3.3, BUN up at 97 from 70, K stable at 4.7, sodium 142.     Histories     Past Medical History:   has a past medical history of Arthritis, Ascending aortic aneurysm ([address]), CAD (coronary artery disease), Chronic kidney disease, Constipation, DDD

## 2023-11-16 ENCOUNTER — OFFICE VISIT (OUTPATIENT)
Dept: CARDIOLOGY CLINIC | Age: 88
End: 2023-11-16
Payer: MEDICAID

## 2023-11-16 VITALS
DIASTOLIC BLOOD PRESSURE: 50 MMHG | SYSTOLIC BLOOD PRESSURE: 90 MMHG | HEART RATE: 55 BPM | BODY MASS INDEX: 29.54 KG/M2 | WEIGHT: 183 LBS

## 2023-11-16 DIAGNOSIS — I50.43 ACUTE ON CHRONIC COMBINED SYSTOLIC AND DIASTOLIC CONGESTIVE HEART FAILURE (HCC): Primary | ICD-10-CM

## 2023-11-16 PROCEDURE — 99215 OFFICE O/P EST HI 40 MIN: CPT | Performed by: INTERNAL MEDICINE

## 2023-11-16 PROCEDURE — 1123F ACP DISCUSS/DSCN MKR DOCD: CPT | Performed by: INTERNAL MEDICINE

## 2023-11-16 RX ORDER — METOLAZONE 2.5 MG/1
2.5 TABLET ORAL DAILY
Qty: 90 TABLET | Refills: 1 | Status: SHIPPED | OUTPATIENT
Start: 2023-11-16

## 2023-11-16 RX ORDER — POTASSIUM CHLORIDE 20 MEQ/1
20 TABLET, EXTENDED RELEASE ORAL 2 TIMES DAILY
Qty: 180 TABLET | Refills: 1 | Status: SHIPPED | OUTPATIENT
Start: 2023-11-16

## 2024-05-15 NOTE — PROGRESS NOTES
Will discuss results at patient's upcoming appointment   tenderness/mass/nodules, no carotid bruit       Lungs:   CTA bilaterally, respirations unlabored   Chest Wall:  No tenderness or deformity   Heart:  Regular rhythm, rate is controlled, S1, S2 normal, there is III/VI RUSB systolic murmur, there is no rub or gallop, cannot assess jvd, 1+ bilateral lower extremity edema on shins. Abdomen:   Soft, non-tender, bowel sounds active all four quadrants,  no masses, no organomegaly       Extremities: Extremities normal, atraumatic, no cyanosis   Pulses: 2+ and symmetric   Skin: Skin color, texture, turgor normal, no rashes.    Pysch: Normal mood and affect   Neurologic: Normal gross motor and sensory exam.  Cranial nerves intact        Labs:     Lab Results   Component Value Date    WBC 6.4 06/30/2023    HGB 9.6 (L) 06/30/2023    HCT 28.8 (L) 06/30/2023    MCV 96.0 06/30/2023     06/30/2023     Lab Results   Component Value Date     06/30/2023    K 4.8 06/30/2023    CL 99 06/30/2023    CO2 33 (H) 06/30/2023    BUN 64 (H) 06/30/2023    CREATININE 3.0 (H) 06/30/2023    GLUCOSE 105 (H) 06/30/2023    CALCIUM 9.0 06/30/2023    PROT 6.7 06/16/2023    LABALBU 3.3 (L) 06/30/2023    BILITOT 0.7 06/16/2023    ALKPHOS 163 (H) 06/16/2023    AST 29 06/16/2023    ALT 31 06/16/2023    LABGLOM 19 (A) 06/30/2023    GFRAA 46 (A) 02/26/2021    AGRATIO 1.2 06/16/2023    GLOB 2.9 06/30/2020         Lab Results   Component Value Date    CHOL 122 05/06/2023    CHOL 133 06/30/2020    CHOL 114 10/17/2019     Lab Results   Component Value Date    TRIG 43 05/06/2023    TRIG 84 06/30/2020    TRIG 66 10/17/2019     Lab Results   Component Value Date    HDL 49 05/06/2023    HDL 47 06/30/2020    HDL 48 06/11/2020     Lab Results   Component Value Date    LDLCALC 64 05/06/2023    LDLCALC 69 06/30/2020    LDLCALC 70 06/11/2020     Lab Results   Component Value Date    LABVLDL 9 05/06/2023    LABVLDL 17 06/30/2020    LABVLDL 15 06/11/2020     No results found for: \"CHOLHDLRATIO\"    Lab Results

## (undated) DEVICE — 3M™ STERI-STRIP™ REINFORCED ADHESIVE SKIN CLOSURES, R1548, 1 IN X 5 IN (25 MM X 125 MM), 4 STRIPS/ENVELOPE: Brand: 3M™ STERI-STRIP™

## (undated) DEVICE — CONTAINER,SPECIMEN,PNEU TUBE,3OZ,OR STRL: Brand: MEDLINE

## (undated) DEVICE — GLOVE SURG SZ 6 L11.2IN FNGR THK9.8MIL STRW LTX POLYMER

## (undated) DEVICE — ELECTRODE ES AD CRD L15FT DISP FOR PT BELOW 30LB REM

## (undated) DEVICE — SHEATH INTRO 16FR L33CM OD6.1MM ID5.3MM HYDRPHLC KINK

## (undated) DEVICE — PRESSURE MONITORING SET: Brand: TRUWAVE, VAMP PLUS

## (undated) DEVICE — SYRINGE MED 50ML LUERLOCK TIP

## (undated) DEVICE — BOWL MED L 32OZ PLAS W/ MOLD GRAD EZ OPN PEEL PCH

## (undated) DEVICE — GLOVE SURG SZ 65 L12IN FNGR THK79MIL GRN LTX FREE

## (undated) DEVICE — 3M™ IOBAN™ 2 ANTIMICROBIAL INCISE DRAPE 6640EZ: Brand: IOBAN™ 2

## (undated) DEVICE — SYRINGE MED 3ML CLR PLAS STD N CTRL LUERLOCK TIP DISP

## (undated) DEVICE — SYRINGE MED 10ML LUERLOCK TIP W/O SFTY DISP

## (undated) DEVICE — SLEEVE CMPR SM STD CALF SCD ANEMB LF

## (undated) DEVICE — MARKER SURG SKIN UTIL BLK REG TIP NONSMEARING W/ 6IN RUL

## (undated) DEVICE — FOOT SWITCH DRAPE: Brand: UNBRANDED

## (undated) DEVICE — E-Z CLEAN, NON-STICK, PTFE COATED, ELECTROSURGICAL BLADE ELECTRODE, 2.5 INCH (6.35 CM): Brand: EZ CLEAN

## (undated) DEVICE — GUIDEWIRE VASC L260CM DIA0.035IN TIP L7CM PTFE S STL STR

## (undated) DEVICE — SHEATH INTRO 12FR L33CM OD4.7MM ID4MM HYDRPHLC KINK

## (undated) DEVICE — GEL US 20GM NONIRRITATING OVERWRAPPED FILE PCH TRNSMIT

## (undated) DEVICE — CATHETER PTCA BLLN L4CM INFL 10-37MM CATH L90CM MOLDING

## (undated) DEVICE — COVER,TABLE,HEAVY DUTY,77"X90",STRL: Brand: MEDLINE

## (undated) DEVICE — SCANLAN® SUTURE BOOT™ INSTRUMENT JAW COVERS - ORIGINAL YELLOW, STANDARD PKG (5 PAIR/CARTRIDGE, 1 CARTRIDGE/PKG): Brand: SCANLAN® SUTURE BOOT™ INSTRUMENT JAW COVERS

## (undated) DEVICE — OLCOTT TORQUE DEVICE: Brand: OLCOTT

## (undated) DEVICE — COVER LT HNDL BLU PLAS

## (undated) DEVICE — SPONGE,LAP,18"X18",DLX,XR,ST,5/PK,40/PK: Brand: MEDLINE

## (undated) DEVICE — GUIDEWIRE ANGIO L150CM DIA0.035IN PTFE STR BENT FIX COR

## (undated) DEVICE — BAG PRSS INFUS 500ML NYL NETTED BK VISIBLE COLOR-CODED G L

## (undated) DEVICE — 3M™ TEGADERM™ CHG CHLORHEXIDINE GLUCONATE GEL PAD 1664, 25 EACH/CARTON, 4 CARTONS/CASE: Brand: 3M™ TEGADERM™

## (undated) DEVICE — 3M™ TEGADERM™ TRANSPARENT FILM DRESSING FRAME STYLE, 1624W, 2-3/8 IN X 2-3/4 IN (6 CM X 7 CM), 100/CT 4CT/CASE: Brand: 3M™ TEGADERM™

## (undated) DEVICE — PRESSURE TUBING: Brand: TRUWAVE

## (undated) DEVICE — DRAPE SURG W82XL124IN SMS INTVENT FEM ANGIO PCH POLY CIR FEN

## (undated) DEVICE — DECANTER BAG 9": Brand: MEDLINE INDUSTRIES, INC.

## (undated) DEVICE — NEEDLE HYPO 16GA L1.5IN PUR POLYPR HUB S STL REG BVL STR

## (undated) DEVICE — IR TRAY: Brand: MEDLINE INDUSTRIES, INC.

## (undated) DEVICE — Device

## (undated) DEVICE — KIT OR ROOM TURNOVER W/STRAP

## (undated) DEVICE — GAUZE,SPONGE,4"X4",16PLY,XRAY,STRL,LF: Brand: MEDLINE

## (undated) DEVICE — TOWEL,OR,DSP,ST,BLUE,DLX,8/PK,10PK/CS: Brand: MEDLINE

## (undated) DEVICE — 34" SINGLE PATIENT USE HOVERMATT BREATHABLE: Brand: SINGLE PATIENT USE HOVERMATT

## (undated) DEVICE — PINNACLE R/O II INTRODUCER SHEATH WITH RADIOPAQUE MARKER: Brand: PINNACLE

## (undated) DEVICE — APPLICATOR PREP 26ML 0.7% IOD POVACRYLEX 74% ISO ALC ST

## (undated) DEVICE — TOTAL TRAY, 16FR 10ML SIL FOLEY, URN: Brand: MEDLINE

## (undated) DEVICE — PROVE COVER: Brand: UNBRANDED

## (undated) DEVICE — COVER US PRB W15XL120CM W/ GEL RUBBERBAND TAPE STRP FLD GEN

## (undated) DEVICE — PACK,UNIVERSAL,NO GOWNS: Brand: MEDLINE

## (undated) DEVICE — DEVICE CTRL FLOWSWITCH 24 PER BX

## (undated) DEVICE — TWIST & GO  SYRINGE KIT, 150 ML(ANGIO - ARTERION, TWIST & GO150ML SYR W/QFT, MC)(60766817): Brand: MEDRAD® TWIST & GO DISPOSABLE SYRINGE 150 ML WITH QUICK FILL TUBE

## (undated) DEVICE — SPONGE GZ W4XL4IN COT 12 PLY TYP VII WVN C FLD DSGN STERILE

## (undated) DEVICE — SNAP KOVER: Brand: UNBRANDED

## (undated) DEVICE — GENERAL: Brand: MEDLINE INDUSTRIES, INC.

## (undated) DEVICE — BASIC SINGLE BASIN 1-LF: Brand: MEDLINE INDUSTRIES, INC.

## (undated) DEVICE — CATHETER ANGIO 5FR L70CM 0.035IN SEG 20CM PGTL FLSH 20 1

## (undated) DEVICE — KIT CATHETER 20GA L12CM ART CUST

## (undated) DEVICE — STOPCOCK FLUID MGMT 3 W 1050 PSI IV ROTATABLE HI PRESSURE

## (undated) DEVICE — STAPLER SKIN H3.9MM WIRE DIA0.58MM CRWN 6.9MM 35 STPL ROT

## (undated) DEVICE — RADIFOCUS GLIDEWIRE: Brand: GLIDEWIRE

## (undated) DEVICE — STRIP SKIN CLSR W1XL5IN NYLON REINF CURAD STERIL

## (undated) DEVICE — KIT MICROINTRODUCER NIT TUNGSTEN GWIRE ECHOGENIC CATH MINI

## (undated) DEVICE — 3M™ TEGADERM™ TRANSPARENT FILM DRESSING FRAME STYLE, 1626W, 4 IN X 4-3/4 IN (10 CM X 12 CM), 50/CT 4CT/CASE: Brand: 3M™ TEGADERM™

## (undated) DEVICE — RADIFOCUS GLIDECATH: Brand: GLIDECATH

## (undated) DEVICE — TWIST & GO, HPCT, COEX, 60''(ANGIO - ART, HPCT, TWIST & GO, CO-EX, 60")(60766876): Brand: MEDRAD® TWIST & GO STERILE HIGH PRESSURE CONNECTOR TUBING, 150CM (60IN)

## (undated) DEVICE — SYRINGE 20ML LL S/C 50

## (undated) DEVICE — PERCLOSE PROGLIDE™ SUTURE-MEDIATED CLOSURE SYSTEM: Brand: PERCLOSE PROGLIDE™

## (undated) DEVICE — 3M™ IOBAN™ 2 ANTIMICROBIAL INCISE DRAPE 6648EZ: Brand: IOBAN™ 2

## (undated) DEVICE — 3M™ TEGADERM™ CHG DRESSING 25/CARTON 4 CARTONS/CASE 1657: Brand: TEGADERM™

## (undated) DEVICE — BLADE CLIPPER GEN PURP NS

## (undated) DEVICE — SYRINGE MED 30ML STD CLR PLAS LUERLOCK TIP N CTRL DISP